# Patient Record
Sex: FEMALE | Race: WHITE | NOT HISPANIC OR LATINO | Employment: OTHER | URBAN - METROPOLITAN AREA
[De-identification: names, ages, dates, MRNs, and addresses within clinical notes are randomized per-mention and may not be internally consistent; named-entity substitution may affect disease eponyms.]

---

## 2017-01-04 ENCOUNTER — COMMUNICATION - HEALTHEAST (OUTPATIENT)
Dept: INTERNAL MEDICINE | Facility: CLINIC | Age: 67
End: 2017-01-04

## 2017-01-04 DIAGNOSIS — E13.9 DIABETES 1.5, MANAGED AS TYPE 2 (H): ICD-10-CM

## 2017-02-18 ENCOUNTER — COMMUNICATION - HEALTHEAST (OUTPATIENT)
Dept: INTERNAL MEDICINE | Facility: CLINIC | Age: 67
End: 2017-02-18

## 2017-02-26 ENCOUNTER — COMMUNICATION - HEALTHEAST (OUTPATIENT)
Dept: INTERNAL MEDICINE | Facility: CLINIC | Age: 67
End: 2017-02-26

## 2017-02-26 DIAGNOSIS — E03.9 HYPOTHYROIDISM: ICD-10-CM

## 2017-03-05 ENCOUNTER — COMMUNICATION - HEALTHEAST (OUTPATIENT)
Dept: INTERNAL MEDICINE | Facility: CLINIC | Age: 67
End: 2017-03-05

## 2017-03-05 DIAGNOSIS — E03.9 HYPOTHYROIDISM: ICD-10-CM

## 2017-03-18 ENCOUNTER — COMMUNICATION - HEALTHEAST (OUTPATIENT)
Dept: ONCOLOGY | Facility: HOSPITAL | Age: 67
End: 2017-03-18

## 2017-03-18 DIAGNOSIS — K21.9 GASTROESOPHAGEAL REFLUX DISEASE WITHOUT ESOPHAGITIS: ICD-10-CM

## 2017-03-24 ENCOUNTER — RECORDS - HEALTHEAST (OUTPATIENT)
Dept: ADMINISTRATIVE | Facility: OTHER | Age: 67
End: 2017-03-24

## 2017-03-29 ENCOUNTER — RECORDS - HEALTHEAST (OUTPATIENT)
Dept: MAMMOGRAPHY | Facility: CLINIC | Age: 67
End: 2017-03-29

## 2017-03-29 ENCOUNTER — RECORDS - HEALTHEAST (OUTPATIENT)
Dept: BONE DENSITY | Facility: CLINIC | Age: 67
End: 2017-03-29

## 2017-03-29 ENCOUNTER — RECORDS - HEALTHEAST (OUTPATIENT)
Dept: ADMINISTRATIVE | Facility: OTHER | Age: 67
End: 2017-03-29

## 2017-03-29 ENCOUNTER — OFFICE VISIT - HEALTHEAST (OUTPATIENT)
Dept: INTERNAL MEDICINE | Facility: CLINIC | Age: 67
End: 2017-03-29

## 2017-03-29 DIAGNOSIS — J44.9 COPD (CHRONIC OBSTRUCTIVE PULMONARY DISEASE) (H): ICD-10-CM

## 2017-03-29 DIAGNOSIS — C34.91 NON-SMALL CELL CANCER OF RIGHT LUNG (H): ICD-10-CM

## 2017-03-29 DIAGNOSIS — J44.0 COPD (CHRONIC OBSTRUCTIVE PULMONARY DISEASE) WITH ACUTE BRONCHITIS (H): ICD-10-CM

## 2017-03-29 DIAGNOSIS — E13.9 DIABETES 1.5, MANAGED AS TYPE 2 (H): ICD-10-CM

## 2017-03-29 DIAGNOSIS — H49.22: ICD-10-CM

## 2017-03-29 DIAGNOSIS — J70.0 ACUTE PULMONARY MANIFESTATIONS DUE TO RADIATION (H): ICD-10-CM

## 2017-03-29 DIAGNOSIS — E11.9 DIABETES MELLITUS (H): ICD-10-CM

## 2017-03-29 DIAGNOSIS — Z12.31 ENCOUNTER FOR SCREENING MAMMOGRAM FOR MALIGNANT NEOPLASM OF BREAST: ICD-10-CM

## 2017-03-29 DIAGNOSIS — E03.9 HYPOTHYROIDISM, UNSPECIFIED TYPE: ICD-10-CM

## 2017-03-29 DIAGNOSIS — J20.9 COPD (CHRONIC OBSTRUCTIVE PULMONARY DISEASE) WITH ACUTE BRONCHITIS (H): ICD-10-CM

## 2017-03-29 DIAGNOSIS — Z13.820 ENCOUNTER FOR SCREENING FOR OSTEOPOROSIS: ICD-10-CM

## 2017-03-29 DIAGNOSIS — Z13.820 SCREENING FOR OSTEOPOROSIS: ICD-10-CM

## 2017-03-29 LAB — HBA1C MFR BLD: 7.1 % (ref 3.5–6)

## 2017-03-29 ASSESSMENT — MIFFLIN-ST. JEOR: SCORE: 1376.35

## 2017-03-31 ENCOUNTER — HOSPITAL ENCOUNTER (OUTPATIENT)
Dept: CT IMAGING | Facility: HOSPITAL | Age: 67
Setting detail: RADIATION/ONCOLOGY SERIES
Discharge: STILL ADMITTED OR ADMINISTRATIVE | End: 2017-03-31
Attending: INTERNAL MEDICINE

## 2017-03-31 DIAGNOSIS — C34.91 NON-SMALL CELL CANCER OF RIGHT LUNG (H): ICD-10-CM

## 2017-03-31 DIAGNOSIS — E13.9 DIABETES 1.5, MANAGED AS TYPE 2 (H): ICD-10-CM

## 2017-03-31 DIAGNOSIS — J44.9 COPD (CHRONIC OBSTRUCTIVE PULMONARY DISEASE) (H): ICD-10-CM

## 2017-03-31 DIAGNOSIS — J70.0 ACUTE PULMONARY MANIFESTATIONS DUE TO RADIATION (H): ICD-10-CM

## 2017-04-05 ENCOUNTER — OFFICE VISIT - HEALTHEAST (OUTPATIENT)
Dept: ONCOLOGY | Facility: HOSPITAL | Age: 67
End: 2017-04-05

## 2017-04-05 DIAGNOSIS — C34.91 NON-SMALL CELL CANCER OF RIGHT LUNG (H): ICD-10-CM

## 2017-04-05 DIAGNOSIS — H49.22: ICD-10-CM

## 2017-04-06 ENCOUNTER — COMMUNICATION - HEALTHEAST (OUTPATIENT)
Dept: ONCOLOGY | Facility: CLINIC | Age: 67
End: 2017-04-06

## 2017-04-18 ENCOUNTER — COMMUNICATION - HEALTHEAST (OUTPATIENT)
Dept: INTERNAL MEDICINE | Facility: CLINIC | Age: 67
End: 2017-04-18

## 2017-04-18 DIAGNOSIS — J44.9 COPD (CHRONIC OBSTRUCTIVE PULMONARY DISEASE) (H): ICD-10-CM

## 2017-05-02 ENCOUNTER — COMMUNICATION - HEALTHEAST (OUTPATIENT)
Dept: INTERNAL MEDICINE | Facility: CLINIC | Age: 67
End: 2017-05-02

## 2017-05-02 DIAGNOSIS — E78.5 HYPERLIPIDEMIA: ICD-10-CM

## 2017-05-04 ENCOUNTER — COMMUNICATION - HEALTHEAST (OUTPATIENT)
Dept: ONCOLOGY | Facility: HOSPITAL | Age: 67
End: 2017-05-04

## 2017-05-04 DIAGNOSIS — I77.89 OTHER SPECIFIED DISORDERS OF ARTERIES AND ARTERIOLES (H): ICD-10-CM

## 2017-05-04 DIAGNOSIS — H53.2 DOUBLE VISION: ICD-10-CM

## 2017-05-08 ENCOUNTER — HOSPITAL ENCOUNTER (OUTPATIENT)
Dept: MRI IMAGING | Facility: HOSPITAL | Age: 67
Discharge: HOME | End: 2017-05-08
Attending: INTERNAL MEDICINE

## 2017-05-08 DIAGNOSIS — H53.2 DOUBLE VISION: ICD-10-CM

## 2017-05-08 DIAGNOSIS — I77.89 OTHER SPECIFIED DISORDERS OF ARTERIES AND ARTERIOLES (H): ICD-10-CM

## 2017-05-09 ENCOUNTER — COMMUNICATION - HEALTHEAST (OUTPATIENT)
Dept: ONCOLOGY | Facility: HOSPITAL | Age: 67
End: 2017-05-09

## 2017-05-09 DIAGNOSIS — J32.9 SINUSITIS: ICD-10-CM

## 2017-05-18 ENCOUNTER — COMMUNICATION - HEALTHEAST (OUTPATIENT)
Dept: ONCOLOGY | Facility: HOSPITAL | Age: 67
End: 2017-05-18

## 2017-05-18 ENCOUNTER — COMMUNICATION - HEALTHEAST (OUTPATIENT)
Dept: INTERNAL MEDICINE | Facility: CLINIC | Age: 67
End: 2017-05-18

## 2017-05-18 DIAGNOSIS — J44.9 COPD (CHRONIC OBSTRUCTIVE PULMONARY DISEASE) (H): ICD-10-CM

## 2017-05-23 ENCOUNTER — RECORDS - HEALTHEAST (OUTPATIENT)
Dept: ADMINISTRATIVE | Facility: OTHER | Age: 67
End: 2017-05-23

## 2017-05-23 ENCOUNTER — AMBULATORY - HEALTHEAST (OUTPATIENT)
Dept: LAB | Facility: CLINIC | Age: 67
End: 2017-05-23

## 2017-05-23 DIAGNOSIS — H49.01 THIRD NERVE PALSY OF RIGHT EYE: ICD-10-CM

## 2017-05-28 ENCOUNTER — COMMUNICATION - HEALTHEAST (OUTPATIENT)
Dept: INTERNAL MEDICINE | Facility: CLINIC | Age: 67
End: 2017-05-28

## 2017-05-28 DIAGNOSIS — E03.9 HYPOTHYROIDISM: ICD-10-CM

## 2017-06-12 ENCOUNTER — RECORDS - HEALTHEAST (OUTPATIENT)
Dept: ADMINISTRATIVE | Facility: OTHER | Age: 67
End: 2017-06-12

## 2017-06-21 ENCOUNTER — COMMUNICATION - HEALTHEAST (OUTPATIENT)
Dept: INTERNAL MEDICINE | Facility: CLINIC | Age: 67
End: 2017-06-21

## 2017-06-21 DIAGNOSIS — E13.9 DIABETES 1.5, MANAGED AS TYPE 2 (H): ICD-10-CM

## 2017-06-22 ENCOUNTER — COMMUNICATION - HEALTHEAST (OUTPATIENT)
Dept: INTERNAL MEDICINE | Facility: CLINIC | Age: 67
End: 2017-06-22

## 2017-06-22 DIAGNOSIS — E03.9 HYPOTHYROIDISM: ICD-10-CM

## 2017-06-29 ENCOUNTER — COMMUNICATION - HEALTHEAST (OUTPATIENT)
Dept: INTERNAL MEDICINE | Facility: CLINIC | Age: 67
End: 2017-06-29

## 2017-06-29 DIAGNOSIS — E13.9 DIABETES 1.5, MANAGED AS TYPE 2 (H): ICD-10-CM

## 2017-07-11 ENCOUNTER — RECORDS - HEALTHEAST (OUTPATIENT)
Dept: ADMINISTRATIVE | Facility: OTHER | Age: 67
End: 2017-07-11

## 2017-07-18 ENCOUNTER — COMMUNICATION - HEALTHEAST (OUTPATIENT)
Dept: ONCOLOGY | Facility: HOSPITAL | Age: 67
End: 2017-07-18

## 2017-07-21 ENCOUNTER — COMMUNICATION - HEALTHEAST (OUTPATIENT)
Dept: INTERNAL MEDICINE | Facility: CLINIC | Age: 67
End: 2017-07-21

## 2017-07-21 DIAGNOSIS — J44.89 CHRONIC AIRWAY OBSTRUCTION, NOT ELSEWHERE CLASSIFIED: ICD-10-CM

## 2017-09-10 ENCOUNTER — COMMUNICATION - HEALTHEAST (OUTPATIENT)
Dept: INTERNAL MEDICINE | Facility: CLINIC | Age: 67
End: 2017-09-10

## 2017-09-10 DIAGNOSIS — I10 HYPERTENSION: ICD-10-CM

## 2017-09-11 ENCOUNTER — COMMUNICATION - HEALTHEAST (OUTPATIENT)
Dept: INTERNAL MEDICINE | Facility: CLINIC | Age: 67
End: 2017-09-11

## 2017-09-11 DIAGNOSIS — J44.9 COPD (CHRONIC OBSTRUCTIVE PULMONARY DISEASE) (H): ICD-10-CM

## 2017-09-17 ENCOUNTER — COMMUNICATION - HEALTHEAST (OUTPATIENT)
Dept: INTERNAL MEDICINE | Facility: CLINIC | Age: 67
End: 2017-09-17

## 2017-09-17 DIAGNOSIS — J44.9 COPD (CHRONIC OBSTRUCTIVE PULMONARY DISEASE) (H): ICD-10-CM

## 2017-09-17 DIAGNOSIS — E13.9 DIABETES 1.5, MANAGED AS TYPE 2 (H): ICD-10-CM

## 2017-10-14 ENCOUNTER — COMMUNICATION - HEALTHEAST (OUTPATIENT)
Dept: ONCOLOGY | Facility: HOSPITAL | Age: 67
End: 2017-10-14

## 2017-10-14 DIAGNOSIS — K21.9 GASTROESOPHAGEAL REFLUX DISEASE WITHOUT ESOPHAGITIS: ICD-10-CM

## 2017-10-20 ENCOUNTER — COMMUNICATION - HEALTHEAST (OUTPATIENT)
Dept: INTERNAL MEDICINE | Facility: CLINIC | Age: 67
End: 2017-10-20

## 2017-10-24 ENCOUNTER — OFFICE VISIT - HEALTHEAST (OUTPATIENT)
Dept: INTERNAL MEDICINE | Facility: CLINIC | Age: 67
End: 2017-10-24

## 2017-10-24 DIAGNOSIS — E11.9 DIABETES MELLITUS (H): ICD-10-CM

## 2017-10-24 DIAGNOSIS — H91.90 HEARING LOSS: ICD-10-CM

## 2017-10-24 DIAGNOSIS — Z00.00 HEALTHCARE MAINTENANCE: ICD-10-CM

## 2017-10-24 DIAGNOSIS — J01.90 ACUTE SINUSITIS: ICD-10-CM

## 2017-10-24 DIAGNOSIS — L30.9 ECZEMA OF BOTH HANDS: ICD-10-CM

## 2017-10-24 LAB — HBA1C MFR BLD: 6.2 % (ref 3.5–6)

## 2017-11-09 ENCOUNTER — COMMUNICATION - HEALTHEAST (OUTPATIENT)
Dept: SCHEDULING | Facility: CLINIC | Age: 67
End: 2017-11-09

## 2017-11-19 ENCOUNTER — COMMUNICATION - HEALTHEAST (OUTPATIENT)
Dept: INTERNAL MEDICINE | Facility: CLINIC | Age: 67
End: 2017-11-19

## 2017-11-19 DIAGNOSIS — E03.9 HYPOTHYROIDISM: ICD-10-CM

## 2017-11-30 ENCOUNTER — COMMUNICATION - HEALTHEAST (OUTPATIENT)
Dept: INTERNAL MEDICINE | Facility: CLINIC | Age: 67
End: 2017-11-30

## 2017-11-30 DIAGNOSIS — Z51.81 MEDICATION MONITORING ENCOUNTER: ICD-10-CM

## 2017-12-06 ENCOUNTER — OFFICE VISIT - HEALTHEAST (OUTPATIENT)
Dept: OTOLARYNGOLOGY | Facility: CLINIC | Age: 67
End: 2017-12-06

## 2017-12-06 ENCOUNTER — OFFICE VISIT - HEALTHEAST (OUTPATIENT)
Dept: AUDIOLOGY | Facility: CLINIC | Age: 67
End: 2017-12-06

## 2017-12-06 DIAGNOSIS — H60.41 KERATOSIS OBTURANS OF EXTERNAL EAR CANAL, RIGHT: ICD-10-CM

## 2017-12-06 DIAGNOSIS — H92.01 EAR PAIN, RIGHT: ICD-10-CM

## 2017-12-06 DIAGNOSIS — H91.91 DIFFICULTY HEARING, RIGHT: ICD-10-CM

## 2017-12-06 DIAGNOSIS — H60.311 ACUTE DIFFUSE OTITIS EXTERNA OF RIGHT EAR: ICD-10-CM

## 2017-12-06 DIAGNOSIS — H93.8X3 PLUGGED FEELING IN EAR, BILATERAL: ICD-10-CM

## 2017-12-06 DIAGNOSIS — H93.11 TINNITUS OF RIGHT EAR: ICD-10-CM

## 2017-12-06 DIAGNOSIS — H61.21 IMPACTED CERUMEN OF RIGHT EAR: ICD-10-CM

## 2017-12-13 ENCOUNTER — OFFICE VISIT - HEALTHEAST (OUTPATIENT)
Dept: OTOLARYNGOLOGY | Facility: CLINIC | Age: 67
End: 2017-12-13

## 2017-12-13 DIAGNOSIS — H60.41 KERATOSIS OBTURANS OF EXTERNAL EAR CANAL, RIGHT: ICD-10-CM

## 2017-12-19 ENCOUNTER — COMMUNICATION - HEALTHEAST (OUTPATIENT)
Dept: INTERNAL MEDICINE | Facility: CLINIC | Age: 67
End: 2017-12-19

## 2017-12-19 DIAGNOSIS — E13.9 DIABETES 1.5, MANAGED AS TYPE 2 (H): ICD-10-CM

## 2017-12-22 ENCOUNTER — COMMUNICATION - HEALTHEAST (OUTPATIENT)
Dept: INTERNAL MEDICINE | Facility: CLINIC | Age: 67
End: 2017-12-22

## 2017-12-22 DIAGNOSIS — E13.9 DIABETES 1.5, MANAGED AS TYPE 2 (H): ICD-10-CM

## 2018-01-02 ENCOUNTER — COMMUNICATION - HEALTHEAST (OUTPATIENT)
Dept: INTERNAL MEDICINE | Facility: CLINIC | Age: 68
End: 2018-01-02

## 2018-01-02 DIAGNOSIS — J20.9 COPD (CHRONIC OBSTRUCTIVE PULMONARY DISEASE) WITH ACUTE BRONCHITIS (H): ICD-10-CM

## 2018-01-02 DIAGNOSIS — J44.0 COPD (CHRONIC OBSTRUCTIVE PULMONARY DISEASE) WITH ACUTE BRONCHITIS (H): ICD-10-CM

## 2018-01-24 ENCOUNTER — OFFICE VISIT - HEALTHEAST (OUTPATIENT)
Dept: OTOLARYNGOLOGY | Facility: CLINIC | Age: 68
End: 2018-01-24

## 2018-01-24 DIAGNOSIS — H60.41 KERATOSIS OBTURANS OF EXTERNAL EAR CANAL, RIGHT: ICD-10-CM

## 2018-02-25 ENCOUNTER — COMMUNICATION - HEALTHEAST (OUTPATIENT)
Dept: INTERNAL MEDICINE | Facility: CLINIC | Age: 68
End: 2018-02-25

## 2018-02-25 DIAGNOSIS — E78.5 HYPERLIPIDEMIA: ICD-10-CM

## 2018-02-26 ENCOUNTER — COMMUNICATION - HEALTHEAST (OUTPATIENT)
Dept: INTERNAL MEDICINE | Facility: CLINIC | Age: 68
End: 2018-02-26

## 2018-02-26 DIAGNOSIS — J44.9 COPD (CHRONIC OBSTRUCTIVE PULMONARY DISEASE) (H): ICD-10-CM

## 2018-02-28 ENCOUNTER — COMMUNICATION - HEALTHEAST (OUTPATIENT)
Dept: INTERNAL MEDICINE | Facility: CLINIC | Age: 68
End: 2018-02-28

## 2018-02-28 DIAGNOSIS — E03.9 HYPOTHYROIDISM: ICD-10-CM

## 2018-03-26 ENCOUNTER — COMMUNICATION - HEALTHEAST (OUTPATIENT)
Dept: INTERNAL MEDICINE | Facility: CLINIC | Age: 68
End: 2018-03-26

## 2018-03-26 DIAGNOSIS — E13.9 DIABETES 1.5, MANAGED AS TYPE 2 (H): ICD-10-CM

## 2018-03-29 ENCOUNTER — HOSPITAL ENCOUNTER (OUTPATIENT)
Dept: CT IMAGING | Facility: HOSPITAL | Age: 68
Setting detail: RADIATION/ONCOLOGY SERIES
Discharge: STILL ADMITTED OR ADMINISTRATIVE | End: 2018-03-29
Attending: INTERNAL MEDICINE

## 2018-03-29 DIAGNOSIS — C34.91 NON-SMALL CELL CANCER OF RIGHT LUNG (H): ICD-10-CM

## 2018-03-29 DIAGNOSIS — H49.22: ICD-10-CM

## 2018-04-02 ENCOUNTER — OFFICE VISIT - HEALTHEAST (OUTPATIENT)
Dept: ONCOLOGY | Facility: HOSPITAL | Age: 68
End: 2018-04-02

## 2018-04-02 ENCOUNTER — AMBULATORY - HEALTHEAST (OUTPATIENT)
Dept: INFUSION THERAPY | Facility: HOSPITAL | Age: 68
End: 2018-04-02

## 2018-04-02 ENCOUNTER — COMMUNICATION - HEALTHEAST (OUTPATIENT)
Dept: INTERNAL MEDICINE | Facility: CLINIC | Age: 68
End: 2018-04-02

## 2018-04-02 DIAGNOSIS — C34.91 NON-SMALL CELL CANCER OF RIGHT LUNG (H): ICD-10-CM

## 2018-04-02 DIAGNOSIS — J44.9 COPD (CHRONIC OBSTRUCTIVE PULMONARY DISEASE) (H): ICD-10-CM

## 2018-04-02 DIAGNOSIS — H49.22: ICD-10-CM

## 2018-04-02 LAB
ALBUMIN SERPL-MCNC: 3.6 G/DL (ref 3.5–5)
ALP SERPL-CCNC: 84 U/L (ref 45–120)
ALT SERPL W P-5'-P-CCNC: 20 U/L (ref 0–45)
ANION GAP SERPL CALCULATED.3IONS-SCNC: 9 MMOL/L (ref 5–18)
AST SERPL W P-5'-P-CCNC: 22 U/L (ref 0–40)
BASOPHILS # BLD AUTO: 0.1 THOU/UL (ref 0–0.2)
BASOPHILS NFR BLD AUTO: 1 % (ref 0–2)
BILIRUB SERPL-MCNC: 0.5 MG/DL (ref 0–1)
BUN SERPL-MCNC: 15 MG/DL (ref 8–22)
CALCIUM SERPL-MCNC: 9.5 MG/DL (ref 8.5–10.5)
CHLORIDE BLD-SCNC: 99 MMOL/L (ref 98–107)
CO2 SERPL-SCNC: 32 MMOL/L (ref 22–31)
CREAT SERPL-MCNC: 1.11 MG/DL (ref 0.6–1.1)
EOSINOPHIL # BLD AUTO: 0.1 THOU/UL (ref 0–0.4)
EOSINOPHIL NFR BLD AUTO: 1 % (ref 0–6)
ERYTHROCYTE [DISTWIDTH] IN BLOOD BY AUTOMATED COUNT: 13.3 % (ref 11–14.5)
GFR SERPL CREATININE-BSD FRML MDRD: 49 ML/MIN/1.73M2
GLUCOSE BLD-MCNC: 131 MG/DL (ref 70–125)
HCT VFR BLD AUTO: 38.6 % (ref 35–47)
HGB BLD-MCNC: 12.8 G/DL (ref 12–16)
LYMPHOCYTES # BLD AUTO: 2.4 THOU/UL (ref 0.8–4.4)
LYMPHOCYTES NFR BLD AUTO: 25 % (ref 20–40)
MCH RBC QN AUTO: 29.8 PG (ref 27–34)
MCHC RBC AUTO-ENTMCNC: 33.2 G/DL (ref 32–36)
MCV RBC AUTO: 90 FL (ref 80–100)
MONOCYTES # BLD AUTO: 1.1 THOU/UL (ref 0–0.9)
MONOCYTES NFR BLD AUTO: 11 % (ref 2–10)
NEUTROPHILS # BLD AUTO: 5.9 THOU/UL (ref 2–7.7)
NEUTROPHILS NFR BLD AUTO: 62 % (ref 50–70)
PLATELET # BLD AUTO: 233 THOU/UL (ref 140–440)
PMV BLD AUTO: 10.9 FL (ref 8.5–12.5)
POTASSIUM BLD-SCNC: 4.3 MMOL/L (ref 3.5–5)
PROT SERPL-MCNC: 7.4 G/DL (ref 6–8)
RBC # BLD AUTO: 4.3 MILL/UL (ref 3.8–5.4)
SODIUM SERPL-SCNC: 140 MMOL/L (ref 136–145)
WBC: 9.6 THOU/UL (ref 4–11)

## 2018-04-03 ENCOUNTER — RECORDS - HEALTHEAST (OUTPATIENT)
Dept: ADMINISTRATIVE | Facility: OTHER | Age: 68
End: 2018-04-03

## 2018-04-03 LAB — RETINOPATHY: NEGATIVE

## 2018-04-17 ENCOUNTER — COMMUNICATION - HEALTHEAST (OUTPATIENT)
Dept: INTERNAL MEDICINE | Facility: CLINIC | Age: 68
End: 2018-04-17

## 2018-04-25 ENCOUNTER — COMMUNICATION - HEALTHEAST (OUTPATIENT)
Dept: ONCOLOGY | Facility: CLINIC | Age: 68
End: 2018-04-25

## 2018-04-25 ENCOUNTER — AMBULATORY - HEALTHEAST (OUTPATIENT)
Dept: ONCOLOGY | Facility: CLINIC | Age: 68
End: 2018-04-25

## 2018-05-26 ENCOUNTER — COMMUNICATION - HEALTHEAST (OUTPATIENT)
Dept: INTERNAL MEDICINE | Facility: CLINIC | Age: 68
End: 2018-05-26

## 2018-05-26 DIAGNOSIS — J44.9 COPD (CHRONIC OBSTRUCTIVE PULMONARY DISEASE) (H): ICD-10-CM

## 2018-06-10 ENCOUNTER — COMMUNICATION - HEALTHEAST (OUTPATIENT)
Dept: INTERNAL MEDICINE | Facility: CLINIC | Age: 68
End: 2018-06-10

## 2018-06-10 DIAGNOSIS — E03.9 HYPOTHYROIDISM: ICD-10-CM

## 2018-06-10 DIAGNOSIS — E78.5 HYPERLIPIDEMIA: ICD-10-CM

## 2018-06-10 DIAGNOSIS — I10 HYPERTENSION: ICD-10-CM

## 2018-06-16 ENCOUNTER — COMMUNICATION - HEALTHEAST (OUTPATIENT)
Dept: INTERNAL MEDICINE | Facility: CLINIC | Age: 68
End: 2018-06-16

## 2018-06-16 DIAGNOSIS — E13.9 DIABETES 1.5, MANAGED AS TYPE 2 (H): ICD-10-CM

## 2018-06-23 ENCOUNTER — COMMUNICATION - HEALTHEAST (OUTPATIENT)
Dept: INTERNAL MEDICINE | Facility: CLINIC | Age: 68
End: 2018-06-23

## 2018-06-23 DIAGNOSIS — E13.9 DIABETES 1.5, MANAGED AS TYPE 2 (H): ICD-10-CM

## 2018-07-11 ENCOUNTER — COMMUNICATION - HEALTHEAST (OUTPATIENT)
Dept: INTERNAL MEDICINE | Facility: CLINIC | Age: 68
End: 2018-07-11

## 2018-07-11 DIAGNOSIS — I10 HYPERTENSION: ICD-10-CM

## 2018-07-25 ENCOUNTER — COMMUNICATION - HEALTHEAST (OUTPATIENT)
Dept: INTERNAL MEDICINE | Facility: CLINIC | Age: 68
End: 2018-07-25

## 2018-07-25 ENCOUNTER — OFFICE VISIT - HEALTHEAST (OUTPATIENT)
Dept: OTOLARYNGOLOGY | Facility: CLINIC | Age: 68
End: 2018-07-25

## 2018-07-25 DIAGNOSIS — J44.9 COPD (CHRONIC OBSTRUCTIVE PULMONARY DISEASE) (H): ICD-10-CM

## 2018-07-25 DIAGNOSIS — H60.41 KERATOSIS OBTURANS OF EXTERNAL EAR CANAL, RIGHT: ICD-10-CM

## 2018-08-02 ENCOUNTER — COMMUNICATION - HEALTHEAST (OUTPATIENT)
Dept: INTERNAL MEDICINE | Facility: CLINIC | Age: 68
End: 2018-08-02

## 2018-08-02 DIAGNOSIS — E03.9 HYPOTHYROIDISM: ICD-10-CM

## 2018-08-25 ENCOUNTER — COMMUNICATION - HEALTHEAST (OUTPATIENT)
Dept: INTERNAL MEDICINE | Facility: CLINIC | Age: 68
End: 2018-08-25

## 2018-08-25 DIAGNOSIS — J44.9 COPD (CHRONIC OBSTRUCTIVE PULMONARY DISEASE) (H): ICD-10-CM

## 2018-08-27 ENCOUNTER — RECORDS - HEALTHEAST (OUTPATIENT)
Dept: ADMINISTRATIVE | Facility: OTHER | Age: 68
End: 2018-08-27

## 2018-08-27 ENCOUNTER — OFFICE VISIT - HEALTHEAST (OUTPATIENT)
Dept: INTERNAL MEDICINE | Facility: CLINIC | Age: 68
End: 2018-08-27

## 2018-08-27 DIAGNOSIS — C34.91 NON-SMALL CELL CANCER OF RIGHT LUNG (H): ICD-10-CM

## 2018-08-27 DIAGNOSIS — H49.22: ICD-10-CM

## 2018-08-27 DIAGNOSIS — H60.41 KERATOSIS OBTURANS OF EXTERNAL EAR CANAL, RIGHT: ICD-10-CM

## 2018-08-27 DIAGNOSIS — G47.33 OSA ON CPAP: ICD-10-CM

## 2018-08-27 DIAGNOSIS — E03.9 HYPOTHYROIDISM, UNSPECIFIED TYPE: ICD-10-CM

## 2018-08-27 DIAGNOSIS — E13.9 DIABETES 1.5, MANAGED AS TYPE 2 (H): ICD-10-CM

## 2018-08-27 LAB
CREAT UR-MCNC: 20.8 MG/DL
HBA1C MFR BLD: 6.8 % (ref 3.5–6)
MICROALBUMIN UR-MCNC: <0.5 MG/DL (ref 0–1.99)
MICROALBUMIN/CREAT UR: NORMAL MG/G
TSH SERPL DL<=0.005 MIU/L-ACNC: 1.36 UIU/ML (ref 0.3–5)

## 2018-09-01 ENCOUNTER — COMMUNICATION - HEALTHEAST (OUTPATIENT)
Dept: INTERNAL MEDICINE | Facility: CLINIC | Age: 68
End: 2018-09-01

## 2018-09-01 DIAGNOSIS — E13.9 DIABETES 1.5, MANAGED AS TYPE 2 (H): ICD-10-CM

## 2018-09-01 DIAGNOSIS — E11.8 TYPE 2 DIABETES MELLITUS WITH COMPLICATION, WITHOUT LONG-TERM CURRENT USE OF INSULIN (H): ICD-10-CM

## 2018-09-30 ENCOUNTER — COMMUNICATION - HEALTHEAST (OUTPATIENT)
Dept: INTERNAL MEDICINE | Facility: CLINIC | Age: 68
End: 2018-09-30

## 2018-09-30 DIAGNOSIS — E03.9 HYPOTHYROIDISM: ICD-10-CM

## 2018-09-30 DIAGNOSIS — Z51.81 MEDICATION MONITORING ENCOUNTER: ICD-10-CM

## 2018-10-14 ENCOUNTER — COMMUNICATION - HEALTHEAST (OUTPATIENT)
Dept: INTERNAL MEDICINE | Facility: CLINIC | Age: 68
End: 2018-10-14

## 2018-10-14 DIAGNOSIS — J44.9 COPD (CHRONIC OBSTRUCTIVE PULMONARY DISEASE) (H): ICD-10-CM

## 2018-11-28 ENCOUNTER — OFFICE VISIT - HEALTHEAST (OUTPATIENT)
Dept: INTERNAL MEDICINE | Facility: CLINIC | Age: 68
End: 2018-11-28

## 2018-11-28 DIAGNOSIS — J44.1 COPD EXACERBATION (H): ICD-10-CM

## 2018-11-28 ASSESSMENT — MIFFLIN-ST. JEOR: SCORE: 1355.03

## 2018-12-02 ENCOUNTER — COMMUNICATION - HEALTHEAST (OUTPATIENT)
Dept: INTERNAL MEDICINE | Facility: CLINIC | Age: 68
End: 2018-12-02

## 2018-12-02 DIAGNOSIS — E03.9 HYPOTHYROIDISM: ICD-10-CM

## 2018-12-10 ENCOUNTER — HOSPITAL ENCOUNTER (OUTPATIENT)
Dept: CT IMAGING | Facility: HOSPITAL | Age: 68
Discharge: HOME | End: 2018-12-10
Attending: INTERNAL MEDICINE

## 2018-12-10 DIAGNOSIS — C34.91 NON-SMALL CELL CANCER OF RIGHT LUNG (H): ICD-10-CM

## 2018-12-12 ENCOUNTER — AMBULATORY - HEALTHEAST (OUTPATIENT)
Dept: INFUSION THERAPY | Facility: HOSPITAL | Age: 68
End: 2018-12-12

## 2018-12-12 ENCOUNTER — AMBULATORY - HEALTHEAST (OUTPATIENT)
Dept: ONCOLOGY | Facility: HOSPITAL | Age: 68
End: 2018-12-12

## 2018-12-12 ENCOUNTER — OFFICE VISIT - HEALTHEAST (OUTPATIENT)
Dept: ONCOLOGY | Facility: HOSPITAL | Age: 68
End: 2018-12-12

## 2018-12-12 DIAGNOSIS — C34.91 NON-SMALL CELL CANCER OF RIGHT LUNG (H): ICD-10-CM

## 2018-12-12 LAB
ALBUMIN SERPL-MCNC: 3.8 G/DL (ref 3.5–5)
ALP SERPL-CCNC: 72 U/L (ref 45–120)
ALT SERPL W P-5'-P-CCNC: 22 U/L (ref 0–45)
ANION GAP SERPL CALCULATED.3IONS-SCNC: 9 MMOL/L (ref 5–18)
AST SERPL W P-5'-P-CCNC: 19 U/L (ref 0–40)
BASOPHILS # BLD AUTO: 0.1 THOU/UL (ref 0–0.2)
BASOPHILS NFR BLD AUTO: 1 % (ref 0–2)
BILIRUB SERPL-MCNC: 0.7 MG/DL (ref 0–1)
BUN SERPL-MCNC: 26 MG/DL (ref 8–22)
CALCIUM SERPL-MCNC: 9.4 MG/DL (ref 8.5–10.5)
CHLORIDE BLD-SCNC: 97 MMOL/L (ref 98–107)
CO2 SERPL-SCNC: 33 MMOL/L (ref 22–31)
CREAT SERPL-MCNC: 1.1 MG/DL (ref 0.6–1.1)
EOSINOPHIL # BLD AUTO: 0.3 THOU/UL (ref 0–0.4)
EOSINOPHIL NFR BLD AUTO: 2 % (ref 0–6)
ERYTHROCYTE [DISTWIDTH] IN BLOOD BY AUTOMATED COUNT: 14.1 % (ref 11–14.5)
GFR SERPL CREATININE-BSD FRML MDRD: 49 ML/MIN/1.73M2
GLUCOSE BLD-MCNC: 131 MG/DL (ref 70–125)
HCT VFR BLD AUTO: 42 % (ref 35–47)
HGB BLD-MCNC: 13.9 G/DL (ref 12–16)
LYMPHOCYTES # BLD AUTO: 4 THOU/UL (ref 0.8–4.4)
LYMPHOCYTES NFR BLD AUTO: 32 % (ref 20–40)
MCH RBC QN AUTO: 29.6 PG (ref 27–34)
MCHC RBC AUTO-ENTMCNC: 33.1 G/DL (ref 32–36)
MCV RBC AUTO: 90 FL (ref 80–100)
MONOCYTES # BLD AUTO: 1.3 THOU/UL (ref 0–0.9)
MONOCYTES NFR BLD AUTO: 11 % (ref 2–10)
NEUTROPHILS # BLD AUTO: 6.5 THOU/UL (ref 2–7.7)
NEUTROPHILS NFR BLD AUTO: 54 % (ref 50–70)
PLATELET # BLD AUTO: 281 THOU/UL (ref 140–440)
PMV BLD AUTO: 11.2 FL (ref 8.5–12.5)
POTASSIUM BLD-SCNC: 3.9 MMOL/L (ref 3.5–5)
PROT SERPL-MCNC: 6.9 G/DL (ref 6–8)
RBC # BLD AUTO: 4.69 MILL/UL (ref 3.8–5.4)
SODIUM SERPL-SCNC: 139 MMOL/L (ref 136–145)
WBC: 12.4 THOU/UL (ref 4–11)

## 2018-12-28 ENCOUNTER — COMMUNICATION - HEALTHEAST (OUTPATIENT)
Dept: SCHEDULING | Facility: CLINIC | Age: 68
End: 2018-12-28

## 2018-12-31 ENCOUNTER — OFFICE VISIT - HEALTHEAST (OUTPATIENT)
Dept: INTERNAL MEDICINE | Facility: CLINIC | Age: 68
End: 2018-12-31

## 2018-12-31 DIAGNOSIS — Z12.31 VISIT FOR SCREENING MAMMOGRAM: ICD-10-CM

## 2018-12-31 DIAGNOSIS — K59.1 FUNCTIONAL DIARRHEA: ICD-10-CM

## 2018-12-31 DIAGNOSIS — J20.9 ACUTE BRONCHITIS, UNSPECIFIED ORGANISM: ICD-10-CM

## 2018-12-31 DIAGNOSIS — E03.9 HYPOTHYROIDISM, UNSPECIFIED TYPE: ICD-10-CM

## 2018-12-31 DIAGNOSIS — E13.9 DIABETES 1.5, MANAGED AS TYPE 2 (H): ICD-10-CM

## 2018-12-31 DIAGNOSIS — J44.9 CHRONIC OBSTRUCTIVE PULMONARY DISEASE, UNSPECIFIED COPD TYPE (H): ICD-10-CM

## 2018-12-31 DIAGNOSIS — C34.91 NON-SMALL CELL CANCER OF RIGHT LUNG (H): ICD-10-CM

## 2018-12-31 ASSESSMENT — MIFFLIN-ST. JEOR: SCORE: 1333.72

## 2019-02-05 ENCOUNTER — COMMUNICATION - HEALTHEAST (OUTPATIENT)
Dept: INTERNAL MEDICINE | Facility: CLINIC | Age: 69
End: 2019-02-05

## 2019-02-05 DIAGNOSIS — J44.9 CHRONIC OBSTRUCTIVE PULMONARY DISEASE, UNSPECIFIED COPD TYPE (H): ICD-10-CM

## 2019-02-06 ENCOUNTER — OFFICE VISIT - HEALTHEAST (OUTPATIENT)
Dept: INTERNAL MEDICINE | Facility: CLINIC | Age: 69
End: 2019-02-06

## 2019-02-06 DIAGNOSIS — K21.9 GASTROESOPHAGEAL REFLUX DISEASE WITHOUT ESOPHAGITIS: ICD-10-CM

## 2019-02-06 DIAGNOSIS — K43.9 VENTRAL HERNIA WITHOUT OBSTRUCTION OR GANGRENE: ICD-10-CM

## 2019-02-06 DIAGNOSIS — E13.9 DIABETES 1.5, MANAGED AS TYPE 2 (H): ICD-10-CM

## 2019-02-06 LAB — HBA1C MFR BLD: 6.3 % (ref 3.5–6)

## 2019-02-06 ASSESSMENT — MIFFLIN-ST. JEOR: SCORE: 1340.97

## 2019-02-15 ENCOUNTER — COMMUNICATION - HEALTHEAST (OUTPATIENT)
Dept: INTERNAL MEDICINE | Facility: CLINIC | Age: 69
End: 2019-02-15

## 2019-02-15 DIAGNOSIS — J44.9 COPD (CHRONIC OBSTRUCTIVE PULMONARY DISEASE) (H): ICD-10-CM

## 2019-03-11 ENCOUNTER — RECORDS - HEALTHEAST (OUTPATIENT)
Dept: GENERAL RADIOLOGY | Facility: CLINIC | Age: 69
End: 2019-03-11

## 2019-03-11 ENCOUNTER — OFFICE VISIT - HEALTHEAST (OUTPATIENT)
Dept: INTERNAL MEDICINE | Facility: CLINIC | Age: 69
End: 2019-03-11

## 2019-03-11 ENCOUNTER — COMMUNICATION - HEALTHEAST (OUTPATIENT)
Dept: SCHEDULING | Facility: CLINIC | Age: 69
End: 2019-03-11

## 2019-03-11 DIAGNOSIS — E13.9 DIABETES 1.5, MANAGED AS TYPE 2 (H): ICD-10-CM

## 2019-03-11 DIAGNOSIS — M54.42 LUMBAGO WITH SCIATICA, LEFT SIDE: ICD-10-CM

## 2019-03-11 DIAGNOSIS — M25.552 HIP PAIN, LEFT: ICD-10-CM

## 2019-03-11 DIAGNOSIS — R07.81 PLEURODYNIA: ICD-10-CM

## 2019-03-11 DIAGNOSIS — G89.29 OTHER CHRONIC PAIN: ICD-10-CM

## 2019-03-11 DIAGNOSIS — R07.81 RIB PAIN ON RIGHT SIDE: ICD-10-CM

## 2019-03-11 DIAGNOSIS — M54.42 CHRONIC LEFT-SIDED LOW BACK PAIN WITH LEFT-SIDED SCIATICA: ICD-10-CM

## 2019-03-11 DIAGNOSIS — M25.552 PAIN IN LEFT HIP: ICD-10-CM

## 2019-03-11 DIAGNOSIS — G89.29 CHRONIC LEFT-SIDED LOW BACK PAIN WITH LEFT-SIDED SCIATICA: ICD-10-CM
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
## 2019-03-11 ASSESSMENT — MIFFLIN-ST. JEOR: SCORE: 1341.43

## 2019-03-12 ENCOUNTER — COMMUNICATION - HEALTHEAST (OUTPATIENT)
Dept: INTERNAL MEDICINE | Facility: CLINIC | Age: 69
End: 2019-03-12

## 2019-03-12 DIAGNOSIS — R07.81 RIB PAIN ON RIGHT SIDE: ICD-10-CM

## 2019-03-17 ENCOUNTER — COMMUNICATION - HEALTHEAST (OUTPATIENT)
Dept: INTERNAL MEDICINE | Facility: CLINIC | Age: 69
End: 2019-03-17

## 2019-03-17 DIAGNOSIS — R07.81 RIB PAIN ON RIGHT SIDE: ICD-10-CM

## 2019-03-25 ENCOUNTER — COMMUNICATION - HEALTHEAST (OUTPATIENT)
Dept: INTERNAL MEDICINE | Facility: CLINIC | Age: 69
End: 2019-03-25

## 2019-03-25 DIAGNOSIS — R07.81 RIB PAIN ON RIGHT SIDE: ICD-10-CM

## 2019-03-27 ASSESSMENT — MIFFLIN-ST. JEOR: SCORE: 1395.86

## 2019-03-28 ENCOUNTER — ANESTHESIA - HEALTHEAST (OUTPATIENT)
Dept: SURGERY | Facility: CLINIC | Age: 69
End: 2019-03-28

## 2019-03-28 ASSESSMENT — MIFFLIN-ST. JEOR: SCORE: 1448.48

## 2019-03-29 ENCOUNTER — SURGERY - HEALTHEAST (OUTPATIENT)
Dept: SURGERY | Facility: CLINIC | Age: 69
End: 2019-03-29

## 2019-03-29 ASSESSMENT — MIFFLIN-ST. JEOR: SCORE: 1454.37

## 2019-03-30 ASSESSMENT — MIFFLIN-ST. JEOR: SCORE: 1479.77

## 2019-03-31 ASSESSMENT — MIFFLIN-ST. JEOR: SCORE: 1506.08

## 2019-04-02 ASSESSMENT — MIFFLIN-ST. JEOR: SCORE: 1512.89

## 2019-04-04 ASSESSMENT — MIFFLIN-ST. JEOR: SCORE: 1386.79

## 2019-04-07 ASSESSMENT — MIFFLIN-ST. JEOR: SCORE: 1384.97

## 2019-04-08 ENCOUNTER — OFFICE VISIT - HEALTHEAST (OUTPATIENT)
Dept: GERIATRICS | Facility: CLINIC | Age: 69
End: 2019-04-08

## 2019-04-08 DIAGNOSIS — J44.9 CHRONIC OBSTRUCTIVE PULMONARY DISEASE, UNSPECIFIED COPD TYPE (H): ICD-10-CM

## 2019-04-08 DIAGNOSIS — S42.291K OTHER CLOSED DISPLACED FRACTURE OF PROXIMAL END OF RIGHT HUMERUS WITH NONUNION, SUBSEQUENT ENCOUNTER: ICD-10-CM

## 2019-04-08 DIAGNOSIS — E13.9 DIABETES 1.5, MANAGED AS TYPE 2 (H): ICD-10-CM

## 2019-04-08 DIAGNOSIS — Z86.19: ICD-10-CM

## 2019-04-08 DIAGNOSIS — C34.91 NON-SMALL CELL CANCER OF RIGHT LUNG (H): ICD-10-CM

## 2019-04-08 DIAGNOSIS — J90 CHRONIC PLEURAL EFFUSION: ICD-10-CM

## 2019-04-08 ASSESSMENT — MIFFLIN-ST. JEOR: SCORE: 1384.97

## 2019-04-10 ENCOUNTER — OFFICE VISIT - HEALTHEAST (OUTPATIENT)
Dept: GERIATRICS | Facility: CLINIC | Age: 69
End: 2019-04-10

## 2019-04-10 DIAGNOSIS — Z86.19: ICD-10-CM

## 2019-04-10 DIAGNOSIS — S42.291K OTHER CLOSED DISPLACED FRACTURE OF PROXIMAL END OF RIGHT HUMERUS WITH NONUNION, SUBSEQUENT ENCOUNTER: ICD-10-CM

## 2019-04-10 DIAGNOSIS — E13.9 DIABETES 1.5, MANAGED AS TYPE 2 (H): ICD-10-CM

## 2019-04-10 DIAGNOSIS — J90 CHRONIC PLEURAL EFFUSION: ICD-10-CM

## 2019-04-10 DIAGNOSIS — J44.9 CHRONIC OBSTRUCTIVE PULMONARY DISEASE, UNSPECIFIED COPD TYPE (H): ICD-10-CM

## 2019-04-10 DIAGNOSIS — C34.91 NON-SMALL CELL CANCER OF RIGHT LUNG (H): ICD-10-CM

## 2019-04-10 ASSESSMENT — MIFFLIN-ST. JEOR: SCORE: 1333.26

## 2019-04-11 ENCOUNTER — COMMUNICATION - HEALTHEAST (OUTPATIENT)
Dept: GERIATRICS | Facility: CLINIC | Age: 69
End: 2019-04-11

## 2019-04-11 ENCOUNTER — RECORDS - HEALTHEAST (OUTPATIENT)
Dept: LAB | Facility: CLINIC | Age: 69
End: 2019-04-11

## 2019-04-11 LAB
ALBUMIN UR-MCNC: NEGATIVE MG/DL
APPEARANCE UR: CLEAR
BILIRUB UR QL STRIP: NEGATIVE
COLOR UR AUTO: NORMAL
GLUCOSE UR STRIP-MCNC: NEGATIVE MG/DL
HGB UR QL STRIP: NEGATIVE
KETONES UR STRIP-MCNC: NEGATIVE MG/DL
LEUKOCYTE ESTERASE UR QL STRIP: NEGATIVE
NITRATE UR QL: NEGATIVE
PH UR STRIP: 7 [PH] (ref 4.5–8)
SP GR UR STRIP: 1.01 (ref 1–1.03)
UROBILINOGEN UR STRIP-ACNC: NORMAL

## 2019-04-12 ENCOUNTER — OFFICE VISIT - HEALTHEAST (OUTPATIENT)
Dept: GERIATRICS | Facility: CLINIC | Age: 69
End: 2019-04-12

## 2019-04-12 DIAGNOSIS — J44.9 CHRONIC OBSTRUCTIVE PULMONARY DISEASE, UNSPECIFIED COPD TYPE (H): ICD-10-CM

## 2019-04-12 DIAGNOSIS — F33.0 MILD EPISODE OF RECURRENT MAJOR DEPRESSIVE DISORDER (H): ICD-10-CM

## 2019-04-12 DIAGNOSIS — A41.9 SEPTIC SHOCK (H): ICD-10-CM

## 2019-04-12 DIAGNOSIS — E13.9 DIABETES 1.5, MANAGED AS TYPE 2 (H): ICD-10-CM

## 2019-04-12 DIAGNOSIS — E03.9 ACQUIRED HYPOTHYROIDISM: ICD-10-CM

## 2019-04-12 DIAGNOSIS — R65.21 SEPTIC SHOCK (H): ICD-10-CM

## 2019-04-12 DIAGNOSIS — D72.829 LEUKOCYTOSIS, UNSPECIFIED TYPE: ICD-10-CM

## 2019-04-12 DIAGNOSIS — J86.9 PLEURAL EMPYEMA (H): ICD-10-CM

## 2019-04-12 DIAGNOSIS — S42.291K OTHER CLOSED DISPLACED FRACTURE OF PROXIMAL END OF RIGHT HUMERUS WITH NONUNION, SUBSEQUENT ENCOUNTER: ICD-10-CM

## 2019-04-12 DIAGNOSIS — R53.81 PHYSICAL DECONDITIONING: ICD-10-CM

## 2019-04-15 ENCOUNTER — RECORDS - HEALTHEAST (OUTPATIENT)
Dept: LAB | Facility: CLINIC | Age: 69
End: 2019-04-15

## 2019-04-15 ENCOUNTER — OFFICE VISIT - HEALTHEAST (OUTPATIENT)
Dept: GERIATRICS | Facility: CLINIC | Age: 69
End: 2019-04-15

## 2019-04-15 ENCOUNTER — COMMUNICATION - HEALTHEAST (OUTPATIENT)
Dept: INTERNAL MEDICINE | Facility: CLINIC | Age: 69
End: 2019-04-15

## 2019-04-15 DIAGNOSIS — S42.291K OTHER CLOSED DISPLACED FRACTURE OF PROXIMAL END OF RIGHT HUMERUS WITH NONUNION, SUBSEQUENT ENCOUNTER: ICD-10-CM

## 2019-04-15 DIAGNOSIS — I10 HYPERTENSION: ICD-10-CM

## 2019-04-15 DIAGNOSIS — J44.9 CHRONIC OBSTRUCTIVE PULMONARY DISEASE, UNSPECIFIED COPD TYPE (H): ICD-10-CM

## 2019-04-15 DIAGNOSIS — Z86.19: ICD-10-CM

## 2019-04-15 DIAGNOSIS — E13.9 DIABETES 1.5, MANAGED AS TYPE 2 (H): ICD-10-CM

## 2019-04-15 DIAGNOSIS — J90 CHRONIC PLEURAL EFFUSION: ICD-10-CM

## 2019-04-15 DIAGNOSIS — C34.91 NON-SMALL CELL CANCER OF RIGHT LUNG (H): ICD-10-CM

## 2019-04-15 LAB
BASOPHILS # BLD AUTO: 0.1 THOU/UL (ref 0–0.2)
BASOPHILS NFR BLD AUTO: 1 % (ref 0–2)
EOSINOPHIL # BLD AUTO: 0.1 THOU/UL (ref 0–0.4)
EOSINOPHIL NFR BLD AUTO: 0 % (ref 0–6)
ERYTHROCYTE [DISTWIDTH] IN BLOOD BY AUTOMATED COUNT: 16.7 % (ref 11–14.5)
HCT VFR BLD AUTO: 28.2 % (ref 35–47)
HGB BLD-MCNC: 8.4 G/DL (ref 12–16)
LYMPHOCYTES # BLD AUTO: 2.2 THOU/UL (ref 0.8–4.4)
LYMPHOCYTES NFR BLD AUTO: 12 % (ref 20–40)
MCH RBC QN AUTO: 27.9 PG (ref 27–34)
MCHC RBC AUTO-ENTMCNC: 29.8 G/DL (ref 32–36)
MCV RBC AUTO: 94 FL (ref 80–100)
MONOCYTES # BLD AUTO: 1.8 THOU/UL (ref 0–0.9)
MONOCYTES NFR BLD AUTO: 10 % (ref 2–10)
NEUTROPHILS # BLD AUTO: 13.6 THOU/UL (ref 2–7.7)
NEUTROPHILS NFR BLD AUTO: 77 % (ref 50–70)
PLATELET # BLD AUTO: 597 THOU/UL (ref 140–440)
PMV BLD AUTO: 10.2 FL (ref 8.5–12.5)
RBC # BLD AUTO: 3.01 MILL/UL (ref 3.8–5.4)
WBC: 18.1 THOU/UL (ref 4–11)

## 2019-04-15 ASSESSMENT — MIFFLIN-ST. JEOR: SCORE: 1338.71

## 2019-04-18 ENCOUNTER — COMMUNICATION - HEALTHEAST (OUTPATIENT)
Dept: ADMINISTRATIVE | Facility: CLINIC | Age: 69
End: 2019-04-18

## 2019-04-18 ENCOUNTER — OFFICE VISIT - HEALTHEAST (OUTPATIENT)
Dept: INFECTIOUS DISEASES | Facility: CLINIC | Age: 69
End: 2019-04-18

## 2019-04-18 ENCOUNTER — COMMUNICATION - HEALTHEAST (OUTPATIENT)
Dept: INFECTIOUS DISEASES | Facility: CLINIC | Age: 69
End: 2019-04-18

## 2019-04-18 ENCOUNTER — AMBULATORY - HEALTHEAST (OUTPATIENT)
Dept: LAB | Facility: CLINIC | Age: 69
End: 2019-04-18

## 2019-04-18 DIAGNOSIS — J18.9 PNEUMONIA OF LEFT LUNG DUE TO INFECTIOUS ORGANISM, UNSPECIFIED PART OF LUNG: ICD-10-CM

## 2019-04-18 LAB
BASOPHILS # BLD AUTO: 0.1 THOU/UL (ref 0–0.2)
BASOPHILS NFR BLD AUTO: 1 % (ref 0–2)
EOSINOPHIL # BLD AUTO: 0.1 THOU/UL (ref 0–0.4)
EOSINOPHIL NFR BLD AUTO: 1 % (ref 0–6)
ERYTHROCYTE [DISTWIDTH] IN BLOOD BY AUTOMATED COUNT: 16.1 % (ref 11–14.5)
HCT VFR BLD AUTO: 27.7 % (ref 35–47)
HGB BLD-MCNC: 8.6 G/DL (ref 12–16)
LYMPHOCYTES # BLD AUTO: 2.8 THOU/UL (ref 0.8–4.4)
LYMPHOCYTES NFR BLD AUTO: 15 % (ref 20–40)
MCH RBC QN AUTO: 28.3 PG (ref 27–34)
MCHC RBC AUTO-ENTMCNC: 31 G/DL (ref 32–36)
MCV RBC AUTO: 91 FL (ref 80–100)
MONOCYTES # BLD AUTO: 1.9 THOU/UL (ref 0–0.9)
MONOCYTES NFR BLD AUTO: 10 % (ref 2–10)
NEUTROPHILS # BLD AUTO: 13.4 THOU/UL (ref 2–7.7)
NEUTROPHILS NFR BLD AUTO: 74 % (ref 50–70)
PLATELET # BLD AUTO: 748 THOU/UL (ref 140–440)
PMV BLD AUTO: 10.3 FL (ref 8.5–12.5)
PROCALCITONIN SERPL-MCNC: 0.25 NG/ML (ref 0–0.49)
RBC # BLD AUTO: 3.04 MILL/UL (ref 3.8–5.4)
WBC: 18.6 THOU/UL (ref 4–11)

## 2019-04-19 ENCOUNTER — OFFICE VISIT - HEALTHEAST (OUTPATIENT)
Dept: GERIATRICS | Facility: CLINIC | Age: 69
End: 2019-04-19

## 2019-04-19 DIAGNOSIS — D72.829 LEUKOCYTOSIS, UNSPECIFIED TYPE: ICD-10-CM

## 2019-04-19 DIAGNOSIS — F33.0 MILD RECURRENT MAJOR DEPRESSION (H): ICD-10-CM

## 2019-04-19 DIAGNOSIS — E13.9 DIABETES 1.5, MANAGED AS TYPE 2 (H): ICD-10-CM

## 2019-04-19 DIAGNOSIS — J44.9 CHRONIC OBSTRUCTIVE PULMONARY DISEASE, UNSPECIFIED COPD TYPE (H): ICD-10-CM

## 2019-04-19 DIAGNOSIS — S42.201D CLOSED FRACTURE OF PROXIMAL END OF RIGHT HUMERUS WITH ROUTINE HEALING, UNSPECIFIED FRACTURE MORPHOLOGY, SUBSEQUENT ENCOUNTER: ICD-10-CM

## 2019-04-19 DIAGNOSIS — J18.9 PNEUMONIA OF LEFT LUNG DUE TO INFECTIOUS ORGANISM, UNSPECIFIED PART OF LUNG: ICD-10-CM

## 2019-04-19 DIAGNOSIS — J86.9 EMPYEMA (H): ICD-10-CM

## 2019-04-19 DIAGNOSIS — K13.79 PAIN IN ORAL CAVITY: ICD-10-CM

## 2019-04-19 DIAGNOSIS — R53.81 PHYSICAL DECONDITIONING: ICD-10-CM

## 2019-04-19 LAB
BASOPHILS # BLD AUTO: 0.2 THOU/UL (ref 0–0.2)
BASOPHILS NFR BLD AUTO: 1 % (ref 0–2)
EOSINOPHIL COUNT (ABSOLUTE): 0.1 THOU/UL (ref 0–0.4)
EOSINOPHIL NFR BLD AUTO: 1 % (ref 0–6)
ERYTHROCYTE [DISTWIDTH] IN BLOOD BY AUTOMATED COUNT: 16.2 % (ref 11–14.5)
HCT VFR BLD AUTO: 28.5 % (ref 35–47)
HGB BLD-MCNC: 8.4 G/DL (ref 12–16)
LYMPHOCYTES # BLD AUTO: 2.2 THOU/UL (ref 0.8–4.4)
LYMPHOCYTES NFR BLD AUTO: 12 % (ref 20–40)
MCH RBC QN AUTO: 27.6 PG (ref 27–34)
MCHC RBC AUTO-ENTMCNC: 29.5 G/DL (ref 32–36)
MCV RBC AUTO: 94 FL (ref 80–100)
MONOCYTES # BLD AUTO: 1.5 THOU/UL (ref 0–0.9)
MONOCYTES NFR BLD AUTO: 8 % (ref 2–10)
MYELOCYTES (ABSOLUTE): 0.1 THOU/UL
MYELOCYTES NFR BLD MANUAL: 1 %
PATH REPORT.MICROSCOPIC SPEC OTHER STN: ABNORMAL
PLAT MORPH BLD: ABNORMAL
PLATELET # BLD AUTO: 761 THOU/UL (ref 140–440)
PMV BLD AUTO: 10.3 FL (ref 8.5–12.5)
POLYCHROMASIA BLD QL SMEAR: ABNORMAL
RBC # BLD AUTO: 3.04 MILL/UL (ref 3.8–5.4)
TOTAL NEUTROPHILS-ABS(DIFF): 14.3 THOU/UL (ref 2–7.7)
TOTAL NEUTROPHILS-REL(DIFF): 78 % (ref 50–70)
WBC: 18.3 THOU/UL (ref 4–11)

## 2019-04-22 ENCOUNTER — COMMUNICATION - HEALTHEAST (OUTPATIENT)
Dept: INFECTIOUS DISEASES | Facility: CLINIC | Age: 69
End: 2019-04-22

## 2019-04-22 LAB
LAB AP CHARGES (HE HISTORICAL CONVERSION): NORMAL
PATH REPORT.COMMENTS IMP SPEC: NORMAL
PATH REPORT.COMMENTS IMP SPEC: NORMAL
PATH REPORT.FINAL DX SPEC: NORMAL
PATH REPORT.RELEVANT HX SPEC: NORMAL

## 2019-04-24 ENCOUNTER — OFFICE VISIT - HEALTHEAST (OUTPATIENT)
Dept: GERIATRICS | Facility: CLINIC | Age: 69
End: 2019-04-24

## 2019-04-24 DIAGNOSIS — F32.1 CURRENT MODERATE EPISODE OF MAJOR DEPRESSIVE DISORDER, UNSPECIFIED WHETHER RECURRENT (H): ICD-10-CM

## 2019-04-24 DIAGNOSIS — Z86.19: ICD-10-CM

## 2019-04-24 DIAGNOSIS — J44.9 CHRONIC OBSTRUCTIVE PULMONARY DISEASE, UNSPECIFIED COPD TYPE (H): ICD-10-CM

## 2019-04-24 DIAGNOSIS — K59.1 FUNCTIONAL DIARRHEA: ICD-10-CM

## 2019-04-24 DIAGNOSIS — E13.9 DIABETES 1.5, MANAGED AS TYPE 2 (H): ICD-10-CM

## 2019-04-24 DIAGNOSIS — K21.9 GASTROESOPHAGEAL REFLUX DISEASE WITHOUT ESOPHAGITIS: ICD-10-CM

## 2019-04-24 DIAGNOSIS — C34.91 NON-SMALL CELL CANCER OF RIGHT LUNG (H): ICD-10-CM

## 2019-04-24 DIAGNOSIS — S42.291K OTHER CLOSED DISPLACED FRACTURE OF PROXIMAL END OF RIGHT HUMERUS WITH NONUNION, SUBSEQUENT ENCOUNTER: ICD-10-CM

## 2019-04-24 DIAGNOSIS — J90 CHRONIC PLEURAL EFFUSION: ICD-10-CM

## 2019-04-24 ASSESSMENT — MIFFLIN-ST. JEOR: SCORE: 1266.58

## 2019-04-25 ENCOUNTER — RECORDS - HEALTHEAST (OUTPATIENT)
Dept: ADMINISTRATIVE | Facility: OTHER | Age: 69
End: 2019-04-25

## 2019-04-29 ENCOUNTER — OFFICE VISIT - HEALTHEAST (OUTPATIENT)
Dept: GERIATRICS | Facility: CLINIC | Age: 69
End: 2019-04-29

## 2019-04-29 ENCOUNTER — COMMUNICATION - HEALTHEAST (OUTPATIENT)
Dept: GERIATRICS | Facility: CLINIC | Age: 69
End: 2019-04-29

## 2019-04-29 DIAGNOSIS — Z86.19: ICD-10-CM

## 2019-04-29 DIAGNOSIS — F41.9 ANXIETY AND DEPRESSION: ICD-10-CM

## 2019-04-29 DIAGNOSIS — F32.1 CURRENT MODERATE EPISODE OF MAJOR DEPRESSIVE DISORDER, UNSPECIFIED WHETHER RECURRENT (H): ICD-10-CM

## 2019-04-29 DIAGNOSIS — E13.9 DIABETES 1.5, MANAGED AS TYPE 2 (H): ICD-10-CM

## 2019-04-29 DIAGNOSIS — C34.91 NON-SMALL CELL CANCER OF RIGHT LUNG (H): ICD-10-CM

## 2019-04-29 DIAGNOSIS — J90 CHRONIC PLEURAL EFFUSION: ICD-10-CM

## 2019-04-29 DIAGNOSIS — S42.291K OTHER CLOSED DISPLACED FRACTURE OF PROXIMAL END OF RIGHT HUMERUS WITH NONUNION, SUBSEQUENT ENCOUNTER: ICD-10-CM

## 2019-04-29 DIAGNOSIS — K59.1 FUNCTIONAL DIARRHEA: ICD-10-CM

## 2019-04-29 DIAGNOSIS — J44.9 CHRONIC OBSTRUCTIVE PULMONARY DISEASE, UNSPECIFIED COPD TYPE (H): ICD-10-CM

## 2019-04-29 DIAGNOSIS — K21.9 GASTROESOPHAGEAL REFLUX DISEASE WITHOUT ESOPHAGITIS: ICD-10-CM

## 2019-04-29 DIAGNOSIS — E03.9 ACQUIRED HYPOTHYROIDISM: ICD-10-CM

## 2019-04-29 DIAGNOSIS — F32.A ANXIETY AND DEPRESSION: ICD-10-CM

## 2019-04-29 ASSESSMENT — MIFFLIN-ST. JEOR: SCORE: 1256.15

## 2019-04-30 ENCOUNTER — RECORDS - HEALTHEAST (OUTPATIENT)
Dept: LAB | Facility: CLINIC | Age: 69
End: 2019-04-30

## 2019-04-30 ENCOUNTER — COMMUNICATION - HEALTHEAST (OUTPATIENT)
Dept: GERIATRICS | Facility: CLINIC | Age: 69
End: 2019-04-30

## 2019-04-30 LAB
ANION GAP SERPL CALCULATED.3IONS-SCNC: 15 MMOL/L (ref 5–18)
BASOPHILS # BLD AUTO: 0.1 THOU/UL (ref 0–0.2)
BASOPHILS NFR BLD AUTO: 1 % (ref 0–2)
BUN SERPL-MCNC: 17 MG/DL (ref 8–22)
CALCIUM SERPL-MCNC: 10 MG/DL (ref 8.5–10.5)
CHLORIDE BLD-SCNC: 105 MMOL/L (ref 98–107)
CO2 SERPL-SCNC: 19 MMOL/L (ref 22–31)
CREAT SERPL-MCNC: 0.92 MG/DL (ref 0.6–1.1)
EOSINOPHIL # BLD AUTO: 0.1 THOU/UL (ref 0–0.4)
EOSINOPHIL NFR BLD AUTO: 1 % (ref 0–6)
ERYTHROCYTE [DISTWIDTH] IN BLOOD BY AUTOMATED COUNT: 15.8 % (ref 11–14.5)
GFR SERPL CREATININE-BSD FRML MDRD: >60 ML/MIN/1.73M2
GLUCOSE BLD-MCNC: 68 MG/DL (ref 70–125)
HCT VFR BLD AUTO: 27.2 % (ref 35–47)
HGB BLD-MCNC: 8 G/DL (ref 12–16)
LYMPHOCYTES # BLD AUTO: 2.5 THOU/UL (ref 0.8–4.4)
LYMPHOCYTES NFR BLD AUTO: 17 % (ref 20–40)
MCH RBC QN AUTO: 27.1 PG (ref 27–34)
MCHC RBC AUTO-ENTMCNC: 29.4 G/DL (ref 32–36)
MCV RBC AUTO: 92 FL (ref 80–100)
MONOCYTES # BLD AUTO: 1.8 THOU/UL (ref 0–0.9)
MONOCYTES NFR BLD AUTO: 12 % (ref 2–10)
NEUTROPHILS # BLD AUTO: 10.1 THOU/UL (ref 2–7.7)
NEUTROPHILS NFR BLD AUTO: 70 % (ref 50–70)
PLATELET # BLD AUTO: 617 THOU/UL (ref 140–440)
PMV BLD AUTO: 9.7 FL (ref 8.5–12.5)
POTASSIUM BLD-SCNC: 4.7 MMOL/L (ref 3.5–5)
RBC # BLD AUTO: 2.95 MILL/UL (ref 3.8–5.4)
SODIUM SERPL-SCNC: 139 MMOL/L (ref 136–145)
WBC: 14.8 THOU/UL (ref 4–11)

## 2019-05-01 ENCOUNTER — OFFICE VISIT - HEALTHEAST (OUTPATIENT)
Dept: GERIATRICS | Facility: CLINIC | Age: 69
End: 2019-05-01

## 2019-05-01 DIAGNOSIS — K21.9 GASTROESOPHAGEAL REFLUX DISEASE WITHOUT ESOPHAGITIS: ICD-10-CM

## 2019-05-01 DIAGNOSIS — E03.9 ACQUIRED HYPOTHYROIDISM: ICD-10-CM

## 2019-05-01 DIAGNOSIS — Z86.19: ICD-10-CM

## 2019-05-01 DIAGNOSIS — K59.1 FUNCTIONAL DIARRHEA: ICD-10-CM

## 2019-05-01 DIAGNOSIS — S42.291K OTHER CLOSED DISPLACED FRACTURE OF PROXIMAL END OF RIGHT HUMERUS WITH NONUNION, SUBSEQUENT ENCOUNTER: ICD-10-CM

## 2019-05-01 DIAGNOSIS — J90 CHRONIC PLEURAL EFFUSION: ICD-10-CM

## 2019-05-01 DIAGNOSIS — C34.91 NON-SMALL CELL CANCER OF RIGHT LUNG (H): ICD-10-CM

## 2019-05-01 DIAGNOSIS — J44.9 CHRONIC OBSTRUCTIVE PULMONARY DISEASE, UNSPECIFIED COPD TYPE (H): ICD-10-CM

## 2019-05-01 ASSESSMENT — MIFFLIN-ST. JEOR: SCORE: 1243.45

## 2019-05-02 ENCOUNTER — OFFICE VISIT - HEALTHEAST (OUTPATIENT)
Dept: INFECTIOUS DISEASES | Facility: CLINIC | Age: 69
End: 2019-05-02

## 2019-05-02 ENCOUNTER — COMMUNICATION - HEALTHEAST (OUTPATIENT)
Dept: INTERNAL MEDICINE | Facility: CLINIC | Age: 69
End: 2019-05-02

## 2019-05-02 DIAGNOSIS — J18.9 PNEUMONIA OF LEFT LUNG DUE TO INFECTIOUS ORGANISM, UNSPECIFIED PART OF LUNG: ICD-10-CM

## 2019-05-03 ENCOUNTER — OFFICE VISIT - HEALTHEAST (OUTPATIENT)
Dept: GERIATRICS | Facility: CLINIC | Age: 69
End: 2019-05-03

## 2019-05-03 DIAGNOSIS — R53.81 PHYSICAL DECONDITIONING: ICD-10-CM

## 2019-05-03 DIAGNOSIS — J86.9 EMPYEMA (H): ICD-10-CM

## 2019-05-03 DIAGNOSIS — F33.1 MODERATE EPISODE OF RECURRENT MAJOR DEPRESSIVE DISORDER (H): ICD-10-CM

## 2019-05-03 DIAGNOSIS — J44.9 CHRONIC OBSTRUCTIVE PULMONARY DISEASE, UNSPECIFIED COPD TYPE (H): ICD-10-CM

## 2019-05-03 DIAGNOSIS — S42.291K OTHER CLOSED DISPLACED FRACTURE OF PROXIMAL END OF RIGHT HUMERUS WITH NONUNION, SUBSEQUENT ENCOUNTER: ICD-10-CM

## 2019-05-03 DIAGNOSIS — E13.9 DIABETES 1.5, MANAGED AS TYPE 2 (H): ICD-10-CM

## 2019-05-06 ENCOUNTER — OFFICE VISIT - HEALTHEAST (OUTPATIENT)
Dept: GERIATRICS | Facility: CLINIC | Age: 69
End: 2019-05-06

## 2019-05-06 DIAGNOSIS — J44.9 CHRONIC OBSTRUCTIVE PULMONARY DISEASE, UNSPECIFIED COPD TYPE (H): ICD-10-CM

## 2019-05-06 DIAGNOSIS — C34.91 NON-SMALL CELL CANCER OF RIGHT LUNG (H): ICD-10-CM

## 2019-05-06 DIAGNOSIS — D64.9 ANEMIA, UNSPECIFIED TYPE: ICD-10-CM

## 2019-05-06 DIAGNOSIS — K21.9 GASTROESOPHAGEAL REFLUX DISEASE WITHOUT ESOPHAGITIS: ICD-10-CM

## 2019-05-06 DIAGNOSIS — F51.04 PSYCHOPHYSIOLOGICAL INSOMNIA: ICD-10-CM

## 2019-05-06 DIAGNOSIS — J90 CHRONIC PLEURAL EFFUSION: ICD-10-CM

## 2019-05-06 DIAGNOSIS — E03.9 ACQUIRED HYPOTHYROIDISM: ICD-10-CM

## 2019-05-06 DIAGNOSIS — S42.291K OTHER CLOSED DISPLACED FRACTURE OF PROXIMAL END OF RIGHT HUMERUS WITH NONUNION, SUBSEQUENT ENCOUNTER: ICD-10-CM

## 2019-05-06 DIAGNOSIS — K59.1 FUNCTIONAL DIARRHEA: ICD-10-CM

## 2019-05-06 DIAGNOSIS — Z86.19: ICD-10-CM

## 2019-05-06 ASSESSMENT — MIFFLIN-ST. JEOR: SCORE: 1249.8

## 2019-05-07 ENCOUNTER — RECORDS - HEALTHEAST (OUTPATIENT)
Dept: LAB | Facility: CLINIC | Age: 69
End: 2019-05-07

## 2019-05-08 ENCOUNTER — OFFICE VISIT - HEALTHEAST (OUTPATIENT)
Dept: PULMONOLOGY | Facility: OTHER | Age: 69
End: 2019-05-08

## 2019-05-08 DIAGNOSIS — J85.1 ABSCESS OF LOWER LOBE OF LEFT LUNG WITH PNEUMONIA (H): ICD-10-CM

## 2019-05-08 LAB
ERYTHROCYTE [DISTWIDTH] IN BLOOD BY AUTOMATED COUNT: 16.3 % (ref 11–14.5)
HCT VFR BLD AUTO: 28.9 % (ref 35–47)
HGB BLD-MCNC: 8.4 G/DL (ref 12–16)
MCH RBC QN AUTO: 26.7 PG (ref 27–34)
MCHC RBC AUTO-ENTMCNC: 29.1 G/DL (ref 32–36)
MCV RBC AUTO: 92 FL (ref 80–100)
PLATELET # BLD AUTO: 640 THOU/UL (ref 140–440)
PMV BLD AUTO: 9.8 FL (ref 8.5–12.5)
RBC # BLD AUTO: 3.15 MILL/UL (ref 3.8–5.4)
WBC: 16.2 THOU/UL (ref 4–11)

## 2019-05-09 ENCOUNTER — RECORDS - HEALTHEAST (OUTPATIENT)
Dept: ADMINISTRATIVE | Facility: OTHER | Age: 69
End: 2019-05-09

## 2019-05-13 ENCOUNTER — OFFICE VISIT - HEALTHEAST (OUTPATIENT)
Dept: GERIATRICS | Facility: CLINIC | Age: 69
End: 2019-05-13

## 2019-05-13 DIAGNOSIS — S42.291K OTHER CLOSED DISPLACED FRACTURE OF PROXIMAL END OF RIGHT HUMERUS WITH NONUNION, SUBSEQUENT ENCOUNTER: ICD-10-CM

## 2019-05-13 DIAGNOSIS — J44.9 CHRONIC OBSTRUCTIVE PULMONARY DISEASE, UNSPECIFIED COPD TYPE (H): ICD-10-CM

## 2019-05-13 DIAGNOSIS — C34.91 NON-SMALL CELL CANCER OF RIGHT LUNG (H): ICD-10-CM

## 2019-05-13 DIAGNOSIS — J18.9 CAVITARY PNEUMONIA: ICD-10-CM

## 2019-05-13 DIAGNOSIS — F51.04 PSYCHOPHYSIOLOGICAL INSOMNIA: ICD-10-CM

## 2019-05-13 DIAGNOSIS — K21.9 GASTROESOPHAGEAL REFLUX DISEASE WITHOUT ESOPHAGITIS: ICD-10-CM

## 2019-05-13 DIAGNOSIS — E03.9 ACQUIRED HYPOTHYROIDISM: ICD-10-CM

## 2019-05-13 DIAGNOSIS — K59.1 FUNCTIONAL DIARRHEA: ICD-10-CM

## 2019-05-13 DIAGNOSIS — Z86.19: ICD-10-CM

## 2019-05-13 DIAGNOSIS — J90 CHRONIC PLEURAL EFFUSION: ICD-10-CM

## 2019-05-13 DIAGNOSIS — J98.4 CAVITARY PNEUMONIA: ICD-10-CM

## 2019-05-13 DIAGNOSIS — D64.9 ANEMIA, UNSPECIFIED TYPE: ICD-10-CM

## 2019-05-13 ASSESSMENT — MIFFLIN-ST. JEOR: SCORE: 1246.17

## 2019-05-14 ENCOUNTER — RECORDS - HEALTHEAST (OUTPATIENT)
Dept: LAB | Facility: CLINIC | Age: 69
End: 2019-05-14

## 2019-05-15 ENCOUNTER — OFFICE VISIT - HEALTHEAST (OUTPATIENT)
Dept: GERIATRICS | Facility: CLINIC | Age: 69
End: 2019-05-15

## 2019-05-15 DIAGNOSIS — J98.4 CAVITARY PNEUMONIA: ICD-10-CM

## 2019-05-15 DIAGNOSIS — K59.1 FUNCTIONAL DIARRHEA: ICD-10-CM

## 2019-05-15 DIAGNOSIS — Z86.19: ICD-10-CM

## 2019-05-15 DIAGNOSIS — S42.291K OTHER CLOSED DISPLACED FRACTURE OF PROXIMAL END OF RIGHT HUMERUS WITH NONUNION, SUBSEQUENT ENCOUNTER: ICD-10-CM

## 2019-05-15 DIAGNOSIS — K21.9 GASTROESOPHAGEAL REFLUX DISEASE WITHOUT ESOPHAGITIS: ICD-10-CM

## 2019-05-15 DIAGNOSIS — J44.9 CHRONIC OBSTRUCTIVE PULMONARY DISEASE, UNSPECIFIED COPD TYPE (H): ICD-10-CM

## 2019-05-15 DIAGNOSIS — D64.9 ANEMIA, UNSPECIFIED TYPE: ICD-10-CM

## 2019-05-15 DIAGNOSIS — F51.04 PSYCHOPHYSIOLOGICAL INSOMNIA: ICD-10-CM

## 2019-05-15 DIAGNOSIS — C34.91 NON-SMALL CELL CANCER OF RIGHT LUNG (H): ICD-10-CM

## 2019-05-15 DIAGNOSIS — J90 CHRONIC PLEURAL EFFUSION: ICD-10-CM

## 2019-05-15 DIAGNOSIS — J18.9 CAVITARY PNEUMONIA: ICD-10-CM

## 2019-05-15 DIAGNOSIS — E03.9 ACQUIRED HYPOTHYROIDISM: ICD-10-CM

## 2019-05-15 LAB
BASOPHILS # BLD AUTO: 0.1 THOU/UL (ref 0–0.2)
BASOPHILS NFR BLD AUTO: 1 % (ref 0–2)
EOSINOPHIL # BLD AUTO: 0.8 THOU/UL (ref 0–0.4)
EOSINOPHIL NFR BLD AUTO: 5 % (ref 0–6)
ERYTHROCYTE [DISTWIDTH] IN BLOOD BY AUTOMATED COUNT: 17.4 % (ref 11–14.5)
HCT VFR BLD AUTO: 32.5 % (ref 35–47)
HGB BLD-MCNC: 9.5 G/DL (ref 12–16)
LYMPHOCYTES # BLD AUTO: 4.9 THOU/UL (ref 0.8–4.4)
LYMPHOCYTES NFR BLD AUTO: 32 % (ref 20–40)
MCH RBC QN AUTO: 26.5 PG (ref 27–34)
MCHC RBC AUTO-ENTMCNC: 29.2 G/DL (ref 32–36)
MCV RBC AUTO: 91 FL (ref 80–100)
MONOCYTES # BLD AUTO: 2 THOU/UL (ref 0–0.9)
MONOCYTES NFR BLD AUTO: 13 % (ref 2–10)
NEUTROPHILS # BLD AUTO: 7.5 THOU/UL (ref 2–7.7)
NEUTROPHILS NFR BLD AUTO: 50 % (ref 50–70)
PLATELET # BLD AUTO: 580 THOU/UL (ref 140–440)
PMV BLD AUTO: 9.8 FL (ref 8.5–12.5)
RBC # BLD AUTO: 3.58 MILL/UL (ref 3.8–5.4)
WBC: 15.5 THOU/UL (ref 4–11)

## 2019-05-15 ASSESSMENT — MIFFLIN-ST. JEOR: SCORE: 1247.99

## 2019-05-16 ENCOUNTER — COMMUNICATION - HEALTHEAST (OUTPATIENT)
Dept: GERIATRICS | Facility: CLINIC | Age: 69
End: 2019-05-16

## 2019-05-16 ENCOUNTER — AMBULATORY - HEALTHEAST (OUTPATIENT)
Dept: GERIATRICS | Facility: CLINIC | Age: 69
End: 2019-05-16

## 2019-05-17 ENCOUNTER — COMMUNICATION - HEALTHEAST (OUTPATIENT)
Dept: GERIATRICS | Facility: CLINIC | Age: 69
End: 2019-05-17

## 2019-05-17 ENCOUNTER — COMMUNICATION - HEALTHEAST (OUTPATIENT)
Dept: INTERNAL MEDICINE | Facility: CLINIC | Age: 69
End: 2019-05-17

## 2019-05-20 ENCOUNTER — COMMUNICATION - HEALTHEAST (OUTPATIENT)
Dept: INTERNAL MEDICINE | Facility: CLINIC | Age: 69
End: 2019-05-20

## 2019-05-21 ENCOUNTER — COMMUNICATION - HEALTHEAST (OUTPATIENT)
Dept: INTERNAL MEDICINE | Facility: CLINIC | Age: 69
End: 2019-05-21

## 2019-05-22 ENCOUNTER — AMBULATORY - HEALTHEAST (OUTPATIENT)
Dept: INFECTIOUS DISEASES | Facility: CLINIC | Age: 69
End: 2019-05-22

## 2019-05-22 ENCOUNTER — COMMUNICATION - HEALTHEAST (OUTPATIENT)
Dept: INTERNAL MEDICINE | Facility: CLINIC | Age: 69
End: 2019-05-22

## 2019-05-22 ENCOUNTER — AMBULATORY - HEALTHEAST (OUTPATIENT)
Dept: LAB | Facility: CLINIC | Age: 69
End: 2019-05-22

## 2019-05-22 DIAGNOSIS — J18.9 PNEUMONIA OF LEFT LUNG DUE TO INFECTIOUS ORGANISM, UNSPECIFIED PART OF LUNG: ICD-10-CM

## 2019-05-22 DIAGNOSIS — J18.9 PNEUMONIA OF LEFT LOWER LOBE DUE TO INFECTIOUS ORGANISM: ICD-10-CM

## 2019-05-22 LAB
BASOPHILS # BLD AUTO: 0.2 THOU/UL (ref 0–0.2)
BASOPHILS NFR BLD AUTO: 1 % (ref 0–2)
EOSINOPHIL # BLD AUTO: 0.6 THOU/UL (ref 0–0.4)
EOSINOPHIL NFR BLD AUTO: 4 % (ref 0–6)
ERYTHROCYTE [DISTWIDTH] IN BLOOD BY AUTOMATED COUNT: 17.9 % (ref 11–14.5)
HCT VFR BLD AUTO: 31.9 % (ref 35–47)
HGB BLD-MCNC: 9.7 G/DL (ref 12–16)
LYMPHOCYTES # BLD AUTO: 7.9 THOU/UL (ref 0.8–4.4)
LYMPHOCYTES NFR BLD AUTO: 46 % (ref 20–40)
MCH RBC QN AUTO: 27.2 PG (ref 27–34)
MCHC RBC AUTO-ENTMCNC: 30.4 G/DL (ref 32–36)
MCV RBC AUTO: 89 FL (ref 80–100)
MONOCYTES # BLD AUTO: 1.9 THOU/UL (ref 0–0.9)
MONOCYTES NFR BLD AUTO: 11 % (ref 2–10)
NEUTROPHILS # BLD AUTO: 6.5 THOU/UL (ref 2–7.7)
NEUTROPHILS NFR BLD AUTO: 38 % (ref 50–70)
PLATELET # BLD AUTO: 487 THOU/UL (ref 140–440)
PMV BLD AUTO: 10.4 FL (ref 8.5–12.5)
RBC # BLD AUTO: 3.57 MILL/UL (ref 3.8–5.4)
WBC: 17.1 THOU/UL (ref 4–11)

## 2019-05-29 ENCOUNTER — COMMUNICATION - HEALTHEAST (OUTPATIENT)
Dept: ADMINISTRATIVE | Facility: CLINIC | Age: 69
End: 2019-05-29

## 2019-05-29 DIAGNOSIS — J18.9 PNEUMONIA OF LEFT LUNG DUE TO INFECTIOUS ORGANISM, UNSPECIFIED PART OF LUNG: ICD-10-CM

## 2019-06-05 ENCOUNTER — OFFICE VISIT - HEALTHEAST (OUTPATIENT)
Dept: INTERNAL MEDICINE | Facility: CLINIC | Age: 69
End: 2019-06-05

## 2019-06-05 DIAGNOSIS — J18.9 CAVITARY PNEUMONIA: ICD-10-CM

## 2019-06-05 DIAGNOSIS — R07.81 RIB PAIN ON RIGHT SIDE: ICD-10-CM

## 2019-06-05 DIAGNOSIS — M89.9 DISORDER OF BONE: ICD-10-CM

## 2019-06-05 DIAGNOSIS — E13.9 DIABETES 1.5, MANAGED AS TYPE 2 (H): ICD-10-CM

## 2019-06-05 DIAGNOSIS — F32.A ANXIETY AND DEPRESSION: ICD-10-CM

## 2019-06-05 DIAGNOSIS — G47.00 INSOMNIA, UNSPECIFIED TYPE: ICD-10-CM

## 2019-06-05 DIAGNOSIS — J44.9 CHRONIC OBSTRUCTIVE PULMONARY DISEASE, UNSPECIFIED COPD TYPE (H): ICD-10-CM

## 2019-06-05 DIAGNOSIS — Z13.820 SCREENING FOR OSTEOPOROSIS: ICD-10-CM

## 2019-06-05 DIAGNOSIS — J18.9 PNEUMONIA OF LEFT LUNG DUE TO INFECTIOUS ORGANISM, UNSPECIFIED PART OF LUNG: ICD-10-CM

## 2019-06-05 DIAGNOSIS — J98.4 CAVITARY PNEUMONIA: ICD-10-CM

## 2019-06-05 DIAGNOSIS — C34.91 NON-SMALL CELL CANCER OF RIGHT LUNG (H): ICD-10-CM

## 2019-06-05 DIAGNOSIS — F41.9 ANXIETY AND DEPRESSION: ICD-10-CM

## 2019-06-05 DIAGNOSIS — R77.9 ELEVATED BLOOD PROTEIN: ICD-10-CM

## 2019-06-05 DIAGNOSIS — J18.9 PNEUMONIA OF LEFT LOWER LOBE DUE TO INFECTIOUS ORGANISM: ICD-10-CM

## 2019-06-05 LAB
ALBUMIN SERPL-MCNC: 3.4 G/DL (ref 3.5–5)
ALP SERPL-CCNC: 91 U/L (ref 45–120)
ALT SERPL W P-5'-P-CCNC: <9 U/L (ref 0–45)
ANION GAP SERPL CALCULATED.3IONS-SCNC: 10 MMOL/L (ref 5–18)
AST SERPL W P-5'-P-CCNC: 22 U/L (ref 0–40)
BASOPHILS # BLD AUTO: 0.1 THOU/UL (ref 0–0.2)
BASOPHILS NFR BLD AUTO: 1 % (ref 0–2)
BILIRUB DIRECT SERPL-MCNC: 0.2 MG/DL
BILIRUB SERPL-MCNC: 0.4 MG/DL (ref 0–1)
BUN SERPL-MCNC: 12 MG/DL (ref 8–22)
CALCIUM SERPL-MCNC: 10.1 MG/DL (ref 8.5–10.5)
CHLORIDE BLD-SCNC: 101 MMOL/L (ref 98–107)
CO2 SERPL-SCNC: 27 MMOL/L (ref 22–31)
CREAT SERPL-MCNC: 1.02 MG/DL (ref 0.6–1.1)
EOSINOPHIL # BLD AUTO: 0.5 THOU/UL (ref 0–0.4)
EOSINOPHIL NFR BLD AUTO: 4 % (ref 0–6)
ERYTHROCYTE [DISTWIDTH] IN BLOOD BY AUTOMATED COUNT: 15 % (ref 11–14.5)
GFR SERPL CREATININE-BSD FRML MDRD: 54 ML/MIN/1.73M2
GLUCOSE BLD-MCNC: 97 MG/DL (ref 70–125)
HBA1C MFR BLD: 5.3 % (ref 3.5–6)
HCT VFR BLD AUTO: 33.5 % (ref 35–47)
HGB BLD-MCNC: 10.8 G/DL (ref 12–16)
LYMPHOCYTES # BLD AUTO: 4.8 THOU/UL (ref 0.8–4.4)
LYMPHOCYTES NFR BLD AUTO: 39 % (ref 20–40)
MCH RBC QN AUTO: 27.9 PG (ref 27–34)
MCHC RBC AUTO-ENTMCNC: 32.2 G/DL (ref 32–36)
MCV RBC AUTO: 87 FL (ref 80–100)
MONOCYTES # BLD AUTO: 1.1 THOU/UL (ref 0–0.9)
MONOCYTES NFR BLD AUTO: 9 % (ref 2–10)
NEUTROPHILS # BLD AUTO: 6 THOU/UL (ref 2–7.7)
NEUTROPHILS NFR BLD AUTO: 48 % (ref 50–70)
PLATELET # BLD AUTO: 407 THOU/UL (ref 140–440)
PMV BLD AUTO: 7.7 FL (ref 7–10)
POTASSIUM BLD-SCNC: 4.7 MMOL/L (ref 3.5–5)
PROT SERPL-MCNC: 8.2 G/DL (ref 6–8)
RBC # BLD AUTO: 3.87 MILL/UL (ref 3.8–5.4)
SODIUM SERPL-SCNC: 138 MMOL/L (ref 136–145)
WBC: 12.5 THOU/UL (ref 4–11)

## 2019-06-05 ASSESSMENT — MIFFLIN-ST. JEOR: SCORE: 1227.12

## 2019-06-06 ENCOUNTER — OFFICE VISIT - HEALTHEAST (OUTPATIENT)
Dept: INFECTIOUS DISEASES | Facility: CLINIC | Age: 69
End: 2019-06-06

## 2019-06-06 DIAGNOSIS — J18.9 PNEUMONIA OF LEFT LUNG DUE TO INFECTIOUS ORGANISM, UNSPECIFIED PART OF LUNG: ICD-10-CM

## 2019-06-07 ENCOUNTER — RECORDS - HEALTHEAST (OUTPATIENT)
Dept: ADMINISTRATIVE | Facility: OTHER | Age: 69
End: 2019-06-07

## 2019-06-09 ENCOUNTER — COMMUNICATION - HEALTHEAST (OUTPATIENT)
Dept: INTERNAL MEDICINE | Facility: CLINIC | Age: 69
End: 2019-06-09

## 2019-06-09 DIAGNOSIS — J44.9 CHRONIC OBSTRUCTIVE PULMONARY DISEASE, UNSPECIFIED COPD TYPE (H): ICD-10-CM

## 2019-06-10 LAB
ALBUMIN PERCENT: 44.2 % (ref 51–67)
ALBUMIN SERPL ELPH-MCNC: 3.6 G/DL (ref 3.2–4.7)
ALPHA 1 PERCENT: 3.7 % (ref 2–4)
ALPHA 2 PERCENT: 13.3 % (ref 5–13)
ALPHA1 GLOB SERPL ELPH-MCNC: 0.3 G/DL (ref 0.1–0.3)
ALPHA2 GLOB SERPL ELPH-MCNC: 1.1 G/DL (ref 0.4–0.9)
B-GLOBULIN SERPL ELPH-MCNC: 0.9 G/DL (ref 0.7–1.2)
BETA PERCENT: 11.5 % (ref 10–17)
GAMMA GLOB SERPL ELPH-MCNC: 2.2 G/DL (ref 0.6–1.4)
GAMMA GLOBULIN PERCENT: 27.3 % (ref 9–20)
PATH ICD:: ABNORMAL
PROT PATTERN SERPL ELPH-IMP: ABNORMAL
PROT SERPL-MCNC: 8.1 G/DL (ref 6–8)
REVIEWING PATHOLOGIST: ABNORMAL

## 2019-06-11 ENCOUNTER — COMMUNICATION - HEALTHEAST (OUTPATIENT)
Dept: INTERNAL MEDICINE | Facility: CLINIC | Age: 69
End: 2019-06-11

## 2019-06-13 ENCOUNTER — AMBULATORY - HEALTHEAST (OUTPATIENT)
Dept: INTERNAL MEDICINE | Facility: CLINIC | Age: 69
End: 2019-06-13

## 2019-06-13 DIAGNOSIS — R77.8 ABNORMAL SPEP: ICD-10-CM

## 2019-06-14 ENCOUNTER — COMMUNICATION - HEALTHEAST (OUTPATIENT)
Dept: INTERNAL MEDICINE | Facility: CLINIC | Age: 69
End: 2019-06-14

## 2019-06-14 DIAGNOSIS — E13.9 DIABETES 1.5, MANAGED AS TYPE 2 (H): ICD-10-CM

## 2019-06-17 ENCOUNTER — HOSPITAL ENCOUNTER (OUTPATIENT)
Dept: CT IMAGING | Facility: HOSPITAL | Age: 69
Discharge: HOME | End: 2019-06-17
Attending: INTERNAL MEDICINE

## 2019-06-17 ENCOUNTER — OFFICE VISIT - HEALTHEAST (OUTPATIENT)
Dept: PULMONOLOGY | Facility: OTHER | Age: 69
End: 2019-06-17

## 2019-06-17 DIAGNOSIS — J44.9 CHRONIC OBSTRUCTIVE PULMONARY DISEASE, UNSPECIFIED COPD TYPE (H): ICD-10-CM

## 2019-06-17 DIAGNOSIS — G47.33 OSA (OBSTRUCTIVE SLEEP APNEA): ICD-10-CM

## 2019-06-17 DIAGNOSIS — J85.1 ABSCESS OF LOWER LOBE OF LEFT LUNG WITH PNEUMONIA (H): ICD-10-CM

## 2019-06-17 ASSESSMENT — MIFFLIN-ST. JEOR: SCORE: 1208.98

## 2019-06-19 ENCOUNTER — RECORDS - HEALTHEAST (OUTPATIENT)
Dept: ADMINISTRATIVE | Facility: OTHER | Age: 69
End: 2019-06-19

## 2019-06-19 ENCOUNTER — COMMUNICATION - HEALTHEAST (OUTPATIENT)
Dept: INTERNAL MEDICINE | Facility: CLINIC | Age: 69
End: 2019-06-19

## 2019-06-19 ENCOUNTER — AMBULATORY - HEALTHEAST (OUTPATIENT)
Dept: LAB | Facility: CLINIC | Age: 69
End: 2019-06-19

## 2019-06-19 DIAGNOSIS — J18.9 PNEUMONIA OF LEFT LUNG DUE TO INFECTIOUS ORGANISM, UNSPECIFIED PART OF LUNG: ICD-10-CM

## 2019-06-19 DIAGNOSIS — E13.9 DIABETES 1.5, MANAGED AS TYPE 2 (H): ICD-10-CM

## 2019-06-19 DIAGNOSIS — R77.8 ABNORMAL SPEP: ICD-10-CM

## 2019-06-19 LAB
BASOPHILS # BLD AUTO: 0.1 THOU/UL (ref 0–0.2)
BASOPHILS NFR BLD AUTO: 1 % (ref 0–2)
EOSINOPHIL # BLD AUTO: 0.4 THOU/UL (ref 0–0.4)
EOSINOPHIL NFR BLD AUTO: 4 % (ref 0–6)
ERYTHROCYTE [DISTWIDTH] IN BLOOD BY AUTOMATED COUNT: 16.4 % (ref 11–14.5)
HCT VFR BLD AUTO: 34.8 % (ref 35–47)
HGB BLD-MCNC: 10.4 G/DL (ref 12–16)
LYMPHOCYTES # BLD AUTO: 5.5 THOU/UL (ref 0.8–4.4)
LYMPHOCYTES NFR BLD AUTO: 53 % (ref 20–40)
MCH RBC QN AUTO: 27.1 PG (ref 27–34)
MCHC RBC AUTO-ENTMCNC: 29.9 G/DL (ref 32–36)
MCV RBC AUTO: 91 FL (ref 80–100)
MONOCYTES # BLD AUTO: 1.4 THOU/UL (ref 0–0.9)
MONOCYTES NFR BLD AUTO: 13 % (ref 2–10)
NEUTROPHILS # BLD AUTO: 3.1 THOU/UL (ref 2–7.7)
NEUTROPHILS NFR BLD AUTO: 30 % (ref 50–70)
PLATELET # BLD AUTO: 373 THOU/UL (ref 140–440)
PMV BLD AUTO: 11.8 FL (ref 8.5–12.5)
RBC # BLD AUTO: 3.84 MILL/UL (ref 3.8–5.4)
WBC: 10.6 THOU/UL (ref 4–11)

## 2019-06-20 ENCOUNTER — COMMUNICATION - HEALTHEAST (OUTPATIENT)
Dept: INFECTIOUS DISEASES | Facility: CLINIC | Age: 69
End: 2019-06-20

## 2019-06-20 ENCOUNTER — AMBULATORY - HEALTHEAST (OUTPATIENT)
Dept: INFECTIOUS DISEASES | Facility: CLINIC | Age: 69
End: 2019-06-20

## 2019-06-21 LAB
PATH ICD:: NORMAL
PROT PATTERN SERPL IFE-IMP: NORMAL
REVIEWING PATHOLOGIST: NORMAL

## 2019-07-02 ENCOUNTER — RECORDS - HEALTHEAST (OUTPATIENT)
Dept: PULMONOLOGY | Facility: OTHER | Age: 69
End: 2019-07-02

## 2019-07-02 ENCOUNTER — COMMUNICATION - HEALTHEAST (OUTPATIENT)
Dept: PULMONOLOGY | Facility: OTHER | Age: 69
End: 2019-07-02

## 2019-07-02 ENCOUNTER — RECORDS - HEALTHEAST (OUTPATIENT)
Dept: ADMINISTRATIVE | Facility: OTHER | Age: 69
End: 2019-07-02

## 2019-07-02 DIAGNOSIS — J84.112 IPF (IDIOPATHIC PULMONARY FIBROSIS) (H): ICD-10-CM

## 2019-07-02 DIAGNOSIS — J70.0 RADIATION PNEUMONITIS (H): ICD-10-CM

## 2019-07-02 DIAGNOSIS — J44.9 CHRONIC OBSTRUCTIVE PULMONARY DISEASE, UNSPECIFIED COPD TYPE (H): ICD-10-CM

## 2019-07-02 DIAGNOSIS — J44.9 CHRONIC OBSTRUCTIVE PULMONARY DISEASE, UNSPECIFIED (H): ICD-10-CM

## 2019-07-03 ENCOUNTER — COMMUNICATION - HEALTHEAST (OUTPATIENT)
Dept: PULMONOLOGY | Facility: OTHER | Age: 69
End: 2019-07-03

## 2019-07-07 ENCOUNTER — COMMUNICATION - HEALTHEAST (OUTPATIENT)
Dept: ONCOLOGY | Facility: HOSPITAL | Age: 69
End: 2019-07-07

## 2019-07-07 DIAGNOSIS — K21.9 GASTROESOPHAGEAL REFLUX DISEASE WITHOUT ESOPHAGITIS: ICD-10-CM

## 2019-07-14 ENCOUNTER — COMMUNICATION - HEALTHEAST (OUTPATIENT)
Dept: INTERNAL MEDICINE | Facility: CLINIC | Age: 69
End: 2019-07-14

## 2019-07-14 DIAGNOSIS — E78.5 HYPERLIPIDEMIA: ICD-10-CM

## 2019-07-27 ENCOUNTER — OFFICE VISIT (OUTPATIENT)
Dept: URGENT CARE | Facility: URGENT CARE | Age: 69
End: 2019-07-27
Payer: MEDICARE

## 2019-07-27 ENCOUNTER — COMMUNICATION - HEALTHEAST (OUTPATIENT)
Dept: SCHEDULING | Facility: CLINIC | Age: 69
End: 2019-07-27

## 2019-07-27 VITALS
TEMPERATURE: 100.8 F | BODY MASS INDEX: 24.83 KG/M2 | HEART RATE: 64 BPM | WEIGHT: 149 LBS | HEIGHT: 65 IN | OXYGEN SATURATION: 95 % | SYSTOLIC BLOOD PRESSURE: 87 MMHG | DIASTOLIC BLOOD PRESSURE: 48 MMHG

## 2019-07-27 DIAGNOSIS — R19.7 DIARRHEA, UNSPECIFIED TYPE: ICD-10-CM

## 2019-07-27 DIAGNOSIS — M19.90 ARTHRITIS: Primary | ICD-10-CM

## 2019-07-27 PROCEDURE — 99214 OFFICE O/P EST MOD 30 MIN: CPT | Performed by: FAMILY MEDICINE

## 2019-07-27 RX ORDER — ALBUTEROL SULFATE 90 UG/1
2 AEROSOL, METERED RESPIRATORY (INHALATION) EVERY 6 HOURS
COMMUNITY
End: 2023-02-13

## 2019-07-27 RX ORDER — ACETAMINOPHEN 500 MG
500-1000 TABLET ORAL EVERY 6 HOURS PRN
Qty: 100 TABLET | Refills: 0 | Status: SHIPPED | OUTPATIENT
Start: 2019-07-27 | End: 2022-08-09

## 2019-07-27 RX ORDER — MONTELUKAST SODIUM 10 MG/1
10 TABLET ORAL AT BEDTIME
COMMUNITY
End: 2021-10-11

## 2019-07-27 RX ORDER — ALENDRONATE SODIUM 35 MG/1
35 TABLET ORAL
COMMUNITY
End: 2021-09-22

## 2019-07-27 RX ORDER — FUROSEMIDE 20 MG
20 TABLET ORAL DAILY
COMMUNITY
End: 2021-09-22

## 2019-07-27 RX ORDER — MULTIPLE VITAMINS W/ MINERALS TAB 9MG-400MCG
1 TAB ORAL DAILY
COMMUNITY

## 2019-07-27 RX ORDER — FLUTICASONE PROPIONATE 50 MCG
1 SPRAY, SUSPENSION (ML) NASAL DAILY
COMMUNITY
End: 2021-09-22

## 2019-07-27 RX ORDER — SACCHAROMYCES BOULARDII 250 MG
250 CAPSULE ORAL 2 TIMES DAILY
COMMUNITY
End: 2021-10-11

## 2019-07-27 RX ORDER — CITALOPRAM HYDROBROMIDE 10 MG/1
10 TABLET ORAL DAILY
COMMUNITY
End: 2021-08-12

## 2019-07-27 RX ORDER — TRAZODONE HYDROCHLORIDE 100 MG/1
100 TABLET ORAL AT BEDTIME
COMMUNITY
End: 2021-09-22

## 2019-07-27 ASSESSMENT — MIFFLIN-ST. JEOR: SCORE: 1196.74

## 2019-07-27 NOTE — PROGRESS NOTES
SUBJECTIVE:   Chief Complaint   Patient presents with     Urgent Care     Triage     Last night started to have loose stools/incontinence x 8; no blood in stool per pt.  Temp today 100.7 orally.  Denies nausea, vomiting, abdominal pain.  Able to keep fluids down, did have some soup as well.  Feels weak and light headed.  Hx COPD and Type II diabetes.         Diarrhea     How long?: Last night     Description:   Consistency of stool: watery  Blood in stool: Unknown   Number of loose stools in past 24 hours: More than eight stools     Progression of Symptoms:  worsening    Accompanying Signs & Symptoms:  Fever:  YES   Nausea or vomiting; No   Abdominal pain:No   Episodes of constipation: NO     Weight loss: Unknown   She also feels weak and lightheaded.    Review of Systems review of system negative except as mentioned in HPI.       No past medical history on file.  No family history on file.  Current Outpatient Medications   Medication Sig Dispense Refill     albuterol (PROAIR HFA/PROVENTIL HFA/VENTOLIN HFA) 108 (90 Base) MCG/ACT inhaler Inhale 2 puffs into the lungs every 6 hours       alendronate (FOSAMAX) 35 MG tablet Take 35 mg by mouth every 7 days       citalopram (CELEXA) 10 MG tablet Take 10 mg by mouth daily       fluticasone (FLONASE) 50 MCG/ACT nasal spray Spray 1 spray into both nostrils daily       furosemide (LASIX) 20 MG tablet Take 20 mg by mouth daily       metFORMIN (GLUCOPHAGE) 500 MG tablet Take 500 mg by mouth 2 times daily (with meals)       montelukast (SINGULAIR) 10 MG tablet Take 10 mg by mouth At Bedtime       multivitamin w/minerals (MULTI-VITAMIN) tablet Take 1 tablet by mouth daily       saccharomyces boulardii (FLORASTOR) 250 MG capsule Take 250 mg by mouth 2 times daily       traZODone (DESYREL) 100 MG tablet Take 100 mg by mouth At Bedtime       UNKNOWN TO PATIENT        Social History     Tobacco Use     Smoking status: Former Smoker     Smokeless tobacco: Never Used   Substance Use  "Topics     Alcohol use: Not on file       OBJECTIVE  BP (!) 87/48   Pulse 64   Temp 100.8  F (38.2  C) (Oral)   Ht 1.651 m (5' 5\")   Wt 67.6 kg (149 lb)   SpO2 95%   BMI 24.79 kg/m      Physical Exam   Constitutional:   Appears weak    Eyes: Conjunctivae are normal. Right eye exhibits no discharge. Left eye exhibits no discharge.   Cardiovascular:   Tachycardic    Pulmonary/Chest: Effort normal and breath sounds normal.   Neurological: She is alert.   Skin: Skin is warm.       Labs:  No results found for this or any previous visit (from the past 24 hour(s)).    X-Ray was not done.    ASSESSMENT:    Rita was seen today for urgent care and triage.    Diagnoses and all orders for this visit:    Diarrhea, unspecified type  Patient presented to the clinic for evaluation of diarrhea, weakness and lightheadedness. She is hypotensive and tachycardic on presentation. She also has elevated temp. I recommended ED visit for labs and possible IV fluids. She will follow up with PCP after ED visit for close monitoring. Son will drive her.     Arthritis:  Patient requested refills for Tylenol   -     acetaminophen (TYLENOL) 500 MG tablet; Take 1-2 tablets (500-1,000 mg) by mouth every 6 hours as needed for mild pain          Mariusz Rojas MD  "

## 2019-08-01 ENCOUNTER — COMMUNICATION - HEALTHEAST (OUTPATIENT)
Dept: CARE COORDINATION | Facility: CLINIC | Age: 69
End: 2019-08-01

## 2019-08-06 ENCOUNTER — AMBULATORY - HEALTHEAST (OUTPATIENT)
Dept: PHARMACY | Facility: CLINIC | Age: 69
End: 2019-08-06

## 2019-08-06 DIAGNOSIS — S42.291K OTHER CLOSED DISPLACED FRACTURE OF PROXIMAL END OF RIGHT HUMERUS WITH NONUNION, SUBSEQUENT ENCOUNTER: ICD-10-CM

## 2019-08-06 DIAGNOSIS — R60.9 EDEMA, UNSPECIFIED TYPE: ICD-10-CM

## 2019-08-06 DIAGNOSIS — E13.9 DIABETES 1.5, MANAGED AS TYPE 2 (H): ICD-10-CM

## 2019-08-06 DIAGNOSIS — J44.9 CHRONIC OBSTRUCTIVE PULMONARY DISEASE, UNSPECIFIED COPD TYPE (H): ICD-10-CM

## 2019-08-06 DIAGNOSIS — T78.40XS ALLERGIC STATE, SEQUELA: ICD-10-CM

## 2019-08-06 DIAGNOSIS — J18.9 PNEUMONIA OF LEFT LUNG DUE TO INFECTIOUS ORGANISM, UNSPECIFIED PART OF LUNG: ICD-10-CM

## 2019-08-06 DIAGNOSIS — F33.1 MODERATE EPISODE OF RECURRENT MAJOR DEPRESSIVE DISORDER (H): ICD-10-CM

## 2019-08-06 DIAGNOSIS — I10 HYPERTENSION: ICD-10-CM

## 2019-08-06 DIAGNOSIS — E03.9 HYPOTHYROIDISM, UNSPECIFIED TYPE: ICD-10-CM

## 2019-08-06 DIAGNOSIS — K21.9 GASTROESOPHAGEAL REFLUX DISEASE WITHOUT ESOPHAGITIS: ICD-10-CM

## 2019-08-08 ENCOUNTER — OFFICE VISIT - HEALTHEAST (OUTPATIENT)
Dept: INTERNAL MEDICINE | Facility: CLINIC | Age: 69
End: 2019-08-08

## 2019-08-08 ENCOUNTER — COMMUNICATION - HEALTHEAST (OUTPATIENT)
Dept: INTERNAL MEDICINE | Facility: CLINIC | Age: 69
End: 2019-08-08

## 2019-08-08 DIAGNOSIS — J44.9 CHRONIC OBSTRUCTIVE PULMONARY DISEASE, UNSPECIFIED COPD TYPE (H): ICD-10-CM

## 2019-08-08 DIAGNOSIS — C34.91 NON-SMALL CELL CANCER OF RIGHT LUNG (H): ICD-10-CM

## 2019-08-08 DIAGNOSIS — F32.A ANXIETY AND DEPRESSION: ICD-10-CM

## 2019-08-08 DIAGNOSIS — F41.9 ANXIETY AND DEPRESSION: ICD-10-CM

## 2019-08-08 DIAGNOSIS — E13.9 DIABETES 1.5, MANAGED AS TYPE 2 (H): ICD-10-CM

## 2019-08-08 DIAGNOSIS — G47.33 OSA ON CPAP: ICD-10-CM

## 2019-08-08 DIAGNOSIS — F51.01 PRIMARY INSOMNIA: ICD-10-CM

## 2019-08-08 DIAGNOSIS — R59.9 LYMPH NODE ENLARGEMENT: ICD-10-CM

## 2019-08-08 DIAGNOSIS — J18.9 PNEUMONIA OF RIGHT LOWER LOBE DUE TO INFECTIOUS ORGANISM: ICD-10-CM

## 2019-08-08 DIAGNOSIS — N17.9 AKI (ACUTE KIDNEY INJURY) (H): ICD-10-CM

## 2019-08-08 DIAGNOSIS — B00.9 HERPES SIMPLEX VIRUS INFECTION: ICD-10-CM

## 2019-08-08 LAB
ANION GAP SERPL CALCULATED.3IONS-SCNC: 10 MMOL/L (ref 5–18)
BUN SERPL-MCNC: 22 MG/DL (ref 8–28)
CALCIUM SERPL-MCNC: 10.6 MG/DL (ref 8.5–10.5)
CHLORIDE BLD-SCNC: 99 MMOL/L (ref 98–107)
CO2 SERPL-SCNC: 26 MMOL/L (ref 22–31)
CREAT SERPL-MCNC: 1.51 MG/DL (ref 0.6–1.1)
CREAT UR-MCNC: 48.5 MG/DL
ERYTHROCYTE [DISTWIDTH] IN BLOOD BY AUTOMATED COUNT: 12.6 % (ref 11–14.5)
GFR SERPL CREATININE-BSD FRML MDRD: 34 ML/MIN/1.73M2
GLUCOSE BLD-MCNC: 100 MG/DL (ref 70–125)
HCT VFR BLD AUTO: 36.3 % (ref 35–47)
HGB BLD-MCNC: 11.8 G/DL (ref 12–16)
MCH RBC QN AUTO: 27.8 PG (ref 27–34)
MCHC RBC AUTO-ENTMCNC: 32.6 G/DL (ref 32–36)
MCV RBC AUTO: 85 FL (ref 80–100)
MICROALBUMIN UR-MCNC: 0.6 MG/DL (ref 0–1.99)
MICROALBUMIN/CREAT UR: 12.4 MG/G
PLATELET # BLD AUTO: 651 THOU/UL (ref 140–440)
PMV BLD AUTO: 6.9 FL (ref 7–10)
POTASSIUM BLD-SCNC: 5.7 MMOL/L (ref 3.5–5)
RBC # BLD AUTO: 4.25 MILL/UL (ref 3.8–5.4)
SODIUM SERPL-SCNC: 135 MMOL/L (ref 136–145)
WBC: 10.4 THOU/UL (ref 4–11)

## 2019-08-08 ASSESSMENT — MIFFLIN-ST. JEOR: SCORE: 1165.43

## 2019-08-09 ENCOUNTER — COMMUNICATION - HEALTHEAST (OUTPATIENT)
Dept: INTERNAL MEDICINE | Facility: CLINIC | Age: 69
End: 2019-08-09

## 2019-08-15 ENCOUNTER — AMBULATORY - HEALTHEAST (OUTPATIENT)
Dept: LAB | Facility: CLINIC | Age: 69
End: 2019-08-15

## 2019-08-15 DIAGNOSIS — C34.91 NON-SMALL CELL CANCER OF RIGHT LUNG (H): ICD-10-CM

## 2019-08-15 DIAGNOSIS — N17.9 AKI (ACUTE KIDNEY INJURY) (H): ICD-10-CM

## 2019-08-15 LAB
ALBUMIN SERPL-MCNC: 3.4 G/DL (ref 3.5–5)
ALP SERPL-CCNC: 86 U/L (ref 45–120)
ALT SERPL W P-5'-P-CCNC: <9 U/L (ref 0–45)
ANION GAP SERPL CALCULATED.3IONS-SCNC: 8 MMOL/L (ref 5–18)
AST SERPL W P-5'-P-CCNC: 15 U/L (ref 0–40)
BILIRUB SERPL-MCNC: 0.3 MG/DL (ref 0–1)
BUN SERPL-MCNC: 13 MG/DL (ref 8–28)
CALCIUM SERPL-MCNC: 10 MG/DL (ref 8.5–10.5)
CHLORIDE BLD-SCNC: 104 MMOL/L (ref 98–107)
CO2 SERPL-SCNC: 28 MMOL/L (ref 22–31)
CREAT SERPL-MCNC: 0.91 MG/DL (ref 0.6–1.1)
GFR SERPL CREATININE-BSD FRML MDRD: >60 ML/MIN/1.73M2
GLUCOSE BLD-MCNC: 93 MG/DL (ref 70–125)
POTASSIUM BLD-SCNC: 5.3 MMOL/L (ref 3.5–5)
PROT SERPL-MCNC: 7 G/DL (ref 6–8)
SODIUM SERPL-SCNC: 140 MMOL/L (ref 136–145)

## 2019-08-22 ENCOUNTER — COMMUNICATION - HEALTHEAST (OUTPATIENT)
Dept: INTERNAL MEDICINE | Facility: CLINIC | Age: 69
End: 2019-08-22

## 2019-08-22 DIAGNOSIS — S42.291D OTHER CLOSED DISPLACED FRACTURE OF PROXIMAL END OF RIGHT HUMERUS WITH ROUTINE HEALING, SUBSEQUENT ENCOUNTER: ICD-10-CM

## 2019-08-26 ENCOUNTER — OFFICE VISIT - HEALTHEAST (OUTPATIENT)
Dept: ONCOLOGY | Facility: HOSPITAL | Age: 69
End: 2019-08-26

## 2019-08-26 DIAGNOSIS — J18.9 HCAP (HEALTHCARE-ASSOCIATED PNEUMONIA): ICD-10-CM

## 2019-08-26 DIAGNOSIS — C34.91 NON-SMALL CELL CANCER OF RIGHT LUNG (H): ICD-10-CM

## 2019-08-30 ENCOUNTER — COMMUNICATION - HEALTHEAST (OUTPATIENT)
Dept: INTERNAL MEDICINE | Facility: CLINIC | Age: 69
End: 2019-08-30

## 2019-09-02 ENCOUNTER — COMMUNICATION - HEALTHEAST (OUTPATIENT)
Dept: INTERNAL MEDICINE | Facility: CLINIC | Age: 69
End: 2019-09-02

## 2019-09-02 DIAGNOSIS — S42.291D OTHER CLOSED DISPLACED FRACTURE OF PROXIMAL END OF RIGHT HUMERUS WITH ROUTINE HEALING, SUBSEQUENT ENCOUNTER: ICD-10-CM

## 2019-09-06 ENCOUNTER — RECORDS - HEALTHEAST (OUTPATIENT)
Dept: ADMINISTRATIVE | Facility: OTHER | Age: 69
End: 2019-09-06

## 2019-09-09 ENCOUNTER — OFFICE VISIT - HEALTHEAST (OUTPATIENT)
Dept: SLEEP MEDICINE | Facility: CLINIC | Age: 69
End: 2019-09-09

## 2019-09-09 DIAGNOSIS — G47.33 OBSTRUCTIVE SLEEP APNEA: ICD-10-CM

## 2019-09-09 DIAGNOSIS — J44.9 CHRONIC OBSTRUCTIVE PULMONARY DISEASE, UNSPECIFIED COPD TYPE (H): ICD-10-CM

## 2019-09-09 DIAGNOSIS — R63.4 LOSS OF WEIGHT: ICD-10-CM

## 2019-09-09 DIAGNOSIS — G47.8 SLEEP DYSFUNCTION WITH SLEEP STAGE DISTURBANCE: ICD-10-CM

## 2019-09-09 ASSESSMENT — MIFFLIN-ST. JEOR: SCORE: 1196.28

## 2019-10-01 ENCOUNTER — OFFICE VISIT - HEALTHEAST (OUTPATIENT)
Dept: INTERNAL MEDICINE | Facility: CLINIC | Age: 69
End: 2019-10-01

## 2019-10-01 DIAGNOSIS — F51.04 PSYCHOPHYSIOLOGICAL INSOMNIA: ICD-10-CM

## 2019-10-01 DIAGNOSIS — R07.81 RIB PAIN ON LEFT SIDE: ICD-10-CM

## 2019-10-01 DIAGNOSIS — S42.291K OTHER CLOSED DISPLACED FRACTURE OF PROXIMAL END OF RIGHT HUMERUS WITH NONUNION, SUBSEQUENT ENCOUNTER: ICD-10-CM

## 2019-10-01 DIAGNOSIS — C34.91 NON-SMALL CELL CANCER OF RIGHT LUNG (H): ICD-10-CM

## 2019-10-01 DIAGNOSIS — Z23 NEED FOR PNEUMOCOCCAL VACCINE: ICD-10-CM

## 2019-10-01 DIAGNOSIS — R59.9 LYMPH NODE ENLARGEMENT: ICD-10-CM

## 2019-10-01 DIAGNOSIS — E13.9 DIABETES 1.5, MANAGED AS TYPE 2 (H): ICD-10-CM

## 2019-10-01 DIAGNOSIS — N17.9 AKI (ACUTE KIDNEY INJURY) (H): ICD-10-CM

## 2019-10-01 DIAGNOSIS — Z23 FLU VACCINE NEED: ICD-10-CM

## 2019-10-01 ASSESSMENT — MIFFLIN-ST. JEOR: SCORE: 1224.34

## 2019-10-03 ENCOUNTER — OFFICE VISIT - HEALTHEAST (OUTPATIENT)
Dept: PODIATRY | Facility: CLINIC | Age: 69
End: 2019-10-03

## 2019-10-03 DIAGNOSIS — L60.2 ONYCHAUXIS: ICD-10-CM

## 2019-10-03 DIAGNOSIS — B35.1 NAIL FUNGUS: ICD-10-CM

## 2019-10-03 ASSESSMENT — MIFFLIN-ST. JEOR: SCORE: 1223.49

## 2019-10-07 ENCOUNTER — COMMUNICATION - HEALTHEAST (OUTPATIENT)
Dept: INTERNAL MEDICINE | Facility: CLINIC | Age: 69
End: 2019-10-07

## 2019-10-07 ENCOUNTER — COMMUNICATION - HEALTHEAST (OUTPATIENT)
Dept: SCHEDULING | Facility: CLINIC | Age: 69
End: 2019-10-07

## 2019-10-07 ENCOUNTER — RECORDS - HEALTHEAST (OUTPATIENT)
Dept: ADMINISTRATIVE | Facility: OTHER | Age: 69
End: 2019-10-07

## 2019-10-07 DIAGNOSIS — E13.9 DIABETES 1.5, MANAGED AS TYPE 2 (H): ICD-10-CM

## 2019-10-16 ENCOUNTER — OFFICE VISIT - HEALTHEAST (OUTPATIENT)
Dept: INTERNAL MEDICINE | Facility: CLINIC | Age: 69
End: 2019-10-16

## 2019-10-16 DIAGNOSIS — R65.20 SEPSIS DUE TO ESCHERICHIA COLI WITH ACUTE RENAL FAILURE WITHOUT SEPTIC SHOCK, UNSPECIFIED ACUTE RENAL FAILURE TYPE (H): ICD-10-CM

## 2019-10-16 DIAGNOSIS — N13.30 HYDRONEPHROSIS, RIGHT: ICD-10-CM

## 2019-10-16 DIAGNOSIS — N17.9 SEPSIS DUE TO ESCHERICHIA COLI WITH ACUTE RENAL FAILURE WITHOUT SEPTIC SHOCK, UNSPECIFIED ACUTE RENAL FAILURE TYPE (H): ICD-10-CM

## 2019-10-16 DIAGNOSIS — E03.9 HYPOTHYROIDISM, UNSPECIFIED TYPE: ICD-10-CM

## 2019-10-16 DIAGNOSIS — A41.51 SEPSIS DUE TO ESCHERICHIA COLI WITH ACUTE RENAL FAILURE WITHOUT SEPTIC SHOCK, UNSPECIFIED ACUTE RENAL FAILURE TYPE (H): ICD-10-CM

## 2019-10-16 LAB
ANION GAP SERPL CALCULATED.3IONS-SCNC: 10 MMOL/L (ref 5–18)
BUN SERPL-MCNC: 22 MG/DL (ref 8–28)
CALCIUM SERPL-MCNC: 10.3 MG/DL (ref 8.5–10.5)
CHLORIDE BLD-SCNC: 103 MMOL/L (ref 98–107)
CO2 SERPL-SCNC: 26 MMOL/L (ref 22–31)
CREAT SERPL-MCNC: 1.41 MG/DL (ref 0.6–1.1)
ERYTHROCYTE [DISTWIDTH] IN BLOOD BY AUTOMATED COUNT: 11.7 % (ref 11–14.5)
GFR SERPL CREATININE-BSD FRML MDRD: 37 ML/MIN/1.73M2
GLUCOSE BLD-MCNC: 104 MG/DL (ref 70–125)
HCT VFR BLD AUTO: 33.9 % (ref 35–47)
HGB BLD-MCNC: 11.1 G/DL (ref 12–16)
MCH RBC QN AUTO: 29.1 PG (ref 27–34)
MCHC RBC AUTO-ENTMCNC: 32.8 G/DL (ref 32–36)
MCV RBC AUTO: 89 FL (ref 80–100)
PLATELET # BLD AUTO: 585 THOU/UL (ref 140–440)
PMV BLD AUTO: 7.4 FL (ref 7–10)
POTASSIUM BLD-SCNC: 5.5 MMOL/L (ref 3.5–5)
RBC # BLD AUTO: 3.81 MILL/UL (ref 3.8–5.4)
SODIUM SERPL-SCNC: 139 MMOL/L (ref 136–145)
TSH SERPL DL<=0.005 MIU/L-ACNC: 4.15 UIU/ML (ref 0.3–5)
WBC: 11.8 THOU/UL (ref 4–11)

## 2019-10-16 ASSESSMENT — MIFFLIN-ST. JEOR: SCORE: 1207.62

## 2019-10-18 ENCOUNTER — COMMUNICATION - HEALTHEAST (OUTPATIENT)
Dept: INTERNAL MEDICINE | Facility: CLINIC | Age: 69
End: 2019-10-18

## 2019-10-18 DIAGNOSIS — J44.9 COPD (CHRONIC OBSTRUCTIVE PULMONARY DISEASE) (H): ICD-10-CM

## 2019-10-20 ENCOUNTER — COMMUNICATION - HEALTHEAST (OUTPATIENT)
Dept: INTERNAL MEDICINE | Facility: CLINIC | Age: 69
End: 2019-10-20

## 2019-10-20 DIAGNOSIS — E03.9 HYPOTHYROIDISM: ICD-10-CM

## 2019-10-23 ENCOUNTER — COMMUNICATION - HEALTHEAST (OUTPATIENT)
Dept: INTERNAL MEDICINE | Facility: CLINIC | Age: 69
End: 2019-10-23

## 2019-10-23 DIAGNOSIS — R07.81 RIB PAIN ON LEFT SIDE: ICD-10-CM

## 2019-10-27 ENCOUNTER — COMMUNICATION - HEALTHEAST (OUTPATIENT)
Dept: INTERNAL MEDICINE | Facility: CLINIC | Age: 69
End: 2019-10-27

## 2019-10-27 DIAGNOSIS — E03.9 HYPOTHYROIDISM: ICD-10-CM

## 2019-10-27 DIAGNOSIS — E03.9 HYPOTHYROIDISM, UNSPECIFIED TYPE: ICD-10-CM

## 2019-10-30 ENCOUNTER — COMMUNICATION - HEALTHEAST (OUTPATIENT)
Dept: INTERNAL MEDICINE | Facility: CLINIC | Age: 69
End: 2019-10-30

## 2019-10-30 DIAGNOSIS — R65.20 SEPSIS DUE TO ESCHERICHIA COLI WITH ACUTE RENAL FAILURE WITHOUT SEPTIC SHOCK, UNSPECIFIED ACUTE RENAL FAILURE TYPE (H): ICD-10-CM

## 2019-10-30 DIAGNOSIS — A41.51 SEPSIS DUE TO ESCHERICHIA COLI WITH ACUTE RENAL FAILURE WITHOUT SEPTIC SHOCK, UNSPECIFIED ACUTE RENAL FAILURE TYPE (H): ICD-10-CM

## 2019-10-30 DIAGNOSIS — N17.9 SEPSIS DUE TO ESCHERICHIA COLI WITH ACUTE RENAL FAILURE WITHOUT SEPTIC SHOCK, UNSPECIFIED ACUTE RENAL FAILURE TYPE (H): ICD-10-CM

## 2019-11-04 ENCOUNTER — COMMUNICATION - HEALTHEAST (OUTPATIENT)
Dept: INTERNAL MEDICINE | Facility: CLINIC | Age: 69
End: 2019-11-04

## 2019-11-04 DIAGNOSIS — Z00.00 ROUTINE HEALTH MAINTENANCE: ICD-10-CM

## 2019-11-07 ENCOUNTER — RECORDS - HEALTHEAST (OUTPATIENT)
Dept: SLEEP MEDICINE | Facility: CLINIC | Age: 69
End: 2019-11-07

## 2019-11-07 ENCOUNTER — RECORDS - HEALTHEAST (OUTPATIENT)
Dept: ADMINISTRATIVE | Facility: OTHER | Age: 69
End: 2019-11-07

## 2019-11-07 DIAGNOSIS — J44.9 CHRONIC OBSTRUCTIVE PULMONARY DISEASE, UNSPECIFIED (H): ICD-10-CM

## 2019-11-07 DIAGNOSIS — R63.4 ABNORMAL WEIGHT LOSS: ICD-10-CM

## 2019-11-07 DIAGNOSIS — G47.33 OBSTRUCTIVE SLEEP APNEA (ADULT) (PEDIATRIC): ICD-10-CM

## 2019-11-07 DIAGNOSIS — G47.8 OTHER SLEEP DISORDERS: ICD-10-CM

## 2019-11-08 ENCOUNTER — RECORDS - HEALTHEAST (OUTPATIENT)
Dept: BONE DENSITY | Facility: CLINIC | Age: 69
End: 2019-11-08

## 2019-11-08 ENCOUNTER — RECORDS - HEALTHEAST (OUTPATIENT)
Dept: ADMINISTRATIVE | Facility: OTHER | Age: 69
End: 2019-11-08

## 2019-11-08 ENCOUNTER — RECORDS - HEALTHEAST (OUTPATIENT)
Dept: MAMMOGRAPHY | Facility: CLINIC | Age: 69
End: 2019-11-08

## 2019-11-08 DIAGNOSIS — Z12.31 ENCOUNTER FOR SCREENING MAMMOGRAM FOR MALIGNANT NEOPLASM OF BREAST: ICD-10-CM

## 2019-11-08 DIAGNOSIS — Z13.820 ENCOUNTER FOR SCREENING FOR OSTEOPOROSIS: ICD-10-CM

## 2019-11-08 DIAGNOSIS — M89.9 DISORDER OF BONE, UNSPECIFIED: ICD-10-CM

## 2019-11-13 ENCOUNTER — COMMUNICATION - HEALTHEAST (OUTPATIENT)
Dept: SLEEP MEDICINE | Facility: CLINIC | Age: 69
End: 2019-11-13

## 2019-11-19 ENCOUNTER — COMMUNICATION - HEALTHEAST (OUTPATIENT)
Dept: LAB | Facility: CLINIC | Age: 69
End: 2019-11-19

## 2019-11-19 ENCOUNTER — RECORDS - HEALTHEAST (OUTPATIENT)
Dept: ADMINISTRATIVE | Facility: OTHER | Age: 69
End: 2019-11-19

## 2019-11-19 DIAGNOSIS — E03.9 HYPOTHYROIDISM, UNSPECIFIED TYPE: ICD-10-CM

## 2019-11-21 ENCOUNTER — OFFICE VISIT - HEALTHEAST (OUTPATIENT)
Dept: SLEEP MEDICINE | Facility: CLINIC | Age: 69
End: 2019-11-21

## 2019-11-21 DIAGNOSIS — G47.33 OBSTRUCTIVE SLEEP APNEA: ICD-10-CM

## 2019-11-21 DIAGNOSIS — G47.69 SLEEP-RELATED MOVEMENT DISORDER: ICD-10-CM

## 2019-11-21 ASSESSMENT — MIFFLIN-ST. JEOR: SCORE: 1264.32

## 2019-11-23 ENCOUNTER — COMMUNICATION - HEALTHEAST (OUTPATIENT)
Dept: INTERNAL MEDICINE | Facility: CLINIC | Age: 69
End: 2019-11-23

## 2019-11-23 DIAGNOSIS — E03.9 HYPOTHYROIDISM: ICD-10-CM

## 2019-11-25 ENCOUNTER — RECORDS - HEALTHEAST (OUTPATIENT)
Dept: HEALTH INFORMATION MANAGEMENT | Facility: CLINIC | Age: 69
End: 2019-11-25

## 2019-11-27 ENCOUNTER — OFFICE VISIT - HEALTHEAST (OUTPATIENT)
Dept: INTERNAL MEDICINE | Facility: CLINIC | Age: 69
End: 2019-11-27

## 2019-11-27 DIAGNOSIS — K43.9 VENTRAL HERNIA WITHOUT OBSTRUCTION OR GANGRENE: ICD-10-CM

## 2019-11-27 DIAGNOSIS — E87.5 HYPERKALEMIA: ICD-10-CM

## 2019-11-27 DIAGNOSIS — J44.9 CHRONIC OBSTRUCTIVE PULMONARY DISEASE, UNSPECIFIED COPD TYPE (H): ICD-10-CM

## 2019-11-27 DIAGNOSIS — D50.0 IRON DEFICIENCY ANEMIA DUE TO CHRONIC BLOOD LOSS: ICD-10-CM

## 2019-11-27 DIAGNOSIS — G47.33 OSA ON CPAP: ICD-10-CM

## 2019-11-27 DIAGNOSIS — E13.9 DIABETES 1.5, MANAGED AS TYPE 2 (H): ICD-10-CM

## 2019-11-27 DIAGNOSIS — Z13.220 ENCOUNTER FOR SCREENING FOR LIPOID DISORDERS: ICD-10-CM

## 2019-11-27 DIAGNOSIS — C34.91 NON-SMALL CELL CANCER OF RIGHT LUNG (H): ICD-10-CM

## 2019-11-27 DIAGNOSIS — E03.9 HYPOTHYROIDISM, UNSPECIFIED TYPE: ICD-10-CM

## 2019-11-27 LAB
ALBUMIN SERPL-MCNC: 3.5 G/DL (ref 3.5–5)
ALP SERPL-CCNC: 91 U/L (ref 45–120)
ALT SERPL W P-5'-P-CCNC: 10 U/L (ref 0–45)
ANION GAP SERPL CALCULATED.3IONS-SCNC: 11 MMOL/L (ref 5–18)
AST SERPL W P-5'-P-CCNC: 17 U/L (ref 0–40)
BILIRUB DIRECT SERPL-MCNC: 0.1 MG/DL
BILIRUB SERPL-MCNC: 0.3 MG/DL (ref 0–1)
BUN SERPL-MCNC: 26 MG/DL (ref 8–28)
CALCIUM SERPL-MCNC: 9.3 MG/DL (ref 8.5–10.5)
CHLORIDE BLD-SCNC: 108 MMOL/L (ref 98–107)
CHOLEST SERPL-MCNC: 170 MG/DL
CO2 SERPL-SCNC: 24 MMOL/L (ref 22–31)
CREAT SERPL-MCNC: 1.36 MG/DL (ref 0.6–1.1)
ERYTHROCYTE [DISTWIDTH] IN BLOOD BY AUTOMATED COUNT: 13.1 % (ref 11–14.5)
FASTING STATUS PATIENT QL REPORTED: NO
GFR SERPL CREATININE-BSD FRML MDRD: 38 ML/MIN/1.73M2
GLUCOSE BLD-MCNC: 101 MG/DL (ref 70–125)
HCT VFR BLD AUTO: 35.7 % (ref 35–47)
HDLC SERPL-MCNC: 49 MG/DL
HGB BLD-MCNC: 11.6 G/DL (ref 12–16)
IRON SATN MFR SERPL: 11 % (ref 20–50)
IRON SERPL-MCNC: 37 UG/DL (ref 42–175)
LDLC SERPL CALC-MCNC: 99 MG/DL
MCH RBC QN AUTO: 29.5 PG (ref 27–34)
MCHC RBC AUTO-ENTMCNC: 32.6 G/DL (ref 32–36)
MCV RBC AUTO: 91 FL (ref 80–100)
PLATELET # BLD AUTO: 267 THOU/UL (ref 140–440)
PMV BLD AUTO: 7.8 FL (ref 7–10)
POTASSIUM BLD-SCNC: 4.6 MMOL/L (ref 3.5–5)
PROT SERPL-MCNC: 6.9 G/DL (ref 6–8)
RBC # BLD AUTO: 3.94 MILL/UL (ref 3.8–5.4)
SODIUM SERPL-SCNC: 143 MMOL/L (ref 136–145)
TIBC SERPL-MCNC: 339 UG/DL (ref 313–563)
TRANSFERRIN SERPL-MCNC: 271 MG/DL (ref 212–360)
TRIGL SERPL-MCNC: 112 MG/DL
TSH SERPL DL<=0.005 MIU/L-ACNC: 2.56 UIU/ML (ref 0.3–5)
WBC: 8.4 THOU/UL (ref 4–11)

## 2019-11-27 ASSESSMENT — PATIENT HEALTH QUESTIONNAIRE - PHQ9: SUM OF ALL RESPONSES TO PHQ QUESTIONS 1-9: 11

## 2019-12-03 ENCOUNTER — RECORDS - HEALTHEAST (OUTPATIENT)
Dept: ADMINISTRATIVE | Facility: OTHER | Age: 69
End: 2019-12-03

## 2019-12-10 ENCOUNTER — RECORDS - HEALTHEAST (OUTPATIENT)
Dept: ADMINISTRATIVE | Facility: OTHER | Age: 69
End: 2019-12-10

## 2019-12-10 ENCOUNTER — COMMUNICATION - HEALTHEAST (OUTPATIENT)
Dept: CARDIOLOGY | Facility: CLINIC | Age: 69
End: 2019-12-10

## 2019-12-10 LAB — RETINOPATHY: NEGATIVE

## 2019-12-12 ENCOUNTER — AMBULATORY - HEALTHEAST (OUTPATIENT)
Dept: CARDIOLOGY | Facility: CLINIC | Age: 69
End: 2019-12-12

## 2019-12-12 DIAGNOSIS — Z00.6 EXAM FOR CLINICAL RESEARCH: ICD-10-CM

## 2019-12-12 DIAGNOSIS — I49.8 BIGEMINY: ICD-10-CM

## 2019-12-12 LAB
ATRIAL RATE - MUSE: 67 BPM
DIASTOLIC BLOOD PRESSURE - MUSE: NORMAL
INTERPRETATION ECG - MUSE: NORMAL
P AXIS - MUSE: 73 DEGREES
PR INTERVAL - MUSE: 212 MS
QRS DURATION - MUSE: 74 MS
QT - MUSE: 408 MS
QTC - MUSE: 431 MS
R AXIS - MUSE: 6 DEGREES
SYSTOLIC BLOOD PRESSURE - MUSE: NORMAL
T AXIS - MUSE: 68 DEGREES
VENTRICULAR RATE- MUSE: 67 BPM

## 2019-12-12 ASSESSMENT — MIFFLIN-ST. JEOR: SCORE: 1281.55

## 2019-12-13 ENCOUNTER — COMMUNICATION - HEALTHEAST (OUTPATIENT)
Dept: INTERNAL MEDICINE | Facility: CLINIC | Age: 69
End: 2019-12-13

## 2019-12-13 DIAGNOSIS — D50.0 IRON DEFICIENCY ANEMIA DUE TO CHRONIC BLOOD LOSS: ICD-10-CM

## 2019-12-16 ENCOUNTER — AMBULATORY - HEALTHEAST (OUTPATIENT)
Dept: CARDIOLOGY | Facility: CLINIC | Age: 69
End: 2019-12-16

## 2019-12-18 ENCOUNTER — COMMUNICATION - HEALTHEAST (OUTPATIENT)
Dept: INTERNAL MEDICINE | Facility: CLINIC | Age: 69
End: 2019-12-18

## 2019-12-30 ENCOUNTER — AMBULATORY - HEALTHEAST (OUTPATIENT)
Dept: SLEEP MEDICINE | Facility: CLINIC | Age: 69
End: 2019-12-30

## 2020-01-05 ENCOUNTER — COMMUNICATION - HEALTHEAST (OUTPATIENT)
Dept: INTERNAL MEDICINE | Facility: CLINIC | Age: 70
End: 2020-01-05

## 2020-01-05 DIAGNOSIS — J44.9 COPD (CHRONIC OBSTRUCTIVE PULMONARY DISEASE) (H): ICD-10-CM

## 2020-01-06 ENCOUNTER — HOSPITAL ENCOUNTER (OUTPATIENT)
Dept: NUCLEAR MEDICINE | Facility: CLINIC | Age: 70
Discharge: HOME | End: 2020-01-06
Attending: UROLOGY

## 2020-01-06 DIAGNOSIS — N13.30 HYDRONEPHROSIS: ICD-10-CM

## 2020-02-09 ENCOUNTER — COMMUNICATION - HEALTHEAST (OUTPATIENT)
Dept: INTERNAL MEDICINE | Facility: CLINIC | Age: 70
End: 2020-02-09

## 2020-02-09 DIAGNOSIS — E03.9 HYPOTHYROIDISM: ICD-10-CM

## 2020-02-14 ENCOUNTER — COMMUNICATION - HEALTHEAST (OUTPATIENT)
Dept: INTERNAL MEDICINE | Facility: CLINIC | Age: 70
End: 2020-02-14

## 2020-02-14 DIAGNOSIS — E03.9 HYPOTHYROIDISM: ICD-10-CM

## 2020-02-27 ENCOUNTER — OFFICE VISIT - HEALTHEAST (OUTPATIENT)
Dept: SLEEP MEDICINE | Facility: CLINIC | Age: 70
End: 2020-02-27

## 2020-02-27 DIAGNOSIS — G47.33 OBSTRUCTIVE SLEEP APNEA: ICD-10-CM

## 2020-02-27 DIAGNOSIS — G47.10 HYPERSOMNIA: ICD-10-CM

## 2020-02-27 ASSESSMENT — MIFFLIN-ST. JEOR: SCORE: 1364.11

## 2020-03-09 ENCOUNTER — AMBULATORY - HEALTHEAST (OUTPATIENT)
Dept: CARDIOLOGY | Facility: CLINIC | Age: 70
End: 2020-03-09

## 2020-03-09 DIAGNOSIS — J44.9 CHRONIC OBSTRUCTIVE PULMONARY DISEASE, UNSPECIFIED COPD TYPE (H): ICD-10-CM

## 2020-03-09 ASSESSMENT — MIFFLIN-ST. JEOR: SCORE: 1364.11

## 2020-03-10 ENCOUNTER — COMMUNICATION - HEALTHEAST (OUTPATIENT)
Dept: INTERNAL MEDICINE | Facility: CLINIC | Age: 70
End: 2020-03-10

## 2020-03-10 DIAGNOSIS — J44.9 CHRONIC OBSTRUCTIVE PULMONARY DISEASE, UNSPECIFIED COPD TYPE (H): ICD-10-CM

## 2020-03-11 ENCOUNTER — RECORDS - HEALTHEAST (OUTPATIENT)
Dept: ADMINISTRATIVE | Facility: OTHER | Age: 70
End: 2020-03-11

## 2020-03-12 ENCOUNTER — HOSPITAL ENCOUNTER (OUTPATIENT)
Dept: CT IMAGING | Facility: HOSPITAL | Age: 70
Setting detail: RADIATION/ONCOLOGY SERIES
Discharge: STILL ADMITTED OR ADMINISTRATIVE | End: 2020-03-12
Attending: INTERNAL MEDICINE

## 2020-03-12 DIAGNOSIS — J18.9 HCAP (HEALTHCARE-ASSOCIATED PNEUMONIA): ICD-10-CM

## 2020-03-12 DIAGNOSIS — C34.91 NON-SMALL CELL CANCER OF RIGHT LUNG (H): ICD-10-CM

## 2020-03-16 ENCOUNTER — OFFICE VISIT - HEALTHEAST (OUTPATIENT)
Dept: ONCOLOGY | Facility: HOSPITAL | Age: 70
End: 2020-03-16

## 2020-03-16 DIAGNOSIS — C34.91 NON-SMALL CELL CANCER OF RIGHT LUNG (H): ICD-10-CM

## 2020-04-10 ENCOUNTER — RECORDS - HEALTHEAST (OUTPATIENT)
Dept: HEALTH INFORMATION MANAGEMENT | Facility: CLINIC | Age: 70
End: 2020-04-10

## 2020-06-15 ENCOUNTER — COMMUNICATION - HEALTHEAST (OUTPATIENT)
Dept: INTERNAL MEDICINE | Facility: CLINIC | Age: 70
End: 2020-06-15

## 2020-06-15 DIAGNOSIS — E13.9 DIABETES 1.5, MANAGED AS TYPE 2 (H): ICD-10-CM

## 2020-06-15 DIAGNOSIS — J44.9 CHRONIC OBSTRUCTIVE PULMONARY DISEASE, UNSPECIFIED COPD TYPE (H): ICD-10-CM

## 2020-06-15 DIAGNOSIS — F41.9 ANXIETY AND DEPRESSION: ICD-10-CM

## 2020-06-15 DIAGNOSIS — F32.A ANXIETY AND DEPRESSION: ICD-10-CM

## 2020-06-26 ENCOUNTER — COMMUNICATION - HEALTHEAST (OUTPATIENT)
Dept: SCHEDULING | Facility: CLINIC | Age: 70
End: 2020-06-26

## 2020-06-30 ENCOUNTER — OFFICE VISIT - HEALTHEAST (OUTPATIENT)
Dept: INTERNAL MEDICINE | Facility: CLINIC | Age: 70
End: 2020-06-30

## 2020-06-30 DIAGNOSIS — E13.9 DIABETES 1.5, MANAGED AS TYPE 2 (H): ICD-10-CM

## 2020-06-30 ASSESSMENT — PATIENT HEALTH QUESTIONNAIRE - PHQ9: SUM OF ALL RESPONSES TO PHQ QUESTIONS 1-9: 10

## 2020-07-23 ENCOUNTER — OFFICE VISIT - HEALTHEAST (OUTPATIENT)
Dept: INTERNAL MEDICINE | Facility: CLINIC | Age: 70
End: 2020-07-23

## 2020-07-23 DIAGNOSIS — E13.9 DIABETES 1.5, MANAGED AS TYPE 2 (H): ICD-10-CM

## 2020-07-23 DIAGNOSIS — E03.9 HYPOTHYROIDISM, UNSPECIFIED TYPE: ICD-10-CM

## 2020-07-23 DIAGNOSIS — C34.91 NON-SMALL CELL CANCER OF RIGHT LUNG (H): ICD-10-CM

## 2020-07-23 DIAGNOSIS — G47.09 OTHER INSOMNIA: ICD-10-CM

## 2020-07-23 LAB
ANION GAP SERPL CALCULATED.3IONS-SCNC: 10 MMOL/L (ref 5–18)
BUN SERPL-MCNC: 17 MG/DL (ref 8–28)
CALCIUM SERPL-MCNC: 9.9 MG/DL (ref 8.5–10.5)
CHLORIDE BLD-SCNC: 100 MMOL/L (ref 98–107)
CO2 SERPL-SCNC: 27 MMOL/L (ref 22–31)
CREAT SERPL-MCNC: 1.54 MG/DL (ref 0.6–1.1)
GFR SERPL CREATININE-BSD FRML MDRD: 33 ML/MIN/1.73M2
GLUCOSE BLD-MCNC: 101 MG/DL (ref 70–125)
HBA1C MFR BLD: 7.1 % (ref 3.5–6)
POTASSIUM BLD-SCNC: 5 MMOL/L (ref 3.5–5)
SODIUM SERPL-SCNC: 137 MMOL/L (ref 136–145)
TSH SERPL DL<=0.005 MIU/L-ACNC: 1.17 UIU/ML (ref 0.3–5)

## 2020-07-23 ASSESSMENT — MIFFLIN-ST. JEOR: SCORE: 1404.93

## 2020-08-04 ENCOUNTER — COMMUNICATION - HEALTHEAST (OUTPATIENT)
Dept: INTERNAL MEDICINE | Facility: CLINIC | Age: 70
End: 2020-08-04

## 2020-08-04 DIAGNOSIS — J44.9 CHRONIC OBSTRUCTIVE PULMONARY DISEASE, UNSPECIFIED COPD TYPE (H): ICD-10-CM

## 2020-08-28 ENCOUNTER — COMMUNICATION - HEALTHEAST (OUTPATIENT)
Dept: INTERNAL MEDICINE | Facility: CLINIC | Age: 70
End: 2020-08-28

## 2020-08-28 DIAGNOSIS — F41.9 ANXIETY AND DEPRESSION: ICD-10-CM

## 2020-08-28 DIAGNOSIS — F32.A ANXIETY AND DEPRESSION: ICD-10-CM

## 2020-08-29 ENCOUNTER — COMMUNICATION - HEALTHEAST (OUTPATIENT)
Dept: INTERNAL MEDICINE | Facility: CLINIC | Age: 70
End: 2020-08-29

## 2020-08-29 DIAGNOSIS — E78.5 HYPERLIPIDEMIA: ICD-10-CM

## 2020-08-29 DIAGNOSIS — K21.9 GASTROESOPHAGEAL REFLUX DISEASE WITHOUT ESOPHAGITIS: ICD-10-CM

## 2020-09-02 ENCOUNTER — COMMUNICATION - HEALTHEAST (OUTPATIENT)
Dept: INTERNAL MEDICINE | Facility: CLINIC | Age: 70
End: 2020-09-02

## 2020-09-02 DIAGNOSIS — J44.9 CHRONIC OBSTRUCTIVE PULMONARY DISEASE, UNSPECIFIED COPD TYPE (H): ICD-10-CM

## 2020-09-03 ENCOUNTER — OFFICE VISIT - HEALTHEAST (OUTPATIENT)
Dept: ONCOLOGY | Facility: HOSPITAL | Age: 70
End: 2020-09-03

## 2020-09-03 DIAGNOSIS — J90 CHRONIC PLEURAL EFFUSION: ICD-10-CM

## 2020-09-03 DIAGNOSIS — C34.91 NON-SMALL CELL CANCER OF RIGHT LUNG (H): ICD-10-CM

## 2020-09-15 ENCOUNTER — COMMUNICATION - HEALTHEAST (OUTPATIENT)
Dept: INTERNAL MEDICINE | Facility: CLINIC | Age: 70
End: 2020-09-15

## 2020-09-15 DIAGNOSIS — E13.9 DIABETES 1.5, MANAGED AS TYPE 2 (H): ICD-10-CM

## 2020-09-23 ENCOUNTER — RECORDS - HEALTHEAST (OUTPATIENT)
Dept: ADMINISTRATIVE | Facility: OTHER | Age: 70
End: 2020-09-23

## 2020-09-24 ENCOUNTER — RECORDS - HEALTHEAST (OUTPATIENT)
Dept: ADMINISTRATIVE | Facility: OTHER | Age: 70
End: 2020-09-24

## 2020-11-19 ENCOUNTER — OFFICE VISIT - HEALTHEAST (OUTPATIENT)
Dept: INTERNAL MEDICINE | Facility: CLINIC | Age: 70
End: 2020-11-19

## 2020-11-19 DIAGNOSIS — G47.9 SLEEP DISORDER: ICD-10-CM

## 2020-11-19 DIAGNOSIS — E03.9 ACQUIRED HYPOTHYROIDISM: ICD-10-CM

## 2020-11-19 DIAGNOSIS — N18.31 ANEMIA OF CHRONIC RENAL FAILURE, STAGE 3A (H): ICD-10-CM

## 2020-11-19 DIAGNOSIS — Z86.19 HISTORY OF GRAM NEGATIVE SEPSIS: ICD-10-CM

## 2020-11-19 DIAGNOSIS — C34.91 NON-SMALL CELL CANCER OF RIGHT LUNG (H): ICD-10-CM

## 2020-11-19 DIAGNOSIS — L30.9 DERMATITIS: ICD-10-CM

## 2020-11-19 DIAGNOSIS — J47.9 BRONCHIECTASIS WITHOUT COMPLICATION (H): ICD-10-CM

## 2020-11-19 DIAGNOSIS — G47.33 OSA ON CPAP: ICD-10-CM

## 2020-11-19 DIAGNOSIS — Z12.11 SCREENING FOR COLON CANCER: ICD-10-CM

## 2020-11-19 DIAGNOSIS — E13.9 DIABETES 1.5, MANAGED AS TYPE 2 (H): ICD-10-CM

## 2020-11-19 DIAGNOSIS — D63.1 ANEMIA OF CHRONIC RENAL FAILURE, STAGE 3A (H): ICD-10-CM

## 2020-11-19 DIAGNOSIS — J44.9 COPD (CHRONIC OBSTRUCTIVE PULMONARY DISEASE) (H): ICD-10-CM

## 2020-11-19 LAB
ANION GAP SERPL CALCULATED.3IONS-SCNC: 8 MMOL/L (ref 5–18)
BUN SERPL-MCNC: 24 MG/DL (ref 8–28)
CALCIUM SERPL-MCNC: 9.9 MG/DL (ref 8.5–10.5)
CHLORIDE BLD-SCNC: 102 MMOL/L (ref 98–107)
CO2 SERPL-SCNC: 27 MMOL/L (ref 22–31)
CREAT SERPL-MCNC: 1.51 MG/DL (ref 0.6–1.1)
GFR SERPL CREATININE-BSD FRML MDRD: 34 ML/MIN/1.73M2
GLUCOSE BLD-MCNC: 130 MG/DL (ref 70–125)
HBA1C MFR BLD: 6.9 %
POTASSIUM BLD-SCNC: 4.8 MMOL/L (ref 3.5–5)
SODIUM SERPL-SCNC: 137 MMOL/L (ref 136–145)
T4 FREE SERPL-MCNC: 1.1 NG/DL (ref 0.7–1.8)

## 2020-11-19 ASSESSMENT — PATIENT HEALTH QUESTIONNAIRE - PHQ9: SUM OF ALL RESPONSES TO PHQ QUESTIONS 1-9: 8

## 2020-11-19 ASSESSMENT — MIFFLIN-ST. JEOR: SCORE: 1383.38

## 2020-11-20 ENCOUNTER — COMMUNICATION - HEALTHEAST (OUTPATIENT)
Dept: INTERNAL MEDICINE | Facility: CLINIC | Age: 70
End: 2020-11-20

## 2020-11-20 DIAGNOSIS — E13.9 DIABETES 1.5, MANAGED AS TYPE 2 (H): ICD-10-CM

## 2020-11-24 ENCOUNTER — COMMUNICATION - HEALTHEAST (OUTPATIENT)
Dept: INTERNAL MEDICINE | Facility: CLINIC | Age: 70
End: 2020-11-24

## 2020-12-17 ENCOUNTER — COMMUNICATION - HEALTHEAST (OUTPATIENT)
Dept: INTERNAL MEDICINE | Facility: CLINIC | Age: 70
End: 2020-12-17

## 2020-12-17 DIAGNOSIS — J44.9 CHRONIC OBSTRUCTIVE PULMONARY DISEASE, UNSPECIFIED COPD TYPE (H): ICD-10-CM

## 2020-12-18 ENCOUNTER — COMMUNICATION - HEALTHEAST (OUTPATIENT)
Dept: INTERNAL MEDICINE | Facility: CLINIC | Age: 70
End: 2020-12-18

## 2020-12-18 DIAGNOSIS — E11.9 DM (DIABETES MELLITUS) (H): ICD-10-CM

## 2020-12-31 ENCOUNTER — COMMUNICATION - HEALTHEAST (OUTPATIENT)
Dept: INTERNAL MEDICINE | Facility: CLINIC | Age: 70
End: 2020-12-31

## 2020-12-31 DIAGNOSIS — E13.9 DIABETES 1.5, MANAGED AS TYPE 2 (H): ICD-10-CM

## 2021-01-12 ENCOUNTER — RECORDS - HEALTHEAST (OUTPATIENT)
Dept: MAMMOGRAPHY | Facility: CLINIC | Age: 71
End: 2021-01-12

## 2021-01-12 DIAGNOSIS — Z12.31 ENCOUNTER FOR SCREENING MAMMOGRAM FOR MALIGNANT NEOPLASM OF BREAST: ICD-10-CM

## 2021-01-14 ENCOUNTER — NURSE TRIAGE (OUTPATIENT)
Dept: NURSING | Facility: CLINIC | Age: 71
End: 2021-01-14

## 2021-01-14 ENCOUNTER — COMMUNICATION - HEALTHEAST (OUTPATIENT)
Dept: SCHEDULING | Facility: CLINIC | Age: 71
End: 2021-01-14

## 2021-01-14 NOTE — TELEPHONE ENCOUNTER
I charted in her [facility] chart.  Cassie Langston RN  [facility]

## 2021-01-18 ENCOUNTER — COMMUNICATION - HEALTHEAST (OUTPATIENT)
Dept: INTERNAL MEDICINE | Facility: CLINIC | Age: 71
End: 2021-01-18

## 2021-01-18 DIAGNOSIS — E03.9 HYPOTHYROIDISM, UNSPECIFIED TYPE: ICD-10-CM

## 2021-02-01 ENCOUNTER — COMMUNICATION - HEALTHEAST (OUTPATIENT)
Dept: INTERNAL MEDICINE | Facility: CLINIC | Age: 71
End: 2021-02-01

## 2021-02-01 DIAGNOSIS — J44.9 CHRONIC OBSTRUCTIVE PULMONARY DISEASE, UNSPECIFIED COPD TYPE (H): ICD-10-CM

## 2021-02-02 ENCOUNTER — RECORDS - HEALTHEAST (OUTPATIENT)
Dept: ADMINISTRATIVE | Facility: OTHER | Age: 71
End: 2021-02-02

## 2021-02-02 ENCOUNTER — COMMUNICATION - HEALTHEAST (OUTPATIENT)
Dept: ONCOLOGY | Facility: HOSPITAL | Age: 71
End: 2021-02-02

## 2021-02-02 LAB — RETINOPATHY: NEGATIVE

## 2021-02-05 ENCOUNTER — COMMUNICATION - HEALTHEAST (OUTPATIENT)
Dept: INTERNAL MEDICINE | Facility: CLINIC | Age: 71
End: 2021-02-05

## 2021-02-05 DIAGNOSIS — J44.9 COPD (CHRONIC OBSTRUCTIVE PULMONARY DISEASE) (H): ICD-10-CM

## 2021-02-18 ENCOUNTER — OFFICE VISIT - HEALTHEAST (OUTPATIENT)
Dept: INTERNAL MEDICINE | Facility: CLINIC | Age: 71
End: 2021-02-18

## 2021-02-18 DIAGNOSIS — N18.31 STAGE 3A CHRONIC KIDNEY DISEASE (H): ICD-10-CM

## 2021-02-18 DIAGNOSIS — E13.9 DIABETES 1.5, MANAGED AS TYPE 2 (H): ICD-10-CM

## 2021-02-18 DIAGNOSIS — G47.33 OSA ON CPAP: ICD-10-CM

## 2021-02-18 LAB
ALBUMIN SERPL-MCNC: 4.1 G/DL (ref 3.5–5)
ALP SERPL-CCNC: 87 U/L (ref 45–120)
ALT SERPL W P-5'-P-CCNC: 14 U/L (ref 0–45)
ANION GAP SERPL CALCULATED.3IONS-SCNC: 9 MMOL/L (ref 5–18)
AST SERPL W P-5'-P-CCNC: 19 U/L (ref 0–40)
BILIRUB SERPL-MCNC: 0.6 MG/DL (ref 0–1)
BUN SERPL-MCNC: 24 MG/DL (ref 8–28)
CALCIUM SERPL-MCNC: 9.4 MG/DL (ref 8.5–10.5)
CHLORIDE BLD-SCNC: 103 MMOL/L (ref 98–107)
CO2 SERPL-SCNC: 26 MMOL/L (ref 22–31)
CREAT SERPL-MCNC: 1.31 MG/DL (ref 0.6–1.1)
GFR SERPL CREATININE-BSD FRML MDRD: 40 ML/MIN/1.73M2
GLUCOSE BLD-MCNC: 100 MG/DL (ref 70–125)
HBA1C MFR BLD: 6.6 %
POTASSIUM BLD-SCNC: 4.7 MMOL/L (ref 3.5–5)
PROT SERPL-MCNC: 7.4 G/DL (ref 6–8)
SODIUM SERPL-SCNC: 138 MMOL/L (ref 136–145)

## 2021-02-18 ASSESSMENT — MIFFLIN-ST. JEOR: SCORE: 1379.3

## 2021-02-19 ENCOUNTER — RECORDS - HEALTHEAST (OUTPATIENT)
Dept: HEALTH INFORMATION MANAGEMENT | Facility: CLINIC | Age: 71
End: 2021-02-19

## 2021-02-19 ENCOUNTER — COMMUNICATION - HEALTHEAST (OUTPATIENT)
Dept: INTERNAL MEDICINE | Facility: CLINIC | Age: 71
End: 2021-02-19

## 2021-02-19 LAB — HCV AB SERPL QL IA: NEGATIVE

## 2021-03-06 ENCOUNTER — COMMUNICATION - HEALTHEAST (OUTPATIENT)
Dept: INTERNAL MEDICINE | Facility: CLINIC | Age: 71
End: 2021-03-06

## 2021-03-06 DIAGNOSIS — E03.9 HYPOTHYROIDISM: ICD-10-CM

## 2021-03-17 ENCOUNTER — HOSPITAL ENCOUNTER (OUTPATIENT)
Dept: CT IMAGING | Facility: HOSPITAL | Age: 71
Setting detail: RADIATION/ONCOLOGY SERIES
Discharge: STILL ADMITTED OR ADMINISTRATIVE | End: 2021-03-17
Attending: INTERNAL MEDICINE

## 2021-03-17 DIAGNOSIS — C34.91 NON-SMALL CELL CANCER OF RIGHT LUNG (H): ICD-10-CM

## 2021-03-17 DIAGNOSIS — J90 CHRONIC PLEURAL EFFUSION: ICD-10-CM

## 2021-03-18 ENCOUNTER — AMBULATORY - HEALTHEAST (OUTPATIENT)
Dept: INFUSION THERAPY | Facility: HOSPITAL | Age: 71
End: 2021-03-18

## 2021-03-18 ENCOUNTER — OFFICE VISIT - HEALTHEAST (OUTPATIENT)
Dept: ONCOLOGY | Facility: HOSPITAL | Age: 71
End: 2021-03-18

## 2021-03-18 DIAGNOSIS — J90 CHRONIC PLEURAL EFFUSION: ICD-10-CM

## 2021-03-18 DIAGNOSIS — C34.91 NON-SMALL CELL CANCER OF RIGHT LUNG (H): ICD-10-CM

## 2021-03-18 LAB
BASOPHILS # BLD AUTO: 0.1 THOU/UL (ref 0–0.2)
BASOPHILS NFR BLD AUTO: 1 % (ref 0–2)
EOSINOPHIL # BLD AUTO: 0.2 THOU/UL (ref 0–0.4)
EOSINOPHIL NFR BLD AUTO: 2 % (ref 0–6)
ERYTHROCYTE [DISTWIDTH] IN BLOOD BY AUTOMATED COUNT: 12.9 % (ref 11–14.5)
HCT VFR BLD AUTO: 42.1 % (ref 35–47)
HGB BLD-MCNC: 13.5 G/DL (ref 12–16)
IMM GRANULOCYTES # BLD: 0 THOU/UL
IMM GRANULOCYTES NFR BLD: 0 %
LYMPHOCYTES # BLD AUTO: 3.3 THOU/UL (ref 0.8–4.4)
LYMPHOCYTES NFR BLD AUTO: 33 % (ref 20–40)
MCH RBC QN AUTO: 29.7 PG (ref 27–34)
MCHC RBC AUTO-ENTMCNC: 32.1 G/DL (ref 32–36)
MCV RBC AUTO: 93 FL (ref 80–100)
MONOCYTES # BLD AUTO: 1.1 THOU/UL (ref 0–0.9)
MONOCYTES NFR BLD AUTO: 11 % (ref 2–10)
NEUTROPHILS # BLD AUTO: 5.2 THOU/UL (ref 2–7.7)
NEUTROPHILS NFR BLD AUTO: 53 % (ref 50–70)
PLATELET # BLD AUTO: 264 THOU/UL (ref 140–440)
PMV BLD AUTO: 11.1 FL (ref 8.5–12.5)
RBC # BLD AUTO: 4.54 MILL/UL (ref 3.8–5.4)
WBC: 9.8 THOU/UL (ref 4–11)

## 2021-03-30 ENCOUNTER — COMMUNICATION - HEALTHEAST (OUTPATIENT)
Dept: FAMILY MEDICINE | Facility: CLINIC | Age: 71
End: 2021-03-30

## 2021-03-30 DIAGNOSIS — E13.9 DIABETES 1.5, MANAGED AS TYPE 2 (H): ICD-10-CM

## 2021-03-31 ENCOUNTER — COMMUNICATION - HEALTHEAST (OUTPATIENT)
Dept: ADMINISTRATIVE | Facility: CLINIC | Age: 71
End: 2021-03-31

## 2021-03-31 DIAGNOSIS — E13.9 DIABETES 1.5, MANAGED AS TYPE 2 (H): ICD-10-CM

## 2021-05-26 ASSESSMENT — PATIENT HEALTH QUESTIONNAIRE - PHQ9
SUM OF ALL RESPONSES TO PHQ QUESTIONS 1-9: 11
SUM OF ALL RESPONSES TO PHQ QUESTIONS 1-9: 8

## 2021-05-27 ASSESSMENT — PATIENT HEALTH QUESTIONNAIRE - PHQ9: SUM OF ALL RESPONSES TO PHQ QUESTIONS 1-9: 10

## 2021-05-27 NOTE — PROGRESS NOTES
[facility]    Name:   Rita Mancini  : 1950  Facility:   [facility] [[id number]]   Room:   Code Status: FULL CODE -   Fac type:   SNF (Skilled Nursing Facility, TCU) -     CHIEF COMPLAINT / REASON FOR VISIT:  Chief Complaint   Patient presents with     Follow-up     TCU follow-up after hospitalization for pneumosepsis, but she was also recently in the ED with a closed displaced fracture of the proximal end of the right humerus.     [facility] ED 19 (displaced fracture of proximal end of right humerus)    HPI: Rita is a 68 y.o. female was admitted to [facility] on 3/27/2019 for septic shock.   She had been to [facility] ED 11 days earlier after slipping on the ice and sustaining a closed displaced fracture of the proximal end of the right humerus.  X-rays of the right shoulder showed fracture of the proximal humerus, comminuted with displacement.  She was discharged with a shoulder immobilizer to home with orthopedic surgery follow-up.      MOST RECENT HOSPITALIZATION  (Per hospital discharge summary)     Pneumonia (S.pyogenes)  complicated by left-sided empyema s/p VATS/decortication 3/29  Strep biology knees bacteremia cleared  All chest tubes now removed, increase ambulation and activity  Respiratory failure-resolved, extubated 3/30   Antibiotics throughout course for broad-spectrum including vancomycin, Zosyn, clindamycin, and ultimately transition to Augmentin to complete 3 additional weeks.  Infectious disease consultant would like to follow-up in clinic in 3 weeks.  BCx 3/30 CNS- contaminant  Follow-up with general surgery after hospitalization  Pain control with small dose of oxycodone when needed, scheduled Tylenol, the latter has been enough for her pain control.     Anxiety  She is on diazepam as an outpatient, while taking more oxycodone, we will put this on hold.    COPD  Continue home nebulizers, she remains on 2-3 L/min  of oxygen, wean as tolerated     Hypervolemia  She was diuresed following ICU stay, back down to baseline weight     Diabetes  At goal as outpatient (A1c <7).   Correction scale, keeping at goal 140-180  Acceptable control in the hospital, continue metformin at discharge     MONSERRAT  Home cpap use     Insomnia  Continue trazodone when needed     Right humerus fracture  Ortho assessed recommending sling, also no DVT in right arm.  Recommendations given, will need outpatient follow-up     Lung cancer  Dx in 2009, hx of recurrent R effusion (talc pleurodesis 4/2015), currently in remission        Consult/s: pulmonary/intensive care, ID, general surgery and orthopedic surgery  Significant Diagnostic Studies:   EXAM: CT CHEST WO CONTRAST  LOCATION: [facility]  DATE/TIME: 3/28/2019 12:36 PM     INDICATION: pleural effusions, concern for empyema pleural effusions, concern for empyema  COMPARISON: Left thoracentesis from 3/27/2019, chest CT from 12/10/2018  TECHNIQUE: Helical images were obtained through the chest. Multiplanar reformats were obtained. Dose reduction techniques were used.  IV CONTRAST: None.     FINDINGS:   LUNGS AND PLEURA: Residual modest sized left pleural effusion is new compared to the previous CT. Airspace infiltrate at left lung base may represent a combination of pneumonia and atelectasis.  Small loculated pleural effusion within right major fissure. Atelectasis/consolidation right lung base new compared to the previous study.  There is no significant change in the postoperative spiculated appearance involving medial right upper lobe measuring approximately 2.5 x 1.5 cm with an adjacent suture line.     MEDIASTINUM: 2 AP window lymph nodes appear larger on image 35 and 36, each now measuring 10 mm in short axis, previously measuring 4 mm. Likely these are reactive. Endotracheal tube and NG tube are in place. Heart size normal.     LIMITED UPPER ABDOMEN: Negative.     MUSCULOSKELETAL:  "Negative.     IMPRESSION:   CONCLUSION:   1.  Modest residual left pleural effusion even after recent thoracentesis. Small loculated right pleural effusion. There are are no pleural findings on this noncontrast study to suggest empyema. Suggest correlating with results of yesterday's large   volume left thoracentesis.  2.  Dependent infiltrates at both lung bases suggestive of pneumonia.  3.  Minimal change in postoperative appearance of right upper lobe. A few small AP window lymph nodes are minimally larger, likely reactive.     Blood culture 3/27/19          Streptococcus pyogenes       EL       Ceftriaxone Sensitive       Clindamycin Sensitive       Erythromycin Sensitive       Penicillin Sensitive       Vancomycin Sensitive           Procedures:  LEFT THORACOSCOPY WITH DECORTICATION 3/29/19  US guided thoracentesis      Treatments: IV antibiotics as above       CURRENT TCU ISSUES    Primary diagnosis: She does have a follow-up with infectious disease on 04/18/19.    Pain management: The pain she experiences is in her right shoulder.  She is receiving low-dose oxycodone (2.5-5 mg every 6 hours) as needed they are also applying ice, and she has a lidocaine patch.    Her daughter, who is in attendance, tells me that her mother gets \"agitated when the pain killers make her groggy.\"  As for the patient, she tells me she is not getting too much, and the pain is never completely gone.    Anxiety: We did talk a bit about her anxiety that can make her pain worse.  They do have an outside person who comes and performs healing touch, and we will write orders for this.    COPD: She does a lot of coughing which is chronic.  She is always coughing up phlegm, so she may wait too long before her respiratory symptoms become serious.  Apparently, she has infections at least a couple times a year.  She was a smoker until 2008 when she developed lung cancer.  She is on multiple inhaled medications.  I have a feeling that she is " generally noncompliant, disagreeing with her daughter how frequently she takes her nebulizer treatments, for example.  She tells me that her breathing is somewhat labored, but her O2 sats remained good.  PT says it is stable at about 97% on 2 L of oxygen per nasal cannula.    Diabetes: Only 2 recent blood glucose levels, 72 and 98.  She is on 1000 mg of metformin twice daily.    Obstructive sleep apnea: She does have a CPAP machine at home but has not used it in months.  Here, she does have an incentive spirometer, and I encouraged her to use it.      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     Osteopenia      Pleural effusion 1/15     Radiation Pneumonitis     Created by Conversion      Sleep apnea     does not use cpap              Family History   Problem Relation Age of Onset     Heart disease Mother      Hypertension Mother      Alcohol abuse Mother      Depression Mother      Heart disease Father 45        mi age 45, cabg     Heart attack Father      Cancer Father         prostate     Hypertension Father      COPD Brother      Alcohol abuse Brother      Alcohol abuse Sister      Breast cancer Paternal Aunt      Leukemia Grandchild      Cancer Maternal Aunt 47        breast     Diabetes Son      Anxiety disorder Son      Depression Son      No Medical Problems Daughter      Schizophrenia Grandchild      Social History     Socioeconomic History     Marital status:      Spouse name: Not on file     Number of children: Not on file     Years of education: Not on file     Highest education level: Not on file   Occupational  History     Not on file   Social Needs     Financial resource strain: Not on file     Food insecurity:     Worry: Not on file     Inability: Not on file     Transportation needs:     Medical: Not on file     Non-medical: Not on file   Tobacco Use     Smoking status: Former Smoker     Packs/day: 1.50     Years: 0.00     Pack years: 0.00     Last attempt to quit: 11/9/2008     Years since quitting: 10.4     Smokeless tobacco: Former User   Substance and Sexual Activity     Alcohol use: No     Drug use: No     Sexual activity: Never   Lifestyle     Physical activity:     Days per week: Not on file     Minutes per session: Not on file     Stress: Not on file   Relationships     Social connections:     Talks on phone: Not on file     Gets together: Not on file     Attends [employer]ism service: Not on file     Active member of club or organization: Not on file     Attends meetings of clubs or organizations: Not on file     Relationship status: Not on file     Intimate partner violence:     Fear of current or ex partner: Not on file     Emotionally abused: Not on file     Physically abused: Not on file     Forced sexual activity: Not on file   Other Topics Concern     Not on file   Social History Narrative     Not on file       MEDICATIONS: Reviewed from the MAR, physician orders, and/or earlier progress notes.  Current Outpatient Medications   Medication Sig     acetaminophen (TYLENOL) 500 MG tablet Take 2 tablets (1,000 mg total) by mouth 3 (three) times a day.     albuterol (PROVENTIL) 2.5 mg /3 mL (0.083 %) nebulizer solution TAKE 3ML BY MOUTH EVERY 4 HOURS AS NEEDED FOR WHEEZING     amLODIPine (NORVASC) 5 MG tablet Take 1 tablet (5 mg total) by mouth daily.     amoxicillin-clavulanate (AUGMENTIN) 875-125 mg per tablet Take 1 tablet by mouth 2 (two) times a day for 21 days. Follow up with Infectious Disease physician prior to completing antibiotics     aspirin 81 MG EC tablet Take 81 mg by mouth daily.     baclofen  (LIORESAL) 10 MG tablet Take 1 tablet (10 mg total) by mouth 3 (three) times a day as needed.     blood glucose meter (GLUCOMETER) Use 1 each As Directed as needed. Dispense glucometer brand per patient's insurance at pharmacy discretion.     blood glucose test (ACCU-CHEK SMARTVIEW TEST STRIP) strips Test blood sugar 3 times daily     calcium carbonate-vitamin D3 (CALCIUM 600 WITH VITAMIN D3) 600 mg(1,500mg) -200 unit per tablet Take 1 tablet by mouth 2 (two) times a day. Taking 1200mg of Calcium with 1000 D3     cetirizine 10 mg cap Take 10 mg by mouth daily with supper.            citalopram (CELEXA) 10 MG tablet TAKE 1 TABLET BY MOUTH ONCE DAILY.     famotidine (PEPCID) 20 MG tablet Take 1 tablet (20 mg total) by mouth daily.     fluticasone (FLONASE) 50 mcg/actuation nasal spray 2 SPRAYS INTO EACH NOSTRIL DAILY.     furosemide (LASIX) 20 MG tablet Take 2 tablets (40 mg total) by mouth every morning.     generic lancets Use 1 each As Directed daily. Dispense brand per patient's insurance at pharmacy discretion. (dx E11.9)     INCRUSE ELLIPTA 62.5 mcg/actuation DsDv inhaler INHALE 1 PUFF BY MOUTH DAILY     levothyroxine (SYNTHROID, LEVOTHROID) 50 MCG tablet TAKE 1 TABLET BY MOUTH EVERY OTHER DAY ALTERNATING WITH 75MG TABLET     levothyroxine (SYNTHROID, LEVOTHROID) 75 MCG tablet TAKE 1 TABLET BY MOUTH EVERY OTHER DAYS ALTERNATING WITH 75MCG AND 50MCG DOSE     lidocaine 4 % patch Place 1 patch on the skin daily. Remove and discard patch with 12 hours.  Apply to right shoulder     metFORMIN (GLUCOPHAGE) 1000 MG tablet Take 1 tablet (1,000 mg total) by mouth 2 (two) times a day with meals.     montelukast (SINGULAIR) 10 mg tablet Take 1 tablet (10 mg total) by mouth at bedtime.     naproxen (NAPROSYN) 500 MG tablet Take 1 tablet (500 mg total) by mouth 2 (two) times a day with meals.     oxyCODONE (ROXICODONE) 5 MG immediate release tablet Take 0.5-1 tablets (2.5-5 mg total) by mouth every 6 (six) hours as needed for  "pain.     PULMICORT FLEXHALER 180 mcg/actuation inhaler INHALE 2 PUFFS 2 (TWO) TIMES A DAY.     simvastatin (ZOCOR) 10 MG tablet TAKE 1 TABLET BY MOUTH AT BEDTIME.     traZODone (DESYREL) 50 MG tablet Take 1 tablet (50 mg total) by mouth at bedtime as needed for sleep.     triamcinolone (KENALOG) 0.1 % ointment Apply topically 2 (two) times a day. hands (Patient taking differently: Apply topically daily as needed hands.      )     VENTOLIN HFA 90 mcg/actuation inhaler INHALE 1-2 PUFFS EVERY 4 (FOUR) HOURS AS NEEDED FOR WHEEZING.     ALLERGIES: No Known Allergies    DIET: Diabetic, regular texture, thin liquids.    Vitals:    04/08/19 1128   BP: 123/69   Pulse: 69   Resp: 18   Temp: 97.8  F (36.6  C)   SpO2: 96%   Weight: 191 lb 9.6 oz (86.9 kg)   Height: 5' 5\" (1.651 m)     Body mass index is 31.88 kg/m .    EXAMINATION:   General: Pleasant middle-aged female, looking much older than her stated age, sitting in a wheelchair, right arm in the sling, nasal cannula in the nose.  Her daughter is in attendance.  Head: Normocephalic and atraumatic.   Eyes: PERRLA, sclerae clear.   ENT: Moist oral mucosa.   Cardiovascular: Regular rate and rhythm.  No appreciable murmur.  Respiratory: Left lung rhonchi, particularly in the base.  I believe this is a chronic condition.  The right lung is pretty much cleared.  Abdomen: Soft and nontender.   Musculoskeletal/Extremities: Age-related degenerative joint disease.  Right arm in a sling.  No peripheral edema.  Integument: No rashes, clinically significant lesions, or skin breakdown.   Cognitive/Psychiatric: Alert and oriented x3 as far as I can tell.  Affect is euthymic.    DIAGNOSTICS:   Results for orders placed or performed during the hospital encounter of 03/27/19   Basic Metabolic Panel   Result Value Ref Range    Sodium 138 136 - 145 mmol/L    Potassium 4.1 3.5 - 5.0 mmol/L    Chloride 102 98 - 107 mmol/L    CO2 27 22 - 31 mmol/L    Anion Gap, Calculation 9 5 - 18 mmol/L    " Glucose 136 (H) 70 - 125 mg/dL    Calcium 8.3 (L) 8.5 - 10.5 mg/dL    BUN 10 8 - 22 mg/dL    Creatinine 0.76 0.60 - 1.10 mg/dL    GFR MDRD Af Amer >60 >60 mL/min/1.73m2    GFR MDRD Non Af Amer >60 >60 mL/min/1.73m2     Lab Results   Component Value Date    WBC 22.8 (H) 04/06/2019    HGB 8.3 (L) 04/06/2019    HCT 26.3 (L) 04/06/2019    MCV 92 04/06/2019     (H) 04/06/2019     Estimated Creatinine Clearance: 72.3 mL/min (by C-G formula based on SCr of 0.76 mg/dL).  Lab Results   Component Value Date    HGBA1C 6.3 (H) 02/06/2019     Lab Results   Component Value Date    TSH 1.36 08/27/2018       ASSESSMENT/Plan:      ICD-10-CM    1. History of gram-positive (S pyogenes) pneumosepsis Z86.19    2. Chronic obstructive pulmonary disease, unspecified COPD type (H) J44.9    3. 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 Non-small cell cancer of right lung (H) C34.91    5. 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 Chronic pleural effusion, left J90      CHANGES:    Request for healing touch therapy.    CARE PLAN:    The care plan has been reviewed and all orders signed. Changes to care plan, if any, as noted. Otherwise, continue current plan of care.  Total time spent with this patient was approximately 40 minutes, with greater than 50% spent in counseling and coordination of care that included discussing a variety of the patient's issues with the patient herself, her daughter, and PT, who was also in the room.    The above has been created using voice recognition software. Please be aware that this may unintentionally  produce inaccuracies and/or nonsensical sentences.      Electronically signed by: Robert Yepez CNP

## 2021-05-27 NOTE — TELEPHONE ENCOUNTER
Patient seen Dr. Ang today and he changed her Augmentin dose and told her to discontinue the Norvasc.  Please fax a order stating this to the assisted living facility.     Fax:[phone]

## 2021-05-27 NOTE — TELEPHONE ENCOUNTER
Refill Approved    Rx renewed per Medication Renewal Policy. Medication was last renewed on 7/12/18.    Lydia Woo, [facility] Triage/Med Refill 4/16/2019     Requested Prescriptions   Pending Prescriptions Disp Refills     furosemide (LASIX) 20 MG tablet [Pharmacy Med Name: FUROSEMIDE 20 MG TABLET] 180 tablet 2     Sig: TAKE 2 TABLETS (40 MG TOTAL) BY MOUTH EVERY MORNING.       Diuretics/Combination Diuretics Refill Protocol  Passed - 4/15/2019  1:06 AM        Passed - Visit with PCP or prescribing provider visit in past 12 months     Last office visit with prescriber/PCP: 2/6/2019 Master Deleon DO OR same dept: 3/11/2019 Lazaro Tabor MD OR same specialty: 3/11/2019 Lazaro Tabor MD  Last physical: Visit date not found Last MTM visit: Visit date not found   Next visit within 3 mo: Visit date not found  Next physical within 3 mo: Visit date not found  Prescriber OR PCP: Master Deleon DO  Last diagnosis associated with med order: 1. Hypertension  - furosemide (LASIX) 20 MG tablet [Pharmacy Med Name: FUROSEMIDE 20 MG TABLET]; Take 2 tablets (40 mg total) by mouth every morning.  Dispense: 180 tablet; Refill: 2    If protocol passes may refill for 12 months if within 3 months of last provider visit (or a total of 15 months).             Passed - Serum Potassium in past 12 months      Lab Results   Component Value Date    Potassium 4.1 04/06/2019             Passed - Serum Sodium in past 12 months      Lab Results   Component Value Date    Sodium 138 04/06/2019             Passed - Blood pressure on file in past 12 months     BP Readings from Last 1 Encounters:   04/15/19 127/69             Passed - Serum Creatinine in past 12 months      Creatinine   Date Value Ref Range Status   04/06/2019 0.76 0.60 - 1.10 mg/dL Final

## 2021-05-27 NOTE — PROGRESS NOTES
[facility]    Name:   Rita Mancini  : 1950  Facility:   [facility] [[id number]]   Room:   Code Status: FULL CODE -   Fac type:   SNF (Skilled Nursing Facility, TCU) -     CHIEF COMPLAINT / REASON FOR VISIT:  Chief Complaint   Patient presents with     Follow-up     TCU follow-up after hospitalization for pneumosepsis, but she was also recently in the ED with a closed displaced fracture of the proximal end of the right humerus.     [facility] ED 19 (displaced fracture of proximal end of right humerus)    Patient was last seen by me on 19.      HPI: Rita is a 68 y.o. female was admitted to [facility] on 3/27/2019 for septic shock.   She had been to [facility] ED 11 days earlier after slipping on the ice and sustaining a closed displaced fracture of the proximal end of the right humerus.  X-rays of the right shoulder showed fracture of the proximal humerus, comminuted with displacement.  She was discharged with a shoulder immobilizer to home with orthopedic surgery follow-up.      MOST RECENT HOSPITALIZATION  (Per hospital discharge summary)     Pneumonia (S.pyogenes)  complicated by left-sided empyema s/p VATS/decortication 3/29  Strep biology knees bacteremia cleared  All chest tubes now removed, increase ambulation and activity  Respiratory failure-resolved, extubated 3/30   Antibiotics throughout course for broad-spectrum including vancomycin, Zosyn, clindamycin, and ultimately transition to Augmentin to complete 3 additional weeks.  Infectious disease consultant would like to follow-up in clinic in 3 weeks.  BCx 3/30 CNS- contaminant  Follow-up with general surgery after hospitalization  Pain control with small dose of oxycodone when needed, scheduled Tylenol, the latter has been enough for her pain control.     Anxiety  She is on diazepam as an outpatient, while taking more oxycodone, we will put this on  hold.    COPD  Continue home nebulizers, she remains on 2-3 L/min of oxygen, wean as tolerated     Hypervolemia  She was diuresed following ICU stay, back down to baseline weight     Diabetes  At goal as outpatient (A1c <7).   Correction scale, keeping at goal 140-180  Acceptable control in the hospital, continue metformin at discharge     MONSERRAT  Home cpap use     Insomnia  Continue trazodone when needed     Right humerus fracture  Ortho assessed recommending sling, also no DVT in right arm.  Recommendations given, will need outpatient follow-up     Lung cancer  Dx in 2009, hx of recurrent R effusion (talc pleurodesis 4/2015), currently in remission        Consult/s: pulmonary/intensive care, ID, general surgery and orthopedic surgery  Significant Diagnostic Studies:   EXAM: CT CHEST WO CONTRAST  LOCATION: [facility]  DATE/TIME: 3/28/2019 12:36 PM     INDICATION: pleural effusions, concern for empyema pleural effusions, concern for empyema  COMPARISON: Left thoracentesis from 3/27/2019, chest CT from 12/10/2018  TECHNIQUE: Helical images were obtained through the chest. Multiplanar reformats were obtained. Dose reduction techniques were used.  IV CONTRAST: None.     FINDINGS:   LUNGS AND PLEURA: Residual modest sized left pleural effusion is new compared to the previous CT. Airspace infiltrate at left lung base may represent a combination of pneumonia and atelectasis.  Small loculated pleural effusion within right major fissure. Atelectasis/consolidation right lung base new compared to the previous study.  There is no significant change in the postoperative spiculated appearance involving medial right upper lobe measuring approximately 2.5 x 1.5 cm with an adjacent suture line.     MEDIASTINUM: 2 AP window lymph nodes appear larger on image 35 and 36, each now measuring 10 mm in short axis, previously measuring 4 mm. Likely these are reactive. Endotracheal tube and NG tube are in place. Heart size  "normal.     LIMITED UPPER ABDOMEN: Negative.     MUSCULOSKELETAL: Negative.     IMPRESSION:   CONCLUSION:   1.  Modest residual left pleural effusion even after recent thoracentesis. Small loculated right pleural effusion. There are are no pleural findings on this noncontrast study to suggest empyema. Suggest correlating with results of yesterday's large   volume left thoracentesis.  2.  Dependent infiltrates at both lung bases suggestive of pneumonia.  3.  Minimal change in postoperative appearance of right upper lobe. A few small AP window lymph nodes are minimally larger, likely reactive.     Blood culture 3/27/19          Streptococcus pyogenes       EL       Ceftriaxone Sensitive       Clindamycin Sensitive       Erythromycin Sensitive       Penicillin Sensitive       Vancomycin Sensitive           Procedures:  LEFT THORACOSCOPY WITH DECORTICATION 3/29/19  US guided thoracentesis      Treatments: IV antibiotics as above       CURRENT TCU ISSUES    Primary diagnosis: She does have a follow-up with infectious disease on 04/18/19.  Chest tube sites can be covered with a dry dressing.  Change every 3 days and as needed.    Pain management: The pain she experiences is in her right shoulder.  She is receiving low-dose oxycodone (2.5-5 mg every 6 hours) as needed they are also applying ice, and she has a lidocaine patch.  She tells me, \"I've had decent luck with Tylenol.\"  At my last visit, we scheduled 1000 mg every 8 hours (3 times daily).    Her daughter, who is in attendance, tells me that her mother gets \"agitated when the pain killers make her groggy.\"  As for the patient, she tells me she is not getting too much, and the pain is never completely gone.    Buttock soreness: She tells me they are using an alcohol-based product to clean her up.  She would like something without that.    Dry mouth: She is requesting Biotene.  We can write orders for that.  Uncertain whether it will be covered by her insurance.  I " did suggest she could also suck on hard candy or lemon drops.    Anxiety: Earlier, we did talk a bit about her anxiety that can make her pain worse.  They do have an outside person who comes and performs healing touch, and we did write orders for this.  However, at the current time her anxiety is affecting her daily life, sleep, and ability to participate in therapy.  We will write orders for lorazepam 0.5 mg nightly.    COPD: She does a lot of coughing which is chronic.  She is always coughing up phlegm, so she may wait too long before her respiratory symptoms become serious.  Apparently, she has infections at least a couple times a year.  She was a smoker until 2008 when she developed lung cancer.  She is on multiple inhaled medications.  I have a feeling that she is generally noncompliant, disagreeing with her daughter how frequently she takes her nebulizer treatments, for example.  She tells me that her breathing is somewhat labored, but her O2 sats remained good.  PT says it is stable at about 97% on 2 L of oxygen per nasal cannula.  She says she has been using the incentive spirometer.    Diabetes: Only 2 recent blood glucose levels, 72 and 98.  She is on 1000 mg of metformin twice daily.    Obstructive sleep apnea: She does have a CPAP machine at home but has not used it in months.  Here, she does have an incentive spirometer, and I encouraged her to use it.      ROS: No headaches, nausea or vomiting, dizziness or dyspnea, dysuria, difficulty chewing or swallowing.    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     Osteopenia      Pleural  effusion 1/15     Radiation Pneumonitis     Created by Conversion      Sleep apnea     does not use cpap              Family History   Problem Relation Age of Onset     Heart disease Mother      Hypertension Mother      Alcohol abuse Mother      Depression Mother      Heart disease Father 45        mi age 45, cabg     Heart attack Father      Cancer Father         prostate     Hypertension Father      COPD Brother      Alcohol abuse Brother      Alcohol abuse Sister      Breast cancer Paternal Aunt      Leukemia Grandchild      Cancer Maternal Aunt 47        breast     Diabetes Son      Anxiety disorder Son      Depression Son      No Medical Problems Daughter      Schizophrenia Grandchild      Social History     Socioeconomic History     Marital status:      Spouse name: Not on file     Number of children: Not on file     Years of education: Not on file     Highest education level: Not on file   Occupational History     Not on file   Social Needs     Financial resource strain: Not on file     Food insecurity:     Worry: Not on file     Inability: Not on file     Transportation needs:     Medical: Not on file     Non-medical: Not on file   Tobacco Use     Smoking status: Former Smoker     Packs/day: 1.50     Years: 0.00     Pack years: 0.00     Last attempt to quit: 11/9/2008     Years since quitting: 10.4     Smokeless tobacco: Former User   Substance and Sexual Activity     Alcohol use: No     Drug use: No     Sexual activity: Never   Lifestyle     Physical activity:     Days per week: Not on file     Minutes per session: Not on file     Stress: Not on file   Relationships     Social connections:     Talks on phone: Not on file     Gets together: Not on file     Attends [employer] service: Not on file     Active member of club or organization: Not on file     Attends meetings of clubs or organizations: Not on file     Relationship status: Not on file     Intimate partner violence:     Fear of current or ex  partner: Not on file     Emotionally abused: Not on file     Physically abused: Not on file     Forced sexual activity: Not on file   Other Topics Concern     Not on file   Social History Narrative     Not on file       MEDICATIONS: Reviewed from the MAR, physician orders, and/or earlier progress notes.  Updated by me today (04/10/19) with the addition of lorazepam reflected below.  Current Outpatient Medications   Medication Sig     LORazepam (ATIVAN) 0.5 MG tablet Take by mouth at bedtime.     acetaminophen (TYLENOL) 500 MG tablet Take 2 tablets (1,000 mg total) by mouth 3 (three) times a day.     albuterol (PROVENTIL) 2.5 mg /3 mL (0.083 %) nebulizer solution TAKE 3ML BY MOUTH EVERY 4 HOURS AS NEEDED FOR WHEEZING     amLODIPine (NORVASC) 5 MG tablet Take 1 tablet (5 mg total) by mouth daily.     amoxicillin-clavulanate (AUGMENTIN) 875-125 mg per tablet Take 1 tablet by mouth 2 (two) times a day for 21 days. Follow up with Infectious Disease physician prior to completing antibiotics     aspirin 81 MG EC tablet Take 81 mg by mouth daily.     baclofen (LIORESAL) 10 MG tablet Take 1 tablet (10 mg total) by mouth 3 (three) times a day as needed.     blood glucose meter (GLUCOMETER) Use 1 each As Directed as needed. Dispense glucometer brand per patient's insurance at pharmacy discretion.     blood glucose test (ACCU-CHEK SMARTVIEW TEST STRIP) strips Test blood sugar 3 times daily     calcium carbonate-vitamin D3 (CALCIUM 600 WITH VITAMIN D3) 600 mg(1,500mg) -200 unit per tablet Take 1 tablet by mouth 2 (two) times a day. Taking 1200mg of Calcium with 1000 D3     cetirizine 10 mg cap Take 10 mg by mouth daily with supper.            citalopram (CELEXA) 10 MG tablet TAKE 1 TABLET BY MOUTH ONCE DAILY.     famotidine (PEPCID) 20 MG tablet Take 1 tablet (20 mg total) by mouth daily.     fluticasone (FLONASE) 50 mcg/actuation nasal spray 2 SPRAYS INTO EACH NOSTRIL DAILY.     furosemide (LASIX) 20 MG tablet Take 2 tablets  "(40 mg total) by mouth every morning.     generic lancets Use 1 each As Directed daily. Dispense brand per patient's insurance at pharmacy discretion. (dx E11.9)     INCRUSE ELLIPTA 62.5 mcg/actuation DsDv inhaler INHALE 1 PUFF BY MOUTH DAILY     levothyroxine (SYNTHROID, LEVOTHROID) 50 MCG tablet TAKE 1 TABLET BY MOUTH EVERY OTHER DAY ALTERNATING WITH 75MG TABLET     levothyroxine (SYNTHROID, LEVOTHROID) 75 MCG tablet TAKE 1 TABLET BY MOUTH EVERY OTHER DAYS ALTERNATING WITH 75MCG AND 50MCG DOSE     lidocaine 4 % patch Place 1 patch on the skin daily. Remove and discard patch with 12 hours.  Apply to right shoulder     metFORMIN (GLUCOPHAGE) 1000 MG tablet Take 1 tablet (1,000 mg total) by mouth 2 (two) times a day with meals.     montelukast (SINGULAIR) 10 mg tablet Take 1 tablet (10 mg total) by mouth at bedtime.     naproxen (NAPROSYN) 500 MG tablet Take 1 tablet (500 mg total) by mouth 2 (two) times a day with meals.     oxyCODONE (ROXICODONE) 5 MG immediate release tablet Take 0.5-1 tablets (2.5-5 mg total) by mouth every 6 (six) hours as needed for pain.     PULMICORT FLEXHALER 180 mcg/actuation inhaler INHALE 2 PUFFS 2 (TWO) TIMES A DAY.     simvastatin (ZOCOR) 10 MG tablet TAKE 1 TABLET BY MOUTH AT BEDTIME.     traZODone (DESYREL) 50 MG tablet Take 1 tablet (50 mg total) by mouth at bedtime as needed for sleep.     triamcinolone (KENALOG) 0.1 % ointment Apply topically 2 (two) times a day. hands (Patient taking differently: Apply topically daily as needed hands.      )     VENTOLIN HFA 90 mcg/actuation inhaler INHALE 1-2 PUFFS EVERY 4 (FOUR) HOURS AS NEEDED FOR WHEEZING.     ALLERGIES: No Known Allergies    DIET: Diabetic, regular texture, thin liquids.  Mighty Shakes Sugar-Free.    Vitals:    04/10/19 1358   BP: 131/72   Pulse: 87   Resp: 18   Temp: 98.3  F (36.8  C)   SpO2: 97%   Weight: 180 lb 3.2 oz (81.7 kg)   Height: 5' 5\" (1.651 m)   Down 11.4 pounds.  She was started on nutritional " supplementation.  Body mass index is 29.99 kg/m .    EXAMINATION:   General: Pleasant middle-aged female, looking much older than her stated age, sitting in a wheelchair, right arm in the sling, nasal cannula in the nose.  Her daughter is in attendance.  Head: Normocephalic and atraumatic.   Eyes: PERRLA, sclerae clear.   ENT: Moist oral mucosa.   Cardiovascular: Regular rate and rhythm.  No appreciable murmur.  Respiratory: Loose, phlegmy cough.  Able to appreciate congestion anteriorly.  Left lung rhonchi, particularly in the base.  I believe this is a chronic condition.  The right lung is pretty much clear.  Abdomen: Soft and nontender.   Musculoskeletal/Extremities: Age-related degenerative joint disease.  Right arm in a sling.  No peripheral edema.  Integument: No rashes, clinically significant lesions, or skin breakdown.   Cognitive/Psychiatric: Alert and oriented x3 as far as I can tell.  Affect is euthymic.    DIAGNOSTICS:   Results for orders placed or performed during the hospital encounter of 03/27/19   Basic Metabolic Panel   Result Value Ref Range    Sodium 138 136 - 145 mmol/L    Potassium 4.1 3.5 - 5.0 mmol/L    Chloride 102 98 - 107 mmol/L    CO2 27 22 - 31 mmol/L    Anion Gap, Calculation 9 5 - 18 mmol/L    Glucose 136 (H) 70 - 125 mg/dL    Calcium 8.3 (L) 8.5 - 10.5 mg/dL    BUN 10 8 - 22 mg/dL    Creatinine 0.76 0.60 - 1.10 mg/dL    GFR MDRD Af Amer >60 >60 mL/min/1.73m2    GFR MDRD Non Af Amer >60 >60 mL/min/1.73m2     Lab Results   Component Value Date    WBC 22.8 (H) 04/06/2019    HGB 8.3 (L) 04/06/2019    HCT 26.3 (L) 04/06/2019    MCV 92 04/06/2019     (H) 04/06/2019     Estimated Creatinine Clearance: 70.1 mL/min (by C-G formula based on SCr of 0.76 mg/dL).  Lab Results   Component Value Date    HGBA1C 6.3 (H) 02/06/2019     Lab Results   Component Value Date    TSH 1.36 08/27/2018       ASSESSMENT/Plan:      ICD-10-CM    1. History of gram-positive (S. pyogenes) pneumosepsis Z86.19     2. Chronic obstructive pulmonary disease, unspecified COPD type (H) J44.9    3. 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 Non-small cell cancer of right lung (H) C34.91    5. 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 Chronic pleural effusion, left J90      CHANGES:    1) Lorazepam 0.5 mg nightly.  2) chest tube incision sites can be covered with dry dressing and change every 3 days and as needed.  3) Biotene spray (for dry mouth).  May use per package insert.  May not be covered by insurance.  4) is not an alcohol products for cleaning up patient.  She is complaining of a sore bottom.    CARE PLAN:    The care plan has been reviewed and all orders signed. Changes to care plan, if any, as noted. Otherwise, continue current plan of care.  Total time spent with this patient was approximately 35  minutes, with greater than 50% spent in counseling and coordination of care that included discussing multiple issues affecting her recovery today.    The above has been created using voice recognition software. Please be aware that this may unintentionally  produce inaccuracies and/or nonsensical sentences.      Electronically signed by: Robert Yepez CNP

## 2021-05-27 NOTE — PROGRESS NOTES
[facility] INFECTIOUS DISEASE CLINIC FOLLOW UP NOTE    Date: 4/18/2019  Patient Name: Rita Mancini   YOB: 1950  MRN: [id number]      ASSESSMENT:  68-year-old woman with recent admission for group A strep sepsis and pneumonia.  She required intubation for respiratory failure.  She is status post VATS with debridement of a left lung abscess on 3/29.  Ultimately had a single right-sided chest tube, and 3 left-sided chest tubes.  She was treated for toxic shock syndrome with addition of clindamycin and IVIG x3 days.  She has had slow recovery with normalization of her procalcitonin, and slow decline of the WBC count.    History of lung cancer in 2009 complicated by recurrent effusion, requiring talc pleurodesis on the right in 2015.  Currently in remission.    Recent right proximal humerus fracture.  Followed by [facility], not a surgical candidate.    PLAN:  -Continue Augmentin until next appointment  -CBC with differential today, repeat weekly  -Chest x-ray  -Peripheral blood smear  -Repeat procalcitonin    Return to clinic in 2-4 weeks.    Louis Ang MD  [facility]   Clinic phone: [phone]  Clinic fax: [phone]    ADDENDUM: cxr reviewed. Radiologist recommending repeat CT chest with contrast. Given ongoing symptoms and findings, patient should also f/up with Dr. Ma with surgery.  ______________________________________________________________________    HISTORY OF PRESENT ILLNESS:   From inpatient ID consult on 3/28:    Rita Mancini is a 68 y.o. old female. History is provided by patients family and chart review.     About two weeks prior to admission on 3/11, Rita had a fall and hurt her ribs. She was treated with prednisone and oxycodone. She had an additional fall on 3/16 and fractured her right humerus. Since then, she has had progressively increasing shortness of breath and the day prior to admission became confused and thus EMS was  called and she was brought to [facility] for further evaluation and management.      Her O2 sats were in the 70s per EMS and she was hypotensive and tachycardic in the ED. CXR showed left sided pleural effusion and left lower lobe opacity. She was initially placed on BiPAP and eventually required intubation. Labs revealed lactic acid of 7.4 with pH of 7.31. WBC 30. She was started on azithromycin, zosyn, and vancomycin.       She remains in the ICU ventilated on pressure support.    SUBJECTIVE / INTERVAL HISTORY:   The patient was hospitalized at [facility] from 3/27 to 4/7.  Her course was complicated by a persistent leukocytosis.  Her chest tubes were eventually discontinued without any signs of decompensation or reaccumulation of fluid on imaging.  Repeat CT scan of the chest showed ongoing consolidation in the lower lobes.  She was eventually transitioned to Augmentin and has been on that for the past 10 days.    She is currently staying at [facility].  She is on supplemental oxygen of 2 L/min.  She overall feels fatigued and weak.  Her daughters are here and feel that she is more disoriented over the past week.  Her narcotic pain medicines were discontinued, but this has not affected her mentation.  Patient appears alert and answers questions appropriately, but the patient's daughters feel that her answers are inaccurate.  The patient does have intermittent cough and feels congested.  The patient feels that she has been having diarrhea, but this is refuted by her daughters who have spoken with the patient's caretakers.    The patient's arm continues to be in a sling.  She does some physical therapy with this.  She has follow-up with [facility] coming up.      ROS:   No recorded fevers, but the patient says that she has episodes of feeling very warm.  No new rashes.      Current Outpatient Medications:      acetaminophen (TYLENOL) 500 MG tablet, Take 2 tablets (1,000 mg total) by mouth 3  (three) times a day., Disp: , Rfl: 0     albuterol (PROVENTIL) 2.5 mg /3 mL (0.083 %) nebulizer solution, TAKE 3ML BY MOUTH EVERY 4 HOURS AS NEEDED FOR WHEEZING, Disp: 900 mL, Rfl: 1     amoxicillin-clavulanate (AUGMENTIN) 875-125 mg per tablet, Take 1 tablet by mouth 2 (two) times a day for 21 days. Follow up with Infectious Disease physician prior to completing antibiotics, Disp: 42 tablet, Rfl: 0     aspirin 81 MG EC tablet, Take 81 mg by mouth daily., Disp: , Rfl:      baclofen (LIORESAL) 10 MG tablet, Take 1 tablet (10 mg total) by mouth 3 (three) times a day as needed., Disp: 30 tablet, Rfl: 0     blood glucose meter (GLUCOMETER), Use 1 each As Directed as needed. Dispense glucometer brand per patient's insurance at pharmacy discretion., Disp: 1 each, Rfl: 0     blood glucose test (ACCU-CHEK SMARTVIEW TEST STRIP) strips, Test blood sugar 3 times daily, Disp: 200 strip, Rfl: 3     cetirizine 10 mg cap, Take 10 mg by mouth daily with supper.    , Disp: , Rfl:      citalopram (CELEXA) 10 MG tablet, TAKE 1 TABLET BY MOUTH ONCE DAILY., Disp: 90 tablet, Rfl: 3     fluticasone (FLONASE) 50 mcg/actuation nasal spray, 2 SPRAYS INTO EACH NOSTRIL DAILY., Disp: 48 g, Rfl: 3     furosemide (LASIX) 20 MG tablet, TAKE 2 TABLETS (40 MG TOTAL) BY MOUTH EVERY MORNING., Disp: 180 tablet, Rfl: 3     generic lancets, Use 1 each As Directed daily. Dispense brand per patient's insurance at pharmacy discretion. (dx E11.9), Disp: 100 each, Rfl: 1     guaiFENesin ER (MUCINEX) 600 mg 12 hr tablet, Take 1,200 mg by mouth 2 (two) times a day., Disp: , Rfl:      INCRUSE ELLIPTA 62.5 mcg/actuation DsDv inhaler, INHALE 1 PUFF BY MOUTH DAILY, Disp: 90 puff, Rfl: 1     levothyroxine (SYNTHROID, LEVOTHROID) 75 MCG tablet, TAKE 1 TABLET BY MOUTH EVERY OTHER DAYS ALTERNATING WITH 75MCG AND 50MCG DOSE, Disp: 45 tablet, Rfl: 2     lidocaine 4 % patch, Place 1 patch on the skin daily. Remove and discard patch with 12 hours.  Apply to right shoulder,  Disp: 5 patch, Rfl: 0     LORazepam (ATIVAN) 0.5 MG tablet, Take by mouth at bedtime., Disp: , Rfl:      menthol-zinc oxide (CALMOSEPTINE) 0.44-20.6 % Oint ointment, Apply topically 3 (three) times a day., Disp: , Rfl:      metFORMIN (GLUCOPHAGE) 1000 MG tablet, Take 1 tablet (1,000 mg total) by mouth 2 (two) times a day with meals., Disp: 180 tablet, Rfl: 3     montelukast (SINGULAIR) 10 mg tablet, Take 1 tablet (10 mg total) by mouth at bedtime., Disp: 90 tablet, Rfl: 3     naproxen (NAPROSYN) 500 MG tablet, Take 1 tablet (500 mg total) by mouth 2 (two) times a day with meals., Disp: 60 tablet, Rfl: 2     oxyCODONE (ROXICODONE) 5 MG immediate release tablet, Take 0.5-1 tablets (2.5-5 mg total) by mouth every 6 (six) hours as needed for pain., Disp: 13 tablet, Rfl: 0     PULMICORT FLEXHALER 180 mcg/actuation inhaler, INHALE 2 PUFFS 2 (TWO) TIMES A DAY., Disp: 3 each, Rfl: 3     simvastatin (ZOCOR) 10 MG tablet, TAKE 1 TABLET BY MOUTH AT BEDTIME., Disp: 90 tablet, Rfl: 2     traZODone (DESYREL) 50 MG tablet, Take 1 tablet (50 mg total) by mouth at bedtime as needed for sleep., Disp: 30 tablet, Rfl: 11     triamcinolone (KENALOG) 0.1 % ointment, Apply topically 2 (two) times a day. hands (Patient taking differently: Apply topically daily as needed hands.   ), Disp: 30 g, Rfl: 3     VENTOLIN HFA 90 mcg/actuation inhaler, INHALE 1-2 PUFFS EVERY 4 (FOUR) HOURS AS NEEDED FOR WHEEZING., Disp: 54 g, Rfl: 0     calcium carbonate-vitamin D3 (CALCIUM 600 WITH VITAMIN D3) 600 mg(1,500mg) -200 unit per tablet, Take 1 tablet by mouth 2 (two) times a day. Taking 1200mg of Calcium with 1000 D3, Disp: , Rfl:      famotidine (PEPCID) 20 MG tablet, Take 1 tablet (20 mg total) by mouth daily., Disp: , Rfl:       OBJECTIVE:  Vitals:    04/18/19 0945   BP: 108/52   Pulse: 76   Temp: 97.9  F (36.6  C)         GEN: No acute distress.    HENT: Head is normocephalic, atraumatic.   EYES: Eyes have anicteric sclerae without conjunctival  injection or stigmata of endocarditis.   RESPIRATORY: Using supplemental oxygen.  Decreased sounds at the right base.  Crackles midway up the left chest.  CARDIOVASCULAR:  Regular rate and rhythm. Normal S1 and S2. No murmur, click, gallop or rub.   ABDOMEN:  Soft, normal bowel sounds, non-tender, no masses  EXTREMITIES: No edema.   SKIN/HAIR/NAILS:  No rashes  NEUROLOGIC: Grossly nonfocal.  AOx3      Pertinent labs:    Results from last 7 days   Lab Units 04/15/19  1405   LN-WHITE BLOOD CELL COUNT thou/uL 18.1*   LN-HEMOGLOBIN g/dL 8.4*   LN-HEMATOCRIT % 28.2*   LN-PLATELET COUNT thou/uL 597*         Lab Results   Component Value Date    CRP 0.8 05/23/2017         Lab Results   Component Value Date    ALT 16 04/05/2019    AST 26 04/05/2019    ALKPHOS 187 (H) 04/05/2019    BILITOT 0.6 04/05/2019         MICROBIOLOGY DATA:  Reviewed     RADIOLOGY:  EXAM: CT CHEST WO CONTRAST  LOCATION: [facility]  DATE/TIME: 4/4/2019 12:19 PM     INDICATION: Pneumonia complicated / unresolved re-evaluate empyema  COMPARISON: 3/28/2019  TECHNIQUE: Helical images were obtained through the chest. Multiplanar reformats were obtained. Dose reduction techniques were used.  IV CONTRAST: None.     FINDINGS:   LUNGS AND PLEURA: Interval placement of a pigtail drain on the right side which resides within the right major fissure, the previous fluid collection within the fissure has been drained. A mild amount of fluid remaining dependently at lung base with some   faint linear hyperdensity involving pleura that may relate to pleural calcifications or perhaps talc material at posterior right costophrenic sulcus. Modest parenchymal opacity persists at right base, atelectasis or fibrosis. Numerous tiny micronodules   present within posterior right upper lobe more prominent though likely this relates to improved aeration after pleural fluid drainage. Postoperative and post therapeutic changes at medial right apex are stable.     There is  also been significant decrease in left pleural effusion with some pockets of air in the fluid and a slight amount of linear air tracking in the soft tissues, likely the patient had interval chest tubes that have been removed. Only a minimal   amount of left pleural fluid and air persists. Subpleural lung parenchymal consolidation or rounded atelectasis persists at right base. There is a small amount of fluid now present within the right major fissure.     MEDIASTINUM: Small likely reactive anterior mediastinal and subcarinal lymph nodes are unchanged. No undrained fluid collection. Heart size normal. The ET and NG tubes have been removed.     LIMITED UPPER ABDOMEN: Negative.     MUSCULOSKELETAL: Negative.     IMPRESSION:   CONCLUSION:   1.  Significant decrease in pleural effusions bilaterally. Small amount of fluid and air bubbles persist within each pleural space.  2.  Right pigtail catheter remains within right major fissure, no surrounding fluid.  3.  Parenchymal subpleural consolidation or bilateral rounded atelectasis minimally changed. Postoperative appearances of medial right apex unchanged.

## 2021-05-27 NOTE — TELEPHONE ENCOUNTER
[facility] Nurse Triage Telephone Note      Provider: SERVANDO Don  Facility: [facility]    Facility Type: TCU    Caller: Winnie  Call Back Number:  [phone]    Allergies: Patient has no known allergies.    Reason for call: Pt is c/o burning and frequency with urination and also has some redness noted around her urethra. Nursing is thinking this could possibly be the start of a yeast infection, pt recently on Abx for pneumonia    Verbal Order/Direction given by Provider: MANDO/TAWANDA, monostat 7, apply I applicator at bedtime x7days    Provider giving order: SERVANDO Don    Verbal order given to: Beatris Holden RN

## 2021-05-27 NOTE — ANESTHESIA PREPROCEDURE EVALUATION
"Anesthesia Evaluation      Patient summary reviewed   History of anesthetic complications (slow to wake up)     Airway    Pulmonary    (+) pneumonia, COPD, sleep apnea, rhonchi,     ROS comment: Acute respiratory failure d/t PNA, intubated  H/o right lung cancer in 2009 s/p chemo/radiation, c/b recurrent right pleural effusions s/p talc pleurodesis                         Cardiovascular   ECG reviewed  Rhythm: regular  Rate: normal,      ROS comment: Septic shock, requiring pressors, d/t PNA       Neuro/Psych    (+) neuromuscular disease,      Endo/Other    (+) diabetes mellitus well controlled, hypothyroidism, obesity (BMI 32),      GI/Hepatic/Renal    (+) GERD,   chronic renal disease ARF,      Other findings: Patient is a 68 year old female who presents with septic shock d/t PNA. 2 weeks ago she fell and fractured her RIGHT HUMEROUS - plan for non-surgical intervention. Patient with left PNA and pleural effusion.      Intubated and sedation  On pressors      Dental                         Anesthesia Plan  Planned anesthetic: general endotracheal  - will required either a DLETT vs bronchial blocker. Have fiberoptic bronchoscope available to confirm placement  - arterial line  - FRACTURED RIGHT HUMEROUS - unable to lie on right side. Positioning will need to be discussed with surgeon  - right PIV with Vit C (for study) - DO NOT HOOK UP TO THIS PIV (it is marked \"do not touch\" per Darell)  - discussed inability to place epidural for pain control d/t patient sedated  - multimodal analgesia - consider IV tylenol, ketamine, precedex (already on), and narcotics.  - Will be transferred to PACU (and subsequently ICU) intubated  ASA 4   Induction: intravenous   Anesthetic plan and risks discussed with: patient  Anesthesia plan special considerations: arterial catheterization,   Post-op plan: extended intubation/vent support          "

## 2021-05-27 NOTE — TELEPHONE ENCOUNTER
Called pt daughter Tricia per Dr Ang's orders, Chest CT was placed and to see Dr Ma after result reported. Daughter understood.

## 2021-05-27 NOTE — TELEPHONE ENCOUNTER
Patient calling to report she is still seeing [facility] for her broken arm.  She had another fall this weekend on Saturday.  She got a weekend supply from [facility] but then was advised to contact Master Deleon DO for pain management.    Patient reports she is getting way better and is taking less and less pain medication but only has 1 day left and is hoping to get a few more pills to get her through the end of her pain/recovery. She meets with PT next week.     Controlled Substance Refill Request  Medication Name:   Requested Prescriptions     Pending Prescriptions Disp Refills     oxyCODONE (ROXICODONE) 5 MG immediate release tablet 13 tablet 0     Sig: Take 1 tablet (5 mg total) by mouth every 4 (four) hours as needed for pain.     Date Last Fill: 3/13/2019  Pharmacy: [id number]      Submit electronically to pharmacy  Controlled Substance Agreement Date Scanned:   Encounter-Level CSA Scan Date:    There are no encounter-level csa scan date.       Last office visit with prescriber/PCP: 2/6/2019 Master Deleon DO OR same dept: 3/11/2019 Lazaro Tabor MD OR same specialty: 3/11/2019 Lazaro Tabor MD  Last physical: Visit date not found Last MTM visit: Visit date not found

## 2021-05-27 NOTE — ANESTHESIA POSTPROCEDURE EVALUATION
Patient: Rita Mancini  LEFT THORACOSCOPY WITH DECORTICATION  Anesthesia type: general    Patient location: PACU  Last vitals:   Vitals:    03/29/19 1604   BP:    Pulse: 61   Resp: 16   Temp:    SpO2: 96%     Post vital signs: stable  Level of consciousness: sedated on vent  Post-anesthesia pain: pain controlled  Post-anesthesia nausea and vomiting: no  Pulmonary: sedated on vent  Cardiovascular: stable and blood pressure at baseline  Hydration: adequate  Anesthetic events: no    QCDR Measures:  ASA# 11 - Shelia-op Cardiac Arrest: ASA11B - Patient did NOT experience unanticipated cardiac arrest  ASA# 12 - Shelia-op Mortality Rate: ASA12B - Patient did NOT die  ASA# 13 - PACU Re-Intubation Rate: ASA13X - Exclusion: organ donor or direct ICU transfer  ASA# 10 - Composite Anes Safety: ASA10A - No serious adverse event    Additional Notes:

## 2021-05-27 NOTE — PROGRESS NOTES
[facility]    Name:   Rita Mancini  : 1950  Facility:   [facility] [[id number]]   Room:   Code Status: FULL CODE -   Fac type:   SNF (Skilled Nursing Facility, TCU) -     CHIEF COMPLAINT / REASON FOR VISIT:  Chief Complaint   Patient presents with     Follow-up     TCU follow-up after hospitalization for pneumosepsis, but she was also recently in the ED with a closed displaced fracture of the proximal end of the right humerus.     [facility] ED 19 (displaced fracture of proximal end of right humerus)  [facility] from 19 until 19 (pneumosepsis)    Patient was last seen by me on 04/10/19 and subsequently seen by Dr. Luna on 19.      HPI: Rita is a 68 y.o. female was admitted to [facility] on 3/27/2019 for septic shock.   She had been to [facility] ED 11 days earlier after slipping on the ice and sustaining a closed displaced fracture of the proximal end of the right humerus.  X-rays of the right shoulder showed fracture of the proximal humerus, comminuted with displacement.  She was discharged with a shoulder immobilizer to home with orthopedic surgery follow-up.      MOST RECENT HOSPITALIZATION  (Per hospital discharge summary)     Pneumonia (S.pyogenes)  complicated by left-sided empyema s/p VATS/decortication 3/29  Strep biology knees bacteremia cleared  All chest tubes now removed, increase ambulation and activity  Respiratory failure-resolved, extubated 3/30   Antibiotics throughout course for broad-spectrum including vancomycin, Zosyn, clindamycin, and ultimately transition to Augmentin to complete 3 additional weeks.  Infectious disease consultant would like to follow-up in clinic in 3 weeks.  BCx 3/30 CNS- contaminant  Follow-up with general surgery after hospitalization  Pain control with small dose of oxycodone when needed, scheduled Tylenol, the latter has been enough for her pain  control.     Anxiety  She is on diazepam as an outpatient, while taking more oxycodone, we will put this on hold.    COPD  Continue home nebulizers, she remains on 2-3 L/min of oxygen, wean as tolerated     Hypervolemia  She was diuresed following ICU stay, back down to baseline weight     Diabetes  At goal as outpatient (A1c <7).   Correction scale, keeping at goal 140-180  Acceptable control in the hospital, continue metformin at discharge     MONSERRAT  Home cpap use     Insomnia  Continue trazodone when needed     Right humerus fracture  Ortho assessed recommending sling, also no DVT in right arm.  Recommendations given, will need outpatient follow-up     Lung cancer  Dx in 2009, hx of recurrent R effusion (talc pleurodesis 4/2015), currently in remission        Consult/s: pulmonary/intensive care, ID, general surgery and orthopedic surgery  Significant Diagnostic Studies:   EXAM: CT CHEST WO CONTRAST  LOCATION: [facility]  DATE/TIME: 3/28/2019 12:36 PM     INDICATION: pleural effusions, concern for empyema pleural effusions, concern for empyema  COMPARISON: Left thoracentesis from 3/27/2019, chest CT from 12/10/2018  TECHNIQUE: Helical images were obtained through the chest. Multiplanar reformats were obtained. Dose reduction techniques were used.  IV CONTRAST: None.     FINDINGS:   LUNGS AND PLEURA: Residual modest sized left pleural effusion is new compared to the previous CT. Airspace infiltrate at left lung base may represent a combination of pneumonia and atelectasis.  Small loculated pleural effusion within right major fissure. Atelectasis/consolidation right lung base new compared to the previous study.  There is no significant change in the postoperative spiculated appearance involving medial right upper lobe measuring approximately 2.5 x 1.5 cm with an adjacent suture line.     MEDIASTINUM: 2 AP window lymph nodes appear larger on image 35 and 36, each now measuring 10 mm in short axis, previously  "measuring 4 mm. Likely these are reactive. Endotracheal tube and NG tube are in place. Heart size normal.     LIMITED UPPER ABDOMEN: Negative.     MUSCULOSKELETAL: Negative.     IMPRESSION:   CONCLUSION:   1.  Modest residual left pleural effusion even after recent thoracentesis. Small loculated right pleural effusion. There are are no pleural findings on this noncontrast study to suggest empyema. Suggest correlating with results of yesterday's large   volume left thoracentesis.  2.  Dependent infiltrates at both lung bases suggestive of pneumonia.  3.  Minimal change in postoperative appearance of right upper lobe. A few small AP window lymph nodes are minimally larger, likely reactive.     Blood culture 3/27/19          Streptococcus pyogenes       EL       Ceftriaxone Sensitive       Clindamycin Sensitive       Erythromycin Sensitive       Penicillin Sensitive       Vancomycin Sensitive           Procedures:  LEFT THORACOSCOPY WITH DECORTICATION 3/29/19  US guided thoracentesis      Treatments: IV antibiotics as above       CURRENT TCU ISSUES    Primary diagnosis: She does have a follow-up with infectious disease on 04/18/19 (may have been changed).      Right humeral head fracture: Angie does want follow-up x-rays at some point.    Pain management: Most of the pain she experiences is in her right shoulder.  She is receiving low-dose oxycodone (2.5-5 mg every 6 hours), and she also has a lidocaine patch.  Acetaminophen has also been beneficial, and we scheduled 1000 mg every 8 hours (3 times daily).    When seen at an earlier visit, her daughter, who was in attendance, told me that her mother gets \"agitated when the pain killers make her groggy.\"  As for the patient, she stated she was not getting too much, although the pain is never completely gone.    Buttock soreness: Dr. Luna ordered calmoseptine.    Dry mouth/dysphagia: She requested Biotene, and we wrote orders for that.  Uncertain whether it would be " covered by her insurance.  I did suggest she could also suck on hard candy or lemon drops, and I later saw her with those.  Her daughter also mentioned that she was choking with eating.  Dr. Luna ordered speech to evaluate and treat.    Anxiety: Earlier, we did talk a bit about her anxiety that can make her pain worse.  They do have an outside person who comes and performs healing touch, and we did write orders for this.  However, at the current time her anxiety is affecting her daily life, sleep, and ability to participate in therapy.  We ordered lorazepam 0.5 mg nightly.  I believe she is able to sleep now.    COPD: She states she has lots of lung problems and is 10 years out from inoperable lung cancer for which she underwent radiation and chemotherapy.  She does a lot of coughing which is chronic.  She is always coughing up phlegm, so she may wait too long before her respiratory symptoms become serious.  Apparently, she has infections at least a couple times a year.  She was a smoker until 2008 when she developed lung cancer.  She is on multiple inhaled medications.  I have a feeling that she is generally noncompliant, disagreeing with her daughter how frequently she takes her nebulizer treatments, for example.  She tells me that her breathing is somewhat labored, but her O2 sats remained good.  PT says it is stable at about 97% on 2 L of oxygen per nasal cannula.  She says she has been using the incentive spirometer.    She does have trouble breathing without oxygen, although Dr. Luna did write orders for a taper.  She also wrote orders for scheduled albuterol nebs (in addition to as needed dosing).      Diabetes: Recent blood glucose levels look good on 1000 mg of metformin twice daily.    Obstructive sleep apnea: She does have a CPAP machine at home but has not used it in months.  Here, she does have an incentive spirometer which she is encouraged to use.      ROS: No headaches, fever or chills, nausea  or vomiting, dizziness, dysuria, constipation or diarrhea.    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     Osteopenia      Pleural effusion 1/15     Radiation Pneumonitis     Created by Conversion      Sleep apnea     does not use cpap              Family History   Problem Relation Age of Onset     Heart disease Mother      Hypertension Mother      Alcohol abuse Mother      Depression Mother      Heart disease Father 45        mi age 45, cabg     Heart attack Father      Cancer Father         prostate     Hypertension Father      COPD Brother      Alcohol abuse Brother      Alcohol abuse Sister      Breast cancer Paternal Aunt      Leukemia Grandchild      Cancer Maternal Aunt 47        breast     Diabetes Son      Anxiety disorder Son      Depression Son      No Medical Problems Daughter      Schizophrenia Grandchild      Social History     Socioeconomic History     Marital status:      Spouse name: Not on file     Number of children: Not on file     Years of education: Not on file     Highest education level: Not on file   Occupational History     Not on file   Social Needs     Financial resource strain: Not on file     Food insecurity:     Worry: Not on file     Inability: Not on file     Transportation needs:     Medical: Not on file     Non-medical: Not on file   Tobacco Use     Smoking status: Former Smoker     Packs/day: 1.50     Years: 0.00     Pack years: 0.00     Last attempt to quit: 11/9/2008     Years since quitting: 10.4     Smokeless tobacco: Former User   Substance and Sexual Activity     Alcohol use: No     Drug use: No      Sexual activity: Never   Lifestyle     Physical activity:     Days per week: Not on file     Minutes per session: Not on file     Stress: Not on file   Relationships     Social connections:     Talks on phone: Not on file     Gets together: Not on file     Attends Anabaptist service: Not on file     Active member of club or organization: Not on file     Attends meetings of clubs or organizations: Not on file     Relationship status: Not on file     Intimate partner violence:     Fear of current or ex partner: Not on file     Emotionally abused: Not on file     Physically abused: Not on file     Forced sexual activity: Not on file   Other Topics Concern     Not on file   Social History Narrative     Not on file       MEDICATIONS: Reviewed from the MAR, physician orders, and/or earlier progress notes.  Updated by me today (04/15/19) with the addition of scheduled albuterol nebs, guaifenesin, and calmoseptine reflected below.  Current Outpatient Medications   Medication Sig     albuterol (PROVENTIL) 2.5 mg /3 mL (0.083 %) nebulizer solution Take 2.5 mg by nebulization 4 (four) times a day. Also has PRN dosing.     guaiFENesin ER (MUCINEX) 600 mg 12 hr tablet Take 1,200 mg by mouth 2 (two) times a day.     menthol-zinc oxide (CALMOSEPTINE) 0.44-20.6 % Oint ointment Apply topically 3 (three) times a day.     acetaminophen (TYLENOL) 500 MG tablet Take 2 tablets (1,000 mg total) by mouth 3 (three) times a day.     albuterol (PROVENTIL) 2.5 mg /3 mL (0.083 %) nebulizer solution TAKE 3ML BY MOUTH EVERY 4 HOURS AS NEEDED FOR WHEEZING     amLODIPine (NORVASC) 5 MG tablet Take 1 tablet (5 mg total) by mouth daily.     amoxicillin-clavulanate (AUGMENTIN) 875-125 mg per tablet Take 1 tablet by mouth 2 (two) times a day for 21 days. Follow up with Infectious Disease physician prior to completing antibiotics     aspirin 81 MG EC tablet Take 81 mg by mouth daily.     baclofen (LIORESAL) 10 MG tablet Take 1 tablet (10 mg total) by  mouth 3 (three) times a day as needed.     blood glucose meter (GLUCOMETER) Use 1 each As Directed as needed. Dispense glucometer brand per patient's insurance at pharmacy discretion.     blood glucose test (ACCU-CHEK SMARTVIEW TEST STRIP) strips Test blood sugar 3 times daily     calcium carbonate-vitamin D3 (CALCIUM 600 WITH VITAMIN D3) 600 mg(1,500mg) -200 unit per tablet Take 1 tablet by mouth 2 (two) times a day. Taking 1200mg of Calcium with 1000 D3     cetirizine 10 mg cap Take 10 mg by mouth daily with supper.            citalopram (CELEXA) 10 MG tablet TAKE 1 TABLET BY MOUTH ONCE DAILY.     famotidine (PEPCID) 20 MG tablet Take 1 tablet (20 mg total) by mouth daily.     fluticasone (FLONASE) 50 mcg/actuation nasal spray 2 SPRAYS INTO EACH NOSTRIL DAILY.     furosemide (LASIX) 20 MG tablet Take 2 tablets (40 mg total) by mouth every morning.     generic lancets Use 1 each As Directed daily. Dispense brand per patient's insurance at pharmacy discretion. (dx E11.9)     INCRUSE ELLIPTA 62.5 mcg/actuation DsDv inhaler INHALE 1 PUFF BY MOUTH DAILY     levothyroxine (SYNTHROID, LEVOTHROID) 50 MCG tablet TAKE 1 TABLET BY MOUTH EVERY OTHER DAY ALTERNATING WITH 75MG TABLET     levothyroxine (SYNTHROID, LEVOTHROID) 75 MCG tablet TAKE 1 TABLET BY MOUTH EVERY OTHER DAYS ALTERNATING WITH 75MCG AND 50MCG DOSE     lidocaine 4 % patch Place 1 patch on the skin daily. Remove and discard patch with 12 hours.  Apply to right shoulder     LORazepam (ATIVAN) 0.5 MG tablet Take by mouth at bedtime.     metFORMIN (GLUCOPHAGE) 1000 MG tablet Take 1 tablet (1,000 mg total) by mouth 2 (two) times a day with meals.     montelukast (SINGULAIR) 10 mg tablet Take 1 tablet (10 mg total) by mouth at bedtime.     naproxen (NAPROSYN) 500 MG tablet Take 1 tablet (500 mg total) by mouth 2 (two) times a day with meals.     oxyCODONE (ROXICODONE) 5 MG immediate release tablet Take 0.5-1 tablets (2.5-5 mg total) by mouth every 6 (six) hours as  "needed for pain.     PULMICORT FLEXHALER 180 mcg/actuation inhaler INHALE 2 PUFFS 2 (TWO) TIMES A DAY.     simvastatin (ZOCOR) 10 MG tablet TAKE 1 TABLET BY MOUTH AT BEDTIME.     traZODone (DESYREL) 50 MG tablet Take 1 tablet (50 mg total) by mouth at bedtime as needed for sleep.     triamcinolone (KENALOG) 0.1 % ointment Apply topically 2 (two) times a day. hands (Patient taking differently: Apply topically daily as needed hands.      )     VENTOLIN HFA 90 mcg/actuation inhaler INHALE 1-2 PUFFS EVERY 4 (FOUR) HOURS AS NEEDED FOR WHEEZING.     ALLERGIES: No Known Allergies    DIET: Diabetic, regular texture, thin liquids.  Mighty Shakes Sugar-Free.    Vitals:    04/15/19 1159   BP: 127/69   Pulse: (!) 104   Resp: 18   Temp: 97.6  F (36.4  C)   SpO2: 96%   Weight: 181 lb 6.4 oz (82.3 kg)   Height: 5' 5\" (1.651 m)   After being down 11.4 pounds, she was started on nutritional supplementation.  Since then, weight has been stable.  Body mass index is 30.19 kg/m .    EXAMINATION:   General: Pleasant middle-aged female, looking much older than her stated age, sitting in a wheelchair, right arm in the sling.    Head: Normocephalic and atraumatic.   Eyes: PERRLA, sclerae clear.   ENT: Moist oral mucosa.   Cardiovascular: Regular rate and rhythm.  No appreciable murmur.  Respiratory: Loose, phlegmy cough.  Able to appreciate congestion anteriorly.  Rales in bilateral bases, left greater than right.  I believe this is a chronic condition.  She tells me she is doing less coughing.  Abdomen: Soft and nontender.   Musculoskeletal/Extremities: Age-related degenerative joint disease.  Right arm in a sling.  No peripheral edema.  Integument: No rashes, clinically significant lesions, or skin breakdown.   Cognitive/Psychiatric: Alert and oriented x3 as far as I can tell.  Affect is euthymic.    DIAGNOSTICS:   Results for orders placed or performed during the hospital encounter of 03/27/19   Basic Metabolic Panel   Result Value Ref " Range    Sodium 138 136 - 145 mmol/L    Potassium 4.1 3.5 - 5.0 mmol/L    Chloride 102 98 - 107 mmol/L    CO2 27 22 - 31 mmol/L    Anion Gap, Calculation 9 5 - 18 mmol/L    Glucose 136 (H) 70 - 125 mg/dL    Calcium 8.3 (L) 8.5 - 10.5 mg/dL    BUN 10 8 - 22 mg/dL    Creatinine 0.76 0.60 - 1.10 mg/dL    GFR MDRD Af Amer >60 >60 mL/min/1.73m2    GFR MDRD Non Af Amer >60 >60 mL/min/1.73m2     Lab Results   Component Value Date    WBC 22.8 (H) 04/06/2019    HGB 8.3 (L) 04/06/2019    HCT 26.3 (L) 04/06/2019    MCV 92 04/06/2019     (H) 04/06/2019     CrCl cannot be calculated (Patient's most recent lab result is older than the maximum 5 days allowed.).  Lab Results   Component Value Date    HGBA1C 6.3 (H) 02/06/2019     Lab Results   Component Value Date    TSH 1.36 08/27/2018       ASSESSMENT/Plan:      ICD-10-CM    1. History of gram-positive (S. pyogenes) pneumosepsis Z86.19    2. Chronic obstructive pulmonary disease, unspecified COPD type (H) J44.9    3. 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 Non-small cell cancer of right lung (H) C34.91    5. 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 Chronic pleural effusion, left J90      CHANGES:    None.    CARE PLAN:    The care plan has been reviewed and all orders signed. Changes to care plan, if any, as noted. Otherwise, continue current plan of care.      The above has been created using voice recognition software. Please be aware that this may unintentionally  produce inaccuracies and/or nonsensical sentences.      Electronically signed by: Robert Yepez CNP

## 2021-05-27 NOTE — ANESTHESIA CARE TRANSFER NOTE
Last vitals:   Vitals:    03/29/19 1415   BP: 150/67   Pulse: 63   Resp: 16   Temp: 36.7  C (98  F)   SpO2: (!) 16%     Patient's level of consciousness is intubated/sedated/on ventilator  Spontaneous respirations: no: intubated/sedated/ventilator  Maintains airway independently: no: intubated/sedated/ventilator  Dentition unchanged: yes  Oropharynx: oral airway in place and endotracheal tube in place    QCDR Measures:  ASA# 20 - Surgical Safety Checklist: WHO surgical safety checklist completed prior to induction    PQRS# 430 - Adult PONV Prevention: 4558F - Pt received => 2 anti-emetic agents (different classes) preop & intraop  ASA# 8 - Peds PONV Prevention: NA - Not pediatric patient, not GA or 2 or more risk factors NOT present  PQRS# 424 - [place]-op Temp Management: 4559F - At least one body temp DOCUMENTED => 35.5C or 95.9F within required timeframe  PQRS# 426 - PACU Transfer Protocol: - Transfer of care checklist used  ASA# 14 - Acute Post-op Pain: ASA14B - Patient did NOT experience pain >= 7 out of 10

## 2021-05-28 NOTE — PROGRESS NOTES
Pulmonary Clinic Follow-up Visit    Assessment/Plan:  68 year old female with a history of tobacco dependence in remission, COPD, MONSERRAT, non small cell lung cancer of the right upper lobe diagnosed 2009 s/p chemotherapy and radiation c/b radiation pneumonitis in remission, recurrent right pleural effusion s/p talc pleurodesis in 2015, hypothyroidism, dyslipidemia, GERD, DM, right ureteral stent, chronic anemia, recent proximal right humerus fracture treated nonoperatively (poor candidate for surgery), and recent left lung abscess and left empyema with Strep pyogenes bacteremia and toxic shock syndrome s/p hospitalization, left thoracoscopic decortication and abscess drainage, and ongoing antibiotics, presenting for post-hospital follow-up.    COPD, left lung abscess/empyema, chronic right upper lobe radiation pneumonitis: My first time meeting Ms. Mancini and her daughter. Currently stable without fever, improving cough. Followed up with Dr. Ma with thoracic surgery and Dr. Ang with ID. Chest CT images reviewed; last CT 4/22/19 with bilateral consolidations and known RUL fibrotic changes from radiation. Continues on amoxicillin-clavulanate and taking Culturelle, but has intermittent loose stools. Has chronic exertional dyspnea; no PFTs on record. Denies fevers/chills. Currently taking umeclidinium and budesonide, with scheduled nebulized albuterol four times a day; she is interested in a more convenient regimen. Discussed pulmonary rehab, and she is interested, but with her current illness and multiple appointments it will not be feasible; wants to defer for a time. Also due for booster of Pneumovax-23, but will wait until acute process has resolved in discussion with the patient. Has thick nasal mucus and only inconsistently using nasal saline; does take cetirizine and nasal fluticasone.    Plan:  - change umeclidinium to umeclidinium-vilanterol one inhalation daily  - change nebulized albuterol to prn  -  continue budesonide flexhaler 180 mcg two inhalations two times a day; rinse/gargle/spit water after use  - continue montelukast 10 mg at bedtime  - continues on furosemide 40 mg daily and appears euvolemic  - ongoing follow-up with Dr. Ang in ID clinic  - I will see her in six weeks with repeat chest CT; imaging interval can be modified by her ID or heme/onc providers if indicated  - will discuss formal PFTs and pulmonary rehab referral at follow-up; patient and her daughter wanted to defer at this point  - received Pneumovax-23 in September 2012 and Prevnar-13 in July 2015; due for booster of Pneumovax-23 which we will defer until she is more recovered from her recent infection per patient preference  - now off oxygen  - encouraged her to call any time with questions or concerning symptoms    I appreciate the opportunity to participate in the care of Ms. Mancini.  Please call any time if needed.    CCx: COPD, left lung abscess/empyema, chronic right upper lobe radiation pneumonitis    HPI: 68 year old female with a history of tobacco dependence in remission, COPD, MONSERRAT, non small cell lung cancer of the right upper lobe diagnosed 2009 s/p chemotherapy and radiation c/b radiation pneumonitis in remission, recurrent right pleural effusion s/p talc pleurodesis, hypothyroidism, dyslipidemia, GERD, DM, right ureteral stent, chronic anemia, recent proximal right humerus fracture treated nonoperatively (poor candidate for surgery), and recent left lung abscess and left empyema with Strep pyogenes bacteremia and toxic shock syndrome s/p hospitalization, left thoracoscopic decortication and abscess drainage, and ongoing antibiotics, presenting for post-hospital follow-up. My first time meeting Ms. Mancini and her daughter. Currently stable without fever, improving cough. Followed up with Dr. Ma with thoracic surgery and Dr. Ang with ID. Chest CT images reviewed; last CT 4/22/19 with bilateral consolidations and  known RUL fibrotic changes from radiation. Continues on amoxicillin-clavulanate and taking Culturelle, but has intermittent loose stools. Has chronic exertional dyspnea; no PFTs on record. Currently taking umeclidinium and budesonide, with scheduled nebulized albuterol four times a day; she is interested in a more convenient regimen. Discussed pulmonary rehab, and she is interested, but with her current illness and multiple appointments it will not be feasible; wants to defer for a time. Also due for booster of Pneumovax-23, but will wait until acute process has resolved in discussion with the patient. Has thick nasal mucus and only inconsistently using nasal saline; does take cetirizine and nasal fluticasone. Denies fevers/chills.    ROS:  A 12-system review was obtained and was negative with the exception of the symptoms endorsed in the history of present illness.    PMH:  MONSERRAT  NSCLC RUL dx'd 2019 s/p chemo and radiation c/b radiation pneumonitis with ongoing fibrotic changes RUL  Recurrent right pleural effusion s/p right talc pleurodesis in 2015  Osteopenia  Hypothyroidism  DL  GERD  DM  MDD  COPD  Right ureteral stent  Chronic anemia  Left lung abscess/empyema, hospitalized 3/27/19-4/7/19, s/p left thoracoscopic decortication and abscess washout on 3/29/19; path negative for malignancy    PSH:  Past Surgical History:   Procedure Laterality Date     PORTACATH PLACEMENT      and removal     THORACOSCOPY Right 4/6/2015    Procedure: RIGHT THORACOSCOPY / BIOPSY PLEURAL / TALC PLEURODESIS;  Surgeon: Michi Ma MD;  Location: [facility] OR;  Service:      THORACOSCOPY Left 3/29/2019    Procedure: LEFT THORACOSCOPY WITH DECORTICATION;  Surgeon: Michi Ma MD;  Location: [facility] OR;  Service: General     TONSILLECTOMY  age 5     URETERAL STENT PLACEMENT Right 6/2010     US THORACENTESIS  3/27/2019       Allergies:  No Known Allergies    Family HX:  Family History   Problem Relation Age of  Onset     Heart disease Mother      Hypertension Mother      Alcohol abuse Mother      Depression Mother      Heart disease Father 45        mi age 45, cabg     Heart attack Father      Cancer Father         prostate     Hypertension Father      COPD Brother      Alcohol abuse Brother      Alcohol abuse Sister      Breast cancer Paternal Aunt      Leukemia Grandchild      Cancer Maternal Aunt 47        breast     Diabetes Son      Anxiety disorder Son      Depression Son      No Medical Problems Daughter      Schizophrenia Grandchild        Social Hx:  Social History     Socioeconomic History     Marital status:      Spouse name: Not on file     Number of children: Not on file     Years of education: Not on file     Highest education level: Not on file   Occupational History     Not on file   Social Needs     Financial resource strain: Not on file     Food insecurity:     Worry: Not on file     Inability: Not on file     Transportation needs:     Medical: Not on file     Non-medical: Not on file   Tobacco Use     Smoking status: Former Smoker     Packs/day: 1.50     Years: 0.00     Pack years: 0.00     Last attempt to quit: 11/9/2008     Years since quitting: 10.4     Smokeless tobacco: Former User   Substance and Sexual Activity     Alcohol use: No     Drug use: No     Sexual activity: Never   Lifestyle     Physical activity:     Days per week: Not on file     Minutes per session: Not on file     Stress: Not on file   Relationships     Social connections:     Talks on phone: Not on file     Gets together: Not on file     Attends [employer] service: Not on file     Active member of club or organization: Not on file     Attends meetings of clubs or organizations: Not on file     Relationship status: Not on file     Intimate partner violence:     Fear of current or ex partner: Not on file     Emotionally abused: Not on file     Physically abused: Not on file     Forced sexual activity: Not on file   Other  Topics Concern     Not on file   Social History Narrative     Not on file       Current Meds:  Current Outpatient Medications   Medication Sig Dispense Refill     acetaminophen (TYLENOL) 500 MG tablet Take 2 tablets (1,000 mg total) by mouth 3 (three) times a day.  0     albuterol (PROVENTIL) 2.5 mg /3 mL (0.083 %) nebulizer solution TAKE 3ML BY MOUTH EVERY 4 HOURS AS NEEDED FOR WHEEZING 900 mL 1     alum/mag hydrox-simethicone-diphenhydramine-lidocaine (MAGIC MOUTHWASH) suspension 15 mL 4 (four) times a day.       amoxicillin-clavulanate (AUGMENTIN) 875-125 mg per tablet Take 1 tablet by mouth 2 (two) times a day.  0     aspirin 81 MG EC tablet Take 81 mg by mouth daily.       blood glucose meter (GLUCOMETER) Use 1 each As Directed as needed. Dispense glucometer brand per patient's insurance at pharmacy discretion. 1 each 0     blood glucose test (ACCU-CHEK SMARTVIEW TEST STRIP) strips Test blood sugar 3 times daily 200 strip 3     calcium carbonate-vitamin D3 (CALCIUM 600 WITH VITAMIN D3) 600 mg(1,500mg) -200 unit per tablet Take 1 tablet by mouth 2 (two) times a day. Taking 1200mg of Calcium with 1000 D3       cetirizine 10 mg cap Take 10 mg by mouth daily with supper.              citalopram (CELEXA) 20 MG tablet Take 20 mg by mouth daily.       famotidine (PEPCID) 20 MG tablet Take 1 tablet (20 mg total) by mouth daily. (Patient taking differently: Take 20 mg by mouth 2 (two) times a day.       )       fluticasone (FLONASE) 50 mcg/actuation nasal spray 2 SPRAYS INTO EACH NOSTRIL DAILY. 48 g 3     furosemide (LASIX) 20 MG tablet TAKE 2 TABLETS (40 MG TOTAL) BY MOUTH EVERY MORNING. 180 tablet 3     generic lancets Use 1 each As Directed daily. Dispense brand per patient's insurance at pharmacy discretion. (dx E11.9) 100 each 1     guaiFENesin ER (MUCINEX) 600 mg 12 hr tablet Take 1,200 mg by mouth 2 (two) times a day.       INCRUSE ELLIPTA 62.5 mcg/actuation DsDv inhaler INHALE 1 PUFF BY MOUTH DAILY 90 puff 1      Lactobacillus rhamnosus GG (CULTURELLE) 15 billion cell CpSP Take 2 capsules by mouth Daily at 8:00 am..       levothyroxine (SYNTHROID, LEVOTHROID) 75 MCG tablet TAKE 1 TABLET BY MOUTH EVERY OTHER DAYS ALTERNATING WITH 75MCG AND 50MCG DOSE 45 tablet 2     lidocaine 4 % patch Place 1 patch on the skin daily. Remove and discard patch with 12 hours.  Apply to right shoulder 5 patch 0     loperamide (IMODIUM A-D) 2 mg tablet Take 2 mg by mouth as needed for diarrhea (2 mg after each loose stool to a maximum of 16 mg/day).       LORazepam (ATIVAN) 0.5 MG tablet Take 1 tablet (0.5 mg total) by mouth at bedtime. 30 tablet 0     menthol-zinc oxide (CALMOSEPTINE) 0.44-20.6 % Oint ointment Apply topically 3 (three) times a day.       metFORMIN (GLUCOPHAGE) 1000 MG tablet Take 1 tablet (1,000 mg total) by mouth 2 (two) times a day with meals. 180 tablet 3     montelukast (SINGULAIR) 10 mg tablet Take 1 tablet (10 mg total) by mouth at bedtime. 90 tablet 3     naproxen (NAPROSYN) 500 MG tablet Take 1 tablet (500 mg total) by mouth 2 (two) times a day with meals. 60 tablet 2     psyllium (METAMUCIL) 0.52 gram capsule Take 0.52 g by mouth 3 (three) times a day with meals.       PULMICORT FLEXHALER 180 mcg/actuation inhaler INHALE 2 PUFFS 2 (TWO) TIMES A DAY. 3 each 3     saliva stimulant (BIOTENE MOISTURIZING MOUTH) oral spray Take by mouth as needed.       simvastatin (ZOCOR) 10 MG tablet TAKE 1 TABLET BY MOUTH AT BEDTIME. 90 tablet 2     sodium chloride (OCEAN) 0.65 % nasal spray Apply 1 spray into each nostril as needed for congestion.       traZODone (DESYREL) 50 MG tablet Take 1 tablet (50 mg total) by mouth at bedtime as needed for sleep. (Patient taking differently: Take 75 mg by mouth at bedtime as needed for sleep.       ) 30 tablet 11     triamcinolone (KENALOG) 0.1 % ointment Apply topically 2 (two) times a day. hands (Patient taking differently: Apply topically daily as needed hands.      ) 30 g 3     VENTOLIN HFA 90  mcg/actuation inhaler INHALE 1-2 PUFFS EVERY 4 (FOUR) HOURS AS NEEDED FOR WHEEZING. 54 g 0     No current facility-administered medications for this visit.        Physical Exam:  /54   Pulse 96   Resp 19   SpO2 94% Comment: RA  Gen: alert, oriented, no distress, in wheelchair  HEENT: nasal turbinates are unremarkable, no oropharyngeal lesions, no cervical or supraclavicular lymphadenopathy  CV: tachy, regular, no M/G/R  Resp: bibasilar crackles, L>R  Abd: soft, nontender, no palpable organomegaly  MSK: right arm in sling  Skin: no apparent rashes  Ext: no cyanosis, clubbing or edema  Neuro: alert, nonfocal    Labs:  reviewed    Imaging studies:  TTE (3/28/19):  - EF 50%  - normal RV sie/function  - no valvular abnormalities  - severely increased CVP    Chest CT (4/22/19):  - images directly reviewed, formal interpretation follows:  FINDINGS:  LUNGS AND PLEURA: A 2.5 x 1.6 cm left lower lobe cavitation has developed. The  surrounding left lower lobe consolidation has decreased.     Evolving irregular right lower lobe consolidation. Persistent right upper lobe  paramediastinal irregular consolidation.     Decreased bilateral pleural effusions. Residual fluid collections in both major  and the right minor fissures.     Increased smooth appearing intralobular septal thickening, particularly on the  left.     MEDIASTINUM: Persistent mediastinal adenopathy which could be reactive or  neoplastic.     LIMITED UPPER ABDOMEN: Left para-aortic 13 x 9 mm node just medial to the  adrenal gland. This could be reactive or neoplastic.     MUSCULOSKELETAL: Proximal right humeral impacted fracture.     CONCLUSION:  1.  Evolving bilateral lung consolidations.  2.  New left lower lobe cavitation. This most likely represents cavitary  pneumonia.  3.  Increasing left interlobular septal thickening most suggestive of edema.  4.  Persistent but improved bilateral pleural effusions.  5.  Mediastinal and upper para-aortic adenopathy  which could be reactive or  neoplastic.  6.  Recommend CT chest follow-up exclude the possibility of neoplasm.    Jabari Perez MD  Pulmonary and Critical Care Medicine  [facility]  Cell [phone]  Office [phone]  Pager [phone]

## 2021-05-28 NOTE — PROGRESS NOTES
[facility]    Name:   Rita Mancini  : 1950  Facility:   [facility] [[id number]]   Room:   Code Status: FULL CODE -   Fac type:   SNF (Skilled Nursing Facility, TCU) -     CHIEF COMPLAINT / REASON FOR VISIT:  Chief Complaint   Patient presents with     Follow-up     TCU follow-up after hospitalization for pneumosepsis, but she was also recently in the ED with a closed displaced fracture of the proximal end of the right humerus.     [facility] ED 19 (displaced fracture of proximal end of right humerus)  [facility] from 19 until 19 (pneumosepsis)  [facility] TCU from 2019 until 2019 (expected discharge date)    Patient was last seen by me on 2019.      HPI: Rita is a 68 y.o. female was admitted to [facility] on 3/27/2019 for septic shock.   She had been to [facility] ED 11 days earlier after slipping on the ice and sustaining a closed displaced fracture of the proximal end of the right humerus.  X-rays of the right shoulder showed fracture of the proximal humerus, comminuted with displacement.  She was discharged with a shoulder immobilizer to home with orthopedic surgery follow-up.    Subsequent to that, she developed progressive shortness of breath, and the day before admission, family thought she sounded quite short of breath and confused.  The patient herself thought it was from her pain medication.  The night before admission, her son heard very heavy breathing.  He woke her up, and she was quite confused.  He helped her to the bathroom, but she took over an hour in the bathroom because she kept falling asleep.  He helped her back to bed and called 911.      EMS measured sats in the 70s and felt she was in respiratory distress.  She was also found to be hypertensive on arrival at the ED.  She was given IV fluids and placed on BiPAP (PCO2 = 63), very elevated lactic acid (7.4),  very high procalcitonin (20.85), acute renal failure (creatinine = 3.59), and hyperkalemic (6.0).  She was intubated and started on pressors.  Cultures grew group A strep as well as S. pyogenes.  There was concern for toxic shock syndrome.  She was given IVIG x3 days and clindamycin.  She was followed by ID (Dr. Ang), and chest tubes were placed.  CT showed improvement and chronic effusion.  She was switched to Zosyn but discharged on Augmentin.      MOST RECENT HOSPITALIZATION  (Per hospital discharge summary)     Pneumonia (S.pyogenes)  complicated by left-sided empyema s/p VATS/decortication 3/29  Strep biology knees bacteremia cleared  All chest tubes now removed, increase ambulation and activity  Respiratory failure-resolved, extubated 3/30   Antibiotics throughout course for broad-spectrum including vancomycin, Zosyn, clindamycin, and ultimately transition to Augmentin to complete 3 additional weeks.  Infectious disease consultant would like to follow-up in clinic in 3 weeks.  BCx 3/30 CNS- contaminant  Follow-up with general surgery after hospitalization  Pain control with small dose of oxycodone when needed, scheduled Tylenol, the latter has been enough for her pain control.     Anxiety  She is on diazepam as an outpatient, while taking more oxycodone, we will put this on hold.    COPD  Continue home nebulizers, she remains on 2-3 L/min of oxygen, wean as tolerated     Hypervolemia  She was diuresed following ICU stay, back down to baseline weight     Diabetes  At goal as outpatient (A1c <7).   Correction scale, keeping at goal 140-180  Acceptable control in the hospital, continue metformin at discharge     MONSERRAT  Home cpap use     Insomnia  Continue trazodone when needed     Right humerus fracture  Ortho assessed recommending sling, also no DVT in right arm.  Recommendations given, will need outpatient follow-up     Lung cancer  Dx in 2009, hx of recurrent R effusion (talc pleurodesis 4/2015), currently  in remission        Consult/s: pulmonary/intensive care, ID, general surgery and orthopedic surgery  Significant Diagnostic Studies:   EXAM: CT CHEST WO CONTRAST  LOCATION: [facility]  DATE/TIME: 3/28/2019 12:36 PM     INDICATION: pleural effusions, concern for empyema pleural effusions, concern for empyema  COMPARISON: Left thoracentesis from 3/27/2019, chest CT from 12/10/2018  TECHNIQUE: Helical images were obtained through the chest. Multiplanar reformats were obtained. Dose reduction techniques were used.  IV CONTRAST: None.     FINDINGS:   LUNGS AND PLEURA: Residual modest sized left pleural effusion is new compared to the previous CT. Airspace infiltrate at left lung base may represent a combination of pneumonia and atelectasis.  Small loculated pleural effusion within right major fissure. Atelectasis/consolidation right lung base new compared to the previous study.  There is no significant change in the postoperative spiculated appearance involving medial right upper lobe measuring approximately 2.5 x 1.5 cm with an adjacent suture line.     MEDIASTINUM: 2 AP window lymph nodes appear larger on image 35 and 36, each now measuring 10 mm in short axis, previously measuring 4 mm. Likely these are reactive. Endotracheal tube and NG tube are in place. Heart size normal.     LIMITED UPPER ABDOMEN: Negative.     MUSCULOSKELETAL: Negative.     IMPRESSION:   CONCLUSION:   1.  Modest residual left pleural effusion even after recent thoracentesis. Small loculated right pleural effusion. There are are no pleural findings on this noncontrast study to suggest empyema. Suggest correlating with results of yesterday's large   volume left thoracentesis.  2.  Dependent infiltrates at both lung bases suggestive of pneumonia.  3.  Minimal change in postoperative appearance of right upper lobe. A few small AP window lymph nodes are minimally larger, likely reactive.     Blood culture 3/27/19          Streptococcus  pyogenes       EL       Ceftriaxone Sensitive       Clindamycin Sensitive       Erythromycin Sensitive       Penicillin Sensitive       Vancomycin Sensitive           Procedures:  LEFT THORACOSCOPY WITH DECORTICATION 3/29/19  US guided thoracentesis      Treatments: IV antibiotics as above    Subsequent Chest CT on 04/22/2019:  CONCLUSION (per Dr. Jabari Perez):  1.  Evolving bilateral lung consolidations.  2.  New left lower lobe cavitation. This most likely represents cavitary  pneumonia.  3.  Increasing left interlobular septal thickening most suggestive of edema.  4.  Persistent but improved bilateral pleural effusions.  5.  Mediastinal and upper para-aortic adenopathy which could be reactive or  neoplastic.  6.  Recommend CT chest follow-up exclude the possibility of neoplasm.      CURRENT TCU ISSUES    Note: Approximately 15 to 20 minutes was spent today explaining what her most recent visits to pulmonology and ortho meant so that she could best understand.    Primary diagnosis: She did have a scheduled follow-up with infectious disease on 04/18/19.  She saw the surgeon, Dr. Ma, on 04/25/2019.  She tells me he wants her to make an appointment to see the physician who saw her in the hospital.  She did have an appointment with Dr. Jabari Perez (pulmonology) on 05/08/2019 with a diagnoses of COPD, left lung abscess/empyema, and chronic right upper lobe radiation pneumonitis.  Orders called for discontinuing Incruse and starting Anoro Ellipta.  Albuterol nebs were also changed every 4 hours as needed for dyspnea.  Also, Ocean Spray was ordered in each nostril daily on a scheduled basis plus up to 4 times daily as needed.     She saw ID on 04/18/2019.  Something was seen on x-ray that Dr. Ang found concerning, and a CT was ordered.  She was then referred to Dr. Ma.  I believe this involves the area of the pleural effusion/lung abscess in the left lobe.  She tells me that ID wanted to see her again  "on 05/02/2019, and she was continued on antibiotics.  Lung sounds currently are very coarse.  Current Augmentin is being continued after being seen by Dr. Perez and referred to ID.  White count was still elevated at 16.2 on 05/08/2019.    Right humeral head fracture: She was seen in follow-up at [facility] by Dr. Jase Anaya.  She appears to be doing well with minimal residual swelling/bruising or complaint of pain.  X-rays showed grossly maintained alignment and abundant fracture callus.  Orders were to wean from the sling as comfort permits and PT/OT to continue with PROM as tolerated.  Still to remain nonweightbearing with the right upper extremity until follow-up on 06/07/2019.    Pain management: The pain she experiences is in her right shoulder.  She is receiving low-dose oxycodone (2.5-5 mg every 6 hours) as needed they are also applying ice, and she has a lidocaine patch.  She mentioned, \"I've had decent luck with Tylenol.\"  At an earlier visit, we scheduled 1000 mg every 8 hours (3 times daily).  The nurse told me she did not want to take any oxycodone because it made her \"goofy.\"  The side effect may not be an accurate assessment, as her previous mental state was caused by illness.  Nonetheless, we discontinued it.    When seen earlier, her daughter, who was in attendance, told me that her mother gets \"agitated when the pain killers make her groggy.\"  As for the patient, she stated she was not getting too much, although the pain is never completely gone.    Buttock soreness: She tells me they are using an alcohol-based product to clean her up.  She would like something without that.  Dr. Luna ordered calmoseptine.  It is much better now.    Candidal intertrigo: Under the breasts and abdominal folds.  With nystatin powder, this is now resolved.    Dry mouth/dysphagia: She requested Biotene, and we wrote orders for that.  Uncertain whether it would be covered by her insurance.  I did suggest she " could also suck on hard candy or lemon drops, and I later saw her with those.  Her daughter also mentioned that she was choking with eating.  Dr. Luna ordered speech to evaluate and treat.  Her diet was recently upgraded to regular texture by SLP on 04/25/2019.  She now claims she is not needing to use the Biotene as much.    Anxiety/insomnia: Earlier, we did talk a bit about her anxiety that can make her pain worse.  They do have an outside person who comes and performs healing touch, and we did write orders for this.  However, at the current time her anxiety is affecting her daily life, sleep, and ability to participate in therapy.  We ordered lorazepam 0.5 mg nightly.  She is sleeping a little better with this and trazodone, although she cannot seem to sleep past 4 AM.  We increased the trazodone from 50 mg to 75 mg nightly, and this seems to be helping.    Depression/cognition: She was seen by Dr. Shepherd on 04/23/2019 and scored high (16/27) on the PHQ 9 depression scale.  At that point, we increased her citalopram from 10 mg to 20 mg daily.  She was also noted by Occupational Therapy to have some cognitive issues per the Short Blessed test.  She does not exhibit overt psychotic symptoms.    COPD: She states she has lots of lung problems and is 10 years out from inoperable lung cancer for which she underwent radiation and chemotherapy.  She does a lot of coughing which is chronic.  She is always coughing up phlegm, so she may wait too long before her respiratory symptoms become serious.  Apparently, she has infections at least a couple times a year.  She was a smoker until 2008 when she developed lung cancer.  She is on multiple inhaled medications.  I have a feeling that she is generally noncompliant, disagreeing with her daughter how frequently she takes her nebulizer treatments, for example.  She tells me that her breathing can be somewhat labored, but her O2 sats remained good.  When seen on 04/24/2019,  "she was on 0.5 L/min per nasal cannula, although she believed she could do without it.  We had planned to do a taper, and oxygen was discontinued on 04/25/2019.    Dr. Luna wrote orders for scheduled albuterol nebs (in addition to as needed dosing).  She also ordered a CBC with differential and a BMP.  CBC on 04/15/2019 still showed an elevated white count of 18.1 with a slight left shift (neutrophils 77%, lymphocytes 12%).  We ordered a follow-up CBC with differential and BMP was still elevated but less so at 14.8, hemoglobin 8.0, platelets 617,000.    Diarrhea: They are loose and frequent and always seems to occur when she takes any antibiotics.  We ordered Imodium 2 mg after each loose stool (maximum 16 mg/day) and Culturelle (15 billion cell) probiotic (2 caps every morning).  Dr. Luna added psyllium capsules (0.52 g 3 times daily with meals), and this has helped somewhat.    GERD: Another problem she complains about there is persistent heartburn.  She is already receiving 20 mg of famotidine daily.  We increased the famotidine to 20 mg twice daily.  This is now much improved, although it did take some time.  She tells me the heartburn is \"all gone.\"    Diabetes: Recent blood glucose levels range between 70 and 175, mostly around 100.  She is on 1000 mg of metformin twice daily.    Anemia: This may or may not be the cause, but she tells me she gets tired very easily.  Her latest hemoglobin was 8.0.  With the previous white count of 14.8 and elevated platelets, we rechecked a hemogram-2.  White count is still elevated at 16.2.  Apparently, some dosing of her antibiotic was administered.    Obstructive sleep apnea: She does have a CPAP machine at home but has not used it in months.  Here, she does have an incentive spirometer, and I encouraged her to use it.       ROS: No headaches, nausea or vomiting, dizziness, dysuria.    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     Osteopenia      Pleural effusion 1/15     Radiation Pneumonitis     Created by Conversion      Sleep apnea     does not use cpap              Family History   Problem Relation Age of Onset     Heart disease Mother      Hypertension Mother      Alcohol abuse Mother      Depression Mother      Heart disease Father 45        mi age 45, cabg     Heart attack Father      Cancer Father         prostate     Hypertension Father      COPD Brother      Alcohol abuse Brother      Alcohol abuse Sister      Breast cancer Paternal Aunt      Leukemia Grandchild      Cancer Maternal Aunt 47        breast     Diabetes Son      Anxiety disorder Son      Depression Son      No Medical Problems Daughter      Schizophrenia Grandchild      Social History     Socioeconomic History     Marital status:      Spouse name: Not on file     Number of children: Not on file     Years of education: Not on file     Highest education level: Not on file   Occupational History     Not on file   Social Needs     Financial resource strain: Not on file     Food insecurity:     Worry: Not on file     Inability: Not on file     Transportation needs:     Medical: Not on file     Non-medical: Not on file   Tobacco Use     Smoking status: Former Smoker     Packs/day: 1.50     Years: 0.00     Pack years: 0.00     Last attempt to quit: 11/9/2008     Years since quitting: 10.5     Smokeless tobacco: Former User   Substance and Sexual Activity     Alcohol use: No     Drug use: No     Sexual activity: Never   Lifestyle     Physical activity:     Days per week: Not on file     Minutes per session: Not on file     Stress:  Not on file   Relationships     Social connections:     Talks on phone: Not on file     Gets together: Not on file     Attends [employer] service: Not on file     Active member of club or organization: Not on file     Attends meetings of clubs or organizations: Not on file     Relationship status: Not on file     Intimate partner violence:     Fear of current or ex partner: Not on file     Emotionally abused: Not on file     Physically abused: Not on file     Forced sexual activity: Not on file   Other Topics Concern     Not on file   Social History Narrative     Not on file       MEDICATIONS: Reviewed from the MAR, physician orders, and/or earlier progress notes.  Updated by me today (05/13/2019) with changes made by Dr. Perez reflected below.  Current Outpatient Medications   Medication Sig     acetaminophen (TYLENOL) 500 MG tablet Take 2 tablets (1,000 mg total) by mouth 3 (three) times a day.     albuterol (PROVENTIL) 2.5 mg /3 mL (0.083 %) nebulizer solution TAKE 3ML BY MOUTH EVERY 4 HOURS AS NEEDED FOR WHEEZING     alum/mag hydrox-simethicone-diphenhydramine-lidocaine (MAGIC MOUTHWASH) suspension 15 mL 4 (four) times a day as needed. Change from scheduled to as needed on 05/13/2019.           amoxicillin-clavulanate (AUGMENTIN) 875-125 mg per tablet Take 1 tablet by mouth 2 (two) times a day.     aspirin 81 MG EC tablet Take 81 mg by mouth daily.     blood glucose meter (GLUCOMETER) Use 1 each As Directed as needed. Dispense glucometer brand per patient's insurance at pharmacy discretion.     blood glucose test (ACCU-CHEK SMARTVIEW TEST STRIP) strips Test blood sugar 3 times daily     calcium carbonate-vitamin D3 (CALCIUM 600 WITH VITAMIN D3) 600 mg(1,500mg) -200 unit per tablet Take 1 tablet by mouth 2 (two) times a day. Taking 1200mg of Calcium with 1000 D3     cetirizine 10 mg cap Take 10 mg by mouth daily with supper.            citalopram (CELEXA) 20 MG tablet Take 20 mg by mouth daily.     famotidine  (PEPCID) 20 MG tablet Take 1 tablet (20 mg total) by mouth daily. (Patient taking differently: Take 20 mg by mouth 2 (two) times a day.       )     fluticasone (FLONASE) 50 mcg/actuation nasal spray 2 SPRAYS INTO EACH NOSTRIL DAILY.     furosemide (LASIX) 20 MG tablet TAKE 2 TABLETS (40 MG TOTAL) BY MOUTH EVERY MORNING.     generic lancets Use 1 each As Directed daily. Dispense brand per patient's insurance at pharmacy discretion. (dx E11.9)     guaiFENesin ER (MUCINEX) 600 mg 12 hr tablet Take 1,200 mg by mouth 2 (two) times a day.     INCRUSE ELLIPTA 62.5 mcg/actuation DsDv inhaler INHALE 1 PUFF BY MOUTH DAILY     Lactobacillus rhamnosus GG (CULTURELLE) 15 billion cell CpSP Take 2 capsules by mouth Daily at 8:00 am..     levothyroxine (SYNTHROID, LEVOTHROID) 75 MCG tablet TAKE 1 TABLET BY MOUTH EVERY OTHER DAYS ALTERNATING WITH 75MCG AND 50MCG DOSE     lidocaine 4 % patch Place 1 patch on the skin daily. Remove and discard patch with 12 hours.  Apply to right shoulder     loperamide (IMODIUM A-D) 2 mg tablet Take 2 mg by mouth as needed for diarrhea (2 mg after each loose stool to a maximum of 16 mg/day).     LORazepam (ATIVAN) 0.5 MG tablet Take 1 tablet (0.5 mg total) by mouth at bedtime.     menthol-zinc oxide (CALMOSEPTINE) 0.44-20.6 % Oint ointment Apply topically 3 (three) times a day.     metFORMIN (GLUCOPHAGE) 1000 MG tablet Take 1 tablet (1,000 mg total) by mouth 2 (two) times a day with meals.     montelukast (SINGULAIR) 10 mg tablet Take 1 tablet (10 mg total) by mouth at bedtime.     naproxen (NAPROSYN) 500 MG tablet Take 1 tablet (500 mg total) by mouth 2 (two) times a day with meals.     psyllium (METAMUCIL) 0.52 gram capsule Take 0.52 g by mouth 3 (three) times a day with meals.     PULMICORT FLEXHALER 180 mcg/actuation inhaler INHALE 2 PUFFS 2 (TWO) TIMES A DAY.     saliva stimulant (BIOTENE MOISTURIZING MOUTH) oral spray Take by mouth as needed.     simvastatin (ZOCOR) 10 MG tablet TAKE 1 TABLET  "BY MOUTH AT BEDTIME.     sodium chloride (OCEAN) 0.65 % nasal spray Apply 1 spray into each nostril as needed for congestion.     traZODone (DESYREL) 50 MG tablet Take 1 tablet (50 mg total) by mouth at bedtime as needed for sleep. (Patient taking differently: Take 75 mg by mouth at bedtime as needed for sleep.       )     triamcinolone (KENALOG) 0.1 % ointment Apply topically 2 (two) times a day. hands (Patient taking differently: Apply topically daily as needed hands.      )     VENTOLIN HFA 90 mcg/actuation inhaler INHALE 1-2 PUFFS EVERY 4 (FOUR) HOURS AS NEEDED FOR WHEEZING.     ALLERGIES: No Known Allergies    DIET: Diabetic, regular texture, thin liquids.  Mighty Shakes Sugar-Free.    Vitals:    05/13/19 1759   BP: 108/56   Pulse: (!) 108   Resp: 18   Temp: 98.7  F (37.1  C)   SpO2: 96%   Weight: 161 lb (73 kg)   Height: 5' 5\" (1.651 m)     Body mass index is 26.79 kg/m .    EXAMINATION:   General: Pleasant middle-aged female, looking much older than her stated age, sitting in a recliner, in NAD.  Head: Normocephalic and atraumatic.   Eyes: PERRLA, sclerae clear.   ENT: Moist oral mucosa.   Cardiovascular: Regular rate and rhythm.  No appreciable murmur.  Respiratory: Still has a loose cough (Mucinex helps).  Earlier, I could appreciate bibasilar rales (left greater than right).  At later visits, there appeared to be coarse bilateral mid to upper lung rhonchi with an expiratory wheeze.  Most likely related to her pleural/left lung abscess effusion discussed above.  These persist, right greater than left.  Abdomen: Soft and nontender.   Musculoskeletal/Extremities: Age-related degenerative joint disease.  Right arm in a sling.  No peripheral edema.  Integument: No rashes, clinically significant lesions, or skin breakdown.   Cognitive/Psychiatric: Alert and oriented x3 as far as I can tell.  Affect is somewhat depressed/anxious.    DIAGNOSTICS:   Results for orders placed or performed in visit on 04/30/19 "   Basic Metabolic Panel   Result Value Ref Range    Sodium 139 136 - 145 mmol/L    Potassium 4.7 3.5 - 5.0 mmol/L    Chloride 105 98 - 107 mmol/L    CO2 19 (L) 22 - 31 mmol/L    Anion Gap, Calculation 15 5 - 18 mmol/L    Glucose 68 (L) 70 - 125 mg/dL    Calcium 10.0 8.5 - 10.5 mg/dL    BUN 17 8 - 22 mg/dL    Creatinine 0.92 0.60 - 1.10 mg/dL    GFR MDRD Af Amer >60 >60 mL/min/1.73m2    GFR MDRD Non Af Amer >60 >60 mL/min/1.73m2     Lab Results   Component Value Date    WBC 16.2 (H) 05/08/2019    HGB 8.4 (L) 05/08/2019    HCT 28.9 (L) 05/08/2019    MCV 92 05/08/2019     (H) 05/08/2019     CrCl cannot be calculated (Patient's most recent lab result is older than the maximum 5 days allowed.).  Lab Results   Component Value Date    HGBA1C 6.3 (H) 02/06/2019     Lab Results   Component Value Date    TSH 1.36 08/27/2018       ASSESSMENT/Plan:      ICD-10-CM    1. History of gram-positive (S. pyogenes) pneumosepsis Z86.19    2. Cavitary pneumonia J18.9     J98.4    3. Chronic pleural effusion, left (s/p Talc pleurodesis 2015) J90    4. 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 Chronic obstructive pulmonary disease, unspecified COPD type (H) J44.9    6. Psychophysiological insomnia F51.04    7. Functional diarrhea K59.1    8. Anemia, unspecified type D64.9    9. Gastroesophageal reflux disease without esophagitis K21.9    10. Acquired hypothyroidism E03.9    11. Non-small cell cancer of right lung, s/p radiation and chemotherapies, in remission since 3025-4340 (H) C34.91      CHANGES:    None.    CARE PLAN:    The care plan has been reviewed and all orders signed. Changes to care plan, if any, as noted. Otherwise, continue current plan of care.  Total time spent with this patient was approximately 35 minutes, with greater than 50% spent in counseling and coordination of care that included a review of recent consults (pulmonology and ortho) and  discussing this with the patient stated that she could best understand.    The above has been created using voice recognition software. Please be aware that this may unintentionally  produce inaccuracies and/or nonsensical sentences.      Electronically signed by: Robert Yepez CNP

## 2021-05-28 NOTE — PROGRESS NOTES
[facility] Patient Outreach:     Discharge Date::  5/16/19      Reason for TCU stay (discharge diagnosis)::  Septic shock, pneumosepsis, right humerus fx, COPD, DM      Are you feeling better, the same or worse since your discharge?:  Patient is feeling the same          As part of your discharge plan, did they discuss home care with you?: Yes        Have your seen them yet, or are they scheduled to visit?: Yes                Do you have any follow up visits scheduled with your PCP or Specialist?:  Yes, with PCP      (RN) Is it scheduled soon enough (3-5 days)?: No        (RN) Is the patient okay with moving appointment up (if RN feels appropriate)?: No    I'm glad to hear you're doing well and we want you to continue to do well. Your PCP would like to see you for a follow-up visit. Can we help set that up for your today?: No        (RN) Provided patient the PCP's phone number to call if they have any questions or concerns?: No (Patient knows the number.  )

## 2021-05-28 NOTE — TELEPHONE ENCOUNTER
[facility] Nurse Triage Telephone Note      Provider: SERVANDO Don  Facility: [facility]    Facility Type: TCU    Caller: Savanna  Call Back Number:  [phone]    Allergies: Patient has no known allergies.    Reason for call: BMP results- ok, WBC 14.8 ( 18.3, 18.6), Hgb 8.0 (8.4, 8.6) not on Iron. Plt 617 ([phone]). Finished antibiotics 4/28/19.     Verbal Order/Direction given by Provider: NNO    Provider giving order: SERVANDO Don    Verbal order given to: Savanna Tafoya RN

## 2021-05-28 NOTE — TELEPHONE ENCOUNTER
Dr. Ang patient      Daughter- Tricia called.    Patient saw Dr. Ang and he wanted her to have a CT scan and also see a Pulmonologist.   Please send order or visit summary to [facility] so they are away for Dr. Ang's request     [facility]U @ [facility] Fax @ [phone]    Pulmonology appointment scheduled for 04.25.2019 and f/u visit with Dr. Ang on 05.02.2019    Tricia @ [phone]

## 2021-05-28 NOTE — TELEPHONE ENCOUNTER
Contacted [facility] and gave verbal orders for the below requested per Master Deleon DO. No further questions or concerns at this time.

## 2021-05-28 NOTE — PROGRESS NOTES
[facility]    Name:   Rita Mancini  : 1950  Facility:   [facility] [[id number]]   Room:   Code Status: FULL CODE -   Fac type:   SNF (Skilled Nursing Facility, TCU) -     CHIEF COMPLAINT / REASON FOR VISIT:  Chief Complaint   Patient presents with     Discharge Summary     TCU discharge after hospitalization for pneumosepsis, but she was also recently in the ED with a closed displaced fracture of the proximal end of the right humerus.     [facility] ED 19 (displaced fracture of proximal end of right humerus)  [facility] from 19 until 19 (pneumosepsis)  [facility] TCU from 2019 until 2019 (expected discharge date)      HPI: Rita is a 68 y.o. female was admitted to [facility] on 3/27/2019 for septic shock.   She had been to [facility] ED 11 days earlier after slipping on the ice and sustaining a closed displaced fracture of the proximal end of the right humerus.  X-rays of the right shoulder showed fracture of the proximal humerus, comminuted with displacement.  She was discharged with a shoulder immobilizer to home with orthopedic surgery follow-up.    Subsequent to that, she developed progressive shortness of breath, and the day before admission, family thought she sounded quite short of breath and confused.  The patient herself thought it was from her pain medication.  The night before admission, her son heard very heavy breathing.  He woke her up, and she was quite confused.  He helped her to the bathroom, but she took over an hour in the bathroom because she kept falling asleep.  He helped her back to bed and called 911.      EMS measured sats in the 70s and felt she was in respiratory distress.  She was also found to be hypertensive on arrival at the ED.  She was given IV fluids and placed on BiPAP (PCO2 = 63), very elevated lactic acid (7.4), very high procalcitonin (20.85), acute  renal failure (creatinine = 3.59), and hyperkalemic (6.0).  She was intubated and started on pressors.  Cultures grew group A strep as well as S. pyogenes.  There was concern for toxic shock syndrome.  She was given IVIG x3 days and clindamycin.  She was followed by ID (Dr. Ang), and chest tubes were placed.  CT showed improvement and chronic effusion.  She was switched to Zosyn but discharged on Augmentin.      MOST RECENT HOSPITALIZATION  (Per hospital discharge summary)     Pneumonia (S.pyogenes)  complicated by left-sided empyema s/p VATS/decortication 3/29  Strep biology knees bacteremia cleared  All chest tubes now removed, increase ambulation and activity  Respiratory failure-resolved, extubated 3/30   Antibiotics throughout course for broad-spectrum including vancomycin, Zosyn, clindamycin, and ultimately transition to Augmentin to complete 3 additional weeks.  Infectious disease consultant would like to follow-up in clinic in 3 weeks.  BCx 3/30 CNS- contaminant  Follow-up with general surgery after hospitalization  Pain control with small dose of oxycodone when needed, scheduled Tylenol, the latter has been enough for her pain control.     Anxiety  She is on diazepam as an outpatient, while taking more oxycodone, we will put this on hold.    COPD  Continue home nebulizers, she remains on 2-3 L/min of oxygen, wean as tolerated     Hypervolemia  She was diuresed following ICU stay, back down to baseline weight     Diabetes  At goal as outpatient (A1c <7).   Correction scale, keeping at goal 140-180  Acceptable control in the hospital, continue metformin at discharge     MONSERRAT  Home cpap use     Insomnia  Continue trazodone when needed     Right humerus fracture  Ortho assessed recommending sling, also no DVT in right arm.  Recommendations given, will need outpatient follow-up     Lung cancer  Dx in 2009, hx of recurrent R effusion (talc pleurodesis 4/2015), currently in remission        Consult/s:  pulmonary/intensive care, ID, general surgery and orthopedic surgery  Significant Diagnostic Studies:   EXAM: CT CHEST WO CONTRAST  LOCATION: [facility]  DATE/TIME: 3/28/2019 12:36 PM     INDICATION: pleural effusions, concern for empyema pleural effusions, concern for empyema  COMPARISON: Left thoracentesis from 3/27/2019, chest CT from 12/10/2018  TECHNIQUE: Helical images were obtained through the chest. Multiplanar reformats were obtained. Dose reduction techniques were used.  IV CONTRAST: None.     FINDINGS:   LUNGS AND PLEURA: Residual modest sized left pleural effusion is new compared to the previous CT. Airspace infiltrate at left lung base may represent a combination of pneumonia and atelectasis.  Small loculated pleural effusion within right major fissure. Atelectasis/consolidation right lung base new compared to the previous study.  There is no significant change in the postoperative spiculated appearance involving medial right upper lobe measuring approximately 2.5 x 1.5 cm with an adjacent suture line.     MEDIASTINUM: 2 AP window lymph nodes appear larger on image 35 and 36, each now measuring 10 mm in short axis, previously measuring 4 mm. Likely these are reactive. Endotracheal tube and NG tube are in place. Heart size normal.     LIMITED UPPER ABDOMEN: Negative.     MUSCULOSKELETAL: Negative.     IMPRESSION:   CONCLUSION:   1.  Modest residual left pleural effusion even after recent thoracentesis. Small loculated right pleural effusion. There are are no pleural findings on this noncontrast study to suggest empyema. Suggest correlating with results of yesterday's large   volume left thoracentesis.  2.  Dependent infiltrates at both lung bases suggestive of pneumonia.  3.  Minimal change in postoperative appearance of right upper lobe. A few small AP window lymph nodes are minimally larger, likely reactive.     Blood culture 3/27/19          Streptococcus pyogenes       EL        Ceftriaxone Sensitive       Clindamycin Sensitive       Erythromycin Sensitive       Penicillin Sensitive       Vancomycin Sensitive           Procedures:  LEFT THORACOSCOPY WITH DECORTICATION 3/29/19  US guided thoracentesis      Treatments: IV antibiotics as above    Subsequent Chest CT on 04/22/2019:  CONCLUSION (per Dr. Jabari Perez):  1.  Evolving bilateral lung consolidations.  2.  New left lower lobe cavitation. This most likely represents cavitary  pneumonia.  3.  Increasing left interlobular septal thickening most suggestive of edema.  4.  Persistent but improved bilateral pleural effusions.  5.  Mediastinal and upper para-aortic adenopathy which could be reactive or  neoplastic.  6.  Recommend CT chest follow-up exclude the possibility of neoplasm.      CURRENT TCU ISSUES    Note: She does have some concerns about possible cancer as presented in the differential diagnosis from her CT scans.  I suggested that she follow-up with pulmonology.    Primary diagnosis: She did have a scheduled follow-up with infectious disease on 04/18/19.  She saw the surgeon, Dr. Ma, on 04/25/2019.  She tells me he wants her to make an appointment to see the physician who saw her in the hospital.  She did have an appointment with Dr. Jabari Perez (pulmonology) on 05/08/2019 with a diagnoses of COPD, left lung abscess/empyema, and chronic right upper lobe radiation pneumonitis.  Orders called for discontinuing Incruse and starting Anoro Ellipta.  Albuterol nebs were also changed every 4 hours as needed for dyspnea.  Also, Ocean Spray was ordered in each nostril daily on a scheduled basis plus up to 4 times daily as needed.     She saw ID on 04/18/2019.  Something was seen on x-ray that Dr. Ang found concerning, and a CT was ordered.  She was then referred to Dr. Ma.  I believe this involves the area of the pleural effusion/lung abscess in the left lobe.  She tells me that ID wanted to see her again on 05/02/2019,  "and she was continued on antibiotics.  Lung sounds currently are very coarse.  Current Augmentin is being continued after being seen by Dr. Perez and referred to ID.  White count was still elevated at 16.2 on 05/08/2019.    Right humeral head fracture: She was seen in follow-up at [facility] by Dr. Jase Anaya.  She appears to be doing well with minimal residual swelling/bruising or complaint of pain.  X-rays showed grossly maintained alignment and abundant fracture callus.  Orders were to wean from the sling as comfort permits and PT/OT to continue with PROM as tolerated.  Still to remain nonweightbearing with the right upper extremity until follow-up on 06/07/2019.    Pain management: The pain she experiences is in her right shoulder.  She is receiving low-dose oxycodone (2.5-5 mg every 6 hours) as needed they are also applying ice, and she has a lidocaine patch.  She mentioned, \"I've had decent luck with Tylenol.\"  At an earlier visit, we scheduled 1000 mg every 8 hours (3 times daily).  The nurse told me she did not want to take any oxycodone because it made her \"goofy.\"  The side effect may not be an accurate assessment, as her previous mental state was caused by illness.  Nonetheless, we discontinued it.    When seen earlier, her daughter, who was in attendance, told me that her mother gets \"agitated when the pain killers make her groggy.\"  As for the patient, she stated she was not getting too much, although the pain is never completely gone.    Buttock soreness: She tells me they are using an alcohol-based product to clean her up.  She would like something without that.  Dr. Luna ordered calmoseptine.  It is still present but much better now.    Candidal intertrigo: Under the breasts and abdominal folds.  With nystatin powder, this is now pretty much resolved.    Dry mouth/dysphagia: She requested Biotene, and we wrote orders for that.  Uncertain whether it would be covered by her insurance.  I " did suggest she could also suck on hard candy or lemon drops, and I later saw her with those.  Her daughter also mentioned that she was choking with eating.  Dr. Luna ordered speech to evaluate and treat.  Her diet was recently upgraded to regular texture by SLP on 04/25/2019.  She now claims she is not needing to use the Biotene as much.    Anxiety/insomnia: Earlier, we did talk a bit about her anxiety that can make her pain worse.  They do have an outside person who comes and performs healing touch, and we did write orders for this.  However, at the current time her anxiety is affecting her daily life, sleep, and ability to participate in therapy.  We ordered lorazepam 0.5 mg nightly.  She is sleeping a little better with this and trazodone, although she cannot seem to sleep past 4 AM.  We increased the trazodone from 50 mg to 75 mg nightly, and this seems to be helping.    Depression/cognition: She was seen by Dr. Shepherd on 04/23/2019 and scored high (16/27) on the PHQ 9 depression scale.  At that point, we increased her citalopram from 10 mg to 20 mg daily.  She was also noted by Occupational Therapy to have some cognitive issues per the Short Blessed test.  She does not exhibit overt psychotic symptoms.    COPD: She states she has lots of lung problems and is 10 years out from inoperable lung cancer for which she underwent radiation and chemotherapy.  She does a lot of coughing which is chronic.  She is always coughing up phlegm, so she may wait too long before her respiratory symptoms become serious.  Apparently, she has infections at least a couple times a year.  She was a smoker until 2008 when she developed lung cancer.  She is on multiple inhaled medications.  I have a feeling that she is generally noncompliant, disagreeing with her daughter how frequently she takes her nebulizer treatments, for example.  She tells me that her breathing can be somewhat labored, but her O2 sats remained good.  When seen  "on 04/24/2019, she was on 0.5 L/min per nasal cannula, although she believed she could do without it.  We had planned to do a taper, and oxygen was discontinued on 04/25/2019.    Dr. Luna wrote orders for scheduled albuterol nebs (in addition to as needed dosing).  She also ordered a CBC with differential and a BMP.  CBC on 04/15/2019 still showed an elevated white count of 18.1 with a slight left shift (neutrophils 77%, lymphocytes 12%).  We ordered a follow-up CBC with differential and BMP was still elevated but less so at 14.8, hemoglobin 8.0, platelets 617,000.    Diarrhea: They are loose and frequent and always seems to occur when she takes any antibiotics.  We ordered Imodium 2 mg after each loose stool (maximum 16 mg/day) and Culturelle (15 billion cell) probiotic (2 caps every morning).  Dr. Luna added psyllium capsules (0.52 g 3 times daily with meals).  This and Imodium has helped somewhat.    GERD: Another problem she complains about there is persistent heartburn.  She is already receiving 20 mg of famotidine daily.  We increased the famotidine to 20 mg twice daily.  This is now much improved, although it did take some time.  She has indicated that her heartburn is \"all gone.\"    Diabetes: Recent blood glucose levels range between 70 and 175, mostly around 100.  She is on 1000 mg of metformin twice daily.    Anemia: This may or may not be the cause, but she tells me she gets tired very easily.  Her latest hemoglobin was 8.0.  With the previous white count of 14.8 and elevated platelets, we rechecked a hemogram-2.  White count is still elevated at 16.2.  Apparently, some dosing of her antibiotic was administered.    Obstructive sleep apnea: She does have a CPAP machine at home but has not used it in months.  Here, she does have an incentive spirometer, and I encouraged her to use it.       ROS: No headaches, nausea or vomiting, dizziness, dysuria.    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     Osteopenia      Pleural effusion 1/15     Radiation Pneumonitis     Created by Conversion      Sleep apnea     does not use cpap              Family History   Problem Relation Age of Onset     Heart disease Mother      Hypertension Mother      Alcohol abuse Mother      Depression Mother      Heart disease Father 45        mi age 45, cabg     Heart attack Father      Cancer Father         prostate     Hypertension Father      COPD Brother      Alcohol abuse Brother      Alcohol abuse Sister      Breast cancer Paternal Aunt      Leukemia Grandchild      Cancer Maternal Aunt 47        breast     Diabetes Son      Anxiety disorder Son      Depression Son      No Medical Problems Daughter      Schizophrenia Grandchild      Social History     Socioeconomic History     Marital status:      Spouse name: Not on file     Number of children: Not on file     Years of education: Not on file     Highest education level: Not on file   Occupational History     Not on file   Social Needs     Financial resource strain: Not on file     Food insecurity:     Worry: Not on file     Inability: Not on file     Transportation needs:     Medical: Not on file     Non-medical: Not on file   Tobacco Use     Smoking status: Former Smoker     Packs/day: 1.50     Years: 0.00     Pack years: 0.00     Last attempt to quit: 11/9/2008     Years since quitting: 10.5     Smokeless tobacco: Former User   Substance and Sexual Activity     Alcohol use: No     Drug use: No     Sexual activity: Never   Lifestyle     Physical activity:     Days per week: Not on file     Minutes per  session: Not on file     Stress: Not on file   Relationships     Social connections:     Talks on phone: Not on file     Gets together: Not on file     Attends Mu-ism service: Not on file     Active member of club or organization: Not on file     Attends meetings of clubs or organizations: Not on file     Relationship status: Not on file     Intimate partner violence:     Fear of current or ex partner: Not on file     Emotionally abused: Not on file     Physically abused: Not on file     Forced sexual activity: Not on file   Other Topics Concern     Not on file   Social History Narrative     Not on file       MEDICATIONS: Reviewed from the MAR, physician orders, and/or earlier progress notes.    Current Outpatient Medications   Medication Sig     acetaminophen (TYLENOL) 500 MG tablet Take 2 tablets (1,000 mg total) by mouth 3 (three) times a day.     albuterol (PROVENTIL) 2.5 mg /3 mL (0.083 %) nebulizer solution TAKE 3ML BY MOUTH EVERY 4 HOURS AS NEEDED FOR WHEEZING     alum/mag hydrox-simethicone-diphenhydramine-lidocaine (MAGIC MOUTHWASH) suspension 15 mL 4 (four) times a day as needed. Change from scheduled to as needed on 05/13/2019.           amoxicillin-clavulanate (AUGMENTIN) 875-125 mg per tablet Take 1 tablet by mouth 2 (two) times a day.     aspirin 81 MG EC tablet Take 81 mg by mouth daily.     blood glucose meter (GLUCOMETER) Use 1 each As Directed as needed. Dispense glucometer brand per patient's insurance at pharmacy discretion.     blood glucose test (ACCU-CHEK SMARTVIEW TEST STRIP) strips Test blood sugar 3 times daily     calcium carbonate-vitamin D3 (CALCIUM 600 WITH VITAMIN D3) 600 mg(1,500mg) -200 unit per tablet Take 1 tablet by mouth 2 (two) times a day. Taking 1200mg of Calcium with 1000 D3     cetirizine 10 mg cap Take 10 mg by mouth daily with supper.            citalopram (CELEXA) 20 MG tablet Take 20 mg by mouth daily.     famotidine (PEPCID) 20 MG tablet Take 1 tablet (20 mg total)  by mouth daily. (Patient taking differently: Take 20 mg by mouth 2 (two) times a day.       )     fluticasone (FLONASE) 50 mcg/actuation nasal spray 2 SPRAYS INTO EACH NOSTRIL DAILY.     furosemide (LASIX) 20 MG tablet TAKE 2 TABLETS (40 MG TOTAL) BY MOUTH EVERY MORNING.     generic lancets Use 1 each As Directed daily. Dispense brand per patient's insurance at pharmacy discretion. (dx E11.9)     guaiFENesin ER (MUCINEX) 600 mg 12 hr tablet Take 1,200 mg by mouth 2 (two) times a day.     Lactobacillus rhamnosus GG (CULTURELLE) 15 billion cell CpSP Take 2 capsules by mouth Daily at 8:00 am..     levothyroxine (SYNTHROID, LEVOTHROID) 75 MCG tablet TAKE 1 TABLET BY MOUTH EVERY OTHER DAYS ALTERNATING WITH 75MCG AND 50MCG DOSE     lidocaine 4 % patch Place 1 patch on the skin daily. Remove and discard patch with 12 hours.  Apply to right shoulder     loperamide (IMODIUM A-D) 2 mg tablet Take 2 mg by mouth as needed for diarrhea (2 mg after each loose stool to a maximum of 16 mg/day).     LORazepam (ATIVAN) 0.5 MG tablet Take 1 tablet (0.5 mg total) by mouth at bedtime.     menthol-zinc oxide (CALMOSEPTINE) 0.44-20.6 % Oint ointment Apply topically 3 (three) times a day.     metFORMIN (GLUCOPHAGE) 1000 MG tablet Take 1 tablet (1,000 mg total) by mouth 2 (two) times a day with meals.     montelukast (SINGULAIR) 10 mg tablet Take 1 tablet (10 mg total) by mouth at bedtime.     naproxen (NAPROSYN) 500 MG tablet Take 1 tablet (500 mg total) by mouth 2 (two) times a day with meals.     psyllium (METAMUCIL) 0.52 gram capsule Take 0.52 g by mouth 3 (three) times a day with meals.     PULMICORT FLEXHALER 180 mcg/actuation inhaler INHALE 2 PUFFS 2 (TWO) TIMES A DAY.     saliva stimulant (BIOTENE MOISTURIZING MOUTH) oral spray Take by mouth as needed.     simvastatin (ZOCOR) 10 MG tablet TAKE 1 TABLET BY MOUTH AT BEDTIME.     sodium chloride (OCEAN) 0.65 % nasal spray Apply 1 spray into each nostril as needed for congestion.      "traZODone (DESYREL) 50 MG tablet Take 1 tablet (50 mg total) by mouth at bedtime as needed for sleep. (Patient taking differently: Take 75 mg by mouth at bedtime as needed for sleep.       )     triamcinolone (KENALOG) 0.1 % ointment Apply topically 2 (two) times a day. hands (Patient taking differently: Apply topically daily as needed hands.      )     umeclidinium-vilanterol (ANORO ELLIPTA) 62.5-25 mcg/actuation inhaler Inhale 1 puff daily.     VENTOLIN HFA 90 mcg/actuation inhaler INHALE 1-2 PUFFS EVERY 4 (FOUR) HOURS AS NEEDED FOR WHEEZING.     ALLERGIES: No Known Allergies    DIET: Diabetic, regular texture, thin liquids.  Mighty Shakes Sugar-Free twice daily.    Vitals:    05/15/19 1412   BP: 107/61   Pulse: 90   Resp: 18   Temp: 97.9  F (36.6  C)   SpO2: 94%   Weight: 161 lb 6.4 oz (73.2 kg)   Height: 5' 5\" (1.651 m)     Body mass index is 26.86 kg/m .    EXAMINATION:   General: Pleasant middle-aged female, looking much older than her stated age, sitting in a recliner, in NAD.  Head: Normocephalic and atraumatic.   Eyes: PERRLA, sclerae clear.   ENT: Moist oral mucosa.   Cardiovascular: Regular rate and rhythm.  No appreciable murmur.  Respiratory: Still has an occasional loose cough (Mucinex helps).  Earlier, I could appreciate bibasilar rales (left greater than right).  At later visits, there appeared to be coarse bilateral mid to upper lung rhonchi with an expiratory wheeze.  Most likely related to her pleural/left lung abscess effusion discussed above.  These persist, right greater than left, and expiratory wheezes can be appreciated midlung.  Abdomen: Soft and nontender.   Musculoskeletal/Extremities: Age-related degenerative joint disease.  Right arm in a sling.  No peripheral edema.  Integument: No rashes, clinically significant lesions, or skin breakdown.   Cognitive/Psychiatric: Alert and oriented x3 as far as I can tell.  Affect is somewhat depressed/anxious.    DIAGNOSTICS:   Results for orders " placed or performed in visit on 04/30/19   Basic Metabolic Panel   Result Value Ref Range    Sodium 139 136 - 145 mmol/L    Potassium 4.7 3.5 - 5.0 mmol/L    Chloride 105 98 - 107 mmol/L    CO2 19 (L) 22 - 31 mmol/L    Anion Gap, Calculation 15 5 - 18 mmol/L    Glucose 68 (L) 70 - 125 mg/dL    Calcium 10.0 8.5 - 10.5 mg/dL    BUN 17 8 - 22 mg/dL    Creatinine 0.92 0.60 - 1.10 mg/dL    GFR MDRD Af Amer >60 >60 mL/min/1.73m2    GFR MDRD Non Af Amer >60 >60 mL/min/1.73m2     Lab Results   Component Value Date    WBC 15.5 (H) 05/15/2019    HGB 9.5 (L) 05/15/2019    HCT 32.5 (L) 05/15/2019    MCV 91 05/15/2019     (H) 05/15/2019     CrCl cannot be calculated (Patient's most recent lab result is older than the maximum 5 days allowed.).  Lab Results   Component Value Date    HGBA1C 6.3 (H) 02/06/2019     Lab Results   Component Value Date    TSH 1.36 08/27/2018       ASSESSMENT/Plan:      ICD-10-CM    1. History of gram-positive (S. pyogenes) pneumosepsis Z86.19    2. Cavitary pneumonia J18.9     J98.4    3. Chronic pleural effusion, left (s/p Talc pleurodesis 2015) J90    4. 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 Chronic obstructive pulmonary disease, unspecified COPD type (H) J44.9    6. Psychophysiological insomnia F51.04    7. Functional diarrhea K59.1    8. Anemia, unspecified type D64.9    9. Gastroesophageal reflux disease without esophagitis K21.9    10. Acquired hypothyroidism E03.9    11. Non-small cell cancer of right lung, s/p radiation and chemotherapies, in remission since [id number] (H) C34.91      DISCHARGE PLAN/FACE TO FACE:  I certify that this patient is under my care and that I had a face-to-face encounter that meets the physician face-to-face encounter requirements with this patient.     Date of Face-to-Face Encounter: 05/15/2019     I certify that, based on my findings, the following services are medically necessary  home health services: Home health aide, home health nurse, home PT, home OT    My clinical findings support the need for the above skilled services because: (Please write a brief narrative summary that describes what the RN, PT, SLP, or other services will be doing in the home. A list of diagnoses in this section does not meet the CMS requirements): Home health aide to assist with bathing, home health nurse to monitor vitals and for medication management, home PT and home OT for mobility and safety in the home.    This patient is homebound because: (Please write a brief narrative summmary describing the functional limitations as to why this patient is homebound and specifically what makes this patient homebound.):  Above services necessarily need to be performed in the home to be of benefit.    The patient is, or has been, under my care and I have initiated the establishment of the plan of care. This patient will be followed by a physician who will periodically review the plan of care.  Initial follow-up should be within 7-10 days.    Approximate time spent with this patient was greater than 30 minutes with greater than 50% spent in discussions regarding services required upon discharge.      The above has been created using voice recognition software. Please be aware that this may unintentionally  produce inaccuracies and/or nonsensical sentences.      Electronically signed by: Robert Yepez CNP

## 2021-05-28 NOTE — PROGRESS NOTES
[facility]    Name:   Rita Mancini  : 1950  Facility:   [facility] [[id number]]   Room:   Code Status: FULL CODE -   Fac type:   SNF (Skilled Nursing Facility, TCU) -     CHIEF COMPLAINT / REASON FOR VISIT:  Chief Complaint   Patient presents with     Follow-up     TCU follow-up after hospitalization for pneumosepsis, but she was also recently in the ED with a closed displaced fracture of the proximal end of the right humerus.  Also addressing diarrhea and depression.     [facility] ED 19 (displaced fracture of proximal end of right humerus)  [facility] from 19 until 19 (pneumosepsis)    Patient was last seen by me on 04/10/19 and subsequently seen by Dr. Luna on 19.      HPI: Rita is a 68 y.o. female was admitted to [facility] on 3/27/2019 for septic shock.   She had been to [facility] ED 11 days earlier after slipping on the ice and sustaining a closed displaced fracture of the proximal end of the right humerus.  X-rays of the right shoulder showed fracture of the proximal humerus, comminuted with displacement.  She was discharged with a shoulder immobilizer to home with orthopedic surgery follow-up.      MOST RECENT HOSPITALIZATION  (Per hospital discharge summary)     Pneumonia (S.pyogenes)  complicated by left-sided empyema s/p VATS/decortication 3/29  Strep biology knees bacteremia cleared  All chest tubes now removed, increase ambulation and activity  Respiratory failure-resolved, extubated 3/30   Antibiotics throughout course for broad-spectrum including vancomycin, Zosyn, clindamycin, and ultimately transition to Augmentin to complete 3 additional weeks.  Infectious disease consultant would like to follow-up in clinic in 3 weeks.  BCx 3/30 CNS- contaminant  Follow-up with general surgery after hospitalization  Pain control with small dose of oxycodone when needed, scheduled Tylenol, the  latter has been enough for her pain control.     Anxiety  She is on diazepam as an outpatient, while taking more oxycodone, we will put this on hold.    COPD  Continue home nebulizers, she remains on 2-3 L/min of oxygen, wean as tolerated     Hypervolemia  She was diuresed following ICU stay, back down to baseline weight     Diabetes  At goal as outpatient (A1c <7).   Correction scale, keeping at goal 140-180  Acceptable control in the hospital, continue metformin at discharge     MONSERRAT  Home cpap use     Insomnia  Continue trazodone when needed     Right humerus fracture  Ortho assessed recommending sling, also no DVT in right arm.  Recommendations given, will need outpatient follow-up     Lung cancer  Dx in 2009, hx of recurrent R effusion (talc pleurodesis 4/2015), currently in remission        Consult/s: pulmonary/intensive care, ID, general surgery and orthopedic surgery  Significant Diagnostic Studies:   EXAM: CT CHEST WO CONTRAST  LOCATION: [facility]  DATE/TIME: 3/28/2019 12:36 PM     INDICATION: pleural effusions, concern for empyema pleural effusions, concern for empyema  COMPARISON: Left thoracentesis from 3/27/2019, chest CT from 12/10/2018  TECHNIQUE: Helical images were obtained through the chest. Multiplanar reformats were obtained. Dose reduction techniques were used.  IV CONTRAST: None.     FINDINGS:   LUNGS AND PLEURA: Residual modest sized left pleural effusion is new compared to the previous CT. Airspace infiltrate at left lung base may represent a combination of pneumonia and atelectasis.  Small loculated pleural effusion within right major fissure. Atelectasis/consolidation right lung base new compared to the previous study.  There is no significant change in the postoperative spiculated appearance involving medial right upper lobe measuring approximately 2.5 x 1.5 cm with an adjacent suture line.     MEDIASTINUM: 2 AP window lymph nodes appear larger on image 35 and 36, each now  "measuring 10 mm in short axis, previously measuring 4 mm. Likely these are reactive. Endotracheal tube and NG tube are in place. Heart size normal.     LIMITED UPPER ABDOMEN: Negative.     MUSCULOSKELETAL: Negative.     IMPRESSION:   CONCLUSION:   1.  Modest residual left pleural effusion even after recent thoracentesis. Small loculated right pleural effusion. There are are no pleural findings on this noncontrast study to suggest empyema. Suggest correlating with results of yesterday's large   volume left thoracentesis.  2.  Dependent infiltrates at both lung bases suggestive of pneumonia.  3.  Minimal change in postoperative appearance of right upper lobe. A few small AP window lymph nodes are minimally larger, likely reactive.     Blood culture 3/27/19          Streptococcus pyogenes       EL       Ceftriaxone Sensitive       Clindamycin Sensitive       Erythromycin Sensitive       Penicillin Sensitive       Vancomycin Sensitive           Procedures:  LEFT THORACOSCOPY WITH DECORTICATION 3/29/19  US guided thoracentesis      Treatments: IV antibiotics as above       CURRENT TCU ISSUES    Primary diagnosis: She did have a scheduled follow-up with infectious disease on 04/18/19.      Right humeral head fracture: Angie does want follow-up x-rays at some point.    Pain management: The pain she experiences is in her right shoulder.  She is receiving low-dose oxycodone (2.5-5 mg every 6 hours) as needed they are also applying ice, and she has a lidocaine patch.  She mentioned, \"I've had decent luck with Tylenol.\"  At an earlier visit, we scheduled 1000 mg every 8 hours (3 times daily).    When seen earlier, her daughter, who was in attendance, told me that her mother gets \"agitated when the pain killers make her groggy.\"  As for the patient, she stated she was not getting too much, although the pain is never completely gone.    Buttock soreness: She tells me they are using an alcohol-based product to clean her up.  " She would like something without that.  Dr. Luna ordered calmoseptine.    Dry mouth/dysphagia: She requested Biotene, and we wrote orders for that.  Uncertain whether it would be covered by her insurance.  I did suggest she could also suck on hard candy or lemon drops, and I later saw her with those.  Her daughter also mentioned that she was choking with eating.  Dr. Luna ordered speech to evaluate and treat.    Anxiety: Earlier, we did talk a bit about her anxiety that can make her pain worse.  They do have an outside person who comes and performs healing touch, and we did write orders for this.  However, at the current time her anxiety is affecting her daily life, sleep, and ability to participate in therapy.  We ordered lorazepam 0.5 mg nightly.  I believe she is able to sleep now.    Depression: She was seen by Dr. Shepherd and scored high on a depression scale.  We are increasing her citalopram from 10 mg daily to 20 mg daily.    COPD: She states she has lots of lung problems and is 10 years out from inoperable lung cancer for which she underwent radiation and chemotherapy.  She does a lot of coughing which is chronic.  She is always coughing up phlegm, so she may wait too long before her respiratory symptoms become serious.  Apparently, she has infections at least a couple times a year.  She was a smoker until 2008 when she developed lung cancer.  She is on multiple inhaled medications.  I have a feeling that she is generally noncompliant, disagreeing with her daughter how frequently she takes her nebulizer treatments, for example.  She tells me that her breathing can be somewhat labored, but her O2 sats remained good.  At the current time, she is on 0.5 L/min per nasal cannula.  At this juncture, she believes that she could do without it.  We do plan to taper as able (orders written by Dr. Luna).  She did say she was using the incentive spirometer.    Dr. Luna wrote orders for scheduled albuterol  nebs (in addition to as needed dosing).  She also ordered a CBC with differential and a BMP.  CBC on 04/15/2019 still showed an elevated white count of 18.1 with a slight left shift (neutrophils 77%, lymphocytes 12%).  We will order a follow-up for 04/26/2019 along with another BMP.    Dry nose: Due to oxygen per nasal cannula.  We are ordering a non-petroleum-based gel to apply to the nostrils twice daily.  Also, Ocean nasal spray may be used hourly as needed.    Diarrhea: This appears to be her biggest concern.  We are going to order Imodium 2 mg after each loose stool, maximum 16 mg/day.  We are also going to order Culturelle (15 billion cell) probiotic, 2 caps every morning.    GERD: Another problem she complains about there is persistent heartburn.  She is already receiving 20 mg of famotidine daily.  We will increase the famotidine to 20 mg twice daily.    Diabetes: Recent blood glucose levels range between 72 and 175, mostly around 100.  She is on 1000 mg of metformin twice daily.    Obstructive sleep apnea: She does have a CPAP machine at home but has not used it in months.  Here, she does have an incentive spirometer, and I encouraged her to use it.       ROS: No headaches, nausea or vomiting, dizziness, dysuria.    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     Osteopenia      Pleural effusion 1/15     Radiation Pneumonitis     Created by Conversion      Sleep apnea     does not use cpap              Family History   Problem Relation Age of Onset     Heart disease Mother      Hypertension Mother      Alcohol abuse Mother      Depression  Mother      Heart disease Father 45        mi age 45, cabg     Heart attack Father      Cancer Father         prostate     Hypertension Father      COPD Brother      Alcohol abuse Brother      Alcohol abuse Sister      Breast cancer Paternal Aunt      Leukemia Grandchild      Cancer Maternal Aunt 47        breast     Diabetes Son      Anxiety disorder Son      Depression Son      No Medical Problems Daughter      Schizophrenia Grandchild      Social History     Socioeconomic History     Marital status:      Spouse name: Not on file     Number of children: Not on file     Years of education: Not on file     Highest education level: Not on file   Occupational History     Not on file   Social Needs     Financial resource strain: Not on file     Food insecurity:     Worry: Not on file     Inability: Not on file     Transportation needs:     Medical: Not on file     Non-medical: Not on file   Tobacco Use     Smoking status: Former Smoker     Packs/day: 1.50     Years: 0.00     Pack years: 0.00     Last attempt to quit: 11/9/2008     Years since quitting: 10.4     Smokeless tobacco: Former User   Substance and Sexual Activity     Alcohol use: No     Drug use: No     Sexual activity: Never   Lifestyle     Physical activity:     Days per week: Not on file     Minutes per session: Not on file     Stress: Not on file   Relationships     Social connections:     Talks on phone: Not on file     Gets together: Not on file     Attends Alevism service: Not on file     Active member of club or organization: Not on file     Attends meetings of clubs or organizations: Not on file     Relationship status: Not on file     Intimate partner violence:     Fear of current or ex partner: Not on file     Emotionally abused: Not on file     Physically abused: Not on file     Forced sexual activity: Not on file   Other Topics Concern     Not on file   Social History Narrative     Not on file       MEDICATIONS: Reviewed from the MAR,  physician orders, and/or earlier progress notes.  Updated by me today (04/24/2019) with multiple changes reflected below.  Current Outpatient Medications   Medication Sig     citalopram (CELEXA) 20 MG tablet Take 20 mg by mouth daily.     Lactobacillus rhamnosus GG (CULTURELLE) 15 billion cell CpSP Take 2 capsules by mouth Daily at 8:00 am..     loperamide (IMODIUM A-D) 2 mg tablet Take 2 mg by mouth as needed for diarrhea (2 mg after each loose stool to a maximum of 16 mg/day).     sodium chloride (OCEAN) 0.65 % nasal spray Apply 1 spray into each nostril as needed for congestion.     acetaminophen (TYLENOL) 500 MG tablet Take 2 tablets (1,000 mg total) by mouth 3 (three) times a day.     albuterol (PROVENTIL) 2.5 mg /3 mL (0.083 %) nebulizer solution TAKE 3ML BY MOUTH EVERY 4 HOURS AS NEEDED FOR WHEEZING     amoxicillin-clavulanate (AUGMENTIN) 875-125 mg per tablet Take 1 tablet by mouth 2 (two) times a day for 21 days. Follow up with Infectious Disease physician prior to completing antibiotics     aspirin 81 MG EC tablet Take 81 mg by mouth daily.     baclofen (LIORESAL) 10 MG tablet Take 1 tablet (10 mg total) by mouth 3 (three) times a day as needed.     blood glucose meter (GLUCOMETER) Use 1 each As Directed as needed. Dispense glucometer brand per patient's insurance at pharmacy discretion.     blood glucose test (ACCU-CHEK SMARTVIEW TEST STRIP) strips Test blood sugar 3 times daily     calcium carbonate-vitamin D3 (CALCIUM 600 WITH VITAMIN D3) 600 mg(1,500mg) -200 unit per tablet Take 1 tablet by mouth 2 (two) times a day. Taking 1200mg of Calcium with 1000 D3     cetirizine 10 mg cap Take 10 mg by mouth daily with supper.            famotidine (PEPCID) 20 MG tablet Take 1 tablet (20 mg total) by mouth daily. (Patient taking differently: Take 20 mg by mouth 2 (two) times a day.       )     fluticasone (FLONASE) 50 mcg/actuation nasal spray 2 SPRAYS INTO EACH NOSTRIL DAILY.     furosemide (LASIX) 20 MG tablet  TAKE 2 TABLETS (40 MG TOTAL) BY MOUTH EVERY MORNING.     generic lancets Use 1 each As Directed daily. Dispense brand per patient's insurance at pharmacy discretion. (dx E11.9)     guaiFENesin ER (MUCINEX) 600 mg 12 hr tablet Take 1,200 mg by mouth 2 (two) times a day.     INCRUSE ELLIPTA 62.5 mcg/actuation DsDv inhaler INHALE 1 PUFF BY MOUTH DAILY     levothyroxine (SYNTHROID, LEVOTHROID) 75 MCG tablet TAKE 1 TABLET BY MOUTH EVERY OTHER DAYS ALTERNATING WITH 75MCG AND 50MCG DOSE     lidocaine 4 % patch Place 1 patch on the skin daily. Remove and discard patch with 12 hours.  Apply to right shoulder     LORazepam (ATIVAN) 0.5 MG tablet Take by mouth at bedtime.     menthol-zinc oxide (CALMOSEPTINE) 0.44-20.6 % Oint ointment Apply topically 3 (three) times a day.     metFORMIN (GLUCOPHAGE) 1000 MG tablet Take 1 tablet (1,000 mg total) by mouth 2 (two) times a day with meals.     montelukast (SINGULAIR) 10 mg tablet Take 1 tablet (10 mg total) by mouth at bedtime.     naproxen (NAPROSYN) 500 MG tablet Take 1 tablet (500 mg total) by mouth 2 (two) times a day with meals.     oxyCODONE (ROXICODONE) 5 MG immediate release tablet Take 0.5-1 tablets (2.5-5 mg total) by mouth every 6 (six) hours as needed for pain.     PULMICORT FLEXHALER 180 mcg/actuation inhaler INHALE 2 PUFFS 2 (TWO) TIMES A DAY.     simvastatin (ZOCOR) 10 MG tablet TAKE 1 TABLET BY MOUTH AT BEDTIME.     traZODone (DESYREL) 50 MG tablet Take 1 tablet (50 mg total) by mouth at bedtime as needed for sleep.     triamcinolone (KENALOG) 0.1 % ointment Apply topically 2 (two) times a day. hands (Patient taking differently: Apply topically daily as needed hands.      )     VENTOLIN HFA 90 mcg/actuation inhaler INHALE 1-2 PUFFS EVERY 4 (FOUR) HOURS AS NEEDED FOR WHEEZING.     ALLERGIES: No Known Allergies    DIET: Diabetic, regular texture, thin liquids.  Mighty Shakes Sugar-Free.    Vitals:    04/24/19 1415   BP: 102/57   Pulse: 88   Resp: 20   Temp: 98  F  "(36.7  C)   SpO2: 95%   Weight: 165 lb 8 oz (75.1 kg)   Height: 5' 5\" (1.651 m)   Significant variance and weight.  Previous weight of 181.4 pounds was obtained on a wheelchair.  Now, it is obtained standing.  Body mass index is 27.54 kg/m .    EXAMINATION:   General: Pleasant middle-aged female, looking much older than her stated age, sitting in a wheelchair, right arm in the sling, nasal cannula in the nose.    Head: Normocephalic and atraumatic.   Eyes: PERRLA, sclerae clear.   ENT: Moist oral mucosa.   Cardiovascular: Regular rate and rhythm.  No appreciable murmur.  Respiratory: Rales in bilateral bases, left greater than right.  I believe this is a chronic condition.  Otherwise, lungs appear clear.  Abdomen: Soft and nontender.   Musculoskeletal/Extremities: Age-related degenerative joint disease.  Right arm in a sling.  No peripheral edema.  Integument: No rashes, clinically significant lesions, or skin breakdown.   Cognitive/Psychiatric: Alert and oriented x3 as far as I can tell.  Affect is somewhat depressed/anxious.    DIAGNOSTICS:   Results for orders placed or performed during the hospital encounter of 03/27/19   Basic Metabolic Panel   Result Value Ref Range    Sodium 138 136 - 145 mmol/L    Potassium 4.1 3.5 - 5.0 mmol/L    Chloride 102 98 - 107 mmol/L    CO2 27 22 - 31 mmol/L    Anion Gap, Calculation 9 5 - 18 mmol/L    Glucose 136 (H) 70 - 125 mg/dL    Calcium 8.3 (L) 8.5 - 10.5 mg/dL    BUN 10 8 - 22 mg/dL    Creatinine 0.76 0.60 - 1.10 mg/dL    GFR MDRD Af Amer >60 >60 mL/min/1.73m2    GFR MDRD Non Af Amer >60 >60 mL/min/1.73m2     Lab Results   Component Value Date    WBC 18.3 (H) 04/18/2019    WBC 18.6 (H) 04/18/2019    HGB 8.4 (L) 04/18/2019    HGB 8.6 (L) 04/18/2019    HCT 28.5 (L) 04/18/2019    HCT 27.7 (L) 04/18/2019    MCV 94 04/18/2019    MCV 91 04/18/2019     (H) 04/18/2019     (H) 04/18/2019     CrCl cannot be calculated (Patient's most recent lab result is older than the " maximum 5 days allowed.).  Lab Results   Component Value Date    HGBA1C 6.3 (H) 02/06/2019     Lab Results   Component Value Date    TSH 1.36 08/27/2018       ASSESSMENT/Plan:      ICD-10-CM    1. History of gram-positive (S. pyogenes) pneumosepsis Z86.19    2. 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 Gastroesophageal reflux disease without esophagitis K21.9    4. Functional diarrhea K59.1    5. Chronic obstructive pulmonary disease, unspecified COPD type (H) J44.9    6. Non-small cell cancer of right lung (H) C34.91    7. 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 Chronic pleural effusion, left J90    9. Current moderate episode of major depressive disorder, unspecified whether recurrent (H) F32.1      CHANGES:    1) Imodium 2 mg after each loose stool (maximum 16 mg/day).  2) Culturelle-15 Billion Cell caps, 2 caps daily.  3) increase famotidine from 20 mg daily to 20 mg twice daily for persistent GERD symptoms.  4) sodium chloride (Ocean) 0.65% nasal spray hourly as needed for dry nose secondary to oxygen per nasal cannula.  5) non-petroleum-based gel to nostrils twice daily.  6) increase citalopram from 10 mg to 20 mg daily due to persistent depression.    CARE PLAN:    The care plan has been reviewed and all orders signed. Changes to care plan, if any, as noted. Otherwise, continue current plan of care.  Total time spent with this patient was approximately 40 minutes, with greater than 50% spent in counseling and coordination of care that involved a review of multiple medical conditions, addressing them with new medications or changes.    The above has been created using voice recognition software. Please be aware that this may unintentionally  produce inaccuracies and/or nonsensical sentences.      Electronically signed by: Robert Yepez CNP

## 2021-05-28 NOTE — PROGRESS NOTES
[facility]    Name:   Rita Mancini  : 1950  Facility:   [facility] [[id number]]   Room:   Code Status: FULL CODE -   Fac type:   SNF (Skilled Nursing Facility, TCU) -     CHIEF COMPLAINT / REASON FOR VISIT:  Chief Complaint   Patient presents with     Follow-up     TCU follow-up after hospitalization for pneumosepsis, but she was also recently in the ED with a closed displaced fracture of the proximal end of the right humerus.  Also addressing diarrhea and depression.     [facility] ED 19 (displaced fracture of proximal end of right humerus)  [facility] from 19 until 19 (pneumosepsis)    Patient was last seen by me on 19.      HPI: Rita is a 68 y.o. female was admitted to [facility] on 3/27/2019 for septic shock.   She had been to [facility] ED 11 days earlier after slipping on the ice and sustaining a closed displaced fracture of the proximal end of the right humerus.  X-rays of the right shoulder showed fracture of the proximal humerus, comminuted with displacement.  She was discharged with a shoulder immobilizer to home with orthopedic surgery follow-up.      MOST RECENT HOSPITALIZATION  (Per hospital discharge summary)     Pneumonia (S.pyogenes)  complicated by left-sided empyema s/p VATS/decortication 3/29  Strep biology knees bacteremia cleared  All chest tubes now removed, increase ambulation and activity  Respiratory failure-resolved, extubated 3/30   Antibiotics throughout course for broad-spectrum including vancomycin, Zosyn, clindamycin, and ultimately transition to Augmentin to complete 3 additional weeks.  Infectious disease consultant would like to follow-up in clinic in 3 weeks.  BCx 3/30 CNS- contaminant  Follow-up with general surgery after hospitalization  Pain control with small dose of oxycodone when needed, scheduled Tylenol, the latter has been enough for her pain  control.     Anxiety  She is on diazepam as an outpatient, while taking more oxycodone, we will put this on hold.    COPD  Continue home nebulizers, she remains on 2-3 L/min of oxygen, wean as tolerated     Hypervolemia  She was diuresed following ICU stay, back down to baseline weight     Diabetes  At goal as outpatient (A1c <7).   Correction scale, keeping at goal 140-180  Acceptable control in the hospital, continue metformin at discharge     MONSERRAT  Home cpap use     Insomnia  Continue trazodone when needed     Right humerus fracture  Ortho assessed recommending sling, also no DVT in right arm.  Recommendations given, will need outpatient follow-up     Lung cancer  Dx in 2009, hx of recurrent R effusion (talc pleurodesis 4/2015), currently in remission        Consult/s: pulmonary/intensive care, ID, general surgery and orthopedic surgery  Significant Diagnostic Studies:   EXAM: CT CHEST WO CONTRAST  LOCATION: [facility]  DATE/TIME: 3/28/2019 12:36 PM     INDICATION: pleural effusions, concern for empyema pleural effusions, concern for empyema  COMPARISON: Left thoracentesis from 3/27/2019, chest CT from 12/10/2018  TECHNIQUE: Helical images were obtained through the chest. Multiplanar reformats were obtained. Dose reduction techniques were used.  IV CONTRAST: None.     FINDINGS:   LUNGS AND PLEURA: Residual modest sized left pleural effusion is new compared to the previous CT. Airspace infiltrate at left lung base may represent a combination of pneumonia and atelectasis.  Small loculated pleural effusion within right major fissure. Atelectasis/consolidation right lung base new compared to the previous study.  There is no significant change in the postoperative spiculated appearance involving medial right upper lobe measuring approximately 2.5 x 1.5 cm with an adjacent suture line.     MEDIASTINUM: 2 AP window lymph nodes appear larger on image 35 and 36, each now measuring 10 mm in short axis, previously  "measuring 4 mm. Likely these are reactive. Endotracheal tube and NG tube are in place. Heart size normal.     LIMITED UPPER ABDOMEN: Negative.     MUSCULOSKELETAL: Negative.     IMPRESSION:   CONCLUSION:   1.  Modest residual left pleural effusion even after recent thoracentesis. Small loculated right pleural effusion. There are are no pleural findings on this noncontrast study to suggest empyema. Suggest correlating with results of yesterday's large   volume left thoracentesis.  2.  Dependent infiltrates at both lung bases suggestive of pneumonia.  3.  Minimal change in postoperative appearance of right upper lobe. A few small AP window lymph nodes are minimally larger, likely reactive.     Blood culture 3/27/19          Streptococcus pyogenes       EL       Ceftriaxone Sensitive       Clindamycin Sensitive       Erythromycin Sensitive       Penicillin Sensitive       Vancomycin Sensitive           Procedures:  LEFT THORACOSCOPY WITH DECORTICATION 3/29/19  US guided thoracentesis      Treatments: IV antibiotics as above       CURRENT TCU ISSUES    Primary diagnosis: She did have a scheduled follow-up with infectious disease on 04/18/19.  She saw the surgeon, Dr. Ma, on 04/25/2019.  She tells me he wants her to make an appointment to see the physician who saw her in the hospital.    Right humeral head fracture: Angie does want follow-up x-rays at some point.  She will be making an appointment for that.    Pain management: The pain she experiences is in her right shoulder.  She is receiving low-dose oxycodone (2.5-5 mg every 6 hours) as needed they are also applying ice, and she has a lidocaine patch.  She mentioned, \"I've had decent luck with Tylenol.\"  At an earlier visit, we scheduled 1000 mg every 8 hours (3 times daily).  The nurse tells me she does not want to take any oxycodone because it makes her \"goofy.\"  We will discontinue it.    When seen earlier, her daughter, who was in attendance, told me that " "her mother gets \"agitated when the pain killers make her groggy.\"  As for the patient, she stated she was not getting too much, although the pain is never completely gone.    Buttock soreness: She tells me they are using an alcohol-based product to clean her up.  She would like something without that.  Dr. Luna ordered calmoseptine.    Candidal intertrigo: Under the breasts and abdominal folds.  She is currently being treated nystatin powder.    Dry mouth/dysphagia: She requested Biotene, and we wrote orders for that.  Uncertain whether it would be covered by her insurance.  I did suggest she could also suck on hard candy or lemon drops, and I later saw her with those.  Her daughter also mentioned that she was choking with eating.  Dr. Luna ordered speech to evaluate and treat.  Her diet was recently upgraded to regular texture by SLP on 04/25/2019.    Anxiety: Earlier, we did talk a bit about her anxiety that can make her pain worse.  They do have an outside person who comes and performs healing touch, and we did write orders for this.  However, at the current time her anxiety is affecting her daily life, sleep, and ability to participate in therapy.  We ordered lorazepam 0.5 mg nightly.  She is sleeping better with this and trazodone.    Depression/cognition: She was seen by Dr. Shepherd on 04/23/2019 and scored high (16/27) on the PHQ 9 depression scale.  At that point, we increased her citalopram from 10 mg to 20 mg daily.  She was also noted by Occupational Therapy to have some cognitive issues per the Short Blessed test.  She does not exhibit overt psychotic symptoms.    COPD: She states she has lots of lung problems and is 10 years out from inoperable lung cancer for which she underwent radiation and chemotherapy.  She does a lot of coughing which is chronic.  She is always coughing up phlegm, so she may wait too long before her respiratory symptoms become serious.  Apparently, she has infections at least " "a couple times a year.  She was a smoker until 2008 when she developed lung cancer.  She is on multiple inhaled medications.  I have a feeling that she is generally noncompliant, disagreeing with her daughter how frequently she takes her nebulizer treatments, for example.  She tells me that her breathing can be somewhat labored, but her O2 sats remained good.  When seen on 04/24/2019, she was on 0.5 L/min per nasal cannula, although she believed she could do without it.  We had planned to do a taper, and oxygen was discontinued on 04/25/2019.    Dr. Luna wrote orders for scheduled albuterol nebs (in addition to as needed dosing).  She also ordered a CBC with differential and a BMP.  CBC on 04/15/2019 still showed an elevated white count of 18.1 with a slight left shift (neutrophils 77%, lymphocytes 12%).  We ordered a follow-up CBC with differential and BMP for 04/26/2019.  At this time, they are not available in the chart.    Dry nose: Due to oxygen per nasal cannula.  We ordered a non-petroleum-based gel to apply to the nares twice daily.  Also, Ocean nasal spray may be used hourly as needed.  While the nares are still somewhat dry, she has not needed oxygen since 04/25/2019.    Diarrhea: This appears to be her biggest concern.  We ordered Imodium 2 mg after each loose stool (maximum 16 mg/day) and Culturelle (15 billion cell) probiotic (2 caps every morning).  Dr. Luna added psyllium capsules (0.52 g 3 times daily with meals), and she states that it is helping a lot.    GERD: Another problem she complains about there is persistent heartburn.  She is already receiving 20 mg of famotidine daily.  We increased the famotidine to 20 mg twice daily.  She does not note much improvement.  She says, \"it's like you want to burp all the time.\"  We may need to switch to something else.    Diabetes: Recent blood glucose levels range between 72 and 175, mostly around 100.  She is on 1000 mg of metformin twice " daily.    Obstructive sleep apnea: She does have a CPAP machine at home but has not used it in months.  Here, she does have an incentive spirometer, and I encouraged her to use it.       ROS: No headaches, nausea or vomiting, dizziness, dysuria.    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     Osteopenia      Pleural effusion 1/15     Radiation Pneumonitis     Created by Conversion      Sleep apnea     does not use cpap              Family History   Problem Relation Age of Onset     Heart disease Mother      Hypertension Mother      Alcohol abuse Mother      Depression Mother      Heart disease Father 45        mi age 45, cabg     Heart attack Father      Cancer Father         prostate     Hypertension Father      COPD Brother      Alcohol abuse Brother      Alcohol abuse Sister      Breast cancer Paternal Aunt      Leukemia Grandchild      Cancer Maternal Aunt 47        breast     Diabetes Son      Anxiety disorder Son      Depression Son      No Medical Problems Daughter      Schizophrenia Grandchild      Social History     Socioeconomic History     Marital status:      Spouse name: Not on file     Number of children: Not on file     Years of education: Not on file     Highest education level: Not on file   Occupational History     Not on file   Social Needs     Financial resource strain: Not on file     Food insecurity:     Worry: Not on file     Inability: Not on file     Transportation needs:     Medical: Not on file     Non-medical: Not on file   Tobacco Use     Smoking status: Former Smoker     Packs/day: 1.50     Years: 0.00     Pack  years: 0.00     Last attempt to quit: 11/9/2008     Years since quitting: 10.4     Smokeless tobacco: Former User   Substance and Sexual Activity     Alcohol use: No     Drug use: No     Sexual activity: Never   Lifestyle     Physical activity:     Days per week: Not on file     Minutes per session: Not on file     Stress: Not on file   Relationships     Social connections:     Talks on phone: Not on file     Gets together: Not on file     Attends Sabianism service: Not on file     Active member of club or organization: Not on file     Attends meetings of clubs or organizations: Not on file     Relationship status: Not on file     Intimate partner violence:     Fear of current or ex partner: Not on file     Emotionally abused: Not on file     Physically abused: Not on file     Forced sexual activity: Not on file   Other Topics Concern     Not on file   Social History Narrative     Not on file       MEDICATIONS: Reviewed from the MAR, physician orders, and/or earlier progress notes.  Updated by me today (04/29/2019) with discontinuation of oxycodone and the addition of psyllium reflected below.  Current Outpatient Medications   Medication Sig     psyllium (METAMUCIL) 0.52 gram capsule Take 0.52 g by mouth 3 (three) times a day with meals.     acetaminophen (TYLENOL) 500 MG tablet Take 2 tablets (1,000 mg total) by mouth 3 (three) times a day.     albuterol (PROVENTIL) 2.5 mg /3 mL (0.083 %) nebulizer solution TAKE 3ML BY MOUTH EVERY 4 HOURS AS NEEDED FOR WHEEZING     aspirin 81 MG EC tablet Take 81 mg by mouth daily.     baclofen (LIORESAL) 10 MG tablet Take 1 tablet (10 mg total) by mouth 3 (three) times a day as needed.     blood glucose meter (GLUCOMETER) Use 1 each As Directed as needed. Dispense glucometer brand per patient's insurance at pharmacy discretion.     blood glucose test (ACCU-CHEK SMARTVIEW TEST STRIP) strips Test blood sugar 3 times daily     calcium carbonate-vitamin D3 (CALCIUM 600 WITH VITAMIN  D3) 600 mg(1,500mg) -200 unit per tablet Take 1 tablet by mouth 2 (two) times a day. Taking 1200mg of Calcium with 1000 D3     cetirizine 10 mg cap Take 10 mg by mouth daily with supper.            citalopram (CELEXA) 20 MG tablet Take 20 mg by mouth daily.     famotidine (PEPCID) 20 MG tablet Take 1 tablet (20 mg total) by mouth daily. (Patient taking differently: Take 20 mg by mouth 2 (two) times a day.       )     fluticasone (FLONASE) 50 mcg/actuation nasal spray 2 SPRAYS INTO EACH NOSTRIL DAILY.     furosemide (LASIX) 20 MG tablet TAKE 2 TABLETS (40 MG TOTAL) BY MOUTH EVERY MORNING.     generic lancets Use 1 each As Directed daily. Dispense brand per patient's insurance at pharmacy discretion. (dx E11.9)     guaiFENesin ER (MUCINEX) 600 mg 12 hr tablet Take 1,200 mg by mouth 2 (two) times a day.     INCRUSE ELLIPTA 62.5 mcg/actuation DsDv inhaler INHALE 1 PUFF BY MOUTH DAILY     Lactobacillus rhamnosus GG (CULTURELLE) 15 billion cell CpSP Take 2 capsules by mouth Daily at 8:00 am..     levothyroxine (SYNTHROID, LEVOTHROID) 75 MCG tablet TAKE 1 TABLET BY MOUTH EVERY OTHER DAYS ALTERNATING WITH 75MCG AND 50MCG DOSE     lidocaine 4 % patch Place 1 patch on the skin daily. Remove and discard patch with 12 hours.  Apply to right shoulder     loperamide (IMODIUM A-D) 2 mg tablet Take 2 mg by mouth as needed for diarrhea (2 mg after each loose stool to a maximum of 16 mg/day).     LORazepam (ATIVAN) 0.5 MG tablet Take by mouth at bedtime.     menthol-zinc oxide (CALMOSEPTINE) 0.44-20.6 % Oint ointment Apply topically 3 (three) times a day.     metFORMIN (GLUCOPHAGE) 1000 MG tablet Take 1 tablet (1,000 mg total) by mouth 2 (two) times a day with meals.     montelukast (SINGULAIR) 10 mg tablet Take 1 tablet (10 mg total) by mouth at bedtime.     naproxen (NAPROSYN) 500 MG tablet Take 1 tablet (500 mg total) by mouth 2 (two) times a day with meals.     PULMICORT FLEXHALER 180 mcg/actuation inhaler INHALE 2 PUFFS 2 (TWO)  "TIMES A DAY.     simvastatin (ZOCOR) 10 MG tablet TAKE 1 TABLET BY MOUTH AT BEDTIME.     sodium chloride (OCEAN) 0.65 % nasal spray Apply 1 spray into each nostril as needed for congestion.     traZODone (DESYREL) 50 MG tablet Take 1 tablet (50 mg total) by mouth at bedtime as needed for sleep.     triamcinolone (KENALOG) 0.1 % ointment Apply topically 2 (two) times a day. hands (Patient taking differently: Apply topically daily as needed hands.      )     VENTOLIN HFA 90 mcg/actuation inhaler INHALE 1-2 PUFFS EVERY 4 (FOUR) HOURS AS NEEDED FOR WHEEZING.     ALLERGIES: No Known Allergies    DIET: Diabetic, regular texture, thin liquids.  Mighty Shakes Sugar-Free.    Vitals:    04/29/19 1245   BP: 104/56   Pulse: 100   Resp: 18   Temp: 97.6  F (36.4  C)   SpO2: 95%   Weight: 163 lb 3.2 oz (74 kg)   Height: 5' 5\" (1.651 m)     Body mass index is 27.16 kg/m .    EXAMINATION:   General: Pleasant middle-aged female, looking much older than her stated age, sitting in a wheelchair, in NAD.  Head: Normocephalic and atraumatic.   Eyes: PERRLA, sclerae clear.   ENT: Moist oral mucosa.   Cardiovascular: Regular rate and rhythm.  No appreciable murmur.  Respiratory: Previously, I could appreciate bibasilar rales (left greater than right).  Now, there appear to be bilateral mid to upper lung rhonchi with an expiratory wheeze.  I believe this is a chronic condition.  In addition to her COPD, she does have a left lung abscess.  Otherwise, lungs appear clear.  Abdomen: Soft and nontender.   Musculoskeletal/Extremities: Age-related degenerative joint disease.  Right arm in a sling.  No peripheral edema.  Integument: No rashes, clinically significant lesions, or skin breakdown.   Cognitive/Psychiatric: Alert and oriented x3 as far as I can tell.  Affect is somewhat depressed/anxious.    DIAGNOSTICS:   Results for orders placed or performed during the hospital encounter of 03/27/19   Basic Metabolic Panel   Result Value Ref Range    " Sodium 138 136 - 145 mmol/L    Potassium 4.1 3.5 - 5.0 mmol/L    Chloride 102 98 - 107 mmol/L    CO2 27 22 - 31 mmol/L    Anion Gap, Calculation 9 5 - 18 mmol/L    Glucose 136 (H) 70 - 125 mg/dL    Calcium 8.3 (L) 8.5 - 10.5 mg/dL    BUN 10 8 - 22 mg/dL    Creatinine 0.76 0.60 - 1.10 mg/dL    GFR MDRD Af Amer >60 >60 mL/min/1.73m2    GFR MDRD Non Af Amer >60 >60 mL/min/1.73m2     Lab Results   Component Value Date    WBC 18.3 (H) 04/18/2019    WBC 18.6 (H) 04/18/2019    HGB 8.4 (L) 04/18/2019    HGB 8.6 (L) 04/18/2019    HCT 28.5 (L) 04/18/2019    HCT 27.7 (L) 04/18/2019    MCV 94 04/18/2019    MCV 91 04/18/2019     (H) 04/18/2019     (H) 04/18/2019     CrCl cannot be calculated (Patient's most recent lab result is older than the maximum 5 days allowed.).  Lab Results   Component Value Date    HGBA1C 6.3 (H) 02/06/2019     Lab Results   Component Value Date    TSH 1.36 08/27/2018       ASSESSMENT/Plan:      ICD-10-CM    1. History of gram-positive (S. pyogenes) pneumosepsis Z86.19    2. 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 Gastroesophageal reflux disease without esophagitis K21.9    4. Functional diarrhea K59.1    5. Chronic obstructive pulmonary disease, unspecified COPD type (H) J44.9    6. Non-small cell cancer of right lung (H) C34.91    7. 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 Chronic pleural effusion, left J90    9. Current moderate episode of major depressive disorder, unspecified whether recurrent (H) F32.1    10. Acquired hypothyroidism E03.9      CHANGES:    Please obtain previously requested BMP and CBC with differential.    CARE PLAN:    The care plan has been reviewed and all orders signed. Changes to care plan, if any, as noted. Otherwise, continue current plan of care.     The above has been created using voice recognition software. Please be aware that this may unintentionally  produce inaccuracies  and/or nonsensical sentences.      Electronically signed by: Robert Yepez, ZACHARIAH

## 2021-05-28 NOTE — TELEPHONE ENCOUNTER
[facility] Nurse Triage Telephone Note      Provider: SERVANDO Don  Facility: [facility]    Facility Type: TCU      Call Back Number:  [phone]([facility] pharmacy)    Allergies: Patient has no known allergies.    Reason for call: Lidocaine patches are not covered by patient's insurance.       Verbal Order/Direction given by Provider: DC Lidocaine patches.      Provider giving order: SERVANDO Dno    Verbal order given to: [facility] pharmacist      Michi Silverio RN

## 2021-05-28 NOTE — TELEPHONE ENCOUNTER
Orders being requested: Skilled nursing 1x a week for 1 week, 3x a week for 2 weeks, 2x a week for 2 weeks, and 1x a week for 2 weeks.   Reason service is needed/diagnosis: Start of care, patient had an evaluation today and she qualifies for their services. She recently was discharged from TCU with discharge diagnosis of an pneumonia and sepsis. Her lungs still really bad. Patient also will be getting blood work per her infectious disease doctor, and they will be following patient with that issue as well.   When are orders needed by: ASAP. Today if possible.   Where to send Orders: Phone:  [phone]  Okay to leave detailed message?  Yes

## 2021-05-28 NOTE — PROGRESS NOTES
[facility]    Name:   Rita Mancini  : 1950  Facility:   [facility] [[id number]]   Room:   Code Status: FULL CODE -   Fac type:   SNF (Skilled Nursing Facility, TCU) -     CHIEF COMPLAINT / REASON FOR VISIT:  Chief Complaint   Patient presents with     Follow-up     TCU follow-up after hospitalization for pneumosepsis, but she was also recently in the ED with a closed displaced fracture of the proximal end of the right humerus.     [facility] ED 19 (displaced fracture of proximal end of right humerus)  [facility] from 19 until 19 (pneumosepsis)    Patient was last seen by me on 2019 and subsequently seen by Dr. Luna on 2019.      HPI: Rita is a 68 y.o. female was admitted to [facility] on 3/27/2019 for septic shock.   She had been to [facility] ED 11 days earlier after slipping on the ice and sustaining a closed displaced fracture of the proximal end of the right humerus.  X-rays of the right shoulder showed fracture of the proximal humerus, comminuted with displacement.  She was discharged with a shoulder immobilizer to home with orthopedic surgery follow-up.    Subsequent to that, she developed progressive shortness of breath, and the day before admission, family thought she sounded quite short of breath and confused.  The patient herself thought it was from her pain medication.  The night before admission, her son heard very heavy breathing.  He woke her up, and she was quite confused.  He helped her to the bathroom, but she took over an hour in the bathroom because she kept falling asleep.  He helped her back to bed and called 911.      EMS measured sats in the 70s and felt she was in respiratory distress.  She was also found to be hypertensive on arrival at the ED.  She was given IV fluids and placed on BiPAP (PCO2 = 63), very elevated lactic acid (7.4), very high procalcitonin (20.85),  acute renal failure (creatinine = 3.59), and hyperkalemic (6.0).  She was intubated and started on pressors.  Cultures grew group A strep as well as S. pyogenes.  There was concern for toxic shock syndrome.  She was given IVIG x3 days and clindamycin.  She was followed by ID (Dr. Ang), and chest tubes were placed.  CT showed improvement and chronic effusion.  She was switched to Zosyn but discharged on Augmentin.      MOST RECENT HOSPITALIZATION  (Per hospital discharge summary)     Pneumonia (S.pyogenes)  complicated by left-sided empyema s/p VATS/decortication 3/29  Strep biology knees bacteremia cleared  All chest tubes now removed, increase ambulation and activity  Respiratory failure-resolved, extubated 3/30   Antibiotics throughout course for broad-spectrum including vancomycin, Zosyn, clindamycin, and ultimately transition to Augmentin to complete 3 additional weeks.  Infectious disease consultant would like to follow-up in clinic in 3 weeks.  BCx 3/30 CNS- contaminant  Follow-up with general surgery after hospitalization  Pain control with small dose of oxycodone when needed, scheduled Tylenol, the latter has been enough for her pain control.     Anxiety  She is on diazepam as an outpatient, while taking more oxycodone, we will put this on hold.    COPD  Continue home nebulizers, she remains on 2-3 L/min of oxygen, wean as tolerated     Hypervolemia  She was diuresed following ICU stay, back down to baseline weight     Diabetes  At goal as outpatient (A1c <7).   Correction scale, keeping at goal 140-180  Acceptable control in the hospital, continue metformin at discharge     MONSERRAT  Home cpap use     Insomnia  Continue trazodone when needed     Right humerus fracture  Ortho assessed recommending sling, also no DVT in right arm.  Recommendations given, will need outpatient follow-up     Lung cancer  Dx in 2009, hx of recurrent R effusion (talc pleurodesis 4/2015), currently in remission        Consult/s:  pulmonary/intensive care, ID, general surgery and orthopedic surgery  Significant Diagnostic Studies:   EXAM: CT CHEST WO CONTRAST  LOCATION: [facility]  DATE/TIME: 3/28/2019 12:36 PM     INDICATION: pleural effusions, concern for empyema pleural effusions, concern for empyema  COMPARISON: Left thoracentesis from 3/27/2019, chest CT from 12/10/2018  TECHNIQUE: Helical images were obtained through the chest. Multiplanar reformats were obtained. Dose reduction techniques were used.  IV CONTRAST: None.     FINDINGS:   LUNGS AND PLEURA: Residual modest sized left pleural effusion is new compared to the previous CT. Airspace infiltrate at left lung base may represent a combination of pneumonia and atelectasis.  Small loculated pleural effusion within right major fissure. Atelectasis/consolidation right lung base new compared to the previous study.  There is no significant change in the postoperative spiculated appearance involving medial right upper lobe measuring approximately 2.5 x 1.5 cm with an adjacent suture line.     MEDIASTINUM: 2 AP window lymph nodes appear larger on image 35 and 36, each now measuring 10 mm in short axis, previously measuring 4 mm. Likely these are reactive. Endotracheal tube and NG tube are in place. Heart size normal.     LIMITED UPPER ABDOMEN: Negative.     MUSCULOSKELETAL: Negative.     IMPRESSION:   CONCLUSION:   1.  Modest residual left pleural effusion even after recent thoracentesis. Small loculated right pleural effusion. There are are no pleural findings on this noncontrast study to suggest empyema. Suggest correlating with results of yesterday's large   volume left thoracentesis.  2.  Dependent infiltrates at both lung bases suggestive of pneumonia.  3.  Minimal change in postoperative appearance of right upper lobe. A few small AP window lymph nodes are minimally larger, likely reactive.     Blood culture 3/27/19          Streptococcus pyogenes       EL        "Ceftriaxone Sensitive       Clindamycin Sensitive       Erythromycin Sensitive       Penicillin Sensitive       Vancomycin Sensitive           Procedures:  LEFT THORACOSCOPY WITH DECORTICATION 3/29/19  US guided thoracentesis      Treatments: IV antibiotics as above       CURRENT TCU ISSUES    Today: She tells me she is feeling stronger every day.    Primary diagnosis: She did have a scheduled follow-up with infectious disease on 04/18/19.  She saw the surgeon, Dr. Ma, on 04/25/2019.  She tells me he wants her to make an appointment to see the physician who saw her in the hospital.  She does have an appointment with Dr. Perez (pulmonology?) on 05/08/2019.    She saw ID on 04/18/2019.  Something was seen on x-ray that Dr. Ang found concerning, and a CT was ordered.  She was then referred to Dr. Ma.  I believe this involves the area of the pleural effusion/lung abscess in the left lobe.  She tells me that ID wanted to see her again on 05/02/2019, and she was continued on antibiotics.  Lung sounds currently are very coarse.    Right humeral head fracture: [facility]ie does want follow-up x-rays at some point.  She will be seen there on 05/09/2019.    Pain management: The pain she experiences is in her right shoulder.  She is receiving low-dose oxycodone (2.5-5 mg every 6 hours) as needed they are also applying ice, and she has a lidocaine patch.  She mentioned, \"I've had decent luck with Tylenol.\"  At an earlier visit, we scheduled 1000 mg every 8 hours (3 times daily).  The nurse told me she did not want to take any oxycodone because it made her \"goofy.\"  The side effect may not be an accurate assessment, as her previous mental state was caused by illness.  Nonetheless, we discontinued it.    When seen earlier, her daughter, who was in attendance, told me that her mother gets \"agitated when the pain killers make her groggy.\"  As for the patient, she stated she was not getting too much, although the pain is " never completely gone.    Buttock soreness: She tells me they are using an alcohol-based product to clean her up.  She would like something without that.  Dr. Luna ordered calmoseptine.  It is much better now.    Candidal intertrigo: Under the breasts and abdominal folds.  With nystatin powder, this is now resolved.    Dry mouth/dysphagia: She requested Biotene, and we wrote orders for that.  Uncertain whether it would be covered by her insurance.  I did suggest she could also suck on hard candy or lemon drops, and I later saw her with those.  Her daughter also mentioned that she was choking with eating.  Dr. Luna ordered speech to evaluate and treat.  Her diet was recently upgraded to regular texture by SLP on 04/25/2019.    Anxiety/insomnia: Earlier, we did talk a bit about her anxiety that can make her pain worse.  They do have an outside person who comes and performs healing touch, and we did write orders for this.  However, at the current time her anxiety is affecting her daily life, sleep, and ability to participate in therapy.  We ordered lorazepam 0.5 mg nightly.  She is sleeping a little better with this and trazodone, although she cannot seem to sleep past 4 AM.  We will try increasing the trazodone from 50 mg to 75 mg nightly.    Depression/cognition: She was seen by Dr. Shepherd on 04/23/2019 and scored high (16/27) on the PHQ 9 depression scale.  At that point, we increased her citalopram from 10 mg to 20 mg daily.  She was also noted by Occupational Therapy to have some cognitive issues per the Short Blessed test.  She does not exhibit overt psychotic symptoms.    COPD: She states she has lots of lung problems and is 10 years out from inoperable lung cancer for which she underwent radiation and chemotherapy.  She does a lot of coughing which is chronic.  She is always coughing up phlegm, so she may wait too long before her respiratory symptoms become serious.  Apparently, she has infections at  least a couple times a year.  She was a smoker until 2008 when she developed lung cancer.  She is on multiple inhaled medications.  I have a feeling that she is generally noncompliant, disagreeing with her daughter how frequently she takes her nebulizer treatments, for example.  She tells me that her breathing can be somewhat labored, but her O2 sats remained good.  When seen on 04/24/2019, she was on 0.5 L/min per nasal cannula, although she believed she could do without it.  We had planned to do a taper, and oxygen was discontinued on 04/25/2019.    Dr. Luna wrote orders for scheduled albuterol nebs (in addition to as needed dosing).  She also ordered a CBC with differential and a BMP.  CBC on 04/15/2019 still showed an elevated white count of 18.1 with a slight left shift (neutrophils 77%, lymphocytes 12%).  We ordered a follow-up CBC with differential and BMP was still elevated but less so at 14.8, hemoglobin 8.0, platelets 617,000.    Dry nose: Due to oxygen per nasal cannula.  We ordered a non-petroleum-based gel to apply to the nares twice daily.  Also, Ocean nasal spray may be used hourly as needed.  While the nares are still somewhat dry, she has not needed oxygen since 04/25/2019.    Diarrhea: They are loose and frequent and always seems to occur when she takes any antibiotics.  We ordered Imodium 2 mg after each loose stool (maximum 16 mg/day) and Culturelle (15 billion cell) probiotic (2 caps every morning).  Dr. Luna added psyllium capsules (0.52 g 3 times daily with meals), and this has helped somewhat.    GERD: Another problem she complains about there is persistent heartburn.  She is already receiving 20 mg of famotidine daily.  We increased the famotidine to 20 mg twice daily.  This is now much improved, although it did take some time.    Diabetes: Recent blood glucose levels range between 72 and 175, mostly around 100.  She is on 1000 mg of metformin twice daily.    Anemia: This may or may  not be the cause, but she tells me she gets tired very easily.  Her latest hemoglobin was 8.0.  With the previous white count of 14.8 and elevated platelets, we will recheck a hemogram-2.    Obstructive sleep apnea: She does have a CPAP machine at home but has not used it in months.  Here, she does have an incentive spirometer, and I encouraged her to use it.       ROS: No headaches, nausea or vomiting, dizziness, dysuria.    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     Osteopenia      Pleural effusion 1/15     Radiation Pneumonitis     Created by Conversion      Sleep apnea     does not use cpap              Family History   Problem Relation Age of Onset     Heart disease Mother      Hypertension Mother      Alcohol abuse Mother      Depression Mother      Heart disease Father 45        mi age 45, cabg     Heart attack Father      Cancer Father         prostate     Hypertension Father      COPD Brother      Alcohol abuse Brother      Alcohol abuse Sister      Breast cancer Paternal Aunt      Leukemia Grandchild      Cancer Maternal Aunt 47        breast     Diabetes Son      Anxiety disorder Son      Depression Son      No Medical Problems Daughter      Schizophrenia Grandchild      Social History     Socioeconomic History     Marital status:      Spouse name: Not on file     Number of children: Not on file     Years of education: Not on file     Highest education level: Not on file   Occupational History     Not on file   Social Needs     Financial resource strain: Not on file     Food insecurity:     Worry: Not on file     Inability: Not  on file     Transportation needs:     Medical: Not on file     Non-medical: Not on file   Tobacco Use     Smoking status: Former Smoker     Packs/day: 1.50     Years: 0.00     Pack years: 0.00     Last attempt to quit: 11/9/2008     Years since quitting: 10.4     Smokeless tobacco: Former User   Substance and Sexual Activity     Alcohol use: No     Drug use: No     Sexual activity: Never   Lifestyle     Physical activity:     Days per week: Not on file     Minutes per session: Not on file     Stress: Not on file   Relationships     Social connections:     Talks on phone: Not on file     Gets together: Not on file     Attends Zoroastrian service: Not on file     Active member of club or organization: Not on file     Attends meetings of clubs or organizations: Not on file     Relationship status: Not on file     Intimate partner violence:     Fear of current or ex partner: Not on file     Emotionally abused: Not on file     Physically abused: Not on file     Forced sexual activity: Not on file   Other Topics Concern     Not on file   Social History Narrative     Not on file       MEDICATIONS: Reviewed from the MAR, physician orders, and/or earlier progress notes.  Updated by me today (05/06/2019) with discontinuation of baclofen and an increase in trazodone reflected below.  Current Outpatient Medications   Medication Sig     acetaminophen (TYLENOL) 500 MG tablet Take 2 tablets (1,000 mg total) by mouth 3 (three) times a day.     albuterol (PROVENTIL) 2.5 mg /3 mL (0.083 %) nebulizer solution TAKE 3ML BY MOUTH EVERY 4 HOURS AS NEEDED FOR WHEEZING     alum/mag hydrox-simethicone-diphenhydramine-lidocaine (MAGIC MOUTHWASH) suspension 15 mL 4 (four) times a day.     amoxicillin-clavulanate (AUGMENTIN) 875-125 mg per tablet Take 1 tablet by mouth 2 (two) times a day.     aspirin 81 MG EC tablet Take 81 mg by mouth daily.     blood glucose meter (GLUCOMETER) Use 1 each As Directed as needed. Dispense glucometer brand per  patient's insurance at pharmacy discretion.     blood glucose test (ACCU-CHEK SMARTVIEW TEST STRIP) strips Test blood sugar 3 times daily     calcium carbonate-vitamin D3 (CALCIUM 600 WITH VITAMIN D3) 600 mg(1,500mg) -200 unit per tablet Take 1 tablet by mouth 2 (two) times a day. Taking 1200mg of Calcium with 1000 D3     cetirizine 10 mg cap Take 10 mg by mouth daily with supper.            citalopram (CELEXA) 20 MG tablet Take 20 mg by mouth daily.     famotidine (PEPCID) 20 MG tablet Take 1 tablet (20 mg total) by mouth daily. (Patient taking differently: Take 20 mg by mouth 2 (two) times a day.       )     fluticasone (FLONASE) 50 mcg/actuation nasal spray 2 SPRAYS INTO EACH NOSTRIL DAILY.     furosemide (LASIX) 20 MG tablet TAKE 2 TABLETS (40 MG TOTAL) BY MOUTH EVERY MORNING.     generic lancets Use 1 each As Directed daily. Dispense brand per patient's insurance at pharmacy discretion. (dx E11.9)     guaiFENesin ER (MUCINEX) 600 mg 12 hr tablet Take 1,200 mg by mouth 2 (two) times a day.     INCRUSE ELLIPTA 62.5 mcg/actuation DsDv inhaler INHALE 1 PUFF BY MOUTH DAILY     Lactobacillus rhamnosus GG (CULTURELLE) 15 billion cell CpSP Take 2 capsules by mouth Daily at 8:00 am..     levothyroxine (SYNTHROID, LEVOTHROID) 75 MCG tablet TAKE 1 TABLET BY MOUTH EVERY OTHER DAYS ALTERNATING WITH 75MCG AND 50MCG DOSE     lidocaine 4 % patch Place 1 patch on the skin daily. Remove and discard patch with 12 hours.  Apply to right shoulder     loperamide (IMODIUM A-D) 2 mg tablet Take 2 mg by mouth as needed for diarrhea (2 mg after each loose stool to a maximum of 16 mg/day).     LORazepam (ATIVAN) 0.5 MG tablet Take 1 tablet (0.5 mg total) by mouth at bedtime.     menthol-zinc oxide (CALMOSEPTINE) 0.44-20.6 % Oint ointment Apply topically 3 (three) times a day.     metFORMIN (GLUCOPHAGE) 1000 MG tablet Take 1 tablet (1,000 mg total) by mouth 2 (two) times a day with meals.     montelukast (SINGULAIR) 10 mg tablet Take 1  "tablet (10 mg total) by mouth at bedtime.     naproxen (NAPROSYN) 500 MG tablet Take 1 tablet (500 mg total) by mouth 2 (two) times a day with meals.     psyllium (METAMUCIL) 0.52 gram capsule Take 0.52 g by mouth 3 (three) times a day with meals.     PULMICORT FLEXHALER 180 mcg/actuation inhaler INHALE 2 PUFFS 2 (TWO) TIMES A DAY.     saliva stimulant (BIOTENE MOISTURIZING MOUTH) oral spray Take by mouth as needed.     simvastatin (ZOCOR) 10 MG tablet TAKE 1 TABLET BY MOUTH AT BEDTIME.     sodium chloride (OCEAN) 0.65 % nasal spray Apply 1 spray into each nostril as needed for congestion.     traZODone (DESYREL) 50 MG tablet Take 1 tablet (50 mg total) by mouth at bedtime as needed for sleep. (Patient taking differently: Take 75 mg by mouth at bedtime as needed for sleep.       )     triamcinolone (KENALOG) 0.1 % ointment Apply topically 2 (two) times a day. hands (Patient taking differently: Apply topically daily as needed hands.      )     VENTOLIN HFA 90 mcg/actuation inhaler INHALE 1-2 PUFFS EVERY 4 (FOUR) HOURS AS NEEDED FOR WHEEZING.     ALLERGIES: No Known Allergies    DIET: Diabetic, regular texture, thin liquids.  Mighty Shakes Sugar-Free.    Vitals:    05/06/19 1340   BP: 109/64   Pulse: (!) 108   Resp: 14   Temp: 98  F (36.7  C)   SpO2: 94%   Weight: 161 lb 12.8 oz (73.4 kg)   Height: 5' 5\" (1.651 m)     Body mass index is 26.92 kg/m .    EXAMINATION:   General: Pleasant middle-aged female, looking much older than her stated age, lying in bed, in NAD.  Head: Normocephalic and atraumatic.   Eyes: PERRLA, sclerae clear.   ENT: Moist oral mucosa.   Cardiovascular: Regular rate and rhythm.  No appreciable murmur.  Respiratory: Previously, I could appreciate bibasilar rales (left greater than right).  At my last several visits, there appear to be coarse bilateral mid to upper lung rhonchi with an expiratory wheeze.  Most likely related to her pleural/left lung abscess effusion.  This was discussed above.  "   Abdomen: Soft and nontender.   Musculoskeletal/Extremities: Age-related degenerative joint disease.  Right arm in a sling.  No peripheral edema.  Integument: No rashes, clinically significant lesions, or skin breakdown.   Cognitive/Psychiatric: Alert and oriented x3 as far as I can tell.  Affect is somewhat depressed/anxious.    DIAGNOSTICS:   Results for orders placed or performed in visit on 04/30/19   Basic Metabolic Panel   Result Value Ref Range    Sodium 139 136 - 145 mmol/L    Potassium 4.7 3.5 - 5.0 mmol/L    Chloride 105 98 - 107 mmol/L    CO2 19 (L) 22 - 31 mmol/L    Anion Gap, Calculation 15 5 - 18 mmol/L    Glucose 68 (L) 70 - 125 mg/dL    Calcium 10.0 8.5 - 10.5 mg/dL    BUN 17 8 - 22 mg/dL    Creatinine 0.92 0.60 - 1.10 mg/dL    GFR MDRD Af Amer >60 >60 mL/min/1.73m2    GFR MDRD Non Af Amer >60 >60 mL/min/1.73m2     Lab Results   Component Value Date    WBC 14.8 (H) 04/30/2019    HGB 8.0 (L) 04/30/2019    HCT 27.2 (L) 04/30/2019    MCV 92 04/30/2019     (H) 04/30/2019     CrCl cannot be calculated (Patient's most recent lab result is older than the maximum 5 days allowed.).  Lab Results   Component Value Date    HGBA1C 6.3 (H) 02/06/2019     Lab Results   Component Value Date    TSH 1.36 08/27/2018       ASSESSMENT/Plan:      ICD-10-CM    1. History of gram-positive (S. pyogenes) pneumosepsis Z86.19    2. 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 Chronic obstructive pulmonary disease, unspecified COPD type (H) J44.9    4. Chronic pleural effusion, left (s/p Talc pleurodesis 2015) J90    5. Functional diarrhea K59.1    6. Psychophysiological insomnia F51.04    7. Anemia, unspecified type D64.9    8. Gastroesophageal reflux disease without esophagitis K21.9    9. Non-small cell cancer of right lung, s/p radiation and chemotherapies, in remission since 7546-3042 (H) C34.91    10. Acquired hypothyroidism E03.9      CHANGES:     None.    CARE PLAN:    The care plan has been reviewed and all orders signed. Changes to care plan, if any, as noted. Otherwise, continue current plan of care.     The above has been created using voice recognition software. Please be aware that this may unintentionally  produce inaccuracies and/or nonsensical sentences.      Electronically signed by: Robert Yepez CNP

## 2021-05-28 NOTE — TELEPHONE ENCOUNTER
Orders being requested: physical therapy 2 time a week for 4 weeks and then 1 time a week for 2 weeks   Reason service is needed/diagnosis: strength, balance, endurance, home exercise program   When are orders needed by: as soon as possible   Where to send Orders: Phone:  [phone]  Okay to leave detailed message?  Yes

## 2021-05-28 NOTE — PROGRESS NOTES
[facility] INFECTIOUS DISEASE CLINIC FOLLOW UP NOTE    Date: 5/2/2019  Patient Name: Rita Mancini   YOB: 1950  MRN: [id number]      ASSESSMENT:  68-year-old woman with recent admission for group A strep sepsis and pneumonia.  She required intubation for respiratory failure.  She is status post VATS with debridement of a left lung abscess on 3/29.  Ultimately had a single right-sided chest tube, and 3 left-sided chest tubes.  She was treated for toxic shock syndrome with addition of clindamycin and IVIG x3 days.  She has had slow recovery with normalization of her procalcitonin, and slow decline of the WBC count.    Repeat chest x-ray last month showed complex necrotizing pneumonia with empyema.  CT scan was done which was reviewed with the radiologist.  This confirmed left lower lobe pneumonia with cavitation, but there was no signs of drainable abscess or effusion.    Today her symptoms have incrementally improved.  Her white count is down to 14.8.  She is now off supplemental oxygen, and participating more in her physical therapy.  She was seen by Dr. Ma, who did not recommend further surgery.  She has been scheduled to see her pulmonologist as well.    History of lung cancer in 2009 complicated by recurrent effusion, requiring talc pleurodesis on the right in 2015.  Currently in remission.    Recent right proximal humerus fracture.  Followed by [facility] [facility], not a surgical candidate.    PLAN:  -Continue Augmentin until next appointment  -weekly CBC with differential at rehab center, results faxed to me    Return to clinic in 4 weeks.    Louis Ang MD  [facility]   Clinic phone: [phone]  Clinic fax: [phone]    ______________________________________________________________________    HISTORY OF PRESENT ILLNESS:   From inpatient ID consult on 3/28:    Rita Mancini is a 68 y.o. old female. History is provided by patients family and chart  review.     About two weeks prior to admission on 3/11, Rita had a fall and hurt her ribs. She was treated with prednisone and oxycodone. She had an additional fall on 3/16 and fractured her right humerus. Since then, she has had progressively increasing shortness of breath and the day prior to admission became confused and thus EMS was called and she was brought to [facility] for further evaluation and management.      Her O2 sats were in the 70s per EMS and she was hypotensive and tachycardic in the ED. CXR showed left sided pleural effusion and left lower lobe opacity. She was initially placed on BiPAP and eventually required intubation. Labs revealed lactic acid of 7.4 with pH of 7.31. WBC 30. She was started on azithromycin, zosyn, and vancomycin.       She remains in the ICU ventilated on pressure support.    SUBJECTIVE / INTERVAL HISTORY:   Patient is here for follow-up with her daughter.  She has been off supplemental oxygen for about a week.  She has been participating more with physical therapy and has been more compliant with incentive spirometry.  She continues to feel fatigued and weak.  She has intermittent loose bowel movements, but not consistently.    Since her last appointment CT scan showed left lower lobe pneumonia with cavitation, but no abscess.  She saw Dr. Ma in clinic who recommended follow-up with pulmonary.  White count was repeated and is decreased to 14.8.    ROS:   No fevers, No new rashes.      Current Outpatient Medications:      acetaminophen (TYLENOL) 500 MG tablet, Take 2 tablets (1,000 mg total) by mouth 3 (three) times a day., Disp: , Rfl: 0     albuterol (PROVENTIL) 2.5 mg /3 mL (0.083 %) nebulizer solution, TAKE 3ML BY MOUTH EVERY 4 HOURS AS NEEDED FOR WHEEZING, Disp: 900 mL, Rfl: 1     alum/mag hydrox-simethicone-diphenhydramine-lidocaine (MAGIC MOUTHWASH) suspension, 15 mL 4 (four) times a day., Disp: , Rfl:      blood glucose meter (GLUCOMETER), Use 1 each As Directed  as needed. Dispense glucometer brand per patient's insurance at pharmacy discretion., Disp: 1 each, Rfl: 0     blood glucose test (ACCU-CHEK SMARTVIEW TEST STRIP) strips, Test blood sugar 3 times daily, Disp: 200 strip, Rfl: 3     calcium carbonate-vitamin D3 (CALCIUM 600 WITH VITAMIN D3) 600 mg(1,500mg) -200 unit per tablet, Take 1 tablet by mouth 2 (two) times a day. Taking 1200mg of Calcium with 1000 D3, Disp: , Rfl:      cetirizine 10 mg cap, Take 10 mg by mouth daily with supper.    , Disp: , Rfl:      citalopram (CELEXA) 20 MG tablet, Take 20 mg by mouth daily., Disp: , Rfl:      famotidine (PEPCID) 20 MG tablet, Take 1 tablet (20 mg total) by mouth daily. (Patient taking differently: Take 20 mg by mouth 2 (two) times a day.    ), Disp: , Rfl:      fluticasone (FLONASE) 50 mcg/actuation nasal spray, 2 SPRAYS INTO EACH NOSTRIL DAILY., Disp: 48 g, Rfl: 3     furosemide (LASIX) 20 MG tablet, TAKE 2 TABLETS (40 MG TOTAL) BY MOUTH EVERY MORNING., Disp: 180 tablet, Rfl: 3     generic lancets, Use 1 each As Directed daily. Dispense brand per patient's insurance at pharmacy discretion. (dx E11.9), Disp: 100 each, Rfl: 1     guaiFENesin ER (MUCINEX) 600 mg 12 hr tablet, Take 1,200 mg by mouth 2 (two) times a day., Disp: , Rfl:      INCRUSE ELLIPTA 62.5 mcg/actuation DsDv inhaler, INHALE 1 PUFF BY MOUTH DAILY, Disp: 90 puff, Rfl: 1     Lactobacillus rhamnosus GG (CULTURELLE) 15 billion cell CpSP, Take 2 capsules by mouth Daily at 8:00 am.., Disp: , Rfl:      levothyroxine (SYNTHROID, LEVOTHROID) 75 MCG tablet, TAKE 1 TABLET BY MOUTH EVERY OTHER DAYS ALTERNATING WITH 75MCG AND 50MCG DOSE, Disp: 45 tablet, Rfl: 2     lidocaine 4 % patch, Place 1 patch on the skin daily. Remove and discard patch with 12 hours.  Apply to right shoulder, Disp: 5 patch, Rfl: 0     loperamide (IMODIUM A-D) 2 mg tablet, Take 2 mg by mouth as needed for diarrhea (2 mg after each loose stool to a maximum of 16 mg/day)., Disp: , Rfl:       LORazepam (ATIVAN) 0.5 MG tablet, Take 1 tablet (0.5 mg total) by mouth at bedtime., Disp: 30 tablet, Rfl: 0     menthol-zinc oxide (CALMOSEPTINE) 0.44-20.6 % Oint ointment, Apply topically 3 (three) times a day., Disp: , Rfl:      metFORMIN (GLUCOPHAGE) 1000 MG tablet, Take 1 tablet (1,000 mg total) by mouth 2 (two) times a day with meals., Disp: 180 tablet, Rfl: 3     montelukast (SINGULAIR) 10 mg tablet, Take 1 tablet (10 mg total) by mouth at bedtime., Disp: 90 tablet, Rfl: 3     psyllium (METAMUCIL) 0.52 gram capsule, Take 0.52 g by mouth 3 (three) times a day with meals., Disp: , Rfl:      PULMICORT FLEXHALER 180 mcg/actuation inhaler, INHALE 2 PUFFS 2 (TWO) TIMES A DAY., Disp: 3 each, Rfl: 3     saliva stimulant (BIOTENE MOISTURIZING MOUTH) oral spray, Take by mouth as needed., Disp: , Rfl:      simvastatin (ZOCOR) 10 MG tablet, TAKE 1 TABLET BY MOUTH AT BEDTIME., Disp: 90 tablet, Rfl: 2     sodium chloride (OCEAN) 0.65 % nasal spray, Apply 1 spray into each nostril as needed for congestion., Disp: , Rfl:      traZODone (DESYREL) 50 MG tablet, Take 1 tablet (50 mg total) by mouth at bedtime as needed for sleep., Disp: 30 tablet, Rfl: 11     triamcinolone (KENALOG) 0.1 % ointment, Apply topically 2 (two) times a day. hands (Patient taking differently: Apply topically daily as needed hands.   ), Disp: 30 g, Rfl: 3     VENTOLIN HFA 90 mcg/actuation inhaler, INHALE 1-2 PUFFS EVERY 4 (FOUR) HOURS AS NEEDED FOR WHEEZING., Disp: 54 g, Rfl: 0     aspirin 81 MG EC tablet, Take 81 mg by mouth daily., Disp: , Rfl:      baclofen (LIORESAL) 10 MG tablet, Take 1 tablet (10 mg total) by mouth 3 (three) times a day as needed., Disp: 30 tablet, Rfl: 0     naproxen (NAPROSYN) 500 MG tablet, Take 1 tablet (500 mg total) by mouth 2 (two) times a day with meals., Disp: 60 tablet, Rfl: 2      OBJECTIVE:  Vitals:    05/02/19 0918   BP: 100/56   Pulse: 68   Temp: 98.1  F (36.7  C)         GEN: No acute distress.    HENT: Head is  normocephalic, atraumatic.   EYES: Eyes have anicteric sclerae without conjunctival injection or stigmata of endocarditis.   RESPIRATORY:  Decreased sounds at the right base.  Crackles midway up the left chest.  CARDIOVASCULAR:  Regular rate and rhythm. Normal S1 and S2. No murmur, click, gallop or rub.   ABDOMEN:  Soft, normal bowel sounds, non-tender, no masses  EXTREMITIES: No edema.  Right arm in sling.  SKIN/HAIR/NAILS:  No rashes  NEUROLOGIC: Grossly nonfocal.  AOx3      Pertinent labs:    Results from last 7 days   Lab Units 04/30/19  1215   LN-WHITE BLOOD CELL COUNT thou/uL 14.8*   LN-HEMOGLOBIN g/dL 8.0*   LN-HEMATOCRIT % 27.2*   LN-PLATELET COUNT thou/uL 617*     Results from last 7 days   Lab Units 04/30/19  1215   LN-SODIUM mmol/L 139   LN-POTASSIUM mmol/L 4.7   LN-CHLORIDE mmol/L 105   LN-CO2 mmol/L 19*   LN-BLOOD UREA NITROGEN mg/dL 17   LN-CREATININE mg/dL 0.92   LN-CALCIUM mg/dL 10.0     Lab Results   Component Value Date    CRP 0.8 05/23/2017         Lab Results   Component Value Date    ALT 16 04/05/2019    AST 26 04/05/2019    ALKPHOS 187 (H) 04/05/2019    BILITOT 0.6 04/05/2019         MICROBIOLOGY DATA:  Reviewed     RADIOLOGY:  EXAM DATE:         04/22/2019     EXAM: CT CHEST WITH CONTRAST  LOCATION: [facility]  DATE/TIME: 4/22/2019 11:15 AM     INDICATION: Pneumonia, unspecified organism  COMPARISON: CT 3/28/2019.  TECHNIQUE: Helical images were obtained through the chest during injection of IV  contrast. Multiplanar reformats were obtained. Dose reduction techniques were  used.     IV CONTRAST: 100ml IV Isovue 370 administered. SPR I-Stat Cr=0.8mg/dl, eGFR=71.     FINDINGS:  LUNGS AND PLEURA: A 2.5 x 1.6 cm left lower lobe cavitation has developed. The  surrounding left lower lobe consolidation has decreased.     Evolving irregular right lower lobe consolidation. Persistent right upper lobe  paramediastinal irregular consolidation.     Decreased bilateral pleural effusions.  Residual fluid collections in both major  and the right minor fissures.     Increased smooth appearing intralobular septal thickening, particularly on the  left.     MEDIASTINUM: Persistent mediastinal adenopathy which could be reactive or  neoplastic.     LIMITED UPPER ABDOMEN: Left para-aortic 13 x 9 mm node just medial to the  adrenal gland. This could be reactive or neoplastic.     MUSCULOSKELETAL: Proximal right humeral impacted fracture.     CONCLUSION:  1.  Evolving bilateral lung consolidations.  2.  New left lower lobe cavitation. This most likely represents cavitary  pneumonia.  3.  Increasing left interlobular septal thickening most suggestive of edema.  4.  Persistent but improved bilateral pleural effusions.  5.  Mediastinal and upper para-aortic adenopathy which could be reactive or  neoplastic.  6.  Recommend CT chest follow-up exclude the possibility of neoplasm.     DICOM format image data is available to non-affiliated external healthcare  facilities or entities on a secure, media-free, reciprocally searchable basis  with patient authorization for at least a 12-month period after the study.

## 2021-05-28 NOTE — PROGRESS NOTES
[facility]    Name:   Rita Mancini  : 1950  Facility:   [facility] [[id number]]   Room:   Code Status: FULL CODE -   Fac type:   SNF (Skilled Nursing Facility, TCU) -     CHIEF COMPLAINT / REASON FOR VISIT:  Chief Complaint   Patient presents with     Follow-up     TCU follow-up after hospitalization for pneumosepsis, but she was also recently in the ED with a closed displaced fracture of the proximal end of the right humerus.     [facility] ED 19 (displaced fracture of proximal end of right humerus)  [facility] from 19 until 19 (pneumosepsis)    Patient was last seen by me on 19.      HPI: Rita is a 68 y.o. female was admitted to [facility] on 3/27/2019 for septic shock.   She had been to [facility] ED 11 days earlier after slipping on the ice and sustaining a closed displaced fracture of the proximal end of the right humerus.  X-rays of the right shoulder showed fracture of the proximal humerus, comminuted with displacement.  She was discharged with a shoulder immobilizer to home with orthopedic surgery follow-up.    Subsequent to that, she developed progressive shortness of breath, and the day before admission, family thought she sounded quite short of breath and confused.  The patient herself thought it was from her pain medication.  The night before admission, her son heard very heavy breathing.  He woke her up, and she was quite confused.  He helped her to the bathroom, but she took over an hour in the bathroom because she kept falling asleep.  He helped her back to bed and called 911.      EMS measured sats in the 70s and felt she was in respiratory distress.  She was also found to be hypertensive on arrival at the ED.  She was given IV fluids and placed on BiPAP (PCO2 = 63), very elevated lactic acid (7.4), very high procalcitonin (20.85), acute renal failure (creatinine = 3.59), and hyperkalemic  (6.0).  She was intubated and started on pressors.  Cultures grew group A strep as well as S. pyogenes.  There was concern for toxic shock syndrome.  She was given IVIG x3 days and clindamycin.  She was followed by ID (Dr. Ang), and chest tubes were placed.  CT showed improvement and chronic effusion.  She was switched to Zosyn but discharged on Augmentin.      MOST RECENT HOSPITALIZATION  (Per hospital discharge summary)     Pneumonia (S.pyogenes)  complicated by left-sided empyema s/p VATS/decortication 3/29  Strep biology knees bacteremia cleared  All chest tubes now removed, increase ambulation and activity  Respiratory failure-resolved, extubated 3/30   Antibiotics throughout course for broad-spectrum including vancomycin, Zosyn, clindamycin, and ultimately transition to Augmentin to complete 3 additional weeks.  Infectious disease consultant would like to follow-up in clinic in 3 weeks.  BCx 3/30 CNS- contaminant  Follow-up with general surgery after hospitalization  Pain control with small dose of oxycodone when needed, scheduled Tylenol, the latter has been enough for her pain control.     Anxiety  She is on diazepam as an outpatient, while taking more oxycodone, we will put this on hold.    COPD  Continue home nebulizers, she remains on 2-3 L/min of oxygen, wean as tolerated     Hypervolemia  She was diuresed following ICU stay, back down to baseline weight     Diabetes  At goal as outpatient (A1c <7).   Correction scale, keeping at goal 140-180  Acceptable control in the hospital, continue metformin at discharge     MONSERRAT  Home cpap use     Insomnia  Continue trazodone when needed     Right humerus fracture  Ortho assessed recommending sling, also no DVT in right arm.  Recommendations given, will need outpatient follow-up     Lung cancer  Dx in 2009, hx of recurrent R effusion (talc pleurodesis 4/2015), currently in remission        Consult/s: pulmonary/intensive care, ID, general surgery and orthopedic  surgery  Significant Diagnostic Studies:   EXAM: CT CHEST WO CONTRAST  LOCATION: [facility]  DATE/TIME: 3/28/2019 12:36 PM     INDICATION: pleural effusions, concern for empyema pleural effusions, concern for empyema  COMPARISON: Left thoracentesis from 3/27/2019, chest CT from 12/10/2018  TECHNIQUE: Helical images were obtained through the chest. Multiplanar reformats were obtained. Dose reduction techniques were used.  IV CONTRAST: None.     FINDINGS:   LUNGS AND PLEURA: Residual modest sized left pleural effusion is new compared to the previous CT. Airspace infiltrate at left lung base may represent a combination of pneumonia and atelectasis.  Small loculated pleural effusion within right major fissure. Atelectasis/consolidation right lung base new compared to the previous study.  There is no significant change in the postoperative spiculated appearance involving medial right upper lobe measuring approximately 2.5 x 1.5 cm with an adjacent suture line.     MEDIASTINUM: 2 AP window lymph nodes appear larger on image 35 and 36, each now measuring 10 mm in short axis, previously measuring 4 mm. Likely these are reactive. Endotracheal tube and NG tube are in place. Heart size normal.     LIMITED UPPER ABDOMEN: Negative.     MUSCULOSKELETAL: Negative.     IMPRESSION:   CONCLUSION:   1.  Modest residual left pleural effusion even after recent thoracentesis. Small loculated right pleural effusion. There are are no pleural findings on this noncontrast study to suggest empyema. Suggest correlating with results of yesterday's large   volume left thoracentesis.  2.  Dependent infiltrates at both lung bases suggestive of pneumonia.  3.  Minimal change in postoperative appearance of right upper lobe. A few small AP window lymph nodes are minimally larger, likely reactive.     Blood culture 3/27/19          Streptococcus pyogenes       EL       Ceftriaxone Sensitive       Clindamycin Sensitive       Erythromycin  "Sensitive       Penicillin Sensitive       Vancomycin Sensitive           Procedures:  LEFT THORACOSCOPY WITH DECORTICATION 3/29/19  US guided thoracentesis      Treatments: IV antibiotics as above       CURRENT TCU ISSUES    Today: She tells me she is feeling stronger every day.    Primary diagnosis: She did have a scheduled follow-up with infectious disease on 04/18/19.  She saw the surgeon, Dr. Ma, on 04/25/2019.  She tells me he wants her to make an appointment to see the physician who saw her in the hospital.    She saw ID on 04/18/2019.  Something was seen on x-ray that Dr. Ang found concerning, and a CT was ordered.  She was then referred to Dr. Ma.  I believe this involves the area of the pleural effusion/lung abscess in the left lobe.  She tells me that ID once to see her again tomorrow, 05/02/2019, but she is unsure why.  Lung sounds currently are very coarse.    Right humeral head fracture: Angie does want follow-up x-rays at some point.  She will be making an appointment for that.    Pain management: The pain she experiences is in her right shoulder.  She is receiving low-dose oxycodone (2.5-5 mg every 6 hours) as needed they are also applying ice, and she has a lidocaine patch.  She mentioned, \"I've had decent luck with Tylenol.\"  At an earlier visit, we scheduled 1000 mg every 8 hours (3 times daily).  The nurse told me she did not want to take any oxycodone because it made her \"goofy.\"  The side effect may not be an accurate assessment, as her previous mental state was caused by illness.  Nonetheless, we discontinued it.    When seen earlier, her daughter, who was in attendance, told me that her mother gets \"agitated when the pain killers make her groggy.\"  As for the patient, she stated she was not getting too much, although the pain is never completely gone.    Buttock soreness: She tells me they are using an alcohol-based product to clean her up.  She would like something without " that.  Dr. Luna ordered calmoseptine.    Candidal intertrigo: Under the breasts and abdominal folds.  She is currently being treated nystatin powder.    Dry mouth/dysphagia: She requested Biotene, and we wrote orders for that.  Uncertain whether it would be covered by her insurance.  I did suggest she could also suck on hard candy or lemon drops, and I later saw her with those.  Her daughter also mentioned that she was choking with eating.  Dr. Luna ordered speech to evaluate and treat.  Her diet was recently upgraded to regular texture by SLP on 04/25/2019.    Anxiety: Earlier, we did talk a bit about her anxiety that can make her pain worse.  They do have an outside person who comes and performs healing touch, and we did write orders for this.  However, at the current time her anxiety is affecting her daily life, sleep, and ability to participate in therapy.  We ordered lorazepam 0.5 mg nightly.  She is sleeping better with this and trazodone.    Depression/cognition: She was seen by Dr. Shepherd on 04/23/2019 and scored high (16/27) on the PHQ 9 depression scale.  At that point, we increased her citalopram from 10 mg to 20 mg daily.  She was also noted by Occupational Therapy to have some cognitive issues per the Short Blessed test.  She does not exhibit overt psychotic symptoms.    COPD: She states she has lots of lung problems and is 10 years out from inoperable lung cancer for which she underwent radiation and chemotherapy.  She does a lot of coughing which is chronic.  She is always coughing up phlegm, so she may wait too long before her respiratory symptoms become serious.  Apparently, she has infections at least a couple times a year.  She was a smoker until 2008 when she developed lung cancer.  She is on multiple inhaled medications.  I have a feeling that she is generally noncompliant, disagreeing with her daughter how frequently she takes her nebulizer treatments, for example.  She tells me that her  "breathing can be somewhat labored, but her O2 sats remained good.  When seen on 04/24/2019, she was on 0.5 L/min per nasal cannula, although she believed she could do without it.  We had planned to do a taper, and oxygen was discontinued on 04/25/2019.    Dr. Luna wrote orders for scheduled albuterol nebs (in addition to as needed dosing).  She also ordered a CBC with differential and a BMP.  CBC on 04/15/2019 still showed an elevated white count of 18.1 with a slight left shift (neutrophils 77%, lymphocytes 12%).  We ordered a follow-up CBC with differential and BMP was still elevated but less so at 14.8, hemoglobin 8.0, platelets 617,000.    Dry nose: Due to oxygen per nasal cannula.  We ordered a non-petroleum-based gel to apply to the nares twice daily.  Also, Ocean nasal spray may be used hourly as needed.  While the nares are still somewhat dry, she has not needed oxygen since 04/25/2019.    Diarrhea: This appears to be her biggest concern.  We ordered Imodium 2 mg after each loose stool (maximum 16 mg/day) and Culturelle (15 billion cell) probiotic (2 caps every morning).  Dr. Luna added psyllium capsules (0.52 g 3 times daily with meals), and she states that it is helping a lot.    GERD: Another problem she complains about there is persistent heartburn.  She is already receiving 20 mg of famotidine daily.  We increased the famotidine to 20 mg twice daily.  She does not note much improvement.  She says, \"it's like you want to burp all the time.\"  We may need to switch to something else.    Diabetes: Recent blood glucose levels range between 72 and 175, mostly around 100.  She is on 1000 mg of metformin twice daily.    Obstructive sleep apnea: She does have a CPAP machine at home but has not used it in months.  Here, she does have an incentive spirometer, and I encouraged her to use it.       ROS: No headaches, nausea or vomiting, dizziness, dysuria.    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     Osteopenia      Pleural effusion 1/15     Radiation Pneumonitis     Created by Conversion      Sleep apnea     does not use cpap              Family History   Problem Relation Age of Onset     Heart disease Mother      Hypertension Mother      Alcohol abuse Mother      Depression Mother      Heart disease Father 45        mi age 45, cabg     Heart attack Father      Cancer Father         prostate     Hypertension Father      COPD Brother      Alcohol abuse Brother      Alcohol abuse Sister      Breast cancer Paternal Aunt      Leukemia Grandchild      Cancer Maternal Aunt 47        breast     Diabetes Son      Anxiety disorder Son      Depression Son      No Medical Problems Daughter      Schizophrenia Grandchild      Social History     Socioeconomic History     Marital status:      Spouse name: Not on file     Number of children: Not on file     Years of education: Not on file     Highest education level: Not on file   Occupational History     Not on file   Social Needs     Financial resource strain: Not on file     Food insecurity:     Worry: Not on file     Inability: Not on file     Transportation needs:     Medical: Not on file     Non-medical: Not on file   Tobacco Use     Smoking status: Former Smoker     Packs/day: 1.50     Years: 0.00     Pack years: 0.00     Last attempt to quit: 11/9/2008     Years since quitting: 10.4     Smokeless tobacco: Former User   Substance and Sexual Activity     Alcohol use: No     Drug use: No     Sexual activity: Never   Lifestyle     Physical activity:     Days per week: Not on file     Minutes per  session: Not on file     Stress: Not on file   Relationships     Social connections:     Talks on phone: Not on file     Gets together: Not on file     Attends Yazdanism service: Not on file     Active member of club or organization: Not on file     Attends meetings of clubs or organizations: Not on file     Relationship status: Not on file     Intimate partner violence:     Fear of current or ex partner: Not on file     Emotionally abused: Not on file     Physically abused: Not on file     Forced sexual activity: Not on file   Other Topics Concern     Not on file   Social History Narrative     Not on file       MEDICATIONS: Reviewed from the MAR, physician orders, and/or earlier progress notes.    Current Outpatient Medications   Medication Sig     acetaminophen (TYLENOL) 500 MG tablet Take 2 tablets (1,000 mg total) by mouth 3 (three) times a day.     albuterol (PROVENTIL) 2.5 mg /3 mL (0.083 %) nebulizer solution TAKE 3ML BY MOUTH EVERY 4 HOURS AS NEEDED FOR WHEEZING     aspirin 81 MG EC tablet Take 81 mg by mouth daily.     baclofen (LIORESAL) 10 MG tablet Take 1 tablet (10 mg total) by mouth 3 (three) times a day as needed.     blood glucose meter (GLUCOMETER) Use 1 each As Directed as needed. Dispense glucometer brand per patient's insurance at pharmacy discretion.     blood glucose test (ACCU-CHEK SMARTVIEW TEST STRIP) strips Test blood sugar 3 times daily     calcium carbonate-vitamin D3 (CALCIUM 600 WITH VITAMIN D3) 600 mg(1,500mg) -200 unit per tablet Take 1 tablet by mouth 2 (two) times a day. Taking 1200mg of Calcium with 1000 D3     cetirizine 10 mg cap Take 10 mg by mouth daily with supper.            citalopram (CELEXA) 20 MG tablet Take 20 mg by mouth daily.     famotidine (PEPCID) 20 MG tablet Take 1 tablet (20 mg total) by mouth daily. (Patient taking differently: Take 20 mg by mouth 2 (two) times a day.       )     fluticasone (FLONASE) 50 mcg/actuation nasal spray 2 SPRAYS INTO EACH NOSTRIL  DAILY.     furosemide (LASIX) 20 MG tablet TAKE 2 TABLETS (40 MG TOTAL) BY MOUTH EVERY MORNING.     generic lancets Use 1 each As Directed daily. Dispense brand per patient's insurance at pharmacy discretion. (dx E11.9)     guaiFENesin ER (MUCINEX) 600 mg 12 hr tablet Take 1,200 mg by mouth 2 (two) times a day.     INCRUSE ELLIPTA 62.5 mcg/actuation DsDv inhaler INHALE 1 PUFF BY MOUTH DAILY     Lactobacillus rhamnosus GG (CULTURELLE) 15 billion cell CpSP Take 2 capsules by mouth Daily at 8:00 am..     levothyroxine (SYNTHROID, LEVOTHROID) 75 MCG tablet TAKE 1 TABLET BY MOUTH EVERY OTHER DAYS ALTERNATING WITH 75MCG AND 50MCG DOSE     lidocaine 4 % patch Place 1 patch on the skin daily. Remove and discard patch with 12 hours.  Apply to right shoulder     loperamide (IMODIUM A-D) 2 mg tablet Take 2 mg by mouth as needed for diarrhea (2 mg after each loose stool to a maximum of 16 mg/day).     LORazepam (ATIVAN) 0.5 MG tablet Take 1 tablet (0.5 mg total) by mouth at bedtime.     menthol-zinc oxide (CALMOSEPTINE) 0.44-20.6 % Oint ointment Apply topically 3 (three) times a day.     metFORMIN (GLUCOPHAGE) 1000 MG tablet Take 1 tablet (1,000 mg total) by mouth 2 (two) times a day with meals.     montelukast (SINGULAIR) 10 mg tablet Take 1 tablet (10 mg total) by mouth at bedtime.     naproxen (NAPROSYN) 500 MG tablet Take 1 tablet (500 mg total) by mouth 2 (two) times a day with meals.     psyllium (METAMUCIL) 0.52 gram capsule Take 0.52 g by mouth 3 (three) times a day with meals.     PULMICORT FLEXHALER 180 mcg/actuation inhaler INHALE 2 PUFFS 2 (TWO) TIMES A DAY.     simvastatin (ZOCOR) 10 MG tablet TAKE 1 TABLET BY MOUTH AT BEDTIME.     sodium chloride (OCEAN) 0.65 % nasal spray Apply 1 spray into each nostril as needed for congestion.     traZODone (DESYREL) 50 MG tablet Take 1 tablet (50 mg total) by mouth at bedtime as needed for sleep.     triamcinolone (KENALOG) 0.1 % ointment Apply topically 2 (two) times a day.  "hands (Patient taking differently: Apply topically daily as needed hands.      )     VENTOLIN HFA 90 mcg/actuation inhaler INHALE 1-2 PUFFS EVERY 4 (FOUR) HOURS AS NEEDED FOR WHEEZING.     ALLERGIES: No Known Allergies    DIET: Diabetic, regular texture, thin liquids.  Mighty Shakes Sugar-Free.    Vitals:    05/01/19 1413   BP: 97/61   Pulse: 99   Resp: 17   Temp: 97.6  F (36.4  C)   SpO2: 96%   Weight: 160 lb 6.4 oz (72.8 kg)   Height: 5' 5\" (1.651 m)     Body mass index is 26.69 kg/m .    EXAMINATION:   General: Pleasant middle-aged female, looking much older than her stated age, lying in bed, in NAD.  Head: Normocephalic and atraumatic.   Eyes: PERRLA, sclerae clear.   ENT: Moist oral mucosa.   Cardiovascular: Regular rate and rhythm.  No appreciable murmur.  Respiratory: Previously, I could appreciate bibasilar rales (left greater than right).  My last 2 visits, there appear to be coarse bilateral mid to upper lung rhonchi with an expiratory wheeze.  Most likely related to her pleural/left lung abscess effusion.  This was discussed above.    Abdomen: Soft and nontender.   Musculoskeletal/Extremities: Age-related degenerative joint disease.  Right arm in a sling.  No peripheral edema.  Integument: No rashes, clinically significant lesions, or skin breakdown.   Cognitive/Psychiatric: Alert and oriented x3 as far as I can tell.  Affect is somewhat depressed/anxious.    DIAGNOSTICS:   Results for orders placed or performed in visit on 04/30/19   Basic Metabolic Panel   Result Value Ref Range    Sodium 139 136 - 145 mmol/L    Potassium 4.7 3.5 - 5.0 mmol/L    Chloride 105 98 - 107 mmol/L    CO2 19 (L) 22 - 31 mmol/L    Anion Gap, Calculation 15 5 - 18 mmol/L    Glucose 68 (L) 70 - 125 mg/dL    Calcium 10.0 8.5 - 10.5 mg/dL    BUN 17 8 - 22 mg/dL    Creatinine 0.92 0.60 - 1.10 mg/dL    GFR MDRD Af Amer >60 >60 mL/min/1.73m2    GFR MDRD Non Af Amer >60 >60 mL/min/1.73m2     Lab Results   Component Value Date    WBC " 14.8 (H) 04/30/2019    HGB 8.0 (L) 04/30/2019    HCT 27.2 (L) 04/30/2019    MCV 92 04/30/2019     (H) 04/30/2019     Estimated Creatinine Clearance: 57.7 mL/min (by C-G formula based on SCr of 0.92 mg/dL).  Lab Results   Component Value Date    HGBA1C 6.3 (H) 02/06/2019     Lab Results   Component Value Date    TSH 1.36 08/27/2018       ASSESSMENT/Plan:      ICD-10-CM    1. History of gram-positive (S. pyogenes) pneumosepsis Z86.19    2. 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 Chronic obstructive pulmonary disease, unspecified COPD type (H) J44.9    4. Chronic pleural effusion, left (s/p Talc pleurodesis 2015) J90    5. Gastroesophageal reflux disease without esophagitis K21.9    6. Functional diarrhea K59.1    7. Non-small cell cancer of right lung, s/p radiation and chemotherapies, in remission since 8312-1008 (H) C34.91    8. Acquired hypothyroidism E03.9      CHANGES:    None.    CARE PLAN:    The care plan has been reviewed and all orders signed. Changes to care plan, if any, as noted. Otherwise, continue current plan of care.     The above has been created using voice recognition software. Please be aware that this may unintentionally  produce inaccuracies and/or nonsensical sentences.      Electronically signed by: Robert Yepez CNP

## 2021-05-28 NOTE — PATIENT INSTRUCTIONS - HE
It was good to see you in clinic today. This is what we discussed:    1. See the completed form for details.  2. Stop Incruse and start Anoro one inhalation daily.  3. Use the Ocean Spray daily and up to four times daily as needed.  4. Use the albuterol nebs as needed.  5. Continue Pulmicort two puffs twice daily. Rinse, gargle, and spit water after use.  6. When it works to do pulmonary rehabilitation, contact me and we can schedule lung function tests to qualify you.  7. I will see you in six weeks with a chest CT.  8. Call any time with questions or concerning symptoms.    Jabari Perez MD  Pulmonary and Critical Care Medicine  [facility]  Office [phone]

## 2021-05-29 NOTE — PROGRESS NOTES
Recent labs reviewed and wbc normalized. CT was done earlier this week which shows ongoing scarring/cavity with resolution of adenopathy.    I called patient, who continues to feel better. At this point she will finish her current antibiotic course. No further antibiotics are needed once done.    She will cancel her upcoming appointment unless she has new symptoms.

## 2021-05-29 NOTE — PROGRESS NOTES
"Pulmonary Clinic Follow-up Visit    Assessment/Plan:  68 year old female with a history of tobacco dependence in remission, COPD, MONSERRAT, non small cell lung cancer of the right upper lobe diagnosed 2009 s/p chemotherapy and radiation c/b radiation pneumonitis in remission, recurrent right pleural effusion s/p talc pleurodesis in 2015, hypothyroidism, dyslipidemia, GERD, DM, right ureteral stent, chronic anemia, recent proximal right humerus fracture treated nonoperatively (poor candidate for surgery), and recent left lung abscess and left empyema with Strep pyogenes bacteremia and toxic shock syndrome s/p hospitalization, left thoracoscopic decortication and abscess drainage, and ongoing antibiotics, presenting for follow-up.     COPD, left lung abscess/empyema, chronic right upper lobe radiation pneumonitis: Feels very \"tired and wobbly,\" overall weak. Breathing feels okay. Occasional cough, similar to previous, usually with light yellow but occasionally green phlegm. Feels some benefit from umeclidinium-vilanterol. No leg edema. Denies fevers/chills. WBC count has been decreasing and following with Dr. Ang in ID clinic. Chest CT today shows marked improvement in the left lung pneumonia and abscess with the process appearing to organize and contract. Also has significant daytime hypersomnolence and untreated MONSERRAT; she is interested in a sleep medicine referral. Also discussed getting formal PFTs and getting her into pulmonary rehabilitation.     Plan:  - continue umeclidinium-vilanterol one inhalation daily  - continue budesonide flexhaler 180 mcg two inhalations two times a day; rinse/gargle/spit water after use  - continue montelukast 10 mg at bedtime  - continues on furosemide 40 mg daily and appears euvolemic  - continues on amoxicillin-clavulanate with ongoing follow-up with Dr. Ang in ID clinic; appreciated  - referral to sleep medicine clinic for untreated MONSERRAT  - PFTs and 6MWT; if she qualifies, will " "refer her to pulmonary rehabilitation  - annual high-dose influenza vaccination  - received Pneumovax-23 in September 2012 and Prevnar-13 in July 2015; due for booster of Pneumovax-23 which we will defer until she is more recovered from her recent infection per patient preference  - follow up in 6 months  - encouraged her to call any time with questions or concerning symptoms     I appreciate the opportunity to participate in the care of Ms. Mancini.  Please call any time if needed.     CCx: COPD, left lung abscess/empyema, chronic right upper lobe radiation pneumonitis    HPI: 68 year old female with a history of tobacco dependence in remission, COPD, MONSERRAT, non small cell lung cancer of the right upper lobe diagnosed 2009 s/p chemotherapy and radiation c/b radiation pneumonitis in remission, recurrent right pleural effusion s/p talc pleurodesis in 2015, hypothyroidism, dyslipidemia, GERD, DM, right ureteral stent, chronic anemia, recent proximal right humerus fracture treated nonoperatively (poor candidate for surgery), and recent left lung abscess and left empyema with Strep pyogenes bacteremia and toxic shock syndrome s/p hospitalization, left thoracoscopic decortication and abscess drainage, and ongoing antibiotics, presenting for follow-up. Feels very \"tired and wobbly,\" overall weak. Breathing feels okay. Occasional cough, similar to previous, usually with light yellow but occasionally green phlegm. Feels some benefit from umeclidinium-vilanterol. No leg edema. Denies fevers/chills. WBC count has been decreasing and following with Dr. Ang in ID clinic. Chest CT today shows marked improvement in the left lung pneumonia and abscess with the process appearing to organize and contract. Also has significant daytime hypersomnolence and untreated MONSERRAT; she is interested in a sleep medicine referral. Also discussed getting formal PFTs and getting her into pulmonary rehabilitation.    ROS:  A 12-system review was " obtained and was negative with the exception of the symptoms endorsed in the history of present illness.    PMH:  Untreated MONSERRAT  NSCLC RUL dx'd 2019 s/p chemo and radiation c/b radiation pneumonitis with ongoing fibrotic changes RUL  Recurrent right pleural effusion s/p right talc pleurodesis in 2015  Osteopenia  Hypothyroidism  DL  GERD  DM  MDD  COPD  Right ureteral stent  Chronic anemia  Left lung abscess/empyema, hospitalized 3/27/19-4/7/19, s/p left thoracoscopic decortication and abscess washout on 3/29/19; path negative for malignancy; remains on amoxicillin-clavulanate as of 6/17/19    PSH:  Past Surgical History:   Procedure Laterality Date     PORTACATH PLACEMENT      and removal     THORACOSCOPY Right 4/6/2015    Procedure: RIGHT THORACOSCOPY / BIOPSY PLEURAL / TALC PLEURODESIS;  Surgeon: Michi Ma MD;  Location: [facility];  Service:      THORACOSCOPY Left 3/29/2019    Procedure: LEFT THORACOSCOPY WITH DECORTICATION;  Surgeon: Michi Ma MD;  Location: [facility];  Service: General     TONSILLECTOMY  age 5     URETERAL STENT PLACEMENT Right 6/2010     US THORACENTESIS  3/27/2019       Allergies:  No Known Allergies    Family HX:  Family History   Problem Relation Age of Onset     Heart disease Mother      Hypertension Mother      Alcohol abuse Mother      Depression Mother      Heart disease Father 45        mi age 45, cabg     Heart attack Father      Cancer Father         prostate     Hypertension Father      COPD Brother      Alcohol abuse Brother      Alcohol abuse Sister      Breast cancer Paternal Aunt      Leukemia Grandchild      Cancer Maternal Aunt 47        breast     Diabetes Son      Anxiety disorder Son      Depression Son      No Medical Problems Daughter      Schizophrenia Grandchild        Social Hx:  Social History     Socioeconomic History     Marital status:      Spouse name: Not on file     Number of children: Not on file     Years of  education: Not on file     Highest education level: Not on file   Occupational History     Not on file   Social Needs     Financial resource strain: Not on file     Food insecurity:     Worry: Not on file     Inability: Not on file     Transportation needs:     Medical: Not on file     Non-medical: Not on file   Tobacco Use     Smoking status: Former Smoker     Packs/day: 1.50     Years: 0.00     Pack years: 0.00     Last attempt to quit: 11/9/2008     Years since quitting: 10.6     Smokeless tobacco: Former User   Substance and Sexual Activity     Alcohol use: No     Drug use: No     Sexual activity: Never   Lifestyle     Physical activity:     Days per week: Not on file     Minutes per session: Not on file     Stress: Not on file   Relationships     Social connections:     Talks on phone: Not on file     Gets together: Not on file     Attends Gnosticist service: Not on file     Active member of club or organization: Not on file     Attends meetings of clubs or organizations: Not on file     Relationship status: Not on file     Intimate partner violence:     Fear of current or ex partner: Not on file     Emotionally abused: Not on file     Physically abused: Not on file     Forced sexual activity: Not on file   Other Topics Concern     Not on file   Social History Narrative     Not on file       Current Meds:  Current Outpatient Medications   Medication Sig Dispense Refill     acetaminophen (TYLENOL) 500 MG tablet Take 2 tablets (1,000 mg total) by mouth 3 (three) times a day.  0     albuterol (PROVENTIL) 2.5 mg /3 mL (0.083 %) nebulizer solution TAKE 3ML BY MOUTH EVERY 4 HOURS AS NEEDED FOR WHEEZING 900 mL 1     amoxicillin-clavulanate (AUGMENTIN) 875-125 mg per tablet Take 1 tablet by mouth 2 (two) times a day. 28 tablet 1     aspirin 81 MG EC tablet Take 81 mg by mouth daily.       blood glucose meter (GLUCOMETER) Use 1 each As Directed as needed. Dispense glucometer brand per patient's insurance at pharmacy  discretion. 1 each 0     blood glucose test (ACCU-CHEK SMARTVIEW TEST STRIP) strips Test blood sugar 3 times daily 200 strip 3     blood glucose test (ACCU-CHEK SMARTVIEW TEST STRIP) strips PT TO TEST BLOOD SUGAR 2-3 TIMES DAILY 300 strip 3     cetirizine 10 mg cap Take 10 mg by mouth daily with supper.              citalopram (CELEXA) 10 MG tablet Take 1 tablet (10 mg total) by mouth daily. 90 tablet 3     famotidine (PEPCID) 20 MG tablet Take 1 tablet (20 mg total) by mouth daily.       fluticasone (FLONASE) 50 mcg/actuation nasal spray 2 SPRAYS INTO EACH NOSTRIL DAILY. 48 g 3     furosemide (LASIX) 20 MG tablet TAKE 2 TABLETS (40 MG TOTAL) BY MOUTH EVERY MORNING. 180 tablet 3     generic lancets Use 1 each As Directed daily. Dispense brand per patient's insurance at pharmacy discretion. (dx E11.9) 100 each 1     Lactobacillus rhamnosus GG (CULTURELLE) 15 billion cell CpSP Take 1 capsule by mouth 2 (two) times a day.              levothyroxine (SYNTHROID, LEVOTHROID) 75 MCG tablet TAKE 1 TABLET BY MOUTH EVERY OTHER DAYS ALTERNATING WITH 75MCG AND 50MCG DOSE 45 tablet 2     loperamide (IMODIUM A-D) 2 mg tablet Take 2 mg by mouth as needed for diarrhea (2 mg after each loose stool to a maximum of 16 mg/day).       menthol-zinc oxide (CALMOSEPTINE) 0.44-20.6 % Oint ointment Apply topically 3 (three) times a day.       metFORMIN (GLUCOPHAGE) 1000 MG tablet Take 1 tablet (1,000 mg total) by mouth 2 (two) times a day with meals. 180 tablet 3     montelukast (SINGULAIR) 10 mg tablet Take 1 tablet (10 mg total) by mouth at bedtime. 90 tablet 3     multivit with calcium,iron,min (MULTIVITAMIN-CA-IRON-MINERALS ORAL) Take by mouth.       naproxen (NAPROSYN) 500 MG tablet Take 1 tablet (500 mg total) by mouth 2 (two) times a day with meals. 180 tablet 3     PULMICORT FLEXHALER 180 mcg/actuation inhaler INHALE 2 PUFFS 2 (TWO) TIMES A DAY. 3 each 3     simvastatin (ZOCOR) 10 MG tablet TAKE 1 TABLET BY MOUTH AT BEDTIME. 90  "tablet 2     traZODone (DESYREL) 100 MG tablet Take 1 tablet (100 mg total) by mouth at bedtime as needed for sleep. (Patient taking differently: Take 100 mg by mouth at bedtime as needed for sleep. Alternative with 50 mg      ) 90 tablet 3     triamcinolone (KENALOG) 0.1 % ointment Apply topically 2 (two) times a day. hands (Patient taking differently: Apply topically daily as needed hands.      ) 30 g 3     umeclidinium-vilanterol (ANORO ELLIPTA) 62.5-25 mcg/actuation inhaler Inhale 1 puff daily. 90 puff 3     VENTOLIN HFA 90 mcg/actuation inhaler INHALE 1-2 PUFFS EVERY 4 (FOUR) HOURS AS NEEDED FOR WHEEZING. 54 g 0     calcium carbonate-vitamin D3 (CALCIUM 600 WITH VITAMIN D3) 600 mg(1,500mg) -200 unit per tablet Take 1 tablet by mouth 2 (two) times a day. Taking 1200mg of Calcium with 1000 D3       sodium chloride (OCEAN) 0.65 % nasal spray Apply 1 spray into each nostril as needed for congestion.       No current facility-administered medications for this visit.        Physical Exam:  /60   Pulse 90   Resp 24   Ht 5' 5\" (1.651 m)   Wt 152 lb 12.8 oz (69.3 kg)   SpO2 96%   BMI 25.43 kg/m    Gen: alert, oriented, no distress  HEENT: nasal turbinates are unremarkable, no oropharyngeal lesions, no cervical or supraclavicular lymphadenopathy  CV: RRR, no M/G/R  Resp: rhonchi toward the bases, no new findings  Abd: soft, nontender, no palpable organomegaly  Skin: no apparent rashes  Ext: no cyanosis, clubbing or edema  Neuro: alert, nonfocal    Labs:  reviewed    Imaging studies:  TTE (3/28/19):  - EF 50%  - normal RV sie/function  - no valvular abnormalities  - severely increased CVP     Chest CT (4/22/19):  - images directly reviewed, formal interpretation follows:  FINDINGS:  LUNGS AND PLEURA: A 2.5 x 1.6 cm left lower lobe cavitation has developed. The  surrounding left lower lobe consolidation has decreased.     Evolving irregular right lower lobe consolidation. Persistent right upper " lobe  paramediastinal irregular consolidation.     Decreased bilateral pleural effusions. Residual fluid collections in both major  and the right minor fissures.     Increased smooth appearing intralobular septal thickening, particularly on the  left.     MEDIASTINUM: Persistent mediastinal adenopathy which could be reactive or  neoplastic.     LIMITED UPPER ABDOMEN: Left para-aortic 13 x 9 mm node just medial to the  adrenal gland. This could be reactive or neoplastic.     MUSCULOSKELETAL: Proximal right humeral impacted fracture.     CONCLUSION:  1.  Evolving bilateral lung consolidations.  2.  New left lower lobe cavitation. This most likely represents cavitary  pneumonia.  3.  Increasing left interlobular septal thickening most suggestive of edema.  4.  Persistent but improved bilateral pleural effusions.  5.  Mediastinal and upper para-aortic adenopathy which could be reactive or  neoplastic.  6.  Recommend CT chest follow-up exclude the possibility of neoplasm.    CT chest (6/17/19):  - images directly reviewed, formal interpretation follows:  FINDINGS:   LUNGS AND PLEURA: The central airways are clear. Bilateral pleural effusions, left greater than right are improved since the previous exam. Mild left lower lobar consolidation with associated mild cavitation, improved since the prior exam. The previously   seen right lower lobar consolidation and adjacent airspace opacities are also improved. Stable irregular medial right apical opacity with associated traction bronchiectasis and distortion, measuring up to 2.1 cm. Previously noted left-sided loculated   pleural fluid along the major fissure has resolved.      MEDIASTINUM: Previously seen mediastinal adenopathy has improved with a representative prevascular node measuring 4 mm short axis dimension versus 11 mm previously. A subcarinal node measures 10 mm short axis dimension versus 17 mm previously. Normal   heart size. Trace pericardial fluid. Mild to  moderate coronary artery calcifications.     LIMITED UPPER ABDOMEN: Fullness of the right renal pelvis may reflect peripelvic cysts.     MUSCULOSKELETAL: Old comminuted right proximal humerus fracture with surrounding callus formation. Multiple old healing bilateral rib fractures.     IMPRESSION:   CONCLUSION:   1.  Interval improved bilateral lower lobar consolidation and airspace opacities. Minimal residual cavitation in the left lower lobar consolidation. This is favored to reflect cavitary pneumonia. Recommend continued follow-up to resolution.  2.  Stable spiculated right apical mass.  3.  Trace residual bilateral pleural effusions, improved since the prior exam.  4.  Interval near complete resolution of the previously seen mediastinal adenopathy.    Jabari Perez MD  Pulmonary and Critical Care Medicine  [facility]  Cell [phone]  Office [phone]  Pager [phone]

## 2021-05-29 NOTE — TELEPHONE ENCOUNTER
"Medication Request  Medication name: Arano inhaler  Pharmacy Name and Location: [facility]/pharmacy #[id number] - [place], [place]   Reason for request: Faxed received from [facility]/pharmacy #[id number] - [place], [place] stating, \"Patient requests new rx: Patient would like a new RX for the arano inhaler would like for a 3 month supply.   When did you use medication last?:  Unknown  Patient offered appointment:  No  Okay to leave a detailed message: no    "

## 2021-05-29 NOTE — TELEPHONE ENCOUNTER
Left detailed message for Natividad giving verbal okay for orders.  Advised Natividad to call back with further questions.

## 2021-05-29 NOTE — TELEPHONE ENCOUNTER
I called the pt to inform that Dr Ang said she may have a CBC in 2 wks. Pt will do so. There is a standing order in place.

## 2021-05-29 NOTE — TELEPHONE ENCOUNTER
Rita wants to know if she needs a Amoxacillin refill.  She will run out before she sees Dr. Ang.

## 2021-05-29 NOTE — TELEPHONE ENCOUNTER
Refill Approved    Rx renewed per Medication Renewal Policy. Medication was last renewed on 9/7/2018.  Last OV 6/5/2019.    Simona Marr, [facility] Connection Triage/Med Refill 6/17/2019     Requested Prescriptions   Pending Prescriptions Disp Refills     blood glucose test (ACCU-CHEK SMARTVIEW TEST STRIP) strips [Pharmacy Med Name: ACCU-CHEK SMARTVIEW TEST STRIP] 200 strip 2     Sig: PT TO TEST BLOOD SUGAR 2-3 TIMES DAILY       Diabetic Supplies Refill Protocol Passed - 6/14/2019 12:43 PM        Passed - Visit with PCP or prescribing provider visit in last 6 months     Last office visit with prescriber/PCP: Visit date not found OR same dept: 6/5/2019 Master Deleon DO OR same specialty: 6/5/2019 Master Deleon DO  Last physical: Visit date not found Last MTM visit: Visit date not found   Next visit within 3 mo: Visit date not found  Next physical within 3 mo: Visit date not found  Prescriber OR PCP: Dang Singh MD  Last diagnosis associated with med order: 1. Diabetes 1.5, managed as type 2 (H)  - ACCU-CHEK SMARTVIEW TEST STRIP strips [Pharmacy Med Name: ACCU-CHEK SMARTVIEW TEST STRIP]; PT TO TEST BLOOD SUGAR 2-3 TIMES DAILY  Dispense: 200 strip; Refill: 2    If protocol passes may refill for 12 months if within 3 months of last provider visit (or a total of 15 months).             Passed - A1C in last 6 months     Hemoglobin A1c   Date Value Ref Range Status   06/05/2019 5.3 3.5 - 6.0 % Final

## 2021-05-29 NOTE — TELEPHONE ENCOUNTER
Medication Question or Clarification  Who is calling: Pharmacy: [facility]  What medication are you calling about? (include dose and sig)   blood glucose test (ACCU-CHEK SMARTVIEW TEST STRIP) strips 300 strip 3 6/17/2019     Sig: PT TO TEST BLOOD SUGAR 2-3 TIMES DAILY        Who prescribed the medication?: Dr Deleon  What is your question/concern?: Fax request received from pharmacy states Patient has Medicare so we need a new Rx for her test strips for just once daily per medicare max of 1 per day when not on insulin.  Pharmacy: [facility]  Okay to leave a detailed message?: Yes  Site CMT - Please call the pharmacy to obtain any additional needed information.

## 2021-05-29 NOTE — TELEPHONE ENCOUNTER
I called the pt, she would like a WBC 2 wks after she completes abx to ensure the values are still WNL. I will send to the MD.

## 2021-05-29 NOTE — TELEPHONE ENCOUNTER
Orders being requested: CBC with differntial  Reason service is needed/diagnosis: Patient was supposed to get this lab drawn today by home care but the nurse was unable to obtain the lab due to patient having deep veins. This lab was ordered by Dr. Louis Ang ([facility])   Patient would like to get this lab drawn in clinic today. Can covering provider place order? Thank you.   When are orders needed by: ASA patient scheduled for lab only today at 3:15 PM.   Where to send Orders: Place in chart  Okay to leave detailed message?  Yes

## 2021-05-29 NOTE — TELEPHONE ENCOUNTER
Sav    In regards of her wbc, wondering if she should do another blood draw, so that it can show if it is bouncing around again, she can be reached @ [phone]

## 2021-05-29 NOTE — PROGRESS NOTES
[facility] Note    Rita Mancini   68 y.o. female    Date of Visit: 6/5/2019  Chief Complaint   Patient presents with     Follow-up     right shoulder, reoccuring abdominal pain under left breast       ASSESSMENT/PLAN  1. Cavitary pneumonia  Hepatic Profile    Basic Metabolic Panel    Hepatic Profile    Basic Metabolic Panel   2. Pneumonia of left lower lobe due to infectious organism (H)  CANCELED: HM1(CBC and Differential)   3. Pneumonia of left lung due to infectious organism, unspecified part of lung  HM1(CBC and Differential)    HM1 (CBC with Diff)   4. Non-small cell cancer of right lung, s/p radiation and chemotherapies, in remission since [id number] (H)     5. Chronic obstructive pulmonary disease, unspecified COPD type (H)     6. Anxiety and depression  citalopram (CELEXA) 10 MG tablet   7. Rib pain on right side  naproxen (NAPROSYN) 500 MG tablet   8. Insomnia, unspecified type  traZODone (DESYREL) 100 MG tablet   9. Disorder of bone   DXA Bone Density Scan   10. Screening for osteoporosis  DXA Bone Density Scan   11. Diabetes 1.5, managed as type 2 (H)  Glycosylated Hemoglobin A1c    Glycosylated Hemoglobin A1c     ---------------------------------------------    1-3.  Rita is recovering from pneumonia complicated by empyema.  Due to persistently high white blood cell count, she is still on Augmentin and is due for follow-up with Dr. Louis Ang tomorrow.  I will have her do the hemogram as he has ordered since this has not been done recently due to difficult blood draw.    4.  She has history of lung cancer, follows with Dr. Foster, no evidence of recurrence    5.  COPD appears stable at this time, no changes.  Dr. Jabari Perez is her pulmonologist    6.  She prefers to go back down to 10 mg for citalopram, was increased to 20 mg at [facility].  She does not feel she needs this higher dose    7.  She has rib pain ever since a fall in March, takes naproxen with good effect, I suggested that  she could take this in the evening with meal to help with rib pain.  I doubt there is fracture, it is in the area of the cartilage.  Also on the differential is ongoing irritation from chest tubes, but seems to be a pain which randomly occurs even prior to the pneumonia, more related to the follow-up    8.  Had a discussion with her daughter Tricia as well as Rita, we think it would be best if she get off all benzodiazepines including the lorazepam which she was occasionally taking for sleep and the diazepam which she was occasionally taking for anxiety.  I will increase the trazodone from 75 mg up to 100 mg since she still has a little bit trouble with sleep, but once she does fall asleep, she is able to stay asleep.  She has bad sleep hygiene, we talked about this a little bit.    9.-10.  Update bone density, she had a humerus fracture around the time of the hospital stay    11.  Update A1c    Return in about 1 month (around 7/5/2019) for Recheck.      SUBJECTIVE    Rita is a 68-year-old woman with history of lung cancer, COPD, who follows up from a prolonged hospital stay and prolonged TCU stay.  She was admitted on 3/27 and discharged on 4/7 following diagnosis of strep pyogenes pneumonia which was complicated by left-sided empyema, status post VATS decortication on 3/29.  Hospital stay was prolonged in part because of this and the need to keep chest tubes in until healed.  She had persistently elevated white blood cell count was on Zosyn, clindamycin, vancomycin, ended up transitioning to Augmentin with follow-up with the infectious disease specialist.    The WBC is not decreasing, still on Augmentin for this.  She will see ID tomorrow.    Dr. Perez ordered a CT chest to see if there is residual infection.      In regards to pain, this was controlled with Tylenol, though she seemed to prefer oxycodone.  She also had anxiety and was on diazepam.  This was put on hold while she was on pain medications.    She  also has diabetes and sleep apnea, uses CPAP.    She had right upper extremity humerus fracture and followed up with [facility].  There is some malalignment of the fracture, but latest x-ray in early May showed that this was healing and they are recommending nonoperative treatment.  I also recommended starting PT/OT, but not unrestricted until 1 month follow-up, which should be coming up soon.     She has pain under the left breast-- she had a fall and appointment with Dr. Tabor in March.      ROS A comprehensive review of systems was performed and was otherwise negative    All medications before and after hospitalization including the changes were reconciled and reviewed with the patient.       Medications, allergies, and problem list were reviewed and updated    Patient Active Problem List   Diagnosis     Episode of recurrent major depressive disorder (H)     Non-small cell cancer of right lung, s/p radiation and chemotherapies, in remission since 6504-7625 (H)     Hypothyroidism     Hyperlipidemia     Diabetes 1.5, managed as type 2 (H)     COPD (chronic obstructive pulmonary disease) (H)     MONSERRAT on CPAP     Diverticulosis     Chronic pleural effusion, left (s/p Talc pleurodesis 2015)     Bronchiectasis (H)     Eczema of both hands     Keratosis obturans of external ear canal, right     Functional diarrhea     Ventral hernia without obstruction or gangrene     PNA (pneumonia)     Septic shock (H)     Hyperkalemia     Empyema (H)     Leukocytosis, unspecified type     History of gram-positive (S. pyogenes) pneumosepsis     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, subsequent encounter     Current moderate episode of major depressive disorder (H)     Gastroesophageal reflux disease without esophagitis     Psychophysiological insomnia     Anemia     Cavitary pneumonia     Past Medical History:   Diagnosis Date     COPD (chronic obstructive pulmonary disease) (H)       Depression      Diabetes mellitus (H)      GERD (gastroesophageal reflux disease)      Hx antineoplastic chemotherapy     lung cancer      Hx of radiation therapy     lung cancer      Hyperlipemia      Hypothyroid      Lung cancer (H) 2008    RUL,  Non small cell cancer     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     Osteopenia      Pleural effusion 1/15     Radiation Pneumonitis     Created by Conversion      Sleep apnea     does not use cpap     Past Surgical History:   Procedure Laterality Date     PORTACATH PLACEMENT      and removal     THORACOSCOPY Right 4/6/2015    Procedure: RIGHT THORACOSCOPY / BIOPSY PLEURAL / TALC PLEURODESIS;  Surgeon: Michi Ma MD;  Location: [facility];  Service:      THORACOSCOPY Left 3/29/2019    Procedure: LEFT THORACOSCOPY WITH DECORTICATION;  Surgeon: Michi Ma MD;  Location: [facility];  Service: General     TONSILLECTOMY  age 5     URETERAL STENT PLACEMENT Right 6/2010     US THORACENTESIS  3/27/2019     Social History     Socioeconomic History     Marital status:      Spouse name: Not on file     Number of children: Not on file     Years of education: Not on file     Highest education level: Not on file   Occupational History     Not on file   Social Needs     Financial resource strain: Not on file     Food insecurity:     Worry: Not on file     Inability: Not on file     Transportation needs:     Medical: Not on file     Non-medical: Not on file   Tobacco Use     Smoking status: Former Smoker     Packs/day: 1.50     Years: 0.00     Pack years: 0.00     Last attempt to quit: 11/9/2008     Years since quitting: 10.5     Smokeless tobacco: Former User   Substance and Sexual Activity     Alcohol use: No     Drug use: No     Sexual activity: Never   Lifestyle     Physical activity:     Days per week: Not on file     Minutes per session: Not on file     Stress: Not on file   Relationships     Social connections:     Talks on phone: Not on  file     Gets together: Not on file     Attends Worship service: Not on file     Active member of club or organization: Not on file     Attends meetings of clubs or organizations: Not on file     Relationship status: Not on file     Intimate partner violence:     Fear of current or ex partner: Not on file     Emotionally abused: Not on file     Physically abused: Not on file     Forced sexual activity: Not on file   Other Topics Concern     Not on file   Social History Narrative     Not on file     Family History   Problem Relation Age of Onset     Heart disease Mother      Hypertension Mother      Alcohol abuse Mother      Depression Mother      Heart disease Father 45        mi age 45, cabg     Heart attack Father      Cancer Father         prostate     Hypertension Father      COPD Brother      Alcohol abuse Brother      Alcohol abuse Sister      Breast cancer Paternal Aunt      Leukemia Grandchild      Cancer Maternal Aunt 47        breast     Diabetes Son      Anxiety disorder Son      Depression Son      No Medical Problems Daughter      Schizophrenia Grandchild        Current Outpatient Medications   Medication Sig Dispense Refill     acetaminophen (TYLENOL) 500 MG tablet Take 2 tablets (1,000 mg total) by mouth 3 (three) times a day.  0     albuterol (PROVENTIL) 2.5 mg /3 mL (0.083 %) nebulizer solution TAKE 3ML BY MOUTH EVERY 4 HOURS AS NEEDED FOR WHEEZING 900 mL 1     amoxicillin-clavulanate (AUGMENTIN) 875-125 mg per tablet Take 1 tablet by mouth 2 (two) times a day for 10 days. 20 tablet 0     aspirin 81 MG EC tablet Take 81 mg by mouth daily.       blood glucose meter (GLUCOMETER) Use 1 each As Directed as needed. Dispense glucometer brand per patient's insurance at pharmacy discretion. 1 each 0     blood glucose test (ACCU-CHEK SMARTVIEW TEST STRIP) strips Test blood sugar 3 times daily 200 strip 3     calcium carbonate-vitamin D3 (CALCIUM 600 WITH VITAMIN D3) 600 mg(1,500mg) -200 unit per tablet  Take 1 tablet by mouth 2 (two) times a day. Taking 1200mg of Calcium with 1000 D3       cetirizine 10 mg cap Take 10 mg by mouth daily with supper.              citalopram (CELEXA) 10 MG tablet Take 1 tablet (10 mg total) by mouth daily. 90 tablet 3     famotidine (PEPCID) 20 MG tablet Take 1 tablet (20 mg total) by mouth daily.       fluticasone (FLONASE) 50 mcg/actuation nasal spray 2 SPRAYS INTO EACH NOSTRIL DAILY. 48 g 3     furosemide (LASIX) 20 MG tablet TAKE 2 TABLETS (40 MG TOTAL) BY MOUTH EVERY MORNING. 180 tablet 3     generic lancets Use 1 each As Directed daily. Dispense brand per patient's insurance at pharmacy discretion. (dx E11.9) 100 each 1     Lactobacillus rhamnosus GG (CULTURELLE) 15 billion cell CpSP Take 1 capsule by mouth 2 (two) times a day.              levothyroxine (SYNTHROID, LEVOTHROID) 75 MCG tablet TAKE 1 TABLET BY MOUTH EVERY OTHER DAYS ALTERNATING WITH 75MCG AND 50MCG DOSE 45 tablet 2     loperamide (IMODIUM A-D) 2 mg tablet Take 2 mg by mouth as needed for diarrhea (2 mg after each loose stool to a maximum of 16 mg/day).       menthol-zinc oxide (CALMOSEPTINE) 0.44-20.6 % Oint ointment Apply topically 3 (three) times a day.       metFORMIN (GLUCOPHAGE) 1000 MG tablet Take 1 tablet (1,000 mg total) by mouth 2 (two) times a day with meals. 180 tablet 3     montelukast (SINGULAIR) 10 mg tablet Take 1 tablet (10 mg total) by mouth at bedtime. 90 tablet 3     PULMICORT FLEXHALER 180 mcg/actuation inhaler INHALE 2 PUFFS 2 (TWO) TIMES A DAY. 3 each 3     simvastatin (ZOCOR) 10 MG tablet TAKE 1 TABLET BY MOUTH AT BEDTIME. 90 tablet 2     sodium chloride (OCEAN) 0.65 % nasal spray Apply 1 spray into each nostril as needed for congestion.       traZODone (DESYREL) 100 MG tablet Take 1 tablet (100 mg total) by mouth at bedtime as needed for sleep. 90 tablet 3     triamcinolone (KENALOG) 0.1 % ointment Apply topically 2 (two) times a day. hands (Patient taking differently: Apply topically daily  "as needed hands.      ) 30 g 3     umeclidinium-vilanterol (ANORO ELLIPTA) 62.5-25 mcg/actuation inhaler Inhale 1 puff daily.       VENTOLIN HFA 90 mcg/actuation inhaler INHALE 1-2 PUFFS EVERY 4 (FOUR) HOURS AS NEEDED FOR WHEEZING. 54 g 0     naproxen (NAPROSYN) 500 MG tablet Take 1 tablet (500 mg total) by mouth 2 (two) times a day with meals. 180 tablet 3     No current facility-administered medications for this visit.        No Known Allergies    EXAM  Vitals:    06/05/19 0953   BP: 102/56   Patient Site: Left Arm   Patient Position: Sitting   Cuff Size: Adult Regular   Pulse: 88   SpO2: 95%   Weight: 156 lb 12.8 oz (71.1 kg)   Height: 5' 5\" (1.651 m)         General: alert, no distress  HEENT: sclerae anicteric, moist oral mucosa  Heart: Regular rate and rhythm, no murmurs.  No pretibial edema.  Warm extremities  Lungs: Scant wheezes throughout, no loud crackles  Gastrointestinal: abdomen is soft, non-tender, non-distended.    Skin: warm/dry, no rashes  Neuro: no gross abnormalities  MSK: Focal tenderness over ninth/10th rib, anterior axillary line on the left    RESULTS REVIEWED:     ANALYSIS AND SUMMARY OF OLD RECORDS, NOTES AND CONSULTS (2): reviewed hospital summary, ortho    RECORDS REQUESTED (1): None.     OTHER HISTORY SUMMARIZED (from nursing staff, family, friends) (2): Much of the history was fact corrected by her daughter    RADIOLOGY TESTS SUMMARIZED or REQUESTED (XRAY/CT/MRI/DXA) (1):   No results found.    MEDICINE TESTS SUMMARIZED or REQUESTED (EKG/ECHO/COLONOSCOPY/EGD) (1): None    INDEPENDENT REVIEW OF EKG OR X-RAY (2): None.    Lab Results   Component Value Date    WBC 17.1 (H) 05/22/2019    HGB 9.7 (L) 05/22/2019    HCT 31.9 (L) 05/22/2019    MCV 89 05/22/2019     (H) 05/22/2019      Results for orders placed or performed in visit on 04/30/19   Basic Metabolic Panel   Result Value Ref Range    Sodium 139 136 - 145 mmol/L    Potassium 4.7 3.5 - 5.0 mmol/L    Chloride 105 98 - 107 mmol/L "    CO2 19 (L) 22 - 31 mmol/L    Anion Gap, Calculation 15 5 - 18 mmol/L    Glucose 68 (L) 70 - 125 mg/dL    Calcium 10.0 8.5 - 10.5 mg/dL    BUN 17 8 - 22 mg/dL    Creatinine 0.92 0.60 - 1.10 mg/dL    GFR MDRD Af Amer >60 >60 mL/min/1.73m2    GFR MDRD Non Af Amer >60 >60 mL/min/1.73m2          Data points  5     Master Deleon DO  Internal Medicine  [facility]

## 2021-05-29 NOTE — PROGRESS NOTES
[facility] INFECTIOUS DISEASE CLINIC FOLLOW UP NOTE    Date: 6/6/2019  Patient Name: Rita Mancini   YOB: 1950  MRN: [id number]      ASSESSMENT:  68-year-old woman with recent admission for group A strep sepsis and pneumonia.  She required intubation for respiratory failure.  She is status post VATS with debridement of a left lung abscess on 3/29.  Ultimately had a single right-sided chest tube, and 3 left-sided chest tubes.  She was treated for toxic shock syndrome with addition of clindamycin and IVIG x3 days.  She has had slow recovery with normalization of her procalcitonin, and slow decline of the WBC count.    Repeat chest x-ray last month showed complex necrotizing pneumonia with empyema.  CT scan was done which was reviewed with the radiologist.  This confirmed left lower lobe pneumonia with cavitation, but there was no signs of drainable abscess or effusion.    Patient continues to improve. Wbc yesterday down to 12.5. Now in independent living.    History of lung cancer in 2009 complicated by recurrent effusion, requiring talc pleurodesis on the right in 2015.  Currently in remission.    Recent right proximal humerus fracture.  Followed by [facility], not a surgical candidate.    PLAN:  -Continue Augmentin, refills today  -repeat cbc in 2 weeks; if normal, then stop augmentin; if still elevated, refill for another 2 wks  -cxr today    I will call after next cbc    Return to clinic in 4 weeks.    Louis Ang MD  [facility]   Clinic phone: [phone]  Clinic fax: [phone]    ______________________________________________________________________    HISTORY OF PRESENT ILLNESS:   From inpatient ID consult on 3/28:    Rita Mancini is a 68 y.o. old female. History is provided by patients family and chart review.     About two weeks prior to admission on 3/11, Rita had a fall and hurt her ribs. She was treated with prednisone and oxycodone. She had  an additional fall on 3/16 and fractured her right humerus. Since then, she has had progressively increasing shortness of breath and the day prior to admission became confused and thus EMS was called and she was brought to [facility] for further evaluation and management.      Her O2 sats were in the 70s per EMS and she was hypotensive and tachycardic in the ED. CXR showed left sided pleural effusion and left lower lobe opacity. She was initially placed on BiPAP and eventually required intubation. Labs revealed lactic acid of 7.4 with pH of 7.31. WBC 30. She was started on azithromycin, zosyn, and vancomycin.       She remains in the ICU ventilated on pressure support.    SUBJECTIVE / INTERVAL HISTORY:   Doing well. Moved back to independent living. Gets winded quickly, but more active. General confusion is closer to baseline per daughter, but she still sees some memory changes. Tolerating antibiotics, with loose stools, unchanged.    ROS:   No fevers, No new rashes.      Current Outpatient Medications:      acetaminophen (TYLENOL) 500 MG tablet, Take 2 tablets (1,000 mg total) by mouth 3 (three) times a day., Disp: , Rfl: 0     amoxicillin-clavulanate (AUGMENTIN) 875-125 mg per tablet, Take 1 tablet by mouth 2 (two) times a day for 10 days., Disp: 20 tablet, Rfl: 0     aspirin 81 MG EC tablet, Take 81 mg by mouth daily., Disp: , Rfl:      blood glucose meter (GLUCOMETER), Use 1 each As Directed as needed. Dispense glucometer brand per patient's insurance at pharmacy discretion., Disp: 1 each, Rfl: 0     blood glucose test (ACCU-CHEK SMARTVIEW TEST STRIP) strips, Test blood sugar 3 times daily, Disp: 200 strip, Rfl: 3     calcium carbonate-vitamin D3 (CALCIUM 600 WITH VITAMIN D3) 600 mg(1,500mg) -200 unit per tablet, Take 1 tablet by mouth 2 (two) times a day. Taking 1200mg of Calcium with 1000 D3, Disp: , Rfl:      cetirizine 10 mg cap, Take 10 mg by mouth daily with supper.    , Disp: , Rfl:      citalopram  (CELEXA) 10 MG tablet, Take 1 tablet (10 mg total) by mouth daily., Disp: 90 tablet, Rfl: 3     famotidine (PEPCID) 20 MG tablet, Take 1 tablet (20 mg total) by mouth daily., Disp: , Rfl:      fluticasone (FLONASE) 50 mcg/actuation nasal spray, 2 SPRAYS INTO EACH NOSTRIL DAILY., Disp: 48 g, Rfl: 3     furosemide (LASIX) 20 MG tablet, TAKE 2 TABLETS (40 MG TOTAL) BY MOUTH EVERY MORNING., Disp: 180 tablet, Rfl: 3     generic lancets, Use 1 each As Directed daily. Dispense brand per patient's insurance at pharmacy discretion. (dx E11.9), Disp: 100 each, Rfl: 1     Lactobacillus rhamnosus GG (CULTURELLE) 15 billion cell CpSP, Take 1 capsule by mouth 2 (two) times a day.    , Disp: , Rfl:      levothyroxine (SYNTHROID, LEVOTHROID) 75 MCG tablet, TAKE 1 TABLET BY MOUTH EVERY OTHER DAYS ALTERNATING WITH 75MCG AND 50MCG DOSE, Disp: 45 tablet, Rfl: 2     loperamide (IMODIUM A-D) 2 mg tablet, Take 2 mg by mouth as needed for diarrhea (2 mg after each loose stool to a maximum of 16 mg/day)., Disp: , Rfl:      metFORMIN (GLUCOPHAGE) 1000 MG tablet, Take 1 tablet (1,000 mg total) by mouth 2 (two) times a day with meals., Disp: 180 tablet, Rfl: 3     montelukast (SINGULAIR) 10 mg tablet, Take 1 tablet (10 mg total) by mouth at bedtime., Disp: 90 tablet, Rfl: 3     multivit with calcium,iron,min (MULTIVITAMIN-CA-IRON-MINERALS ORAL), Take by mouth., Disp: , Rfl:      PULMICORT FLEXHALER 180 mcg/actuation inhaler, INHALE 2 PUFFS 2 (TWO) TIMES A DAY., Disp: 3 each, Rfl: 3     simvastatin (ZOCOR) 10 MG tablet, TAKE 1 TABLET BY MOUTH AT BEDTIME., Disp: 90 tablet, Rfl: 2     traZODone (DESYREL) 100 MG tablet, Take 1 tablet (100 mg total) by mouth at bedtime as needed for sleep., Disp: 90 tablet, Rfl: 3     umeclidinium-vilanterol (ANORO ELLIPTA) 62.5-25 mcg/actuation inhaler, Inhale 1 puff daily., Disp: , Rfl:      VENTOLIN HFA 90 mcg/actuation inhaler, INHALE 1-2 PUFFS EVERY 4 (FOUR) HOURS AS NEEDED FOR WHEEZING., Disp: 54 g, Rfl: 0      albuterol (PROVENTIL) 2.5 mg /3 mL (0.083 %) nebulizer solution, TAKE 3ML BY MOUTH EVERY 4 HOURS AS NEEDED FOR WHEEZING, Disp: 900 mL, Rfl: 1     menthol-zinc oxide (CALMOSEPTINE) 0.44-20.6 % Oint ointment, Apply topically 3 (three) times a day., Disp: , Rfl:      naproxen (NAPROSYN) 500 MG tablet, Take 1 tablet (500 mg total) by mouth 2 (two) times a day with meals., Disp: 180 tablet, Rfl: 3     sodium chloride (OCEAN) 0.65 % nasal spray, Apply 1 spray into each nostril as needed for congestion., Disp: , Rfl:      triamcinolone (KENALOG) 0.1 % ointment, Apply topically 2 (two) times a day. hands (Patient taking differently: Apply topically daily as needed hands.   ), Disp: 30 g, Rfl: 3      OBJECTIVE:  Vitals:    06/06/19 0915   BP: 100/58   Pulse: 84   Temp: 98.5  F (36.9  C)         GEN: No acute distress.    HENT: Head is normocephalic, atraumatic.   EYES: Eyes have anicteric sclerae without conjunctival injection or stigmata of endocarditis.   RESPIRATORY:  Decreased sounds at the right base.  Crackles at base on the left mostly  CARDIOVASCULAR:  Regular rate and rhythm. Normal S1 and S2. No murmur, click, gallop or rub.   EXTREMITIES: No edema.    SKIN/HAIR/NAILS:  No rashes  NEUROLOGIC: Grossly nonfocal.  AOx3      Pertinent labs:    Results from last 7 days   Lab Units 06/05/19  1033   LN-WHITE BLOOD CELL COUNT thou/uL 12.5*   LN-HEMOGLOBIN g/dL 10.8*   LN-HEMATOCRIT % 33.5*   LN-PLATELET COUNT thou/uL 407     Results from last 7 days   Lab Units 06/05/19  1033   LN-SODIUM mmol/L 138   LN-POTASSIUM mmol/L 4.7   LN-CHLORIDE mmol/L 101   LN-CO2 mmol/L 27   LN-BLOOD UREA NITROGEN mg/dL 12   LN-CREATININE mg/dL 1.02   LN-CALCIUM mg/dL 10.1     Lab Results   Component Value Date    CRP 0.8 05/23/2017         Lab Results   Component Value Date    ALT <9 06/05/2019    AST 22 06/05/2019    ALKPHOS 91 06/05/2019    BILITOT 0.4 06/05/2019         MICROBIOLOGY DATA:  Reviewed     RADIOLOGY:  EXAM DATE:          04/22/2019     EXAM: CT CHEST WITH CONTRAST  LOCATION: [facility]  DATE/TIME: 4/22/2019 11:15 AM     INDICATION: Pneumonia, unspecified organism  COMPARISON: CT 3/28/2019.  TECHNIQUE: Helical images were obtained through the chest during injection of IV  contrast. Multiplanar reformats were obtained. Dose reduction techniques were  used.     IV CONTRAST: 100ml IV Isovue 370 administered. SPR I-Stat Cr=0.8mg/dl, eGFR=71.     FINDINGS:  LUNGS AND PLEURA: A 2.5 x 1.6 cm left lower lobe cavitation has developed. The  surrounding left lower lobe consolidation has decreased.     Evolving irregular right lower lobe consolidation. Persistent right upper lobe  paramediastinal irregular consolidation.     Decreased bilateral pleural effusions. Residual fluid collections in both major  and the right minor fissures.     Increased smooth appearing intralobular septal thickening, particularly on the  left.     MEDIASTINUM: Persistent mediastinal adenopathy which could be reactive or  neoplastic.     LIMITED UPPER ABDOMEN: Left para-aortic 13 x 9 mm node just medial to the  adrenal gland. This could be reactive or neoplastic.     MUSCULOSKELETAL: Proximal right humeral impacted fracture.     CONCLUSION:  1.  Evolving bilateral lung consolidations.  2.  New left lower lobe cavitation. This most likely represents cavitary  pneumonia.  3.  Increasing left interlobular septal thickening most suggestive of edema.  4.  Persistent but improved bilateral pleural effusions.  5.  Mediastinal and upper para-aortic adenopathy which could be reactive or  neoplastic.  6.  Recommend CT chest follow-up exclude the possibility of neoplasm.     DICOM format image data is available to non-affiliated external healthcare  facilities or entities on a secure, media-free, reciprocally searchable basis  with patient authorization for at least a 12-month period after the study.

## 2021-05-29 NOTE — TELEPHONE ENCOUNTER
Orders being requested: 1 time a week for 4 weeks ot orders  Reason service is needed/diagnosis: working on safety for self care and home management tasks, address strengthening and motion in right arm as the ortho allows  When are orders needed by: asap  Where to send Orders: Phone:  [phone]  Okay to leave detailed message?  Yes

## 2021-05-29 NOTE — PATIENT INSTRUCTIONS - HE
It was good to see you in clinic today. This is what we discussed:    1. Continue Anoro one inhalation daily.  2. Continue Pulmicort Flexhaler two inhalations twice daily. Rinse, gargle, and spit water after use.  3. Continue Singulair one pill at bedtime.  4. We will get you into the sleep medicine clinic.  5. We will get lung function tests and a walking test.  6. Depending on the lung function test results, we can get into pulmonary rehabilitation.  7. The left lung abscess is healing on the CT.  8. I will see you in about six months.  9. Call any time with questions or concerning symptoms.    Jabari Perez MD  Pulmonary and Critical Care Medicine  [facility]  Office [phone]

## 2021-05-30 VITALS — BODY MASS INDEX: 31.61 KG/M2 | HEIGHT: 65 IN | WEIGHT: 189.7 LBS

## 2021-05-30 VITALS — WEIGHT: 188.3 LBS | BODY MASS INDEX: 31.33 KG/M2

## 2021-05-30 NOTE — TELEPHONE ENCOUNTER
Called and spoke with Rita.  Explained that Dr. Perez tried to reach her last evening and was not able.    Explained that her PFT's showed a likely mixed obstructive/restrictive process (primarily restrictive) and reduced DLEO.  No significant desat on 6 MWT but walk distance was reduced.  Her PFT results do qualify her for pulmonary rehab and orders were placed for that.  Will have our schedulers call her to see which location she would prefer and fax orders.

## 2021-05-30 NOTE — TELEPHONE ENCOUNTER
Pulmonary Telephone Note    PFT shows a likely mixed obstructive/restrictive process (primarily restrictive) and reduced DLCO. No significant desaturation on 6MWT, but walk distance is somewhat reduced (about 71% predicted). She would qualify for pulmonary rehabilitation. Called her cell and home phone numbers and reached voicemail on both. Left a brief voicemail message on her cell phone with the clinic phone number and a message that I would try her again at another time. She apparently asked at the clinic about pulmonary rehab, so I will place the order and have the clinic contact her with results and help arrange pulmonary rehab.    Jabari Perez MD  Pulmonary and Critical Care Medicine  [facility]  Cell [phone]  Office [phone]  Pager [phone]

## 2021-05-30 NOTE — TELEPHONE ENCOUNTER
Patient calling - says she was in the hospital for 2 weeks and then in transitional care for 5 weeks.  She says she was diagnosed with pneumonia and became septic.  Says she was on antibiotics for almost 7 weeks    Says her temperature now is 100.6 and she has had more than 7 episodes of explosive diarrhea today.  Says her temperature is usually 97.6.  Is feeling weak and a little lightheaded when she stands.  Able to walk.  Says she is hydrated - has been drinking water and ginger ale.  No abdominal pain or blood in stool.    Triaged to disposition of See Physician Within 4 Hours.  Patient intends to have her daughter bring her in now.    Angela Maya, RN  Triage Nurse Advisor    Reason for Disposition    [1] SEVERE diarrhea (e.g., 7 or more times / day more than normal) AND [2]  age > 60 years    Protocols used: DIARRHEA-A-

## 2021-05-30 NOTE — TELEPHONE ENCOUNTER
Refill Approved    Rx renewed per Medication Renewal Policy. Medication was last renewed on 2/25/18.    Jil Arrington, [facility] Connection Triage/Med Refill 7/14/2019     Requested Prescriptions   Pending Prescriptions Disp Refills     simvastatin (ZOCOR) 10 MG tablet [Pharmacy Med Name: SIMVASTATIN 10 MG TABLET] 90 tablet 2     Sig: TAKE 1 TABLET BY MOUTH AT BEDTIME.       Statins Refill Protocol (Hmg CoA Reductase Inhibitors) Passed - 7/14/2019  3:46 PM        Passed - PCP or prescribing provider visit in past 12 months      Last office visit with prescriber/PCP: 6/5/2019 Master Deleon DO OR same dept: 6/5/2019 Master Deleon DO OR same specialty: 6/5/2019 Master Deleon DO  Last physical: Visit date not found Last MTM visit: Visit date not found   Next visit within 3 mo: Visit date not found  Next physical within 3 mo: Visit date not found  Prescriber OR PCP: Master Deleon DO  Last diagnosis associated with med order: 1. Hyperlipidemia  - simvastatin (ZOCOR) 10 MG tablet [Pharmacy Med Name: SIMVASTATIN 10 MG TABLET]; TAKE 1 TABLET BY MOUTH AT BEDTIME.  Dispense: 90 tablet; Refill: 2    If protocol passes may refill for 12 months if within 3 months of last provider visit (or a total of 15 months).

## 2021-05-31 ENCOUNTER — RECORDS - HEALTHEAST (OUTPATIENT)
Dept: ADMINISTRATIVE | Facility: CLINIC | Age: 71
End: 2021-05-31

## 2021-05-31 VITALS — BODY MASS INDEX: 31 KG/M2 | WEIGHT: 186.3 LBS

## 2021-05-31 NOTE — TELEPHONE ENCOUNTER
Central PA team  [phone]  Pool: HE PA MED ([id number])          PA has been initiated.       PA form completed and faxed insurance via Cover My Meds     Key:   [id number] - PA Case ID: [id number] - Rx #: [id number]     Medication:  diazePAM 2MG tablets    Insurance:  [employer]         Response will be received via fax and may take up to 5-10 business days depending on plan

## 2021-05-31 NOTE — PROGRESS NOTES
TCM DISCHARGE FOLLOW UP CALL    Discharge Date:  7/31/2019  Reason for hospital stay (discharge diagnosis)::  HCAP RLL, septic shock  Are you feeling better, the same or worse since your discharge?:  Patient is feeling the same (She has altered taste, mouth is sore, very tired. Tongue is reddened. No white patches.. Has a dry weak cough. Pain under (L) breast and in shoulder unchanged. )  Do you feel like you have a plan in the event of a health emergency?: Yes (Dtr, neighbors)    As part of your discharge plan, were  home care services ordered for you?: No    Did you receive any new medications, or was there a change to your medications?: Yes    Are you taking those medications, or do you have any established regiment?:  Levaquin daily (pt thinks this is causing taste alteration). She is taking Tylenol scheduled.  Do you have any follow up visits scheduled with your PCP or Specialist?:  Yes, with PCP and Yes, with Specialist  (RN) Is PCP appt scheduled soon enough (within 14 days of discharge date)?: Yes (8/8)    Who are you seeing and when is it scheduled?:  Dr Foster 8/26

## 2021-05-31 NOTE — PROGRESS NOTES
Hospital discharge follow up call to pt, no answer. Left VM message reminding pt of appt on 8/7. RN will attempt call back at another time.

## 2021-05-31 NOTE — TELEPHONE ENCOUNTER
RN cannot approve Refill Request    RN can NOT refill this medication med is not covered by policy/route to provider. Last office visit: 8/8/2019 Master Deleon DO Last Physical: Visit date not found Last MTM visit: Visit date not found Last visit same specialty: 8/8/2019 Master Deleon DO.  Next visit within 3 mo: Visit date not found  Next physical within 3 mo: Visit date not found      Irene Seymour, [facility] Connection Triage/Med Refill 8/23/2019    Requested Prescriptions   Pending Prescriptions Disp Refills     acetaminophen (TYLENOL) 500 MG tablet [Pharmacy Med Name: ACETAMINOPHEN 500 MG TABLET] 100 tablet 0     Sig: TAKE 1-2 TABLETS (500-1,000 MG) BY MOUTH EVERY 6 HOURS AS NEEDED FOR MILD PAIN       There is no refill protocol information for this order

## 2021-05-31 NOTE — TELEPHONE ENCOUNTER
Left message for patient stating letter has been completed and is ready for  at the  on first floor at the clinic.

## 2021-05-31 NOTE — TELEPHONE ENCOUNTER
Fax received from [facility], they have started the Prior Authorization Process via Cover My Meds    CoverMyMeds Key: [id number]    Medication Name:   diazePAM (VALIUM) 2 MG tablet 30 tablet 1 8/8/2019     Sig - Route: Take 1 tablet (2 mg total) by mouth at bedtime as needed for anxiety. - Oral    Sent to pharmacy as: diazePAM (VALIUM) 2 MG tablet    E-Prescribing Status: Receipt confirmed by pharmacy (8/8/2019 11:40 AM CDT)          Insurance Plan: Medicare Part D  PBM: Cherise  Patient ID: [id number]    Please complete the PA process

## 2021-05-31 NOTE — PROGRESS NOTES
MTM Transition of Care Encounter  Assessment & Plan                                                     Post Discharge Medication Reconciliation Status: discharge medications reconciled and changed, per note/orders (see AVS)    Pneumonia: Symptom improvement, but patient continues to be fatigued. Appears to be tolerating levofloxacin. Will be seen by PCP on Thursday for further evaluation.     Pain: Stable. Continue to take APAP PRN. Due to her renal function, agree to avoid NSAIDs.     Type 2 Diabetes:  well controlled. A1C at goal of <7%. FBG at goal  mg/dL. She plans on discussing with PCP about decreasing metformin back to 500 mg two times a day. Recommend future lipid recheck.    Hypothyroidism: TSH was within normal limits. Reviewed symptoms/signs hypo/hyperthyroidism.     Edema: Patient reduce the dose on her own and has seen no change. Recommended to continue 20 mg and monitor edema.     Depression/Insomnia: Needs improvement. Appears that trazodone is helping her stay asleep, but not fall asleep and she is having a hangover feeling in the morning. Reviewed to talk to Dr. Deleon about diazepam, but to only use PRN and not on a regular basis due to the risk of falls, cognitive impairment, and dependence. If there is an anxiety component, then consider increasing citalopram to 20 mg.     GERD: Stable. Recommended that she talk to Dr. Deleon about stopping famotidine regularly and to use PRN for GERD symptoms.     Allergies: Stable. Recommended to continue current regimen.     COPD: Stable. Continue current inhalers. Reviewed that Arnuity is an Elliptra version (like Anoro) or Pulmicort and would be once daily if she is interested in switching to consolidate her medication regimen.     Follow Up  As needed with MTM     Subjective & Objective                                                       Rita Mancini is a 69 y.o. female called for a transitions of care visit. she was discharged from [facility]  "on 7/31/19 for septic shock and PNA.     Chief Complaint: A couple good days, but fatigued again. Not getting her walking in and is less active so she feels tired.     Pneumonia: Septic shock resolved after brief ICU stay. Secondary to right lower lobe pneumonia. Discharged with levofloxacin 500 mg daily x 7 days. Reports her breathing is ok. If walking around will have shortness of breath, but if sitting or short distance she is ok. Less coughing and less sputum. Pain under her left breast and around her back. Denies hypoglycemia or tendon pain.     Pain: Currently taking APAP 1000 mg three times a day. Naproxen was dc'ed due to her kidneys. Was only taking occasionally after fall in April. APAP works for her.     Type 2 Diabetes: Currently taking metformin 1000 mg two times a day. Was on a lower dose in the past and had stress which raised her A1c. Has diarrhea but does not think it is from metformin, is a separate issue for her.   Tests BG once times daily in the morning. Reports blood sugars: <100.   Last A1c checked 6/5/19 = 5.3%.   Microalbumin checked 8/27/18  Is taking simvastatin 10 mg HS. Last lipids checked 2015  Is taking aspirin 81 mg for primary prevention.     Hypothyroidism: Currently taking levothyroxine 50 mcg and 75 mcg every other day. Last TSH checked 8/27/18. Is not aware of symptoms/signs hypo/hyperthyroidism. Current symptoms: none     Edema: Currently taking furosemide 20 mg daily. Was taking 40 mg daily, but decreased on her own to 20 mg for a week because she is worried about dehydration. Reports no change. Denies swelling. Was having edema mostly in her feet,  ankles and hands which have been fine.     Depression/Insomnia: Currently taking citalopram 10 mg daily and trazodone 100 mg HS. Has been a little more teary than usual, had \"stuff\" going on. Planning on talking to Dr. Deleon about restarting Valium. Was on it in the past and it would keep her asleep. Trazodone helps her stay " asleep, but does not help her fall asleep. Increased the dose to 100 mg which did not help and made her tired the next day. Does not plan to use Valium nightly or with trazodone. She can tell when she needs it, feels like she cant unwind.     GERD: Currently taking famotidine 20 mg daily. Does not think she needs it anymore. Was used many years ago after cancer since she had heatburn all the time, now tums will take care of it.     Allergies: Currently taking  cetirizine 10 mg daily, montelukast 10 mg daily and fluticasone nasal spray 2 sprays daily. Yearround, helpful.     COPD: Current medications: Pulmicort 180 mcg 2 puffs two times a day, Anoro daily, and Ventolin HFA PRN. Rinses mouth after Pulmicort. Has not needed the Ventolin more since having PNA. Anoro is expensive but it is nice that it is one puff daily and she feels like it has helped. Prior to PNA, feels like her breathing was overall normal.       PMH: reviewed in EPIC   Allergies/ADRs: reviewed in EPIC   Alcohol: Reviewed in EPIC  Tobacco:   Social History     Tobacco Use   Smoking Status Former Smoker     Packs/day: 1.50     Years: 0.00     Pack years: 0.00     Last attempt to quit: 11/9/2008     Years since quitting: 10.7   Smokeless Tobacco Former User     Recent Vitals:   BP Readings from Last 3 Encounters:   07/31/19 112/65   06/17/19 106/60   06/06/19 100/58      Wt Readings from Last 3 Encounters:   07/29/19 154 lb 14.4 oz (70.3 kg)   06/17/19 152 lb 12.8 oz (69.3 kg)   06/06/19 156 lb (70.8 kg)     ----------------    The patient declined an after visit summary    I spent 20 minutes with this patient today;  . All changes were made via collaborative practice agreement with Master Deleon DO. A copy of the visit note was provided to the patient's provider.     Charla Perez, Pharm.D., [facility]  Medication Therapy Management Pharmacist  [facility]     Current Outpatient Medications   Medication Sig Dispense Refill      acetaminophen (TYLENOL) 500 MG tablet Take 2 tablets (1,000 mg total) by mouth 3 (three) times a day. (Patient taking differently: Take 500 mg by mouth 3 (three) times a day.       )  0     aspirin 81 MG EC tablet Take 81 mg by mouth at bedtime.              blood glucose meter (GLUCOMETER) Use 1 each As Directed as needed. Dispense glucometer brand per patient's insurance at pharmacy discretion. 1 each 0     blood glucose test (ACCU-CHEK SMARTVIEW TEST STRIP) strips Test blood sugar 3 times daily 200 strip 3     blood glucose test (ACCU-CHEK SMARTVIEW TEST STRIP) strips PT TO TEST BLOOD SUGAR DAILY 300 strip 3     cetirizine 10 mg cap Take 10 mg by mouth daily with supper.              citalopram (CELEXA) 10 MG tablet Take 1 tablet (10 mg total) by mouth daily. 90 tablet 3     famotidine (PEPCID) 20 MG tablet Take 1 tablet (20 mg total) by mouth daily. 90 tablet 3     fluticasone (FLONASE) 50 mcg/actuation nasal spray 2 SPRAYS INTO EACH NOSTRIL DAILY. 48 g 3     furosemide (LASIX) 20 MG tablet TAKE 2 TABLETS (40 MG TOTAL) BY MOUTH EVERY MORNING. 180 tablet 3     generic lancets Use 1 each As Directed daily. Dispense brand per patient's insurance at pharmacy discretion. (dx E11.9) 100 each 1     Lactobacillus rhamnosus GG (CULTURELLE) 15 billion cell CpSP Take 1 capsule by mouth daily.              levoFLOXacin (LEVAQUIN) 500 MG tablet Take 1 tablet (500 mg total) by mouth daily for 7 days. Starting on 8/1 (morning) 7 tablet 0     levothyroxine (SYNTHROID, LEVOTHROID) 50 MCG tablet Take 50 mcg by mouth every other day.       levothyroxine (SYNTHROID, LEVOTHROID) 75 MCG tablet Take 75 mcg by mouth every other day.       metFORMIN (GLUCOPHAGE) 1000 MG tablet Take 1 tablet (1,000 mg total) by mouth 2 (two) times a day with meals. 180 tablet 3     montelukast (SINGULAIR) 10 mg tablet Take 1 tablet (10 mg total) by mouth at bedtime. 90 tablet 3     multivitamin therapeutic tablet Take 1 tablet by mouth daily.        PULMICORT FLEXHALER 180 mcg/actuation inhaler INHALE 2 PUFFS 2 (TWO) TIMES A DAY. 3 each 3     simvastatin (ZOCOR) 10 MG tablet Take 1 tablet (10 mg total) by mouth at bedtime. 90 tablet 3     sodium chloride (OCEAN) 0.65 % nasal spray Apply 1 spray into each nostril as needed for congestion.       traZODone (DESYREL) 100 MG tablet Take 1 tablet (100 mg total) by mouth at bedtime as needed for sleep. 90 tablet 3     triamcinolone (KENALOG) 0.1 % ointment Apply topically 2 (two) times a day as needed.       umeclidinium-vilanterol (ANORO ELLIPTA) 62.5-25 mcg/actuation inhaler Inhale 1 puff daily. 90 puff 3     VENTOLIN HFA 90 mcg/actuation inhaler INHALE 1-2 PUFFS EVERY 4 (FOUR) HOURS AS NEEDED FOR WHEEZING. 54 g 0     No current facility-administered medications for this visit.

## 2021-05-31 NOTE — PROGRESS NOTES
[facility]    Rita Mancini   69 y.o. female    Date of Visit: 8/8/2019  Chief Complaint   Patient presents with     Hospital Visit Follow Up     [facility]     Medication Management     Breathing Problem     oxygen levels at night are low     Request For Letter     FL at the end of the month     Mouth Lesions       ASSESSMENT/PLAN  1. Pneumonia of right lower lobe due to infectious organism (H)  HM2(CBC w/o Differential)   2. Chronic obstructive pulmonary disease, unspecified COPD type (H)     3. Lymph node enlargement     4. Non-small cell cancer of right lung, s/p radiation and chemotherapies, in remission since 6654-2120 (H)     5. Diabetes 1.5, managed as type 2 (H)  Microalbumin, Random Urine    metFORMIN (GLUCOPHAGE) 500 MG tablet   6. VIRA (acute kidney injury) (H)  Basic Metabolic Panel   7. Primary insomnia  diazePAM (VALIUM) 2 MG tablet    DISCONTINUED: diazePAM (VALIUM) 2 MG tablet   8. Anxiety and depression  diazePAM (VALIUM) 2 MG tablet    DISCONTINUED: diazePAM (VALIUM) 2 MG tablet   9. MONSERRAT on CPAP     10. Herpes simplex virus infection       ---------------------------------------------    1-2.  Rita is a 69-year-old woman with COPD, and recurrence of pneumonia (left lower lobe cavitary pneumonia in March and right lower lung pneumonia last month) who presents for follow-up.  She completed a course of levofloxacin.  She was not as severely ill this admission, but did have a brief stay in the ICU, feels better, encouraged to stay active, continue using her flutter valve, etc.  She currently feels well, but tired.  Given her recurrent pneumonia, Dr. Alcala raised some concern about immunodeficiency, pneumonia vaccination status.  I will discuss with Betsey Alcala and Sav.  She has documented PPSV23 in 2012 and PCV13 in 2015.    --tentative plan for recheck CT chest 4-6 weeks after last on 7/30 to verify pneumonia is cleared, pending plans from Dr. Foster (see below)    3-4.   She has a concerning paratracheal lymph node, history of non-small cell lung cancer.  I have communicated with the oncology clinic, they have a follow-up with her later this month.    5.  Cut back on metformin from 1000 mg twice a day down to 500 mg twice a day.  She has lost nearly 40 pounds since this past December.    6.  Acute kidney injury noted in the hospital, this improved with fluids.  She has herself cut back on the furosemide from 40 mg daily down to 20 mg daily.  She does not see any swelling.  We will check kidney function again, may end up changing furosemide to 20 mg daily as needed.  I think she had a lot of swelling during the first hospitalization which prompted at least adjustment of lasix.  She has been on it for 5+ years.    --creatinine, K (5.7) and calcium are elevated-- stop lasix and ensure improvement with recheck BMP next week.  Not on K-sparing diuretic or supplemental KCl that I know of-- advised her to stop KCl if she is taking.    7.  We had somewhat of a long discussion regarding insomnia, largely driven by anxiety.  She has used diazepam in the past, but expressed my concern about her use of diazepam with untreated sleep apnea.  She agreed to use her CPAP, as another sleep study coming up.  But her daughter thinks that may be she was not using the CPAP because she is anxious.  I explained the concern about respiratory suppression in someone with sleep apnea.  I agreed to prescribe a low dose, 2 mg nightly, since trazodone does not seem to help.  He was taken off this previously because she was also on oxycodone, also I think was a bit confused at the time.    8.  She is on citalopram 10 mg daily.  This was increased earlier in the year, though it was thought not to be needed at the higher dose anymore.  She is back down to 10 mg daily.    9.  See #8    10.  Improving with topical treatment.      Return in about 1 month (around 9/8/2019) for Recheck.      MCKENNA  Rita is a  69-year-old woman with history of lung cancer in remission, COPD, and recent admission from July 27-31 for septic shock.    This is secondary to right lower lung pneumonia.  She was treated with broad-spectrum antibiotics and narrowed to levofloxacin to complete a 7-day course.  Requirement for vasopressors was very brief.  CT scan was done given her recent pneumonia on the left side with cavitation (with prolonged Augmentin treatment for persistent leukocytosis), and this does demonstrate improvement in this, but incidentally noted was a prominent paratracheal lymph node which requires follow-up with the oncology department.  Original non-small cell lung cancer was treated with chemotherapy and radiation, treatment finished February 2010.  She later had talc pleurodesis for recurring right-sided effusion, nonmalignant.    She also has chronic right-sided hydronephrosis from UPJ obstruction, which is not new.    Acute kidney injury resolved with hydration.    She had questions about vaccination status for pneumonia. Dr. Alcala brought up concern for vaccination.      She cut furosemide from 40 mg to 20 mg daily.  She feels she does not need this diuretic anymore.    She has noted that her blood sugars have been in the high 80s to high 90s.  She would like to cut back the metformin.    She has trouble sleeping at night, trazodone is not helping.  The sleep has been related to anxiety. One of the NPs at the TCU had increased citalopram to address anxiety, but she wanted to decrease it back to 10 mg daily.  Diazepam previously has helped with sleep, but she also has sleep apnea and never uses her CPAP.  She blames this on ripping the mask off in her sleep and always finding it lying on the ground when she wakes up.    Appetite is low, she thinks this will improve when stopping levofloxacin.  She lost 40 pounds since last December.    ROS A comprehensive review of systems was performed and was otherwise negative    Post  Discharge Medication Reconciliation Status: discharge medications reconciled, continue medications without change     Patient Active Problem List   Diagnosis     Episode of recurrent major depressive disorder (H)     Non-small cell cancer of right lung, s/p radiation and chemotherapies, in remission since 4154-6616 (H)     Hypothyroidism     Dyslipidemia     Diabetes 1.5, managed as type 2 (H)     COPD (chronic obstructive pulmonary disease) (H)     MONSERRAT on CPAP     Diverticulosis     Chronic pleural effusion, left (s/p Talc pleurodesis 2015)     Bronchiectasis (H)     Eczema of both hands     Keratosis obturans of external ear canal, right     Ventral hernia without obstruction or gangrene     PNA (pneumonia)     VIRA (acute kidney injury) (H)     Hyperkalemia     Empyema (H)     Leukocytosis, unspecified type     History of gram-positive (S. pyogenes) pneumosepsis     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, subsequent encounter     Current moderate episode of major depressive disorder (H)     Gastroesophageal reflux disease without esophagitis     Psychophysiological insomnia     Anemia     Cavitary pneumonia     HAP (hospital-acquired pneumonia)     Volume depletion     HCAP (healthcare-associated pneumonia)     Hydronephrosis, right     Lymph node enlargement     Past Medical History:   Diagnosis Date     COPD (chronic obstructive pulmonary disease) (H)      Depression      Diabetes mellitus (H)      Functional diarrhea 12/31/2018     GERD (gastroesophageal reflux disease)      Hx antineoplastic chemotherapy     lung cancer      Hx of radiation therapy     lung cancer      Hyperlipemia      Hypothyroid      Lung cancer (H) 2008    RUL,  Non small cell cancer     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     Osteopenia      Pleural effusion 1/15     Radiation Pneumonitis     Created by Conversion      Sleep apnea     does not use cpap     Past Surgical History:   Procedure  Laterality Date     PORTACATH PLACEMENT      and removal     THORACOSCOPY Right 4/6/2015    Procedure: RIGHT THORACOSCOPY / BIOPSY PLEURAL / TALC PLEURODESIS;  Surgeon: Michi Ma MD;  Location: [facility];  Service:      THORACOSCOPY Left 3/29/2019    Procedure: LEFT THORACOSCOPY WITH DECORTICATION;  Surgeon: Michi Ma MD;  Location: [facility];  Service: General     TONSILLECTOMY  age 5     URETERAL STENT PLACEMENT Right 6/2010     US THORACENTESIS  3/27/2019     Social History     Socioeconomic History     Marital status:      Spouse name: Not on file     Number of children: Not on file     Years of education: Not on file     Highest education level: Not on file   Occupational History     Not on file   Social Needs     Financial resource strain: Not on file     Food insecurity:     Worry: Not on file     Inability: Not on file     Transportation needs:     Medical: Not on file     Non-medical: Not on file   Tobacco Use     Smoking status: Former Smoker     Packs/day: 1.50     Years: 0.00     Pack years: 0.00     Last attempt to quit: 11/9/2008     Years since quitting: 10.7     Smokeless tobacco: Former User   Substance and Sexual Activity     Alcohol use: No     Drug use: No     Sexual activity: Never   Lifestyle     Physical activity:     Days per week: Not on file     Minutes per session: Not on file     Stress: Not on file   Relationships     Social connections:     Talks on phone: Not on file     Gets together: Not on file     Attends Buddhist service: Not on file     Active member of club or organization: Not on file     Attends meetings of clubs or organizations: Not on file     Relationship status: Not on file     Intimate partner violence:     Fear of current or ex partner: Not on file     Emotionally abused: Not on file     Physically abused: Not on file     Forced sexual activity: Not on file   Other Topics Concern     Not on file   Social History Narrative      Not on file     Family History   Problem Relation Age of Onset     Heart disease Mother      Hypertension Mother      Alcohol abuse Mother      Depression Mother      Heart disease Father 45        mi age 45, cabg     Heart attack Father      Cancer Father         prostate     Hypertension Father      COPD Brother      Alcohol abuse Brother      Alcohol abuse Sister      Breast cancer Paternal Aunt      Leukemia Grandchild      Cancer Maternal Aunt 47        breast     Diabetes Son      Anxiety disorder Son      Depression Son      No Medical Problems Daughter      Schizophrenia Grandchild        Current Outpatient Medications   Medication Sig Dispense Refill     acetaminophen (TYLENOL) 500 MG tablet Take 2 tablets (1,000 mg total) by mouth 3 (three) times a day. (Patient taking differently: Take 500 mg by mouth 3 (three) times a day.       )  0     aspirin 81 MG EC tablet Take 81 mg by mouth at bedtime.              blood glucose meter (GLUCOMETER) Use 1 each As Directed as needed. Dispense glucometer brand per patient's insurance at pharmacy discretion. 1 each 0     blood glucose test (ACCU-CHEK SMARTVIEW TEST STRIP) strips PT TO TEST BLOOD SUGAR DAILY 300 strip 3     cetirizine 10 mg cap Take 10 mg by mouth daily with supper.              citalopram (CELEXA) 10 MG tablet Take 1 tablet (10 mg total) by mouth daily. 90 tablet 3     diazePAM (VALIUM) 2 MG tablet Take 1 tablet (2 mg total) by mouth at bedtime as needed for anxiety. 30 tablet 1     famotidine (PEPCID) 20 MG tablet Take 1 tablet (20 mg total) by mouth daily. 90 tablet 3     fluticasone (FLONASE) 50 mcg/actuation nasal spray 2 SPRAYS INTO EACH NOSTRIL DAILY. 48 g 3     furosemide (LASIX) 20 MG tablet Take 1-2 tablets (20-40 mg total) by mouth every morning. 180 tablet 3     generic lancets Use 1 each As Directed daily. Dispense brand per patient's insurance at pharmacy discretion. (dx E11.9) 100 each 1     Lactobacillus rhamnosus GG (CULTURELLE) 15  "billion cell CpSP Take 1 capsule by mouth daily.              levothyroxine (SYNTHROID, LEVOTHROID) 50 MCG tablet Take 50 mcg by mouth every other day.       levothyroxine (SYNTHROID, LEVOTHROID) 75 MCG tablet Take 75 mcg by mouth every other day.       metFORMIN (GLUCOPHAGE) 500 MG tablet Take 1 tablet (500 mg total) by mouth 2 (two) times a day with meals. 180 tablet 3     montelukast (SINGULAIR) 10 mg tablet Take 1 tablet (10 mg total) by mouth at bedtime. 90 tablet 3     multivitamin therapeutic tablet Take 1 tablet by mouth daily.       PULMICORT FLEXHALER 180 mcg/actuation inhaler INHALE 2 PUFFS 2 (TWO) TIMES A DAY. 3 each 3     simvastatin (ZOCOR) 10 MG tablet Take 1 tablet (10 mg total) by mouth at bedtime. 90 tablet 3     sodium chloride (OCEAN) 0.65 % nasal spray Apply 1 spray into each nostril as needed for congestion.       triamcinolone (KENALOG) 0.1 % ointment Apply topically 2 (two) times a day as needed.       umeclidinium-vilanterol (ANORO ELLIPTA) 62.5-25 mcg/actuation inhaler Inhale 1 puff daily. 90 puff 3     VENTOLIN HFA 90 mcg/actuation inhaler INHALE 1-2 PUFFS EVERY 4 (FOUR) HOURS AS NEEDED FOR WHEEZING. 54 g 0     No current facility-administered medications for this visit.        No Known Allergies    EXAM  Vitals:    08/08/19 1104   BP: 94/58   Patient Site: Left Arm   Patient Position: Sitting   Cuff Size: Adult Regular   Pulse: 88   Weight: 143 lb 3.2 oz (65 kg)   Height: 5' 5\" (1.651 m)         General: alert, no distress  HEENT: sclerae anicteric, moist oral mucosa  Heart: Regular rate and rhythm, no murmurs.  No pretibial edema.    Lungs: Bibasilar coarse crackles  Gastrointestinal: abdomen is non-distended.    Skin: warm/dry, herpetic lesion on left philtrum  Neuro: no gross abnormalities  Psychiatric: Pleasant affect, anxious mood    RESULTS REVIEWED:     ANALYSIS AND SUMMARY OF OLD RECORDS, NOTES AND CONSULTS (2): Reviewed hospital discharge summary, summarized above    RECORDS " REQUESTED (1): None.     OTHER HISTORY SUMMARIZED (from nursing staff, family, friends) (2): Daughter provided some of the history, her mother has a high level of anxiety, and she thinks that anxiety keeps her awake at night.    RADIOLOGY TESTS SUMMARIZED or REQUESTED (XRAY/CT/MRI/DXA) (1):   Ct Abdomen Pelvis Without Oral Without Iv Contrast    Result Date: 7/30/2019  EXAM: CT ABDOMEN PELVIS WO ORAL WO IV CONTRAST LOCATION: [facility] DATE/TIME: 7/30/2019 2:17 PM INDICATION: Right-sided hydronephrosis. Septic shock. COMPARISON: Chest CT 7/30/2019, PET/CT 8/29/2008. TECHNIQUE: Helical thin-section CT scan of the abdomen and pelvis was performed without oral or IV contrast. Multiplanar reformats were obtained. Dose reduction techniques were used. CONTRAST: None. FINDINGS: LUNG BASES: Bilateral lower lobe infiltrates, better seen on prior chest CT. Previous pleurodesis on the right. ABDOMEN: Chronic moderate right hydronephrosis with normal caliber ureter, indicating chronic UPJ stenosis. Normal noncontrast liver, spleen, pancreas, gallbladder, adrenal glands, and left kidney. Midline ventral abdominal wall hernia containing fat. There is also a small fat-containing umbilical hernia. PELVIS: Sigmoid colonic diverticulosis. No free fluid or inflammatory changes. MUSCULOSKELETAL: Negative.     CONCLUSION: 1.  Chronic right UPJ stenosis, not appreciably changed since at least 2008. 2.  Bilateral lower lobe pulmonary infiltrates, better seen on earlier chest CT.    Xr Chest 2 Views    Result Date: 7/27/2019  EXAM: XR CHEST 2 VIEWS LOCATION: [facility] DATE/TIME: 7/27/2019 8:17 PM INDICATION: chest pain COMPARISON: 06/06/2019, chest CT 06/17/2019 FINDINGS: New multisegmental airspace opacity in the right lower lobe suspicious for pneumonia please correlate for infection. Irregular slightly nodular opacity at the left bases similar. Stable fluid within the right major fissure. Right upper  paramediastinal mass seen on CT is not well seen on radiographs. No pneumothorax. Normal heart size and mediastinal contour.    Ct Chest Without Contrast    Result Date: 7/30/2019  EXAM: CT CHEST WO CONTRAST LOCATION: [facility] DATE/TIME: 7/30/2019 9:40 AM INDICATION: Pneumonia. Non-small cell right lung cancer. COMPARISON: 6/17/2019 CT TECHNIQUE: Helical images were obtained through the chest. Multiplanar reformats were obtained. Dose reduction techniques were used. CONTRAST: None. FINDINGS: LUNGS AND PLEURA: New moderate right lower lobe consolidation could represent bacterial or aspiration pneumonia. No cavitation. Improving left lower lobe consolidation with cavitation resolution. Small bilateral pleural effusions, the right is increased. The medial right upper lobe irregular mass estimated at 2.6 x 1.6 cm (series 3, image 26) is not significantly changed since April 2019, but larger than December 2018. Therefore malignancy progression remains possible. MEDIASTINUM: Enlarging mediastinal adenopathy. Left paratracheal carinal node measures 2 x 1.4 cm compared to 1.4 x 0.7 cm previously. A subcarinal node is best measured short axis at 1.5 cm enlarged from 1.1 cm previously. LIMITED UPPER ABDOMEN: Ventral hernia containing mesentery. Incompletely imaged new right hydronephrosis. MUSCULOSKELETAL: No metastases seen.     CONCLUSION: 1.  New right lower lobe pneumonia. 2.  Improving left lower lobe pneumonia with cavitation resolution. 3.  Right upper lobe paratracheal 2.6 cm mass enlarged from December 2018. This could be malignant recurrence. 4.  Mediastinal adenopathy. 5.  New right hydronephrosis. CT abdomen and pelvis could further evaluate. NOTE: ABNORMAL REPORT THE DICTATION ABOVE DESCRIBES AN ABNORMALITY FOR WHICH FOLLOW-UP IS NEEDED.       MEDICINE TESTS SUMMARIZED or REQUESTED (EKG/ECHO/COLONOSCOPY/EGD) (1): None    INDEPENDENT REVIEW OF EKG OR X-RAY (2): None.    Results for orders placed or  performed during the hospital encounter of 07/27/19   Basic Metabolic Panel   Result Value Ref Range    Sodium 142 136 - 145 mmol/L    Potassium 4.0 3.5 - 5.0 mmol/L    Chloride 107 98 - 107 mmol/L    CO2 27 22 - 31 mmol/L    Anion Gap, Calculation 8 5 - 18 mmol/L    Glucose 94 70 - 125 mg/dL    Calcium 9.4 8.5 - 10.5 mg/dL    BUN 29 (H) 8 - 28 mg/dL    Creatinine 1.28 (H) 0.60 - 1.10 mg/dL    GFR MDRD Af Amer 50 (L) >60 mL/min/1.73m2    GFR MDRD Non Af Amer 41 (L) >60 mL/min/1.73m2         Lab Results   Component Value Date    HGBA1C 5.3 06/05/2019        Data points  6     Master Deleon DO  Internal Medicine  [facility]

## 2021-05-31 NOTE — TELEPHONE ENCOUNTER
Dr Deleon has approved a letter for patient not to travel/ fly at the end of August (leaving the 26th) because of her recent hospitalization, please contact patient when ready for .

## 2021-05-31 NOTE — TELEPHONE ENCOUNTER
----- Message from Louis Ang MD sent at 8/23/2019 11:49 AM CDT -----  Regarding: RE: Pneumonia status  Hi,  I'm sorry to hear she had another pneumonia. But I'm also glad to hear she is doing better.  My read of Dr Alcala's note is that she should have both pneumococcal vaccines. Verification only means that we should look for documentation of previous vaccinations, not checking serotypes.    That is only my interpretation of Dr. Alcala's note. Personally, I don't think she needs serotype levels. Just make sure she is up to date on vaccines.    Thanks,  Louis.  ----- Message -----  From: Master Deleon DO  Sent: 8/22/2019   5:13 PM  To: Michael Alcala MD, Louis Ang MD  Subject: Pneumonia status                                 Hi,    Thanks for your help taking care of Rita.  I was seeing her in follow-up, she mentioned Michael mentioned (during hospital) verification of pneumonia vaccination status.  I was not sure if this was a request to assess for immunodeficiency with pneumococcal immunoglobulin subtypes before and after vaccination, or if it was just a call to verify that she has received both pneumococcal vaccinations.    I did not know how to answer her question, so I thought I would ask.  She seems to be getting better from her latest bout from pneumonia.  I am not sure if she has a follow-up set with the ID clinic at this time.    Thanks for your help again    Master Deleon DO  Internal Medicine  [facility]

## 2021-05-31 NOTE — TELEPHONE ENCOUNTER
Please call Rita and let her know that Dr. Alcala just wanted to be sure she had the vaccines (I believe she did).  There was not a recommendation to start an immunodeficiency workup.      Next time we should do the PCV 23 vaccine (pneumonia shot) since she had this one before age 64.  That one should be updated.

## 2021-05-31 NOTE — TELEPHONE ENCOUNTER
"RN cannot approve Refill Request    RN can NOT refill this medication med is not covered by policy/route to provider. Last office visit: 8/8/2019 Master Deleon DO Last Physical: Visit date not found Last MTM visit: Visit date not found Last visit same specialty: 8/8/2019 Master Deleon DO.  Next visit within 3 mo: Visit date not found  Next physical within 3 mo: Visit date not found  Last OV 8/8/2019  Last renewal 8/23/2019 \"no print\".  No dispensing instructions    Tennille Lopez, [facility] Triage/Med Refill 9/3/2019    Requested Prescriptions   Pending Prescriptions Disp Refills     acetaminophen (TYLENOL) 500 MG tablet [Pharmacy Med Name: ACETAMINOPHEN 500 MG TABLET] 100 tablet 0     Sig: TAKE 1-2 TABLETS (500-1,000 MG) BY MOUTH EVERY 6 HOURS AS NEEDED FOR MILD PAIN       There is no refill protocol information for this order           "

## 2021-05-31 NOTE — PROGRESS NOTES
[facility] Progress Note    Patient: Rita Mancini  MRN: [id number]  Date of Service: 8/26/2019        Reason for visit      1. Non-small cell cancer of right lung, s/p radiation and chemotherapies, in remission since 2311-3235 (H)    2. HCAP (healthcare-associated pneumonia)        Assessment     1.  A very pleasant 69 y.o. woman with non-small-cell lung cancer stage III treated with cisplatin and -16 and radiation and currently in remission.  She was diagnosed in 09/2009. Finished her treatment in February 2010.  No evidence of recurrence.  Last CT scan was somewhat concerning for mediastinal lymph nodes but I think that most likely reactive.  2.   Recent hospitalization for pneumonia and then repeat hospitalization for hospital associated pneumonia.  She is done with the antibiotics.  She had it on both sides and needed a chest tube intubation in the whole 9 yards.  3.  H/o Double vision secondary to left lateral rectus muscle weakness.  Seems to have resolved a little bit.  4.  Mild renal insufficiency. Stable slightly dilated renal collecting system.  This is stable.  5.  Diabetes is improved since she has lost all that weight.    Plan     1. I would say we should meet back in about 6 months with a repeat CT chest.  2. Follow-up with your primary care physician for other medical issues.  3. Advised to continue with exercise.  4. Continue to use flutter valve as well as incentive spirometry.  Be very careful while swallowing.    Clinical stage      Lung Cancer, Right side    Primary site: Lung (Left)    Clinical: Stage IIIA (T1b, N2, M0) signed by Herbert Foster MD on 10/9/2014 11:00 AM    Summary: Stage IIIA (T1b, N2, M0)      History     Rita Mancini is a very pleasant 69 y.o. old female with a history of nonsmall cell lung cancer located in the right upper lobe measuring 4.2 cm in size, presenting with some shortness of breath and cough in 09/2009 and biopsy suggestive of adenocarcinoma  including lymphnode biopsy confirming it is stage III disease.  Subsequent to that she was treated with cisplatin and -16 for 3 cycles and Taxotere for 2 cycles afterwards.  Subsequent to that she has been in remission.  She had been fine up until 11/2013 where a CT scan actually showed some congestion in the right lower lobe and then the PET scan showed that to be slightly hypermetabolic.  Therefore, she had that biopsied and that was negative. She had CT in September 2015 that revealed pleural effusion. This was tapped and was negative. About one litre of fluid was removed. She felt slightly better.    She was seen by pulmonary again for the fluid. Was then sent to Dr. Fair for pleural biopsy and pleurodhesis. That was done on 4/6/15. The biopsy was non malignant.      She has since been followed by me and pulmonary with CT scans.      Rita was admitted at [facility] on March 27 with septic shock.  She was found to have bilateral pneumonia but more so on the left side.  Needed to be intubated and ventilated.  Needed chest tube.  He was in the hospital for several days.  Repeat hospitalization.  She was recently again in the hospital 31 July with some coughing and shortness of breath.  Found to have another infiltrate in the right lower lobe.    Currently she is getting little bit better.  Still having fair amount of cough.  Needing to use flapper valve to help clear up her lungs.  She has lost a lot of weight.  But now eating better.        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     Osteopenia      Pleural effusion 1/15     Pneumonia      Radiation Pneumonitis     Created by Conversion      Sleep apnea     does not use cpap     Social History     Socioeconomic History     Marital status:      Spouse name: Not on file     Number of children: Not on file     Years of education: Not on file     Highest education level: Not on file   Occupational History     Not on file   Social Needs     Financial resource strain: Not on file     Food insecurity:     Worry: Not on file     Inability: Not on file     Transportation needs:     Medical: Not on file     Non-medical: Not on file   Tobacco Use     Smoking status: Former Smoker     Packs/day: 1.50     Years: 0.00     Pack years: 0.00     Last attempt to quit: 11/9/2008     Years since quitting: 10.8     Smokeless tobacco: Former User   Substance and Sexual Activity     Alcohol use: No     Drug use: No     Sexual activity: Never   Lifestyle     Physical activity:     Days per week: Not on file     Minutes per session: Not on file     Stress: Not on file   Relationships     Social connections:     Talks on phone: Not on file     Gets together: Not on file     Attends Nondenominational service: Not on file     Active member of club or organization: Not on file     Attends meetings of clubs or organizations: Not on file     Relationship status: Not on file     Intimate partner violence:     Fear of current or ex partner: Not on file     Emotionally abused: Not on file     Physically abused: Not on file     Forced sexual activity: Not on file   Other Topics Concern     Not on file   Social History Narrative     Not on file     Social History     Tobacco Use     Smoking status: Former Smoker     Packs/day: 1.50     Years: 0.00     Pack years: 0.00     Last attempt to quit: 11/9/2008     Years since quitting: 10.8     Smokeless tobacco: Former User   Substance Use Topics     Alcohol use: No     Drug use: No       Review of Systems    Constitutional  Constitutional (WDL): Exceptions to WDL  Fatigue: Fatigue not relieved by rest - Limiting instrumental ADL  Weight Loss: 10 - <20% from baseline, nutritional support indicated(down 70 lbs since April)  Neurosensory  Neurosensory (WDL): All neurosensory elements are within defined limits  Cardiovascular  Cardiovascular (WDL): All cardiovascular elements are within defined limits  Pulmonary  Respiratory (WDL): Exceptions to WDL  Cough: Mild symptoms, nonprescription intervention indicated(clear to white phlgem)  Dyspnea: Shortness of breath with minimal exertion, limiting instrumental ADL  Gastrointestinal  Gastrointestinal (WDL): All gastrointestinal elements are within defined limits  Genitourinary  Genitourinary (WDL): All genitourinary elements are within defined limits  Integumentary  Integumentary (WDL): Exceptions to WDL  Alopecia: Hair loss of up to 50% of normal for that individual that is not obvious from a distance but only on close inspection, a different hair style may be required to cover the hair loss but it does not require a wig or hair piece to camouflage  Patient Coping  Patient Coping: Accepting  Accompanied by  Accompanied by: Alone    ECOG performance status and Distress Assessment      ECOG Performance:    ECOG Performance Status: 1    Distress Assessment  Distress Assessment Score: 1:     Pain Status  Currently in Pain: No/denies      Vital Signs     Vitals:    08/26/19 1327   BP: 116/56   Pulse: 74   Temp: 98.2  F (36.8  C)   SpO2: 97%       Physical Exam     GENERAL: No acute distress. Cooperative in conversation.   HEENT: Pupils are equal, round and reactive. Oral mucosa is clean and intact. No ulcerations or mucositis noted. No bleeding noted.  RESP:Chest symmetric lungs are clear bilaterally per auscultation. Regular respiratory rate.  Significant amount of coarse crepitation and rhonchi.  CV: Normal S1 S2 Regular, rate and rhythm. No murmurs.  ABD: Nondistended, soft,  nontender. Positive bowel sounds. No organomegaly.   EXTREMITIES: No lower extremity edema.   NEURO: Non- focal. Alert and oriented x3.  Cranial nerves appear intact.  PSYCH: Within normal limits. No depression or anxiety.  SKIN: Warm dry intact.    LYMPH NODES: Bilateral cervical, supraclavicular, axillary lymph node examination was done.  Negative for any palpable adenopathy.      Lab Results     Results for orders placed or performed in visit on 08/15/19   Comprehensive Metabolic Panel   Result Value Ref Range    Sodium 140 136 - 145 mmol/L    Potassium 5.3 (H) 3.5 - 5.0 mmol/L    Chloride 104 98 - 107 mmol/L    CO2 28 22 - 31 mmol/L    Anion Gap, Calculation 8 5 - 18 mmol/L    Glucose 93 70 - 125 mg/dL    BUN 13 8 - 28 mg/dL    Creatinine 0.91 0.60 - 1.10 mg/dL    GFR MDRD Af Amer >60 >60 mL/min/1.73m2    GFR MDRD Non Af Amer >60 >60 mL/min/1.73m2    Bilirubin, Total 0.3 0.0 - 1.0 mg/dL    Calcium 10.0 8.5 - 10.5 mg/dL    Protein, Total 7.0 6.0 - 8.0 g/dL    Albumin 3.4 (L) 3.5 - 5.0 g/dL    Alkaline Phosphatase 86 45 - 120 U/L    AST 15 0 - 40 U/L    ALT <9 0 - 45 U/L         Imaging Results     Ct Abdomen Pelvis Without Oral Without Iv Contrast    Result Date: 7/30/2019  EXAM: CT ABDOMEN PELVIS WO ORAL WO IV CONTRAST LOCATION: [facility] DATE/TIME: 7/30/2019 2:17 PM INDICATION: Right-sided hydronephrosis. Septic shock. COMPARISON: Chest CT 7/30/2019, PET/CT 8/29/2008. TECHNIQUE: Helical thin-section CT scan of the abdomen and pelvis was performed without oral or IV contrast. Multiplanar reformats were obtained. Dose reduction techniques were used. CONTRAST: None. FINDINGS: LUNG BASES: Bilateral lower lobe infiltrates, better seen on prior chest CT. Previous pleurodesis on the right. ABDOMEN: Chronic moderate right hydronephrosis with normal caliber ureter, indicating chronic UPJ stenosis. Normal noncontrast liver, spleen, pancreas, gallbladder, adrenal glands, and left kidney. Midline ventral  abdominal wall hernia containing fat. There is also a small fat-containing umbilical hernia. PELVIS: Sigmoid colonic diverticulosis. No free fluid or inflammatory changes. MUSCULOSKELETAL: Negative.     CONCLUSION: 1.  Chronic right UPJ stenosis, not appreciably changed since at least 2008. 2.  Bilateral lower lobe pulmonary infiltrates, better seen on earlier chest CT.    Xr Chest 2 Views    Result Date: 7/27/2019  EXAM: XR CHEST 2 VIEWS LOCATION: [facility] DATE/TIME: 7/27/2019 8:17 PM INDICATION: chest pain COMPARISON: 06/06/2019, chest CT 06/17/2019 FINDINGS: New multisegmental airspace opacity in the right lower lobe suspicious for pneumonia please correlate for infection. Irregular slightly nodular opacity at the left bases similar. Stable fluid within the right major fissure. Right upper paramediastinal mass seen on CT is not well seen on radiographs. No pneumothorax. Normal heart size and mediastinal contour.    Ct Chest Without Contrast    Result Date: 7/30/2019  EXAM: CT CHEST WO CONTRAST LOCATION: [facility] DATE/TIME: 7/30/2019 9:40 AM INDICATION: Pneumonia. Non-small cell right lung cancer. COMPARISON: 6/17/2019 CT TECHNIQUE: Helical images were obtained through the chest. Multiplanar reformats were obtained. Dose reduction techniques were used. CONTRAST: None. FINDINGS: LUNGS AND PLEURA: New moderate right lower lobe consolidation could represent bacterial or aspiration pneumonia. No cavitation. Improving left lower lobe consolidation with cavitation resolution. Small bilateral pleural effusions, the right is increased. The medial right upper lobe irregular mass estimated at 2.6 x 1.6 cm (series 3, image 26) is not significantly changed since April 2019, but larger than December 2018. Therefore malignancy progression remains possible. MEDIASTINUM: Enlarging mediastinal adenopathy. Left paratracheal carinal node measures 2 x 1.4 cm compared to 1.4 x 0.7 cm previously. A subcarinal  node is best measured short axis at 1.5 cm enlarged from 1.1 cm previously. LIMITED UPPER ABDOMEN: Ventral hernia containing mesentery. Incompletely imaged new right hydronephrosis. MUSCULOSKELETAL: No metastases seen.     CONCLUSION: 1.  New right lower lobe pneumonia. 2.  Improving left lower lobe pneumonia with cavitation resolution. 3.  Right upper lobe paratracheal 2.6 cm mass enlarged from December 2018. This could be malignant recurrence. 4.  Mediastinal adenopathy. 5.  New right hydronephrosis. CT abdomen and pelvis could further evaluate. NOTE: ABNORMAL REPORT THE DICTATION ABOVE DESCRIBES AN ABNORMALITY FOR WHICH FOLLOW-UP IS NEEDED.         Herbert Foster MD

## 2021-06-01 ENCOUNTER — RECORDS - HEALTHEAST (OUTPATIENT)
Dept: ADMINISTRATIVE | Facility: CLINIC | Age: 71
End: 2021-06-01

## 2021-06-01 VITALS — WEIGHT: 190.9 LBS | BODY MASS INDEX: 31.77 KG/M2

## 2021-06-01 VITALS — WEIGHT: 185 LBS | BODY MASS INDEX: 30.79 KG/M2

## 2021-06-01 NOTE — PROGRESS NOTES
"[facility] Clinic Note    Rita Mancini   69 y.o. female    Date of Visit: 10/1/2019  Chief Complaint   Patient presents with     Follow-up       ASSESSMENT/PLAN  1. Rib pain on left side  gabapentin (NEURONTIN) 300 MG capsule   2. Flu vaccine need  Influenza High Dose,Seasonal,PF 65+ Yrs   3. Need for pneumococcal vaccine  Pneumococcal polysaccharide vaccine 23-valent 3 yo or older, subq/IM   4. Diabetes 1.5, managed as type 2 (H)     5. VIRA (acute kidney injury) (H)     6. Lymph node enlargement     7. Non-small cell cancer of right lung, s/p radiation and chemotherapies, in remission since 0187-4072 (H)     8. 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 Psychophysiological insomnia       ---------------------------------------------    1. Left sided rib pain following pneumonia and VATS, will try gabapentin at bedtime, as she has never tried this medication    2. Update influenza    3. Needs the pneumococcal 23 since she has not had one since turning 64    4-5. A1c 5.3% earlier this year, we cut back on metformin in part because of weight loss and also creatinine.  Creatinine normalized lately and advised to stay off furosemide.  She doesn't have a clear indication for lasix apart from \"swelling\" she may get.  I think she got fluid overloaded during one of the hospital stays.      6. Lymph node attributed to probable reactive node, will be followed by Dr. Foster and his team    7. Hx non small cell lung CA    8. Fxr healed, managed by [facility]    9. She has not had to use sleeping medications.  I have tried to steer her away from diazepam. She may have repeat testing coming up with Dr. Wilkes.    Return in about 2 months (around 12/1/2019) for Recheck.      SUBJECTIVE    Rita Mancini is a 69-year-old woman with history of COPD, lung cancer, who presents for 2-month follow-up.    She missed the 1 month follow-up, but has been doing " well.     She sleeps better without taking trazodone or diazepam.  She has some additional sleep evaluation coming up.      She has been more active.     She still has left sided rib pain.  She had a prolonged hospital stay for pneumonia/empyema and need for VATS/chest tubes and a subsequent stay as well with.    She lost nearly 40 lbs while on antibiotics, but since off them she feels better and has put the weight back on.      She is due for PPSV23 (last 2012, when she was <65 years old).       ROS:   Per HPI, all other systems negative     Medications, allergies, and problem list were reviewed and updated    Patient Active Problem List   Diagnosis     Episode of recurrent major depressive disorder (H)     Non-small cell cancer of right lung, s/p radiation and chemotherapies, in remission since 3474-0535 (H)     Hypothyroidism     Dyslipidemia     Diabetes 1.5, managed as type 2 (H)     COPD (chronic obstructive pulmonary disease) (H)     MONSERRAT on CPAP     Diverticulosis     Chronic pleural effusion, left (s/p [place] pleurodesis 2015)     Bronchiectasis (H)     Eczema of both hands     Keratosis obturans of external ear canal, right     Ventral hernia without obstruction or gangrene     Hyperkalemia     Current moderate episode of major depressive disorder (H)     Gastroesophageal reflux disease without esophagitis     Psychophysiological insomnia     Anemia     Hydronephrosis, right     Lymph node enlargement     Past Medical History:   Diagnosis Date     COPD (chronic obstructive pulmonary disease) (H)      Depression      Diabetes mellitus (H)      Functional diarrhea 12/31/2018     GERD (gastroesophageal reflux disease)      HCAP (healthcare-associated pneumonia)      Hx antineoplastic chemotherapy     lung cancer      Hx of radiation therapy     lung cancer      Hyperlipemia      Hypothyroid      Lung cancer (H) 2008    RUL,  Non small cell cancer     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     Osteopenia       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              blood glucose meter (GLUCOMETER) Use 1 each As Directed as needed. Dispense glucometer brand per patient's insurance at pharmacy discretion. 1 each 0     blood glucose test (ACCU-CHEK SMARTVIEW TEST STRIP) strips PT TO TEST BLOOD SUGAR DAILY 300 strip 3     cetirizine 10 mg cap Take 10 mg by mouth daily with supper.              citalopram (CELEXA) 10 MG tablet Take 1 tablet (10 mg total) by mouth daily. 90 tablet 3     diazePAM (VALIUM) 2 MG tablet Take 1 tablet (2 mg total) by mouth at bedtime as needed for anxiety. 30 tablet 1     famotidine (PEPCID) 20 MG tablet Take 1 tablet (20 mg total) by mouth daily. 90 tablet 3     fluticasone (FLONASE) 50 mcg/actuation nasal spray 2 SPRAYS INTO EACH NOSTRIL DAILY. 48 g 3     generic lancets Use 1 each As Directed daily. Dispense brand per patient's insurance at pharmacy discretion. (dx E11.9) 100 each 1     Lactobacillus rhamnosus GG (CULTURELLE) 15 billion cell CpSP Take 1 capsule by mouth daily.              levothyroxine (SYNTHROID, LEVOTHROID) 50 MCG tablet Take 50 mcg by mouth every other day.       levothyroxine (SYNTHROID, LEVOTHROID) 75 MCG tablet Take 75 mcg by mouth every other day.       metFORMIN (GLUCOPHAGE) 500 MG tablet Take 1 tablet (500 mg total) by mouth 2 (two) times a day with meals. 180 tablet 3     montelukast (SINGULAIR) 10 mg tablet Take 1 tablet (10 mg total) by mouth at bedtime. 90 tablet 3     multivitamin therapeutic tablet Take 1  "tablet by mouth daily.       PULMICORT FLEXHALER 180 mcg/actuation inhaler INHALE 2 PUFFS 2 (TWO) TIMES A DAY. 3 each 3     simvastatin (ZOCOR) 10 MG tablet Take 1 tablet (10 mg total) by mouth at bedtime. 90 tablet 3     sodium chloride (OCEAN) 0.65 % nasal spray Apply 1 spray into each nostril as needed for congestion.       traZODone (DESYREL) 100 MG tablet Take 100 mg by mouth at bedtime.       triamcinolone (KENALOG) 0.1 % ointment Apply topically 2 (two) times a day as needed.       umeclidinium-vilanterol (ANORO ELLIPTA) 62.5-25 mcg/actuation inhaler Inhale 1 puff daily. 90 puff 3     VENTOLIN HFA 90 mcg/actuation inhaler INHALE 1-2 PUFFS EVERY 4 (FOUR) HOURS AS NEEDED FOR WHEEZING. 54 g 0     gabapentin (NEURONTIN) 300 MG capsule Take 1 capsule (300 mg total) by mouth at bedtime. 30 capsule 0     No current facility-administered medications for this visit.      No Known Allergies    EXAM  Vitals:    10/01/19 1551   BP: 98/56   Patient Site: Left Arm   Patient Position: Sitting   Cuff Size: Adult Regular   Pulse: 80   SpO2: 97%   Weight: 156 lb 3 oz (70.8 kg)   Height: 5' 5\" (1.651 m)         GEN: alert, no distress  Lungs: Expiratory wheezes throughout, some course crackles near the bases  Heart: RRR    This visit lasted a total of 26 minutes.  Over 50% of the time was spent counseling regarding he management of insomnia, diabetes.       Master Deleon,   Internal Medicine  [facility]    "

## 2021-06-01 NOTE — PROGRESS NOTES
Dear Dr. Perez, Jabari LIMA Md  [address] [facility]  [address]  [place] [address]    Thank you for the opportunity to participate in the care of Ms. Rita Mancini.    She is a 69 y.o. female who comes to the clinic for the transfer of care of her obstructive sleep apnea.  The patient was actually diagnosed with obstructive sleep apnea at our facility on 11/11 2010 with an apnea hypopnea index of 26.7 events per hour with the lowest O2 sat of 82%.  She was placed on CPAP therapy however, in July of this year, she was hospitalized for pneumonia/septic shock.  During the hospitalization she lost close to 40 pounds.  Post discharge, she feels that her sleep is actually better without the CPAP.  She is interested in finding out she still has obstructive sleep apnea or not even though she has gained about 10 pounds back.  Patient's review of systems otherwise negative.     Ideal Sleep-Wake Cycle(devoid of societal pressure):    Patient would try to initiate sleep at around 10-12 at night with a sleep latency of 30 minutes to an hour. The patient would have 1 awakening. Final wake up time is around 8-10 AM.    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 OR;  Service:      THORACOSCOPY Left 3/29/2019    Procedure: LEFT THORACOSCOPY WITH DECORTICATION;  Surgeon: Michi Ma MD;  Location: [facility] OR;  Service: General     TONSILLECTOMY  age 5     URETERAL STENT PLACEMENT Right 6/2010     US THORACENTESIS  3/27/2019        Meds  Current Outpatient Medications   Medication Sig Dispense Refill     acetaminophen (TYLENOL) 500 MG tablet TAKE 1-2 TABLETS (500-1,000 MG) BY MOUTH EVERY 6 HOURS AS NEEDED FOR MILD PAIN 100 tablet 0     aspirin 81 MG EC tablet Take 81 mg by mouth at bedtime.              blood glucose meter (GLUCOMETER) Use 1 each As Directed as needed. Dispense glucometer brand per patient's insurance at pharmacy discretion. 1 each 0     blood glucose test (ACCU-CHEK SMARTVIEW TEST STRIP) strips PT TO TEST BLOOD SUGAR DAILY 300 strip 3     cetirizine 10 mg cap Take 10 mg by mouth daily with supper.              citalopram (CELEXA) 10 MG tablet Take 1 tablet (10 mg total) by mouth daily. 90 tablet 3     famotidine (PEPCID) 20 MG tablet Take 1 tablet (20 mg total) by mouth daily. 90 tablet 3     fluticasone (FLONASE) 50 mcg/actuation nasal spray 2 SPRAYS INTO EACH NOSTRIL DAILY. 48 g 3     generic lancets Use 1 each As Directed daily. Dispense brand per patient's insurance at pharmacy discretion. (dx E11.9) 100 each 1     Lactobacillus rhamnosus GG (CULTURELLE) 15 billion cell CpSP Take 1 capsule by mouth daily.              levothyroxine (SYNTHROID, LEVOTHROID) 50 MCG tablet Take 50 mcg by mouth every other day.       levothyroxine (SYNTHROID, LEVOTHROID) 75 MCG tablet Take 75 mcg by mouth every other day.       metFORMIN (GLUCOPHAGE) 500 MG tablet Take 1 tablet (500 mg total) by mouth 2 (two) times a day with meals. 180 tablet 3     montelukast (SINGULAIR) 10 mg tablet Take 1 tablet (10 mg total) by mouth at bedtime. 90 tablet 3     multivitamin therapeutic tablet  Take 1 tablet by mouth daily.       PULMICORT FLEXHALER 180 mcg/actuation inhaler INHALE 2 PUFFS 2 (TWO) TIMES A DAY. 3 each 3     simvastatin (ZOCOR) 10 MG tablet Take 1 tablet (10 mg total) by mouth at bedtime. 90 tablet 3     sodium chloride (OCEAN) 0.65 % nasal spray Apply 1 spray into each nostril as needed for congestion.       triamcinolone (KENALOG) 0.1 % ointment Apply topically 2 (two) times a day as needed.       umeclidinium-vilanterol (ANORO ELLIPTA) 62.5-25 mcg/actuation inhaler Inhale 1 puff daily. 90 puff 3     VENTOLIN HFA 90 mcg/actuation inhaler INHALE 1-2 PUFFS EVERY 4 (FOUR) HOURS AS NEEDED FOR WHEEZING. 54 g 0     diazePAM (VALIUM) 2 MG tablet Take 1 tablet (2 mg total) by mouth at bedtime as needed for anxiety. 30 tablet 1     traZODone (DESYREL) 100 MG tablet Take 100 mg by mouth at bedtime.       traZODone (DESYREL) 50 MG tablet Take 50 mg by mouth at bedtime.       No current facility-administered medications for this visit.         Allergies  Patient has no known allergies.     Social History  Social History     Socioeconomic History     Marital status:      Spouse name: Not on file     Number of children: Not on file     Years of education: Not on file     Highest education level: Not on file   Occupational History     Not on file   Social Needs     Financial resource strain: Not on file     Food insecurity:     Worry: Not on file     Inability: Not on file     Transportation needs:     Medical: Not on file     Non-medical: Not on file   Tobacco Use     Smoking status: Former Smoker     Packs/day: 1.50     Years: 0.00     Pack years: 0.00     Last attempt to quit: 11/9/2008     Years since quitting: 10.8     Smokeless tobacco: Former User   Substance and Sexual Activity     Alcohol use: No     Drug use: No     Sexual activity: Never   Lifestyle     Physical activity:     Days per week: Not on file     Minutes per session: Not on file     Stress: Not on file   Relationships      Social connections:     Talks on phone: Not on file     Gets together: Not on file     Attends [employer] service: Not on file     Active member of club or organization: Not on file     Attends meetings of clubs or organizations: Not on file     Relationship status: Not on file     Intimate partner violence:     Fear of current or ex partner: Not on file     Emotionally abused: Not on file     Physically abused: Not on file     Forced sexual activity: Not on file   Other Topics Concern     Not on file   Social History Narrative     Not on file        Family History  Family History   Problem Relation Age of Onset     Heart disease Mother      Hypertension Mother      Alcohol abuse Mother      Depression Mother      Heart disease Father 45        mi age 45, cabg     Heart attack Father      Cancer Father         prostate     Hypertension Father      COPD Brother      Alcohol abuse Brother      Alcohol abuse Sister      Breast cancer Paternal Aunt      Leukemia Grandchild      Cancer Maternal Aunt 47        breast     Diabetes Son      Anxiety disorder Son      Depression Son      No Medical Problems Daughter      Schizophrenia Grandchild         Review of Systems:  Constitutional: Negative except as noted in HPI.   Eyes: Negative except as noted in HPI.   ENT: Negative except as noted in HPI.   Cardiovascular: Negative except as noted in HPI.   Respiratory: Negative except as noted in HPI.   Gastrointestinal: Negative except as noted in HPI.   Genitourinary: Negative except as noted in HPI.   Musculoskeletal: Negative except as noted in HPI.   Integumentary: Negative except as noted in HPI.   Neurological: Negative except as noted in HPI.   Psychiatric: Negative except as noted in HPI.   Endocrine: Negative except as noted in HPI.   Hematologic/Lymphatic: Negative except as noted in HPI.      STOP BANG 9/9/2019   Do you snore loudly (louder than talking or loud enough to be heard through closed doors)? 1   Do you often  "feel tired, fatigued, or sleepy during daytime? 1   Has anyone observed you stop breathing in your sleep? 0   Do you have or are you being treated for high blood pressure? 0   BMI more than 35 kg/m2 0   Age over 50 years old? 1   Neck circumference greater than 16 inches? 0   Gender male? 0   Total Score 3     Epworths Sleepiness Scale 9/9/2019   Sitting and reading 1   Watching TV 1   Sitting, inactive in a public place (e.g. a theatre or a meeting) 1   As a passenger in a car for an hour without a break 1   Lying down to rest in the afternoon when circumstances permit 2   Sitting and talking to someone 0   Sitting quietly after a lunch without alcohol 0   In a car, while stopped for a few minutes in traffic 0   Total score 6     Rooming 9/9/2019   Usual bedtime 10-12   Sleep Latency varies 30-90 minutes   Awakenings 1 time   Wake Up Time 8-10   Weekends same   Energy Drinks 0   Coffee 0   Cola 0   Difficulty falling asleep Yes   Difficulty staying asleep Yes   Excessive daytime tiredness Yes   Excessive daytime sleepiness Yes   Dozing off while driving No   Shift Worker No   Sleep Walking? Yes   Sleep Talking? Yes   Kicking or punching? Yes   Restless legs symptoms No       Physical Exam:  /67   Pulse 75   Ht 5' 5\" (1.651 m)   Wt 150 lb (68 kg)   SpO2 98%   BMI 24.96 kg/m    BMI:Body mass index is 24.96 kg/m .   GEN: NAD, appropriate for age  Head: Normocephalic.  EYES: PERRLA, EOMI  ENT: Oropharynx is clear, Severino class 3+ airway. Uvula is intact  Nasal mucosa is moist without erythema  Neck : Thyroid is within normal limits. Neck Circ 14\"  CV: Regular rate and rhythm, S1 & S2 positive.  LUNGS: Bilateral breathsounds heard with rhonchi heard.  ABDOMEN: Positive bowel sounds in all quadrants, soft, no rebound or guarding  MUSCULOSKELETAL: Bilateral trace leg swelling  SKIN: warm, dry, no rashes  Neurological: Alert, oriented to time, place, and person.  Psych: normal mood, normal affect   "   Labs/Studies:     Lab Results   Component Value Date    WBC 10.4 08/08/2019    HGB 11.8 (L) 08/08/2019    HCT 36.3 08/08/2019    MCV 85 08/08/2019     (H) 08/08/2019         Chemistry        Component Value Date/Time     08/15/2019 1052    K 5.3 (H) 08/15/2019 1052     08/15/2019 1052    CO2 28 08/15/2019 1052    BUN 13 08/15/2019 1052    CREATININE 0.91 08/15/2019 1052    GLU 93 08/15/2019 1052        Component Value Date/Time    CALCIUM 10.0 08/15/2019 1052    ALKPHOS 86 08/15/2019 1052    AST 15 08/15/2019 1052    ALT <9 08/15/2019 1052    BILITOT 0.3 08/15/2019 1052            No results found for: FERRITIN  Lab Results   Component Value Date    TSH 1.36 08/27/2018         Assessment and Plan:  In summary Rita Mancini is a 69 y.o. year old female here for transfer of care.  1.  Obstructive sleep apnea  Due to the patient's history of significant weight loss and reports of improved sleep without CPAP, I recommend that we get another sleep study to make sure she does not need CPAP therapy anymore.  Due to her history of COPD I recommend we also get transcutaneous CO2 monitoring.  2.  History of COPD  3.  Other sleep disturbance  4.  Weight loss    Patient verbalized understanding of these issues, agrees with the plan and all questions were answered today. Patient was given an opportuntity to voice any other symptoms or concerns not listed above. Patient did not have any other symptoms or concerns.      Patient told to return in one week after the sleep study is interpreted.      Barry Wilkes DO  Board Certified in Internal Medicine and Sleep Medicine  [facility].    (Note created with Dragon voice recognition and unintended spelling errors and word substitutions may occur)

## 2021-06-02 ENCOUNTER — RECORDS - HEALTHEAST (OUTPATIENT)
Dept: ADMINISTRATIVE | Facility: CLINIC | Age: 71
End: 2021-06-02

## 2021-06-02 VITALS — WEIGHT: 161.4 LBS | HEIGHT: 65 IN | BODY MASS INDEX: 26.89 KG/M2

## 2021-06-02 VITALS — WEIGHT: 165.5 LBS | BODY MASS INDEX: 27.57 KG/M2 | HEIGHT: 65 IN

## 2021-06-02 VITALS — HEIGHT: 65 IN | BODY MASS INDEX: 30.02 KG/M2 | WEIGHT: 180.2 LBS

## 2021-06-02 VITALS — WEIGHT: 191.6 LBS | HEIGHT: 65 IN | BODY MASS INDEX: 31.92 KG/M2

## 2021-06-02 VITALS — WEIGHT: 160.4 LBS | BODY MASS INDEX: 26.73 KG/M2 | HEIGHT: 65 IN

## 2021-06-02 VITALS — HEIGHT: 65 IN | BODY MASS INDEX: 30.32 KG/M2 | WEIGHT: 182 LBS

## 2021-06-02 VITALS — HEIGHT: 65 IN | WEIGHT: 163.2 LBS | BODY MASS INDEX: 27.19 KG/M2

## 2021-06-02 VITALS — HEIGHT: 65 IN | WEIGHT: 181.4 LBS | BODY MASS INDEX: 30.22 KG/M2

## 2021-06-02 VITALS — WEIGHT: 161 LBS | HEIGHT: 65 IN | BODY MASS INDEX: 26.82 KG/M2

## 2021-06-02 VITALS — WEIGHT: 180.3 LBS | HEIGHT: 65 IN | BODY MASS INDEX: 30.04 KG/M2

## 2021-06-02 VITALS — BODY MASS INDEX: 25.96 KG/M2 | WEIGHT: 156 LBS

## 2021-06-02 VITALS — WEIGHT: 191.6 LBS | BODY MASS INDEX: 31.92 KG/M2 | HEIGHT: 65 IN

## 2021-06-02 VITALS — WEIGHT: 168 LBS | BODY MASS INDEX: 27.96 KG/M2

## 2021-06-02 VITALS — WEIGHT: 185 LBS | HEIGHT: 65 IN | BODY MASS INDEX: 30.82 KG/M2

## 2021-06-02 VITALS — HEIGHT: 65 IN | WEIGHT: 156.8 LBS | BODY MASS INDEX: 26.12 KG/M2

## 2021-06-02 VITALS — BODY MASS INDEX: 30.31 KG/M2 | WEIGHT: 181.9 LBS | HEIGHT: 65 IN

## 2021-06-02 VITALS — WEIGHT: 181.6 LBS | BODY MASS INDEX: 30.22 KG/M2

## 2021-06-02 VITALS — HEIGHT: 65 IN | WEIGHT: 161.8 LBS | BODY MASS INDEX: 26.96 KG/M2

## 2021-06-02 NOTE — TELEPHONE ENCOUNTER
RN cannot approve Refill Request    RN can NOT refill this medication med is not covered by policy/route to provider. Last office visit: 10/16/2019 Master Deleon DO Last Physical: Visit date not found Last MTM visit: Visit date not found Last visit same specialty: 10/16/2019 Master Deleon DO.  Next visit within 3 mo: Visit date not found  Next physical within 3 mo: Visit date not found      Jil Arrington, [facility] Triage/Med Refill 10/18/2019    Requested Prescriptions   Pending Prescriptions Disp Refills     PULMICORT FLEXHALER 180 mcg/actuation inhaler [Pharmacy Med Name: PULMICORT 180 MCG FLEXHALER]  3     Sig: TAKE 2 PUFFS BY MOUTH TWICE A DAY       There is no refill protocol information for this order

## 2021-06-02 NOTE — TELEPHONE ENCOUNTER
RN cannot approve Refill Request    RN can NOT refill this medication Sig clarification. Last office visit: 10/16/2019 Master Deleon DO Last Physical: Visit date not found Last MTM visit: Visit date not found Last visit same specialty: 10/16/2019 Master Deleon DO.  Next visit within 3 mo: Visit date not found  Next physical within 3 mo: Visit date not found      Annabel Cortez, [facility] Triage/Med Refill 10/27/2019    Requested Prescriptions   Pending Prescriptions Disp Refills     levothyroxine (SYNTHROID, LEVOTHROID) 75 MCG tablet [Pharmacy Med Name: LEVOTHYROXINE 75 MCG TABLET] 45 tablet 2     Sig: TAKE 1 TABLET BY MOUTH EVERY OTHER DAYS ALTERNATING WITH 75MCG AND 50MCG DOSE       Thyroid Hormones Protocol Passed - 10/27/2019  9:53 AM        Passed - Provider visit in past 12 months or next 3 months     Last office visit with prescriber/PCP: 10/16/2019 Master Deleon DO OR same dept: 10/16/2019 Master Deleon DO OR same specialty: 10/16/2019 Master Deleon DO  Last physical: Visit date not found Last MTM visit: Visit date not found   Next visit within 3 mo: Visit date not found  Next physical within 3 mo: Visit date not found  Prescriber OR PCP: Master Deleon DO  Last diagnosis associated with med order: 1. Hypothyroidism  - levothyroxine (SYNTHROID, LEVOTHROID) 75 MCG tablet [Pharmacy Med Name: LEVOTHYROXINE 75 MCG TABLET]; TAKE 1 TABLET BY MOUTH EVERY OTHER DAYS ALTERNATING WITH 75MCG AND 50MCG DOSE  Dispense: 45 tablet; Refill: 2    If protocol passes may refill for 12 months if within 3 months of last provider visit (or a total of 15 months).             Passed - TSH on file in past 12 months for patient age 12 & older     TSH   Date Value Ref Range Status   10/16/2019 4.15 0.30 - 5.00 uIU/mL Final

## 2021-06-02 NOTE — TELEPHONE ENCOUNTER
RN Triage:    Spoke with 69 yr old Rita who c/o fever ranging from 99.0 to 103.0 x 4 days.    Pt reports massiel colored urine with foul odor.    Reports occasional stinging when urinating.    Pt reports sweats and chills.    Fever 99.5 currently.    Taking Tylenol or Naproxen for fever.    Woke up last night and was confused.  Pt did a load of laundry and made coffee thinking it was morning.    Reports dizziness off and on; denies current dizziness.    Reports Shortness of breath when laying in bed shivering under the covers.    Hx of COPD.  Hx of sepsis.    PLAN:  Advised ED per protocol.  Pt intends to go to [facility] ED.   Joanne Velez RN   [facility] RN Triage      Reason for Disposition    Difficulty breathing    Patient sounds very sick or weak to the triager    Protocols used: FEVER-A-[place], URINATION PAIN - FEMALE-A-OH

## 2021-06-02 NOTE — TELEPHONE ENCOUNTER
metFORMIN (GLUCOPHAGE) 1000 MG tablet [[id number]]     Electronically signed by: Master Deleon DO on 08/27/18 1453 Status: Discontinued   Ordering user: Master Deleon DO 08/27/18 1453 Authorized by: Master Deleon DO   Frequency: BID with meals 08/27/18 - 08/08/19  Released by: Master Deleon DO 08/27/18 1453   Discontinued by: Master Deleon DO 08/08/19 1136

## 2021-06-02 NOTE — PROGRESS NOTES
FOOT AND ANKLE SURGERY/PODIATRY CONSULT NOTE         ASSESSMENT:   Onychomycosis  Onychauxis      TREATMENT:  Debrided left great toenail        HPI: The patient is a pleasant 69-year-old female who presented to the clinic today complaining of a long thick painful left great toenail.  The patient stated that 25 years ago she injured this toenail.  Ever since that time she has always had some problems with the toenail.  It has become unsightly.  Over the past several months however the nail has become painful.  The pain is aggravated by her shoe gear.  It is an aching pain upon pressure.  She has not had any redness or swelling surrounding the toenail.  She is never had any noticeable drainage of bleeding.  The pain is relieved after removing her shoe gear.  She denies any other previous treatment.  The pain is mild to moderate.    Past Medical History:   Diagnosis Date     COPD (chronic obstructive pulmonary disease) (H)      Depression      Diabetes mellitus (H)      Functional diarrhea 12/31/2018     GERD (gastroesophageal reflux disease)      HCAP (healthcare-associated pneumonia)      Hx antineoplastic chemotherapy     lung cancer      Hx of radiation therapy     lung cancer      Hyperlipemia      Hypothyroid      Lung cancer (H) 2008    RUL,  Non small cell cancer     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     Osteopenia      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, subsequent encounter 4/8/2019     Pleural effusion 1/15     Pneumonia      Radiation Pneumonitis     Created by Conversion      Sleep apnea     does not use cpap       Past Surgical History:   Procedure Laterality Date     PORTACATH PLACEMENT      and removal     THORACOSCOPY Right 4/6/2015    Procedure: RIGHT THORACOSCOPY / BIOPSY PLEURAL / TALC PLEURODESIS;  Surgeon: Michi Ma MD;  Location: [facility];  Service:      THORACOSCOPY Left 3/29/2019    Procedure: LEFT THORACOSCOPY WITH  DECORTICATION;  Surgeon: Michi Ma MD;  Location: [facility] OR;  Service: General     TONSILLECTOMY  age 5     URETERAL STENT PLACEMENT Right 6/2010     US THORACENTESIS  3/27/2019       No Known Allergies      Current Outpatient Medications:      acetaminophen (TYLENOL) 500 MG tablet, TAKE 1-2 TABLETS (500-1,000 MG) BY MOUTH EVERY 6 HOURS AS NEEDED FOR MILD PAIN, Disp: 100 tablet, Rfl: 0     aspirin 81 MG EC tablet, Take 81 mg by mouth at bedtime.    , Disp: , Rfl:      blood glucose meter (GLUCOMETER), Use 1 each As Directed as needed. Dispense glucometer brand per patient's insurance at pharmacy discretion., Disp: 1 each, Rfl: 0     blood glucose test (ACCU-CHEK SMARTVIEW TEST STRIP) strips, PT TO TEST BLOOD SUGAR DAILY, Disp: 300 strip, Rfl: 3     cetirizine 10 mg cap, Take 10 mg by mouth daily with supper.    , Disp: , Rfl:      citalopram (CELEXA) 10 MG tablet, Take 1 tablet (10 mg total) by mouth daily., Disp: 90 tablet, Rfl: 3     diazePAM (VALIUM) 2 MG tablet, Take 1 tablet (2 mg total) by mouth at bedtime as needed for anxiety., Disp: 30 tablet, Rfl: 1     docosahexanoic acid/epa (FISH OIL ORAL), Take by mouth., Disp: , Rfl:      famotidine (PEPCID) 20 MG tablet, Take 1 tablet (20 mg total) by mouth daily., Disp: 90 tablet, Rfl: 3     fluticasone (FLONASE) 50 mcg/actuation nasal spray, 2 SPRAYS INTO EACH NOSTRIL DAILY., Disp: 48 g, Rfl: 3     gabapentin (NEURONTIN) 300 MG capsule, Take 1 capsule (300 mg total) by mouth at bedtime., Disp: 30 capsule, Rfl: 0     generic lancets, Use 1 each As Directed daily. Dispense brand per patient's insurance at pharmacy discretion. (dx E11.9), Disp: 100 each, Rfl: 1     levothyroxine (SYNTHROID, LEVOTHROID) 50 MCG tablet, Take 50 mcg by mouth every other day., Disp: , Rfl:      levothyroxine (SYNTHROID, LEVOTHROID) 75 MCG tablet, Take 75 mcg by mouth every other day., Disp: , Rfl:      metFORMIN (GLUCOPHAGE) 500 MG tablet, Take 1 tablet (500 mg total) by  mouth 2 (two) times a day with meals., Disp: 180 tablet, Rfl: 3     montelukast (SINGULAIR) 10 mg tablet, Take 1 tablet (10 mg total) by mouth at bedtime., Disp: 90 tablet, Rfl: 3     multivitamin therapeutic tablet, Take 1 tablet by mouth daily., Disp: , Rfl:      PULMICORT FLEXHALER 180 mcg/actuation inhaler, INHALE 2 PUFFS 2 (TWO) TIMES A DAY., Disp: 3 each, Rfl: 3     simvastatin (ZOCOR) 10 MG tablet, Take 1 tablet (10 mg total) by mouth at bedtime., Disp: 90 tablet, Rfl: 3     traZODone (DESYREL) 100 MG tablet, Take 100 mg by mouth at bedtime., Disp: , Rfl:      triamcinolone (KENALOG) 0.1 % ointment, Apply topically 2 (two) times a day as needed., Disp: , Rfl:      umeclidinium-vilanterol (ANORO ELLIPTA) 62.5-25 mcg/actuation inhaler, Inhale 1 puff daily., Disp: 90 puff, Rfl: 3     VENTOLIN HFA 90 mcg/actuation inhaler, INHALE 1-2 PUFFS EVERY 4 (FOUR) HOURS AS NEEDED FOR WHEEZING., Disp: 54 g, Rfl: 0    Family History   Problem Relation Age of Onset     Heart disease Mother      Hypertension Mother      Alcohol abuse Mother      Depression Mother      Heart disease Father 45        mi age 45, cabg     Heart attack Father      Cancer Father         prostate     Hypertension Father      Snoring Father      COPD Brother      Alcohol abuse Brother      Alcohol abuse Sister      Breast cancer Paternal Aunt      Leukemia Grandchild      Cancer Maternal Aunt 47        breast     Diabetes Son      Anxiety disorder Son      Depression Son      No Medical Problems Daughter      Schizophrenia Grandchild        Social History     Socioeconomic History     Marital status:      Spouse name: Not on file     Number of children: Not on file     Years of education: Not on file     Highest education level: Not on file   Occupational History     Not on file   Social Needs     Financial resource strain: Not on file     Food insecurity:     Worry: Not on file     Inability: Not on file     Transportation needs:     Medical:  Not on file     Non-medical: Not on file   Tobacco Use     Smoking status: Former Smoker     Packs/day: 1.50     Years: 0.00     Pack years: 0.00     Last attempt to quit: 11/9/2008     Years since quitting: 10.9     Smokeless tobacco: Former User   Substance and Sexual Activity     Alcohol use: No     Drug use: No     Sexual activity: Never   Lifestyle     Physical activity:     Days per week: Not on file     Minutes per session: Not on file     Stress: Not on file   Relationships     Social connections:     Talks on phone: Not on file     Gets together: Not on file     Attends Mandaen service: Not on file     Active member of club or organization: Not on file     Attends meetings of clubs or organizations: Not on file     Relationship status: Not on file     Intimate partner violence:     Fear of current or ex partner: Not on file     Emotionally abused: Not on file     Physically abused: Not on file     Forced sexual activity: Not on file   Other Topics Concern     Not on file   Social History Narrative     Not on file       Review of Systems - Patient denies fever, chills, rash, wound, stiffness, limping, numbness, weakness, heart burn, blood in stool, chest pain with activity, calf pain when walking, shortness of breath with activity, chronic cough, easy bleeding/bruising, swelling of ankles, excessive thirst, fatigue, depression, anxiety.  Patient admits to painful left great toenail.      OBJECTIVE:  Appearance: alert, well appearing, and in no distress.    Vitals:    10/03/19 1323   BP: 90/48   Pulse: 66   SpO2: 96%       BMI= Body mass index is 25.96 kg/m .    General appearance: Patient is alert and fully cooperative with history & exam.  No sign of distress is noted during the visit.  Psychiatric: Affect is pleasant & appropriate.  Patient appears motivated to improve health.  Respiratory: Breathing is regular & unlabored while sitting.  HEENT: Hearing is intact to spoken word.  Speech is clear.  No  gross evidence of visual impairment that would impact ambulation.    Vascular: Dorsalis pedis and posterior tibial pulses are palpable. There is no pedal hair growth bilaterally.  CFT < 3 sec from anterior tibial surface to distal digits bilaterally. There is no appreciable edema noted.  Dermatologic: Nails bilateral feet are normal length and color.  The left great toenail however is 2-3 times normal thickness with subungual debris present.  Turgor and texture are within normal limits. No coloration or temperature changes. No primary or secondary lesions noted.  Neurologic: All epicritic and proprioceptive sensations are grossly intact bilaterally.  Musculoskeletal: All active and passive ankle, subtalar, midtarsal, and 1st MPJ range of motion are grossly intact without pain or crepitus, with the exception of none. Manual muscle strength is within normal limits bilaterally. All dorsiflexors, plantarflexors, invertors, evertors are intact bilaterally. Tenderness present to nail plate left great toe on palpation.  No tenderness to left great toe with range of motion. Calf is soft/non-tender without warmth/induration    Imaging:     No results found.       TREATMENT:  I debrided the left great toenail today.  I recommended the patient have this performed every 2 months.  She is to return to the clinic as needed.    Pierre Louis; JANICE  [facility] Foot & Ankle Surgery/Podiatry

## 2021-06-02 NOTE — PROGRESS NOTES
[facility] Clinic Note    Rita Mancini   69 y.o. female    Date of Visit: 10/16/2019  Chief Complaint   Patient presents with     Hospital Visit Follow Up     [facility], discharged 10/10/19       ASSESSMENT/PLAN  1. Sepsis due to Escherichia coli with acute renal failure without septic shock, unspecified acute renal failure type (H)  Ambulatory referral to Urology    Basic Metabolic Panel    HM2(CBC w/o Differential)   2. Hydronephrosis, right  Ambulatory referral to Urology    Basic Metabolic Panel   3. Hypothyroidism, unspecified type  Thyroid Stimulating Hormone (TSH)     ---------------------------------------------    1.  Rita is doing well following hospital admission for E. coli sepsis.  She is not volume overloaded following the hospitalization, still should remain off furosemide.  She does not have an appointment with Dr. Jeffers with urology yet.  This is to follow-up on chronic right-sided UPJ stenosis and hydronephrosis.  She did present with acute kidney injury, so we will recheck basic metabolic panel, also white blood cell count.  I asked Dr. Watkins if there was any potential for immunodeficiency given that she has had 3 presentations to the hospital with sepsis in the last 6 months, though he did not feel this needed to be worked up.  --She has just finished up course of levofloxacin, reminded she should take daily Bactrim for prophylaxis until she can see Dr. Jeffers    2.  See above    3.  Recheck thyroid, she has been more fatigued than normal.  She could sleep most of the day.  We will also check hemogram as above.  --TSH high but within normal parameters.  Increase to 75 mcg 5d/wk and 50 mcg M/F    She has oncology follow-up in March, she also has a sleep study coming up this next month.  I suggested she go through with this.  I recommended against taking any further diazepam, this was taken off her list.  If needed, she can take trazodone as she has been doing recently,  albeit sparingly.    Consider early colonoscopy given iron deficiency anemia.  We will wait until she is feeling better before entertaining this.    Continue off metformin, blood sugars around 100    Return in about 1 month (around 11/16/2019) for Recheck.      SUBJECTIVE  Rita Mancini is a 69-year-old woman with history of lung cancer, 2 admissions earlier this year for pneumonia, who presents for recent hospitalization follow-up for E. coli sepsis.    She presented with severe sepsis, E. coli was the bacteria, treated with broad-spectrum antibiotics and eventually narrowed to cefepime, then levofloxacin by mouth to complete a course.  Once she is done with that, she was to transition to daily Bactrim prophylaxis until she can follow-up with urology.  She has not set up an appointment with urology yet.    They were consulted because she has chronic UPJ stenosis on the right leading to hydronephrosis, which has been present for over 10 years.  They did not feel that intervention was immediately necessary.    Her renal dysfunction resolved during the course of the hospitalization and with IV fluids.    She has iron deficiency anemia, GI was consulted, they did not recommend any inpatient work-up, particularly while she is dealing with sepsis.     We stopped metformin at discharge, BG in 100 range.    She has been feeling well apart from being tired, she feels like she could sleep all day.      ROS A comprehensive review of systems was performed and was otherwise negative    Post Discharge Medication Reconciliation Status: discharge medications reconciled, continue medications without change     Patient Active Problem List   Diagnosis     Episode of recurrent major depressive disorder (H)     Non-small cell cancer of right lung, s/p radiation and chemotherapies, in remission since [id number] (H)     Hypothyroidism     Dyslipidemia     Diabetes 1.5, managed as type 2 (H)     COPD (chronic obstructive pulmonary  disease) (H)     MONSERRAT on CPAP     Diverticulosis     Chronic pleural effusion, left (s/p Talc pleurodesis 2015)     Bronchiectasis (H)     Eczema of both hands     Keratosis obturans of external ear canal, right     Ventral hernia without obstruction or gangrene     Hyperkalemia     Current moderate episode of major depressive disorder (H)     Gastroesophageal reflux disease without esophagitis     Psychophysiological insomnia     Anemia     Hydronephrosis, right     Lymph node enlargement     Severe sepsis (H)     Sepsis due to Escherichia coli with acute renal failure without septic shock, unspecified acute renal failure type (H)     Past Medical History:   Diagnosis Date     COPD (chronic obstructive pulmonary disease) (H)      Depression      Diabetes mellitus (H)      Functional diarrhea 12/31/2018     GERD (gastroesophageal reflux disease)      HCAP (healthcare-associated pneumonia)      Hx antineoplastic chemotherapy     lung cancer      Hx of radiation therapy     lung cancer      Hyperlipemia      Hypothyroid      Lung cancer (H) 2008    RUL,  Non small cell cancer     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     Osteopenia      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, subsequent encounter 4/8/2019     Pleural effusion 1/15     Pneumonia      Radiation Pneumonitis     Created by Conversion      Sleep apnea     does not use cpap     Past Surgical History:   Procedure Laterality Date     PORTACATH PLACEMENT      and removal     THORACOSCOPY Right 4/6/2015    Procedure: RIGHT THORACOSCOPY / BIOPSY PLEURAL / TALC PLEURODESIS;  Surgeon: Michi Ma MD;  Location: [facility];  Service:      THORACOSCOPY Left 3/29/2019    Procedure: LEFT THORACOSCOPY WITH DECORTICATION;  Surgeon: Michi Ma MD;  Location: [facility];  Service: General     TONSILLECTOMY  age 5     URETERAL STENT PLACEMENT Right 6/2010     US THORACENTESIS  3/27/2019     Social  History     Socioeconomic History     Marital status:      Spouse name: Not on file     Number of children: Not on file     Years of education: Not on file     Highest education level: Not on file   Occupational History     Not on file   Social Needs     Financial resource strain: Not on file     Food insecurity:     Worry: Not on file     Inability: Not on file     Transportation needs:     Medical: Not on file     Non-medical: Not on file   Tobacco Use     Smoking status: Former Smoker     Packs/day: 1.50     Years: 0.00     Pack years: 0.00     Last attempt to quit: 11/9/2008     Years since quitting: 10.9     Smokeless tobacco: Former User   Substance and Sexual Activity     Alcohol use: No     Drug use: No     Sexual activity: Never   Lifestyle     Physical activity:     Days per week: Not on file     Minutes per session: Not on file     Stress: Not on file   Relationships     Social connections:     Talks on phone: Not on file     Gets together: Not on file     Attends Shinto service: Not on file     Active member of club or organization: Not on file     Attends meetings of clubs or organizations: Not on file     Relationship status: Not on file     Intimate partner violence:     Fear of current or ex partner: Not on file     Emotionally abused: Not on file     Physically abused: Not on file     Forced sexual activity: Not on file   Other Topics Concern     Not on file   Social History Narrative     Not on file     Family History   Problem Relation Age of Onset     Heart disease Mother      Hypertension Mother      Alcohol abuse Mother      Depression Mother      Heart disease Father 45        mi age 45, cabg     Heart attack Father      Cancer Father         prostate     Hypertension Father      Snoring Father      COPD Brother      Alcohol abuse Brother      Alcohol abuse Sister      Breast cancer Paternal Aunt      Leukemia Grandchild      Cancer Maternal Aunt 47        breast     Diabetes Son       Anxiety disorder Son      Depression Son      No Medical Problems Daughter      Schizophrenia Grandchild        Current Outpatient Medications   Medication Sig Dispense Refill     acetaminophen (TYLENOL) 500 MG tablet TAKE 1-2 TABLETS (500-1,000 MG) BY MOUTH EVERY 6 HOURS AS NEEDED FOR MILD PAIN 100 tablet 0     aspirin 81 MG EC tablet Take 81 mg by mouth at bedtime.              blood glucose meter (GLUCOMETER) Use 1 each As Directed daily. Dispense glucometer brand per patient's insurance at pharmacy discretion. 1 each 0     blood glucose test (ACCU-CHEK SMARTVIEW TEST STRIP) strips PT TO TEST BLOOD SUGAR DAILY 300 strip 3     cetirizine (ZYRTEC) 10 MG tablet Take 10 mg by mouth daily with supper.       citalopram (CELEXA) 10 MG tablet Take 1 tablet (10 mg total) by mouth daily. 90 tablet 3     docosahexanoic acid/epa (FISH OIL ORAL) Take 1 g by mouth daily.              famotidine (PEPCID) 20 MG tablet Take 1 tablet (20 mg total) by mouth daily. 90 tablet 3     ferrous sulfate 325 (65 FE) MG tablet Take 1 tablet (325 mg total) by mouth daily with breakfast.  0     fluticasone (FLONASE) 50 mcg/actuation nasal spray 2 SPRAYS INTO EACH NOSTRIL DAILY. 48 g 3     gabapentin (NEURONTIN) 300 MG capsule Take 1 capsule (300 mg total) by mouth at bedtime. 30 capsule 0     generic lancets Use 1 each As Directed daily. Dispense brand per patient's insurance at pharmacy discretion. (dx E11.9) 100 each 1     levothyroxine (SYNTHROID, LEVOTHROID) 50 MCG tablet Take 50 mcg by mouth every other day.       levothyroxine (SYNTHROID, LEVOTHROID) 75 MCG tablet Take 75 mcg by mouth every other day.       montelukast (SINGULAIR) 10 mg tablet Take 1 tablet (10 mg total) by mouth at bedtime. 90 tablet 3     multivitamin therapeutic tablet Take 1 tablet by mouth daily.       PULMICORT FLEXHALER 180 mcg/actuation inhaler INHALE 2 PUFFS 2 (TWO) TIMES A DAY. (Patient taking differently: Inhale 2 puffs daily.       ) 3 each 3      "simvastatin (ZOCOR) 10 MG tablet Take 1 tablet (10 mg total) by mouth at bedtime. 90 tablet 3     sulfamethoxazole-trimethoprim (BACTRIM DS) 800-160 mg per tablet Take 1 tablet by mouth daily for 20 days. Start after finished with levofloxacin and continue until you can see urology 20 tablet 0     traZODone (DESYREL) 100 MG tablet Take 100 mg by mouth at bedtime as needed for sleep.              triamcinolone (KENALOG) 0.1 % ointment Apply 1 application topically 2 (two) times a day as needed (dry skin on hands).              umeclidinium-vilanterol (ANORO ELLIPTA) 62.5-25 mcg/actuation inhaler Inhale 1 puff daily. 90 puff 3     VENTOLIN HFA 90 mcg/actuation inhaler INHALE 1-2 PUFFS EVERY 4 (FOUR) HOURS AS NEEDED FOR WHEEZING. 54 g 0     No current facility-administered medications for this visit.        No Known Allergies    EXAM  Vitals:    10/16/19 1003   BP: 102/62   Patient Site: Left Arm   Patient Position: Sitting   Cuff Size: Adult Regular   Pulse: 76   SpO2: 98%   Weight: 152 lb 8 oz (69.2 kg)   Height: 5' 5\" (1.651 m)         General: alert, no distress  HEENT: sclerae anicteric, moist oral mucosa  Heart: Regular rate and rhythm, no murmurs.  No pretibial edema.    Lungs: Bibasilar crackles which is baseline, nonlabored breathing  Gastrointestinal: abdomen is non-distended.    Skin: warm/dry, no rashes  Neuro: no gross abnormalities  Psychiatric: Pleasant affect       RESULTS REVIEWED:     ANALYSIS AND SUMMARY OF OLD RECORDS, NOTES AND CONSULTS (2): dc summary reviewed, summ above    RECORDS REQUESTED (1): None.     OTHER HISTORY SUMMARIZED (from nursing staff, family, friends) (2):     RADIOLOGY TESTS SUMMARIZED or REQUESTED (XRAY/CT/MRI/DXA) (1):   Xr Chest 2 Views    Result Date: 10/7/2019  EXAM: XR CHEST 2 VIEWS LOCATION: [facility] DATE/TIME: 10/7/2019 2:07 PM INDICATION: fever COMPARISON: PA and lateral views of the chest 7/27/2019     The cardiac silhouette is normal in size. Mediastinal " borders are well-defined. Separate focal areas of consolidation in the right lower lobe and left lower lobe present 7/27/2019 has resolved, with angular and linear opacities and their former location reflecting parenchymal scarring. An additional focal opacity in the medial right  apex is also unchanged reflecting focal scar. There are no new airspace opacities. Unchanged basilar pleural scarring. No pleural fluid accumulation or pneumothorax.    Us Kidney Bilateral    Result Date: 10/8/2019  EXAM: US KIDNEY BILATERAL WITH BLADDER LOCATION: [facility] DATE/TIME: 10/8/2019 10:09 AM INDICATION: complicated uti COMPARISON: CT abdomen pelvis dated 07/30/2019. TECHNIQUE: Routine kidneys and bladder. FINDINGS: RIGHT KIDNEY: 10.2 x 6 x 5.8 cm. Study. No renal masses. Moderate hydronephrosis which appears similar in degree to the CT LEFT KIDNEY: 10.8 x 5.1 x 2.3 cm. Normal without hydronephrosis or masses. BLADDER: Normal.     1.  Moderate right hydronephrosis not significantly changed from CT abdomen pelvis of 07/30/2019.      MEDICINE TESTS SUMMARIZED or REQUESTED (EKG/ECHO/COLONOSCOPY/EGD) (1): None    INDEPENDENT REVIEW OF EKG OR X-RAY (2): None.    Lab Results   Component Value Date    HGBA1C 5.9 10/08/2019        Lab Results   Component Value Date    TSH 1.36 08/27/2018        Data points  4     Master Deleon DO  Internal Medicine  [facility]

## 2021-06-02 NOTE — TELEPHONE ENCOUNTER
I sent in the current Rx of levothyroxine to [facility] on [address]... does this need to go to mailorder?

## 2021-06-02 NOTE — TELEPHONE ENCOUNTER
Refill Approved    Rx renewed per Medication Renewal Policy. Medication was last renewed on 10/18/19 .  levothyroxine (SYNTHROID, LEVOTHROID) 75 MCG tablet 90 tablet 3 10/18/2019   Sig: Take daily 5 days a week (except Monday and Friday)    Jli Arrington, [facility] Triage/Med Refill 10/20/2019     Requested Prescriptions   Pending Prescriptions Disp Refills     levothyroxine (SYNTHROID, LEVOTHROID) 75 MCG tablet [Pharmacy Med Name: LEVOTHYROXINE 75 MCG TABLET] 45 tablet 2     Sig: TAKE 1 TABLET BY MOUTH EVERY OTHER DAYS ALTERNATING WITH 75MCG AND 50MCG DOSE       Thyroid Hormones Protocol Passed - 10/20/2019 10:36 AM        Passed - Provider visit in past 12 months or next 3 months     Last office visit with prescriber/PCP: 10/16/2019 Master Deleon DO OR same dept: 10/16/2019 Master Deleon DO OR same specialty: 10/16/2019 Master Deleon DO  Last physical: Visit date not found Last MTM visit: Visit date not found   Next visit within 3 mo: Visit date not found  Next physical within 3 mo: Visit date not found  Prescriber OR PCP: Master Deleon DO  Last diagnosis associated with med order: 1. Hypothyroidism  - levothyroxine (SYNTHROID, LEVOTHROID) 75 MCG tablet [Pharmacy Med Name: LEVOTHYROXINE 75 MCG TABLET]; TAKE 1 TABLET BY MOUTH EVERY OTHER DAYS ALTERNATING WITH 75MCG AND 50MCG DOSE  Dispense: 45 tablet; Refill: 2    If protocol passes may refill for 12 months if within 3 months of last provider visit (or a total of 15 months).             Passed - TSH on file in past 12 months for patient age 12 & older     TSH   Date Value Ref Range Status   10/16/2019 4.15 0.30 - 5.00 uIU/mL Final

## 2021-06-02 NOTE — TELEPHONE ENCOUNTER
Refill Approved    Rx renewed per Medication Renewal Policy. Medication was last renewed on 10/1/19.    Lydia Woo, [facility] Triage/Med Refill 10/23/2019     Requested Prescriptions   Pending Prescriptions Disp Refills     gabapentin (NEURONTIN) 300 MG capsule [Pharmacy Med Name: GABAPENTIN 300 MG CAPSULE] 30 capsule 0     Sig: TAKE 1 CAPSULE BY MOUTH EVERYDAY AT BEDTIME       Gabapentin/Levetiracetam/Tiagabine Refill Protocol  Passed - 10/23/2019  9:32 AM        Passed - PCP or prescribing provider visit in past 12 months or next 3 months     Last office visit with prescriber/PCP: 10/16/2019 Master Deleon DO OR same dept: 10/16/2019 Master Deleon DO OR same specialty: 10/16/2019 Master Deleon DO  Last physical: Visit date not found Last MTM visit: Visit date not found   Next visit within 3 mo: Visit date not found  Next physical within 3 mo: Visit date not found  Prescriber OR PCP: Master Deleon DO  Last diagnosis associated with med order: 1. Rib pain on left side  - gabapentin (NEURONTIN) 300 MG capsule [Pharmacy Med Name: GABAPENTIN 300 MG CAPSULE]; TAKE 1 CAPSULE BY MOUTH EVERYDAY AT BEDTIME  Dispense: 30 capsule; Refill: 0    If protocol passes may refill for 12 months if within 3 months of last provider visit (or a total of 15 months).

## 2021-06-02 NOTE — TELEPHONE ENCOUNTER
RN cannot approve Refill Request    RN can NOT refill this medication med is not covered by policy/route to provider. Last office visit: 10/16/2019 Master Deleon DO Last Physical: Visit date not found Last MTM visit: Visit date not found Last visit same specialty: 10/16/2019 Master Deleon DO.  Next visit within 3 mo: Visit date not found  Next physical within 3 mo: Visit date not found      Chery Steel, [facility] Triage/Med Refill 10/30/2019    Requested Prescriptions   Pending Prescriptions Disp Refills     sulfamethoxazole-trimethoprim (BACTRIM DS) 800-160 mg per tablet 20 tablet 0     Sig: Take 1 tablet by mouth daily for 20 days. Start after finished with levofloxacin and continue until you can see urology       There is no refill protocol information for this order

## 2021-06-03 VITALS — WEIGHT: 143.2 LBS | BODY MASS INDEX: 23.86 KG/M2 | HEIGHT: 65 IN

## 2021-06-03 VITALS
OXYGEN SATURATION: 96 % | WEIGHT: 156 LBS | SYSTOLIC BLOOD PRESSURE: 90 MMHG | HEIGHT: 65 IN | HEART RATE: 66 BPM | BODY MASS INDEX: 25.99 KG/M2 | DIASTOLIC BLOOD PRESSURE: 48 MMHG

## 2021-06-03 VITALS — BODY MASS INDEX: 25.46 KG/M2 | HEIGHT: 65 IN | WEIGHT: 152.8 LBS

## 2021-06-03 VITALS
WEIGHT: 165 LBS | HEART RATE: 70 BPM | SYSTOLIC BLOOD PRESSURE: 116 MMHG | DIASTOLIC BLOOD PRESSURE: 73 MMHG | OXYGEN SATURATION: 98 % | HEIGHT: 65 IN | BODY MASS INDEX: 27.49 KG/M2

## 2021-06-03 VITALS
OXYGEN SATURATION: 97 % | HEIGHT: 65 IN | BODY MASS INDEX: 26.02 KG/M2 | SYSTOLIC BLOOD PRESSURE: 98 MMHG | HEART RATE: 80 BPM | DIASTOLIC BLOOD PRESSURE: 56 MMHG | WEIGHT: 156.19 LBS

## 2021-06-03 VITALS
DIASTOLIC BLOOD PRESSURE: 59 MMHG | RESPIRATION RATE: 20 BRPM | SYSTOLIC BLOOD PRESSURE: 119 MMHG | HEIGHT: 65 IN | BODY MASS INDEX: 28.12 KG/M2 | WEIGHT: 168.8 LBS | HEART RATE: 65 BPM | TEMPERATURE: 98.2 F

## 2021-06-03 VITALS
SYSTOLIC BLOOD PRESSURE: 102 MMHG | HEIGHT: 65 IN | DIASTOLIC BLOOD PRESSURE: 62 MMHG | WEIGHT: 152.5 LBS | HEART RATE: 76 BPM | BODY MASS INDEX: 25.41 KG/M2 | OXYGEN SATURATION: 98 %

## 2021-06-03 VITALS
BODY MASS INDEX: 24.99 KG/M2 | HEIGHT: 65 IN | OXYGEN SATURATION: 98 % | SYSTOLIC BLOOD PRESSURE: 110 MMHG | HEART RATE: 75 BPM | DIASTOLIC BLOOD PRESSURE: 67 MMHG | WEIGHT: 150 LBS

## 2021-06-03 VITALS — BODY MASS INDEX: 24.88 KG/M2 | WEIGHT: 149.5 LBS

## 2021-06-03 NOTE — TELEPHONE ENCOUNTER
Spoke with pt about diabetic eye quality. Pt understanding that she is due, has not scheduled yet but states she will.  PT appreciated the reminder.

## 2021-06-03 NOTE — TELEPHONE ENCOUNTER
Patient has lab appt on 12/5/19; says for A1c in appt notes, but last A1c was 10/8/19. Looks like she might need a thyroid re-check? Please advise.    Thanks

## 2021-06-03 NOTE — TELEPHONE ENCOUNTER
Lab results on 10/16/19:  The lab work shows there is improvement in the white blood cell count.   The thyroid level looks ok, but TSH at the higher end of normal.  With the fatigue, I would suggest a slight increase in thyroid medicine.      We can recheck in a couple months.    TSH order pended:

## 2021-06-03 NOTE — TELEPHONE ENCOUNTER
Orders being requested: Mammogram and Bone Density   Reason service is needed/diagnosis: Routine health maintenance  When are orders needed by: Non-urgent  Where to send Orders: [facility]  Okay to leave detailed message?  Yes  [phone]

## 2021-06-03 NOTE — TELEPHONE ENCOUNTER
Overnight polysomnography was reviewed.   The patient still had obstructive sleep apnea. I will wait until her upcoming appointment to discuss results and treatment options.     Thank you for allowing me to participate in the care of this patient. If you have any questions or concerns, please feel free to call me.

## 2021-06-03 NOTE — TELEPHONE ENCOUNTER
Both DEXA and mammogram have been ordered repeatedly and there are several active orders for each.  I will forward to  to see if they can help her set up these tests.  Both are tests that I have previously talked to her about and recommended to her.

## 2021-06-03 NOTE — PROGRESS NOTES
Assessment and Plan:       1. Encounter for screening for lipoid disorders  Check fasting labs today  - Lipid Conner, RANDOM    2. Hypothyroidism, unspecified type  Because of the fatigue, increase the levothyroxine and because the TSH was in the upper range of normal.  We will recheck today.  - Thyroid Stimulating Hormone (TSH)    3. Non-small cell cancer of right lung, s/p radiation and chemotherapies, in remission since [id number] (H)  She follows up with Dr. Foster in January with additional testing    4. Chronic obstructive pulmonary disease, unspecified COPD type (H)  She has coarse breath sounds at baseline, not on supplementary oxygen.  Not in exacerbation.    5. Diabetes 1.5, managed as type 2 (H)  She is not quite due for repeat A1c.  This has been managed well off metformin.  - Hepatic Profile  - HM2(CBC w/o Differential)  - Basic Metabolic Panel    6. Hyperkalemia  Repeat BMP as above    7. Iron deficiency anemia due to chronic blood loss  She has iron deficiency anemia, we will recheck on this.  There may be a need to repeat colonoscopy earlier than otherwise indicated in 2021.  - Iron and Transferrin Iron Binding Capacity  -iron still low, refer to GI for discussion  -change famotidine to omeprazole as recommended by Dr. Wojciech Mauricio in hospital.     8. Ventral hernia without obstruction or gangrene  Noted right upper quadrant and supraumbilical ventral hernia    9. MONSERRAT on CPAP  Uses CPAP        The patient's current medical problems were reviewed.    The following high BMI interventions were performed this visit: encouragement to exercise  The following health maintenance schedule was reviewed with the patient and provided in printed form in the after visit summary:   Health Maintenance   Topic Date Due     HEPATITIS C SCREENING  1950     LIPID  03/25/2016     DIABETIC EYE EXAM  04/03/2019     ZOSTER VACCINES (3 of 3) 11/26/2019     ADVANCE CARE PLANNING  03/25/2020     TD 18+ HE  10/25/2020      A1C  04/08/2020     DEPRESSION FOLLOW UP  05/27/2020     MICROALBUMIN  08/08/2020     FALL RISK ASSESSMENT  08/08/2020     BMP  10/16/2020     MEDICARE ANNUAL WELLNESS VISIT  11/27/2020     DIABETIC FOOT EXAM  11/27/2020     COLONOSCOPY  02/15/2021     MAMMOGRAM  11/08/2021     DXA SCAN  11/08/2021     PNEUMOCOCCAL IMMUNIZATION 65+ LOW/MEDIUM RISK  Completed     INFLUENZA VACCINE RULE BASED  Completed        Subjective:   Chief Complaint: Rita Mancini is an 69 y.o. female here for an Annual Wellness visit.   HPI:      She has been feeling well.  She is a bit tired today, but may be because she had a large Thanksgiving meal with her friends.  She has Thanksgiving with her immediate family tomorrow at a [employer] restaurant.    She was hospitalized several times over the last last 6 months for a few different infections, most recently E. coli sepsis.  She made a full recovery from this.  She has not had any fevers recently.    She had a couple of admissions for pneumonia earlier this year.  She saw infectious disease, they were not concerned about immunodeficiency.  She made a referral recovery on this as well.  She has COPD, always has a productive cough.    Through the course of illnesses, her weight dropped into the 140s, now back up to 160s.  She does not want to gain any more weight.  She has a difficult time controlling her impulse to eat.  She has a complicated relationship with food, and the sense that have brings her jorge.    When she was at a lighter weight, she had less apneic events as seen on her sleep study.    During recent hospital stay, was identified that she had iron deficiency anemia.  GI was consulted, they did not recommend any inpatient work-up, but should consider outpatient colonoscopy and/or EGD.  Last colonoscopy was 2011.    She has a ventral hernia superior to the umbilicus which continues to be reducible.  She has a new area in the right upper quadrant where she thinks she has another  hernia.      Review of Systems:    Please see above.  The rest of the review of systems are negative for all systems.    Patient Care Team:  Master Deleon DO as PCP - General (Internal Medicine)  Ella Cohen MD as Physician (Radiation Oncology)  Herbert Foster MD as Physician (Hematology and Oncology)  Felisha Lee, RN as Oncology Nurse Navigator (Hematology and Oncology)  Robert Yepez, CNP as Assigned PCP  Charla Perez, PharmD as Pharmacist (Pharmacist)  Jase Anaya MD as Physician (Orthopedic Surgery)     Patient Active Problem List   Diagnosis     Episode of recurrent major depressive disorder (H)     Non-small cell cancer of right lung, s/p radiation and chemotherapies, in remission since [id number] (H)     Hypothyroidism     Dyslipidemia     Diabetes 1.5, managed as type 2 (H)     COPD (chronic obstructive pulmonary disease) (H)     MONSERRAT on CPAP     Diverticulosis     Chronic pleural effusion, left (s/p Talc pleurodesis 2015)     Bronchiectasis (H)     Eczema of both hands     Keratosis obturans of external ear canal, right     Ventral hernia without obstruction or gangrene     Hyperkalemia     Current moderate episode of major depressive disorder (H)     Gastroesophageal reflux disease without esophagitis     Psychophysiological insomnia     Anemia     Hydronephrosis, right     Lymph node enlargement     Past Medical History:   Diagnosis Date     COPD (chronic obstructive pulmonary disease) (H)      Depression      Diabetes mellitus (H)      Functional diarrhea 12/31/2018     GERD (gastroesophageal reflux disease)      HCAP (healthcare-associated pneumonia)      Hx antineoplastic chemotherapy     lung cancer      Hx of radiation therapy     lung cancer      Hyperlipemia      Hypothyroid      Lung cancer (H) 2008    RUL,  Non small cell cancer     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     Osteopenia      Other closed displaced fracture of proximal end of right  humerus with nonunion, subsequent encounter 4/8/2019     Pleural effusion 1/15     Pneumonia      Radiation Pneumonitis     Created by Conversion      Sepsis due to Escherichia coli with acute renal failure without septic shock, unspecified acute renal failure type (H)      Sleep apnea     does not use cpap      Past Surgical History:   Procedure Laterality Date     PORTACATH PLACEMENT      and removal     THORACOSCOPY Right 4/6/2015    Procedure: RIGHT THORACOSCOPY / BIOPSY PLEURAL / TALC PLEURODESIS;  Surgeon: Michi Ma MD;  Location: [facility] Main OR;  Service:      THORACOSCOPY Left 3/29/2019    Procedure: LEFT THORACOSCOPY WITH DECORTICATION;  Surgeon: Michi Ma MD;  Location: [facility] Main OR;  Service: General     TONSILLECTOMY  age 5     URETERAL STENT PLACEMENT Right 6/2010     US THORACENTESIS  3/27/2019      Family History   Problem Relation Age of Onset     Heart disease Mother      Hypertension Mother      Alcohol abuse Mother      Depression Mother      Heart disease Father 45        mi age 45, cabg     Heart attack Father      Cancer Father         prostate     Hypertension Father      Snoring Father      COPD Brother      Alcohol abuse Brother      Alcohol abuse Sister      Breast cancer Paternal Aunt      Leukemia Grandchild      Cancer Maternal Aunt 47        breast     Diabetes Son      Anxiety disorder Son      Depression Son      No Medical Problems Daughter      Schizophrenia Grandchild       Social History     Socioeconomic History     Marital status:      Spouse name: Not on file     Number of children: Not on file     Years of education: Not on file     Highest education level: Not on file   Occupational History     Not on file   Social Needs     Financial resource strain: Not on file     Food insecurity:     Worry: Not on file     Inability: Not on file     Transportation needs:     Medical: Not on file     Non-medical: Not on file   Tobacco Use     Smoking  status: Former Smoker     Packs/day: 1.50     Years: 0.00     Pack years: 0.00     Last attempt to quit: 2008     Years since quittin.0     Smokeless tobacco: Former User   Substance and Sexual Activity     Alcohol use: No     Drug use: No     Sexual activity: Never   Lifestyle     Physical activity:     Days per week: Not on file     Minutes per session: Not on file     Stress: Not on file   Relationships     Social connections:     Talks on phone: Not on file     Gets together: Not on file     Attends Yazidism service: Not on file     Active member of club or organization: Not on file     Attends meetings of clubs or organizations: Not on file     Relationship status: Not on file     Intimate partner violence:     Fear of current or ex partner: Not on file     Emotionally abused: Not on file     Physically abused: Not on file     Forced sexual activity: Not on file   Other Topics Concern     Not on file   Social History Narrative     Not on file      Current Outpatient Medications   Medication Sig Dispense Refill     acetaminophen (TYLENOL) 500 MG tablet TAKE 1-2 TABLETS (500-1,000 MG) BY MOUTH EVERY 6 HOURS AS NEEDED FOR MILD PAIN 100 tablet 0     aspirin 81 MG EC tablet Take 81 mg by mouth at bedtime.              blood glucose meter (GLUCOMETER) Use 1 each As Directed daily. Dispense glucometer brand per patient's insurance at pharmacy discretion. 1 each 0     blood glucose test (ACCU-CHEK SMARTVIEW TEST STRIP) strips PT TO TEST BLOOD SUGAR DAILY 300 strip 3     cetirizine (ZYRTEC) 10 MG tablet Take 10 mg by mouth daily with supper.       citalopram (CELEXA) 10 MG tablet Take 1 tablet (10 mg total) by mouth daily. 90 tablet 3     docosahexanoic acid/epa (FISH OIL ORAL) Take 1 g by mouth daily.              famotidine (PEPCID) 20 MG tablet Take 1 tablet (20 mg total) by mouth daily. 90 tablet 3     ferrous sulfate 325 (65 FE) MG tablet Take 1 tablet (325 mg total) by mouth daily with breakfast.  0      fluticasone (FLONASE) 50 mcg/actuation nasal spray 2 SPRAYS INTO EACH NOSTRIL DAILY. 48 g 3     generic lancets Use 1 each As Directed daily. Dispense brand per patient's insurance at pharmacy discretion. (dx E11.9) 100 each 1     levothyroxine (SYNTHROID, LEVOTHROID) 50 MCG tablet TAKE 1 TABLET BY MOUTH EVERY OTHER DAY ALTERNATING WITH 75MG TABLET 45 tablet 3     levothyroxine (SYNTHROID, LEVOTHROID) 75 MCG tablet Take daily 5 days a week (except Monday and Friday) 90 tablet 3     montelukast (SINGULAIR) 10 mg tablet Take 1 tablet (10 mg total) by mouth at bedtime. 90 tablet 3     multivitamin therapeutic tablet Take 1 tablet by mouth daily.       PULMICORT FLEXHALER 180 mcg/actuation inhaler TAKE 2 PUFFS BY MOUTH TWICE A DAY 3 each 3     simvastatin (ZOCOR) 10 MG tablet Take 1 tablet (10 mg total) by mouth at bedtime. 90 tablet 3     traZODone (DESYREL) 100 MG tablet Take 100 mg by mouth at bedtime as needed for sleep.              triamcinolone (KENALOG) 0.1 % ointment Apply 1 application topically 2 (two) times a day as needed (dry skin on hands).              umeclidinium-vilanterol (ANORO ELLIPTA) 62.5-25 mcg/actuation inhaler Inhale 1 puff daily. 90 puff 3     VENTOLIN HFA 90 mcg/actuation inhaler INHALE 1-2 PUFFS EVERY 4 (FOUR) HOURS AS NEEDED FOR WHEEZING. 54 g 0     No current facility-administered medications for this visit.       Objective:   Vital Signs: There were no vitals taken for this visit.     VisionScreening:  No exam data present     PHYSICAL Exam    General: alert, no distress  HEENT: sclerae anicteric, moist oral mucosa  Heart: Regular rate and rhythm, no murmurs.  No pretibial edema.  Warm extremities  Lungs: Diffuse coarse crackles, seems to be baseline  Gastrointestinal: abdomen is soft, non-tender, non-distended.  There is a 10 cm bulge superior to the umbilicus consistent with previously identified ventral hernia.  There is also a bulge with coughing from a standing position of the right  upper quadrant also consistent with small ventral hernia.  Skin: warm/dry, no rashes  Neuro: no gross abnormalities          Assessment Results 11/27/2019   Activities of Daily Living No help needed   Instrumental Activities of Daily Living No help needed   Get Up and Go Score Less than 12 seconds   Mini Cog Total Score 5   Some recent data might be hidden     A Mini-Cog score of 0-2 suggests the possibility of dementia, score of 3-5 suggests no dementia    Identified Health Risks:     She is at risk for lack of exercise and has been provided with information to increase physical activity for the benefit of her well-being.  Information on urinary incontinence and treatment options given to patient.  The patient s PHQ-9 score is consistent with moderate depression.  She was provided with information regarding depression and was advised to schedule a follow up appointment in 52 weeks to further address this issue.  She is at risk for falling and has been provided with information to reduce the risk of falling at home.  Patient's advanced directive was discussed and I am comfortable with the patient's wishes.

## 2021-06-03 NOTE — TELEPHONE ENCOUNTER
Refill Approved    Rx renewed per Medication Renewal Policy. Medication was last renewed on .  levothyroxine (SYNTHROID, LEVOTHROID) 50 MCG tablet 90 tablet 3 10/28/2019     levothyroxine (SYNTHROID, LEVOTHROID) 75 MCG tablet 90 tablet 3 10/28/2019      Jil Arrington, [facility] Connection Triage/Med Refill 11/23/2019     Requested Prescriptions   Pending Prescriptions Disp Refills     levothyroxine (SYNTHROID, LEVOTHROID) 50 MCG tablet [Pharmacy Med Name: LEVOTHYROXINE 50 MCG TABLET] 45 tablet 3     Sig: TAKE 1 TABLET BY MOUTH EVERY OTHER DAY ALTERNATING WITH 75MG TABLET       Thyroid Hormones Protocol Passed - 11/23/2019 11:33 AM        Passed - Provider visit in past 12 months or next 3 months     Last office visit with prescriber/PCP: 10/16/2019 Master Deleon DO OR same dept: 10/16/2019 Master Deleon DO OR same specialty: 10/16/2019 Master Deleon DO  Last physical: Visit date not found Last MTM visit: Visit date not found   Next visit within 3 mo: Visit date not found  Next physical within 3 mo: Visit date not found  Prescriber OR PCP: aMster Deleon DO  Last diagnosis associated with med order: 1. Hypothyroidism  - levothyroxine (SYNTHROID, LEVOTHROID) 50 MCG tablet [Pharmacy Med Name: LEVOTHYROXINE 50 MCG TABLET]; TAKE 1 TABLET BY MOUTH EVERY OTHER DAY ALTERNATING WITH 75MG TABLET  Dispense: 45 tablet; Refill: 3    If protocol passes may refill for 12 months if within 3 months of last provider visit (or a total of 15 months).             Passed - TSH on file in past 12 months for patient age 12 & older     TSH   Date Value Ref Range Status   10/16/2019 4.15 0.30 - 5.00 uIU/mL Final

## 2021-06-04 VITALS
SYSTOLIC BLOOD PRESSURE: 135 MMHG | TEMPERATURE: 98.7 F | HEART RATE: 61 BPM | OXYGEN SATURATION: 97 % | BODY MASS INDEX: 32.45 KG/M2 | DIASTOLIC BLOOD PRESSURE: 68 MMHG | WEIGHT: 195 LBS

## 2021-06-04 VITALS
TEMPERATURE: 97.8 F | HEART RATE: 78 BPM | DIASTOLIC BLOOD PRESSURE: 56 MMHG | SYSTOLIC BLOOD PRESSURE: 113 MMHG | RESPIRATION RATE: 20 BRPM | OXYGEN SATURATION: 95 % | BODY MASS INDEX: 31.16 KG/M2 | HEIGHT: 65 IN | WEIGHT: 187 LBS

## 2021-06-04 VITALS
SYSTOLIC BLOOD PRESSURE: 108 MMHG | DIASTOLIC BLOOD PRESSURE: 66 MMHG | HEART RATE: 64 BPM | BODY MASS INDEX: 32.65 KG/M2 | OXYGEN SATURATION: 99 % | WEIGHT: 196 LBS | HEIGHT: 65 IN

## 2021-06-04 VITALS
HEIGHT: 65 IN | BODY MASS INDEX: 31.16 KG/M2 | SYSTOLIC BLOOD PRESSURE: 105 MMHG | HEART RATE: 68 BPM | OXYGEN SATURATION: 95 % | WEIGHT: 187 LBS | DIASTOLIC BLOOD PRESSURE: 67 MMHG

## 2021-06-04 NOTE — PROGRESS NOTES
Patient was offered choice of vendor and chose [facility].  Patient Rita Mancini was set up at [facility] on 12/30/19. Patient received a Resmed [id number] Auto. Pressures were set at 5-10.   Patient s ramp is 5 cm H2O for off and FLEX/EPR is EPR.  Patient received a Resmed Mask name: Airfit [id number]  nasal Size sm, heated tubing and heated humidifier.    Pacee Her

## 2021-06-04 NOTE — TELEPHONE ENCOUNTER
Medication Question or Clarification  Who is calling: Patient  What medication are you calling about? (include dose and sig)   umeclidinium-vilanterol (ANORO ELLIPTA) 62.5-25 mcg/actuation inhaler  1 puff, Inhalation, DAILY         Summary: Inhale 1 puff daily., Starting Mon 6/10/2019, Normal          Who prescribed the medication?: Master Deleon, DO  What is your question/concern?: The patient was text from [facility] saying clinic did not respond to refill. Writer shared it has refills until at least May 2020.  Writer internally faxed order to [facility] [phone].  Patient is going to call [facility] back.   Pharmacy: [facility] [place]  Okay to leave a detailed message?: Yes  Site CMT - Please call the pharmacy to obtain any additional needed information.

## 2021-06-04 NOTE — TELEPHONE ENCOUNTER
Zestar Study Consent Visit    Study description: ECG and PPG Study: Zestar Study      Rita Mancini a 69 y.o. female , was contacted by today to discuss participation in the Zestar study.     The patient called the Clinical Trials Office to inquire about study participation.  The consent form was reviewed with the patient.     The review of the study included:    Study purpose     Conflict of interest    Device description      Study visits    Risks of participation    Benefits (if any)    Alternatives    Voluntary participation    Confidentiality     Compensation/costs of participation    Study stipends    Injury and legal rights    The subject was queried in regards to her willingness to continue and come in for scheduled appointment. her questions were answered to her satisfaction.   The patient has given her preliminary agreement to volunteer to participate in the above noted study.     Plan: Rita Mancini will come to [facility] on 12/12/2019 [date] to continue consent process. If she continues to agrees to participate, the study visit will be done on the same day.  she was instructed to eat as usual before coming for study visit and take medications as usual too. she was encouraged to wear comfortable clothes and shoes to the study appointment.       Gayathri Palma RN

## 2021-06-04 NOTE — TELEPHONE ENCOUNTER
RN cannot approve Refill Request    RN can NOT refill this medication med is not covered by policy/route to provider. Last office visit: 10/16/2019 Master Deleon DO Last Physical: 11/27/2019 Last MTM visit: Visit date not found Last visit same specialty: 10/16/2019 Master Deleon DO.  Next visit within 3 mo: Visit date not found  Next physical within 3 mo: Visit date not found      Santiago Coello, [facility] Connection Triage/Med Refill 1/5/2020    Requested Prescriptions   Pending Prescriptions Disp Refills     montelukast (SINGULAIR) 10 mg tablet [Pharmacy Med Name: MONTELUKAST SOD 10 MG TABLET] 90 tablet 3     Sig: TAKE 1 TABLET BY MOUTH EVERYDAY AT BEDTIME       There is no refill protocol information for this order

## 2021-06-05 ENCOUNTER — RECORDS - HEALTHEAST (OUTPATIENT)
Dept: ONCOLOGY | Facility: HOSPITAL | Age: 71
End: 2021-06-05

## 2021-06-05 VITALS
DIASTOLIC BLOOD PRESSURE: 60 MMHG | HEART RATE: 76 BPM | OXYGEN SATURATION: 98 % | HEIGHT: 65 IN | WEIGHT: 192.1 LBS | BODY MASS INDEX: 32.01 KG/M2 | SYSTOLIC BLOOD PRESSURE: 122 MMHG

## 2021-06-05 VITALS
DIASTOLIC BLOOD PRESSURE: 75 MMHG | OXYGEN SATURATION: 99 % | WEIGHT: 189.8 LBS | TEMPERATURE: 98.6 F | BODY MASS INDEX: 32.08 KG/M2 | HEART RATE: 71 BPM | SYSTOLIC BLOOD PRESSURE: 128 MMHG

## 2021-06-05 VITALS
HEIGHT: 65 IN | BODY MASS INDEX: 32.15 KG/M2 | RESPIRATION RATE: 18 BRPM | WEIGHT: 193 LBS | HEART RATE: 76 BPM | DIASTOLIC BLOOD PRESSURE: 64 MMHG | SYSTOLIC BLOOD PRESSURE: 126 MMHG | OXYGEN SATURATION: 96 %

## 2021-06-05 DIAGNOSIS — C34.90 MALIGNANT NEOPLASM OF BRONCHUS AND LUNG (H): ICD-10-CM

## 2021-06-06 NOTE — TELEPHONE ENCOUNTER
Requested Prescriptions     Pending Prescriptions Disp Refills     fluticasone propionate (FLONASE) 50 mcg/actuation nasal spray [Pharmacy Med Name: FLUTICASONE PROP 50 MCG SPRAY] 16 g 3     Sig: SPRAY 2 SPRAYS INTO EACH NOSTRIL EVERY DAY     Last Appt: 11/27/19  Next Appt:  unknown  90 day supply pended

## 2021-06-06 NOTE — TELEPHONE ENCOUNTER
Medication refill request declined: requested refill too soon      Disp Refills Start End    levothyroxine (SYNTHROID, LEVOTHROID) 50 MCG tablet 45 tablet 3 11/23/2019     Sig: TAKE 1 TABLET BY MOUTH EVERY OTHER DAY ALTERNATING WITH 75MG TABLET    Sent to pharmacy as: levothyroxine 50 mcg tablet    E-Prescribing Status: Receipt confirmed by pharmacy (11/23/2019 12:00 PM CST)      Chery Steel RN [facility] Care Connection Triage/Med Refill 2/13/2020 2:26 PM

## 2021-06-06 NOTE — PROGRESS NOTES
[facility] Cancer Care Progress Note    Patient: Rita Mancini  MRN: [id number]  Date of Service: 3/16/2020        Reason for visit      1. Non-small cell cancer of right lung, s/p radiation and chemotherapies, in remission since [id number] (H)        Assessment     1.  A very pleasant 69 y.o. woman with non-small-cell lung cancer stage III treated with cisplatin and -16 and radiation and currently in remission.  She was diagnosed in 09/2009. Finished her treatment in February 2010.  No evidence of recurrence.  Current CT scan shows no evidence of any recurrence.  Still shows some chronic changes.  2.   History of hospitalization for pneumonia and then repeat hospitalization for hospital associated pneumonia.  She is done with the antibiotics.  She had it on both sides and needed a chest tube intubation in the whole 9 yards.  3.  H/o Double vision secondary to left lateral rectus muscle weakness.  Seems to have resolved a little bit.  4.  Mild renal insufficiency. Stable slightly dilated renal collecting system.  This is stable.  5.  Diabetes is improved since she has lost all that weight.    Plan     1. I would say we should meet back in about 6 months.  We will not need to do anymore CT sooner than a year.  2. Follow-up with your primary care physician for other medical issues.  3. Advised to continue with exercise.  4. Continue to use flutter valve as well as incentive spirometry.  Be very careful while swallowing.    Clinical stage      Lung Cancer, Right side    Primary site: Lung (Left)    Clinical: Stage IIIA (T1b, N2, M0) signed by Herbert Foster MD on 10/9/2014 11:00 AM    Summary: Stage IIIA (T1b, N2, M0)      History     Rita Mancini is a very pleasant 69 y.o. old female with a history of nonsmall cell lung cancer located in the right upper lobe measuring 4.2 cm in size, presenting with some shortness of breath and cough in 09/2009 and biopsy suggestive of adenocarcinoma including lymphnode biopsy  confirming it is stage III disease.  Subsequent to that she was treated with cisplatin and -16 for 3 cycles and Taxotere for 2 cycles afterwards.  Subsequent to that she has been in remission.  She had been fine up until 11/2013 where a CT scan actually showed some congestion in the right lower lobe and then the PET scan showed that to be slightly hypermetabolic.  Therefore, she had that biopsied and that was negative. She had CT in September 2015 that revealed pleural effusion. This was tapped and was negative. About one litre of fluid was removed. She felt slightly better.    She was seen by pulmonary again for the fluid. Was then sent to Dr. Fair for pleural biopsy and pleurodhesis. That was done on 4/6/15. The biopsy was non malignant.      She has since been followed by me and pulmonary with CT scans.      Rita was admitted at [facility] on March 27 with septic shock.  She was found to have bilateral pneumonia but more so on the left side.  Needed to be intubated and ventilated.  Needed chest tube.  He was in the hospital for several days.  Repeat hospitalization.  She was recently again in the hospital 31 July with some coughing and shortness of breath.  Found to have another infiltrate in the right lower lobe.    We did have phone visit with her today.  Reviewed her CT scan with her.  She has been complaining of some cough which is quite chronic for her.  Some expectoration.  Overall otherwise the patient is pretty stable.  Denies any new symptoms.  She was just wondering about her CT scan.      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     Osteopenia      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, subsequent encounter 2019     Pleural effusion 1/15     Pneumonia      Radiation Pneumonitis     Created by Conversion      Sepsis due to Escherichia coli with acute renal failure without septic shock, unspecified acute renal failure type (H)      Sleep apnea     does not use cpap     Social History     Socioeconomic History     Marital status:      Spouse name: Not on file     Number of children: Not on file     Years of education: Not on file     Highest education level: Not on file   Occupational History     Not on file   Social Needs     Financial resource strain: Not on file     Food insecurity     Worry: Not on file     Inability: Not on file     Transportation needs     Medical: Not on file     Non-medical: Not on file   Tobacco Use     Smoking status: Former Smoker     Packs/day: 1.50     Years: 0.00     Pack years: 0.00     Last attempt to quit: 2008     Years since quittin.3     Smokeless tobacco: Former User   Substance and Sexual Activity     Alcohol use: No     Drug use: No     Sexual activity: Never   Lifestyle     Physical activity     Days per week: Not on file     Minutes per session: Not on file     Stress: Not on file   Relationships     Social connections     Talks on phone: Not on file     Gets together: Not on file     Attends Episcopalian service: Not on file     Active member of club or organization: Not on file     Attends meetings of clubs or organizations: Not on file     Relationship status: Not on file     Intimate partner violence     Fear of current or ex partner: Not on file     Emotionally abused: Not on file     Physically abused: Not on file     Forced sexual activity: Not on file   Other Topics  Concern     Not on file   Social History Narrative     Not on file     Social History     Tobacco Use     Smoking status: Former Smoker     Packs/day: 1.50     Years: 0.00     Pack years: 0.00     Last attempt to quit: 2008     Years since quittin.3     Smokeless tobacco: Former User   Substance Use Topics     Alcohol use: No     Drug use: No       Review of Systems   Constitutional     Neurosensory     Cardiovascular     Pulmonary     Gastrointestinal     Genitourinary     Integumentary     Patient Coping     Accompanied by       ECOG performance status and Distress Assessment      ECOG Performance:         Distress Assessment   :     Pain Status         Vital Signs     There were no vitals filed for this visit.    Physical Exam     GENERAL: No acute distress. Cooperative in conversation.   HEENT: Pupils are equal, round and reactive. Oral mucosa is clean and intact. No ulcerations or mucositis noted. No bleeding noted.  RESP:Chest symmetric lungs are clear bilaterally per auscultation. Regular respiratory rate.  Significant amount of coarse crepitation and rhonchi.  CV: Normal S1 S2 Regular, rate and rhythm. No murmurs.  ABD: Nondistended, soft, nontender. Positive bowel sounds. No organomegaly.   EXTREMITIES: No lower extremity edema.   NEURO: Non- focal. Alert and oriented x3.  Cranial nerves appear intact.  PSYCH: Within normal limits. No depression or anxiety.  SKIN: Warm dry intact.    LYMPH NODES: Bilateral cervical, supraclavicular, axillary lymph node examination was done.  Negative for any palpable adenopathy.      Lab Results     Results for orders placed or performed in visit on 19   ECG Clinic - Today   Result Value Ref Range    SYSTOLIC BLOOD PRESSURE      DIASTOLIC BLOOD PRESSURE      VENTRICULAR RATE 67 BPM    ATRIAL RATE 67 BPM    P-R INTERVAL 212 ms    QRS DURATION 74 ms    Q-T INTERVAL 408 ms    QTC CALCULATION (BEZET) 431 ms    P Axis 73 degrees    R AXIS 6 degrees    T AXIS 68  degrees    MUSE DIAGNOSIS       Sinus rhythm with 1st degree A-V block  Otherwise normal ECG  When compared with ECG of 27-MAR-2019 09:47,  Premature atrial complexes are no longer Present  CA interval has increased  Confirmed by ELLIE ZAVALA MD LOC:JN ([id number]) on 12/12/2019 3:56:46 PM           Imaging Results     Ct Chest Without Contrast    Result Date: 3/12/2020  EXAM: CT CHEST WO CONTRAST LOCATION: [facility] DATE/TIME: 3/12/2020 2:09 PM INDICATION: COPD exacerbation Pneumonia COMPARISON: CT of the chest without contrast 07/30/2019 TECHNIQUE: CT chest without IV contrast. Multiplanar reformats were obtained. Dose reduction techniques were used. CONTRAST: None. FINDINGS: LUNGS AND PLEURA: Thin linear parenchymal band in the posterior left lower lobe representing focal cicatricial atelectasis. There is a small adjacent calcified granuloma. Similar pattern of abnormalities in the right base including subpleural atelectasis and high attenuation along the pleural surfaces indicative of previous talc pleurodesis. Irregularly-shaped subpleural opacity along the right apical mediastinal pleura associated with volume loss in bronchiectasis of adjacent airways reflecting focal parenchymal scarring. There is a well-circumscribed ovoid nodule in the basilar right lower  lobe measuring 6 x 7 mm, unchanged from 07/30/2019 (series 4, image 73). The trachea and central airways are patent. Occasional peripheral airways in the lower lobes have cylindrical bronchiectasis in proximity to areas of cicatricial atelectasis. MEDIASTINUM/AXILLAE: Cardiac chambers are normal in size. No pericardial effusion. Normal caliber thoracic aorta with minimal atheromatous calcification. No enlarged hilar lymph nodes. A subcarinal node has diminished in size measuring 9 mm AP (series 4, image 52, formerly up to 12 mm. No new enlarged thoracic lymph nodes. UPPER ABDOMEN: Calyces of the right kidney superior pole remain dilated without  associated cortical thinning, unchanged. No new findings in the imaged upper abdomen. MUSCULOSKELETAL: Posttraumatic deformity of the proximal right humerus. Diffuse small thoracic spine degenerative osteophytes without focal compression deformity. No lytic or sclerotic bone lesions are present.     1.  Evolution of bilateral lower lobe airspace opacities present 07/30/2019 into focal parenchymal scarring. 2.  Status post right talc pleurodesis. 3.  Unchanged ovoid nodule in the medial right lower lobe measuring 5 x 7 mm. No new or enlarging lung nodules. 4.  Decrease in size of an enlarged subcarinal lymph node. No new enlarged thoracic lymph nodes. 5.  Focal irregular mixed solid and groundglass opacity in the medial right apex associated with volume loss presumably a treated lung cancer. No findings to suggest local disease recurrence or progression of thoracic metastatic disease.    I have personally reviewed the images with the patient and compared them to the prior scans and appreciated the difference.  All the patient's questions were answered      Herbert Foster MD

## 2021-06-06 NOTE — TELEPHONE ENCOUNTER
Medication Question or Clarification  Who is calling: Patient  What medication are you calling about (include dose and sig)?:     1.   Disp Refills Start End    levothyroxine (SYNTHROID, LEVOTHROID) 75 MCG tablet 90 tablet 3 10/28/2019     Sig: Take daily 5 days a week (except Monday and Friday)    Sent to pharmacy as: levothyroxine 75 mcg tablet (SYNTHROID, LEVOTHROID)    Notes to Pharmacy: Update prescription, do not fill until requested by patient or due for refill    E-Prescribing Status: Receipt confirmed by pharmacy (10/28/2019  9:54 AM CDT)      2.   Disp Refills Start End    levothyroxine (SYNTHROID, LEVOTHROID) 50 MCG tablet 45 tablet 3 11/23/2019     Sig: TAKE 1 TABLET BY MOUTH EVERY OTHER DAY ALTERNATING WITH 75MG TABLET    Sent to pharmacy as: levothyroxine 50 mcg tablet    E-Prescribing Status: Receipt confirmed by pharmacy (11/23/2019 12:00 PM CST)      Who prescribed the medication?: Master Deleon, DO   What is your question/concern?: Patient stated that she would like a call back for clarification on how to take the above medications. Patient stated that she has been taking 50 MCG tablet on Mondays and Fridays. Patient stated that she doesn't know and is confused and that a call back for carnification would be appreciated.  Requested Pharmacy: [facility] #[id number]  Okay to leave a detailed message?: Yes  [phone]

## 2021-06-06 NOTE — PROGRESS NOTES
Dear Dr. Deleon, Master Quezada, DO  [address] [place] [address],    Thank you for the opportunity to participate in the care of Rita Mancini.     She is a 69 y.o. y/o female patient who comes to the sleep medicine clinic for follow up.  This is the patient's first clinical visit since starting CPAP therapy.  The patient states that her sleep quality and energy level have dramatically improved since she started CPAP therapy.  She also reports fewer nighttime awakenings and better daytime alertness.  However she is complaining of some allergic reactions with the mask on her skin.       Assessment and Plan:  In summary Rita Mancini is a 69 y.o. year old female who is here for follow-up.    1. Obstructive sleep apnea  I congratulated the patient on her excellent CPAP usage.  I will keep her on the same pressure settings for now.  I welcome her to follow-up with me every 2 years.  I also recommend that she discuss her skin irritation issues with the durable medical  to see if there is any other mask options that may decrease her skin sensitivity.    2. Hypersomnia  Improving       Initial compliance Download data for 30 days:  Pressure setting: APAP 5-10 CWP  Residual AHI: 0.9 events per hour  Leak: Minimal  Compliance: 70%  Mask Tolerance: Good  Skin irritation: None    Past Medical History:   Diagnosis Date     Anxiety and depression 1962     Bigeminy 03/17/2019     COPD (chronic obstructive pulmonary disease) (H) 2009     Diabetes mellitus (H) 2008     Functional diarrhea 12/31/2018     GERD (gastroesophageal reflux disease)      HCAP (healthcare-associated pneumonia)      Hx antineoplastic chemotherapy     lung cancer      Hx of radiation therapy     lung cancer      Hyperlipemia 2009     Hypothyroid      Lung cancer (H) 2008    RUL,  Non small cell cancer     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     Osteopenia      Other closed displaced fracture of proximal end of  right humerus with nonunion, subsequent encounter 2019     Pleural effusion 1/15     Pneumonia      Radiation Pneumonitis     Created by Conversion      Sepsis due to Escherichia coli with acute renal failure without septic shock, unspecified acute renal failure type (H)      Sleep apnea     does not use cpap       Past Surgical History:   Procedure Laterality Date     PORTACATH PLACEMENT      and removal     THORACOSCOPY Right 2015    Procedure: RIGHT THORACOSCOPY / BIOPSY PLEURAL / TALC PLEURODESIS;  Surgeon: Michi Ma MD;  Location: [facility];  Service:      THORACOSCOPY Left 3/29/2019    Procedure: LEFT THORACOSCOPY WITH DECORTICATION;  Surgeon: Michi Ma MD;  Location: [facility];  Service: General     TONSILLECTOMY  age 5     URETERAL STENT PLACEMENT Right 2010     US THORACENTESIS  3/27/2019       Social History     Socioeconomic History     Marital status:      Spouse name: Not on file     Number of children: Not on file     Years of education: Not on file     Highest education level: Not on file   Occupational History     Not on file   Social Needs     Financial resource strain: Not on file     Food insecurity:     Worry: Not on file     Inability: Not on file     Transportation needs:     Medical: Not on file     Non-medical: Not on file   Tobacco Use     Smoking status: Former Smoker     Packs/day: 1.50     Years: 0.00     Pack years: 0.00     Last attempt to quit: 2008     Years since quittin.3     Smokeless tobacco: Former User   Substance and Sexual Activity     Alcohol use: No     Drug use: No     Sexual activity: Never   Lifestyle     Physical activity:     Days per week: Not on file     Minutes per session: Not on file     Stress: Not on file   Relationships     Social connections:     Talks on phone: Not on file     Gets together: Not on file     Attends Jain service: Not on file     Active member of club or organization: Not on  file     Attends meetings of clubs or organizations: Not on file     Relationship status: Not on file     Intimate partner violence:     Fear of current or ex partner: Not on file     Emotionally abused: Not on file     Physically abused: Not on file     Forced sexual activity: Not on file   Other Topics Concern     Not on file   Social History Narrative     Not on file       Current Outpatient Medications   Medication Sig Dispense Refill     acetaminophen (TYLENOL) 500 MG tablet TAKE 1-2 TABLETS (500-1,000 MG) BY MOUTH EVERY 6 HOURS AS NEEDED FOR MILD PAIN 100 tablet 0     aspirin 81 MG EC tablet Take 81 mg by mouth at bedtime.              blood glucose meter (GLUCOMETER) Use 1 each As Directed daily. Dispense glucometer brand per patient's insurance at pharmacy discretion. 1 each 0     blood glucose test (ACCU-CHEK SMARTVIEW TEST STRIP) strips PT TO TEST BLOOD SUGAR DAILY 300 strip 3     cetirizine (ZYRTEC) 10 MG tablet Take 10 mg by mouth daily with supper.       citalopram (CELEXA) 10 MG tablet Take 1 tablet (10 mg total) by mouth daily. 90 tablet 3     docosahexanoic acid/epa (FISH OIL ORAL) Take 1 g by mouth daily.              famotidine (PEPCID) 20 MG tablet Take 1 tablet (20 mg total) by mouth daily. 90 tablet 3     ferrous sulfate 325 (65 FE) MG tablet Take 1 tablet (325 mg total) by mouth daily with breakfast.  0     fluticasone (FLONASE) 50 mcg/actuation nasal spray 2 SPRAYS INTO EACH NOSTRIL DAILY. 48 g 3     generic lancets Use 1 each As Directed daily. Dispense brand per patient's insurance at pharmacy discretion. (dx E11.9) 100 each 1     levothyroxine (SYNTHROID, LEVOTHROID) 50 MCG tablet Take on empty stomach every Monday and Friday (see other dose for remaining days of week) 45 tablet 3     levothyroxine (SYNTHROID, LEVOTHROID) 75 MCG tablet Take daily 5 days a week (except Monday and Friday) 90 tablet 3     montelukast (SINGULAIR) 10 mg tablet TAKE 1 TABLET BY MOUTH EVERYDAY AT BEDTIME 90 tablet  "3     multivitamin therapeutic tablet Take 1 tablet by mouth daily.       omeprazole (PRILOSEC) 20 MG capsule Take 1 capsule (20 mg total) by mouth daily before breakfast. 90 capsule 0     PULMICORT FLEXHALER 180 mcg/actuation inhaler TAKE 2 PUFFS BY MOUTH TWICE A DAY 3 each 3     simvastatin (ZOCOR) 10 MG tablet Take 1 tablet (10 mg total) by mouth at bedtime. 90 tablet 3     traZODone (DESYREL) 100 MG tablet Take 100 mg by mouth at bedtime as needed for sleep.              triamcinolone (KENALOG) 0.1 % ointment Apply 1 application topically 2 (two) times a day as needed (dry skin on hands).              umeclidinium-vilanterol (ANORO ELLIPTA) 62.5-25 mcg/actuation inhaler Inhale 1 puff daily. 90 puff 3     VENTOLIN HFA 90 mcg/actuation inhaler INHALE 1-2 PUFFS EVERY 4 (FOUR) HOURS AS NEEDED FOR WHEEZING. 54 g 0     No current facility-administered medications for this visit.        No Known Allergies    Epworths Sleepiness Scale 9/9/2019   Sitting and reading 1   Watching TV 1   Sitting, inactive in a public place (e.g. a theatre or a meeting) 1   As a passenger in a car for an hour without a break 1   Lying down to rest in the afternoon when circumstances permit 2   Sitting and talking to someone 0   Sitting quietly after a lunch without alcohol 0   In a car, while stopped for a few minutes in traffic 0   Total score 6       Physical Exam:  /67   Pulse 68   Ht 5' 5\" (1.651 m)   Wt 187 lb (84.8 kg)   SpO2 95%   BMI 31.12 kg/m    BMI:Body mass index is 31.12 kg/m .   GEN: NAD, obese  Psych: normal mood, normal affect    Labs/Studies:    I reviewed the efficacy and compliance report from her device. Data summarized on the HPI and the PAP compliance flow sheet.      Patient verbalized understanding of these issues, agrees with the plan and all questions were answered today. Patient was given an opportuntity to voice any other symptoms or concerns not listed above. Patient did not have any other symptoms or " concerns.      Barry Wilkes DO  Board Certified in Internal Medicine and Sleep Medicine        (Note created with Dragon voice recognition and unintended spelling errors and word substitutions may occur)

## 2021-06-06 NOTE — TELEPHONE ENCOUNTER
Review of chart indicates last script instructions were levothyroxine was to take 50 mcg daily alternating with 75 mcg daily.    Will route to pcp to confirm this is how pt should be taking medication.   Pt reported she takes the 50 mcg tab 2 days a week and 75 mcg 5 days a week.

## 2021-06-08 NOTE — TELEPHONE ENCOUNTER
Refill Approved    Rx renewed per Medication Renewal Policy. Medication was last renewed on 6/5/19.    Lydia Woo, [facility] Triage/Med Refill 6/16/2020     Requested Prescriptions   Pending Prescriptions Disp Refills     umeclidinium-vilanteroL (ANORO ELLIPTA) 62.5-25 mcg/actuation inhaler 90 puff 3     Sig: Inhale 1 puff daily.       Asthma Medications Refill Protocol Passed - 6/15/2020  2:16 PM        Passed - PCP or prescribing provider visit in last year     Last office visit with prescriber/PCP: 10/16/2019 Master Deleon DO OR same dept: 10/16/2019 Master Deleon DO OR same specialty: 10/16/2019 Master Deleon DO  Last physical: 11/27/2019 Last MTM visit: Visit date not found    Next appt within 3 mo: Visit date not found Next physical within 3 mo: Visit date not found  Prescriber OR PCP: Master Deleon DO  Last diagnosis associated with med order: 1. Chronic obstructive pulmonary disease, unspecified COPD type (H)  - umeclidinium-vilanteroL (ANORO ELLIPTA) 62.5-25 mcg/actuation inhaler; Inhale 1 puff daily.  Dispense: 90 puff; Refill: 3    2. Diabetes 1.5, managed as type 2 (H)  - blood glucose test (ACCU-CHEK SMARTVIEW TEST STRIP) strips; PT TO TEST BLOOD SUGAR DAILY  Dispense: 300 strip; Refill: 3    3. Anxiety and depression  - citalopram (CELEXA) 10 MG tablet; Take 1 tablet (10 mg total) by mouth daily.  Dispense: 90 tablet; Refill: 3    If protocol passes may refill for 6 months if within 3 months of last provider visit (or a total of 9 months).             blood glucose test (ACCU-CHEK SMARTVIEW TEST STRIP) strips 300 strip 3     Sig: PT TO TEST BLOOD SUGAR DAILY       Diabetic Supplies Refill Protocol Failed - 6/15/2020  2:16 PM        Failed - Visit with PCP or prescribing provider visit in last 6 months     Last office visit with prescriber/PCP: 10/16/2019 Master Deleon DO OR [facility] dept: 10/16/2019 Master Deleon DO OR same specialty: 10/16/2019  Master Deleon DO  Last physical: 11/27/2019 Last MTM visit: Visit date not found   Next visit within 3 mo: Visit date not found  Next physical within 3 mo: Visit date not found  Prescriber OR PCP: Master Deleon DO  Last diagnosis associated with med order: 1. Chronic obstructive pulmonary disease, unspecified COPD type (H)  - umeclidinium-vilanteroL (ANORO ELLIPTA) 62.5-25 mcg/actuation inhaler; Inhale 1 puff daily.  Dispense: 90 puff; Refill: 3    2. Diabetes 1.5, managed as type 2 (H)  - blood glucose test (ACCU-CHEK SMARTVIEW TEST STRIP) strips; PT TO TEST BLOOD SUGAR DAILY  Dispense: 300 strip; Refill: 3    3. Anxiety and depression  - citalopram (CELEXA) 10 MG tablet; Take 1 tablet (10 mg total) by mouth daily.  Dispense: 90 tablet; Refill: 3    If protocol passes may refill for 12 months if within 3 months of last provider visit (or a total of 15 months).             Failed - A1C in last 6 months     Hemoglobin A1c   Date Value Ref Range Status   10/08/2019 5.9 4.2 - 6.1 % Final                  citalopram (CELEXA) 10 MG tablet 90 tablet 3     Sig: Take 1 tablet (10 mg total) by mouth daily.       SSRI Refill Protocol  Passed - 6/15/2020  2:16 PM        Passed - PCP or prescribing provider visit in last year     Last office visit with prescriber/PCP: 10/16/2019 Master Deleon DO OR same dept: 10/16/2019 Master Deleon DO OR same specialty: 10/16/2019 Master Deleon DO  Last physical: 11/27/2019 Last MTM visit: Visit date not found   Next visit within 3 mo: Visit date not found  Next physical within 3 mo: Visit date not found  Prescriber OR PCP: Master Deleon DO  Last diagnosis associated with med order: 1. Chronic obstructive pulmonary disease, unspecified COPD type (H)  - umeclidinium-vilanteroL (ANORO ELLIPTA) 62.5-25 mcg/actuation inhaler; Inhale 1 puff daily.  Dispense: 90 puff; Refill: 3    2. Diabetes 1.5, managed as type 2 (H)  - blood glucose test (ACCU-CHEK  SMARTVIEW TEST STRIP) strips; PT TO TEST BLOOD SUGAR DAILY  Dispense: 300 strip; Refill: 3    3. Anxiety and depression  - citalopram (CELEXA) 10 MG tablet; Take 1 tablet (10 mg total) by mouth daily.  Dispense: 90 tablet; Refill: 3    If protocol passes may refill for 12 months if within 3 months of last provider visit (or a total of 15 months).

## 2021-06-08 NOTE — TELEPHONE ENCOUNTER
Requested Prescriptions     Pending Prescriptions Disp Refills     umeclidinium-vilanteroL (ANORO ELLIPTA) 62.5-25 mcg/actuation inhaler 90 puff 3     Sig: Inhale 1 puff daily.     blood glucose test (ACCU-CHEK SMARTVIEW TEST STRIP) strips 300 strip 3     Sig: PT TO TEST BLOOD SUGAR DAILY     [facility] #[id number] is open

## 2021-06-09 NOTE — PROGRESS NOTES
ASSESSMENT:  1. Diabetes mellitus  Rita Mancini is a 66-year-old woman who presents for diabetes follow-up.  She has had a great decrease in the stress level, which she attributes to the improvement in her A1c down to 7.1.  Continue current therapy.  She is on good regimen with statin and aspirin.  Six-month recheck.  Update microalbumin.  She recently saw ophthalmology, does not have retinopathy.  - Glycosylated Hemoglobin A1c  - Microalbumin, Random Urine    2. COPD (chronic obstructive pulmonary disease) with acute bronchitis  Refilled medication  - albuterol (PROVENTIL) 2.5 mg /3 mL (0.083 %) nebulizer solution; Take 3 mL (2.5 mg total) by nebulization every 4 (four) hours as needed for wheezing (COPD Code: J44.1).  Dispense: 900 mL; Refill: 12    3. Screening for osteoporosis  Due for 2 year follow-up, she is on calcium and vitamin D.  - DXA Bone Density Scan; Future    4. Hypothyroidism, unspecified type  Due for recheck  - Thyroid Stimulating Hormone (TSH)    5. Non-small cell cancer of right lung  In remission, followed by Dr. Foster, will see him next week.  - Comprehensive Metabolic Panel  - HM1(CBC and Differential)  - HM1 (CBC with Diff)    6. Neuropathy of left abducens nerve  Recently diagnosed with left lateral rectus palsy secondary to diabetes.  This is expected to resolve within 6 weeks.      PLAN:  Patient Instructions     Lab Results   Component Value Date    HGBA1C 7.1 (H) 03/29/2017      Great work!    Check on bone density       Orders Placed This Encounter   Procedures     DXA Bone Density Scan     Standing Status:   Future     Number of Occurrences:   1     Standing Expiration Date:   3/29/2018     Order Specific Question:   Can the procedure be changed per Radiologist protocol?     Answer:   Yes     Glycosylated Hemoglobin A1c     Microalbumin, Random Urine     Thyroid Stimulating Hormone (TSH)     HM1 (CBC with Diff)     Medications Discontinued During This Encounter   Medication Reason      famotidine (PEPCID) 20 MG tablet Duplicate order     albuterol (PROVENTIL) 2.5 mg /3 mL (0.083 %) nebulizer solution Reorder       Return in about 6 months (around 9/29/2017) for Next scheduled follow up.    CHIEF COMPLAINT:  Chief Complaint   Patient presents with     Follow-up     Otitis Media     Right ear       HISTORY OF PRESENT ILLNESS:  Rita is a 66 y.o. female presenting to the clinic today for diabetes and right ear pain.    Diabetes Type II: Her A1c is 7.1 today, decreased from 7.5 and 7.6 at her previous visits. Since her family stress has decreased, her sleep has greatly improved and her blood sugar levels have improved. She is exercising less than she had been and continues to occasionally binge-eat carbohydrates, so she believes it was stress that was adversely affecting her diabetes and vision, and causing weight gain.    Double Vision: She saw ophthalmologist Dr. Vitale at [facility] on 3/24/17 because she is seeing double, due to left lateral rectus palsy secondary to diabetes. She will follow up with there in 5 weeks. She has had these symptoms in the past, but they did not concern her previously because it would resolve on its own. She is wearing a patch over her right eye, which is helpful, but she says this puts strain on her right eye.     Ear issue: She reports her right ear is itchy and are irritating her. She has been scratching inside her ears. She says it feels as if she has multiple pimples in her right ear canal and has pain with pressing on her external ear. She denies any drainage from her ears.     Health Maintenance: She is due for a DEXA scan. Her last DEXA scan on 3/27/15 revealed low bone mass. She has a family history of osteoporosis. She had her mammogram done today.     REVIEW OF SYSTEMS:   She follows up routinely with oncologist Dr. Foster regarding lung cancer. All other systems are negative.    PFSH:  Her stress levels have decreased, since her grandson Stalin is  "cancer-free and her son's divorce has resolved. She is using public transport because she is afraid to drive. She was in [place] recently for a wedding that didn't end up happening.    TOBACCO USE:  History   Smoking Status     Former Smoker     Packs/day: 1.50     Years: 0.00     Quit date: 11/9/2008   Smokeless Tobacco     Never Used       VITALS:  Vitals:    03/29/17 1128   BP: 110/60   Pulse: 80   Weight: 189 lb 11.2 oz (86 kg)   Height: 5' 5\" (1.651 m)     Wt Readings from Last 3 Encounters:   03/29/17 189 lb 11.2 oz (86 kg)   10/12/16 195 lb 11.2 oz (88.8 kg)   08/08/16 199 lb 12.8 oz (90.6 kg)     Body mass index is 31.57 kg/(m^2).    PHYSICAL EXAM:  General Appearance: Alert, cooperative, no distress, appears stated age  Eyes: Mild reduction in abduction of left eye  Ears: Some dry, thickened skin in right external ear canal. No erythema of external ear canals bilaterally. Cerumen partially obstructs the right TM. No discharge.   Neck: Supple, symmetrical, no cervical adenopathy; Thyroid: no enlargement/tenderness/nodules    ADDITIONAL HISTORY SUMMARIZED (2): Reviewed Dr. Foster's oncology note from 8/8/16.  DECISION TO OBTAIN EXTRA INFORMATION (1): None.   RADIOLOGY TESTS (1): Reviewed DEXA scan from 3/27/15.  LABS (1): Reviewed labs from 10/12/16 and 8/8/16. Ordered labs today.  MEDICINE TESTS (1): None.  INDEPENDENT REVIEW (2 each): None.     The visit lasted a total of 18 minutes face to face with the patient. Over 50% of the time was spent counseling and educating the patient about diabetes and ear pain.    IShanice, am scribing for and in the presence of, Dr. Deleon.    I, Dr. Deleon, personally performed the services described in this documentation, as scribed by Shanice Jasso in my presence, and it is both accurate and complete.    MEDICATIONS:  Current Outpatient Prescriptions   Medication Sig Dispense Refill     ACCU-CHEK SMARTVIEW TEST STRIP strips USE 1 EACH AS DIRECTED DAILY. " DISPENSE BRAND PER PATIENT'S INSURANCE AT PHARMACY DISCRETION. 100 strip 1     albuterol (PROVENTIL HFA;VENTOLIN HFA) 90 mcg/actuation inhaler Inhale 1-2 puffs every 4 (four) hours as needed for wheezing. 3 Inhaler 3     aspirin 81 MG EC tablet Take 81 mg by mouth daily.       blood glucose meter (GLUCOMETER) Use 1 each As Directed as needed. Dispense glucometer brand per patient's insurance at pharmacy discretion. 1 each 0     budesonide (PULMICORT FLEXHALER) 180 mcg/actuation inhaler Inhale 2 puffs 2 (two) times a day. 3 each 1     calcium carbonate-vitamin D3 (CALCIUM 600 WITH VITAMIN D3) 600 mg(1,500mg) -200 unit per tablet Take 1 tablet by mouth 2 (two) times a day. Taking 1200mg of Calcium with 1000 D3       cetirizine 10 mg cap Take 10 mg by mouth bedtime.        citalopram (CELEXA) 10 MG tablet TAKE 1 TABLET BY MOUTH ONCE DAILY. 90 tablet 3     diazePAM (VALIUM) 10 MG tablet Take 1 tablet (10 mg total) by mouth every 12 (twelve) hours as needed for anxiety. 30 tablet 3     famotidine (PEPCID) 20 MG tablet Take 1 tablet (20 mg total) by mouth bedtime as needed for heartburn. 180 tablet 3     fluticasone (FLONASE) 50 mcg/actuation nasal spray 2 sprays into each nostril daily. 48 g 3     furosemide (LASIX) 20 MG tablet Take 1 tablet (20 mg total) by mouth daily. 180 tablet 3     generic lancets Use 1 each As Directed daily. Dispense brand per patient's insurance at pharmacy discretion. (dx E11.9) 100 each 1     INCRUSE ELLIPTA 62.5 mcg/actuation DsDv inhaler INHALE 1 PUFF BY MOUTH DAILY 90 each 3     levothyroxine (SYNTHROID, LEVOTHROID) 50 MCG tablet TAKE 1 TABLET BY MOUTH EVERY OTHER DAY *ALTERNATING WITH 75MG TABLET 45 tablet 0     levothyroxine (SYNTHROID, LEVOTHROID) 75 MCG tablet TAKE 1 TAB BY MOUTH EVERY OTHER DAY. ALTERNATE THE 75MCG AND 50MCG DOSE. 45 tablet 0     metFORMIN (GLUCOPHAGE) 1000 MG tablet Take 1 tablet (1,000 mg total) by mouth 2 (two) times a day with meals. 180 tablet 3     montelukast  (SINGULAIR) 10 mg tablet TAKE 1 TABLET BY MOUTH AT BEDTIME 90 tablet 3     simvastatin (ZOCOR) 10 MG tablet TAKE 1 TABLET BY MOUTH AT BEDTIME. 90 tablet 3     albuterol (PROVENTIL) 2.5 mg /3 mL (0.083 %) nebulizer solution Take 3 mL (2.5 mg total) by nebulization every 4 (four) hours as needed for wheezing (COPD Code: J44.1). 900 mL 12     No current facility-administered medications for this visit.        Total data points: 4

## 2021-06-09 NOTE — PROGRESS NOTES
[facility] Clinic Note    Rita Mancini   70 y.o. female    Date of Visit: 7/23/2020  Chief Complaint   Patient presents with     Follow-up     meds       ASSESSMENT/PLAN  1. Diabetes 1.5, managed as type 2 (H)  Glycosylated Hemoglobin A1c    dulaglutide (TRULICITY) 1.5 mg/0.5 mL PnIj    Basic Metabolic Panel   2. Hypothyroidism, unspecified type  Thyroid Stimulating Hormone (TSH)   3. Non-small cell cancer of right lung, s/p radiation and chemotherapies, in remission since [id number] (H)     4. Other insomnia       ---------------------------------------------    1.  Plan to increase Trulicity up to 1.5 mg/week.  I think her mention of heartburn is less likely to be from Trulicity, and more likely to be related to stress, as she suggested it might be.  I did explain to her that we have a couple options, either keeping it as is or trying it at the higher dose, the latter option potentially exposing herself to making heartburn worse.  She does not have any symptoms of pancreatitis, and I felt like it was relatively safe to trial the medication at the higher dose.    2.  Update TSH    3.  Continue to follow with Dr. Foster.    4. Strong recommendation AGAINST benzodiazepines.  She had been on them for sleep, but between myself, family, we agreed these were more risky than beneficial, particularly given COPD, and for a time she was needing o2 (around time of hospitalization).  I don't think she has misused these, but seems to take to pain medications and benzodiazepines too easily and is hard to get off.  Continue trazodone.     Return in about 3 months (around 10/23/2020) for Diabetes, Establish care with new provider.      SUBJECTIVE    Rita Mancini is a 70-year-old woman with history of non-small cell lung cancer in remission, diabetes, hypothyroidism, presents for diabetic follow-up.    About a month ago, her blood sugars have been rising, so we decided to start Trulicity.  Since starting Trulicity, she  has noticed that she might get heartburn a little more frequently than usual, usually resolves by itself, sometimes takes Tums to good effect.  She has not had any epigastric pain, nausea, or vomiting.  She thinks it may be due to increased stress.  At times she has had trouble sleeping as well.     The blood sugars are still in the 130s or 140s when she wakes up, higher than she feels like they ought to be.  She does not feel that she lost any weight as was the hope with starting this medication as well.    She had a particularly harrowing year last year, was hospitalized a couple times, one time critically ill and on a ventilator.  She lost a dramatic amount of weight, but unfortunately gained it back.  When she had lost the weight, she came off of her diabetes medications, namely metformin.  We decided not to add this back because of the reduced GFR    Am am departing from [facility] at the end of August, so she will need to find a new physician.    ROS:   Per HPI, all other systems negative     Medications, allergies, and problem list were reviewed and updated    Patient Active Problem List   Diagnosis     Episode of recurrent major depressive disorder (H)     Non-small cell cancer of right lung, s/p radiation and chemotherapies, in remission since 5816-0697 (H)     Hypothyroidism     Dyslipidemia     Diabetes 1.5, managed as type 2 (H)     COPD (chronic obstructive pulmonary disease) (H)     MONSERRAT on CPAP     Diverticulosis     Chronic pleural effusion, left (s/p Talc pleurodesis 2015)     Bronchiectasis (H)     Eczema of both hands     Keratosis obturans of external ear canal, right     Ventral hernia without obstruction or gangrene     Hyperkalemia     Current moderate episode of major depressive disorder (H)     Gastroesophageal reflux disease without esophagitis     Other insomnia     Anemia     Hydronephrosis, right     Lymph node enlargement     Past Medical History:   Diagnosis Date     Anxiety and  depression 1962     Bigeminy 03/17/2019     COPD (chronic obstructive pulmonary disease) (H) 2009     Diabetes mellitus (H) 2008     Functional diarrhea 12/31/2018     GERD (gastroesophageal reflux disease)      HCAP (healthcare-associated pneumonia)      Hx antineoplastic chemotherapy     lung cancer      Hx of radiation therapy     lung cancer      Hyperlipemia 2009     Hypothyroid      Lung cancer (H) 2008    RUL,  Non small cell cancer     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     Osteopenia      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              blood glucose meter (GLUCOMETER) Use 1 each As Directed daily. Dispense glucometer brand per patient's insurance at pharmacy discretion. 1 each 0     blood glucose test (ACCU-CHEK SMARTVIEW TEST STRIP) strips PT TO TEST BLOOD SUGAR DAILY 100 strip 3     cetirizine (ZYRTEC) 10 MG tablet Take 10 mg by mouth daily with supper.       citalopram (CELEXA) 10 MG tablet Take 1 tablet (10 mg total) by mouth daily. 90 tablet 0     famotidine (PEPCID) 20 MG tablet Take 1 tablet (20 mg total) by mouth daily. 90 tablet 3     ferrous sulfate 325 (65 FE) MG tablet Take 1 tablet (325 mg total) by mouth daily with breakfast.  0     fluticasone propionate (FLONASE) 50 mcg/actuation nasal spray SPRAY 2 SPRAYS INTO EACH NOSTRIL EVERY DAY 16 g 3     generic lancets Use 1 each As Directed daily. Dispense brand per patient's insurance at pharmacy discretion. (dx E11.9) 100 each 1     levothyroxine (SYNTHROID, LEVOTHROID) 50 MCG tablet  "Take on empty stomach every Monday and Friday (see other dose for remaining days of week) 45 tablet 3     levothyroxine (SYNTHROID, LEVOTHROID) 75 MCG tablet Take daily 5 days a week (except Monday and Friday) 90 tablet 3     melatonin 1 mg Tab tablet Take 10 mg by mouth at bedtime as needed.       montelukast (SINGULAIR) 10 mg tablet TAKE 1 TABLET BY MOUTH EVERYDAY AT BEDTIME 90 tablet 3     multivitamin therapeutic tablet Take 1 tablet by mouth daily.       PULMICORT FLEXHALER 180 mcg/actuation inhaler TAKE 2 PUFFS BY MOUTH TWICE A DAY 3 each 3     simvastatin (ZOCOR) 10 MG tablet Take 1 tablet (10 mg total) by mouth at bedtime. 90 tablet 3     traZODone (DESYREL) 100 MG tablet Take 100 mg by mouth at bedtime as needed for sleep.              triamcinolone (KENALOG) 0.1 % ointment Apply 1 application topically 2 (two) times a day as needed (dry skin on hands).              umeclidinium-vilanteroL (ANORO ELLIPTA) 62.5-25 mcg/actuation inhaler Inhale 1 puff daily. 90 puff 1     VENTOLIN HFA 90 mcg/actuation inhaler INHALE 1-2 PUFFS EVERY 4 (FOUR) HOURS AS NEEDED FOR WHEEZING. 54 g 0     dulaglutide (TRULICITY) 1.5 mg/0.5 mL PnIj Inject 1.5 mg under the skin once a week. 4 Syringe 3     No current facility-administered medications for this visit.      No Known Allergies    EXAM  Vitals:    07/23/20 1412   BP: 108/66   Patient Site: Right Arm   Patient Position: Sitting   Cuff Size: Adult Large   Pulse: 64   SpO2: 99%   Weight: 196 lb (88.9 kg)   Height: 5' 5\" (1.651 m)         General: Alert, no distress  Psychiatric: Pleasant affect  Lungs: Right lower lung squeaking sound scant wheezing, otherwise clear, similar to her baseline  Recommend with both numbers    Results reviewed:     Recent Results (from the past 24 hour(s))   Glycosylated Hemoglobin A1c    Collection Time: 07/23/20  2:57 PM   Result Value Ref Range    Hemoglobin A1c 7.1 (H) 3.5 - 6.0 %   Basic Metabolic Panel    Collection Time: 07/23/20  2:57 PM "   Result Value Ref Range    Sodium 137 136 - 145 mmol/L    Potassium 5.0 3.5 - 5.0 mmol/L    Chloride 100 98 - 107 mmol/L    CO2 27 22 - 31 mmol/L    Anion Gap, Calculation 10 5 - 18 mmol/L    Glucose 101 70 - 125 mg/dL    Calcium 9.9 8.5 - 10.5 mg/dL    BUN 17 8 - 28 mg/dL    Creatinine 1.54 (H) 0.60 - 1.10 mg/dL    GFR MDRD Af Amer 40 (L) >60 mL/min/1.73m2    GFR MDRD Non Af Amer 33 (L) >60 mL/min/1.73m2   Thyroid Stimulating Hormone (TSH)    Collection Time: 07/23/20  2:57 PM   Result Value Ref Range    TSH 1.17 0.30 - 5.00 uIU/mL        Master Deleon DO  Internal Medicine  [facility]

## 2021-06-09 NOTE — TELEPHONE ENCOUNTER
Pt is calling    Pt has appointment this Tuesday.  Diabetic.  Off her Metformin for the last 13 months. Lost a lot of weight and was doing well.  Now she has gained a lot of that weight back, she has had some increase stress, and she was off of her diet due to her birthday yesterday. Increase sugar.  Blood sugar is running around 150 fasting when she first wakes up.  , 192 in the last few days.  Now today she woke up at 9:00am and her BS was 270 and then 301.  Her blood sugar now is 234.  She still has not eaten anything since yesterday.  These are fasting levels.  She stated that she is not experiencing any effects of increase BS.  She is wondering if she should restart her Metformin, as she still has some pills left at home.    Reason for Disposition    Blood glucose 240 - 300 mg/dL (13.3 - 16.7 mmol/L)    Additional Information    Negative: Unconscious or difficult to awaken    Negative: Acting confused (e.g., disoriented, slurred speech)    Negative: Very weak (can't stand)    Negative: Sounds like a life-threatening emergency to the triager    Negative: Vomiting and signs of dehydration (e.g., very dry mouth, lightheaded, dark urine)    Negative: Blood glucose > 240 mg/dL (13.3 mmol/L) and rapid breathing    Negative: Blood glucose > 500 mg/dL (27.8 mmol/L)    Negative: Blood glucose > 240 mg/dL (13.3 mmol/L) AND urine ketones moderate-large (or more than 1+)    Negative: Blood glucose > 240 mg/dL (13.3 mmol/L) and blood ketones > 1.4 mmol/L    Negative: Blood glucose > 240 mg/dL (13.3 mmol/L) AND vomiting AND unable to check for ketones (in blood or urine)    Negative: Vomiting lasting > 4 hours    Negative: Patient sounds very sick or weak to the triager    Negative: Fever > 100.5 F (38.1 C)    Negative: Caller has URGENT medication or insulin pump question and triager unable to answer question    Negative: Blood glucose > 400 mg/dL (22.2 mmol/L)    Negative: Blood glucose > 300 mg/dL (16.7 mmol/L) AND  two or more times in a row    Negative: Urine ketones moderate - large (or blood ketones > 1.4 mmol/L)    Negative: New-onset diabetes suspected (e.g., frequent urination, weak, weight loss)    Negative: Symptoms of high blood sugar (e.g., frequent urination, weak, weight loss) and not able to test blood glucose    Negative: Patient wants to be seen    Negative: Caller has NON-URGENT medication question about med that PCP prescribed and triager unable to answer question    Protocols used: DIABETES - HIGH BLOOD SUGAR-A-OH    Protocol and home care reviewed with the patient. I advised her not to restart the Metformin. Continue to watch your diet closely. Increase fluids. Monitor you blood sugar. Follow up on Tuesday at your appointment.  Call back if any symptoms develop, or if you blood sugar remains above 300 twice in a row or more.  Call back with any new signs, symptoms, concerns, or questions.  She verbalized understanding.    Joanne Arreola RN   [facility] Nurse Advisor

## 2021-06-09 NOTE — PROGRESS NOTES
Patient would like to be contacted via the following phone number [phone]    ASSESSMENT:  1. Diabetes 1.5, managed as type 2 (H)  She was taken off metformin previously as she lost nearly 50 lbs in context of a couple admissions for pneumonia.  She gained it all back, has had difficulty with portion control as well as carbohydrates.  While starting metformin is an option, she has CKD III and going back to full dose metformin is not idea.  I suggested trial of GLP-1 agonist like Trulicity which could also help address issues with appetite.  Recheck 1 month.  Consider reduced dose metformin for GFR if GLP-1's not covered or not affordable.   - dulaglutide (TRULICITY) 0.75 mg/0.5 mL PnIj; Inject 0.75 mg under the skin once a week.  Dispense: 4 Syringe; Refill: 1      PLAN:  There are no Patient Instructions on file for this visit.    No orders of the defined types were placed in this encounter.      Return in about 1 month (around 7/30/2020) for Diabetes.    CHIEF COMPLAINT:  Chief Complaint   Patient presents with     Follow-up     meds, hypertension       HISTORY OF PRESENT ILLNESS:  Rita is a 70 y.o. female calling in to the clinic today     The blood sugar is skyrocketing.  Her eating is out of control, not exercising, has gained weight.  She was 145 lbs at her lowest (and able to come off DM meds), but now 194 lb.    Her glucometer shows 139 for 90 day average, 173 for 14 day average.  In other words, the numbers are trending upward lately  One morning she found her numbers at 300.      She had a rough 2019 with a couple hospital stays for pneumonia.      She has kept up to date with lung cancer surveillance and no sign of return on last CT done this past March    It was noted during one of the hospital stays again that she has moderate R hydronephrosis, but lasix renogram does not show any obstruction.      REVIEW OF SYSTEMS:     All other systems are negative.    PFSH:      TOBACCO USE:  Social History      Tobacco Use   Smoking Status Former Smoker     Packs/day: 1.50     Years: 0.00     Pack years: 0.00     Last attempt to quit: 2008     Years since quittin.6   Smokeless Tobacco Former User       VITALS:    Stated Weight 194lb    PHYSICAL EXAM:  (observations via phone)  Clear voice        The visit lasted a total of 13 minutes   CA intake time  6 minutes    Telephone Consent was completed by  staff and confirmed by SA    I have reviewed and updated the patient's Past Medical History, Social History, Family History as appropriate.    MEDICATIONS: Reviewed and updated per CA and MD  Current Outpatient Medications   Medication Sig Dispense Refill     aspirin 81 MG EC tablet Take 81 mg by mouth at bedtime.              blood glucose meter (GLUCOMETER) Use 1 each As Directed daily. Dispense glucometer brand per patient's insurance at pharmacy discretion. 1 each 0     blood glucose test (ACCU-CHEK SMARTVIEW TEST STRIP) strips PT TO TEST BLOOD SUGAR DAILY 100 strip 3     cetirizine (ZYRTEC) 10 MG tablet Take 10 mg by mouth daily with supper.       citalopram (CELEXA) 10 MG tablet Take 1 tablet (10 mg total) by mouth daily. 90 tablet 0     docosahexanoic acid/epa (FISH OIL ORAL) Take 1 g by mouth daily.              famotidine (PEPCID) 20 MG tablet Take 1 tablet (20 mg total) by mouth daily. 90 tablet 3     ferrous sulfate 325 (65 FE) MG tablet Take 1 tablet (325 mg total) by mouth daily with breakfast.  0     fluticasone propionate (FLONASE) 50 mcg/actuation nasal spray SPRAY 2 SPRAYS INTO EACH NOSTRIL EVERY DAY 16 g 3     generic lancets Use 1 each As Directed daily. Dispense brand per patient's insurance at pharmacy discretion. (dx E11.9) 100 each 1     levothyroxine (SYNTHROID, LEVOTHROID) 50 MCG tablet Take on empty stomach every Monday and Friday (see other dose for remaining days of week) 45 tablet 3     levothyroxine (SYNTHROID, LEVOTHROID) 75 MCG tablet Take daily 5 days a week (except  "Monday and Friday) 90 tablet 3     melatonin 1 mg Tab tablet Take 10 mg by mouth at bedtime as needed.       montelukast (SINGULAIR) 10 mg tablet TAKE 1 TABLET BY MOUTH EVERYDAY AT BEDTIME 90 tablet 3     multivitamin therapeutic tablet Take 1 tablet by mouth daily.       PULMICORT FLEXHALER 180 mcg/actuation inhaler TAKE 2 PUFFS BY MOUTH TWICE A DAY 3 each 3     simvastatin (ZOCOR) 10 MG tablet Take 1 tablet (10 mg total) by mouth at bedtime. 90 tablet 3     traZODone (DESYREL) 100 MG tablet Take 100 mg by mouth at bedtime as needed for sleep.              triamcinolone (KENALOG) 0.1 % ointment Apply 1 application topically 2 (two) times a day as needed (dry skin on hands).              umeclidinium-vilanteroL (ANORO ELLIPTA) 62.5-25 mcg/actuation inhaler Inhale 1 puff daily. 90 puff 1     VENTOLIN HFA 90 mcg/actuation inhaler INHALE 1-2 PUFFS EVERY 4 (FOUR) HOURS AS NEEDED FOR WHEEZING. 54 g 0     dulaglutide (TRULICITY) 0.75 mg/0.5 mL PnIj Inject 0.75 mg under the skin once a week. 4 Syringe 1     No current facility-administered medications for this visit.            The patient has been notified of following:     \"This telephone visit will be conducted via a call between you and your physician/provider. We have found that certain health care needs can be provided without the need for a physical exam.  This service lets us provide the care you need with a short phone conversation.  If a prescription is necessary we can send it directly to your pharmacy.  If lab work is needed we can place an order for that and you can then stop by our lab to have the test done at a later time.    If during the course of the call the physician/provider feels a telephone visit is not appropriate, you will not be charged for this service.\"    "

## 2021-06-09 NOTE — PROGRESS NOTES
[facility] Cancer Care Progress Note    Patient: Rita Mancini  MRN: [id number]  Date of Service: 4/5/2017        Reason for visit      1. Non-small cell cancer of right lung    2. Neuropathy of left abducens nerve        Assessment     1.  A very pleasant 66 y.o. woman with non-small-cell lung cancer stage III treated with cisplatin and -16 and radiation and currently in remission.  She was diagnosed in 09/2009. Finished her treatment in February 2010.  2.  Persistent pleural effusion right side. S/p pleural biopsy and talc pleurodesis.  This looks stable.  3.  Double vision secondary to left lateral rectus muscle weakness.  4.  Mild renal insufficiency. Stable slightly dilated renal collecting system.    Plan     1. RTC in 12 months with CT chest.  2. I asked her that if her double vision is not better after 2 months then we should get an MRI brain.  3. Advised to continue with exercise.  4. I advised her that she needs to be strong and take care of her health.  In particular she needs to take care of her diabetes with proper diet.    Clinical stage      Lung Cancer, Right side    Primary site: Lung (Left)    Clinical: Stage IIIA (T1b, N2, M0) signed by Herbert Foster MD on 10/9/2014 11:00 AM    Summary: Stage IIIA (T1b, N2, M0)      History     Rita Mancini is a very pleasant 66 y.o. old female with a history of nonsmall cell lung cancer located in the right upper lobe measuring 4.2 cm in size, presenting with some shortness of breath and cough in 09/2009 and biopsy suggestive of adenocarcinoma including lymphnode biopsy confirming it is stage III disease.  Subsequent to that she was treated with cisplatin and -16 for 3 cycles and Taxotere for 2 cycles afterwards.  Subsequent to that she has been in remission.  She had been fine up until 11/2013 where a CT scan actually showed some congestion in the right lower lobe and then the PET scan showed that to be slightly hypermetabolic.  Therefore, she had  that biopsied and that was negative. She had CT in September that revealed pleural effusion. This was tapped and was negative. About one litre of fluid was removed. She felt slightly better.    She was seen by pulmonary again for the fluid. Was then sent to Dr. Fair for pleural biopsy and pleurodhesis. That was done on 4/6/15. The biopsy was non malignant.      However she is under a lot of stress because her son is going through divorce and her 4-year-old grandson has been diagnosed with leukemia.    She comes in today for scheduled follow-up.  Since her last visit she has had problems with double vision due to some lateral rectus muscle issue on her left eye.  She has been seen by ophthalmology.  She has been told that her symptoms can be expected to get better on their own.  She is using an eye patch.  Comes in today after CT scan.      Past Medical History     Past Medical History:   Diagnosis Date     COPD (chronic obstructive pulmonary disease)      Depression      Diabetes mellitus      GERD (gastroesophageal reflux disease)      Hx antineoplastic chemotherapy     lung cancer      Hx of radiation therapy     lung cancer      Hyperlipemia      Hypothyroid      Lung cancer 2008    RUL,  Non small cell cancer     Osteopenia      Pleural effusion 1/15     Sleep apnea     does not use cpap     Social History     Social History     Marital status:      Spouse name: N/A     Number of children: N/A     Years of education: N/A     Occupational History     Not on file.     Social History Main Topics     Smoking status: Former Smoker     Packs/day: 1.50     Years: 0.00     Quit date: 11/9/2008     Smokeless tobacco: Never Used     Alcohol use No     Drug use: No     Sexual activity: No     Other Topics Concern     Not on file     Social History Narrative     Social History   Substance Use Topics     Smoking status: Former Smoker     Packs/day: 1.50     Years: 0.00     Quit date: 11/9/2008     Smokeless  tobacco: Never Used     Alcohol use No       Review of Systems   Constitutional  Constitutional (WDL): Exceptions to WDL  Fatigue: Fatigue relieved by rest  Neurosensory  Neurosensory (WDL): All neurosensory elements are within defined limits  Cardiovascular  Cardiovascular (WDL): All cardiovascular elements are within defined limits  Pulmonary  Respiratory (WDL): Exceptions to WDL  Cough: Mild symptoms, nonprescription intervention indicated (Chronic)  Dyspnea: Shortness of breath with moderate exertion (Hx: COPD.)  Gastrointestinal  Gastrointestinal (WDL): Exceptions to WDL  Dry Mouth: Symptomatic (e.g., dry or thick saliva) without significant dietary alteration, unstimulated saliva flow >0.2 ml/min  Genitourinary  Genitourinary (WDL): All genitourinary elements are within defined limits  Integumentary  Integumentary (WDL): All integumentary elements are within defined limits  Patient Coping  Patient Coping: Accepting  Accompanied by  Accompanied by: Alone    ECOG performance status and Distress Assessment      ECOG Performance:    ECOG Performance Status: 1    Distress Assessment  Distress Assessment Score: No distress:     Pain Status  Currently in Pain: No/denies      Vital Signs     Vitals:    04/05/17 1540   BP: 110/53   Pulse: 80   Temp: 98.2  F (36.8  C)   SpO2: 97%       Physical Exam     GENERAL: No acute distress. Cooperative in conversation.   HEENT: Pupils are equal, round and reactive. Oral mucosa is clean and intact. No ulcerations or mucositis noted. No bleeding noted.  RESP:Chest symmetric lungs are clear bilaterally per auscultation. Regular respiratory rate. No wheezes or rhonchi.  CV: Normal S1 S2 Regular, rate and rhythm. No murmurs.  ABD: Nondistended, soft, nontender. Positive bowel sounds. No organomegaly.   EXTREMITIES: No lower extremity edema.   NEURO: Non- focal. Alert and oriented x3.  Cranial nerves appear intact.  PSYCH: Within normal limits. No depression or anxiety.  SKIN: Warm dry  intact.    LYMPH NODES: Bilateral cervical, supraclavicular, axillary lymph node examination was done.  Negative for any palpable adenopathy.      Lab Results     Results for orders placed or performed in visit on 03/29/17   Glycosylated Hemoglobin A1c   Result Value Ref Range    Hemoglobin A1c 7.1 (H) 3.5 - 6.0 %   Microalbumin, Random Urine   Result Value Ref Range    Microalbumin, Random Urine <0.50 0.00 - 1.99 mg/dL    Creatinine, Urine 23.3 mg/dL    Microalbumin/Creatinine Ratio Random Urine  <=19.9 mg/g   Thyroid Stimulating Hormone (TSH)   Result Value Ref Range    TSH 1.75 0.30 - 5.00 uIU/mL   Comprehensive Metabolic Panel   Result Value Ref Range    Sodium 141 136 - 145 mmol/L    Potassium 4.7 3.5 - 5.0 mmol/L    Chloride 100 98 - 107 mmol/L    CO2 29 22 - 31 mmol/L    Anion Gap, Calculation 12 5 - 18 mmol/L    Glucose 119 70 - 125 mg/dL    BUN 13 8 - 22 mg/dL    Creatinine 1.29 (H) 0.60 - 1.10 mg/dL    GFR MDRD Af Amer 50 (L) >60 mL/min/1.73m2    GFR MDRD Non Af Amer 41 (L) >60 mL/min/1.73m2    Bilirubin, Total 0.5 0.0 - 1.0 mg/dL    Calcium 10.1 8.5 - 10.5 mg/dL    Protein, Total 7.4 6.0 - 8.0 g/dL    Albumin 3.9 3.5 - 5.0 g/dL    Alkaline Phosphatase 87 45 - 120 U/L    AST 21 0 - 40 U/L    ALT 19 0 - 45 U/L   HM1 (CBC with Diff)   Result Value Ref Range    WBC 9.1 4.0 - 11.0 thou/uL    RBC 4.64 3.80 - 5.40 mill/uL    Hemoglobin 13.4 12.0 - 16.0 g/dL    Hematocrit 40.5 35.0 - 47.0 %    MCV 87 80 - 100 fL    MCH 28.8 27.0 - 34.0 pg    MCHC 33.0 32.0 - 36.0 g/dL    RDW 12.3 11.0 - 14.5 %    Platelets 239 140 - 440 thou/uL    MPV 9.5 7.0 - 10.0 fL    Neutrophils % 50 50 - 70 %    Lymphocytes % 36 20 - 40 %    Monocytes % 12 (H) 2 - 10 %    Eosinophils % 2 0 - 6 %    Basophils % 1 0 - 2 %    Neutrophils Absolute 4.5 2.0 - 7.7 thou/uL    Lymphocytes Absolute 3.2 0.8 - 4.4 thou/uL    Monocytes Absolute 1.1 (H) 0.0 - 0.9 thou/uL    Eosinophils Absolute 0.2 0.0 - 0.4 thou/uL    Basophils Absolute 0.1 0.0 - 0.2  thou/uL         Imaging Results     Ct Chest Without Contrast    Result Date: 3/31/2017  CT CHEST WO CONTRAST 3/31/2017 12:37 PM INDICATION: Neoplasm: chest, lung, rx monitor or f/u TECHNIQUE: Routine chest. Dose reduction techniques were used. IV CONTRAST: None COMPARISON: 08/04/2016 study FINDINGS: LUNGS AND PLEURA: Central airways are patent. Right Mediastinal soft tissue with associated architectural distortion and traction bronchiectasis has not changed in configuration from the prior study. A 6 mm right lower lobe nodule is unchanged (series 3 image 78). Opacities adjacent to the pleura and right major fissure in the right middle lobe have not changed (image 77). Pleural thickening and adjacent architectural distortion in the right lung base has not changed. Trace right pleural fluid cannot be excluded. Right pleural calcifications are noted. There are no new pulmonary nodules. MEDIASTINUM: Normal size heart. Soft tissue in the left lower paratracheal distribution measuring 9 mm in short axis has not changed from the prior study. Subcarinal soft tissue measuring 10 mm has not changed. LIMITED UPPER ABDOMEN: Possible fatty change of the liver. Dilatation of the right renal collecting system is seen. MUSCULOSKELETAL: Negative.     CONCLUSION: 1.  No significant change from the prior study. 2.  A right apical soft tissue focus with associated architectural distortion has not changed in configuration from the prior study. A small opacity in the medial right lung base has not changed. 3.  Nodular foci in the lungs have not changed. Attention on follow-up imaging. 4.  Small prominent and mildly enlarged mediastinal lymph nodes have not changed. 5.  Right pleural scarring and calcification is unchanged.    Dxa Bone Density Scan    Result Date: 3/30/2017  3/29/2017 RE: Rita Mancini YOB: 1950 Dear Master Deleon, Patient Profile: 66 y.o. female, postmenopausal, is here for the follow up bone  density test. History of fractures - None. Family history of osteoporosis - Yes;  mother.  Family history of hip fracture: Yes;  mother. Smoking history - Past. Osteoporosis treatment past -  Yes;  HRT. Osteoporosis treatment current - No.  Chronic medical problems - Diabetes Mellitus, Malignancy, Radiation treatment and Thyroid disease. High risk medications -  Chemotherapy;  Yes, in the Past and Thyroid;  Yes, Currently. Assessment: 1. The spine bone density is best assessed at L1-L2 with T-score of -2.2. 2. Femoral bone densities show low bone mass with a left femoral neck T- score of -2.0 and right femoral neck T-score of -1.8. 3. Vertebral fracture assessment was not done. 4.  Since the scan in 2015, bone density has declined 2.5% at L1 and L2.  Bone density has not changed significantly in either total hip.  Since the scan in 2010 however, bone density has declined significantly in both total hips. 66 y.o. female with LOW BONE DENSITY (OSTEOPENIA) and MODERATE predicted fracture risk with a 10 year major osteoporotic fracture risk of 18.5 % and hip fracture risk of 2.8 %. Recommendations: Ensure adequate calcium, vitamin D intake, and regular weightbearing exercise with a recheck in 2 years.  Given the significant decline in bone density in both hips since 2010, further preventative measures also could be considered Bone densitometry was performed on your patient using our [employer] densitometer. The results are summarized and a copy of the actual scans are included for your review. In conformity with the International Society of Clinical Densitometry's most recent position statement for DXA interpretation (2015), the diagnosis will be made on the lowest measured T-score of the lumbar spine, femoral neck, total proximal femur or 33% radius. Note the change in terminology for diagnostic classification from OSTEOPENIA to LOW BONE MASS. All trending for sequential exams will be done using multiple vertebrae  or the total proximal femur. Fracture risk is based on the WHO Fracture Risk Assessment Tool (FRAX). If additional information is needed or if you would like to discuss the results, please do not hesitate to call me. Thank you for referring this patient to [facility]. We are happy to be of service in support of you and your practice. If you have any questions or suggestions to improve our service, please call me at [phone]. Sincerely, Valentino López M.D. KATHYCELENO. Osteoporosis Services, [facility]     Mammo Screening Bilateral    Result Date: 3/30/2017  BILATERAL FULL FIELD DIGITAL SCREENING MAMMOGRAM Performed on: 3/29/17. Compared to: 03/27/2015 Mammo Screening Bilateral, 02/02/2012 Mammo Screening Bilateral, and 12/22/2010 Mammo Screening Bilateral Findings: The breasts have scattered areas of fibroglandular densities. There is no radiographic evidence of malignancy. This study was evaluated with the assistance of Computer-Aided Detection. Continue routine screening mammogram as recommended. ACR BI-RADS Category 1: Negative        Herbert Foster MD

## 2021-06-10 NOTE — TELEPHONE ENCOUNTER
Refill Approved    Rx renewed per Medication Renewal Policy. Medication was last renewed on 3/12/20, last OV 7/23/20.    Annabel Cortez, [facility] Triage/Med Refill 8/5/2020     Requested Prescriptions   Pending Prescriptions Disp Refills     fluticasone propionate (FLONASE) 50 mcg/actuation nasal spray [Pharmacy Med Name: FLUTICASONE PROP 50 MCG SPRAY] 16 g 0     Sig: SPRAY 2 SPRAYS INTO EACH NOSTRIL EVERY DAY       Nasal Steroid Refill Protocol Passed - 8/4/2020  6:00 PM        Passed - Patient has had office visit/physical in last 2 years     Last office visit with prescriber/PCP: 7/23/2020 OR same dept: 7/23/2020 Master Deleon DO OR same specialty: 7/23/2020 Master Deleon DO Last physical: 11/27/2019 Last MTM visit: Visit date not found    Next appt within 3 mo: Visit date not found  Next physical within 3 mo: Visit date not found  Prescriber OR PCP: Master Deleon DO  Last diagnosis associated with med order: 1. Chronic obstructive pulmonary disease, unspecified COPD type (H)  - fluticasone propionate (FLONASE) 50 mcg/actuation nasal spray [Pharmacy Med Name: FLUTICASONE PROP 50 MCG SPRAY]; SPRAY 2 SPRAYS INTO EACH NOSTRIL EVERY DAY  Dispense: 16 g; Refill: 0     If protocol passes may refill for 12 months if within 3 months of last provider visit (or a total of 15 months).

## 2021-06-11 NOTE — TELEPHONE ENCOUNTER
Refill Approved    Rx renewed per Medication Renewal Policy. Medication was last renewed on 6/16/20.    Lydia Woo, [facility] Triage/Med Refill 8/31/2020     Requested Prescriptions   Pending Prescriptions Disp Refills     citalopram (CELEXA) 10 MG tablet [Pharmacy Med Name: CITALOPRAM HBR 10 MG TABLET] 90 tablet 0     Sig: TAKE 1 TABLET BY MOUTH EVERY DAY       [facility] Refill Protocol  Passed - 8/28/2020  1:46 PM        Passed - PCP or prescribing provider visit in last year     Last office visit with prescriber/PCP: 7/23/2020 Master Deleon DO OR same dept: 10/16/2019 Master Deleon DO OR same specialty: 7/23/2020 Master Deleon DO  Last physical: 11/27/2019 Last MTM visit: Visit date not found   Next visit within 3 mo: Visit date not found  Next physical within 3 mo: Visit date not found  Prescriber OR PCP: Master Deleon DO  Last diagnosis associated with med order: 1. Anxiety and depression  - citalopram (CELEXA) 10 MG tablet [Pharmacy Med Name: CITALOPRAM HBR 10 MG TABLET]; TAKE 1 TABLET BY MOUTH EVERY DAY  Dispense: 90 tablet; Refill: 0    If protocol passes may refill for 12 months if within 3 months of last provider visit (or a total of 15 months).

## 2021-06-11 NOTE — PROGRESS NOTES
Pt arrived at [facility] to see Dr. Foster. Pt has Lung Cancer and is here for f/u.

## 2021-06-11 NOTE — TELEPHONE ENCOUNTER
Refill Approved    Rx renewed per Medication Renewal Policy. Medication was last renewed on 7/8/19.    Lydia Woo, [facility] Triage/Med Refill 8/31/2020     Requested Prescriptions   Pending Prescriptions Disp Refills     famotidine (PEPCID) 20 MG tablet 90 tablet 3     Sig: Take 1 tablet (20 mg total) by mouth daily.       GI Medications Refill Protocol Passed - 8/29/2020 12:04 PM        Passed - PCP or prescribing provider visit in last 12 or next 3 months.     Last office visit with prescriber/PCP: 7/23/2020 Master Deleon DO OR same dept: 10/16/2019 Master Deleon DO OR same specialty: 7/23/2020 Master Deleon DO  Last physical: 11/27/2019 Last MTM visit: Visit date not found   Next visit within 3 mo: Visit date not found  Next physical within 3 mo: Visit date not found  Prescriber OR PCP: Master Deleon DO  Last diagnosis associated with med order: 1. Gastroesophageal reflux disease without esophagitis  - famotidine (PEPCID) 20 MG tablet; Take 1 tablet (20 mg total) by mouth daily.  Dispense: 90 tablet; Refill: 3    2. Hyperlipidemia  - simvastatin (ZOCOR) 10 MG tablet; Take 1 tablet (10 mg total) by mouth at bedtime.  Dispense: 90 tablet; Refill: 3    If protocol passes may refill for 12 months if within 3 months of last provider visit (or a total of 15 months).                simvastatin (ZOCOR) 10 MG tablet 90 tablet 3     Sig: Take 1 tablet (10 mg total) by mouth at bedtime.       Statins Refill Protocol (Hmg CoA Reductase Inhibitors) Passed - 8/29/2020 12:04 PM        Passed - PCP or prescribing provider visit in past 12 months      Last office visit with prescriber/PCP: 7/23/2020 Master Deleon DO OR [facility] dept: 10/16/2019 Master Deleon DO OR same specialty: 7/23/2020 Master Deleon DO  Last physical: 11/27/2019 Last MTM visit: Visit date not found   Next visit within 3 mo: Visit date not found  Next physical within 3 mo: Visit date not  found  Prescriber OR PCP: Master Deleon DO  Last diagnosis associated with med order: 1. Gastroesophageal reflux disease without esophagitis  - famotidine (PEPCID) 20 MG tablet; Take 1 tablet (20 mg total) by mouth daily.  Dispense: 90 tablet; Refill: 3    2. Hyperlipidemia  - simvastatin (ZOCOR) 10 MG tablet; Take 1 tablet (10 mg total) by mouth at bedtime.  Dispense: 90 tablet; Refill: 3    If protocol passes may refill for 12 months if within 3 months of last provider visit (or a total of 15 months).

## 2021-06-11 NOTE — PROGRESS NOTES
[facility] Progress Note    Patient: Rita Mancini  MRN: [id number]  Date of Service: 9/3/2020        Reason for visit      1. Non-small cell cancer of right lung, s/p radiation and chemotherapies, in remission since [id number] (H)    2. Chronic pleural effusion, left (s/p Talc pleurodesis 2015)        Assessment     1.  A very pleasant 70 y.o. woman with non-small-cell lung cancer stage III treated with cisplatin and -16 and radiation and currently in remission.  She was diagnosed in 09/2009. Finished her treatment in February 2010.  No evidence of recurrence.  In March 2020 CT scan showed no evidence of any recurrence.  Still shows some chronic changes.  2.   History of hospitalization for pneumonia and then repeat hospitalization for hospital associated pneumonia.  She is done with the antibiotics.  She had it on both sides and needed a chest tube intubation in the whole 9 yards.  3.  H/o Double vision secondary to left lateral rectus muscle weakness.  Seems to have resolved a little bit.  4.  Mild renal insufficiency. Stable slightly dilated renal collecting system.  This is stable.  5.  Diabetes is improved since she has lost all that weight.    Plan     1. Should come back in 6 months.  We will do a CT scan at that time.  2. Follow-up with your primary care physician for other medical issues.  3. Advised to continue with exercise.  4. Continue to use flutter valve as well as incentive spirometry.  Be very careful while swallowing.    Clinical stage      Lung Cancer, Right side    Primary site: Lung (Left)    Clinical: Stage IIIA (T1b, N2, M0) signed by Herbert Foster MD on 10/9/2014 11:00 AM    Summary: Stage IIIA (T1b, N2, M0)      History     Rita Mancini is a very pleasant 70 y.o. old female with a history of nonsmall cell lung cancer located in the right upper lobe measuring 4.2 cm in size, presenting with some shortness of breath and cough in 09/2009 and biopsy suggestive of  adenocarcinoma including lymphnode biopsy confirming it is stage III disease.  Subsequent to that she was treated with cisplatin and -16 for 3 cycles and Taxotere for 2 cycles afterwards.  Subsequent to that she has been in remission.  She had been fine up until 11/2013 where a CT scan actually showed some congestion in the right lower lobe and then the PET scan showed that to be slightly hypermetabolic.  Therefore, she had that biopsied and that was negative. She had CT in September 2015 that revealed pleural effusion. This was tapped and was negative. About one litre of fluid was removed. She felt slightly better.    She was seen by pulmonary again for the fluid. Was then sent to Dr. Fair for pleural biopsy and pleurodhesis. That was done on 4/6/15. The biopsy was non malignant.      She has since been followed by me and pulmonary with CT scans.      Rita was admitted at [facility] on March 27, 2019 with septic shock.  She was found to have bilateral pneumonia but more so on the left side.  Needed to be intubated and ventilated.  Needed chest tube.  He was in the hospital for several days.  Repeat hospitalization.  She was  again in the hospital 31 July with some coughing and shortness of breath.  Found to have another infiltrate in the right lower lobe.    This year she has stayed out of the hospital.  She had a CT scan in March 2020 which was stable.  She had a virtual visit at that time.    Today comes in scheduled for follow-up.  Overall she is feeling fair.      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     Osteopenia      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, subsequent encounter 2019     Pleural effusion 1/15     Pneumonia      Radiation Pneumonitis     Created by Conversion      Sepsis due to Escherichia coli with acute renal failure without septic shock, unspecified acute renal failure type (H)      Sleep apnea     does not use cpap     Social History     Socioeconomic History     Marital status:      Spouse name: Not on file     Number of children: Not on file     Years of education: Not on file     Highest education level: Not on file   Occupational History     Not on file   Social Needs     Financial resource strain: Not on file     Food insecurity     Worry: Not on file     Inability: Not on file     Transportation needs     Medical: Not on file     Non-medical: Not on file   Tobacco Use     Smoking status: Former Smoker     Packs/day: 1.50     Years: 0.00     Pack years: 0.00     Last attempt to quit: 2008     Years since quittin.8     Smokeless tobacco: Former User   Substance and Sexual Activity     Alcohol use: No     Drug use: No     Sexual activity: Never   Lifestyle     Physical activity     Days per week: Not on file     Minutes per session: Not on file     Stress: Not on file   Relationships     Social connections     Talks on phone: Not on file     Gets together: Not on file     Attends Sikhism service: Not on file     Active member of club or organization: Not on file     Attends meetings of clubs or organizations: Not on file     Relationship status: Not on file     Intimate partner violence     Fear of current or ex partner: Not on file     Emotionally abused: Not on file     Physically abused: Not on file     Forced sexual activity: Not on file   Other Topics Concern     Not on file    Social History Narrative     Not on file     Social History     Tobacco Use     Smoking status: Former Smoker     Packs/day: 1.50     Years: 0.00     Pack years: 0.00     Last attempt to quit: 2008     Years since quittin.8     Smokeless tobacco: Former User   Substance Use Topics     Alcohol use: No     Drug use: No       Review of Systems   Constitutional  Constitutional (WDL): Exceptions to WDL  Fatigue: Fatigue relieved by rest  Neurosensory  Neurosensory (WDL): Exceptions to WDL  Peripheral Sensory Neuropathy: Asymptomatic, loss of deep tendon reflexes or paresthesia  Cardiovascular  Cardiovascular (WDL): All cardiovascular elements are within defined limits  Pulmonary  Respiratory (WDL): Exceptions to WDL  Cough: Mild symptoms, nonprescription intervention indicated  Dyspnea: Shortness of breath with moderate exertion  Gastrointestinal  Gastrointestinal (WDL): All gastrointestinal elements are within defined limits  Genitourinary  Genitourinary (WDL): All genitourinary elements are within defined limits  Integumentary  Integumentary (WDL): All integumentary elements are within defined limits  Patient Coping  Patient Coping: Accepting  Accompanied by  Accompanied by: Alone    ECOG performance status and Distress Assessment      ECOG Performance:    ECOG Performance Status: 0    Distress Assessment  Distress Assessment Score: 2:     Pain Status  Currently in Pain: No/denies      Vital Signs     Vitals:    20 1004   BP: 135/68   Pulse: 61   Temp: 98.7  F (37.1  C)   SpO2: 97%       Physical Exam     GENERAL: No acute distress. Cooperative in conversation.   HEENT: Pupils are equal, round and reactive. Oral mucosa is clean and intact. No ulcerations or mucositis noted. No bleeding noted.  RESP:Chest symmetric lungs are clear bilaterally per auscultation. Regular respiratory rate.  Significant amount of coarse crepitation and rhonchi.  CV: Normal S1 S2 Regular, rate and rhythm. No murmurs.  ABD:  Nondistended, soft, nontender. Positive bowel sounds. No organomegaly.   EXTREMITIES: No lower extremity edema.   NEURO: Non- focal. Alert and oriented x3.  Cranial nerves appear intact.  PSYCH: Within normal limits. No depression or anxiety.  SKIN: Warm dry intact.    LYMPH NODES: Bilateral cervical, supraclavicular, axillary lymph node examination was done.  Negative for any palpable adenopathy.      Lab Results     Results for orders placed or performed in visit on 07/23/20   Glycosylated Hemoglobin A1c   Result Value Ref Range    Hemoglobin A1c 7.1 (H) 3.5 - 6.0 %   Basic Metabolic Panel   Result Value Ref Range    Sodium 137 136 - 145 mmol/L    Potassium 5.0 3.5 - 5.0 mmol/L    Chloride 100 98 - 107 mmol/L    CO2 27 22 - 31 mmol/L    Anion Gap, Calculation 10 5 - 18 mmol/L    Glucose 101 70 - 125 mg/dL    Calcium 9.9 8.5 - 10.5 mg/dL    BUN 17 8 - 28 mg/dL    Creatinine 1.54 (H) 0.60 - 1.10 mg/dL    GFR MDRD Af Amer 40 (L) >60 mL/min/1.73m2    GFR MDRD Non Af Amer 33 (L) >60 mL/min/1.73m2   Thyroid Stimulating Hormone (TSH)   Result Value Ref Range    TSH 1.17 0.30 - 5.00 uIU/mL         Imaging Results     No results found.      Herbert Foster MD

## 2021-06-13 NOTE — PROGRESS NOTES
Assessment and Plan:     Patient has been advised of split billing requirements and indicates understanding: Yes     1. COPD (chronic obstructive pulmonary disease)  Stage II function.   - budesonide (PULMICORT FLEXHALER) 180 mcg/actuation inhaler; TAKE 2 PUFFS BY MOUTH TWICE A DAY  Dispense: 3 each; Refill: 3    2. Diabetes 1.5, managed as type 2   Urged to continue dulaglutide therapy.   - Glycosylated Hemoglobin A1c  - dulaglutide (TRULICITY) 1.5 mg/0.5 mL PnIj; Inject 1.5 mg under the skin once a week.  Dispense: 4 Syringe; Refill: 12    3. Non-small cell cancer of right lung, s/p radiation and chemotherapies, in remission since 6728-1643   Likely cured, no clinical evidence of recurrence.  She has quit smoking.     4. MONSERRAT on CPAP    5. Bronchiectasis without complication   Chronic, with chronic cough and some sputum.  No acute changes or hemoptysis.     6. Sleep disorder  Trazodone is not helping sleep. Will try mirtazapine.   - mirtazapine (REMERON) 15 MG tablet; Take 0.5 tablets (7.5 mg total) by mouth at bedtime.  Dispense: 30 tablet; Refill: 5    7. Anemia of chronic renal failure, stage 3a  Iron deficiency work up is negative. Iron studies are Ok, she will stop the iron replacement therapy.   - Basic Metabolic Panel    8. Dermatitis  This is a chronic eczematous dermatitis, avoids use on face.  Refilled.   - triamcinolone (KENALOG) 0.1 % ointment; Apply 1 application topically 2 (two) times a day as needed (dry skin on hands).  Dispense: 80 g; Refill: 5    9. Acquired hypothyroidism  Assess control   - T4, Free    10. History of gram negative sepsis  E. Coli, noted to have chronic right sided pyelocaliectasis (hydronephrosis) which may have played a role. She has had evaluation by urology, renogram revealed no chronic obstruction.      11. Screening for colon cancer  Colonoscopy 9/23/2020, negative except diverticulosis, due 2030. ( EGD done then also negative evaluation for iron deficiency.)    The  patient's current medical problems were reviewed.    The following health maintenance schedule was reviewed with the patient and provided in printed form in the after visit summary:   Health Maintenance   Topic Date Due     HEPATITIS C SCREENING  1950     COPD ACTION PLAN  1950     MICROALBUMIN  08/08/2020     TD 18+ HE  10/25/2020     MEDICARE ANNUAL WELLNESS VISIT  11/27/2020     DIABETIC FOOT EXAM  11/27/2020     LIPID  11/27/2020     DIABETIC EYE EXAM  12/10/2020     COLORECTAL CANCER SCREENING  02/15/2021     A1C  01/23/2021     BMP  07/23/2021     MAMMOGRAM  11/08/2021     DXA SCAN  11/08/2021     FALL RISK ASSESSMENT  11/19/2021     ADVANCE CARE PLANNING  11/27/2024     DEPRESSION ACTION PLAN  Completed     SPIROMETRY  Completed     Pneumococcal Vaccine: Pediatrics (0 to 5 Years) and At-Risk Patients (6 to 64 Years)  Completed     Pneumococcal Vaccine: 65+ Years  Completed     INFLUENZA VACCINE RULE BASED  Completed     ZOSTER VACCINES  Completed        Subjective:   Chief Complaint: Rita Mancini is an 70 y.o. female here for an Annual Wellness visit.   HPI:  She is doing OK, chronic cough ongoing.  No unusual dyspnea.  No GI problems.  Just had colonoscopy and EGD for anemia, which were negative.  Has had weight gain since her severe illness with sepsis last fall.  Feels like diabetes is going OK.     Review of Systems:  Please see above.  The rest of the review of systems are negative for all systems.    Patient Care Team:  Michi Murillo MD as PCP - General (Internal Medicine)  Ella Cohen MD as Physician (Radiation Oncology)  Herbert Foster MD as Physician (Hematology and Oncology)  Felisha Lee RN as Oncology Nurse Navigator (Hematology and Oncology)  Charla Perez, PharmD as Pharmacist (Pharmacist)  Jase Anaya MD as Physician (Orthopedic Surgery)  Pierre Louis DPM as Assigned Musculoskeletal Provider  Jabari Perez MD as Assigned Pulmonology  Provider    Patient Active Problem List   Diagnosis     Episode of recurrent major depressive disorder (H)     Non-small cell cancer of right lung, s/p radiation and chemotherapies, in remission since 1656-2966 (H)     Hypothyroidism     Dyslipidemia     Diabetes 1.5, managed as type 2 (H)     COPD (chronic obstructive pulmonary disease) (H)     MONSERRAT on CPAP     Diverticulosis     Chronic pleural effusion, left (s/p Talc pleurodesis 2015)     Bronchiectasis (H)     Eczema of both hands     Keratosis obturans of external ear canal, right     Ventral hernia without obstruction or gangrene     Hyperkalemia     Current moderate episode of major depressive disorder (H)     Gastroesophageal reflux disease without esophagitis     Anemia     Hydronephrosis, right     Lymph node enlargement     Past Medical History:   Diagnosis Date     Anxiety and depression 1962     Bigeminy 03/17/2019     COPD (chronic obstructive pulmonary disease) (H) 2009     Diabetes mellitus (H) 2008     Functional diarrhea 12/31/2018     GERD (gastroesophageal reflux disease)      HCAP (healthcare-associated pneumonia)      Hx antineoplastic chemotherapy     lung cancer      Hx of radiation therapy     lung cancer      Hyperlipemia 2009     Hypothyroid      Lung cancer (H) 2008    RUL,  Non small cell cancer     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     Osteopenia      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, subsequent encounter 4/8/2019     Pleural effusion 1/15     Pneumonia      Radiation Pneumonitis     Created by Conversion      Sepsis due to Escherichia coli with acute renal failure without septic shock, unspecified acute renal failure type (H)      Sleep apnea 2010    does not use cpap      Past Surgical History:   Procedure Laterality Date     PORTACATH PLACEMENT      and removal     THORACOSCOPY Right 4/6/2015    Procedure: RIGHT THORACOSCOPY / BIOPSY PLEURAL / TALC PLEURODESIS;  Surgeon: Michi MCKEON  MD Francesca;  Location: [facility] Main OR;  Service:      THORACOSCOPY Left 3/29/2019    Procedure: LEFT THORACOSCOPY WITH DECORTICATION;  Surgeon: Michi Ma MD;  Location: [facility] Main OR;  Service: General     TONSILLECTOMY  age 5     URETERAL STENT PLACEMENT Right 2010     US THORACENTESIS  3/27/2019      Family History   Problem Relation Age of Onset     Heart disease Mother      Hypertension Mother      Alcohol abuse Mother      Depression Mother      Heart disease Father 45        mi age 45, cabg     Heart attack Father      Cancer Father         prostate     Hypertension Father      Snoring Father      COPD Brother      Alcohol abuse Brother      Alcohol abuse Sister      Breast cancer Paternal Aunt      Leukemia Grandchild      Cancer Maternal Aunt 47        breast     Diabetes Son      Anxiety disorder Son      Depression Son      No Medical Problems Daughter      Schizophrenia Grandchild       Social History     Socioeconomic History     Marital status:      Spouse name: Not on file     Number of children: Not on file     Years of education: Not on file     Highest education level: Not on file   Occupational History     Not on file   Social Needs     Financial resource strain: Not on file     Food insecurity     Worry: Not on file     Inability: Not on file     Transportation needs     Medical: Not on file     Non-medical: Not on file   Tobacco Use     Smoking status: Former Smoker     Packs/day: 1.50     Years: 0.00     Pack years: 0.00     Quit date: 2008     Years since quittin.0     Smokeless tobacco: Former User   Substance and Sexual Activity     Alcohol use: No     Drug use: No     Sexual activity: Never   Lifestyle     Physical activity     Days per week: Not on file     Minutes per session: Not on file     Stress: Not on file   Relationships     Social connections     Talks on phone: Not on file     Gets together: Not on file     Attends Scientologist service: Not on  file     Active member of club or organization: Not on file     Attends meetings of clubs or organizations: Not on file     Relationship status: Not on file     Intimate partner violence     Fear of current or ex partner: Not on file     Emotionally abused: Not on file     Physically abused: Not on file     Forced sexual activity: Not on file   Other Topics Concern     Not on file   Social History Narrative     Not on file      Current Outpatient Medications   Medication Sig Dispense Refill     aspirin 81 MG EC tablet Take 81 mg by mouth at bedtime.              blood glucose meter (GLUCOMETER) Use 1 each As Directed daily. Dispense glucometer brand per patient's insurance at pharmacy discretion. 1 each 0     blood glucose test (ACCU-CHEK SMARTVIEW TEST STRIP) strips PT TO TEST BLOOD SUGAR DAILY 100 strip 3     cetirizine (ZYRTEC) 10 MG tablet Take 10 mg by mouth daily with supper.       citalopram (CELEXA) 10 MG tablet TAKE 1 TABLET BY MOUTH EVERY DAY 90 tablet 3     dulaglutide (TRULICITY) 1.5 mg/0.5 mL PnIj Inject 1.5 mg under the skin once a week. 4 Syringe 3     famotidine (PEPCID) 20 MG tablet Take 1 tablet (20 mg total) by mouth daily. 90 tablet 3     ferrous sulfate 325 (65 FE) MG tablet Take 1 tablet (325 mg total) by mouth daily with breakfast.  0     fluticasone propionate (FLONASE) 50 mcg/actuation nasal spray SPRAY 2 SPRAYS INTO EACH NOSTRIL EVERY DAY 16 g 2     generic lancets Use 1 each As Directed daily. Dispense brand per patient's insurance at pharmacy discretion. (dx E11.9) 100 each 1     levothyroxine (SYNTHROID, LEVOTHROID) 50 MCG tablet Take on empty stomach every Monday and Friday (see other dose for remaining days of week) 45 tablet 3     levothyroxine (SYNTHROID, LEVOTHROID) 75 MCG tablet Take daily 5 days a week (except Monday and Friday) 90 tablet 3     melatonin 5 mg cap Take 10 mg by mouth at bedtime as needed.        montelukast (SINGULAIR) 10 mg tablet TAKE 1 TABLET BY MOUTH EVERYDAY  "AT BEDTIME 90 tablet 3     multivitamin therapeutic tablet Take 1 tablet by mouth daily.       PULMICORT FLEXHALER 180 mcg/actuation inhaler TAKE 2 PUFFS BY MOUTH TWICE A DAY 3 each 3     simvastatin (ZOCOR) 10 MG tablet Take 1 tablet (10 mg total) by mouth at bedtime. 90 tablet 3     traZODone (DESYREL) 100 MG tablet Take 100 mg by mouth at bedtime as needed for sleep.              triamcinolone (KENALOG) 0.1 % ointment Apply 1 application topically 2 (two) times a day as needed (dry skin on hands).              umeclidinium-vilanteroL (ANORO ELLIPTA) 62.5-25 mcg/actuation inhaler Inhale 1 puff daily. 90 puff 1     VENTOLIN HFA 90 mcg/actuation inhaler INHALE 1-2 PUFFS EVERY 4 (FOUR) HOURS AS NEEDED FOR WHEEZING. 54 g 0     Objective:   Vital Signs:   Visit Vitals  /64 (Patient Site: Left Arm, Patient Position: Sitting, Cuff Size: Adult Regular)   Pulse 76   Resp 18   Ht 5' 4.5\" (1.638 m)   Wt 193 lb (87.5 kg)   SpO2 96%   BMI 32.62 kg/m       PHYSICAL EXAM:  General Appearance: In no acute distress    /64 (Patient Site: Left Arm, Patient Position: Sitting, Cuff Size: Adult Regular)   Pulse 76   Resp 18   Ht 5' 4.5\" (1.638 m)   Wt 193 lb (87.5 kg)   SpO2 96%   BMI 32.62 kg/m        RESPIRATORY: bilateral scattered, but few rhonchi, and occasional brief wheeze.   CARDIOVASCULAR: S1, S2  ABDOMEN:  Soft, flat, and non-tender  PSYCHIATRIC: Oriented X 3, without confusion, behavior and affect normal, thinking is clear    Assessment Results 11/19/2020   Activities of Daily Living No help needed   Instrumental Activities of Daily Living No help needed   Get Up and Go Score Less than 12 seconds   Mini Cog Total Score 4   Some recent data might be hidden     A Mini-Cog score of 0-2 suggests the possibility of dementia, score of 3-5 suggests no dementia        Identified Health Risks:     She is at risk for lack of exercise and has been provided with information to increase physical activity for the benefit " of her well-being.

## 2021-06-13 NOTE — TELEPHONE ENCOUNTER
RN cannot approve Refill Request    RN can NOT refill this medication med is not covered by policy/route to provider. Last office visit: 7/23/2020 Master Deleon DO Last Physical: 11/27/2019 Last MTM visit: Visit date not found Last visit same specialty: 7/23/2020 Master Deleon DO.  Next visit within 3 mo: Visit date not found  Next physical within 3 mo: Visit date not found      Quynh Mueller, [facility] Triage/Med Refill 12/17/2020    Requested Prescriptions   Pending Prescriptions Disp Refills     ANORO ELLIPTA 62.5-25 mcg/actuation inhaler [Pharmacy Med Name: ANORO ELLIPTA 62.5-25 MCG INH]  1     Sig: TAKE 1 PUFF BY MOUTH EVERY DAY       There is no refill protocol information for this order

## 2021-06-13 NOTE — TELEPHONE ENCOUNTER
Refill Request  Did you contact pharmacy: Yes  Medication name:   Requested Prescriptions     Pending Prescriptions Disp Refills     dulaglutide (TRULICITY) 1.5 mg/0.5 mL PnIj 4 Syringe 12     Sig: Inject 1.5 mg under the skin once a week.     Who prescribed the medication: Michi Murillo MD  Requested Pharmacy: [facility]  Is patient out of medication: N/A  Patient notified refills processed in 3 business days:  N/A  Okay to leave a detailed message: no    Pt requesting 90 day supply. Please advise.

## 2021-06-13 NOTE — PROGRESS NOTES
formerly [facility] Clinic Note    Rita Mancini   67 y.o. female    Date of Visit: 10/24/2017  Chief Complaint   Patient presents with     Diabetes     Labs Only     Ear Pain     right     Questions about skin       ASSESSMENT/PLAN  1. Diabetes mellitus  Glycosylated Hemoglobin A1c   2. Hearing loss  Ambulatory referral to ENT   3. Acute sinusitis     4. Healthcare maintenance  Influenza High Dose, Seasonal 65+ yrs   5. Eczema of both hands       ---------------------------------------------    1.  A1c 6.2, continue current treatment.  Check foot exam next time    2.  Referral to ENT for removal of what appears to be cerumen from the right TM.  She Ever had irrigation of the ear canal done at urgent care    3.  20 days of acute sinusitis symptoms, no improvement with azithromycin, change to amoxicillin which may be more effective    4.  Vaccination today    5.  Recommended against using bacitracin on the hand because of the tendency to cause allergic contact dermatitis, can use Vaseline instead, also triamcinolone ointment    Return in about 6 months (around 4/24/2018) for Recheck.      SUBJECTIVE  Rita Mancini is a 67-year-old woman here for diabetes follow-up.  A1c returned at 7.0, but later returned at 6.2.  She is on aspirin and simvastatin, does not smoke, eye exam up-to-date.    She called in earlier, 2 weeks ago she developed sinus congestion, colorful drainage, which has not let up.  She was treated over the telephone with a azithromycin, but has not improved.    While she was in [place], she jumped in a pool and had right-sided hearing loss shortly thereafter.  She went to a [employer] and was noted to have impaction of cerumen.  Both sides were irrigated, and she had some sort of film over the right tympanic membrane.  She still has significant hearing loss on the side.  She was advised to go to ENT, wanted to know what I thought.    She gets dry skin, sometimes itchy, sometimes has colorless bumps on  her fingers and fissuring of the skin.  She has been using bacitracin.      Medications, allergies, and problem list were reviewed and updated    Patient Active Problem List   Diagnosis     Radiation Pneumonitis     Depression     Non-small cell cancer of right lung     Hypothyroidism     Hyperlipidemia     Diabetes 1.5, managed as type 2     COPD (chronic obstructive pulmonary disease)     MONSERRAT on CPAP     Diverticulosis     Bronchiectasis     Neuropathy of left abducens nerve     Eczema of both hands     Past Medical History:   Diagnosis Date     COPD (chronic obstructive pulmonary disease)      Depression      Diabetes mellitus      GERD (gastroesophageal reflux disease)      Hx antineoplastic chemotherapy     lung cancer      Hx of radiation therapy     lung cancer      Hyperlipemia      Hypothyroid      Lung cancer 2008    RUL,  Non small cell cancer     Osteopenia      Pleural effusion 1/15     Sleep apnea     does not use cpap     Current Outpatient Prescriptions   Medication Sig Dispense Refill     albuterol (PROVENTIL) 2.5 mg /3 mL (0.083 %) nebulizer solution Take 3 mL (2.5 mg total) by nebulization every 4 (four) hours as needed for wheezing (COPD Code: J44.1). 900 mL 12     aspirin 81 MG EC tablet Take 81 mg by mouth daily.       blood glucose meter (GLUCOMETER) Use 1 each As Directed as needed. Dispense glucometer brand per patient's insurance at pharmacy discretion. 1 each 0     blood glucose test (ACCU-CHEK SMARTVIEW TEST STRIP) strips PT TO TEST BLOOD SUGAR 2-3 TIMES DAILY 200 strip 3     calcium carbonate-vitamin D3 (CALCIUM 600 WITH VITAMIN D3) 600 mg(1,500mg) -200 unit per tablet Take 1 tablet by mouth 2 (two) times a day. Taking 1200mg of Calcium with 1000 D3       cetirizine 10 mg cap Take 10 mg by mouth bedtime.        citalopram (CELEXA) 10 MG tablet TAKE 1 TABLET BY MOUTH ONCE DAILY. 90 tablet 3     diazePAM (VALIUM) 10 MG tablet TAKE 1 TABLET (10 MG TOTAL) BY MOUTH EVERY 12 (TWELVE) HOURS AS  NEEDED FOR ANXIETY. 30 tablet 3     famotidine (PEPCID) 20 MG tablet TAKE 1 TABLET (20 MG TOTAL) BY MOUTH 2 (TWO) TIMES A DAY. 180 tablet 1     fluticasone (FLONASE) 50 mcg/actuation nasal spray 2 sprays into each nostril daily. 48 g 3     furosemide (LASIX) 20 MG tablet TAKE 2 TABLETS BY MOUTH EVERY MORNING. 180 tablet 3     generic lancets Use 1 each As Directed daily. Dispense brand per patient's insurance at pharmacy discretion. (dx E11.9) 100 each 1     INCRUSE ELLIPTA 62.5 mcg/actuation DsDv inhaler INHALE 1 PUFF BY MOUTH DAILY 90 each 3     levothyroxine (SYNTHROID, LEVOTHROID) 50 MCG tablet TAKE 1 TABLET BY MOUTH EVERY OTHER DAY ALTERNATING WITH 75MG TABLET 45 tablet 2     levothyroxine (SYNTHROID, LEVOTHROID) 75 MCG tablet TAKE 1 TABLET BY MOUTH EVERY OTHER DAYS ALTERNATING WITH 75MCG AND 50MCG DOSE 45 tablet 1     metFORMIN (GLUCOPHAGE) 1000 MG tablet TAKE 1 TABLET (1,000 MG TOTAL) BY MOUTH 2 (TWO) TIMES A DAY WITH MEALS. 180 tablet 0     montelukast (SINGULAIR) 10 mg tablet TAKE 1 TABLET BY MOUTH AT BEDTIME 90 tablet 1     PULMICORT FLEXHALER 180 mcg/actuation inhaler INHALE 2 PUFFS 2 (TWO) TIMES A DAY. 3 each 1     simvastatin (ZOCOR) 10 MG tablet TAKE 1 TABLET BY MOUTH AT BEDTIME. 90 tablet 3     VENTOLIN HFA 90 mcg/actuation inhaler INHALE 1-2 PUFFS EVERY 4 (FOUR) HOURS AS NEEDED FOR WHEEZING. 54 g 0     amoxicillin (AMOXIL) 875 MG tablet Take 1 tablet (875 mg total) by mouth 2 (two) times a day for 7 days. 14 tablet 0     triamcinolone (KENALOG) 0.1 % ointment Apply topically 2 (two) times a day. hands 30 g 3     No current facility-administered medications for this visit.      No Known Allergies    EXAM  Vitals:    10/24/17 1215   BP: 96/62   Pulse: 75   SpO2: 95%   Weight: 186 lb 4.8 oz (84.5 kg)     General: Alert, no distress  Skin: Fissuring of the fingers, no papules noted at time of exam  ENT: Left TM and ear canal unremarkable, right ear canal unremarkable, right TM obscured by a layer of what  appears to be cerumen    Results reviewed:     A1c reviewed, 6.2  Reviewed urgent care note she brought in, citing cerumen impaction as the diagnosis    Data points: 3    Master Deleon,   Internal Medicine  [facility]

## 2021-06-13 NOTE — TELEPHONE ENCOUNTER
RN cannot approve Refill Request    RN can NOT refill this medication Protocol failed and NO refill given. Last office visit: Visit date not found Last Physical: 11/19/2020 Last MTM visit: Visit date not found Last visit same specialty: 7/23/2020 Master Deleon DO.  Next visit within 3 mo: Visit date not found  Next physical within 3 mo: Visit date not found      Lydia Woo, [facility] Triage/Med Refill 11/20/2020    Requested Prescriptions   Pending Prescriptions Disp Refills     dulaglutide (TRULICITY) 1.5 mg/0.5 mL PnIj 4 Syringe 12     Sig: Inject 1.5 mg under the skin once a week.       Insulin/GLP-1 Refill Protocol Failed - 11/20/2020  2:24 PM        Failed - Microalbumin in last year     Microalbumin, Random Urine   Date Value Ref Range Status   08/08/2019 0.60 0.00 - 1.99 mg/dL Final                  Passed - Visit with PCP or prescribing provider visit in last 6 months     Last office visit with prescriber/PCP: Visit date not found OR same dept: Visit date not found OR same specialty: 7/23/2020 Master Deleon DO Last physical: 11/19/2020 Last MTM visit: Visit date not found     Next appt within 3 mo: Visit date not found  Next physical within 3 mo: Visit date not found  Prescriber OR PCP: Michi Murillo MD  Last diagnosis associated with med order: 1. Diabetes 1.5, managed as type 2 (H)  - dulaglutide (TRULICITY) 1.5 mg/0.5 mL PnIj; Inject 1.5 mg under the skin once a week.  Dispense: 4 Syringe; Refill: 12    If protocol passes may refill for 6 months if within 3 months of last provider visit (or a total of 9 months).              Passed - A1C in last 6 months     Hemoglobin A1c   Date Value Ref Range Status   11/19/2020 6.9 (H) <=5.6 % Final     Comment:     Normal <5.7% Prediabete 5.7-6.4% Diabletes 6.5% or higher - adopted from ADA consensus guidelines               Passed - Blood pressure in last year     BP Readings from Last 1 Encounters:   11/19/20 126/64             Passed -  Creatinine done in last year     Creatinine   Date Value Ref Range Status   11/19/2020 1.51 (H) 0.60 - 1.10 mg/dL Final

## 2021-06-14 NOTE — TELEPHONE ENCOUNTER
She lives in a [facility] complex. They will be immunizing their residents for coronavirus. The patient wants the MD's approval to go ahead and get the shot or because of her existing conditions and the meds she is on, is it not recommended? Please call her and advise further:  [phone]..  Thank you,  Cassie Langston RN  [facility]    Reason for Disposition    Nursing judgment    Additional Information    Negative: Nursing judgment    Negative: Nursing judgment    Negative: Nursing judgment    Protocols used: NO PROTOCOL AVAILABLE - INFORMATION ONLY-A-[place]

## 2021-06-14 NOTE — TELEPHONE ENCOUNTER
Pt calling requestin a new blood glucose meter. Pt is requesting the accucheck brand. Please send to [facility] on [address].

## 2021-06-14 NOTE — PROGRESS NOTES
Rita Mancini is a 67 y.o. female seen in consultation at the request of Dr. Deleon for hearing loss.  Patient has noticed acute hearing loss in the right ear over the last couple of months after swimming.  Denies otologic history of infections or surgeries. Denies tinnitus and vertigo.  She notes frequent sinus infections.    ALLERGY:  No Known Allergies    MEDICATIONS:     Current Outpatient Prescriptions on File Prior to Visit   Medication Sig Dispense Refill     albuterol (PROVENTIL) 2.5 mg /3 mL (0.083 %) nebulizer solution Take 3 mL (2.5 mg total) by nebulization every 4 (four) hours as needed for wheezing (COPD Code: J44.1). 900 mL 12     aspirin 81 MG EC tablet Take 81 mg by mouth daily.       blood glucose meter (GLUCOMETER) Use 1 each As Directed as needed. Dispense glucometer brand per patient's insurance at pharmacy discretion. 1 each 0     blood glucose test (ACCU-CHEK SMARTVIEW TEST STRIP) strips PT TO TEST BLOOD SUGAR 2-3 TIMES DAILY 200 strip 3     calcium carbonate-vitamin D3 (CALCIUM 600 WITH VITAMIN D3) 600 mg(1,500mg) -200 unit per tablet Take 1 tablet by mouth 2 (two) times a day. Taking 1200mg of Calcium with 1000 D3       cetirizine 10 mg cap Take 10 mg by mouth bedtime.        citalopram (CELEXA) 10 MG tablet TAKE 1 TABLET BY MOUTH ONCE DAILY. 90 tablet 2     diazePAM (VALIUM) 10 MG tablet TAKE 1 TABLET (10 MG TOTAL) BY MOUTH EVERY 12 (TWELVE) HOURS AS NEEDED FOR ANXIETY. 30 tablet 3     famotidine (PEPCID) 20 MG tablet TAKE 1 TABLET (20 MG TOTAL) BY MOUTH 2 (TWO) TIMES A DAY. 180 tablet 1     fluticasone (FLONASE) 50 mcg/actuation nasal spray 2 sprays into each nostril daily. 48 g 3     furosemide (LASIX) 20 MG tablet TAKE 2 TABLETS BY MOUTH EVERY MORNING. 180 tablet 3     generic lancets Use 1 each As Directed daily. Dispense brand per patient's insurance at pharmacy discretion. (dx E11.9) 100 each 1     INCRUSE ELLIPTA 62.5 mcg/actuation DsDv inhaler INHALE 1 PUFF BY MOUTH DAILY 90 each  3     levothyroxine (SYNTHROID, LEVOTHROID) 50 MCG tablet TAKE 1 TABLET BY MOUTH EVERY OTHER DAY ALTERNATING WITH 75MG TABLET 45 tablet 2     levothyroxine (SYNTHROID, LEVOTHROID) 75 MCG tablet TAKE 1 TABLET BY MOUTH EVERY OTHER DAYS ALTERNATING WITH 75MCG AND 50MCG DOSE 45 tablet 1     metFORMIN (GLUCOPHAGE) 1000 MG tablet TAKE 1 TABLET (1,000 MG TOTAL) BY MOUTH 2 (TWO) TIMES A DAY WITH MEALS. 180 tablet 0     montelukast (SINGULAIR) 10 mg tablet TAKE 1 TABLET BY MOUTH AT BEDTIME 90 tablet 1     PULMICORT FLEXHALER 180 mcg/actuation inhaler INHALE 2 PUFFS 2 (TWO) TIMES A DAY. 3 each 1     simvastatin (ZOCOR) 10 MG tablet TAKE 1 TABLET BY MOUTH AT BEDTIME. 90 tablet 3     triamcinolone (KENALOG) 0.1 % ointment Apply topically 2 (two) times a day. hands 30 g 3     VENTOLIN HFA 90 mcg/actuation inhaler INHALE 1-2 PUFFS EVERY 4 (FOUR) HOURS AS NEEDED FOR WHEEZING. 54 g 0     No current facility-administered medications on file prior to visit.        Past Medical/Surgical History, Family History and Social History reviewed in detail and documented separately in the medical record.    Complete Review of Systems:  A 10-point review was performed.  Pertinent positives are noted in the HPI and on a separate scanned document in the chart.    EXAM:  There were no vitals filed for this visit.    Nurse documentation reviewed  and documented separately.    General Appearance: Pleasant, alert, appropriate appearance for age. No acute distress    Head Exam: Normal. Normocephalic, atraumatic.    Eye Exam: Normal external eye, conjunctiva, lids, cornea. Extra-ocular movements are intact.    Left external ear: normal  Left otoscopic exam: Normal EAC. Normal TM     Right external ear:Cerumen impaction    PROCEDURE  Cerumen removal  The right ear is examined under the microscope and a cerumen impaction is noted.  This is debrided uncovering keratosis obturans of the medial canal.  There is granulation tissue and some exudate.  Removal  is quite painful for her so we aborted removal.      Neuro: Alert and oriented times 3, CN 2-12 grossly intact, no nystagmus, PERRL, EOMI, normal speech and gait    Chest/Respiratory Exam: Normal chest wall motion and respiratory effort. No audible stridor or wheezing.    Cardiovascular Exam: Regular rate and rhythm.  No cyanosis, clubbing or edema.    Pulses: carotid pulses normal    ASSESSMENT:  1. Impacted cerumen of right ear    2. Acute diffuse otitis externa of right ear    3. Keratosis obturans of external ear canal, right        PLAN: Findings, assessment, and management options were discussed. Will treat with ototopicals and then see her back in 1 week for further cleaning and consultation.

## 2021-06-14 NOTE — PROGRESS NOTES
Audiology Report    Referring Provider:   Service    History:  Rita Mancini is seen in conjunction with ENT appointment today. She has a history of otalgia, aural fullness, and difficulty hearing in her right ear since she jumped into a pool while visiting [place] in early October. Rita reports reports some aural fullness in her left ear. She states she had her ears flushed at Urgent Care, but they were unable to clear her right ear. She reportedly experiences tinnitus in her right ear. Rita reports she has some balance issues. She reports a positive family history of hearing loss. She denies a history of otorrhea, ear surgery, and noise exposure.    Results:     Left Ear Right Ear   Otoscopy clear canals occluding cerumen   Tympanometry normal (Type A)  flat (Type B)  with small ear canal volume indicating possible occlusion     Further hearing testing was discontinued today due to the occluding cerumen in the right ear.    Plan:  The patient is returned to ENT for follow up.  She should return for retesting with ENT recommendation.      Please see audiogram under  media  and  audiogram  in the patient s chart.     Eric Llanos, CCC-A  [place] Licensed Audiologist #[id number]

## 2021-06-14 NOTE — PROGRESS NOTES
HPI: reutnrs after drops, still with plugging and some pain.  Past medical history, surgical history, family history, social history, medications, and allergies have been reviewed and are documented above.     Review of Systems: a 10-system review was performed. Symptoms are noted in the HPI and on a scanned document in the computer chart.    Physical Examination:   GEN: no acute distress, alert and oriented  EYES: extraocular movements intact, pupils equal and round. Sclera clear.   EARS: right impaction is cleared under binocular microscopy using sction and microdissection. The tympanic membrane is edematous.  PULM: breathing comfortably on room air with no stertor or stridor. Chest expansion symmetric.  HEART: regular rate and rhythm, no peripheral edema    MEDICAL DECISION-MAKING:Keratosis obturans.  Continue drops for 1 week and then I would like to recheck ear in 1 month given irritation seen in the EAC.

## 2021-06-14 NOTE — TELEPHONE ENCOUNTER
Please let her know that I think she should get the vaccine.  It is safe and effective and would be a good thing for her.  Dr. Lidia MD

## 2021-06-14 NOTE — TELEPHONE ENCOUNTER
Refill Approved    Rx renewed per Medication Renewal Policy. Medication was last renewed on 9/2/20.  Last OV 11/19/20.  Joanne Velez, [facility] Triage/Med Refill 2/1/2021     Requested Prescriptions   Pending Prescriptions Disp Refills     fluticasone propionate (FLONASE) 50 mcg/actuation nasal spray [Pharmacy Med Name: FLUTICASONE PROP 50 MCG SPRAY] 16 g 0     Sig: SPRAY 2 SPRAYS INTO EACH NOSTRIL EVERY DAY       Nasal Steroid Refill Protocol Passed - 2/1/2021 12:07 AM        Passed - Patient has had office visit/physical in last 2 years     Last office visit with prescriber/PCP: Visit date not found OR same dept: Visit date not found OR same specialty: 7/23/2020 Master Deleon DO Last physical: 11/19/2020 Last MTM visit: Visit date not found    Next appt within 3 mo: Visit date not found  Next physical within 3 mo: Visit date not found  Prescriber OR PCP: Michi Murillo MD  Last diagnosis associated with med order: 1. Chronic obstructive pulmonary disease, unspecified COPD type (H)  - fluticasone propionate (FLONASE) 50 mcg/actuation nasal spray [Pharmacy Med Name: FLUTICASONE PROP 50 MCG SPRAY]; SPRAY 2 SPRAYS INTO EACH NOSTRIL EVERY DAY  Dispense: 16 g; Refill: 0     If protocol passes may refill for 12 months if within 3 months of last provider visit (or a total of 15 months).

## 2021-06-15 NOTE — TELEPHONE ENCOUNTER
RN cannot approve Refill Request    RN can NOT refill this medication med is not covered by policy/route to provider. Last office visit: 7/23/2020 Master Deleon DO Last Physical: 11/27/2019 Last MTM visit: Visit date not found Last visit same specialty: 7/23/2020 Master Deleon DO.  Next visit within 3 mo: Visit date not found  Next physical within 3 mo: Visit date not found      Junior Prince, [facility] Triage/Med Refill 2/5/2021    Requested Prescriptions   Pending Prescriptions Disp Refills     montelukast (SINGULAIR) 10 mg tablet [Pharmacy Med Name: MONTELUKAST SOD 10 MG TABLET] 90 tablet 1     Sig: TAKE 1 TABLET BY MOUTH EVERYDAY AT BEDTIME       There is no refill protocol information for this order

## 2021-06-15 NOTE — TELEPHONE ENCOUNTER
Refill Approved    Rx renewed per Medication Renewal Policy. Medication was last renewed on 2/14/20.    Junior Prince, [facility] Triage/Med Refill 3/7/2021     Requested Prescriptions   Pending Prescriptions Disp Refills     levothyroxine (SYNTHROID, LEVOTHROID) 50 MCG tablet [Pharmacy Med Name: LEVOTHYROXINE 50 MCG TABLET] 45 tablet 2     Sig: TAKE ON EMPTY STOMACH EVERY MONDAY AND FRIDAY (SEE OTHER DOSE FOR REMAINING DAYS OF WEEK)       Thyroid Hormones Protocol Passed - 3/6/2021  9:06 AM        Passed - Provider visit in past 12 months or next 3 months     Last office visit with prescriber/PCP: 7/23/2020 Master Deleon DO OR same dept: 2/18/2021 Michi Murillo MD OR same specialty: 2/18/2021 Michi Murillo MD  Last physical: 11/27/2019 Last MTM visit: Visit date not found   Next visit within 3 mo: Visit date not found  Next physical within 3 mo: Visit date not found  Prescriber OR PCP: Master Deleon DO  Last diagnosis associated with med order: 1. Hypothyroidism  - levothyroxine (SYNTHROID, LEVOTHROID) 50 MCG tablet [Pharmacy Med Name: LEVOTHYROXINE 50 MCG TABLET]; Take on empty stomach every Monday and Friday (see other dose for remaining days of week)  Dispense: 45 tablet; Refill: 2    If protocol passes may refill for 12 months if within 3 months of last provider visit (or a total of 15 months).             Passed - TSH on file in past 12 months for patient age 12 & older     TSH   Date Value Ref Range Status   07/23/2020 1.17 0.30 - 5.00 uIU/mL Final

## 2021-06-15 NOTE — PROGRESS NOTES
ASSESSMENT AND PLAN:    1. Stage 3a chronic kidney disease    2. Diabetes 1.5, managed as type 2   She feels that control is good.     3. MONSERRAT on CPAP  Not using CPAP and feels well, doesn't feel she needs it.  No daytime somnolence.      No changes in medications.  Follow up in 6 months.     CHIEF COMPLAINT:  Chief Complaint   Patient presents with     Follow-up     HISTORY OF PRESENT ILLNESS:  Rita Mancini is a 70 y.o. female here in follow up. She feels well.  No problems with dyspnea, or cough or any febrile illness.  No voiding symptoms or bowel symptoms. Sleeping OK and doesn't feel she needs the CPAP.  Weight is stable.     REVIEW OF SYSTEMS:   See HPI, all other systems on review are negative.    Past Medical History:   Diagnosis Date     Anxiety and depression      Bigeminy 2019     COPD (chronic obstructive pulmonary disease) (H) 2009     Diabetes mellitus (H)      Functional diarrhea 2018     GERD (gastroesophageal reflux disease)      HCAP (healthcare-associated pneumonia)      Hx antineoplastic chemotherapy     lung cancer      Hx of radiation therapy     lung cancer      Hyperlipemia 2009     Hypothyroid      Lung cancer (H)     RUL,  Non small cell cancer     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     Osteopenia      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, subsequent encounter 2019     Pleural effusion 1/15     Pneumonia      Radiation Pneumonitis     Created by Conversion      Sepsis due to Escherichia coli with acute renal failure without septic shock, unspecified acute renal failure type (H)      Sleep apnea     does not use cpap     Social History     Tobacco Use   Smoking Status Former Smoker     Packs/day: 1.50     Years: 0.00     Pack years: 0.00     Quit date: 2008     Years since quittin.2   Smokeless Tobacco Former User     Family History   Problem Relation Age of Onset     Heart disease Mother       "Hypertension Mother      Alcohol abuse Mother      Depression Mother      Heart disease Father 45        mi age 45, cabg     Heart attack Father      Cancer Father         prostate     Hypertension Father      Snoring Father      COPD Brother      Alcohol abuse Brother      Alcohol abuse Sister      Breast cancer Paternal Aunt      Leukemia Grandchild      Cancer Maternal Aunt 47        breast     Diabetes Son      Anxiety disorder Son      Depression Son      No Medical Problems Daughter      Schizophrenia Grandchild      Past Surgical History:   Procedure Laterality Date     PORTACATH PLACEMENT      and removal     THORACOSCOPY Right 4/6/2015    Procedure: RIGHT THORACOSCOPY / BIOPSY PLEURAL / TALC PLEURODESIS;  Surgeon: Michi Ma MD;  Location: [facility];  Service:      THORACOSCOPY Left 3/29/2019    Procedure: LEFT THORACOSCOPY WITH DECORTICATION;  Surgeon: Michi Ma MD;  Location: [facility];  Service: General     TONSILLECTOMY  age 5     URETERAL STENT PLACEMENT Right 6/2010      THORACENTESIS  3/27/2019     VITALS:  Vitals:    02/18/21 1310   BP: 122/60   Patient Site: Left Arm   Patient Position: Sitting   Cuff Size: Adult Regular   Pulse: 76   SpO2: 98%   Weight: 192 lb 1.6 oz (87.1 kg)   Height: 5' 4.5\" (1.638 m)     Wt Readings from Last 3 Encounters:   02/18/21 192 lb 1.6 oz (87.1 kg)   11/19/20 193 lb (87.5 kg)   09/03/20 195 lb (88.5 kg)     PHYSICAL EXAM:  Constitutional:  In NAD, alert and oriented  Neck: no cervical or axillary adenopathy  Cardiac:  S1 S2   Lungs: Clear   Abdomen:   Soft, flat and non-tender     Current Outpatient Medications   Medication Sig Dispense Refill     ANORO ELLIPTA 62.5-25 mcg/actuation inhaler TAKE 1 PUFF BY MOUTH EVERY DAY 1 each 12     aspirin 81 MG EC tablet Take 81 mg by mouth at bedtime.              blood glucose meter (GLUCOMETER) Use 1 each As Directed daily. Dispense glucometer brand per patient's insurance at pharmacy " discretion. 1 each 0     blood glucose test (ACCU-CHEK SMARTVIEW TEST STRIP) strips PT TO TEST BLOOD SUGAR DAILY 100 strip 3     budesonide (PULMICORT FLEXHALER) 180 mcg/actuation inhaler TAKE 2 PUFFS BY MOUTH TWICE A DAY 3 each 3     cetirizine (ZYRTEC) 10 MG tablet Take 10 mg by mouth daily with supper.       citalopram (CELEXA) 10 MG tablet TAKE 1 TABLET BY MOUTH EVERY DAY 90 tablet 3     dulaglutide (TRULICITY) 1.5 mg/0.5 mL PnIj Inject 1.5 mg under the skin once a week. 4 Syringe 12     famotidine (PEPCID) 20 MG tablet Take 1 tablet (20 mg total) by mouth daily. 90 tablet 3     fluticasone propionate (FLONASE) 50 mcg/actuation nasal spray SPRAY 2 SPRAYS INTO EACH NOSTRIL EVERY DAY 48 g 3     generic lancets Use 1 each As Directed daily. Dispense brand per patient's insurance at pharmacy discretion. (dx E11.9) 100 each 1     levothyroxine (SYNTHROID, LEVOTHROID) 50 MCG tablet Take on empty stomach every Monday and Friday (see other dose for remaining days of week) 45 tablet 3     levothyroxine (SYNTHROID, LEVOTHROID) 75 MCG tablet Take daily 5 days a week (except Monday and Friday) 90 tablet 3     melatonin 5 mg cap Take 10 mg by mouth at bedtime as needed.        mirtazapine (REMERON) 15 MG tablet Take 0.5 tablets (7.5 mg total) by mouth at bedtime. 30 tablet 5     montelukast (SINGULAIR) 10 mg tablet TAKE 1 TABLET BY MOUTH EVERYDAY AT BEDTIME 90 tablet 2     multivitamin therapeutic tablet Take 1 tablet by mouth daily.       simvastatin (ZOCOR) 10 MG tablet Take 1 tablet (10 mg total) by mouth at bedtime. 90 tablet 3     triamcinolone (KENALOG) 0.1 % ointment Apply 1 application topically 2 (two) times a day as needed (dry skin on hands). 80 g 5     VENTOLIN HFA 90 mcg/actuation inhaler INHALE 1-2 PUFFS EVERY 4 (FOUR) HOURS AS NEEDED FOR WHEEZING. 54 g 0     Michi Murillo MD  Internal Medicine  [facility]

## 2021-06-15 NOTE — PROGRESS NOTES
HPI:  Notes her ear is feeling well  - no issues.    Past medical history, surgical history, social history, family history, medications, and allergies have been reviewed with the patient and are documented above.    Review of Systems: a 10-system review was performed. Pertinent positives are noted in the HPI and on a separate scanned document in the chart.    PHYSICAL EXAMINATION:  GEN: no acute distress, normocephalic  EYES: extraocular movements are intact, pupils are equal and round. Sclera clear.   EARS:  Very small amount of dried debris on the inferior EAC medially and somewhat on the inferior TM.  Right EAC and TM are normal.  PULM: breathing comfortably on room air, normal chest expansion with respiration  HEART: regular rate and rhythm, no peripheral edema    MEDICAL DECISION-MAKING:   Recheck for cleaning in 6 months.

## 2021-06-16 PROBLEM — F32.1 CURRENT MODERATE EPISODE OF MAJOR DEPRESSIVE DISORDER (H): Status: ACTIVE | Noted: 2019-04-24

## 2021-06-16 PROBLEM — R59.9 LYMPH NODE ENLARGEMENT: Status: ACTIVE | Noted: 2019-08-08

## 2021-06-16 PROBLEM — D64.9 ANEMIA: Status: ACTIVE | Noted: 2019-05-06

## 2021-06-16 PROBLEM — K21.9 GASTROESOPHAGEAL REFLUX DISEASE WITHOUT ESOPHAGITIS: Status: ACTIVE | Noted: 2019-04-24

## 2021-06-16 PROBLEM — K43.9 VENTRAL HERNIA WITHOUT OBSTRUCTION OR GANGRENE: Status: ACTIVE | Noted: 2019-02-06

## 2021-06-16 PROBLEM — N13.30 HYDRONEPHROSIS, RIGHT: Status: ACTIVE | Noted: 2019-07-30

## 2021-06-16 PROBLEM — Z86.19 HISTORY OF GRAM NEGATIVE SEPSIS: Status: ACTIVE | Noted: 2020-11-19

## 2021-06-16 PROBLEM — L30.9 ECZEMA OF BOTH HANDS: Status: ACTIVE | Noted: 2017-10-24

## 2021-06-16 PROBLEM — N18.30 CHRONIC KIDNEY DISEASE, STAGE 3 (H): Status: ACTIVE | Noted: 2021-02-18

## 2021-06-16 PROBLEM — H60.41: Status: ACTIVE | Noted: 2018-01-24

## 2021-06-16 NOTE — TELEPHONE ENCOUNTER
Telephone Encounter by Faina Edouard at 3/13/2019 11:39 AM     Author: Faina Edouard Service: -- Author Type: --    Filed: 3/13/2019 11:41 AM Encounter Date: 3/12/2019 Status: Signed    : Faina Edouard       PRIOR AUTHORIZATION DENIED    Denial Rational: Lidoderm patches are only covered with the diagnosis of post-herpetic neuralgia, diabetic neuropathy, or cancer related pain. It will not be covered for the associated diagnosis.         Appeal Information:

## 2021-06-16 NOTE — TELEPHONE ENCOUNTER
Telephone Encounter by Natalie Fitzgerald at 8/12/2019  2:44 PM     Author: Natalie Fitzgerald Service: -- Author Type: --    Filed: 8/12/2019  2:45 PM Encounter Date: 8/9/2019 Status: Signed    : Natalie Fitzgerald APPROVED:    Approval start date:05/14/2019  Approval end date:09/11/2019    Pharmacy has been notified of approval and will contact patient when medication is ready for pickup.

## 2021-06-16 NOTE — TELEPHONE ENCOUNTER
Reason for Call:  Medication or medication refill:    Do you use a [facility]?  Name of the pharmacy and phone number for the current request: [facility] on [address]-new pharm since last time.    Name of the medication requested:   blood glucose test (ACCU-CHEK SMARTVIEW TEST STRIP) strips 100 strip 3 9/16/2020  No        And lancets as well please      Other request: needs to be specific , please use dx and how often, quantity, dx and frequency.      Can we leave a detailed message on this number? Yes    Phone number patient can be reached at: Other phone number:  [phone]    Best Time: any    Call taken on 3/31/2021 at 9:15 AM by Pamela Behr

## 2021-06-16 NOTE — PROGRESS NOTES
[facility] Progress Note    Patient: Rita Mancini  MRN: [id number]  Date of Service: 3/18/2021        Reason for visit      1. Non-small cell cancer of right lung, s/p radiation and chemotherapies, in remission since [id number] (H)        Assessment     1.  A very pleasant 70 y.o. woman with non-small-cell lung cancer stage III treated with cisplatin and -16 and radiation and currently in remission.  She was diagnosed in 09/2009. Finished her treatment in February 2010.  No evidence of recurrence.  In March 2020 CT scan showed no evidence of any recurrence.  Her current CT scan is negative for any malignancy but still shows some chronic changes.  2.   History of hospitalization for pneumonia and then repeat hospitalization for hospital associated pneumonia.  She is done with the antibiotics.  She had it on both sides and needed a chest tubes in 2019.  3.  H/o Double vision secondary to left lateral rectus muscle weakness.  Seems to have resolved a little bit.  4.  Mild renal insufficiency. Stable slightly dilated renal collecting system.  This is stable.  5.  Diabetes is improved since she has lost all that weight.    Plan     1. Should come back in 12 months.  We will do a CT scan at that time.  2. Follow-up with your primary care physician for other medical issues.  3. Advised to continue with exercise.  4. Continue to use flutter valve as well as incentive spirometry.  Be very careful while swallowing.    Clinical stage      Lung Cancer, Right side    Primary site: Lung (Left)    Clinical: Stage IIIA (T1b, N2, M0) signed by Herbert Foster MD on 10/9/2014 11:00 AM    Summary: Stage IIIA (T1b, N2, M0)      History     Rita Mancini is a very pleasant 70 y.o. old female with a history of nonsmall cell lung cancer located in the right upper lobe measuring 4.2 cm in size, presenting with some shortness of breath and cough in 09/2009 and biopsy suggestive of adenocarcinoma including lymphnode biopsy  confirming it is stage III disease.  Subsequent to that she was treated with cisplatin and -16 for 3 cycles and Taxotere for 2 cycles afterwards.  Subsequent to that she has been in remission.  She had been fine up until 11/2013 where a CT scan actually showed some congestion in the right lower lobe and then the PET scan showed that to be slightly hypermetabolic.  Therefore, she had that biopsied and that was negative. She had CT in September 2015 that revealed pleural effusion. This was tapped and was negative. About one litre of fluid was removed. She felt slightly better.    She was seen by pulmonary again for the fluid. Was then sent to Dr. Fair for pleural biopsy and pleurodhesis. That was done on 4/6/15. The biopsy was non malignant.      She has since been followed by me and pulmonary with CT scans.      Rita was admitted at [facility] on March 27, 2019 with septic shock.  She was found to have bilateral pneumonia but more so on the left side.  Needed to be intubated and ventilated.  Needed chest tube.  He was in the hospital for several days.  Repeat hospitalization.  She was  again in the hospital 31 July with some coughing and shortness of breath.  Found to have another infiltrate in the right lower lobe.    This year she has stayed out of the hospital.  She had a CT scan in March 2020 which was stable.  She had a virtual visit at that time.    Today comes in scheduled for follow-up.  Overall she is feeling fair.  Comes in today after CT scan.  He is trying to manage her blood sugars well.  She did get her Covid vaccination.  No new medical events since the last time she was seen here.      Past Medical History     Past Medical History:   Diagnosis Date     Anxiety and depression 1962     Cynthia 03/17/2019     COPD (chronic obstructive pulmonary disease) (H) 2009     Diabetes mellitus (H) 2008     Functional diarrhea 12/31/2018     GERD (gastroesophageal reflux disease)      [facility]      Hx antineoplastic chemotherapy     lung cancer      Hx of radiation therapy     lung cancer      Hyperlipemia 2009     Hypothyroid      Lung cancer (H)     RUL,  Non small cell cancer     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     Osteopenia      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, subsequent encounter 2019     Pleural effusion 1/15     Pneumonia      Radiation Pneumonitis     Created by Conversion      Sepsis due to Escherichia coli with acute renal failure without septic shock, unspecified acute renal failure type (H)      Sleep apnea     does not use cpap     Social History     Socioeconomic History     Marital status:      Spouse name: Not on file     Number of children: Not on file     Years of education: Not on file     Highest education level: Not on file   Occupational History     Not on file   Social Needs     Financial resource strain: Not on file     Food insecurity     Worry: Not on file     Inability: Not on file     Transportation needs     Medical: Not on file     Non-medical: Not on file   Tobacco Use     Smoking status: Former Smoker     Packs/day: 1.50     Years: 0.00     Pack years: 0.00     Quit date: 2008     Years since quittin.3     Smokeless tobacco: Former User   Substance and Sexual Activity     Alcohol use: No     Drug use: No     Sexual activity: Never   Lifestyle     Physical activity     Days per week: Not on file     Minutes per session: Not on file     Stress: Not on file   Relationships     Social connections     Talks on phone: Not on file     Gets together: Not on file     Attends Sabianist service: Not on file     Active member of club or organization: Not on file     Attends meetings of clubs or organizations: Not on file     Relationship status: Not on file     Intimate partner violence     Fear of current or ex partner: Not on file     Emotionally abused: Not on  file     Physically abused: Not on file     Forced sexual activity: Not on file   Other Topics Concern     Not on file   Social History Narrative     Not on file     Social History     Tobacco Use     Smoking status: Former Smoker     Packs/day: 1.50     Years: 0.00     Pack years: 0.00     Quit date: 2008     Years since quittin.3     Smokeless tobacco: Former User   Substance Use Topics     Alcohol use: No     Drug use: No       Review of Systems   Constitutional  Constitutional (WDL): Exceptions to WDL  Fatigue: Fatigue not relieved by rest - Limiting instrumental ADL  Neurosensory  Neurosensory (WDL): All neurosensory elements are within defined limits  Cardiovascular  Cardiovascular (WDL): All cardiovascular elements are within defined limits  Pulmonary  Respiratory (WDL): Exceptions to WDL  Cough: Moderate symptoms, medical intervention indicated, limiting instrumental ADL(phlegm clear to green)  Dyspnea: Shortness of breath with moderate exertion  Gastrointestinal  Gastrointestinal (WDL): All gastrointestinal elements are within defined limits  Genitourinary  Genitourinary (WDL): All genitourinary elements are within defined limits  Integumentary  Integumentary (WDL): Exceptions to WDL(cold sores)  Patient Coping  Patient Coping: Accepting  Accompanied by  Accompanied by: Alone    ECOG performance status and Distress Assessment      ECOG Performance:    ECOG Performance Status: 1    Distress Assessment  Distress Assessment Score: 2:     Pain Status  Currently in Pain: Yes      Vital Signs     Vitals:    21 1340   BP: 128/75   Pulse: 71   Temp: 98.6  F (37  C)   SpO2: 99%       Physical Exam     GENERAL: No acute distress. Cooperative in conversation.   HEENT: Pupils are equal, round and reactive. Oral mucosa is clean and intact. No ulcerations or mucositis noted. No bleeding noted.  RESP:Chest symmetric lungs are clear bilaterally per auscultation. Regular respiratory rate.  Significant amount  of coarse crepitation and rhonchi.  CV: Normal S1 S2 Regular, rate and rhythm. No murmurs.  ABD: Nondistended, soft, nontender. Positive bowel sounds. No organomegaly.   EXTREMITIES: No lower extremity edema.   NEURO: Non- focal. Alert and oriented x3.  Cranial nerves appear intact.  PSYCH: Within normal limits. No depression or anxiety.  SKIN: Warm dry intact.    LYMPH NODES: Bilateral cervical, supraclavicular, axillary lymph node examination was done.  Negative for any palpable adenopathy.      Lab Results     Results for orders placed or performed in visit on 03/18/21   HM1 (CBC with Diff)   Result Value Ref Range    WBC 9.8 4.0 - 11.0 thou/uL    RBC 4.54 3.80 - 5.40 mill/uL    Hemoglobin 13.5 12.0 - 16.0 g/dL    Hematocrit 42.1 35.0 - 47.0 %    MCV 93 80 - 100 fL    MCH 29.7 27.0 - 34.0 pg    MCHC 32.1 32.0 - 36.0 g/dL    RDW 12.9 11.0 - 14.5 %    Platelets 264 140 - 440 thou/uL    MPV 11.1 8.5 - 12.5 fL    Neutrophils % 53 50 - 70 %    Lymphocytes % 33 20 - 40 %    Monocytes % 11 (H) 2 - 10 %    Eosinophils % 2 0 - 6 %    Basophils % 1 0 - 2 %    Immature Granulocyte % 0 <=0 %    Neutrophils Absolute 5.2 2.0 - 7.7 thou/uL    Lymphocytes Absolute 3.3 0.8 - 4.4 thou/uL    Monocytes Absolute 1.1 (H) 0.0 - 0.9 thou/uL    Eosinophils Absolute 0.2 0.0 - 0.4 thou/uL    Basophils Absolute 0.1 0.0 - 0.2 thou/uL    Immature Granulocyte Absolute 0.0 <=0.0 thou/uL         Imaging Results     Ct Chest Without Contrast    Result Date: 3/17/2021  EXAM: CT CHEST WO CONTRAST LOCATION: [facility] DATE/TIME: 3/17/2021 1:15 PM INDICATION: COPD exacerbation Cough Pleural effusion COMPARISON: CT chest exams 03/12/2020, 7/30/2019 and 4/22/2019 TECHNIQUE: CT chest without IV contrast. Multiplanar reformats were obtained. Dose reduction techniques were used. CONTRAST: None. FINDINGS: LUNGS AND PLEURA: Stable post right pleurodesis changes with associated irregular pleural thickening. Stable irregular medial  right apical and upper right para mediastinal consolidative opacity with associated traction bronchiectasis and architectural distortion, unchanged since 04/22/2019. Mild lower lobar predominant bronchiectasis with stable bronchial wall thickening likely reflecting chronic bronchiolitis. Mild upper lobar endobronchial mucoid impaction. Basilar predominant parenchymal scarring is similar to the prior exam. No pleural effusion. MEDIASTINUM/AXILLAE: No thoracic adenopathy. Small hiatal hernia. CORONARY ARTERY CALCIFICATION: Mild. UPPER ABDOMEN: Moderate to severe right-sided renal pelvocaliectasis is stable since 07/30/2019. MUSCULOSKELETAL: Old healed right proximal humerus fracture deformity. Spinal degenerative changes.     1.  Stable basilar predominant bronchiectasis with bronchial wall thickening likely reflecting chronic bronchiolitis. 2.  Mild upper lobar predominant endobronchial mucoid impaction consistent with bronchiolitis. No pneumonic infiltrate or pleural effusion. 3.  Stable post right pleurodesis changes and stable right upper lobe findings presumably related to treated lung cancer.         Herbert Foster MD

## 2021-06-16 NOTE — TELEPHONE ENCOUNTER
Telephone Encounter by Yeny Rios CMA at 3/13/2019  3:12 PM     Author: Yeny Rios CMA Service: -- Author Type: Certified Medical Assistant    Filed: 3/13/2019  3:38 PM Encounter Date: 3/12/2019 Status: Signed    : Yeny Rios CMA (Certified Medical Assistant)       Contacted patient and stated the denial, patient says she is feeling better and doesn't think she will need the patch if she can get a refill of Oxycodone, please advise.    Medication: Oxycodone  Last Date Filled 3/11/2019   pulled: YES     Only PCP Prescribing? : YES  Taken as prescribed from physician notes? YES- 3/11/2019- 2. Rib pain on right side  No evidence of fracture.  Begin aggressive treatment including short course narcotics.  Pleural effusion apparently is chronic per history and comparison with old films  - XR Ribs Right W PA Chest; Future  - naproxen (NAPROSYN) 500 MG tablet; Take 1 tablet (500 mg total) by mouth 2 (two) times a day with meals.  Dispense: 60 tablet; Refill: 2  - lidocaine (LIDODERM) 5 %; Remove & Discard patch within 12 hours or as directed by MD  Dispense: 30 patch; Refill: 0  - predniSONE (DELTASONE) 20 MG tablet; Take 20 mg by mouth daily for 7 days.  Dispense: 7 tablet; Refill: 0  - oxyCODONE (ROXICODONE) 5 MG immediate release tablet; Take 1 tablet (5 mg total) by mouth every 4 (four) hours as needed for pain.  Dispense: 13 tablet; Refill: 0    CSA in last year: NO  Random Utox in last year: NO  (if any of the above answer NO - schedule with PCP)     Opioids + benzodiazepines? NO    All responses suggest: PCP decision

## 2021-06-16 NOTE — TELEPHONE ENCOUNTER
Telephone Encounter by Yeny Rios CMA at 3/25/2019 11:47 AM     Author: Yeny Rios CMA Service: -- Author Type: Certified Medical Assistant    Filed: 3/25/2019  1:48 PM Encounter Date: 3/25/2019 Status: Signed    : Yeny Rios CMA (Certified Medical Assistant)       Medication: Oxycodone  Last Date Filled: 3/23/2019 for 30 tablets, 3/19/2019 for 30 tablets, 3/16/2019 for 20 tablets, 3/13/2019 for 13 tablets, 3/11/2019 for 13 tablets   pulled: YES     Only PCP Prescribing? : NO  Taken as prescribed from physician notes? YES- 3/11/2019- 2. Rib pain on right side  No evidence of fracture.  Begin aggressive treatment including short course narcotics.  Pleural effusion apparently is chronic per history and comparison with old films  - XR Ribs Right W PA Chest; Future  - naproxen (NAPROSYN) 500 MG tablet; Take 1 tablet (500 mg total) by mouth 2 (two) times a day with meals.  Dispense: 60 tablet; Refill: 2  - lidocaine (LIDODERM) 5 %; Remove & Discard patch within 12 hours or as directed by MD  Dispense: 30 patch; Refill: 0  - predniSONE (DELTASONE) 20 MG tablet; Take 20 mg by mouth daily for 7 days.  Dispense: 7 tablet; Refill: 0  - oxyCODONE (ROXICODONE) 5 MG immediate release tablet; Take 1 tablet (5 mg total) by mouth every 4 (four) hours as needed for pain.  Dispense: 13 tablet; Refill: 0    CSA in last year: NO  Random Utox in last year: NO  (if any of the above answer NO - schedule with PCP)     Opioids + benzodiazepines? NO    All responses suggest: provider discretion

## 2021-06-17 NOTE — PATIENT INSTRUCTIONS - HE
"Patient Instructions by Barry Wilkes DO at 11/21/2019 11:00 AM     Author: Barry Wilkes DO Service: -- Author Type: Physician    Filed: 11/21/2019 11:16 AM Encounter Date: 11/21/2019 Status: Signed    : Barry Wilkes DO (Physician)         What is sleep apnea?    Sleep apnea is a condition that makes you stop breathing for short periods while you are asleep. There are 2 types of sleep apnea. One is called \"obstructive sleep apnea,\" and the other is called \"central sleep apnea.\"  In obstructive sleep apnea, you stop breathing because your throat narrows or closes (figure 1). In central sleep apnea, you stop breathing because your brain does not send the right signals to your muscles to make you breathe. When people talk about sleep apnea, they are usually referring to obstructive sleep apnea, which is what this article is about.  People with sleep apnea do not know that they stop breathing when they are asleep. But they do sometimes wake up startled or gasping for breath. They also often hear from loved ones that they snore.  What are the symptoms of sleep apnea? -- The main symptoms of sleep apnea are loud snoring, tiredness, and daytime sleepiness. Other symptoms can include:  ?Restless sleep  ?Waking up choking or gasping  ?Morning headaches, dry mouth, or sore throat  ?Waking up often to urinate  ?Waking up feeling unrested or groggy  ?Trouble thinking clearly or remembering things  Some people with sleep apnea don't have symptoms, or they don't know they have them. They might figure that it's normal to be tired or to snore a lot.  Should I see a doctor or nurse? -- Yes. If you think you might have sleep apnea, see your doctor.  Is there a test for sleep apnea? -- Yes. If your doctor or nurse suspects you have sleep apnea, he or she might send you for a \"sleep study.\" Sleep studies can sometimes be done at home, but they are usually done in a sleep lab. For the study, you spend the night in " "the lab, and you are hooked up to different machines that monitor your heart rate, breathing, and other body functions. The results of the test will tell your doctor or nurse if you have the disorder.  Is there anything I can do on my own to help my sleep apnea? -- Yes. Here are some things that might help:  ?Stay off your back when sleeping. (This is not always practical, because people cannot control their position while asleep. Plus, it only helps some people.)  ?Lose weight, if you are overweight  ?Avoid alcohol, because it can make sleep apnea worse  How is sleep apnea treated? -- The most effective treatment for sleep apnea is a device that keeps your airway open while you sleep. Treatment with this device is called \"continuous positive airway pressure,\" or CPAP. People getting CPAP wear a face mask at night that keeps them breathing (figure 2).  If your doctor or nurse recommends a CPAP machine, try to be patient about using it. The mask might seem uncomfortable to wear at first, and the machine might seem noisy, but using the machine can really pay off. People with sleep apnea who use a CPAP machine feel more rested and generally feel better.  There is also another device that you wear in your mouth called an \"oral appliance\" or \"mandibular advancement device.\" It also helps keep your airway open while you sleep.  In rare cases, when nothing else helps, doctors recommend surgery to keep the airway open. Surgery to do this is not always effective, and even when it is, the problem can come back.  Is sleep apnea dangerous? -- It can be. People with sleep apnea do not get good-quality sleep, so they are often tired and not alert. This puts them at risk for car accidents and other types of accidents. Plus, studies show that people with sleep apnea are more likely than others to have high blood pressure, heart attacks, and other serious heart problems. In people with severe sleep apnea, getting treated (for " example, with a CPAP machine) can help prevent some of these problems.    GRAPHICS  Airway in a person with sleep apnea    Normally when a person sleeps, the airway remains open, and air can pass from the nose and mouth to the lungs. In a person with sleep apnea, parts of the throat and mouth drop into the airway and block off the flow of air. This can cause loud snoring and interrupt breathing for short periods.  Graphic 36939 Version 5.0    Continuous positive airway pressure (CPAP) for sleep apnea    The CPAP mask gently blows air into your nose while you sleep. It puts just enough pressure on your airway to keep it from closing. The mask in this picture fits over just the nose. Other CPAP devices have masks that fit over the nose and mouth.    Please bring your machine, mask, hose and power cord on the next clinical visit with me. Thank you.

## 2021-06-17 NOTE — PROGRESS NOTES
[facility] Progress Note    Patient: Rita Mancini  MRN: [id number]  Date of Service: 4/2/2018        Reason for visit      1. Non-small cell cancer of right lung        Assessment     1.  A very pleasant 67 y.o. woman with non-small-cell lung cancer stage III treated with cisplatin and -16 and radiation and currently in remission.  She was diagnosed in 09/2009. Finished her treatment in February 2010. Seems to have a mucus plug in the RLL bronchus.  2.  Persistent pleural effusion right side. S/p pleural biopsy and talc pleurodesis.  This looks stable.  3.  H/o Double vision secondary to left lateral rectus muscle weakness.  4.  Mild renal insufficiency. Stable slightly dilated renal collecting system.    Plan     1. RTC in 6 months with CT chest.  2. I asked her that if her double vision is not better after 2 months then we should get an MRI brain.  3. Advised to continue with exercise.  4. I advised her that she needs to be strong and take care of her health.  In particular she needs to take care of her diabetes with proper diet.    Clinical stage      Lung Cancer, Right side    Primary site: Lung (Left)    Clinical: Stage IIIA (T1b, N2, M0) signed by Herbert Foster MD on 10/9/2014 11:00 AM    Summary: Stage IIIA (T1b, N2, M0)      History     Rita Mancini is a very pleasant 67 y.o. old female with a history of nonsmall cell lung cancer located in the right upper lobe measuring 4.2 cm in size, presenting with some shortness of breath and cough in 09/2009 and biopsy suggestive of adenocarcinoma including lymphnode biopsy confirming it is stage III disease.  Subsequent to that she was treated with cisplatin and -16 for 3 cycles and Taxotere for 2 cycles afterwards.  Subsequent to that she has been in remission.  She had been fine up until 11/2013 where a CT scan actually showed some congestion in the right lower lobe and then the PET scan showed that to be slightly hypermetabolic.  Therefore,  she had that biopsied and that was negative. She had CT in September that revealed pleural effusion. This was tapped and was negative. About one litre of fluid was removed. She felt slightly better.    She was seen by pulmonary again for the fluid. Was then sent to Dr. Fair for pleural biopsy and pleurodhesis. That was done on 4/6/15. The biopsy was non malignant.      However she is under a lot of stress because her son is going through divorce and her 5 year-old grandson has been diagnosed with leukemia.    She comes in today for scheduled follow-up. Comes in today after CT scan.      Past Medical History     Past Medical History:   Diagnosis Date     COPD (chronic obstructive pulmonary disease)      Depression      Diabetes mellitus      GERD (gastroesophageal reflux disease)      Hx antineoplastic chemotherapy     lung cancer      Hx of radiation therapy     lung cancer      Hyperlipemia      Hypothyroid      Lung cancer 2008    RUL,  Non small cell cancer     Osteopenia      Pleural effusion 1/15     Sleep apnea     does not use cpap     Social History     Social History     Marital status:      Spouse name: N/A     Number of children: N/A     Years of education: N/A     Occupational History     Not on file.     Social History Main Topics     Smoking status: Former Smoker     Packs/day: 1.50     Years: 0.00     Quit date: 11/9/2008     Smokeless tobacco: Former User     Alcohol use No     Drug use: No     Sexual activity: No     Other Topics Concern     Not on file     Social History Narrative     Social History   Substance Use Topics     Smoking status: Former Smoker     Packs/day: 1.50     Years: 0.00     Quit date: 11/9/2008     Smokeless tobacco: Former User     Alcohol use No       Review of Systems   Constitutional  Constitutional (WDL): Exceptions to WDL  Fatigue: Fatigue relieved by rest  Weight Gain: 5 - <10% from baseline (up 5 lbs)  Neurosensory  Neurosensory (WDL): All neurosensory  elements are within defined limits  Cardiovascular  Cardiovascular (WDL): Exceptions to WDL  Edema: Yes  Edema Limbs: 5 - 10% inter-limb discrepancy in volume or circumference at point of greatest visible difference, swelling or obscuration of anatomic architecture on close inspection (hands, feet and ankles)  Pulmonary  Respiratory (WDL): Exceptions to WDL  Cough: Mild symptoms, nonprescription intervention indicated (productive clear phlem)  Gastrointestinal  Gastrointestinal (WDL): All gastrointestinal elements are within defined limits  Genitourinary  Genitourinary (WDL): All genitourinary elements are within defined limits (some stress incontinence)  Integumentary  Integumentary (WDL): Exceptions to WDL  Flushing: Asymptomatic, clinical or diagnostic observations only, intervention not indicated  Patient Coping  Patient Coping: Accepting  Accompanied by  Accompanied by: Alone    ECOG performance status and Distress Assessment      ECOG Performance:    ECOG Performance Status: 1    Distress Assessment  Distress Assessment Score: 3:     Pain Status  Currently in Pain: No/denies      Vital Signs     Vitals:    04/02/18 1450   BP: 110/55   Pulse: 72   Temp: 98.5  F (36.9  C)   SpO2: 97%       Physical Exam     GENERAL: No acute distress. Cooperative in conversation.   HEENT: Pupils are equal, round and reactive. Oral mucosa is clean and intact. No ulcerations or mucositis noted. No bleeding noted.  RESP:Chest symmetric lungs are clear bilaterally per auscultation. Regular respiratory rate. No wheezes or rhonchi.  CV: Normal S1 S2 Regular, rate and rhythm. No murmurs.  ABD: Nondistended, soft, nontender. Positive bowel sounds. No organomegaly.   EXTREMITIES: No lower extremity edema.   NEURO: Non- focal. Alert and oriented x3.  Cranial nerves appear intact.  PSYCH: Within normal limits. No depression or anxiety.  SKIN: Warm dry intact.    LYMPH NODES: Bilateral cervical, supraclavicular, axillary lymph node  examination was done.  Negative for any palpable adenopathy.      Lab Results     Results for orders placed or performed in visit on 04/02/18   Comprehensive Metabolic Panel   Result Value Ref Range    Sodium 140 136 - 145 mmol/L    Potassium 4.3 3.5 - 5.0 mmol/L    Chloride 99 98 - 107 mmol/L    CO2 32 (H) 22 - 31 mmol/L    Anion Gap, Calculation 9 5 - 18 mmol/L    Glucose 131 (H) 70 - 125 mg/dL    BUN 15 8 - 22 mg/dL    Creatinine 1.11 (H) 0.60 - 1.10 mg/dL    GFR MDRD Af Amer 59 (L) >60 mL/min/1.73m2    GFR MDRD Non Af Amer 49 (L) >60 mL/min/1.73m2    Bilirubin, Total 0.5 0.0 - 1.0 mg/dL    Calcium 9.5 8.5 - 10.5 mg/dL    Protein, Total 7.4 6.0 - 8.0 g/dL    Albumin 3.6 3.5 - 5.0 g/dL    Alkaline Phosphatase 84 45 - 120 U/L    AST 22 0 - 40 U/L    ALT 20 0 - 45 U/L   HM1 (CBC with Diff)   Result Value Ref Range    WBC 9.6 4.0 - 11.0 thou/uL    RBC 4.30 3.80 - 5.40 mill/uL    Hemoglobin 12.8 12.0 - 16.0 g/dL    Hematocrit 38.6 35.0 - 47.0 %    MCV 90 80 - 100 fL    MCH 29.8 27.0 - 34.0 pg    MCHC 33.2 32.0 - 36.0 g/dL    RDW 13.3 11.0 - 14.5 %    Platelets 233 140 - 440 thou/uL    MPV 10.9 8.5 - 12.5 fL    Neutrophils % 62 50 - 70 %    Lymphocytes % 25 20 - 40 %    Monocytes % 11 (H) 2 - 10 %    Eosinophils % 1 0 - 6 %    Basophils % 1 0 - 2 %    Neutrophils Absolute 5.9 2.0 - 7.7 thou/uL    Lymphocytes Absolute 2.4 0.8 - 4.4 thou/uL    Monocytes Absolute 1.1 (H) 0.0 - 0.9 thou/uL    Eosinophils Absolute 0.1 0.0 - 0.4 thou/uL    Basophils Absolute 0.1 0.0 - 0.2 thou/uL         Imaging Results     Ct Chest Without Contrast    Result Date: 3/29/2018  CT CHEST WO CONTRAST 3/29/2018 1:52 PM INDICATION: Neoplasm: chest, metastatic, rx monitor or f/u. TECHNIQUE: Routine chest. Dose reduction techniques were used. IV CONTRAST: None. COMPARISON: 03/31/2017. FINDINGS: LUNGS AND PLEURA: Right upper lobe paramediastinal post treatment change is stable. Stable small pulmonary nodules, including right lower lobe 6 mm (series  4, image 155). New 3 x 5 mm ovoid density at the bifurcation of subsegmental arteries in the right  lower lobe posteriorly (series 4, image 56). Stable postprocedural pleural change on the right with regions of hyperdensity and thickening. Stable right basilar predominant scarring and mild bibasilar bronchiectasis. Minimal right lower lobe tree-in-bud  nodules are presumed related to airways disease and infectious/inflammatory foci. MEDIASTINUM: No adenopathy. Stable right perihilar thickening. No pericardial effusion. LIMITED UPPER ABDOMEN: Right renal cyst. Normal adrenals. MUSCULOSKELETAL: Negative.     CONCLUSION: 1.  New small ovoid density at the bifurcation of right lower lobe subsegmental airways. This could represent retained airway secretions, though given ovoid configuration, recommend short-term follow up CT (4-8 weeks) or bronchoscopy if further testing is necessary. 2.  Otherwise, stable exam. No evidence of recurrent or metastatic disease. NOTE: ABNORMAL REPORT THE DICTATION ABOVE DESCRIBES AN ABNORMALITY FOR WHICH FOLLOW-UP IS NEEDED.         Herbert Foster MD

## 2021-06-17 NOTE — PATIENT INSTRUCTIONS - HE
Patient Instructions by Barry Wilkes DO at 9/9/2019  1:20 PM     Author: Barry Wilkes DO Service: -- Author Type: Physician    Filed: 9/9/2019  1:46 PM Encounter Date: 9/9/2019 Status: Signed    : Barry Wilkes DO (Physician)         Patient education: What is a sleep study?     What is a sleep study? -- A sleep study is a test that measures how well you sleep and checks for sleep problems. For some sleep studies, you stay overnight in a sleep lab at a hospital or sleep center.     What happens during a sleep study? -- Before you go to sleep, a technician attaches small, sticky patches called electrodes to your head, chest, and legs. He or she will also place a small tube beneath your nose and might wrap 1 or 2 belts around your chest.   Each of these items has wires that connect to monitors. The monitors record your movement, brain activity, breathing, and other body functions while you sleep.  If you have a history of trouble falling asleep, your doctor might prescribe a medicine to help you fall asleep in the lab. If you have never taken the medicine before, your doctor might ask you take it on a night before your sleep study to see how it affects you.   Why might my doctor order a sleep study? -- Your doctor will order a sleep study if he or she thinks you have sleep apnea or a different condition that makes you:   ?Have sudden jerking leg movements while you sleep, called periodic limb movements.   ?Feel very sleepy during the day and fall asleep all of a sudden, called narcolepsy.   ?Have trouble falling asleep or staying asleep over a long period of time, called chronic insomnia.   ?Do odd things while you sleep, such as walking.  How should I prepare for a sleep study? -- On the day of your sleep study, you should:   ?Avoid alcohol   ?Avoid drinking coffee, tea, sodas, and other drinks that have caffeine in the afternoon and evening   ?Take all of your regular medicines    The cost of  care estimate line is [phone]. They are able to give the patient an estimate of the charges and also an estimate of their insurance coverage/patient responsibility.    Please bring one tab of low dose melatonin 3 mg or less to the night of the study.    If the patient wishes to take the melatonin. It is completely voluntary.    Patient may take own melatonin after arrival to sleep center. Do not drive or operate machinery after intake of melatonin.

## 2021-06-17 NOTE — PATIENT INSTRUCTIONS - HE
Patient Instructions by Master Deleon DO at 10/1/2019  4:05 PM     Author: Master Deleon DO Service: -- Author Type: Physician    Filed: 10/1/2019  4:39 PM Encounter Date: 10/1/2019 Status: Addendum    : Master Deleon DO (Physician)    Related Notes: Original Note by Master Deleon DO (Physician) filed at 10/1/2019  4:39 PM

## 2021-06-17 NOTE — PROGRESS NOTES
Rita had a recent f/u with Dr. Foster and she received a good report.  She will return in Oct for f/u.  I sent out a letter today introducing myself and my role.  I provided my direct number and invited calls.

## 2021-06-17 NOTE — PATIENT INSTRUCTIONS - HE
Patient Instructions by Master Deleon DO at 11/27/2019 10:40 AM     Author: Master Deleon DO Service: -- Author Type: Physician    Filed: 11/27/2019 10:53 AM Encounter Date: 11/27/2019 Status: Signed    : Master Deleon DO (Physician)         Patient Education     Exercise for a Healthier Heart  You may wonder how you can improve the health of your heart. If youre thinking about exercise, youre on the right track. You dont need to become an athlete, but you do need a certain amount of brisk exercise to help strengthen your heart. If you have been diagnosed with a heart condition, your doctor may recommend exercise to help stabilize your condition. To help make exercise a habit, choose safe, fun activities.       Be sure to check with your health care provider before starting an exercise program.    Why exercise?  Exercising regularly offers many healthy rewards. It can help you do all of the following:    Improve your blood cholesterol levels to help prevent further heart trouble    Lower your blood pressure to help prevent a stroke or heart attack    Control diabetes, or reduce your risk of getting this disease    Improve your heart and lung function    Reach and maintain a healthy weight    Make your muscles stronger and more limber so you can stay active    Prevent falls and fractures by slowing the loss of bone mass (osteoporosis)    Manage stress better  Exercise tips  Ease into your routine. Set small goals. Then build on them.  Exercise on most days. Aim for a total of 150 or more minutes of moderate to  vigorous intensity activity each week. Consider 40 minutes, 3 to 4 times a week. For best results, activity should last for 40 minutes on average. It is OK to work up to the 40 minute period over time. Examples of moderate-intensity activity is walking one mile in 15 minutes or 30 to 45 minutes of yard work.  Step up your daily activity level. Along with your exercise program, try  being more active throughout the day. Walk instead of drive. Do more household tasks or yard work.  Choose one or more activities you enjoy. Walking is one of the easiest things you can do. You can also try swimming, riding a bike, or taking an exercise class.  Stop exercising and call your doctor if you:    Have chest pain or feel dizzy or lightheaded    Feel burning, tightness, pressure, or heaviness in your chest, neck, shoulders, back, or arms    Have unusual shortness of breath    Have increased joint or muscle pain    Have palpitations or an irregular heartbeat      [id number] [employer]. [address] [place] [address]. All rights reserved. This information is not intended as a substitute for professional medical care. Always follow your healthcare professional's instructions.         Patient Education   Urinary Incontinence, Female (Adult)  Urinary incontinence means loss of control of the bladder. This problem affects many women, especially as they get older. If you have incontinence, you may be embarrassed to ask for help. But know that this problem can be treated.  Types of Incontinence  There are different types of incontinence. Two of the main types are described here. You can have more than one type.    Stress incontinence. With this type, urine leaks when pressure (stress) is put on the bladder. This may happen when you cough, sneeze, or laugh. Stress incontinence most often occurs because the pelvic floor muscles that support the bladder and urethra are weak. This can happen after pregnancy and vaginal childbirth or a hysterectomy. It can also be due to excess body weight or hormone changes.    Urge incontinence (also called overactive bladder). With this type, a sudden urge to urinate is felt often. This may happen even though there may not be much urine in the bladder. The need to urinate often during the night is common. Urge incontinence most often occurs because of  bladder spasms. This may be due to bladder irritation or infection. Damage to bladder nerves or pelvic muscles, constipation, and certain medicines can also lead to urge incontinence.  Treatment of urinary incontinence depends on the cause. Further evaluation is needed to find the type you have. This will likely include an exam and certain tests. Based on the results, you and your healthcare provider can then plan treatment. Until a diagnosis is made, the home care tips below can help relieve symptoms.  Home care    Do pelvic floor muscle exercises, if they are prescribed. The pelvic floor muscles help support the bladder and urethra. Many women find that their symptoms improve when doing special exercises that strengthen these muscles. To do the exercises contract the muscles you would use to stop your stream of urine, but do this when youre not urinating. Hold for 10 seconds, then relax. Repeat 10 to 20 times in a row, at least 3 times a day. Your provider may give you other instructions for how to do the exercises and how often.    Keep a bladder diary. This helps track how often and how much you urinate over a set period of time. Bring this diary with you to your next visit with the provider. The information can help your provider learn more about your bladder problem.    Lose weight, if advised to by your provider. Excess weight puts pressure on the bladder. Your provider can help you create a weight-loss plan thats right for you. This may include exercising more and making certain diet changes.    Don't consume foods and drinks that may irritate the bladder. These can include alcohol and caffeinated drinks.    Quit smoking. Smoking and other tobacco use can lead to chronic cough that strains the pelvic floor muscles. Smoking may also damage the bladder and urethra. Talk with your provider about treatments or methods you can use to quit smoking.    If drinking large amounts of fluid causes you to have  symptoms, you may be advised to limit your fluid intake. You may also be advised to drink most of your fluids during the day and to limit fluids at night.    If youre worried about urine leakage or accidents, you may wear absorbent pads to catch urine. Change the pads often. This helps reduce discomfort. It may also reduce the risk of skin or bladder infections.  Follow-up care  Follow up with your healthcare provider, or as directed. It may take some to find the right treatment for your problem. Your treatment plan may include special therapies or medicines. Certain procedures or surgery may also be options. Be sure to discuss any questions you have with your provider.  When to seek medical advice  Call the healthcare provider right away if any of these occur:    Fever of 100.4 F (38 C) or higher, or as directed by your provider    Bladder pain or fullness    Abdominal swelling    Nausea or vomiting    Back pain    Weakness, dizziness or fainting  Date Last Reviewed: 10/1/2017    [id number] [employer]. [address] [place] [address]. All rights reserved. This information is not intended as a substitute for professional medical care. Always follow your healthcare professional's instructions.     Patient Education   Depression and Suicide in Older Adults  Nearly 2 million older Americans have some type of depression. Sadly, some of them even take their own lives. Yet depression among older adults is often ignored. Learn the warning signs. You may help spare a loved one needless pain. You may also save a life.       What Is Depression?  Depression is a mood disorder that affects the way you think and feel. The most common symptom is a feeling of deep sadness. People who are depressed also may seem tired and listless. And nothing seems to give them pleasure. Its normal to grieve or be sad sometimes. But sadness lessens or passes with time. Depression rarely goes away or improves on its own.  Other symptoms of depression are:    Sleeping more or less than normal    Eating more or less than normal    Having headaches, stomachaches, or other pains that dont go away    Feeling nervous, empty, or worthless    Crying a great deal    Thinking or talking about suicide or death    Feeling confused or forgetful  What Causes It?  The causes of depression arent fully known. Certain chemicals in the brain play a role. Depression does run in families. And life stresses can also trigger depression in some people. That may be the case with older adults. They often face great burdens, such as the death of friends or a spouse. They may have failing health. And they are more likely to be alone, lonely, or poor.  How You Can Help  Often, depressed people may not want to ask for help. When they do, they may be ignored. Or, they may receive the wrong treatment. You can help by showing parents and older friends love and support. If they seem depressed, help them find the right treatment. Talk to your doctor. Or contact a local mental health center, social service agency, or hospital. With modern treatment, no one has to suffer from depression.  Resources:    National [place] of Mental Health  246.672.4260  www.nimh.nih.gov    National [place] on Mental Illness  404.691.7148  www.lissette.org    M[facility]  673.536.7966  www.[facility].org    National Suicide Hotline  230.679.8108 (800-SUICIDE)      8171-6715 [employer]. [address] [place] [address]. All rights reserved. This information is not intended as a substitute for professional medical care. Always follow your healthcare professional's instructions.         Patient Education   Preventing Falls in the Home  As you get older, falls are more likely. Thats because your reaction time slows. Your muscles and joints may also get stiffer, making them less flexible. Illness, medications, and vision changes can also affect your balance. A fall  could leave you unable to live on your own. To make your home safer, follow these tips:    Floors    Put nonskid pads under area rugs.    Remove throw rugs.    Replace worn floor coverings.    Tack carpets firmly to each step on carpeted stairs. Put nonskid strips on the edges of uncarpeted stairs.    Keep floors and stairs free of clutter and cords.    Arrange furniture so there are clear pathways.    Clean up any spills right away.    Bathrooms    Install grab bars in the tub or shower.    Apply nonskid strips or put a nonskid rubber mat in the tub or shower.    Sit on a bath chair to bathe.    Use bathmats with nonskid backing.    Lighting    Keep a flashlight in each room.    Put a nightlight along the pathway between the bedroom and the bathroom.    0871-0745 [employer]. [address] [place] [address]. All rights reserved. This information is not intended as a substitute for professional medical care. Always follow your healthcare professional's instructions.           Advance Directive  Patients advance directive was discussed and I am comfortable with the patients wishes.  Patient Education   Personalized Prevention Plan  You are due for the preventive services outlined below.  Your care team is available to assist you in scheduling these services.  If you have already completed any of these items, please share that information with your care team to update in your medical record.  Health Maintenance   Topic Date Due   ? HEPATITIS C SCREENING  1950   ? LIPID  03/25/2016   ? DIABETIC EYE EXAM  04/03/2019   ? ZOSTER VACCINES (3 of 3) 11/26/2019   ? ADVANCE CARE PLANNING  03/25/2020   ? TD 18+ HE  10/25/2020   ? A1C  04/08/2020   ? DEPRESSION FOLLOW UP  05/27/2020   ? MICROALBUMIN  08/08/2020   ? FALL RISK ASSESSMENT  08/08/2020   ? BMP  10/16/2020   ? MEDICARE ANNUAL WELLNESS VISIT  11/27/2020   ? DIABETIC FOOT EXAM  11/27/2020   ? COLONOSCOPY  02/15/2021   ? MAMMOGRAM   11/08/2021   ? DXA SCAN  11/08/2021   ? PNEUMOCOCCAL IMMUNIZATION 65+ LOW/MEDIUM RISK  Completed   ? INFLUENZA VACCINE RULE BASED  Completed

## 2021-06-18 NOTE — PATIENT INSTRUCTIONS - HE
Patient Instructions by Michi Murillo MD at 11/19/2020 10:00 AM     Author: Michi Murillo MD Service: -- Author Type: Physician    Filed: 11/19/2020 10:08 AM Encounter Date: 11/19/2020 Status: Signed    : Michi Murillo MD (Physician)         Patient Education     Exercise for a Healthier Heart  You may wonder how you can improve the health of your heart. If youre thinking about exercise, youre on the right track. You dont need to become an athlete, but you do need a certain amount of brisk exercise to help strengthen your heart. If you have been diagnosed with a heart condition, your doctor may recommend exercise to help stabilize your condition. To help make exercise a habit, choose safe, fun activities.       Be sure to check with your health care provider before starting an exercise program.    Why exercise?  Exercising regularly offers many healthy rewards. It can help you do all of the following:    Improve your blood cholesterol levels to help prevent further heart trouble    Lower your blood pressure to help prevent a stroke or heart attack    Control diabetes, or reduce your risk of getting this disease    Improve your heart and lung function    Reach and maintain a healthy weight    Make your muscles stronger and more limber so you can stay active    Prevent falls and fractures by slowing the loss of bone mass (osteoporosis)    Manage stress better  Exercise tips  Ease into your routine. Set small goals. Then build on them.  Exercise on most days. Aim for a total of 150 or more minutes of moderate to  vigorous intensity activity each week. Consider 40 minutes, 3 to 4 times a week. For best results, activity should last for 40 minutes on average. It is OK to work up to the 40 minute period over time. Examples of moderate-intensity activity is walking one mile in 15 minutes or 30 to 45 minutes of yard work.  Step up your daily activity level. Along with your exercise program, try being more  active throughout the day. Walk instead of drive. Do more household tasks or yard work.  Choose one or more activities you enjoy. Walking is one of the easiest things you can do. You can also try swimming, riding a bike, or taking an exercise class.  Stop exercising and call your doctor if you:    Have chest pain or feel dizzy or lightheaded    Feel burning, tightness, pressure, or heaviness in your chest, neck, shoulders, back, or arms    Have unusual shortness of breath    Have increased joint or muscle pain    Have palpitations or an irregular heartbeat      [id number] [employer]. [address] [place] [address]. All rights reserved. This information is not intended as a substitute for professional medical care. Always follow your healthcare professional's instructions.         Patient Education   Urinary Incontinence, Female (Adult)  Urinary incontinence means loss of control of the bladder. This problem affects many women, especially as they get older. If you have incontinence, you may be embarrassed to ask for help. But know that this problem can be treated.  Types of Incontinence  There are different types of incontinence. Two of the main types are described here. You can have more than one type.    Stress incontinence. With this type, urine leaks when pressure (stress) is put on the bladder. This may happen when you cough, sneeze, or laugh. Stress incontinence most often occurs because the pelvic floor muscles that support the bladder and urethra are weak. This can happen after pregnancy and vaginal childbirth or a hysterectomy. It can also be due to excess body weight or hormone changes.    Urge incontinence (also called overactive bladder). With this type, a sudden urge to urinate is felt often. This may happen even though there may not be much urine in the bladder. The need to urinate often during the night is common. Urge incontinence most often occurs because of bladder  spasms. This may be due to bladder irritation or infection. Damage to bladder nerves or pelvic muscles, constipation, and certain medicines can also lead to urge incontinence.  Treatment of urinary incontinence depends on the cause. Further evaluation is needed to find the type you have. This will likely include an exam and certain tests. Based on the results, you and your healthcare provider can then plan treatment. Until a diagnosis is made, the home care tips below can help relieve symptoms.  Home care    Do pelvic floor muscle exercises, if they are prescribed. The pelvic floor muscles help support the bladder and urethra. Many women find that their symptoms improve when doing special exercises that strengthen these muscles. To do the exercises contract the muscles you would use to stop your stream of urine, but do this when youre not urinating. Hold for 10 seconds, then relax. Repeat 10 to 20 times in a row, at least 3 times a day. Your provider may give you other instructions for how to do the exercises and how often.    Keep a bladder diary. This helps track how often and how much you urinate over a set period of time. Bring this diary with you to your next visit with the provider. The information can help your provider learn more about your bladder problem.    Lose weight, if advised to by your provider. Excess weight puts pressure on the bladder. Your provider can help you create a weight-loss plan thats right for you. This may include exercising more and making certain diet changes.    Don't consume foods and drinks that may irritate the bladder. These can include alcohol and caffeinated drinks.    Quit smoking. Smoking and other tobacco use can lead to chronic cough that strains the pelvic floor muscles. Smoking may also damage the bladder and urethra. Talk with your provider about treatments or methods you can use to quit smoking.    If drinking large amounts of fluid causes you to have symptoms, you may  be advised to limit your fluid intake. You may also be advised to drink most of your fluids during the day and to limit fluids at night.    If youre worried about urine leakage or accidents, you may wear absorbent pads to catch urine. Change the pads often. This helps reduce discomfort. It may also reduce the risk of skin or bladder infections.  Follow-up care  Follow up with your healthcare provider, or as directed. It may take some to find the right treatment for your problem. Your treatment plan may include special therapies or medicines. Certain procedures or surgery may also be options. Be sure to discuss any questions you have with your provider.  When to seek medical advice  Call the healthcare provider right away if any of these occur:    Fever of 100.4 F (38 C) or higher, or as directed by your provider    Bladder pain or fullness    Abdominal swelling    Nausea or vomiting    Back pain    Weakness, dizziness or fainting  Date Last Reviewed: 10/1/2017    [id number] [employer]. [address] [place] [address]. All rights reserved. This information is not intended as a substitute for professional medical care. Always follow your healthcare professional's instructions.     Patient Education   Depression and Suicide in Older Adults  Nearly 2 million older Americans have some type of depression. Sadly, some of them even take their own lives. Yet depression among older adults is often ignored. Learn the warning signs. You may help spare a loved one needless pain. You may also save a life.       What Is Depression?  Depression is a mood disorder that affects the way you think and feel. The most common symptom is a feeling of deep sadness. People who are depressed also may seem tired and listless. And nothing seems to give them pleasure. Its normal to grieve or be sad sometimes. But sadness lessens or passes with time. Depression rarely goes away or improves on its own. Other symptoms of  depression are:    Sleeping more or less than normal    Eating more or less than normal    Having headaches, stomachaches, or other pains that dont go away    Feeling nervous, empty, or worthless    Crying a great deal    Thinking or talking about suicide or death    Feeling confused or forgetful  What Causes It?  The causes of depression arent fully known. Certain chemicals in the brain play a role. Depression does run in families. And life stresses can also trigger depression in some people. That may be the case with older adults. They often face great burdens, such as the death of friends or a spouse. They may have failing health. And they are more likely to be alone, lonely, or poor.  How You Can Help  Often, depressed people may not want to ask for help. When they do, they may be ignored. Or, they may receive the wrong treatment. You can help by showing parents and older friends love and support. If they seem depressed, help them find the right treatment. Talk to your doctor. Or contact a local mental health center, social service agency, or hospital. With modern treatment, no one has to suffer from depression.  Resources:    [employer] [place] of Mental Health  845.133.5088  www.nimh.nih.gov    National [place] on Mental Illness  437.932.2657  www.[facility].org    [facility]  743.694.3780  www.[facility].org    National Suicide Hotline  300.250.6018 (800-SUICIDE)      1856-3686 [employer] - is now [employer]. [address] [place] [address]. All rights reserved. This information is not intended as a substitute for professional medical care. Always follow your healthcare professional's instructions.           Advance Directive  Patients advance directive was discussed and I am comfortable with the patients wishes.  Patient Education   Personalized Prevention Plan  You are due for the preventive services outlined below.  Your care team is available to assist you in scheduling these services.  If you have  already completed any of these items, please share that information with your care team to update in your medical record.  Health Maintenance   Topic Date Due   ? HEPATITIS C SCREENING  1950   ? COPD ACTION PLAN  1950   ? MICROALBUMIN  08/08/2020   ? TD 18+ HE  10/25/2020   ? MEDICARE ANNUAL WELLNESS VISIT  11/27/2020   ? DIABETIC FOOT EXAM  11/27/2020   ? LIPID  11/27/2020   ? DIABETIC EYE EXAM  12/10/2020   ? COLORECTAL CANCER SCREENING  02/15/2021   ? A1C  01/23/2021   ? BMP  07/23/2021   ? MAMMOGRAM  11/08/2021   ? DXA SCAN  11/08/2021   ? FALL RISK ASSESSMENT  11/19/2021   ? ADVANCE CARE PLANNING  11/19/2025   ? DEPRESSION ACTION PLAN  Completed   ? SPIROMETRY  Completed   ? Pneumococcal Vaccine: Pediatrics (0 to 5 Years) and At-Risk Patients (6 to 64 Years)  Completed   ? Pneumococcal Vaccine: 65+ Years  Completed   ? INFLUENZA VACCINE RULE BASED  Completed   ? ZOSTER VACCINES  Completed

## 2021-06-19 NOTE — LETTER
Letter by Robert Yepez CNP at      Author: Robert Yepez CNP Service: -- Author Type: --    Filed:  Encounter Date: 2019 Status: (Other)         Patient: Rita Mancini   MR Number: [id number]   YOB: 1950   Date of Visit: 2019     [facility]    Name:   Rita Mancini  : 1950  Facility:   [facility] [[id number]]   Room:   Code Status: FULL CODE -   Fac type:   SNF (Skilled Nursing Facility, TCU) -     CHIEF COMPLAINT / REASON FOR VISIT:  Chief Complaint   Patient presents with   ? Follow-up     TCU follow-up after hospitalization for pneumosepsis, but she was also recently in the ED with a closed displaced fracture of the proximal end of the right humerus.     [facility] ED 19 (displaced fracture of proximal end of right humerus)  [facility] from 19 until 19 (pneumosepsis)    Patient was last seen by me on 19.      HPI: Rita is a 68 y.o. female was admitted to [facility] on 3/27/2019 for septic shock.   She had been to [facility] ED 11 days earlier after slipping on the ice and sustaining a closed displaced fracture of the proximal end of the right humerus.  X-rays of the right shoulder showed fracture of the proximal humerus, comminuted with displacement.  She was discharged with a shoulder immobilizer to home with orthopedic surgery follow-up.    Subsequent to that, she developed progressive shortness of breath, and the day before admission, family thought she sounded quite short of breath and confused.  The patient herself thought it was from her pain medication.  The night before admission, her son heard very heavy breathing.  He woke her up, and she was quite confused.  He helped her to the bathroom, but she took over an hour in the bathroom because she kept falling asleep.  He helped her back to bed and called 911.      EMS measured sats in the 70s and  felt she was in respiratory distress.  She was also found to be hypertensive on arrival at the ED.  She was given IV fluids and placed on BiPAP (PCO2 = 63), very elevated lactic acid (7.4), very high procalcitonin (20.85), acute renal failure (creatinine = 3.59), and hyperkalemic (6.0).  She was intubated and started on pressors.  Cultures grew group A strep as well as S. pyogenes.  There was concern for toxic shock syndrome.  She was given IVIG x3 days and clindamycin.  She was followed by ID (Dr. Ang), and chest tubes were placed.  CT showed improvement and chronic effusion.  She was switched to Zosyn but discharged on Augmentin.      MOST RECENT HOSPITALIZATION  (Per hospital discharge summary)     Pneumonia (S.pyogenes)  complicated by left-sided empyema s/p VATS/decortication 3/29  Strep biology knees bacteremia cleared  All chest tubes now removed, increase ambulation and activity  Respiratory failure-resolved, extubated 3/30   Antibiotics throughout course for broad-spectrum including vancomycin, Zosyn, clindamycin, and ultimately transition to Augmentin to complete 3 additional weeks.  Infectious disease consultant would like to follow-up in clinic in 3 weeks.  BCx 3/30 CNS- contaminant  Follow-up with general surgery after hospitalization  Pain control with small dose of oxycodone when needed, scheduled Tylenol, the latter has been enough for her pain control.     Anxiety  She is on diazepam as an outpatient, while taking more oxycodone, we will put this on hold.    COPD  Continue home nebulizers, she remains on 2-3 L/min of oxygen, wean as tolerated     Hypervolemia  She was diuresed following ICU stay, back down to baseline weight     Diabetes  At goal as outpatient (A1c <7).   Correction scale, keeping at goal 140-180  Acceptable control in the hospital, continue metformin at discharge     MONSERRAT  Home cpap use     Insomnia  Continue trazodone when needed     Right humerus fracture  Ortho assessed  recommending sling, also no DVT in right arm.  Recommendations given, will need outpatient follow-up     Lung cancer  Dx in 2009, hx of recurrent R effusion (talc pleurodesis 4/2015), currently in remission        Consult/s: pulmonary/intensive care, ID, general surgery and orthopedic surgery  Significant Diagnostic Studies:   EXAM: CT CHEST WO CONTRAST  LOCATION: [facility]  DATE/TIME: 3/28/2019 12:36 PM     INDICATION: pleural effusions, concern for empyema pleural effusions, concern for empyema  COMPARISON: Left thoracentesis from 3/27/2019, chest CT from 12/10/2018  TECHNIQUE: Helical images were obtained through the chest. Multiplanar reformats were obtained. Dose reduction techniques were used.  IV CONTRAST: None.     FINDINGS:   LUNGS AND PLEURA: Residual modest sized left pleural effusion is new compared to the previous CT. Airspace infiltrate at left lung base may represent a combination of pneumonia and atelectasis.  Small loculated pleural effusion within right major fissure. Atelectasis/consolidation right lung base new compared to the previous study.  There is no significant change in the postoperative spiculated appearance involving medial right upper lobe measuring approximately 2.5 x 1.5 cm with an adjacent suture line.     MEDIASTINUM: 2 AP window lymph nodes appear larger on image 35 and 36, each now measuring 10 mm in short axis, previously measuring 4 mm. Likely these are reactive. Endotracheal tube and NG tube are in place. Heart size normal.     LIMITED UPPER ABDOMEN: Negative.     MUSCULOSKELETAL: Negative.     IMPRESSION:   CONCLUSION:   1.  Modest residual left pleural effusion even after recent thoracentesis. Small loculated right pleural effusion. There are are no pleural findings on this noncontrast study to suggest empyema. Suggest correlating with results of yesterday's large   volume left thoracentesis.  2.  Dependent infiltrates at both lung bases suggestive of  "pneumonia.  3.  Minimal change in postoperative appearance of right upper lobe. A few small AP window lymph nodes are minimally larger, likely reactive.     Blood culture 3/27/19          Streptococcus pyogenes       EL       Ceftriaxone Sensitive       Clindamycin Sensitive       Erythromycin Sensitive       Penicillin Sensitive       Vancomycin Sensitive           Procedures:  LEFT THORACOSCOPY WITH DECORTICATION 3/29/19  US guided thoracentesis      Treatments: IV antibiotics as above       CURRENT TCU ISSUES    Today: She tells me she is feeling stronger every day.    Primary diagnosis: She did have a scheduled follow-up with infectious disease on 04/18/19.  She saw the surgeon, Dr. Ma, on 04/25/2019.  She tells me he wants her to make an appointment to see the physician who saw her in the hospital.    She saw ID on 04/18/2019.  Something was seen on x-ray that Dr. Ang found concerning, and a CT was ordered.  She was then referred to Dr. Ma.  I believe this involves the area of the pleural effusion/lung abscess in the left lobe.  She tells me that ID once to see her again tomorrow, 05/02/2019, but she is unsure why.  Lung sounds currently are very coarse.    Right humeral head fracture: Angie does want follow-up x-rays at some point.  She will be making an appointment for that.    Pain management: The pain she experiences is in her right shoulder.  She is receiving low-dose oxycodone (2.5-5 mg every 6 hours) as needed they are also applying ice, and she has a lidocaine patch.  She mentioned, \"I've had decent luck with Tylenol.\"  At an earlier visit, we scheduled 1000 mg every 8 hours (3 times daily).  The nurse told me she did not want to take any oxycodone because it made her \"goofy.\"  The side effect may not be an accurate assessment, as her previous mental state was caused by illness.  Nonetheless, we discontinued it.    When seen earlier, her daughter, who was in attendance, told me that her " "mother gets \"agitated when the pain killers make her groggy.\"  As for the patient, she stated she was not getting too much, although the pain is never completely gone.    Buttock soreness: She tells me they are using an alcohol-based product to clean her up.  She would like something without that.  Dr. Luna ordered calmoseptine.    Candidal intertrigo: Under the breasts and abdominal folds.  She is currently being treated nystatin powder.    Dry mouth/dysphagia: She requested Biotene, and we wrote orders for that.  Uncertain whether it would be covered by her insurance.  I did suggest she could also suck on hard candy or lemon drops, and I later saw her with those.  Her daughter also mentioned that she was choking with eating.  Dr. Luna ordered speech to evaluate and treat.  Her diet was recently upgraded to regular texture by SLP on 04/25/2019.    Anxiety: Earlier, we did talk a bit about her anxiety that can make her pain worse.  They do have an outside person who comes and performs healing touch, and we did write orders for this.  However, at the current time her anxiety is affecting her daily life, sleep, and ability to participate in therapy.  We ordered lorazepam 0.5 mg nightly.  She is sleeping better with this and trazodone.    Depression/cognition: She was seen by Dr. Shepherd on 04/23/2019 and scored high (16/27) on the PHQ 9 depression scale.  At that point, we increased her citalopram from 10 mg to 20 mg daily.  She was also noted by Occupational Therapy to have some cognitive issues per the Short Blessed test.  She does not exhibit overt psychotic symptoms.    COPD: She states she has lots of lung problems and is 10 years out from inoperable lung cancer for which she underwent radiation and chemotherapy.  She does a lot of coughing which is chronic.  She is always coughing up phlegm, so she may wait too long before her respiratory symptoms become serious.  Apparently, she has infections at least a " "couple times a year.  She was a smoker until 2008 when she developed lung cancer.  She is on multiple inhaled medications.  I have a feeling that she is generally noncompliant, disagreeing with her daughter how frequently she takes her nebulizer treatments, for example.  She tells me that her breathing can be somewhat labored, but her O2 sats remained good.  When seen on 04/24/2019, she was on 0.5 L/min per nasal cannula, although she believed she could do without it.  We had planned to do a taper, and oxygen was discontinued on 04/25/2019.    Dr. Luna wrote orders for scheduled albuterol nebs (in addition to as needed dosing).  She also ordered a CBC with differential and a BMP.  CBC on 04/15/2019 still showed an elevated white count of 18.1 with a slight left shift (neutrophils 77%, lymphocytes 12%).  We ordered a follow-up CBC with differential and BMP was still elevated but less so at 14.8, hemoglobin 8.0, platelets 617,000.    Dry nose: Due to oxygen per nasal cannula.  We ordered a non-petroleum-based gel to apply to the nares twice daily.  Also, Ocean nasal spray may be used hourly as needed.  While the nares are still somewhat dry, she has not needed oxygen since 04/25/2019.    Diarrhea: This appears to be her biggest concern.  We ordered Imodium 2 mg after each loose stool (maximum 16 mg/day) and Culturelle (15 billion cell) probiotic (2 caps every morning).  Dr. Luna added psyllium capsules (0.52 g 3 times daily with meals), and she states that it is helping a lot.    GERD: Another problem she complains about there is persistent heartburn.  She is already receiving 20 mg of famotidine daily.  We increased the famotidine to 20 mg twice daily.  She does not note much improvement.  She says, \"it's like you want to burp all the time.\"  We may need to switch to something else.    Diabetes: Recent blood glucose levels range between 72 and 175, mostly around 100.  She is on 1000 mg of metformin twice " daily.    Obstructive sleep apnea: She does have a CPAP machine at home but has not used it in months.  Here, she does have an incentive spirometer, and I encouraged her to use it.       ROS: No headaches, nausea or vomiting, dizziness, dysuria.    Past Medical History:   Diagnosis Date   ? COPD (chronic obstructive pulmonary disease) (H)    ? Depression    ? Diabetes mellitus (H)    ? GERD (gastroesophageal reflux disease)    ? Hx antineoplastic chemotherapy     lung cancer    ? Hx of radiation therapy     lung cancer    ? Hyperlipemia    ? Hypothyroid    ? Lung cancer (H) 2008    RUL,  Non small cell cancer   ? 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 Osteopenia    ? Pleural effusion 1/15   ? Radiation Pneumonitis     Created by Conversion    ? Sleep apnea     does not use cpap              Family History   Problem Relation Age of Onset   ? Heart disease Mother    ? Hypertension Mother    ? Alcohol abuse Mother    ? Depression Mother    ? Heart disease Father 45        mi age 45, cabg   ? Heart attack Father    ? Cancer Father         prostate   ? Hypertension Father    ? COPD Brother    ? Alcohol abuse Brother    ? Alcohol abuse Sister    ? Breast cancer Paternal Aunt    ? Leukemia Grandchild    ? Cancer Maternal Aunt 47        breast   ? Diabetes Son    ? Anxiety disorder Son    ? Depression Son    ? No Medical Problems Daughter    ? Schizophrenia Grandchild      Social History     Socioeconomic History   ? Marital status:      Spouse name: Not on file   ? Number of children: Not on file   ? Years of education: Not on file   ? Highest education level: Not on file   Occupational History   ? Not on file   Social Needs   ? Financial resource strain: Not on file   ? Food insecurity:     Worry: Not on file     Inability: Not on file   ? Transportation needs:     Medical: Not on file     Non-medical: Not on file   Tobacco Use   ? Smoking status: Former Smoker     Packs/day: 1.50     Years: 0.00     Pack  years: 0.00     Last attempt to quit: 11/9/2008     Years since quitting: 10.4   ? Smokeless tobacco: Former User   Substance and Sexual Activity   ? Alcohol use: No   ? Drug use: No   ? Sexual activity: Never   Lifestyle   ? Physical activity:     Days per week: Not on file     Minutes per session: Not on file   ? Stress: Not on file   Relationships   ? Social connections:     Talks on phone: Not on file     Gets together: Not on file     Attends Evangelical service: Not on file     Active member of club or organization: Not on file     Attends meetings of clubs or organizations: Not on file     Relationship status: Not on file   ? Intimate partner violence:     Fear of current or ex partner: Not on file     Emotionally abused: Not on file     Physically abused: Not on file     Forced sexual activity: Not on file   Other Topics Concern   ? Not on file   Social History Narrative   ? Not on file       MEDICATIONS: Reviewed from the MAR, physician orders, and/or earlier progress notes.    Current Outpatient Medications   Medication Sig   ? acetaminophen (TYLENOL) 500 MG tablet Take 2 tablets (1,000 mg total) by mouth 3 (three) times a day.   ? albuterol (PROVENTIL) 2.5 mg /3 mL (0.083 %) nebulizer solution TAKE 3ML BY MOUTH EVERY 4 HOURS AS NEEDED FOR WHEEZING   ? aspirin 81 MG EC tablet Take 81 mg by mouth daily.   ? baclofen (LIORESAL) 10 MG tablet Take 1 tablet (10 mg total) by mouth 3 (three) times a day as needed.   ? blood glucose meter (GLUCOMETER) Use 1 each As Directed as needed. Dispense glucometer brand per patient's insurance at pharmacy discretion.   ? blood glucose test (ACCU-CHEK SMARTVIEW TEST STRIP) strips Test blood sugar 3 times daily   ? calcium carbonate-vitamin D3 (CALCIUM 600 WITH VITAMIN D3) 600 mg(1,500mg) -200 unit per tablet Take 1 tablet by mouth 2 (two) times a day. Taking 1200mg of Calcium with 1000 D3   ? cetirizine 10 mg cap Take 10 mg by mouth daily with supper.          ? citalopram  (CELEXA) 20 MG tablet Take 20 mg by mouth daily.   ? famotidine (PEPCID) 20 MG tablet Take 1 tablet (20 mg total) by mouth daily. (Patient taking differently: Take 20 mg by mouth 2 (two) times a day.       )   ? fluticasone (FLONASE) 50 mcg/actuation nasal spray 2 SPRAYS INTO EACH NOSTRIL DAILY.   ? furosemide (LASIX) 20 MG tablet TAKE 2 TABLETS (40 MG TOTAL) BY MOUTH EVERY MORNING.   ? generic lancets Use 1 each As Directed daily. Dispense brand per patient's insurance at pharmacy discretion. (dx E11.9)   ? guaiFENesin ER (MUCINEX) 600 mg 12 hr tablet Take 1,200 mg by mouth 2 (two) times a day.   ? INCRUSE ELLIPTA 62.5 mcg/actuation DsDv inhaler INHALE 1 PUFF BY MOUTH DAILY   ? Lactobacillus rhamnosus GG (CULTURELLE) 15 billion cell CpSP Take 2 capsules by mouth Daily at 8:00 am..   ? levothyroxine (SYNTHROID, LEVOTHROID) 75 MCG tablet TAKE 1 TABLET BY MOUTH EVERY OTHER DAYS ALTERNATING WITH 75MCG AND 50MCG DOSE   ? lidocaine 4 % patch Place 1 patch on the skin daily. Remove and discard patch with 12 hours.  Apply to right shoulder   ? loperamide (IMODIUM A-D) 2 mg tablet Take 2 mg by mouth as needed for diarrhea (2 mg after each loose stool to a maximum of 16 mg/day).   ? LORazepam (ATIVAN) 0.5 MG tablet Take 1 tablet (0.5 mg total) by mouth at bedtime.   ? menthol-zinc oxide (CALMOSEPTINE) 0.44-20.6 % Oint ointment Apply topically 3 (three) times a day.   ? metFORMIN (GLUCOPHAGE) 1000 MG tablet Take 1 tablet (1,000 mg total) by mouth 2 (two) times a day with meals.   ? montelukast (SINGULAIR) 10 mg tablet Take 1 tablet (10 mg total) by mouth at bedtime.   ? naproxen (NAPROSYN) 500 MG tablet Take 1 tablet (500 mg total) by mouth 2 (two) times a day with meals.   ? psyllium (METAMUCIL) 0.52 gram capsule Take 0.52 g by mouth 3 (three) times a day with meals.   ? PULMICORT FLEXHALER 180 mcg/actuation inhaler INHALE 2 PUFFS 2 (TWO) TIMES A DAY.   ? simvastatin (ZOCOR) 10 MG tablet TAKE 1 TABLET BY MOUTH AT BEDTIME.  "  ? sodium chloride (OCEAN) 0.65 % nasal spray Apply 1 spray into each nostril as needed for congestion.   ? traZODone (DESYREL) 50 MG tablet Take 1 tablet (50 mg total) by mouth at bedtime as needed for sleep.   ? triamcinolone (KENALOG) 0.1 % ointment Apply topically 2 (two) times a day. hands (Patient taking differently: Apply topically daily as needed hands.      )   ? VENTOLIN HFA 90 mcg/actuation inhaler INHALE 1-2 PUFFS EVERY 4 (FOUR) HOURS AS NEEDED FOR WHEEZING.     ALLERGIES: No Known Allergies    DIET: Diabetic, regular texture, thin liquids.  Mighty Shakes Sugar-Free.    Vitals:    05/01/19 1413   BP: 97/61   Pulse: 99   Resp: 17   Temp: 97.6  F (36.4  C)   SpO2: 96%   Weight: 160 lb 6.4 oz (72.8 kg)   Height: 5' 5\" (1.651 m)     Body mass index is 26.69 kg/m .    EXAMINATION:   General: Pleasant middle-aged female, looking much older than her stated age, lying in bed, in NAD.  Head: Normocephalic and atraumatic.   Eyes: PERRLA, sclerae clear.   ENT: Moist oral mucosa.   Cardiovascular: Regular rate and rhythm.  No appreciable murmur.  Respiratory: Previously, I could appreciate bibasilar rales (left greater than right).  My last 2 visits, there appear to be coarse bilateral mid to upper lung rhonchi with an expiratory wheeze.  Most likely related to her pleural/left lung abscess effusion.  This was discussed above.    Abdomen: Soft and nontender.   Musculoskeletal/Extremities: Age-related degenerative joint disease.  Right arm in a sling.  No peripheral edema.  Integument: No rashes, clinically significant lesions, or skin breakdown.   Cognitive/Psychiatric: Alert and oriented x3 as far as I can tell.  Affect is somewhat depressed/anxious.    DIAGNOSTICS:   Results for orders placed or performed in visit on 04/30/19   Basic Metabolic Panel   Result Value Ref Range    Sodium 139 136 - 145 mmol/L    Potassium 4.7 3.5 - 5.0 mmol/L    Chloride 105 98 - 107 mmol/L    CO2 19 (L) 22 - 31 mmol/L    Anion Gap, " Calculation 15 5 - 18 mmol/L    Glucose 68 (L) 70 - 125 mg/dL    Calcium 10.0 8.5 - 10.5 mg/dL    BUN 17 8 - 22 mg/dL    Creatinine 0.92 0.60 - 1.10 mg/dL    GFR MDRD Af Amer >60 >60 mL/min/1.73m2    GFR MDRD Non Af Amer >60 >60 mL/min/1.73m2     Lab Results   Component Value Date    WBC 14.8 (H) 04/30/2019    HGB 8.0 (L) 04/30/2019    HCT 27.2 (L) 04/30/2019    MCV 92 04/30/2019     (H) 04/30/2019     Estimated Creatinine Clearance: 57.7 mL/min (by C-G formula based on SCr of 0.92 mg/dL).  Lab Results   Component Value Date    HGBA1C 6.3 (H) 02/06/2019     Lab Results   Component Value Date    TSH 1.36 08/27/2018       ASSESSMENT/Plan:      ICD-10-CM    1. History of gram-positive (S. pyogenes) pneumosepsis Z86.19    2. 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 Chronic obstructive pulmonary disease, unspecified COPD type (H) J44.9    4. Chronic pleural effusion, left (s/p Talc pleurodesis 2015) J90    5. Gastroesophageal reflux disease without esophagitis K21.9    6. Functional diarrhea K59.1    7. Non-small cell cancer of right lung, s/p radiation and chemotherapies, in remission since 3530-1821 (H) C34.91    8. Acquired hypothyroidism E03.9      CHANGES:    None.    CARE PLAN:    The care plan has been reviewed and all orders signed. Changes to care plan, if any, as noted. Otherwise, continue current plan of care.     The above has been created using voice recognition software. Please be aware that this may unintentionally  produce inaccuracies and/or nonsensical sentences.      Electronically signed by: Robert Yepez CNP

## 2021-06-19 NOTE — LETTER
Letter by Robert Yepez CNP at      Author: Robert Yepez CNP Service: -- Author Type: --    Filed:  Encounter Date: 4/10/2019 Status: (Other)         Patient: Rita Mancini   MR Number: [id number]   YOB: 1950   Date of Visit: 4/10/2019     [facility]    Name:   Rita Mancini  : 1950  Facility:   [facility] [[id number]]   Room:   Code Status: FULL CODE -   Fac type:   SNF (Skilled Nursing Facility, TCU) -     CHIEF COMPLAINT / REASON FOR VISIT:  Chief Complaint   Patient presents with   ? Follow-up     TCU follow-up after hospitalization for pneumosepsis, but she was also recently in the ED with a closed displaced fracture of the proximal end of the right humerus.     [facility] ED 19 (displaced fracture of proximal end of right humerus)    Patient was last seen by me on 19.      HPI: Rita is a 68 y.o. female was admitted to [facility] on 3/27/2019 for septic shock.   She had been to [facility] ED 11 days earlier after slipping on the ice and sustaining a closed displaced fracture of the proximal end of the right humerus.  X-rays of the right shoulder showed fracture of the proximal humerus, comminuted with displacement.  She was discharged with a shoulder immobilizer to home with orthopedic surgery follow-up.      MOST RECENT HOSPITALIZATION  (Per hospital discharge summary)     Pneumonia (S.pyogenes)  complicated by left-sided empyema s/p VATS/decortication 3/29  Strep biology knees bacteremia cleared  All chest tubes now removed, increase ambulation and activity  Respiratory failure-resolved, extubated 3/30   Antibiotics throughout course for broad-spectrum including vancomycin, Zosyn, clindamycin, and ultimately transition to Augmentin to complete 3 additional weeks.  Infectious disease consultant would like to follow-up in clinic in 3 weeks.  BCx 3/30 CNS- contaminant  Follow-up with  general surgery after hospitalization  Pain control with small dose of oxycodone when needed, scheduled Tylenol, the latter has been enough for her pain control.     Anxiety  She is on diazepam as an outpatient, while taking more oxycodone, we will put this on hold.    COPD  Continue home nebulizers, she remains on 2-3 L/min of oxygen, wean as tolerated     Hypervolemia  She was diuresed following ICU stay, back down to baseline weight     Diabetes  At goal as outpatient (A1c <7).   Correction scale, keeping at goal 140-180  Acceptable control in the hospital, continue metformin at discharge     MONSERRAT  Home cpap use     Insomnia  Continue trazodone when needed     Right humerus fracture  Ortho assessed recommending sling, also no DVT in right arm.  Recommendations given, will need outpatient follow-up     Lung cancer  Dx in 2009, hx of recurrent R effusion (talc pleurodesis 4/2015), currently in remission        Consult/s: pulmonary/intensive care, ID, general surgery and orthopedic surgery  Significant Diagnostic Studies:   EXAM: CT CHEST WO CONTRAST  LOCATION: [facility]  DATE/TIME: 3/28/2019 12:36 PM     INDICATION: pleural effusions, concern for empyema pleural effusions, concern for empyema  COMPARISON: Left thoracentesis from 3/27/2019, chest CT from 12/10/2018  TECHNIQUE: Helical images were obtained through the chest. Multiplanar reformats were obtained. Dose reduction techniques were used.  IV CONTRAST: None.     FINDINGS:   LUNGS AND PLEURA: Residual modest sized left pleural effusion is new compared to the previous CT. Airspace infiltrate at left lung base may represent a combination of pneumonia and atelectasis.  Small loculated pleural effusion within right major fissure. Atelectasis/consolidation right lung base new compared to the previous study.  There is no significant change in the postoperative spiculated appearance involving medial right upper lobe measuring approximately 2.5 x 1.5 cm  "with an adjacent suture line.     MEDIASTINUM: 2 AP window lymph nodes appear larger on image 35 and 36, each now measuring 10 mm in short axis, previously measuring 4 mm. Likely these are reactive. Endotracheal tube and NG tube are in place. Heart size normal.     LIMITED UPPER ABDOMEN: Negative.     MUSCULOSKELETAL: Negative.     IMPRESSION:   CONCLUSION:   1.  Modest residual left pleural effusion even after recent thoracentesis. Small loculated right pleural effusion. There are are no pleural findings on this noncontrast study to suggest empyema. Suggest correlating with results of yesterday's large   volume left thoracentesis.  2.  Dependent infiltrates at both lung bases suggestive of pneumonia.  3.  Minimal change in postoperative appearance of right upper lobe. A few small AP window lymph nodes are minimally larger, likely reactive.     Blood culture 3/27/19          Streptococcus pyogenes       EL       Ceftriaxone Sensitive       Clindamycin Sensitive       Erythromycin Sensitive       Penicillin Sensitive       Vancomycin Sensitive           Procedures:  LEFT THORACOSCOPY WITH DECORTICATION 3/29/19  US guided thoracentesis      Treatments: IV antibiotics as above       CURRENT TCU ISSUES    Primary diagnosis: She does have a follow-up with infectious disease on 04/18/19.  Chest tube sites can be covered with a dry dressing.  Change every 3 days and as needed.    Pain management: The pain she experiences is in her right shoulder.  She is receiving low-dose oxycodone (2.5-5 mg every 6 hours) as needed they are also applying ice, and she has a lidocaine patch.  She tells me, \"I've had decent luck with Tylenol.\"  At my last visit, we scheduled 1000 mg every 8 hours (3 times daily).    Her daughter, who is in attendance, tells me that her mother gets \"agitated when the pain killers make her groggy.\"  As for the patient, she tells me she is not getting too much, and the pain is never completely " gone.    Buttock soreness: She tells me they are using an alcohol-based product to clean her up.  She would like something without that.    Dry mouth: She is requesting Biotene.  We can write orders for that.  Uncertain whether it will be covered by her insurance.  I did suggest she could also suck on hard candy or lemon drops.    Anxiety: Earlier, we did talk a bit about her anxiety that can make her pain worse.  They do have an outside person who comes and performs healing touch, and we did write orders for this.  However, at the current time her anxiety is affecting her daily life, sleep, and ability to participate in therapy.  We will write orders for lorazepam 0.5 mg nightly.    COPD: She does a lot of coughing which is chronic.  She is always coughing up phlegm, so she may wait too long before her respiratory symptoms become serious.  Apparently, she has infections at least a couple times a year.  She was a smoker until 2008 when she developed lung cancer.  She is on multiple inhaled medications.  I have a feeling that she is generally noncompliant, disagreeing with her daughter how frequently she takes her nebulizer treatments, for example.  She tells me that her breathing is somewhat labored, but her O2 sats remained good.  PT says it is stable at about 97% on 2 L of oxygen per nasal cannula.  She says she has been using the incentive spirometer.    Diabetes: Only 2 recent blood glucose levels, 72 and 98.  She is on 1000 mg of metformin twice daily.    Obstructive sleep apnea: She does have a CPAP machine at home but has not used it in months.  Here, she does have an incentive spirometer, and I encouraged her to use it.      ROS: No headaches, nausea or vomiting, dizziness or dyspnea, dysuria, difficulty chewing or swallowing.    Past Medical History:   Diagnosis Date   ? COPD (chronic obstructive pulmonary disease) (H)    ? Depression    ? Diabetes mellitus (H)    ? GERD (gastroesophageal reflux disease)     ? Hx antineoplastic chemotherapy     lung cancer    ? Hx of radiation therapy     lung cancer    ? Hyperlipemia    ? Hypothyroid    ? Lung cancer (H) 2008    RUL,  Non small cell cancer   ? 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 Osteopenia    ? Pleural effusion 1/15   ? Radiation Pneumonitis     Created by Conversion    ? Sleep apnea     does not use cpap              Family History   Problem Relation Age of Onset   ? Heart disease Mother    ? Hypertension Mother    ? Alcohol abuse Mother    ? Depression Mother    ? Heart disease Father 45        mi age 45, cabg   ? Heart attack Father    ? Cancer Father         prostate   ? Hypertension Father    ? COPD Brother    ? Alcohol abuse Brother    ? Alcohol abuse Sister    ? Breast cancer Paternal Aunt    ? Leukemia Grandchild    ? Cancer Maternal Aunt 47        breast   ? Diabetes Son    ? Anxiety disorder Son    ? Depression Son    ? No Medical Problems Daughter    ? Schizophrenia Grandchild      Social History     Socioeconomic History   ? Marital status:      Spouse name: Not on file   ? Number of children: Not on file   ? Years of education: Not on file   ? Highest education level: Not on file   Occupational History   ? Not on file   Social Needs   ? Financial resource strain: Not on file   ? Food insecurity:     Worry: Not on file     Inability: Not on file   ? Transportation needs:     Medical: Not on file     Non-medical: Not on file   Tobacco Use   ? Smoking status: Former Smoker     Packs/day: 1.50     Years: 0.00     Pack years: 0.00     Last attempt to quit: 11/9/2008     Years since quitting: 10.4   ? Smokeless tobacco: Former User   Substance and Sexual Activity   ? Alcohol use: No   ? Drug use: No   ? Sexual activity: Never   Lifestyle   ? Physical activity:     Days per week: Not on file     Minutes per session: Not on file   ? Stress: Not on file   Relationships   ? Social connections:     Talks on phone: Not on file     Gets together:  Not on file     Attends [employer] service: Not on file     Active member of club or organization: Not on file     Attends meetings of clubs or organizations: Not on file     Relationship status: Not on file   ? Intimate partner violence:     Fear of current or ex partner: Not on file     Emotionally abused: Not on file     Physically abused: Not on file     Forced sexual activity: Not on file   Other Topics Concern   ? Not on file   Social History Narrative   ? Not on file       MEDICATIONS: Reviewed from the MAR, physician orders, and/or earlier progress notes.  Updated by me today (04/10/19) with the addition of lorazepam reflected below.  Current Outpatient Medications   Medication Sig   ? LORazepam (ATIVAN) 0.5 MG tablet Take by mouth at bedtime.   ? acetaminophen (TYLENOL) 500 MG tablet Take 2 tablets (1,000 mg total) by mouth 3 (three) times a day.   ? albuterol (PROVENTIL) 2.5 mg /3 mL (0.083 %) nebulizer solution TAKE 3ML BY MOUTH EVERY 4 HOURS AS NEEDED FOR WHEEZING   ? amLODIPine (NORVASC) 5 MG tablet Take 1 tablet (5 mg total) by mouth daily.   ? amoxicillin-clavulanate (AUGMENTIN) 875-125 mg per tablet Take 1 tablet by mouth 2 (two) times a day for 21 days. Follow up with Infectious Disease physician prior to completing antibiotics   ? aspirin 81 MG EC tablet Take 81 mg by mouth daily.   ? baclofen (LIORESAL) 10 MG tablet Take 1 tablet (10 mg total) by mouth 3 (three) times a day as needed.   ? blood glucose meter (GLUCOMETER) Use 1 each As Directed as needed. Dispense glucometer brand per patient's insurance at pharmacy discretion.   ? blood glucose test (ACCU-CHEK SMARTVIEW TEST STRIP) strips Test blood sugar 3 times daily   ? calcium carbonate-vitamin D3 (CALCIUM 600 WITH VITAMIN D3) 600 mg(1,500mg) -200 unit per tablet Take 1 tablet by mouth 2 (two) times a day. Taking 1200mg of Calcium with 1000 D3   ? cetirizine 10 mg cap Take 10 mg by mouth daily with supper.          ? citalopram (CELEXA) 10 MG  tablet TAKE 1 TABLET BY MOUTH ONCE DAILY.   ? famotidine (PEPCID) 20 MG tablet Take 1 tablet (20 mg total) by mouth daily.   ? fluticasone (FLONASE) 50 mcg/actuation nasal spray 2 SPRAYS INTO EACH NOSTRIL DAILY.   ? furosemide (LASIX) 20 MG tablet Take 2 tablets (40 mg total) by mouth every morning.   ? generic lancets Use 1 each As Directed daily. Dispense brand per patient's insurance at pharmacy discretion. (dx E11.9)   ? INCRUSE ELLIPTA 62.5 mcg/actuation DsDv inhaler INHALE 1 PUFF BY MOUTH DAILY   ? levothyroxine (SYNTHROID, LEVOTHROID) 50 MCG tablet TAKE 1 TABLET BY MOUTH EVERY OTHER DAY ALTERNATING WITH 75MG TABLET   ? levothyroxine (SYNTHROID, LEVOTHROID) 75 MCG tablet TAKE 1 TABLET BY MOUTH EVERY OTHER DAYS ALTERNATING WITH 75MCG AND 50MCG DOSE   ? lidocaine 4 % patch Place 1 patch on the skin daily. Remove and discard patch with 12 hours.  Apply to right shoulder   ? metFORMIN (GLUCOPHAGE) 1000 MG tablet Take 1 tablet (1,000 mg total) by mouth 2 (two) times a day with meals.   ? montelukast (SINGULAIR) 10 mg tablet Take 1 tablet (10 mg total) by mouth at bedtime.   ? naproxen (NAPROSYN) 500 MG tablet Take 1 tablet (500 mg total) by mouth 2 (two) times a day with meals.   ? oxyCODONE (ROXICODONE) 5 MG immediate release tablet Take 0.5-1 tablets (2.5-5 mg total) by mouth every 6 (six) hours as needed for pain.   ? PULMICORT FLEXHALER 180 mcg/actuation inhaler INHALE 2 PUFFS 2 (TWO) TIMES A DAY.   ? simvastatin (ZOCOR) 10 MG tablet TAKE 1 TABLET BY MOUTH AT BEDTIME.   ? traZODone (DESYREL) 50 MG tablet Take 1 tablet (50 mg total) by mouth at bedtime as needed for sleep.   ? triamcinolone (KENALOG) 0.1 % ointment Apply topically 2 (two) times a day. hands (Patient taking differently: Apply topically daily as needed hands.      )   ? VENTOLIN HFA 90 mcg/actuation inhaler INHALE 1-2 PUFFS EVERY 4 (FOUR) HOURS AS NEEDED FOR WHEEZING.     ALLERGIES: No Known Allergies    DIET: Diabetic, regular texture, thin  "liquids.  Mighty Shakes Sugar-Free.    Vitals:    04/10/19 1358   BP: 131/72   Pulse: 87   Resp: 18   Temp: 98.3  F (36.8  C)   SpO2: 97%   Weight: 180 lb 3.2 oz (81.7 kg)   Height: 5' 5\" (1.651 m)   Down 11.4 pounds.  She was started on nutritional supplementation.  Body mass index is 29.99 kg/m .    EXAMINATION:   General: Pleasant middle-aged female, looking much older than her stated age, sitting in a wheelchair, right arm in the sling, nasal cannula in the nose.  Her daughter is in attendance.  Head: Normocephalic and atraumatic.   Eyes: PERRLA, sclerae clear.   ENT: Moist oral mucosa.   Cardiovascular: Regular rate and rhythm.  No appreciable murmur.  Respiratory: Loose, phlegmy cough.  Able to appreciate congestion anteriorly.  Left lung rhonchi, particularly in the base.  I believe this is a chronic condition.  The right lung is pretty much clear.  Abdomen: Soft and nontender.   Musculoskeletal/Extremities: Age-related degenerative joint disease.  Right arm in a sling.  No peripheral edema.  Integument: No rashes, clinically significant lesions, or skin breakdown.   Cognitive/Psychiatric: Alert and oriented x3 as far as I can tell.  Affect is euthymic.    DIAGNOSTICS:   Results for orders placed or performed during the hospital encounter of 03/27/19   Basic Metabolic Panel   Result Value Ref Range    Sodium 138 136 - 145 mmol/L    Potassium 4.1 3.5 - 5.0 mmol/L    Chloride 102 98 - 107 mmol/L    CO2 27 22 - 31 mmol/L    Anion Gap, Calculation 9 5 - 18 mmol/L    Glucose 136 (H) 70 - 125 mg/dL    Calcium 8.3 (L) 8.5 - 10.5 mg/dL    BUN 10 8 - 22 mg/dL    Creatinine 0.76 0.60 - 1.10 mg/dL    GFR MDRD Af Amer >60 >60 mL/min/1.73m2    GFR MDRD Non Af Amer >60 >60 mL/min/1.73m2     Lab Results   Component Value Date    WBC 22.8 (H) 04/06/2019    HGB 8.3 (L) 04/06/2019    HCT 26.3 (L) 04/06/2019    MCV 92 04/06/2019     (H) 04/06/2019     Estimated Creatinine Clearance: 70.1 mL/min (by C-G formula based on " SCr of 0.76 mg/dL).  Lab Results   Component Value Date    HGBA1C 6.3 (H) 02/06/2019     Lab Results   Component Value Date    TSH 1.36 08/27/2018       ASSESSMENT/Plan:      ICD-10-CM    1. History of gram-positive (S. pyogenes) pneumosepsis Z86.19    2. Chronic obstructive pulmonary disease, unspecified COPD type (H) J44.9    3. 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 Non-small cell cancer of right lung (H) C34.91    5. 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 Chronic pleural effusion, left J90      CHANGES:    1) Lorazepam 0.5 mg nightly.  2) chest tube incision sites can be covered with dry dressing and change every 3 days and as needed.  3) Biotene spray (for dry mouth).  May use per package insert.  May not be covered by insurance.  4) is not an alcohol products for cleaning up patient.  She is complaining of a sore bottom.    CARE PLAN:    The care plan has been reviewed and all orders signed. Changes to care plan, if any, as noted. Otherwise, continue current plan of care.  Total time spent with this patient was approximately 35  minutes, with greater than 50% spent in counseling and coordination of care that included discussing multiple issues affecting her recovery today.    The above has been created using voice recognition software. Please be aware that this may unintentionally  produce inaccuracies and/or nonsensical sentences.      Electronically signed by: Robert Yepez CNP

## 2021-06-19 NOTE — PROGRESS NOTES
HPI:  SHe nots no interval issues with her ears.  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      Past medical history, surgical history, social history, family history, medications, and allergies have been reviewed with the patient and are documented above.    Review of Systems: a 10-system review was performed. Pertinent positives are noted in the HPI and on a separate scanned document in the chart.    PHYSICAL EXAMINATION:  GEN: no acute distress, normocephalic  EYES: extraocular movements are intact, pupils are equal and round. Sclera clear.   EARS: Left EAC and TM are prestine.  Right EAC shows minimal cerumen, TM normal.  NEURO: CN II-XII are intact bilaterally. alert and oriented. No nystagmus. Gait is normal.  PULM: breathing comfortably on room air, normal chest expansion with respiration  HEART: regular rate and rhythm, no peripheral edema      MEDICAL DECISION-MAKING:   I think we can switch to annual visits or certainly sooner if need be.

## 2021-06-19 NOTE — LETTER
Letter by Robert Yepez CNP at      Author: Robert Yepez CNP Service: -- Author Type: --    Filed:  Encounter Date: 4/15/2019 Status: (Other)         Patient: Rita Mancini   MR Number: [id number]   YOB: 1950   Date of Visit: 4/15/2019     [facility]    Name:   Rita Mancini  : 1950  Facility:   [facility] [[id number]]   Room:   Code Status: FULL CODE -   Fac type:   SNF (Skilled Nursing Facility, TCU) -     CHIEF COMPLAINT / REASON FOR VISIT:  Chief Complaint   Patient presents with   ? Follow-up     TCU follow-up after hospitalization for pneumosepsis, but she was also recently in the ED with a closed displaced fracture of the proximal end of the right humerus.     [facility] ED 19 (displaced fracture of proximal end of right humerus)  [facility] from 19 until 19 (pneumosepsis)    Patient was last seen by me on 04/10/19 and subsequently seen by Dr. Luna on 19.      HPI: Rita is a 68 y.o. female was admitted to [facility] on 3/27/2019 for septic shock.   She had been to [facility] ED 11 days earlier after slipping on the ice and sustaining a closed displaced fracture of the proximal end of the right humerus.  X-rays of the right shoulder showed fracture of the proximal humerus, comminuted with displacement.  She was discharged with a shoulder immobilizer to home with orthopedic surgery follow-up.      MOST RECENT HOSPITALIZATION  (Per hospital discharge summary)     Pneumonia (S.pyogenes)  complicated by left-sided empyema s/p VATS/decortication 3/29  Strep biology knees bacteremia cleared  All chest tubes now removed, increase ambulation and activity  Respiratory failure-resolved, extubated 3/30   Antibiotics throughout course for broad-spectrum including vancomycin, Zosyn, clindamycin, and ultimately transition to Augmentin to complete 3 additional weeks.   Infectious disease consultant would like to follow-up in clinic in 3 weeks.  BCx 3/30 CNS- contaminant  Follow-up with general surgery after hospitalization  Pain control with small dose of oxycodone when needed, scheduled Tylenol, the latter has been enough for her pain control.     Anxiety  She is on diazepam as an outpatient, while taking more oxycodone, we will put this on hold.    COPD  Continue home nebulizers, she remains on 2-3 L/min of oxygen, wean as tolerated     Hypervolemia  She was diuresed following ICU stay, back down to baseline weight     Diabetes  At goal as outpatient (A1c <7).   Correction scale, keeping at goal 140-180  Acceptable control in the hospital, continue metformin at discharge     MONSERRAT  Home cpap use     Insomnia  Continue trazodone when needed     Right humerus fracture  Ortho assessed recommending sling, also no DVT in right arm.  Recommendations given, will need outpatient follow-up     Lung cancer  Dx in 2009, hx of recurrent R effusion (talc pleurodesis 4/2015), currently in remission        Consult/s: pulmonary/intensive care, ID, general surgery and orthopedic surgery  Significant Diagnostic Studies:   EXAM: CT CHEST WO CONTRAST  LOCATION: [facility]  DATE/TIME: 3/28/2019 12:36 PM     INDICATION: pleural effusions, concern for empyema pleural effusions, concern for empyema  COMPARISON: Left thoracentesis from 3/27/2019, chest CT from 12/10/2018  TECHNIQUE: Helical images were obtained through the chest. Multiplanar reformats were obtained. Dose reduction techniques were used.  IV CONTRAST: None.     FINDINGS:   LUNGS AND PLEURA: Residual modest sized left pleural effusion is new compared to the previous CT. Airspace infiltrate at left lung base may represent a combination of pneumonia and atelectasis.  Small loculated pleural effusion within right major fissure. Atelectasis/consolidation right lung base new compared to the previous study.  There is no significant  "change in the postoperative spiculated appearance involving medial right upper lobe measuring approximately 2.5 x 1.5 cm with an adjacent suture line.     MEDIASTINUM: 2 AP window lymph nodes appear larger on image 35 and 36, each now measuring 10 mm in short axis, previously measuring 4 mm. Likely these are reactive. Endotracheal tube and NG tube are in place. Heart size normal.     LIMITED UPPER ABDOMEN: Negative.     MUSCULOSKELETAL: Negative.     IMPRESSION:   CONCLUSION:   1.  Modest residual left pleural effusion even after recent thoracentesis. Small loculated right pleural effusion. There are are no pleural findings on this noncontrast study to suggest empyema. Suggest correlating with results of yesterday's large   volume left thoracentesis.  2.  Dependent infiltrates at both lung bases suggestive of pneumonia.  3.  Minimal change in postoperative appearance of right upper lobe. A few small AP window lymph nodes are minimally larger, likely reactive.     Blood culture 3/27/19          Streptococcus pyogenes       EL       Ceftriaxone Sensitive       Clindamycin Sensitive       Erythromycin Sensitive       Penicillin Sensitive       Vancomycin Sensitive           Procedures:  LEFT THORACOSCOPY WITH DECORTICATION 3/29/19  US guided thoracentesis      Treatments: IV antibiotics as above       CURRENT TCU ISSUES    Primary diagnosis: She does have a follow-up with infectious disease on 04/18/19 (may have been changed).      Right humeral head fracture: Angie does want follow-up x-rays at some point.    Pain management: Most of the pain she experiences is in her right shoulder.  She is receiving low-dose oxycodone (2.5-5 mg every 6 hours), and she also has a lidocaine patch.  Acetaminophen has also been beneficial, and we scheduled 1000 mg every 8 hours (3 times daily).    When seen at an earlier visit, her daughter, who was in attendance, told me that her mother gets \"agitated when the pain killers make her " "groggy.\"  As for the patient, she stated she was not getting too much, although the pain is never completely gone.    Buttock soreness: Dr. Luna ordered calmoseptine.    Dry mouth/dysphagia: She requested Biotene, and we wrote orders for that.  Uncertain whether it would be covered by her insurance.  I did suggest she could also suck on hard candy or lemon drops, and I later saw her with those.  Her daughter also mentioned that she was choking with eating.  Dr. Luna ordered speech to evaluate and treat.    Anxiety: Earlier, we did talk a bit about her anxiety that can make her pain worse.  They do have an outside person who comes and performs healing touch, and we did write orders for this.  However, at the current time her anxiety is affecting her daily life, sleep, and ability to participate in therapy.  We ordered lorazepam 0.5 mg nightly.  I believe she is able to sleep now.    COPD: She states she has lots of lung problems and is 10 years out from inoperable lung cancer for which she underwent radiation and chemotherapy.  She does a lot of coughing which is chronic.  She is always coughing up phlegm, so she may wait too long before her respiratory symptoms become serious.  Apparently, she has infections at least a couple times a year.  She was a smoker until 2008 when she developed lung cancer.  She is on multiple inhaled medications.  I have a feeling that she is generally noncompliant, disagreeing with her daughter how frequently she takes her nebulizer treatments, for example.  She tells me that her breathing is somewhat labored, but her O2 sats remained good.  PT says it is stable at about 97% on 2 L of oxygen per nasal cannula.  She says she has been using the incentive spirometer.    She does have trouble breathing without oxygen, although Dr. Luna did write orders for a taper.  She also wrote orders for scheduled albuterol nebs (in addition to as needed dosing).      Diabetes: Recent blood " glucose levels look good on 1000 mg of metformin twice daily.    Obstructive sleep apnea: She does have a CPAP machine at home but has not used it in months.  Here, she does have an incentive spirometer which she is encouraged to use.      ROS: No headaches, fever or chills, nausea or vomiting, dizziness, dysuria, constipation or diarrhea.    Past Medical History:   Diagnosis Date   ? COPD (chronic obstructive pulmonary disease) (H)    ? Depression    ? Diabetes mellitus (H)    ? GERD (gastroesophageal reflux disease)    ? Hx antineoplastic chemotherapy     lung cancer    ? Hx of radiation therapy     lung cancer    ? Hyperlipemia    ? Hypothyroid    ? Lung cancer (H) 2008    RUL,  Non small cell cancer   ? 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 Osteopenia    ? Pleural effusion 1/15   ? Radiation Pneumonitis     Created by Conversion    ? Sleep apnea     does not use cpap              Family History   Problem Relation Age of Onset   ? Heart disease Mother    ? Hypertension Mother    ? Alcohol abuse Mother    ? Depression Mother    ? Heart disease Father 45        mi age 45, cabg   ? Heart attack Father    ? Cancer Father         prostate   ? Hypertension Father    ? COPD Brother    ? Alcohol abuse Brother    ? Alcohol abuse Sister    ? Breast cancer Paternal Aunt    ? Leukemia Grandchild    ? Cancer Maternal Aunt 47        breast   ? Diabetes Son    ? Anxiety disorder Son    ? Depression Son    ? No Medical Problems Daughter    ? Schizophrenia Grandchild      Social History     Socioeconomic History   ? Marital status:      Spouse name: Not on file   ? Number of children: Not on file   ? Years of education: Not on file   ? Highest education level: Not on file   Occupational History   ? Not on file   Social Needs   ? Financial resource strain: Not on file   ? Food insecurity:     Worry: Not on file     Inability: Not on file   ? Transportation needs:     Medical: Not on file     Non-medical: Not on  file   Tobacco Use   ? Smoking status: Former Smoker     Packs/day: 1.50     Years: 0.00     Pack years: 0.00     Last attempt to quit: 11/9/2008     Years since quitting: 10.4   ? Smokeless tobacco: Former User   Substance and Sexual Activity   ? Alcohol use: No   ? Drug use: No   ? Sexual activity: Never   Lifestyle   ? Physical activity:     Days per week: Not on file     Minutes per session: Not on file   ? Stress: Not on file   Relationships   ? Social connections:     Talks on phone: Not on file     Gets together: Not on file     Attends Evangelical service: Not on file     Active member of club or organization: Not on file     Attends meetings of clubs or organizations: Not on file     Relationship status: Not on file   ? Intimate partner violence:     Fear of current or ex partner: Not on file     Emotionally abused: Not on file     Physically abused: Not on file     Forced sexual activity: Not on file   Other Topics Concern   ? Not on file   Social History Narrative   ? Not on file       MEDICATIONS: Reviewed from the MAR, physician orders, and/or earlier progress notes.  Updated by me today (04/15/19) with the addition of scheduled albuterol nebs, guaifenesin, and calmoseptine reflected below.  Current Outpatient Medications   Medication Sig   ? albuterol (PROVENTIL) 2.5 mg /3 mL (0.083 %) nebulizer solution Take 2.5 mg by nebulization 4 (four) times a day. Also has PRN dosing.   ? guaiFENesin ER (MUCINEX) 600 mg 12 hr tablet Take 1,200 mg by mouth 2 (two) times a day.   ? menthol-zinc oxide (CALMOSEPTINE) 0.44-20.6 % Oint ointment Apply topically 3 (three) times a day.   ? acetaminophen (TYLENOL) 500 MG tablet Take 2 tablets (1,000 mg total) by mouth 3 (three) times a day.   ? albuterol (PROVENTIL) 2.5 mg /3 mL (0.083 %) nebulizer solution TAKE 3ML BY MOUTH EVERY 4 HOURS AS NEEDED FOR WHEEZING   ? amLODIPine (NORVASC) 5 MG tablet Take 1 tablet (5 mg total) by mouth daily.   ? amoxicillin-clavulanate  (AUGMENTIN) 875-125 mg per tablet Take 1 tablet by mouth 2 (two) times a day for 21 days. Follow up with Infectious Disease physician prior to completing antibiotics   ? aspirin 81 MG EC tablet Take 81 mg by mouth daily.   ? baclofen (LIORESAL) 10 MG tablet Take 1 tablet (10 mg total) by mouth 3 (three) times a day as needed.   ? blood glucose meter (GLUCOMETER) Use 1 each As Directed as needed. Dispense glucometer brand per patient's insurance at pharmacy discretion.   ? blood glucose test (ACCU-CHEK SMARTVIEW TEST STRIP) strips Test blood sugar 3 times daily   ? calcium carbonate-vitamin D3 (CALCIUM 600 WITH VITAMIN D3) 600 mg(1,500mg) -200 unit per tablet Take 1 tablet by mouth 2 (two) times a day. Taking 1200mg of Calcium with 1000 D3   ? cetirizine 10 mg cap Take 10 mg by mouth daily with supper.          ? citalopram (CELEXA) 10 MG tablet TAKE 1 TABLET BY MOUTH ONCE DAILY.   ? famotidine (PEPCID) 20 MG tablet Take 1 tablet (20 mg total) by mouth daily.   ? fluticasone (FLONASE) 50 mcg/actuation nasal spray 2 SPRAYS INTO EACH NOSTRIL DAILY.   ? furosemide (LASIX) 20 MG tablet Take 2 tablets (40 mg total) by mouth every morning.   ? generic lancets Use 1 each As Directed daily. Dispense brand per patient's insurance at pharmacy discretion. (dx E11.9)   ? INCRUSE ELLIPTA 62.5 mcg/actuation DsDv inhaler INHALE 1 PUFF BY MOUTH DAILY   ? levothyroxine (SYNTHROID, LEVOTHROID) 50 MCG tablet TAKE 1 TABLET BY MOUTH EVERY OTHER DAY ALTERNATING WITH 75MG TABLET   ? levothyroxine (SYNTHROID, LEVOTHROID) 75 MCG tablet TAKE 1 TABLET BY MOUTH EVERY OTHER DAYS ALTERNATING WITH 75MCG AND 50MCG DOSE   ? lidocaine 4 % patch Place 1 patch on the skin daily. Remove and discard patch with 12 hours.  Apply to right shoulder   ? LORazepam (ATIVAN) 0.5 MG tablet Take by mouth at bedtime.   ? metFORMIN (GLUCOPHAGE) 1000 MG tablet Take 1 tablet (1,000 mg total) by mouth 2 (two) times a day with meals.   ? montelukast (SINGULAIR) 10 mg  "tablet Take 1 tablet (10 mg total) by mouth at bedtime.   ? naproxen (NAPROSYN) 500 MG tablet Take 1 tablet (500 mg total) by mouth 2 (two) times a day with meals.   ? oxyCODONE (ROXICODONE) 5 MG immediate release tablet Take 0.5-1 tablets (2.5-5 mg total) by mouth every 6 (six) hours as needed for pain.   ? PULMICORT FLEXHALER 180 mcg/actuation inhaler INHALE 2 PUFFS 2 (TWO) TIMES A DAY.   ? simvastatin (ZOCOR) 10 MG tablet TAKE 1 TABLET BY MOUTH AT BEDTIME.   ? traZODone (DESYREL) 50 MG tablet Take 1 tablet (50 mg total) by mouth at bedtime as needed for sleep.   ? triamcinolone (KENALOG) 0.1 % ointment Apply topically 2 (two) times a day. hands (Patient taking differently: Apply topically daily as needed hands.      )   ? VENTOLIN HFA 90 mcg/actuation inhaler INHALE 1-2 PUFFS EVERY 4 (FOUR) HOURS AS NEEDED FOR WHEEZING.     ALLERGIES: No Known Allergies    DIET: Diabetic, regular texture, thin liquids.  Mighty Shakes Sugar-Free.    Vitals:    04/15/19 1159   BP: 127/69   Pulse: (!) 104   Resp: 18   Temp: 97.6  F (36.4  C)   SpO2: 96%   Weight: 181 lb 6.4 oz (82.3 kg)   Height: 5' 5\" (1.651 m)   After being down 11.4 pounds, she was started on nutritional supplementation.  Since then, weight has been stable.  Body mass index is 30.19 kg/m .    EXAMINATION:   General: Pleasant middle-aged female, looking much older than her stated age, sitting in a wheelchair, right arm in the sling.    Head: Normocephalic and atraumatic.   Eyes: PERRLA, sclerae clear.   ENT: Moist oral mucosa.   Cardiovascular: Regular rate and rhythm.  No appreciable murmur.  Respiratory: Loose, phlegmy cough.  Able to appreciate congestion anteriorly.  Rales in bilateral bases, left greater than right.  I believe this is a chronic condition.  She tells me she is doing less coughing.  Abdomen: Soft and nontender.   Musculoskeletal/Extremities: Age-related degenerative joint disease.  Right arm in a sling.  No peripheral edema.  Integument: No " rashes, clinically significant lesions, or skin breakdown.   Cognitive/Psychiatric: Alert and oriented x3 as far as I can tell.  Affect is euthymic.    DIAGNOSTICS:   Results for orders placed or performed during the hospital encounter of 03/27/19   Basic Metabolic Panel   Result Value Ref Range    Sodium 138 136 - 145 mmol/L    Potassium 4.1 3.5 - 5.0 mmol/L    Chloride 102 98 - 107 mmol/L    CO2 27 22 - 31 mmol/L    Anion Gap, Calculation 9 5 - 18 mmol/L    Glucose 136 (H) 70 - 125 mg/dL    Calcium 8.3 (L) 8.5 - 10.5 mg/dL    BUN 10 8 - 22 mg/dL    Creatinine 0.76 0.60 - 1.10 mg/dL    GFR MDRD Af Amer >60 >60 mL/min/1.73m2    GFR MDRD Non Af Amer >60 >60 mL/min/1.73m2     Lab Results   Component Value Date    WBC 22.8 (H) 04/06/2019    HGB 8.3 (L) 04/06/2019    HCT 26.3 (L) 04/06/2019    MCV 92 04/06/2019     (H) 04/06/2019     CrCl cannot be calculated (Patient's most recent lab result is older than the maximum 5 days allowed.).  Lab Results   Component Value Date    HGBA1C 6.3 (H) 02/06/2019     Lab Results   Component Value Date    TSH 1.36 08/27/2018       ASSESSMENT/Plan:      ICD-10-CM    1. History of gram-positive (S. pyogenes) pneumosepsis Z86.19    2. Chronic obstructive pulmonary disease, unspecified COPD type (H) J44.9    3. 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 Non-small cell cancer of right lung (H) C34.91    5. 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 Chronic pleural effusion, left J90      CHANGES:    None.    CARE PLAN:    The care plan has been reviewed and all orders signed. Changes to care plan, if any, as noted. Otherwise, continue current plan of care.      The above has been created using voice recognition software. Please be aware that this may unintentionally  produce inaccuracies and/or nonsensical sentences.      Electronically signed by: Robert Yepez, [email or web address]

## 2021-06-19 NOTE — LETTER
Letter by Jabari Perez MD at      Author: Jabari Perez MD Service: -- Author Type: --    Filed:  Encounter Date: 5/8/2019 Status: (Other)         [place],   [address] [facility] [address]                                  May 8, 2019    Patient: Rita Mancini   MR Number: [id number]   YOB: 1950   Date of Visit: 5/8/2019     Dear Francesca Willis, and Sav:    I saw Rita Mancini today at the [facility]. Below are the relevant portions of my assessment and plan of care.    If you have questions, please do not hesitate to call me. I look forward to following Rita along with you.    Sincerely,        Jabari Perez MD            MD Louis Armstrong MD Wilson, Andrew P, MD  5/8/2019 11:40 AM  Sign at close encounter  Pulmonary Clinic Follow-up Visit    Assessment/Plan:  68 year old female with a history of tobacco dependence in remission, COPD, MONSERRAT, non small cell lung cancer of the right upper lobe diagnosed 2009 s/p chemotherapy and radiation c/b radiation pneumonitis in remission, recurrent right pleural effusion s/p talc pleurodesis in 2015, hypothyroidism, dyslipidemia, GERD, DM, right ureteral stent, chronic anemia, recent proximal right humerus fracture treated nonoperatively (poor candidate for surgery), and recent left lung abscess and left empyema with Strep pyogenes bacteremia and toxic shock syndrome s/p hospitalization, left thoracoscopic decortication and abscess drainage, and ongoing antibiotics, presenting for post-hospital follow-up.    COPD, left lung abscess/empyema, chronic right upper lobe radiation pneumonitis: My first time meeting Ms. Mancini and her daughter. Currently stable without fever, improving cough. Followed up with Dr. Ma with thoracic surgery and Dr. Ang with ID. Chest CT images reviewed; last CT 4/22/19 with bilateral consolidations and known RUL fibrotic changes from radiation.  Continues on amoxicillin-clavulanate and taking Culturelle, but has intermittent loose stools. Has chronic exertional dyspnea; no PFTs on record. Denies fevers/chills. Currently taking umeclidinium and budesonide, with scheduled nebulized albuterol four times a day; she is interested in a more convenient regimen. Discussed pulmonary rehab, and she is interested, but with her current illness and multiple appointments it will not be feasible; wants to defer for a time. Also due for booster of Pneumovax-23, but will wait until acute process has resolved in discussion with the patient. Has thick nasal mucus and only inconsistently using nasal saline; does take cetirizine and nasal fluticasone.    Plan:  - change umeclidinium to umeclidinium-vilanterol one inhalation daily  - change nebulized albuterol to prn  - continue budesonide flexhaler 180 mcg two inhalations two times a day; rinse/gargle/spit water after use  - continue montelukast 10 mg at bedtime  - continues on furosemide 40 mg daily and appears euvolemic  - ongoing follow-up with Dr. Ang in ID clinic  - I will see her in six weeks with repeat chest CT; imaging interval can be modified by her ID or heme/onc providers if indicated  - will discuss formal PFTs and pulmonary rehab referral at follow-up; patient and her daughter wanted to defer at this point  - received Pneumovax-23 in September 2012 and Prevnar-13 in July 2015; due for booster of Pneumovax-23 which we will defer until she is more recovered from her recent infection per patient preference  - now off oxygen  - encouraged her to call any time with questions or concerning symptoms    I appreciate the opportunity to participate in the care of Ms. Mancini.  Please call any time if needed.    CCx: COPD, left lung abscess/empyema, chronic right upper lobe radiation pneumonitis    HPI: 68 year old female with a history of tobacco dependence in remission, COPD, MONSERRAT, non small cell lung cancer of the right  upper lobe diagnosed 2009 s/p chemotherapy and radiation c/b radiation pneumonitis in remission, recurrent right pleural effusion s/p talc pleurodesis, hypothyroidism, dyslipidemia, GERD, DM, right ureteral stent, chronic anemia, recent proximal right humerus fracture treated nonoperatively (poor candidate for surgery), and recent left lung abscess and left empyema with Strep pyogenes bacteremia and toxic shock syndrome s/p hospitalization, left thoracoscopic decortication and abscess drainage, and ongoing antibiotics, presenting for post-hospital follow-up. My first time meeting Ms. Mancini and her daughter. Currently stable without fever, improving cough. Followed up with Dr. Ma with thoracic surgery and Dr. Ang with ID. Chest CT images reviewed; last CT 4/22/19 with bilateral consolidations and known RUL fibrotic changes from radiation. Continues on amoxicillin-clavulanate and taking Culturelle, but has intermittent loose stools. Has chronic exertional dyspnea; no PFTs on record. Currently taking umeclidinium and budesonide, with scheduled nebulized albuterol four times a day; she is interested in a more convenient regimen. Discussed pulmonary rehab, and she is interested, but with her current illness and multiple appointments it will not be feasible; wants to defer for a time. Also due for booster of Pneumovax-23, but will wait until acute process has resolved in discussion with the patient. Has thick nasal mucus and only inconsistently using nasal saline; does take cetirizine and nasal fluticasone. Denies fevers/chills.    ROS:  A 12-system review was obtained and was negative with the exception of the symptoms endorsed in the history of present illness.    PMH:  MONSERRAT  NSCLC RUL dx'd 2019 s/p chemo and radiation c/b radiation pneumonitis with ongoing fibrotic changes RUL  Recurrent right pleural effusion s/p right talc pleurodesis in 2015  Osteopenia  Hypothyroidism  DL  GERD  DM  MDD  COPD  Right  ureteral stent  Chronic anemia  Left lung abscess/empyema, hospitalized 3/27/19-4/7/19, s/p left thoracoscopic decortication and abscess washout on 3/29/19; path negative for malignancy    PSH:  Past Surgical History:   Procedure Laterality Date   ? PORTACATH PLACEMENT      and removal   ? THORACOSCOPY Right 4/6/2015    Procedure: RIGHT THORACOSCOPY / BIOPSY PLEURAL / TALC PLEURODESIS;  Surgeon: Michi Ma MD;  Location: [facility] OR;  Service:    ? THORACOSCOPY Left 3/29/2019    Procedure: LEFT THORACOSCOPY WITH DECORTICATION;  Surgeon: Michi Ma MD;  Location: [facility] Main OR;  Service: General   ? TONSILLECTOMY  age 5   ? URETERAL STENT PLACEMENT Right 6/2010   ? US THORACENTESIS  3/27/2019       Allergies:  No Known Allergies    Family HX:  Family History   Problem Relation Age of Onset   ? Heart disease Mother    ? Hypertension Mother    ? Alcohol abuse Mother    ? Depression Mother    ? Heart disease Father 45        mi age 45, cabg   ? Heart attack Father    ? Cancer Father         prostate   ? Hypertension Father    ? COPD Brother    ? Alcohol abuse Brother    ? Alcohol abuse Sister    ? Breast cancer Paternal Aunt    ? Leukemia Grandchild    ? Cancer Maternal Aunt 47        breast   ? Diabetes Son    ? Anxiety disorder Son    ? Depression Son    ? No Medical Problems Daughter    ? Schizophrenia Grandchild        Social Hx:  Social History     Socioeconomic History   ? Marital status:      Spouse name: Not on file   ? Number of children: Not on file   ? Years of education: Not on file   ? Highest education level: Not on file   Occupational History   ? Not on file   Social Needs   ? Financial resource strain: Not on file   ? Food insecurity:     Worry: Not on file     Inability: Not on file   ? Transportation needs:     Medical: Not on file     Non-medical: Not on file   Tobacco Use   ? Smoking status: Former Smoker     Packs/day: 1.50     Years: 0.00     Pack years: 0.00      Last attempt to quit: 11/9/2008     Years since quitting: 10.4   ? Smokeless tobacco: Former User   Substance and Sexual Activity   ? Alcohol use: No   ? Drug use: No   ? Sexual activity: Never   Lifestyle   ? Physical activity:     Days per week: Not on file     Minutes per session: Not on file   ? Stress: Not on file   Relationships   ? Social connections:     Talks on phone: Not on file     Gets together: Not on file     Attends [employer] service: Not on file     Active member of club or organization: Not on file     Attends meetings of clubs or organizations: Not on file     Relationship status: Not on file   ? Intimate partner violence:     Fear of current or ex partner: Not on file     Emotionally abused: Not on file     Physically abused: Not on file     Forced sexual activity: Not on file   Other Topics Concern   ? Not on file   Social History Narrative   ? Not on file       Current Meds:  Current Outpatient Medications   Medication Sig Dispense Refill   ? acetaminophen (TYLENOL) 500 MG tablet Take 2 tablets (1,000 mg total) by mouth 3 (three) times a day.  0   ? albuterol (PROVENTIL) 2.5 mg /3 mL (0.083 %) nebulizer solution TAKE 3ML BY MOUTH EVERY 4 HOURS AS NEEDED FOR WHEEZING 900 mL 1   ? alum/mag hydrox-simethicone-diphenhydramine-lidocaine (MAGIC MOUTHWASH) suspension 15 mL 4 (four) times a day.     ? amoxicillin-clavulanate (AUGMENTIN) 875-125 mg per tablet Take 1 tablet by mouth 2 (two) times a day.  0   ? aspirin 81 MG EC tablet Take 81 mg by mouth daily.     ? blood glucose meter (GLUCOMETER) Use 1 each As Directed as needed. Dispense glucometer brand per patient's insurance at pharmacy discretion. 1 each 0   ? blood glucose test (ACCU-CHEK SMARTVIEW TEST STRIP) strips Test blood sugar 3 times daily 200 strip 3   ? calcium carbonate-vitamin D3 (CALCIUM 600 WITH VITAMIN D3) 600 mg(1,500mg) -200 unit per tablet Take 1 tablet by mouth 2 (two) times a day. Taking 1200mg of Calcium with 1000 D3     ?  cetirizine 10 mg cap Take 10 mg by mouth daily with supper.            ? citalopram (CELEXA) 20 MG tablet Take 20 mg by mouth daily.     ? famotidine (PEPCID) 20 MG tablet Take 1 tablet (20 mg total) by mouth daily. (Patient taking differently: Take 20 mg by mouth 2 (two) times a day.       )     ? fluticasone (FLONASE) 50 mcg/actuation nasal spray 2 SPRAYS INTO EACH NOSTRIL DAILY. 48 g 3   ? furosemide (LASIX) 20 MG tablet TAKE 2 TABLETS (40 MG TOTAL) BY MOUTH EVERY MORNING. 180 tablet 3   ? generic lancets Use 1 each As Directed daily. Dispense brand per patient's insurance at pharmacy discretion. (dx E11.9) 100 each 1   ? guaiFENesin ER (MUCINEX) 600 mg 12 hr tablet Take 1,200 mg by mouth 2 (two) times a day.     ? INCRUSE ELLIPTA 62.5 mcg/actuation DsDv inhaler INHALE 1 PUFF BY MOUTH DAILY 90 puff 1   ? Lactobacillus rhamnosus GG (CULTURELLE) 15 billion cell CpSP Take 2 capsules by mouth Daily at 8:00 am..     ? levothyroxine (SYNTHROID, LEVOTHROID) 75 MCG tablet TAKE 1 TABLET BY MOUTH EVERY OTHER DAYS ALTERNATING WITH 75MCG AND 50MCG DOSE 45 tablet 2   ? lidocaine 4 % patch Place 1 patch on the skin daily. Remove and discard patch with 12 hours.  Apply to right shoulder 5 patch 0   ? loperamide (IMODIUM A-D) 2 mg tablet Take 2 mg by mouth as needed for diarrhea (2 mg after each loose stool to a maximum of 16 mg/day).     ? LORazepam (ATIVAN) 0.5 MG tablet Take 1 tablet (0.5 mg total) by mouth at bedtime. 30 tablet 0   ? menthol-zinc oxide (CALMOSEPTINE) 0.44-20.6 % Oint ointment Apply topically 3 (three) times a day.     ? metFORMIN (GLUCOPHAGE) 1000 MG tablet Take 1 tablet (1,000 mg total) by mouth 2 (two) times a day with meals. 180 tablet 3   ? montelukast (SINGULAIR) 10 mg tablet Take 1 tablet (10 mg total) by mouth at bedtime. 90 tablet 3   ? naproxen (NAPROSYN) 500 MG tablet Take 1 tablet (500 mg total) by mouth 2 (two) times a day with meals. 60 tablet 2   ? psyllium (METAMUCIL) 0.52 gram capsule Take  0.52 g by mouth 3 (three) times a day with meals.     ? PULMICORT FLEXHALER 180 mcg/actuation inhaler INHALE 2 PUFFS 2 (TWO) TIMES A DAY. 3 each 3   ? saliva stimulant (BIOTENE MOISTURIZING MOUTH) oral spray Take by mouth as needed.     ? simvastatin (ZOCOR) 10 MG tablet TAKE 1 TABLET BY MOUTH AT BEDTIME. 90 tablet 2   ? sodium chloride (OCEAN) 0.65 % nasal spray Apply 1 spray into each nostril as needed for congestion.     ? traZODone (DESYREL) 50 MG tablet Take 1 tablet (50 mg total) by mouth at bedtime as needed for sleep. (Patient taking differently: Take 75 mg by mouth at bedtime as needed for sleep.       ) 30 tablet 11   ? triamcinolone (KENALOG) 0.1 % ointment Apply topically 2 (two) times a day. hands (Patient taking differently: Apply topically daily as needed hands.      ) 30 g 3   ? VENTOLIN HFA 90 mcg/actuation inhaler INHALE 1-2 PUFFS EVERY 4 (FOUR) HOURS AS NEEDED FOR WHEEZING. 54 g 0     No current facility-administered medications for this visit.        Physical Exam:  /54   Pulse 96   Resp 19   SpO2 94% Comment: RA  Gen: alert, oriented, no distress, in wheelchair  HEENT: nasal turbinates are unremarkable, no oropharyngeal lesions, no cervical or supraclavicular lymphadenopathy  CV: tachy, regular, no M/G/R  Resp: bibasilar crackles, L>R  Abd: soft, nontender, no palpable organomegaly  MSK: right arm in sling  Skin: no apparent rashes  Ext: no cyanosis, clubbing or edema  Neuro: alert, nonfocal    Labs:  reviewed    Imaging studies:  TTE (3/28/19):  - EF 50%  - normal RV sie/function  - no valvular abnormalities  - severely increased CVP    Chest CT (4/22/19):  - images directly reviewed, formal interpretation follows:  FINDINGS:  LUNGS AND PLEURA: A 2.5 x 1.6 cm left lower lobe cavitation has developed. The  surrounding left lower lobe consolidation has decreased.     Evolving irregular right lower lobe consolidation. Persistent right upper lobe  paramediastinal irregular  consolidation.     Decreased bilateral pleural effusions. Residual fluid collections in both major  and the right minor fissures.     Increased smooth appearing intralobular septal thickening, particularly on the  left.     MEDIASTINUM: Persistent mediastinal adenopathy which could be reactive or  neoplastic.     LIMITED UPPER ABDOMEN: Left para-aortic 13 x 9 mm node just medial to the  adrenal gland. This could be reactive or neoplastic.     MUSCULOSKELETAL: Proximal right humeral impacted fracture.     CONCLUSION:  1.  Evolving bilateral lung consolidations.  2.  New left lower lobe cavitation. This most likely represents cavitary  pneumonia.  3.  Increasing left interlobular septal thickening most suggestive of edema.  4.  Persistent but improved bilateral pleural effusions.  5.  Mediastinal and upper para-aortic adenopathy which could be reactive or  neoplastic.  6.  Recommend CT chest follow-up exclude the possibility of neoplasm.    Jabari Perez MD  Pulmonary and Critical Care Medicine  [facility]  Cell [phone]  Office [phone]  Pager [phone]

## 2021-06-19 NOTE — LETTER
Letter by Robert Yepez CNP at      Author: Robert Yepez CNP Service: -- Author Type: --    Filed:  Encounter Date: 2019 Status: (Other)         Patient: Rita Mancini   MR Number: [id number]   YOB: 1950   Date of Visit: 2019     [facility]    Name:   Rita Mancini  : 1950  Facility:   [facility] [[id number]]   Room:   Code Status: FULL CODE -   Fac type:   SNF (Skilled Nursing Facility, TCU) -     CHIEF COMPLAINT / REASON FOR VISIT:  Chief Complaint   Patient presents with   ? Follow-up     TCU follow-up after hospitalization for pneumosepsis, but she was also recently in the ED with a closed displaced fracture of the proximal end of the right humerus.      ED 19 (displaced fracture of proximal end of right humerus)  [facility] from 19 until 19 (pneumosepsis)  [facility] TCU from 2019 until 2019 (expected discharge date)    Patient was last seen by me on 2019.      HPI: Rita is a 68 y.o. female was admitted to [facility] on 3/27/2019 for septic shock.   She had been to [facility] ED 11 days earlier after slipping on the ice and sustaining a closed displaced fracture of the proximal end of the right humerus.  X-rays of the right shoulder showed fracture of the proximal humerus, comminuted with displacement.  She was discharged with a shoulder immobilizer to home with orthopedic surgery follow-up.    Subsequent to that, she developed progressive shortness of breath, and the day before admission, family thought she sounded quite short of breath and confused.  The patient herself thought it was from her pain medication.  The night before admission, her son heard very heavy breathing.  He woke her up, and she was quite confused.  He helped her to the bathroom, but she took over an hour in the bathroom because she kept falling  asleep.  He helped her back to bed and called 911.      EMS measured sats in the 70s and felt she was in respiratory distress.  She was also found to be hypertensive on arrival at the ED.  She was given IV fluids and placed on BiPAP (PCO2 = 63), very elevated lactic acid (7.4), very high procalcitonin (20.85), acute renal failure (creatinine = 3.59), and hyperkalemic (6.0).  She was intubated and started on pressors.  Cultures grew group A strep as well as S. pyogenes.  There was concern for toxic shock syndrome.  She was given IVIG x3 days and clindamycin.  She was followed by ID (Dr. Ang), and chest tubes were placed.  CT showed improvement and chronic effusion.  She was switched to Zosyn but discharged on Augmentin.      MOST RECENT HOSPITALIZATION  (Per hospital discharge summary)     Pneumonia (S.pyogenes)  complicated by left-sided empyema s/p VATS/decortication 3/29  Strep biology knees bacteremia cleared  All chest tubes now removed, increase ambulation and activity  Respiratory failure-resolved, extubated 3/30   Antibiotics throughout course for broad-spectrum including vancomycin, Zosyn, clindamycin, and ultimately transition to Augmentin to complete 3 additional weeks.  Infectious disease consultant would like to follow-up in clinic in 3 weeks.  BCx 3/30 CNS- contaminant  Follow-up with general surgery after hospitalization  Pain control with small dose of oxycodone when needed, scheduled Tylenol, the latter has been enough for her pain control.     Anxiety  She is on diazepam as an outpatient, while taking more oxycodone, we will put this on hold.    COPD  Continue home nebulizers, she remains on 2-3 L/min of oxygen, wean as tolerated     Hypervolemia  She was diuresed following ICU stay, back down to baseline weight     Diabetes  At goal as outpatient (A1c <7).   Correction scale, keeping at goal 140-180  Acceptable control in the hospital, continue metformin at discharge     MONSERRAT  Home cpap  use     Insomnia  Continue trazodone when needed     Right humerus fracture  Ortho assessed recommending sling, also no DVT in right arm.  Recommendations given, will need outpatient follow-up     Lung cancer  Dx in 2009, hx of recurrent R effusion (talc pleurodesis 4/2015), currently in remission        Consult/s: pulmonary/intensive care, ID, general surgery and orthopedic surgery  Significant Diagnostic Studies:   EXAM: CT CHEST WO CONTRAST  LOCATION: [facility]  DATE/TIME: 3/28/2019 12:36 PM     INDICATION: pleural effusions, concern for empyema pleural effusions, concern for empyema  COMPARISON: Left thoracentesis from 3/27/2019, chest CT from 12/10/2018  TECHNIQUE: Helical images were obtained through the chest. Multiplanar reformats were obtained. Dose reduction techniques were used.  IV CONTRAST: None.     FINDINGS:   LUNGS AND PLEURA: Residual modest sized left pleural effusion is new compared to the previous CT. Airspace infiltrate at left lung base may represent a combination of pneumonia and atelectasis.  Small loculated pleural effusion within right major fissure. Atelectasis/consolidation right lung base new compared to the previous study.  There is no significant change in the postoperative spiculated appearance involving medial right upper lobe measuring approximately 2.5 x 1.5 cm with an adjacent suture line.     MEDIASTINUM: 2 AP window lymph nodes appear larger on image 35 and 36, each now measuring 10 mm in short axis, previously measuring 4 mm. Likely these are reactive. Endotracheal tube and NG tube are in place. Heart size normal.     LIMITED UPPER ABDOMEN: Negative.     MUSCULOSKELETAL: Negative.     IMPRESSION:   CONCLUSION:   1.  Modest residual left pleural effusion even after recent thoracentesis. Small loculated right pleural effusion. There are are no pleural findings on this noncontrast study to suggest empyema. Suggest correlating with results of yesterday's large   volume  left thoracentesis.  2.  Dependent infiltrates at both lung bases suggestive of pneumonia.  3.  Minimal change in postoperative appearance of right upper lobe. A few small AP window lymph nodes are minimally larger, likely reactive.     Blood culture 3/27/19          Streptococcus pyogenes       EL       Ceftriaxone Sensitive       Clindamycin Sensitive       Erythromycin Sensitive       Penicillin Sensitive       Vancomycin Sensitive           Procedures:  LEFT THORACOSCOPY WITH DECORTICATION 3/29/19  US guided thoracentesis      Treatments: IV antibiotics as above    Subsequent Chest CT on 04/22/2019:  CONCLUSION (per Dr. Jabari Perez):  1.  Evolving bilateral lung consolidations.  2.  New left lower lobe cavitation. This most likely represents cavitary  pneumonia.  3.  Increasing left interlobular septal thickening most suggestive of edema.  4.  Persistent but improved bilateral pleural effusions.  5.  Mediastinal and upper para-aortic adenopathy which could be reactive or  neoplastic.  6.  Recommend CT chest follow-up exclude the possibility of neoplasm.      CURRENT TCU ISSUES    Note: Approximately 15 to 20 minutes was spent today explaining what her most recent visits to pulmonology and ortho meant so that she could best understand.    Primary diagnosis: She did have a scheduled follow-up with infectious disease on 04/18/19.  She saw the surgeon, Dr. Ma, on 04/25/2019.  She tells me he wants her to make an appointment to see the physician who saw her in the hospital.  She did have an appointment with Dr. Jabari Perez (pulmonology) on 05/08/2019 with a diagnoses of COPD, left lung abscess/empyema, and chronic right upper lobe radiation pneumonitis.  Orders called for discontinuing Incruse and starting Anoro Ellipta.  Albuterol nebs were also changed every 4 hours as needed for dyspnea.  Also, Ocean Spray was ordered in each nostril daily on a scheduled basis plus up to 4 times daily as needed.     She  "saw ID on 04/18/2019.  Something was seen on x-ray that Dr. Ang found concerning, and a CT was ordered.  She was then referred to Dr. Ma.  I believe this involves the area of the pleural effusion/lung abscess in the left lobe.  She tells me that ID wanted to see her again on 05/02/2019, and she was continued on antibiotics.  Lung sounds currently are very coarse.  Current Augmentin is being continued after being seen by Dr. Perez and referred to ID.  White count was still elevated at 16.2 on 05/08/2019.    Right humeral head fracture: She was seen in follow-up at [facility] by Dr. Jase Anaya.  She appears to be doing well with minimal residual swelling/bruising or complaint of pain.  X-rays showed grossly maintained alignment and abundant fracture callus.  Orders were to wean from the sling as comfort permits and PT/OT to continue with PROM as tolerated.  Still to remain nonweightbearing with the right upper extremity until follow-up on 06/07/2019.    Pain management: The pain she experiences is in her right shoulder.  She is receiving low-dose oxycodone (2.5-5 mg every 6 hours) as needed they are also applying ice, and she has a lidocaine patch.  She mentioned, \"I've had decent luck with Tylenol.\"  At an earlier visit, we scheduled 1000 mg every 8 hours (3 times daily).  The nurse told me she did not want to take any oxycodone because it made her \"goofy.\"  The side effect may not be an accurate assessment, as her previous mental state was caused by illness.  Nonetheless, we discontinued it.    When seen earlier, her daughter, who was in attendance, told me that her mother gets \"agitated when the pain killers make her groggy.\"  As for the patient, she stated she was not getting too much, although the pain is never completely gone.    Buttock soreness: She tells me they are using an alcohol-based product to clean her up.  She would like something without that.  Dr. Luna ordered calmoseptine.  " It is much better now.    Candidal intertrigo: Under the breasts and abdominal folds.  With nystatin powder, this is now resolved.    Dry mouth/dysphagia: She requested Biotene, and we wrote orders for that.  Uncertain whether it would be covered by her insurance.  I did suggest she could also suck on hard candy or lemon drops, and I later saw her with those.  Her daughter also mentioned that she was choking with eating.  Dr. Luna ordered speech to evaluate and treat.  Her diet was recently upgraded to regular texture by SLP on 04/25/2019.  She now claims she is not needing to use the Biotene as much.    Anxiety/insomnia: Earlier, we did talk a bit about her anxiety that can make her pain worse.  They do have an outside person who comes and performs healing touch, and we did write orders for this.  However, at the current time her anxiety is affecting her daily life, sleep, and ability to participate in therapy.  We ordered lorazepam 0.5 mg nightly.  She is sleeping a little better with this and trazodone, although she cannot seem to sleep past 4 AM.  We increased the trazodone from 50 mg to 75 mg nightly, and this seems to be helping.    Depression/cognition: She was seen by Dr. Shepherd on 04/23/2019 and scored high (16/27) on the PHQ 9 depression scale.  At that point, we increased her citalopram from 10 mg to 20 mg daily.  She was also noted by Occupational Therapy to have some cognitive issues per the Short Blessed test.  She does not exhibit overt psychotic symptoms.    COPD: She states she has lots of lung problems and is 10 years out from inoperable lung cancer for which she underwent radiation and chemotherapy.  She does a lot of coughing which is chronic.  She is always coughing up phlegm, so she may wait too long before her respiratory symptoms become serious.  Apparently, she has infections at least a couple times a year.  She was a smoker until 2008 when she developed lung cancer.  She is on multiple  "inhaled medications.  I have a feeling that she is generally noncompliant, disagreeing with her daughter how frequently she takes her nebulizer treatments, for example.  She tells me that her breathing can be somewhat labored, but her O2 sats remained good.  When seen on 04/24/2019, she was on 0.5 L/min per nasal cannula, although she believed she could do without it.  We had planned to do a taper, and oxygen was discontinued on 04/25/2019.    Dr. Luna wrote orders for scheduled albuterol nebs (in addition to as needed dosing).  She also ordered a CBC with differential and a BMP.  CBC on 04/15/2019 still showed an elevated white count of 18.1 with a slight left shift (neutrophils 77%, lymphocytes 12%).  We ordered a follow-up CBC with differential and BMP was still elevated but less so at 14.8, hemoglobin 8.0, platelets 617,000.    Diarrhea: They are loose and frequent and always seems to occur when she takes any antibiotics.  We ordered Imodium 2 mg after each loose stool (maximum 16 mg/day) and Culturelle (15 billion cell) probiotic (2 caps every morning).  Dr. Luna added psyllium capsules (0.52 g 3 times daily with meals), and this has helped somewhat.    GERD: Another problem she complains about there is persistent heartburn.  She is already receiving 20 mg of famotidine daily.  We increased the famotidine to 20 mg twice daily.  This is now much improved, although it did take some time.  She tells me the heartburn is \"all gone.\"    Diabetes: Recent blood glucose levels range between 70 and 175, mostly around 100.  She is on 1000 mg of metformin twice daily.    Anemia: This may or may not be the cause, but she tells me she gets tired very easily.  Her latest hemoglobin was 8.0.  With the previous white count of 14.8 and elevated platelets, we rechecked a hemogram-2.  White count is still elevated at 16.2.  Apparently, some dosing of her antibiotic was administered.    Obstructive sleep apnea: She does have " a CPAP machine at home but has not used it in months.  Here, she does have an incentive spirometer, and I encouraged her to use it.       ROS: No headaches, nausea or vomiting, dizziness, dysuria.    Past Medical History:   Diagnosis Date   ? COPD (chronic obstructive pulmonary disease) (H)    ? Depression    ? Diabetes mellitus (H)    ? GERD (gastroesophageal reflux disease)    ? Hx antineoplastic chemotherapy     lung cancer    ? Hx of radiation therapy     lung cancer    ? Hyperlipemia    ? Hypothyroid    ? Lung cancer (H) 2008    RUL,  Non small cell cancer   ? 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 Osteopenia    ? Pleural effusion 1/15   ? Radiation Pneumonitis     Created by Conversion    ? Sleep apnea     does not use cpap              Family History   Problem Relation Age of Onset   ? Heart disease Mother    ? Hypertension Mother    ? Alcohol abuse Mother    ? Depression Mother    ? Heart disease Father 45        mi age 45, cabg   ? Heart attack Father    ? Cancer Father         prostate   ? Hypertension Father    ? COPD Brother    ? Alcohol abuse Brother    ? Alcohol abuse Sister    ? Breast cancer Paternal Aunt    ? Leukemia Grandchild    ? Cancer Maternal Aunt 47        breast   ? Diabetes Son    ? Anxiety disorder Son    ? Depression Son    ? No Medical Problems Daughter    ? Schizophrenia Grandchild      Social History     Socioeconomic History   ? Marital status:      Spouse name: Not on file   ? Number of children: Not on file   ? Years of education: Not on file   ? Highest education level: Not on file   Occupational History   ? Not on file   Social Needs   ? Financial resource strain: Not on file   ? Food insecurity:     Worry: Not on file     Inability: Not on file   ? Transportation needs:     Medical: Not on file     Non-medical: Not on file   Tobacco Use   ? Smoking status: Former Smoker     Packs/day: 1.50     Years: 0.00     Pack years: 0.00     Last attempt to quit: 11/9/2008      Years since quitting: 10.5   ? Smokeless tobacco: Former User   Substance and Sexual Activity   ? Alcohol use: No   ? Drug use: No   ? Sexual activity: Never   Lifestyle   ? Physical activity:     Days per week: Not on file     Minutes per session: Not on file   ? Stress: Not on file   Relationships   ? Social connections:     Talks on phone: Not on file     Gets together: Not on file     Attends Rastafari service: Not on file     Active member of club or organization: Not on file     Attends meetings of clubs or organizations: Not on file     Relationship status: Not on file   ? Intimate partner violence:     Fear of current or ex partner: Not on file     Emotionally abused: Not on file     Physically abused: Not on file     Forced sexual activity: Not on file   Other Topics Concern   ? Not on file   Social History Narrative   ? Not on file       MEDICATIONS: Reviewed from the MAR, physician orders, and/or earlier progress notes.  Updated by me today (05/13/2019) with changes made by Dr. Perez reflected below.  Current Outpatient Medications   Medication Sig   ? acetaminophen (TYLENOL) 500 MG tablet Take 2 tablets (1,000 mg total) by mouth 3 (three) times a day.   ? albuterol (PROVENTIL) 2.5 mg /3 mL (0.083 %) nebulizer solution TAKE 3ML BY MOUTH EVERY 4 HOURS AS NEEDED FOR WHEEZING   ? alum/mag hydrox-simethicone-diphenhydramine-lidocaine (MAGIC MOUTHWASH) suspension 15 mL 4 (four) times a day as needed. Change from scheduled to as needed on 05/13/2019.         ? amoxicillin-clavulanate (AUGMENTIN) 875-125 mg per tablet Take 1 tablet by mouth 2 (two) times a day.   ? aspirin 81 MG EC tablet Take 81 mg by mouth daily.   ? blood glucose meter (GLUCOMETER) Use 1 each As Directed as needed. Dispense glucometer brand per patient's insurance at pharmacy discretion.   ? blood glucose test (ACCU-CHEK SMARTVIEW TEST STRIP) strips Test blood sugar 3 times daily   ? calcium carbonate-vitamin D3 (CALCIUM 600 WITH VITAMIN  D3) 600 mg(1,500mg) -200 unit per tablet Take 1 tablet by mouth 2 (two) times a day. Taking 1200mg of Calcium with 1000 D3   ? cetirizine 10 mg cap Take 10 mg by mouth daily with supper.          ? citalopram (CELEXA) 20 MG tablet Take 20 mg by mouth daily.   ? famotidine (PEPCID) 20 MG tablet Take 1 tablet (20 mg total) by mouth daily. (Patient taking differently: Take 20 mg by mouth 2 (two) times a day.       )   ? fluticasone (FLONASE) 50 mcg/actuation nasal spray 2 SPRAYS INTO EACH NOSTRIL DAILY.   ? furosemide (LASIX) 20 MG tablet TAKE 2 TABLETS (40 MG TOTAL) BY MOUTH EVERY MORNING.   ? generic lancets Use 1 each As Directed daily. Dispense brand per patient's insurance at pharmacy discretion. (dx E11.9)   ? guaiFENesin ER (MUCINEX) 600 mg 12 hr tablet Take 1,200 mg by mouth 2 (two) times a day.   ? INCRUSE ELLIPTA 62.5 mcg/actuation DsDv inhaler INHALE 1 PUFF BY MOUTH DAILY   ? Lactobacillus rhamnosus GG (CULTURELLE) 15 billion cell CpSP Take 2 capsules by mouth Daily at 8:00 am..   ? levothyroxine (SYNTHROID, LEVOTHROID) 75 MCG tablet TAKE 1 TABLET BY MOUTH EVERY OTHER DAYS ALTERNATING WITH 75MCG AND 50MCG DOSE   ? lidocaine 4 % patch Place 1 patch on the skin daily. Remove and discard patch with 12 hours.  Apply to right shoulder   ? loperamide (IMODIUM A-D) 2 mg tablet Take 2 mg by mouth as needed for diarrhea (2 mg after each loose stool to a maximum of 16 mg/day).   ? LORazepam (ATIVAN) 0.5 MG tablet Take 1 tablet (0.5 mg total) by mouth at bedtime.   ? menthol-zinc oxide (CALMOSEPTINE) 0.44-20.6 % Oint ointment Apply topically 3 (three) times a day.   ? metFORMIN (GLUCOPHAGE) 1000 MG tablet Take 1 tablet (1,000 mg total) by mouth 2 (two) times a day with meals.   ? montelukast (SINGULAIR) 10 mg tablet Take 1 tablet (10 mg total) by mouth at bedtime.   ? naproxen (NAPROSYN) 500 MG tablet Take 1 tablet (500 mg total) by mouth 2 (two) times a day with meals.   ? psyllium (METAMUCIL) 0.52 gram capsule Take  "0.52 g by mouth 3 (three) times a day with meals.   ? PULMICORT FLEXHALER 180 mcg/actuation inhaler INHALE 2 PUFFS 2 (TWO) TIMES A DAY.   ? saliva stimulant (BIOTENE MOISTURIZING MOUTH) oral spray Take by mouth as needed.   ? simvastatin (ZOCOR) 10 MG tablet TAKE 1 TABLET BY MOUTH AT BEDTIME.   ? sodium chloride (OCEAN) 0.65 % nasal spray Apply 1 spray into each nostril as needed for congestion.   ? traZODone (DESYREL) 50 MG tablet Take 1 tablet (50 mg total) by mouth at bedtime as needed for sleep. (Patient taking differently: Take 75 mg by mouth at bedtime as needed for sleep.       )   ? triamcinolone (KENALOG) 0.1 % ointment Apply topically 2 (two) times a day. hands (Patient taking differently: Apply topically daily as needed hands.      )   ? VENTOLIN HFA 90 mcg/actuation inhaler INHALE 1-2 PUFFS EVERY 4 (FOUR) HOURS AS NEEDED FOR WHEEZING.     ALLERGIES: No Known Allergies    DIET: Diabetic, regular texture, thin liquids.  Mighty Shakes Sugar-Free.    Vitals:    05/13/19 1759   BP: 108/56   Pulse: (!) 108   Resp: 18   Temp: 98.7  F (37.1  C)   SpO2: 96%   Weight: 161 lb (73 kg)   Height: 5' 5\" (1.651 m)     Body mass index is 26.79 kg/m .    EXAMINATION:   General: Pleasant middle-aged female, looking much older than her stated age, sitting in a recliner, in NAD.  Head: Normocephalic and atraumatic.   Eyes: PERRLA, sclerae clear.   ENT: Moist oral mucosa.   Cardiovascular: Regular rate and rhythm.  No appreciable murmur.  Respiratory: Still has a loose cough (Mucinex helps).  Earlier, I could appreciate bibasilar rales (left greater than right).  At later visits, there appeared to be coarse bilateral mid to upper lung rhonchi with an expiratory wheeze.  Most likely related to her pleural/left lung abscess effusion discussed above.  These persist, right greater than left.  Abdomen: Soft and nontender.   Musculoskeletal/Extremities: Age-related degenerative joint disease.  Right arm in a sling.  No peripheral " edema.  Integument: No rashes, clinically significant lesions, or skin breakdown.   Cognitive/Psychiatric: Alert and oriented x3 as far as I can tell.  Affect is somewhat depressed/anxious.    DIAGNOSTICS:   Results for orders placed or performed in visit on 04/30/19   Basic Metabolic Panel   Result Value Ref Range    Sodium 139 136 - 145 mmol/L    Potassium 4.7 3.5 - 5.0 mmol/L    Chloride 105 98 - 107 mmol/L    CO2 19 (L) 22 - 31 mmol/L    Anion Gap, Calculation 15 5 - 18 mmol/L    Glucose 68 (L) 70 - 125 mg/dL    Calcium 10.0 8.5 - 10.5 mg/dL    BUN 17 8 - 22 mg/dL    Creatinine 0.92 0.60 - 1.10 mg/dL    GFR MDRD Af Amer >60 >60 mL/min/1.73m2    GFR MDRD Non Af Amer >60 >60 mL/min/1.73m2     Lab Results   Component Value Date    WBC 16.2 (H) 05/08/2019    HGB 8.4 (L) 05/08/2019    HCT 28.9 (L) 05/08/2019    MCV 92 05/08/2019     (H) 05/08/2019     CrCl cannot be calculated (Patient's most recent lab result is older than the maximum 5 days allowed.).  Lab Results   Component Value Date    HGBA1C 6.3 (H) 02/06/2019     Lab Results   Component Value Date    TSH 1.36 08/27/2018       ASSESSMENT/Plan:      ICD-10-CM    1. History of gram-positive (S. pyogenes) pneumosepsis Z86.19    2. Cavitary pneumonia J18.9     J98.4    3. Chronic pleural effusion, left (s/p Talc pleurodesis 2015) J90    4. 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 Chronic obstructive pulmonary disease, unspecified COPD type (H) J44.9    6. Psychophysiological insomnia F51.04    7. Functional diarrhea K59.1    8. Anemia, unspecified type D64.9    9. Gastroesophageal reflux disease without esophagitis K21.9    10. Acquired hypothyroidism E03.9    11. Non-small cell cancer of right lung, s/p radiation and chemotherapies, in remission since 7570-0148 (H) C34.91      CHANGES:    None.    CARE PLAN:    The care plan has been reviewed and all orders signed. Changes to care  plan, if any, as noted. Otherwise, continue current plan of care.  Total time spent with this patient was approximately 35 minutes, with greater than 50% spent in counseling and coordination of care that included a review of recent consults (pulmonology and ortho) and discussing this with the patient stated that she could best understand.    The above has been created using voice recognition software. Please be aware that this may unintentionally  produce inaccuracies and/or nonsensical sentences.      Electronically signed by: Robert Yepez CNP

## 2021-06-19 NOTE — LETTER
Letter by Robert Yepez CNP at      Author: Robert Yepez CNP Service: -- Author Type: --    Filed:  Encounter Date: 2019 Status: (Other)         Patient: Rita Mancini   MR Number: [id number]   YOB: 1950   Date of Visit: 2019     [facility]    Name:   Rita Mancini  : 1950  Facility:   [facility] [[id number]]   Room:   Code Status: FULL CODE -   Fac type:   SNF (Skilled Nursing Facility, TCU) -     CHIEF COMPLAINT / REASON FOR VISIT:  Chief Complaint   Patient presents with   ? Follow-up     TCU follow-up after hospitalization for pneumosepsis, but she was also recently in the ED with a closed displaced fracture of the proximal end of the right humerus.  Also addressing diarrhea and depression.     [facility] ED 19 (displaced fracture of proximal end of right humerus)  [facility] from 19 until 19 (pneumosepsis)    Patient was last seen by me on 19.      HPI: Rita is a 68 y.o. female was admitted to [facility] on 3/27/2019 for septic shock.   She had been to [facility] ED 11 days earlier after slipping on the ice and sustaining a closed displaced fracture of the proximal end of the right humerus.  X-rays of the right shoulder showed fracture of the proximal humerus, comminuted with displacement.  She was discharged with a shoulder immobilizer to home with orthopedic surgery follow-up.      MOST RECENT HOSPITALIZATION  (Per hospital discharge summary)     Pneumonia (S.pyogenes)  complicated by left-sided empyema s/p VATS/decortication 3/29  Strep biology knees bacteremia cleared  All chest tubes now removed, increase ambulation and activity  Respiratory failure-resolved, extubated 3/30   Antibiotics throughout course for broad-spectrum including vancomycin, Zosyn, clindamycin, and ultimately transition to Augmentin to complete 3 additional weeks.  Infectious  disease consultant would like to follow-up in clinic in 3 weeks.  BCx 3/30 CNS- contaminant  Follow-up with general surgery after hospitalization  Pain control with small dose of oxycodone when needed, scheduled Tylenol, the latter has been enough for her pain control.     Anxiety  She is on diazepam as an outpatient, while taking more oxycodone, we will put this on hold.    COPD  Continue home nebulizers, she remains on 2-3 L/min of oxygen, wean as tolerated     Hypervolemia  She was diuresed following ICU stay, back down to baseline weight     Diabetes  At goal as outpatient (A1c <7).   Correction scale, keeping at goal 140-180  Acceptable control in the hospital, continue metformin at discharge     MONSERRAT  Home cpap use     Insomnia  Continue trazodone when needed     Right humerus fracture  Ortho assessed recommending sling, also no DVT in right arm.  Recommendations given, will need outpatient follow-up     Lung cancer  Dx in 2009, hx of recurrent R effusion (talc pleurodesis 4/2015), currently in remission        Consult/s: pulmonary/intensive care, ID, general surgery and orthopedic surgery  Significant Diagnostic Studies:   EXAM: CT CHEST WO CONTRAST  LOCATION: [facility]  DATE/TIME: 3/28/2019 12:36 PM     INDICATION: pleural effusions, concern for empyema pleural effusions, concern for empyema  COMPARISON: Left thoracentesis from 3/27/2019, chest CT from 12/10/2018  TECHNIQUE: Helical images were obtained through the chest. Multiplanar reformats were obtained. Dose reduction techniques were used.  IV CONTRAST: None.     FINDINGS:   LUNGS AND PLEURA: Residual modest sized left pleural effusion is new compared to the previous CT. Airspace infiltrate at left lung base may represent a combination of pneumonia and atelectasis.  Small loculated pleural effusion within right major fissure. Atelectasis/consolidation right lung base new compared to the previous study.  There is no significant change in the  "postoperative spiculated appearance involving medial right upper lobe measuring approximately 2.5 x 1.5 cm with an adjacent suture line.     MEDIASTINUM: 2 AP window lymph nodes appear larger on image 35 and 36, each now measuring 10 mm in short axis, previously measuring 4 mm. Likely these are reactive. Endotracheal tube and NG tube are in place. Heart size normal.     LIMITED UPPER ABDOMEN: Negative.     MUSCULOSKELETAL: Negative.     IMPRESSION:   CONCLUSION:   1.  Modest residual left pleural effusion even after recent thoracentesis. Small loculated right pleural effusion. There are are no pleural findings on this noncontrast study to suggest empyema. Suggest correlating with results of yesterday's large   volume left thoracentesis.  2.  Dependent infiltrates at both lung bases suggestive of pneumonia.  3.  Minimal change in postoperative appearance of right upper lobe. A few small AP window lymph nodes are minimally larger, likely reactive.     Blood culture 3/27/19          Streptococcus pyogenes       EL       Ceftriaxone Sensitive       Clindamycin Sensitive       Erythromycin Sensitive       Penicillin Sensitive       Vancomycin Sensitive           Procedures:  LEFT THORACOSCOPY WITH DECORTICATION 3/29/19  US guided thoracentesis      Treatments: IV antibiotics as above       CURRENT TCU ISSUES    Primary diagnosis: She did have a scheduled follow-up with infectious disease on 04/18/19.  She saw the surgeon, Dr. Ma, on 04/25/2019.  She tells me he wants her to make an appointment to see the physician who saw her in the hospital.    Right humeral head fracture: Angie does want follow-up x-rays at some point.  She will be making an appointment for that.    Pain management: The pain she experiences is in her right shoulder.  She is receiving low-dose oxycodone (2.5-5 mg every 6 hours) as needed they are also applying ice, and she has a lidocaine patch.  She mentioned, \"I've had decent luck with " "Tylenol.\"  At an earlier visit, we scheduled 1000 mg every 8 hours (3 times daily).  The nurse tells me she does not want to take any oxycodone because it makes her \"goofy.\"  We will discontinue it.    When seen earlier, her daughter, who was in attendance, told me that her mother gets \"agitated when the pain killers make her groggy.\"  As for the patient, she stated she was not getting too much, although the pain is never completely gone.    Buttock soreness: She tells me they are using an alcohol-based product to clean her up.  She would like something without that.  Dr. Luna ordered calmoseptine.    Candidal intertrigo: Under the breasts and abdominal folds.  She is currently being treated nystatin powder.    Dry mouth/dysphagia: She requested Biotene, and we wrote orders for that.  Uncertain whether it would be covered by her insurance.  I did suggest she could also suck on hard candy or lemon drops, and I later saw her with those.  Her daughter also mentioned that she was choking with eating.  Dr. Luna ordered speech to evaluate and treat.  Her diet was recently upgraded to regular texture by SLP on 04/25/2019.    Anxiety: Earlier, we did talk a bit about her anxiety that can make her pain worse.  They do have an outside person who comes and performs healing touch, and we did write orders for this.  However, at the current time her anxiety is affecting her daily life, sleep, and ability to participate in therapy.  We ordered lorazepam 0.5 mg nightly.  She is sleeping better with this and trazodone.    Depression/cognition: She was seen by Dr. Shepherd on 04/23/2019 and scored high (16/27) on the PHQ 9 depression scale.  At that point, we increased her citalopram from 10 mg to 20 mg daily.  She was also noted by Occupational Therapy to have some cognitive issues per the Short Blessed test.  She does not exhibit overt psychotic symptoms.    COPD: She states she has lots of lung problems and is 10 years out " from inoperable lung cancer for which she underwent radiation and chemotherapy.  She does a lot of coughing which is chronic.  She is always coughing up phlegm, so she may wait too long before her respiratory symptoms become serious.  Apparently, she has infections at least a couple times a year.  She was a smoker until 2008 when she developed lung cancer.  She is on multiple inhaled medications.  I have a feeling that she is generally noncompliant, disagreeing with her daughter how frequently she takes her nebulizer treatments, for example.  She tells me that her breathing can be somewhat labored, but her O2 sats remained good.  When seen on 04/24/2019, she was on 0.5 L/min per nasal cannula, although she believed she could do without it.  We had planned to do a taper, and oxygen was discontinued on 04/25/2019.    Dr. Luna wrote orders for scheduled albuterol nebs (in addition to as needed dosing).  She also ordered a CBC with differential and a BMP.  CBC on 04/15/2019 still showed an elevated white count of 18.1 with a slight left shift (neutrophils 77%, lymphocytes 12%).  We ordered a follow-up CBC with differential and BMP for 04/26/2019.  At this time, they are not available in the chart.    Dry nose: Due to oxygen per nasal cannula.  We ordered a non-petroleum-based gel to apply to the nares twice daily.  Also, Ocean nasal spray may be used hourly as needed.  While the nares are still somewhat dry, she has not needed oxygen since 04/25/2019.    Diarrhea: This appears to be her biggest concern.  We ordered Imodium 2 mg after each loose stool (maximum 16 mg/day) and Culturelle (15 billion cell) probiotic (2 caps every morning).  Dr. Luna added psyllium capsules (0.52 g 3 times daily with meals), and she states that it is helping a lot.    GERD: Another problem she complains about there is persistent heartburn.  She is already receiving 20 mg of famotidine daily.  We increased the famotidine to 20 mg twice  "daily.  She does not note much improvement.  She says, \"it's like you want to burp all the time.\"  We may need to switch to something else.    Diabetes: Recent blood glucose levels range between 72 and 175, mostly around 100.  She is on 1000 mg of metformin twice daily.    Obstructive sleep apnea: She does have a CPAP machine at home but has not used it in months.  Here, she does have an incentive spirometer, and I encouraged her to use it.       ROS: No headaches, nausea or vomiting, dizziness, dysuria.    Past Medical History:   Diagnosis Date   ? COPD (chronic obstructive pulmonary disease) (H)    ? Depression    ? Diabetes mellitus (H)    ? GERD (gastroesophageal reflux disease)    ? Hx antineoplastic chemotherapy     lung cancer    ? Hx of radiation therapy     lung cancer    ? Hyperlipemia    ? Hypothyroid    ? Lung cancer (H) 2008    RUL,  Non small cell cancer   ? 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 Osteopenia    ? Pleural effusion 1/15   ? Radiation Pneumonitis     Created by Conversion    ? Sleep apnea     does not use cpap              Family History   Problem Relation Age of Onset   ? Heart disease Mother    ? Hypertension Mother    ? Alcohol abuse Mother    ? Depression Mother    ? Heart disease Father 45        mi age 45, cabg   ? Heart attack Father    ? Cancer Father         prostate   ? Hypertension Father    ? COPD Brother    ? Alcohol abuse Brother    ? Alcohol abuse Sister    ? Breast cancer Paternal Aunt    ? Leukemia Grandchild    ? Cancer Maternal Aunt 47        breast   ? Diabetes Son    ? Anxiety disorder Son    ? Depression Son    ? No Medical Problems Daughter    ? Schizophrenia Grandchild      Social History     Socioeconomic History   ? Marital status:      Spouse name: Not on file   ? Number of children: Not on file   ? Years of education: Not on file   ? Highest education level: Not on file   Occupational History   ? Not on file   Social Needs   ? Financial resource " strain: Not on file   ? Food insecurity:     Worry: Not on file     Inability: Not on file   ? Transportation needs:     Medical: Not on file     Non-medical: Not on file   Tobacco Use   ? Smoking status: Former Smoker     Packs/day: 1.50     Years: 0.00     Pack years: 0.00     Last attempt to quit: 11/9/2008     Years since quitting: 10.4   ? Smokeless tobacco: Former User   Substance and Sexual Activity   ? Alcohol use: No   ? Drug use: No   ? Sexual activity: Never   Lifestyle   ? Physical activity:     Days per week: Not on file     Minutes per session: Not on file   ? Stress: Not on file   Relationships   ? Social connections:     Talks on phone: Not on file     Gets together: Not on file     Attends Tenriism service: Not on file     Active member of club or organization: Not on file     Attends meetings of clubs or organizations: Not on file     Relationship status: Not on file   ? Intimate partner violence:     Fear of current or ex partner: Not on file     Emotionally abused: Not on file     Physically abused: Not on file     Forced sexual activity: Not on file   Other Topics Concern   ? Not on file   Social History Narrative   ? Not on file       MEDICATIONS: Reviewed from the MAR, physician orders, and/or earlier progress notes.  Updated by me today (04/29/2019) with discontinuation of oxycodone and the addition of psyllium reflected below.  Current Outpatient Medications   Medication Sig   ? psyllium (METAMUCIL) 0.52 gram capsule Take 0.52 g by mouth 3 (three) times a day with meals.   ? acetaminophen (TYLENOL) 500 MG tablet Take 2 tablets (1,000 mg total) by mouth 3 (three) times a day.   ? albuterol (PROVENTIL) 2.5 mg /3 mL (0.083 %) nebulizer solution TAKE 3ML BY MOUTH EVERY 4 HOURS AS NEEDED FOR WHEEZING   ? aspirin 81 MG EC tablet Take 81 mg by mouth daily.   ? baclofen (LIORESAL) 10 MG tablet Take 1 tablet (10 mg total) by mouth 3 (three) times a day as needed.   ? blood glucose meter (GLUCOMETER)  Use 1 each As Directed as needed. Dispense glucometer brand per patient's insurance at pharmacy discretion.   ? blood glucose test (ACCU-CHEK SMARTVIEW TEST STRIP) strips Test blood sugar 3 times daily   ? calcium carbonate-vitamin D3 (CALCIUM 600 WITH VITAMIN D3) 600 mg(1,500mg) -200 unit per tablet Take 1 tablet by mouth 2 (two) times a day. Taking 1200mg of Calcium with 1000 D3   ? cetirizine 10 mg cap Take 10 mg by mouth daily with supper.          ? citalopram (CELEXA) 20 MG tablet Take 20 mg by mouth daily.   ? famotidine (PEPCID) 20 MG tablet Take 1 tablet (20 mg total) by mouth daily. (Patient taking differently: Take 20 mg by mouth 2 (two) times a day.       )   ? fluticasone (FLONASE) 50 mcg/actuation nasal spray 2 SPRAYS INTO EACH NOSTRIL DAILY.   ? furosemide (LASIX) 20 MG tablet TAKE 2 TABLETS (40 MG TOTAL) BY MOUTH EVERY MORNING.   ? generic lancets Use 1 each As Directed daily. Dispense brand per patient's insurance at pharmacy discretion. (dx E11.9)   ? guaiFENesin ER (MUCINEX) 600 mg 12 hr tablet Take 1,200 mg by mouth 2 (two) times a day.   ? INCRUSE ELLIPTA 62.5 mcg/actuation DsDv inhaler INHALE 1 PUFF BY MOUTH DAILY   ? Lactobacillus rhamnosus GG (CULTURELLE) 15 billion cell CpSP Take 2 capsules by mouth Daily at 8:00 am..   ? levothyroxine (SYNTHROID, LEVOTHROID) 75 MCG tablet TAKE 1 TABLET BY MOUTH EVERY OTHER DAYS ALTERNATING WITH 75MCG AND 50MCG DOSE   ? lidocaine 4 % patch Place 1 patch on the skin daily. Remove and discard patch with 12 hours.  Apply to right shoulder   ? loperamide (IMODIUM A-D) 2 mg tablet Take 2 mg by mouth as needed for diarrhea (2 mg after each loose stool to a maximum of 16 mg/day).   ? LORazepam (ATIVAN) 0.5 MG tablet Take by mouth at bedtime.   ? menthol-zinc oxide (CALMOSEPTINE) 0.44-20.6 % Oint ointment Apply topically 3 (three) times a day.   ? metFORMIN (GLUCOPHAGE) 1000 MG tablet Take 1 tablet (1,000 mg total) by mouth 2 (two) times a day with meals.   ?  "montelukast (SINGULAIR) 10 mg tablet Take 1 tablet (10 mg total) by mouth at bedtime.   ? naproxen (NAPROSYN) 500 MG tablet Take 1 tablet (500 mg total) by mouth 2 (two) times a day with meals.   ? PULMICORT FLEXHALER 180 mcg/actuation inhaler INHALE 2 PUFFS 2 (TWO) TIMES A DAY.   ? simvastatin (ZOCOR) 10 MG tablet TAKE 1 TABLET BY MOUTH AT BEDTIME.   ? sodium chloride (OCEAN) 0.65 % nasal spray Apply 1 spray into each nostril as needed for congestion.   ? traZODone (DESYREL) 50 MG tablet Take 1 tablet (50 mg total) by mouth at bedtime as needed for sleep.   ? triamcinolone (KENALOG) 0.1 % ointment Apply topically 2 (two) times a day. hands (Patient taking differently: Apply topically daily as needed hands.      )   ? VENTOLIN HFA 90 mcg/actuation inhaler INHALE 1-2 PUFFS EVERY 4 (FOUR) HOURS AS NEEDED FOR WHEEZING.     ALLERGIES: No Known Allergies    DIET: Diabetic, regular texture, thin liquids.  Mighty Shakes Sugar-Free.    Vitals:    04/29/19 1245   BP: 104/56   Pulse: 100   Resp: 18   Temp: 97.6  F (36.4  C)   SpO2: 95%   Weight: 163 lb 3.2 oz (74 kg)   Height: 5' 5\" (1.651 m)     Body mass index is 27.16 kg/m .    EXAMINATION:   General: Pleasant middle-aged female, looking much older than her stated age, sitting in a wheelchair, in NAD.  Head: Normocephalic and atraumatic.   Eyes: PERRLA, sclerae clear.   ENT: Moist oral mucosa.   Cardiovascular: Regular rate and rhythm.  No appreciable murmur.  Respiratory: Previously, I could appreciate bibasilar rales (left greater than right).  Now, there appear to be bilateral mid to upper lung rhonchi with an expiratory wheeze.  I believe this is a chronic condition.  In addition to her COPD, she does have a left lung abscess.  Otherwise, lungs appear clear.  Abdomen: Soft and nontender.   Musculoskeletal/Extremities: Age-related degenerative joint disease.  Right arm in a sling.  No peripheral edema.  Integument: No rashes, clinically significant lesions, or skin " breakdown.   Cognitive/Psychiatric: Alert and oriented x3 as far as I can tell.  Affect is somewhat depressed/anxious.    DIAGNOSTICS:   Results for orders placed or performed during the hospital encounter of 03/27/19   Basic Metabolic Panel   Result Value Ref Range    Sodium 138 136 - 145 mmol/L    Potassium 4.1 3.5 - 5.0 mmol/L    Chloride 102 98 - 107 mmol/L    CO2 27 22 - 31 mmol/L    Anion Gap, Calculation 9 5 - 18 mmol/L    Glucose 136 (H) 70 - 125 mg/dL    Calcium 8.3 (L) 8.5 - 10.5 mg/dL    BUN 10 8 - 22 mg/dL    Creatinine 0.76 0.60 - 1.10 mg/dL    GFR MDRD Af Amer >60 >60 mL/min/1.73m2    GFR MDRD Non Af Amer >60 >60 mL/min/1.73m2     Lab Results   Component Value Date    WBC 18.3 (H) 04/18/2019    WBC 18.6 (H) 04/18/2019    HGB 8.4 (L) 04/18/2019    HGB 8.6 (L) 04/18/2019    HCT 28.5 (L) 04/18/2019    HCT 27.7 (L) 04/18/2019    MCV 94 04/18/2019    MCV 91 04/18/2019     (H) 04/18/2019     (H) 04/18/2019     CrCl cannot be calculated (Patient's most recent lab result is older than the maximum 5 days allowed.).  Lab Results   Component Value Date    HGBA1C 6.3 (H) 02/06/2019     Lab Results   Component Value Date    TSH 1.36 08/27/2018       ASSESSMENT/Plan:      ICD-10-CM    1. History of gram-positive (S. pyogenes) pneumosepsis Z86.19    2. 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 Gastroesophageal reflux disease without esophagitis K21.9    4. Functional diarrhea K59.1    5. Chronic obstructive pulmonary disease, unspecified COPD type (H) J44.9    6. Non-small cell cancer of right lung (H) C34.91    7. 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 Chronic pleural effusion, left J90    9. Current moderate episode of major depressive disorder, unspecified whether recurrent (H) F32.1    10. Acquired hypothyroidism E03.9      CHANGES:    Please obtain previously requested BMP and CBC with differential.    CARE PLAN:     The care plan has been reviewed and all orders signed. Changes to care plan, if any, as noted. Otherwise, continue current plan of care.     The above has been created using voice recognition software. Please be aware that this may unintentionally  produce inaccuracies and/or nonsensical sentences.      Electronically signed by: Robert Yepez CNP

## 2021-06-19 NOTE — LETTER
Letter by Kristan Luna MD at      Author: Kristan Luna MD Service: -- Author Type: --    Filed:  Encounter Date: 5/3/2019 Status: (Other)         Patient: Rita Mancini   MR Number: [id number]   YOB: 1950   Date of Visit: 5/3/2019     [facility]      Facility:    [facility] [[id number]]    Code Status: FULL CODE   Resides at the [facility]: [facility] ( independent appts)      Chief Complaint/Reason for Visit:  Chief Complaint   Patient presents with   ? Review Of Multiple Medical Conditions       HPI:   Rita is a 68 y.o. female with known medical history of colon  Non-small cell carcinoma of the right lung (status post chemotherapy and radiation therapy remission since [id number])  Pleural and pleural effusion, s/p  Talc pleurodesis in 2015  Hypothyroid, acquired  Diabetes type 2  COPD  Obstructive sleep apnea ( not usingCPAP    Weeks before admission, she had cough.  She had been to the clinic, chest x-ray from 3/11 did not show acute pneumonia.  She was put on a Z-Kevin because of her emphysema.  A week later, she fell on the ice and had a right proximal humeral shaft fracture.  She had a nonoperative management with a sling and pain medications.    Since that time she had progressive shortness of breath, the day before admission family thought she sounded quite short of breath and confused.  Patient herself thought it was from the pain medication.  The night before admission, her son heard her heavy breathing.  He woke her up and she was quite confused.  He helped her to the bathroom but she took over an hour in the bathroom because she kept falling asleep.  He helped her back to bed and thought she was really significantly confused.  He called 911.    EMS measured a saturation in the 70s, felt she is in respiratory distress.  On arrival to the emergency department she was hypotensive.  She was given IV fluids and placed on  BiPAP (PCO2 = 63), very elevated lactic acid at 7.4, procalcitonin was very high at 20.85 acute renal failure (creatinine = 3.59) , hyperkalemia (6.0) FVC = 20.85..  She was intubated, started on pressors.  Cultures were growing group A strep.  There was concern for toxic shock syndrome.  She was given IVIG times 3 days and clindamycin.  She had sided empyema, that fluid was growing group A streptococcus as well as Streptococcus pyogenous.  She had a VATS/decortication procedure on 3/29.  Followed by infectious disease.  Chest tubes were placed, CT showed improvement and and effusions.   She was switched to Zosyn.  Discharge she was changed to Augmentin.    There was no DVT found in the right arm.  She will need to follow-up with orthopedic department as an outpatient.    Transferred to [facility]    UPDATE:Went to Dr Ang,ID yesterday, did CXR:  FINDINGS: Increase vague opacification in the right midlung with continued fluid  superiorly within the right major fissure. Small right effusion is unchanged.     Heterogeneous opacities in the left base with left pleural effusion are  unchanged. Left apical pleural thickening medially is also unchanged.  He did want to continue with Augmentin another 4 weeks based on the CXR    She also saw Dr Ma, he did not think she needed surgery    He ordered labs 4/18, [facility] had ordered labs 4/15:    Ref Range & Units 4/18/19 1044 4/15/19 1405 4/6/19 1050    WBC 4.0 - 11.0 thou/uL 18.6High   18.1High   22.8High      Hemoglobin 12.0 - 16.0 g/dL 8.6Low   8.4Low   8.3Low      MCHC 32.0 - 36.0 g/dL 31.0Low   29.8Low   31.6Low      RDW 11.0 - 14.5 % 16.1High   16.7High   16.2High      Platelets 140 - 440 thou/uL 748High   597High   611High          Ref Range & Units  4/18/19 3/27/19    Total Neutrophils % 50 - 70 % 78High   89High      Lymphocytes % 20 - 40 % 12Low   5Low      Monocytes % 2 - 10 % 8  5     Eosinophils %  0 - 6 % 1  0     Basophils % 0 - 2 % 1  0      Myelocytes % <=1 % 1  1     Total Neutrophils Absolute 2.0 - 7.7 thou/ul 14.3High   17.7High      Lymphocytes Absolute 0.8 - 4.4 thou/uL 2.2  0.9     Monocytes Absolute 0.0 - 0.9 thou/uL 1.5High   0.9            Ref Range & Units 4/18/19     Procalcitonin 0.00 - 0.49 ng/mL 0.25        Ref Range & Units 3/27/19     Procalcitonin 0.00 - 0.49 ng/mL 20.85           She had a HOME EVALUATION: not ready to go home, needs ongoing therapy  She will need DME, toilet seat raiser  Recliner ( electric)    SLP upgraded her diet to regular      Past Medical History:  Past Medical History:   Diagnosis Date   ? COPD (chronic obstructive pulmonary disease) (H)    ? Depression    ? Diabetes mellitus (H)    ? GERD (gastroesophageal reflux disease)    ? Hx antineoplastic chemotherapy     lung cancer    ? Hx of radiation therapy     lung cancer    ? Hyperlipemia    ? Hypothyroid    ? Lung cancer (H) 2008    RUL,  Non small cell cancer   ? 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 Osteopenia    ? Pleural effusion 1/15   ? Radiation Pneumonitis     Created by Conversion    ? Sleep apnea     does not use cpap           Surgical History:  Past Surgical History:   Procedure Laterality Date   ? PORTACATH PLACEMENT      and removal   ? THORACOSCOPY Right 4/6/2015    Procedure: RIGHT THORACOSCOPY / BIOPSY PLEURAL / TALC PLEURODESIS;  Surgeon: Michi Ma MD;  Location: [facility];  Service:    ? THORACOSCOPY Left 3/29/2019    Procedure: LEFT THORACOSCOPY WITH DECORTICATION;  Surgeon: Michi Ma MD;  Location: [facility];  Service: General   ? TONSILLECTOMY  age 5   ? URETERAL STENT PLACEMENT Right 6/2010   ? US THORACENTESIS  3/27/2019                Review of Systems     Magic Mouthwash has been very helpful: Does not have much mouth pain, able to eat  She is a poor sleeper at home, that continues to be an issue here  Her weight has gone down there may be a difference between the TCU and hospital scales, I  think she also had diuresed some fluid .    Blood pressure 109/58, pulse 82, temperature 97  F (36.1  C), resp. rate 16, SpO2 95 %      Physical Exam  Constitutional:   female, appears tired , talkative, meeting with PT,2 dtrs, 1 son,Soc Service and nurse manager re Home evaluation.  Cardiovascular: Regular rhythm and normal heart sounds.   Pulmonary/Chest: few rhonchi on left, occasional cough but nonproductive  Abdominal: Soft. Bowel sounds are normal. .   Musculoskeletal: Right arm in a sling , uses other 3 extremities without problem   Neurological: She is alert and oriented to person, place, and time.   Skin: Skin is dry. No rash noted. No erythema. Pale  Positioned in a recliner, not able to examine buttocks skin   Psychiatric: She has a normal mood . Thought content normal.       No Known Allergies    Medication List:  Current Outpatient Medications   Medication Sig   ? acetaminophen (TYLENOL) 500 MG tablet Take 2 tablets (1,000 mg total) by mouth 3 (three) times a day.   ? albuterol (PROVENTIL) 2.5 mg /3 mL (0.083 %) nebulizer solution TAKE 3ML BY MOUTH EVERY 4 HOURS AS NEEDED FOR WHEEZING   ? alum/mag hydrox-simethicone-diphenhydramine-lidocaine (MAGIC MOUTHWASH) suspension 15 mL 4 (four) times a day.   ? amoxicillin-clavulanate (AUGMENTIN) 875-125 mg per tablet Take 1 tablet by mouth 2 (two) times a day.   ? aspirin 81 MG EC tablet Take 81 mg by mouth daily.   ? baclofen (LIORESAL) 10 MG tablet Take 1 tablet (10 mg total) by mouth 3 (three) times a day as needed.   ? blood glucose meter (GLUCOMETER) Use 1 each As Directed as needed. Dispense glucometer brand per patient's insurance at pharmacy discretion.   ? blood glucose test (ACCU-CHEK SMARTVIEW TEST STRIP) strips Test blood sugar 3 times daily   ? calcium carbonate-vitamin D3 (CALCIUM 600 WITH VITAMIN D3) 600 mg(1,500mg) -200 unit per tablet Take 1 tablet by mouth 2 (two) times a day. Taking 1200mg of Calcium with 1000 D3   ? cetirizine 10 mg  cap Take 10 mg by mouth daily with supper.          ? citalopram (CELEXA) 20 MG tablet Take 20 mg by mouth daily.   ? famotidine (PEPCID) 20 MG tablet Take 1 tablet (20 mg total) by mouth daily. (Patient taking differently: Take 20 mg by mouth 2 (two) times a day.       )   ? fluticasone (FLONASE) 50 mcg/actuation nasal spray 2 SPRAYS INTO EACH NOSTRIL DAILY.   ? furosemide (LASIX) 20 MG tablet TAKE 2 TABLETS (40 MG TOTAL) BY MOUTH EVERY MORNING.   ? generic lancets Use 1 each As Directed daily. Dispense brand per patient's insurance at pharmacy discretion. (dx E11.9)   ? guaiFENesin ER (MUCINEX) 600 mg 12 hr tablet Take 1,200 mg by mouth 2 (two) times a day.   ? INCRUSE ELLIPTA 62.5 mcg/actuation DsDv inhaler INHALE 1 PUFF BY MOUTH DAILY   ? Lactobacillus rhamnosus GG (CULTURELLE) 15 billion cell CpSP Take 2 capsules by mouth Daily at 8:00 am..   ? levothyroxine (SYNTHROID, LEVOTHROID) 75 MCG tablet TAKE 1 TABLET BY MOUTH EVERY OTHER DAYS ALTERNATING WITH 75MCG AND 50MCG DOSE   ? lidocaine 4 % patch Place 1 patch on the skin daily. Remove and discard patch with 12 hours.  Apply to right shoulder   ? loperamide (IMODIUM A-D) 2 mg tablet Take 2 mg by mouth as needed for diarrhea (2 mg after each loose stool to a maximum of 16 mg/day).   ? LORazepam (ATIVAN) 0.5 MG tablet Take 1 tablet (0.5 mg total) by mouth at bedtime.   ? menthol-zinc oxide (CALMOSEPTINE) 0.44-20.6 % Oint ointment Apply topically 3 (three) times a day.   ? metFORMIN (GLUCOPHAGE) 1000 MG tablet Take 1 tablet (1,000 mg total) by mouth 2 (two) times a day with meals.   ? montelukast (SINGULAIR) 10 mg tablet Take 1 tablet (10 mg total) by mouth at bedtime.   ? naproxen (NAPROSYN) 500 MG tablet Take 1 tablet (500 mg total) by mouth 2 (two) times a day with meals.   ? psyllium (METAMUCIL) 0.52 gram capsule Take 0.52 g by mouth 3 (three) times a day with meals.   ? PULMICORT FLEXHALER 180 mcg/actuation inhaler INHALE 2 PUFFS 2 (TWO) TIMES A DAY.   ?  saliva stimulant (BIOTENE MOISTURIZING MOUTH) oral spray Take by mouth as needed.   ? simvastatin (ZOCOR) 10 MG tablet TAKE 1 TABLET BY MOUTH AT BEDTIME.   ? sodium chloride (OCEAN) 0.65 % nasal spray Apply 1 spray into each nostril as needed for congestion.   ? traZODone (DESYREL) 50 MG tablet Take 1 tablet (50 mg total) by mouth at bedtime as needed for sleep.   ? triamcinolone (KENALOG) 0.1 % ointment Apply topically 2 (two) times a day. hands (Patient taking differently: Apply topically daily as needed hands.      )   ? VENTOLIN HFA 90 mcg/actuation inhaler INHALE 1-2 PUFFS EVERY 4 (FOUR) HOURS AS NEEDED FOR WHEEZING.       Labs:        Ref Range & Units 4/6/19  4/4/19  3/27/19     Procalcitonin 0.00 - 0.49 ng/mL 0.28  0.51High   20.85High             Assessment / Plan:    ICD-10-CM    1. Pleural empyema (H) and  pneumonia J86.9  oxygen per nasal cannula.  Wean orders to keep saturation at or greater than 90%.  The next to help mobilize phlegm in her airways.  On Augmentin through 4/28    R65.21    2. 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   3. Physical deconditioning R53.81  therapy as able, DME needs for apartment   4. 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 Chronic obstructive pulmonary disease, unspecified COPD type (H) J44.  her inhalers have been as needed, will schedule albuterol 4 times a day as a neb   6. Mild episode of recurrent major depressive disorder (H) F33.0  citalopram for mood disorder.  She agrees with her family about a psychology assessment         Electronically signed by: Kristan Luna MD

## 2021-06-19 NOTE — LETTER
Letter by Kristan Luna MD at      Author: Kristan Luna MD Service: -- Author Type: --    Filed:  Encounter Date: 4/12/2019 Status: (Other)         Patient: Rita Mancini   MR Number: [id number]   YOB: 1950   Date of Visit: 4/12/2019     [facility]      Facility:    [facility] [[id number]]    Code Status: FULL CODE   Resides at the [facility] ( independent appts)      Chief Complaint/Reason for Visit:  Chief Complaint   Patient presents with   ? H & P       HPI:   Rita is a 68 y.o. female with known medical history of colon  Non-small cell carcinoma of the right lung (status post chemotherapy and radiation therapy remission since [id number])  Pleural and pleural effusion, s/p  Talc pleurodesis in 2015  Hypothyroid, acquired  Diabetes type 2  COPD  Obstructive sleep apnea ( not usingCPAP    Weeks before admission, she had cough.  She had been to the clinic, chest x-ray from 3/11 did not show acute pneumonia.  She was put on a Z-Kevin because of her emphysema.  A week later, she fell on the ice and had a right proximal humeral shaft fracture.  She had a nonoperative management with a sling and pain medications.    Since that time she had progressive shortness of breath, the day before admission family thought she sounded quite short of breath and confused.  Patient herself thought it was from the pain medication.  The night before admission, her son heard her heavy breathing.  He woke her up and she was quite confused.  He helped her to the bathroom but she took over an hour in the bathroom because she kept falling asleep.  He helped her back to bed and thought she was really significantly confused.  He called 911.    EMS measured a saturation in the 70s, felt she is in respiratory distress.  On arrival to the emergency department she was hypotensive.  She was given IV fluids and placed on BiPAP (PCO2 = 63), very elevated  lactic acid at 7.4, procalcitonin was very high at 20.85 acute renal failure (creatinine = 3.59) , hyperkalemia (6.0) FVC = 20.85..  She was intubated, started on pressors.  Cultures were growing group A strep.  There was concern for toxic shock syndrome.  She was given IVIG times 3 days and clindamycin.  She had sided empyema, that fluid was growing group A streptococcus as well as Streptococcus pyogenous.  She had a VATS/decortication procedure on 3/29.  Followed by infectious disease.  Chest tubes were placed, CT showed improvement and and effusions.   She was switched to Zosyn.  Discharge she was changed to Augmentin.    There was no DVT found in the right arm.  She will need to follow-up with orthopedic department as an outpatient.    Past Medical History:  Past Medical History:   Diagnosis Date   ? COPD (chronic obstructive pulmonary disease) (H)    ? Depression    ? Diabetes mellitus (H)    ? GERD (gastroesophageal reflux disease)    ? Hx antineoplastic chemotherapy     lung cancer    ? Hx of radiation therapy     lung cancer    ? Hyperlipemia    ? Hypothyroid    ? Lung cancer (H) 2008    RUL,  Non small cell cancer   ? 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 Osteopenia    ? Pleural effusion 1/15   ? Radiation Pneumonitis     Created by Conversion    ? Sleep apnea     does not use cpap           Surgical History:  Past Surgical History:   Procedure Laterality Date   ? PORTACATH PLACEMENT      and removal   ? THORACOSCOPY Right 4/6/2015    Procedure: RIGHT THORACOSCOPY / BIOPSY PLEURAL / TALC PLEURODESIS;  Surgeon: Michi Ma MD;  Location: [facility];  Service:    ? THORACOSCOPY Left 3/29/2019    Procedure: LEFT THORACOSCOPY WITH DECORTICATION;  Surgeon: Michi Ma MD;  Location: [facility] OR;  Service: General   ? TONSILLECTOMY  age 5   ? URETERAL STENT PLACEMENT Right 6/2010   ? US THORACENTESIS  3/27/2019       Family History:   Family History   Problem Relation Age of  Onset   ? Heart disease Mother    ? Hypertension Mother    ? Alcohol abuse Mother    ? Depression Mother    ? Heart disease Father 45        mi age 45, cabg   ? Heart attack Father    ? Cancer Father         prostate   ? Hypertension Father    ? COPD Brother    ? Alcohol abuse Brother    ? Alcohol abuse Sister    ? Breast cancer Paternal Aunt    ? Leukemia Grandchild    ? Cancer Maternal Aunt 47        breast   ? Diabetes Son    ? Anxiety disorder Son    ? Depression Son    ? No Medical Problems Daughter    ? Schizophrenia Grandchild        Social History:    Social History     Socioeconomic History   ? Marital status:      Spouse name: Not on file   ? Number of children: Not on file   ? Years of education: Not on file   ? Highest education level: Not on file   Occupational History   ? Not on file   Social Needs   ? Financial resource strain: Not on file   ? Food insecurity:     Worry: Not on file     Inability: Not on file   ? Transportation needs:     Medical: Not on file     Non-medical: Not on file   Tobacco Use   ? Smoking status: Former Smoker     Packs/day: 1.50     Years: 0.00     Pack years: 0.00     Last attempt to quit: 11/9/2008     Years since quitting: 10.4   ? Smokeless tobacco: Former User   Substance and Sexual Activity   ? Alcohol use: No   ? Drug use: No   ? Sexual activity: Never   Lifestyle   ? Physical activity:     Days per week: Not on file     Minutes per session: Not on file   ? Stress: Not on file   Relationships   ? Social connections:     Talks on phone: Not on file     Gets together: Not on file     Attends Confucianism service: Not on file     Active member of club or organization: Not on file     Attends meetings of clubs or organizations: Not on file     Relationship status: Not on file   ? Intimate partner violence:     Fear of current or ex partner: Not on file     Emotionally abused: Not on file     Physically abused: Not on file     Forced sexual activity: Not on file  "  Other Topics Concern   ? Not on file   Social History Narrative   ? Not on file          Review of Systems   Feels totally wiped out.  She is uncomfortable with being totally dependent.  Her cough is not productive of phlegm but she hears a lot of phlegm rattling around.  She has a poor appetite  Her tongue is sore as well as her throat.  She states \"I am never comfy\".  Worried about details of her insurance coverage for the TCU stay.  Comprehensive review of systems is negative       and floor nurse manager: Care conference yesterday yielded:  Family asking for psychology evaluation  She has a stage I ulcer in both buttocks, nursing asked for calmoseptine  Family asked that her nebulizations be taken from as needed to scheduled as well as a trazodone  Daughter has noted that her mother chokes with eating, will request speech language path evaluation  Daughter noted that throughout the hospital stay her white count actually increased, her discharge white count 2800.    Blood pressure 118/64, pulse 88, temperature 98.4  F (36.9  C), resp. rate 18, SpO2 97 %, not currently breastfeeding.  MI = 32      Physical Exam   Constitutional: She is oriented to person, place, and time. No distress.   Appearing  female, appears her stated age   HENT:   Nose: Nose normal.   No pharyngeal erythema or exudate, hard palate without exudate, surface of the tongue somewhat dry but no white matter   Eyes: Conjunctivae and EOM are normal. No scleral icterus.   Cardiovascular: Regular rhythm and normal heart sounds.   Pulmonary/Chest: She has no wheezes. She has rales.    coarse rhonchi throughout  Cough non productive  Expiratory wheezes bilaterally   Abdominal: Soft. Bowel sounds are normal. She exhibits no distension. There is no tenderness.   Musculoskeletal:   Fatigue limits  motor exam  Right upper arm with tenderness and resolving bruising, right arm in a sling   Lymphadenopathy:     She has no cervical " adenopathy.   Neurological: She is alert and oriented to person, place, and time.   Skin: Skin is dry. No rash noted. No erythema.   Pale  Positioned in a recliner, not able to examine buttocks skin   Psychiatric: She has a normal mood and affect. Thought content normal.       Medication List:  Current Outpatient Medications   Medication Sig   ? acetaminophen (TYLENOL) 500 MG tablet Take 2 tablets (1,000 mg total) by mouth 3 (three) times a day.   ? albuterol (PROVENTIL) 2.5 mg /3 mL (0.083 %) nebulizer solution TAKE 3ML BY MOUTH EVERY 4 HOURS AS NEEDED FOR WHEEZING   ? albuterol (PROVENTIL) 2.5 mg /3 mL (0.083 %) nebulizer solution Take 2.5 mg by nebulization 4 (four) times a day. Also has PRN dosing.   ? amLODIPine (NORVASC) 5 MG tablet Take 1 tablet (5 mg total) by mouth daily.   ? amoxicillin-clavulanate (AUGMENTIN) 875-125 mg per tablet Take 1 tablet by mouth 2 (two) times a day for 21 days. Follow up with Infectious Disease physician prior to completing antibiotics   ? aspirin 81 MG EC tablet Take 81 mg by mouth daily.   ? baclofen (LIORESAL) 10 MG tablet Take 1 tablet (10 mg total) by mouth 3 (three) times a day as needed.   ? blood glucose meter (GLUCOMETER) Use 1 each As Directed as needed. Dispense glucometer brand per patient's insurance at pharmacy discretion.   ? blood glucose test (ACCU-CHEK SMARTVIEW TEST STRIP) strips Test blood sugar 3 times daily   ? calcium carbonate-vitamin D3 (CALCIUM 600 WITH VITAMIN D3) 600 mg(1,500mg) -200 unit per tablet Take 1 tablet by mouth 2 (two) times a day. Taking 1200mg of Calcium with 1000 D3   ? cetirizine 10 mg cap Take 10 mg by mouth daily with supper.          ? citalopram (CELEXA) 10 MG tablet TAKE 1 TABLET BY MOUTH ONCE DAILY.   ? famotidine (PEPCID) 20 MG tablet Take 1 tablet (20 mg total) by mouth daily.   ? fluticasone (FLONASE) 50 mcg/actuation nasal spray 2 SPRAYS INTO EACH NOSTRIL DAILY.   ? furosemide (LASIX) 20 MG tablet Take 2 tablets (40 mg total) by  mouth every morning.   ? generic lancets Use 1 each As Directed daily. Dispense brand per patient's insurance at pharmacy discretion. (dx E11.9)   ? guaiFENesin ER (MUCINEX) 600 mg 12 hr tablet Take 1,200 mg by mouth 2 (two) times a day.   ? INCRUSE ELLIPTA 62.5 mcg/actuation DsDv inhaler INHALE 1 PUFF BY MOUTH DAILY   ? levothyroxine (SYNTHROID, LEVOTHROID) 50 MCG tablet TAKE 1 TABLET BY MOUTH EVERY OTHER DAY ALTERNATING WITH 75MG TABLET   ? levothyroxine (SYNTHROID, LEVOTHROID) 75 MCG tablet TAKE 1 TABLET BY MOUTH EVERY OTHER DAYS ALTERNATING WITH 75MCG AND 50MCG DOSE   ? lidocaine 4 % patch Place 1 patch on the skin daily. Remove and discard patch with 12 hours.  Apply to right shoulder   ? LORazepam (ATIVAN) 0.5 MG tablet Take by mouth at bedtime.   ? menthol-zinc oxide (CALMOSEPTINE) 0.44-20.6 % Oint ointment Apply topically 3 (three) times a day.   ? metFORMIN (GLUCOPHAGE) 1000 MG tablet Take 1 tablet (1,000 mg total) by mouth 2 (two) times a day with meals.   ? montelukast (SINGULAIR) 10 mg tablet Take 1 tablet (10 mg total) by mouth at bedtime.   ? naproxen (NAPROSYN) 500 MG tablet Take 1 tablet (500 mg total) by mouth 2 (two) times a day with meals.   ? oxyCODONE (ROXICODONE) 5 MG immediate release tablet Take 0.5-1 tablets (2.5-5 mg total) by mouth every 6 (six) hours as needed for pain.   ? PULMICORT FLEXHALER 180 mcg/actuation inhaler INHALE 2 PUFFS 2 (TWO) TIMES A DAY.   ? simvastatin (ZOCOR) 10 MG tablet TAKE 1 TABLET BY MOUTH AT BEDTIME.   ? traZODone (DESYREL) 50 MG tablet Take 1 tablet (50 mg total) by mouth at bedtime as needed for sleep.   ? triamcinolone (KENALOG) 0.1 % ointment Apply topically 2 (two) times a day. hands (Patient taking differently: Apply topically daily as needed hands.      )   ? VENTOLIN HFA 90 mcg/actuation inhaler INHALE 1-2 PUFFS EVERY 4 (FOUR) HOURS AS NEEDED FOR WHEEZING.       Labs:    Ref Range & Units 4/6/19 1050 4/4/19 0737 4/3/19 1014 4/2/19 0659    WBC 4.0 - 11.0  thou/uL 22.8High   26.0High   27.2High   26.4High      RBC 3.80 - 5.40 mill/uL 2.86Low   3.41Low   3.15Low   3.35Low      Hemoglobin 12.0 - 16.0 g/dL 8.3Low   9.9Low   9.2Low   9.7Low      Hematocrit 35.0 - 47.0 % 26.3Low   31.1Low   28.1Low   29.1Low      MCV 80 - 100 fL 92  91  89  87     MCH 27.0 - 34.0 pg 29.0  29.0  29.2  29.0     MCHC 32.0 - 36.0 g/dL 31.6Low   31.8Low   32.7  33.3     RDW 11.0 - 14.5 % 16.2High   16.2High   15.6High   14.8High      Platelets 140 - 440 thou/uL 611High   483High   431  354        Ref Range & Units 4/6/19 1050 4/5/19 0702 4/4/19 0737    Sodium 136 - 145 mmol/L 138  138  135Low      Potassium 3.5 - 5.0 mmol/L 4.1  3.6  4.0     Chloride 98 - 107 mmol/L 102  101  98     CO2 22 - 31 mmol/L 27 28  28     Anion Gap, Calculation 5 - 18 mmol/L 9  9  9     Glucose 70 - 125 mg/dL 136High   128High   118     Calcium 8.5 - 10.5 mg/dL 8.3Low   8.5  8.1Low      BUN 8 - 22 mg/dL 10  13  13     Creatinine 0.60 - 1.10 mg/dL 0.76  0.79  0.74     GFR MDRD Non Af Amer >60 mL/min/1.73m2 >60  >60  >60        Ref Range & Units 4/6/19 1050 4/4/19 1906 3/27/19 0827    Procalcitonin 0.00 - 0.49 ng/mL 0.28  0.51High   20.85High             Assessment / Plan:    ICD-10-CM    1. Pleural empyema (H) and  pneumonia J86.9  oxygen per nasal cannula.  Wean orders to keep saturation at or greater than 90%.  The next to help mobilize phlegm in her airways.  On Augmentin through 4/28   2. Septic shock (H)/ Bactermia Group A strep and Strep pyogenes A41.9  need to follow electrolytes, white count which is still elevated, renal function.    R65.21    3. Leukocytosis, unspecified type D72.829  WHITE count still quite elevated   4. 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   5. Physical deconditioning R53.81  therapy as able   6. 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 Chronic obstructive  pulmonary disease, unspecified COPD type (H) J44.  her inhalers have been as needed, will schedule albuterol 4 times a day as a neb   8. Mild episode of recurrent major depressive disorder (H) F33.0  citalopram for mood disorder.  She agrees with her family about a psychology assessment   9. Acquired hypothyroidism E03.9  levothyroxine       Total time 55 minutes, greater than 50% face-to-face with Rita reviewing her symptoms, hospitalization, discussions from her care conference yesterday, formulating care plan, as well as review of hospital records.          Electronically signed by: Kristan Luna MD

## 2021-06-19 NOTE — LETTER
Letter by Robert Yepez CNP at      Author: Robert Yepez CNP Service: -- Author Type: --    Filed:  Encounter Date: 5/15/2019 Status: (Other)         Patient: Rita Mancini   MR Number: [id number]   YOB: 1950   Date of Visit: 5/15/2019     [facility]    Name:   Rita Mancini  : 1950  Facility:   [facility] [[id number]]   Room:   Code Status: FULL CODE -   Fac type:   SNF (Skilled Nursing Facility, TCU) -     CHIEF COMPLAINT / REASON FOR VISIT:  Chief Complaint   Patient presents with   ? Discharge Summary     TCU discharge after hospitalization for pneumosepsis, but she was also recently in the ED with a closed displaced fracture of the proximal end of the right humerus.     [facility] ED 19 (displaced fracture of proximal end of right humerus)  [facility] from 19 until 19 (pneumosepsis)  [facility] TCU from 2019 until 2019 (expected discharge date)      HPI: Rita is a 68 y.o. female was admitted to [facility] on 3/27/2019 for septic shock.   She had been to [facility] ED 11 days earlier after slipping on the ice and sustaining a closed displaced fracture of the proximal end of the right humerus.  X-rays of the right shoulder showed fracture of the proximal humerus, comminuted with displacement.  She was discharged with a shoulder immobilizer to home with orthopedic surgery follow-up.    Subsequent to that, she developed progressive shortness of breath, and the day before admission, family thought she sounded quite short of breath and confused.  The patient herself thought it was from her pain medication.  The night before admission, her son heard very heavy breathing.  He woke her up, and she was quite confused.  He helped her to the bathroom, but she took over an hour in the bathroom because she kept falling asleep.  He helped her back to bed and  called 911.      EMS measured sats in the 70s and felt she was in respiratory distress.  She was also found to be hypertensive on arrival at the ED.  She was given IV fluids and placed on BiPAP (PCO2 = 63), very elevated lactic acid (7.4), very high procalcitonin (20.85), acute renal failure (creatinine = 3.59), and hyperkalemic (6.0).  She was intubated and started on pressors.  Cultures grew group A strep as well as S. pyogenes.  There was concern for toxic shock syndrome.  She was given IVIG x3 days and clindamycin.  She was followed by ID (Dr. Ang), and chest tubes were placed.  CT showed improvement and chronic effusion.  She was switched to Zosyn but discharged on Augmentin.      MOST RECENT HOSPITALIZATION  (Per hospital discharge summary)     Pneumonia (S.pyogenes)  complicated by left-sided empyema s/p VATS/decortication 3/29  Strep biology knees bacteremia cleared  All chest tubes now removed, increase ambulation and activity  Respiratory failure-resolved, extubated 3/30   Antibiotics throughout course for broad-spectrum including vancomycin, Zosyn, clindamycin, and ultimately transition to Augmentin to complete 3 additional weeks.  Infectious disease consultant would like to follow-up in clinic in 3 weeks.  BCx 3/30 CNS- contaminant  Follow-up with general surgery after hospitalization  Pain control with small dose of oxycodone when needed, scheduled Tylenol, the latter has been enough for her pain control.     Anxiety  She is on diazepam as an outpatient, while taking more oxycodone, we will put this on hold.    COPD  Continue home nebulizers, she remains on 2-3 L/min of oxygen, wean as tolerated     Hypervolemia  She was diuresed following ICU stay, back down to baseline weight     Diabetes  At goal as outpatient (A1c <7).   Correction scale, keeping at goal 140-180  Acceptable control in the hospital, continue metformin at discharge     MONSERRAT  Home cpap use     Insomnia  Continue trazodone when  needed     Right humerus fracture  Ortho assessed recommending sling, also no DVT in right arm.  Recommendations given, will need outpatient follow-up     Lung cancer  Dx in 2009, hx of recurrent R effusion (talc pleurodesis 4/2015), currently in remission        Consult/s: pulmonary/intensive care, ID, general surgery and orthopedic surgery  Significant Diagnostic Studies:   EXAM: CT CHEST WO CONTRAST  LOCATION: [facility]  DATE/TIME: 3/28/2019 12:36 PM     INDICATION: pleural effusions, concern for empyema pleural effusions, concern for empyema  COMPARISON: Left thoracentesis from 3/27/2019, chest CT from 12/10/2018  TECHNIQUE: Helical images were obtained through the chest. Multiplanar reformats were obtained. Dose reduction techniques were used.  IV CONTRAST: None.     FINDINGS:   LUNGS AND PLEURA: Residual modest sized left pleural effusion is new compared to the previous CT. Airspace infiltrate at left lung base may represent a combination of pneumonia and atelectasis.  Small loculated pleural effusion within right major fissure. Atelectasis/consolidation right lung base new compared to the previous study.  There is no significant change in the postoperative spiculated appearance involving medial right upper lobe measuring approximately 2.5 x 1.5 cm with an adjacent suture line.     MEDIASTINUM: 2 AP window lymph nodes appear larger on image 35 and 36, each now measuring 10 mm in short axis, previously measuring 4 mm. Likely these are reactive. Endotracheal tube and NG tube are in place. Heart size normal.     LIMITED UPPER ABDOMEN: Negative.     MUSCULOSKELETAL: Negative.     IMPRESSION:   CONCLUSION:   1.  Modest residual left pleural effusion even after recent thoracentesis. Small loculated right pleural effusion. There are are no pleural findings on this noncontrast study to suggest empyema. Suggest correlating with results of yesterday's large   volume left thoracentesis.  2.  Dependent  infiltrates at both lung bases suggestive of pneumonia.  3.  Minimal change in postoperative appearance of right upper lobe. A few small AP window lymph nodes are minimally larger, likely reactive.     Blood culture 3/27/19          Streptococcus pyogenes       EL       Ceftriaxone Sensitive       Clindamycin Sensitive       Erythromycin Sensitive       Penicillin Sensitive       Vancomycin Sensitive           Procedures:  LEFT THORACOSCOPY WITH DECORTICATION 3/29/19  US guided thoracentesis      Treatments: IV antibiotics as above    Subsequent Chest CT on 04/22/2019:  CONCLUSION (per Dr. Jabari Perez):  1.  Evolving bilateral lung consolidations.  2.  New left lower lobe cavitation. This most likely represents cavitary  pneumonia.  3.  Increasing left interlobular septal thickening most suggestive of edema.  4.  Persistent but improved bilateral pleural effusions.  5.  Mediastinal and upper para-aortic adenopathy which could be reactive or  neoplastic.  6.  Recommend CT chest follow-up exclude the possibility of neoplasm.      CURRENT TCU ISSUES    Note: She does have some concerns about possible cancer as presented in the differential diagnosis from her CT scans.  I suggested that she follow-up with pulmonology.    Primary diagnosis: She did have a scheduled follow-up with infectious disease on 04/18/19.  She saw the surgeon, Dr. Ma, on 04/25/2019.  She tells me he wants her to make an appointment to see the physician who saw her in the hospital.  She did have an appointment with Dr. Jabari Perez (pulmonology) on 05/08/2019 with a diagnoses of COPD, left lung abscess/empyema, and chronic right upper lobe radiation pneumonitis.  Orders called for discontinuing Incruse and starting Anoro Ellipta.  Albuterol nebs were also changed every 4 hours as needed for dyspnea.  Also, Ocean Spray was ordered in each nostril daily on a scheduled basis plus up to 4 times daily as needed.     She saw ID on 04/18/2019.   "Something was seen on x-ray that Dr. nAg found concerning, and a CT was ordered.  She was then referred to Dr. Ma.  I believe this involves the area of the pleural effusion/lung abscess in the left lobe.  She tells me that ID wanted to see her again on 05/02/2019, and she was continued on antibiotics.  Lung sounds currently are very coarse.  Current Augmentin is being continued after being seen by Dr. Perez and referred to ID.  White count was still elevated at 16.2 on 05/08/2019.    Right humeral head fracture: She was seen in follow-up at [facility] by Dr. Jase Anaya.  She appears to be doing well with minimal residual swelling/bruising or complaint of pain.  X-rays showed grossly maintained alignment and abundant fracture callus.  Orders were to wean from the sling as comfort permits and PT/OT to continue with PROM as tolerated.  Still to remain nonweightbearing with the right upper extremity until follow-up on 06/07/2019.    Pain management: The pain she experiences is in her right shoulder.  She is receiving low-dose oxycodone (2.5-5 mg every 6 hours) as needed they are also applying ice, and she has a lidocaine patch.  She mentioned, \"I've had decent luck with Tylenol.\"  At an earlier visit, we scheduled 1000 mg every 8 hours (3 times daily).  The nurse told me she did not want to take any oxycodone because it made her \"goofy.\"  The side effect may not be an accurate assessment, as her previous mental state was caused by illness.  Nonetheless, we discontinued it.    When seen earlier, her daughter, who was in attendance, told me that her mother gets \"agitated when the pain killers make her groggy.\"  As for the patient, she stated she was not getting too much, although the pain is never completely gone.    Buttock soreness: She tells me they are using an alcohol-based product to clean her up.  She would like something without that.  Dr. Luna ordered calmoseptine.  It is still present but " much better now.    Candidal intertrigo: Under the breasts and abdominal folds.  With nystatin powder, this is now pretty much resolved.    Dry mouth/dysphagia: She requested Biotene, and we wrote orders for that.  Uncertain whether it would be covered by her insurance.  I did suggest she could also suck on hard candy or lemon drops, and I later saw her with those.  Her daughter also mentioned that she was choking with eating.  Dr. Luna ordered speech to evaluate and treat.  Her diet was recently upgraded to regular texture by SLP on 04/25/2019.  She now claims she is not needing to use the Biotene as much.    Anxiety/insomnia: Earlier, we did talk a bit about her anxiety that can make her pain worse.  They do have an outside person who comes and performs healing touch, and we did write orders for this.  However, at the current time her anxiety is affecting her daily life, sleep, and ability to participate in therapy.  We ordered lorazepam 0.5 mg nightly.  She is sleeping a little better with this and trazodone, although she cannot seem to sleep past 4 AM.  We increased the trazodone from 50 mg to 75 mg nightly, and this seems to be helping.    Depression/cognition: She was seen by Dr. Shepherd on 04/23/2019 and scored high (16/27) on the PHQ 9 depression scale.  At that point, we increased her citalopram from 10 mg to 20 mg daily.  She was also noted by Occupational Therapy to have some cognitive issues per the Short Blessed test.  She does not exhibit overt psychotic symptoms.    COPD: She states she has lots of lung problems and is 10 years out from inoperable lung cancer for which she underwent radiation and chemotherapy.  She does a lot of coughing which is chronic.  She is always coughing up phlegm, so she may wait too long before her respiratory symptoms become serious.  Apparently, she has infections at least a couple times a year.  She was a smoker until 2008 when she developed lung cancer.  She is on  "multiple inhaled medications.  I have a feeling that she is generally noncompliant, disagreeing with her daughter how frequently she takes her nebulizer treatments, for example.  She tells me that her breathing can be somewhat labored, but her O2 sats remained good.  When seen on 04/24/2019, she was on 0.5 L/min per nasal cannula, although she believed she could do without it.  We had planned to do a taper, and oxygen was discontinued on 04/25/2019.    Dr. Luna wrote orders for scheduled albuterol nebs (in addition to as needed dosing).  She also ordered a CBC with differential and a BMP.  CBC on 04/15/2019 still showed an elevated white count of 18.1 with a slight left shift (neutrophils 77%, lymphocytes 12%).  We ordered a follow-up CBC with differential and BMP was still elevated but less so at 14.8, hemoglobin 8.0, platelets 617,000.    Diarrhea: They are loose and frequent and always seems to occur when she takes any antibiotics.  We ordered Imodium 2 mg after each loose stool (maximum 16 mg/day) and Culturelle (15 billion cell) probiotic (2 caps every morning).  Dr. Luna added psyllium capsules (0.52 g 3 times daily with meals).  This and Imodium has helped somewhat.    GERD: Another problem she complains about there is persistent heartburn.  She is already receiving 20 mg of famotidine daily.  We increased the famotidine to 20 mg twice daily.  This is now much improved, although it did take some time.  She has indicated that her heartburn is \"all gone.\"    Diabetes: Recent blood glucose levels range between 70 and 175, mostly around 100.  She is on 1000 mg of metformin twice daily.    Anemia: This may or may not be the cause, but she tells me she gets tired very easily.  Her latest hemoglobin was 8.0.  With the previous white count of 14.8 and elevated platelets, we rechecked a hemogram-2.  White count is still elevated at 16.2.  Apparently, some dosing of her antibiotic was administered.    Obstructive " sleep apnea: She does have a CPAP machine at home but has not used it in months.  Here, she does have an incentive spirometer, and I encouraged her to use it.       ROS: No headaches, nausea or vomiting, dizziness, dysuria.    Past Medical History:   Diagnosis Date   ? COPD (chronic obstructive pulmonary disease) (H)    ? Depression    ? Diabetes mellitus (H)    ? GERD (gastroesophageal reflux disease)    ? Hx antineoplastic chemotherapy     lung cancer    ? Hx of radiation therapy     lung cancer    ? Hyperlipemia    ? Hypothyroid    ? Lung cancer (H) 2008    RUL,  Non small cell cancer   ? 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 Osteopenia    ? Pleural effusion 1/15   ? Radiation Pneumonitis     Created by Conversion    ? Sleep apnea     does not use cpap              Family History   Problem Relation Age of Onset   ? Heart disease Mother    ? Hypertension Mother    ? Alcohol abuse Mother    ? Depression Mother    ? Heart disease Father 45        mi age 45, cabg   ? Heart attack Father    ? Cancer Father         prostate   ? Hypertension Father    ? COPD Brother    ? Alcohol abuse Brother    ? Alcohol abuse Sister    ? Breast cancer Paternal Aunt    ? Leukemia Grandchild    ? Cancer Maternal Aunt 47        breast   ? Diabetes Son    ? Anxiety disorder Son    ? Depression Son    ? No Medical Problems Daughter    ? Schizophrenia Grandchild      Social History     Socioeconomic History   ? Marital status:      Spouse name: Not on file   ? Number of children: Not on file   ? Years of education: Not on file   ? Highest education level: Not on file   Occupational History   ? Not on file   Social Needs   ? Financial resource strain: Not on file   ? Food insecurity:     Worry: Not on file     Inability: Not on file   ? Transportation needs:     Medical: Not on file     Non-medical: Not on file   Tobacco Use   ? Smoking status: Former Smoker     Packs/day: 1.50     Years: 0.00     Pack years: 0.00     Last  attempt to quit: 11/9/2008     Years since quitting: 10.5   ? Smokeless tobacco: Former User   Substance and Sexual Activity   ? Alcohol use: No   ? Drug use: No   ? Sexual activity: Never   Lifestyle   ? Physical activity:     Days per week: Not on file     Minutes per session: Not on file   ? Stress: Not on file   Relationships   ? Social connections:     Talks on phone: Not on file     Gets together: Not on file     Attends [employer] service: Not on file     Active member of club or organization: Not on file     Attends meetings of clubs or organizations: Not on file     Relationship status: Not on file   ? Intimate partner violence:     Fear of current or ex partner: Not on file     Emotionally abused: Not on file     Physically abused: Not on file     Forced sexual activity: Not on file   Other Topics Concern   ? Not on file   Social History Narrative   ? Not on file       MEDICATIONS: Reviewed from the MAR, physician orders, and/or earlier progress notes.    Current Outpatient Medications   Medication Sig   ? acetaminophen (TYLENOL) 500 MG tablet Take 2 tablets (1,000 mg total) by mouth 3 (three) times a day.   ? albuterol (PROVENTIL) 2.5 mg /3 mL (0.083 %) nebulizer solution TAKE 3ML BY MOUTH EVERY 4 HOURS AS NEEDED FOR WHEEZING   ? alum/mag hydrox-simethicone-diphenhydramine-lidocaine (MAGIC MOUTHWASH) suspension 15 mL 4 (four) times a day as needed. Change from scheduled to as needed on 05/13/2019.         ? amoxicillin-clavulanate (AUGMENTIN) 875-125 mg per tablet Take 1 tablet by mouth 2 (two) times a day.   ? aspirin 81 MG EC tablet Take 81 mg by mouth daily.   ? blood glucose meter (GLUCOMETER) Use 1 each As Directed as needed. Dispense glucometer brand per patient's insurance at pharmacy discretion.   ? blood glucose test (ACCU-CHEK SMARTVIEW TEST STRIP) strips Test blood sugar 3 times daily   ? calcium carbonate-vitamin D3 (CALCIUM 600 WITH VITAMIN D3) 600 mg(1,500mg) -200 unit per tablet Take 1  tablet by mouth 2 (two) times a day. Taking 1200mg of Calcium with 1000 D3   ? cetirizine 10 mg cap Take 10 mg by mouth daily with supper.          ? citalopram (CELEXA) 20 MG tablet Take 20 mg by mouth daily.   ? famotidine (PEPCID) 20 MG tablet Take 1 tablet (20 mg total) by mouth daily. (Patient taking differently: Take 20 mg by mouth 2 (two) times a day.       )   ? fluticasone (FLONASE) 50 mcg/actuation nasal spray 2 SPRAYS INTO EACH NOSTRIL DAILY.   ? furosemide (LASIX) 20 MG tablet TAKE 2 TABLETS (40 MG TOTAL) BY MOUTH EVERY MORNING.   ? generic lancets Use 1 each As Directed daily. Dispense brand per patient's insurance at pharmacy discretion. (dx E11.9)   ? guaiFENesin ER (MUCINEX) 600 mg 12 hr tablet Take 1,200 mg by mouth 2 (two) times a day.   ? Lactobacillus rhamnosus GG (CULTURELLE) 15 billion cell CpSP Take 2 capsules by mouth Daily at 8:00 am..   ? levothyroxine (SYNTHROID, LEVOTHROID) 75 MCG tablet TAKE 1 TABLET BY MOUTH EVERY OTHER DAYS ALTERNATING WITH 75MCG AND 50MCG DOSE   ? lidocaine 4 % patch Place 1 patch on the skin daily. Remove and discard patch with 12 hours.  Apply to right shoulder   ? loperamide (IMODIUM A-D) 2 mg tablet Take 2 mg by mouth as needed for diarrhea (2 mg after each loose stool to a maximum of 16 mg/day).   ? LORazepam (ATIVAN) 0.5 MG tablet Take 1 tablet (0.5 mg total) by mouth at bedtime.   ? menthol-zinc oxide (CALMOSEPTINE) 0.44-20.6 % Oint ointment Apply topically 3 (three) times a day.   ? metFORMIN (GLUCOPHAGE) 1000 MG tablet Take 1 tablet (1,000 mg total) by mouth 2 (two) times a day with meals.   ? montelukast (SINGULAIR) 10 mg tablet Take 1 tablet (10 mg total) by mouth at bedtime.   ? naproxen (NAPROSYN) 500 MG tablet Take 1 tablet (500 mg total) by mouth 2 (two) times a day with meals.   ? psyllium (METAMUCIL) 0.52 gram capsule Take 0.52 g by mouth 3 (three) times a day with meals.   ? PULMICORT FLEXHALER 180 mcg/actuation inhaler INHALE 2 PUFFS 2 (TWO) TIMES A  "DAY.   ? saliva stimulant (BIOTENE MOISTURIZING MOUTH) oral spray Take by mouth as needed.   ? simvastatin (ZOCOR) 10 MG tablet TAKE 1 TABLET BY MOUTH AT BEDTIME.   ? sodium chloride (OCEAN) 0.65 % nasal spray Apply 1 spray into each nostril as needed for congestion.   ? traZODone (DESYREL) 50 MG tablet Take 1 tablet (50 mg total) by mouth at bedtime as needed for sleep. (Patient taking differently: Take 75 mg by mouth at bedtime as needed for sleep.       )   ? triamcinolone (KENALOG) 0.1 % ointment Apply topically 2 (two) times a day. hands (Patient taking differently: Apply topically daily as needed hands.      )   ? umeclidinium-vilanterol (ANORO ELLIPTA) 62.5-25 mcg/actuation inhaler Inhale 1 puff daily.   ? VENTOLIN HFA 90 mcg/actuation inhaler INHALE 1-2 PUFFS EVERY 4 (FOUR) HOURS AS NEEDED FOR WHEEZING.     ALLERGIES: No Known Allergies    DIET: Diabetic, regular texture, thin liquids.  Mighty Shakes Sugar-Free twice daily.    Vitals:    05/15/19 1412   BP: 107/61   Pulse: 90   Resp: 18   Temp: 97.9  F (36.6  C)   SpO2: 94%   Weight: 161 lb 6.4 oz (73.2 kg)   Height: 5' 5\" (1.651 m)     Body mass index is 26.86 kg/m .    EXAMINATION:   General: Pleasant middle-aged female, looking much older than her stated age, sitting in a recliner, in NAD.  Head: Normocephalic and atraumatic.   Eyes: PERRLA, sclerae clear.   ENT: Moist oral mucosa.   Cardiovascular: Regular rate and rhythm.  No appreciable murmur.  Respiratory: Still has an occasional loose cough (Mucinex helps).  Earlier, I could appreciate bibasilar rales (left greater than right).  At later visits, there appeared to be coarse bilateral mid to upper lung rhonchi with an expiratory wheeze.  Most likely related to her pleural/left lung abscess effusion discussed above.  These persist, right greater than left, and expiratory wheezes can be appreciated midlung.  Abdomen: Soft and nontender.   Musculoskeletal/Extremities: Age-related degenerative joint " disease.  Right arm in a sling.  No peripheral edema.  Integument: No rashes, clinically significant lesions, or skin breakdown.   Cognitive/Psychiatric: Alert and oriented x3 as far as I can tell.  Affect is somewhat depressed/anxious.    DIAGNOSTICS:   Results for orders placed or performed in visit on 04/30/19   Basic Metabolic Panel   Result Value Ref Range    Sodium 139 136 - 145 mmol/L    Potassium 4.7 3.5 - 5.0 mmol/L    Chloride 105 98 - 107 mmol/L    CO2 19 (L) 22 - 31 mmol/L    Anion Gap, Calculation 15 5 - 18 mmol/L    Glucose 68 (L) 70 - 125 mg/dL    Calcium 10.0 8.5 - 10.5 mg/dL    BUN 17 8 - 22 mg/dL    Creatinine 0.92 0.60 - 1.10 mg/dL    GFR MDRD Af Amer >60 >60 mL/min/1.73m2    GFR MDRD Non Af Amer >60 >60 mL/min/1.73m2     Lab Results   Component Value Date    WBC 15.5 (H) 05/15/2019    HGB 9.5 (L) 05/15/2019    HCT 32.5 (L) 05/15/2019    MCV 91 05/15/2019     (H) 05/15/2019     CrCl cannot be calculated (Patient's most recent lab result is older than the maximum 5 days allowed.).  Lab Results   Component Value Date    HGBA1C 6.3 (H) 02/06/2019     Lab Results   Component Value Date    TSH 1.36 08/27/2018       ASSESSMENT/Plan:      ICD-10-CM    1. History of gram-positive (S. pyogenes) pneumosepsis Z86.19    2. Cavitary pneumonia J18.9     J98.4    3. Chronic pleural effusion, left (s/p Talc pleurodesis 2015) J90    4. 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 Chronic obstructive pulmonary disease, unspecified COPD type (H) J44.9    6. Psychophysiological insomnia F51.04    7. Functional diarrhea K59.1    8. Anemia, unspecified type D64.9    9. Gastroesophageal reflux disease without esophagitis K21.9    10. Acquired hypothyroidism E03.9    11. Non-small cell cancer of right lung, s/p radiation and chemotherapies, in remission since 6427-3099 (H) C34.91      DISCHARGE PLAN/FACE TO FACE:  I certify that this patient is  under my care and that I had a face-to-face encounter that meets the physician face-to-face encounter requirements with this patient.     Date of Face-to-Face Encounter: 05/15/2019     I certify that, based on my findings, the following services are medically necessary home health services: Home health aide, home health nurse, home PT, home OT    My clinical findings support the need for the above skilled services because: (Please write a brief narrative summary that describes what the RN, PT, SLP, or other services will be doing in the home. A list of diagnoses in this section does not meet the CMS requirements): Home health aide to assist with bathing, home health nurse to monitor vitals and for medication management, home PT and home OT for mobility and safety in the home.    This patient is homebound because: (Please write a brief narrative summmary describing the functional limitations as to why this patient is homebound and specifically what makes this patient homebound.):  Above services necessarily need to be performed in the home to be of benefit.    The patient is, or has been, under my care and I have initiated the establishment of the plan of care. This patient will be followed by a physician who will periodically review the plan of care.  Initial follow-up should be within 7-10 days.    Approximate time spent with this patient was greater than 30 minutes with greater than 50% spent in discussions regarding services required upon discharge.      The above has been created using voice recognition software. Please be aware that this may unintentionally  produce inaccuracies and/or nonsensical sentences.      Electronically signed by: Robert Yepez CNP

## 2021-06-19 NOTE — LETTER
Letter by Robert Yepez CNP at      Author: Robert Yepez CNP Service: -- Author Type: --    Filed:  Encounter Date: 2019 Status: (Other)         Patient: Rita Mancini   MR Number: [id number]   YOB: 1950   Date of Visit: 2019     [facility]    Name:   Rita Mancini  : 1950  Facility:   [facility] [[id number]]   Room:   Code Status: FULL CODE -   Fac type:   SNF (Skilled Nursing Facility, TCU) -     CHIEF COMPLAINT / REASON FOR VISIT:  Chief Complaint   Patient presents with   ? Follow-up     TCU follow-up after hospitalization for pneumosepsis, but she was also recently in the ED with a closed displaced fracture of the proximal end of the right humerus.     [facility] ED 19 (displaced fracture of proximal end of right humerus)    HPI: Rita is a 68 y.o. female was admitted to [facility] on 3/27/2019 for septic shock.    She had been to [facility] ED 11 days earlier after slipping on the ice and sustaining a closed displaced fracture of the proximal end of the right humerus.  X-rays of the right shoulder showed fracture of the proximal humerus, comminuted with displacement.  She was discharged with a shoulder immobilizer to home with orthopedic surgery follow-up.      MOST RECENT HOSPITALIZATION  (Per hospital discharge summary)     Pneumonia (S.pyogenes)  complicated by left-sided empyema s/p VATS/decortication 3/29  Strep biology knees bacteremia cleared  All chest tubes now removed, increase ambulation and activity  Respiratory failure-resolved, extubated 3/30   Antibiotics throughout course for broad-spectrum including vancomycin, Zosyn, clindamycin, and ultimately transition to Augmentin to complete 3 additional weeks.  Infectious disease consultant would like to follow-up in clinic in 3 weeks.  BCx 3/30 CNS- contaminant  Follow-up with general surgery after hospitalization  Pain  control with small dose of oxycodone when needed, scheduled Tylenol, the latter has been enough for her pain control.     Anxiety  She is on diazepam as an outpatient, while taking more oxycodone, we will put this on hold.    COPD  Continue home nebulizers, she remains on 2-3 L/min of oxygen, wean as tolerated     Hypervolemia  She was diuresed following ICU stay, back down to baseline weight     Diabetes  At goal as outpatient (A1c <7).   Correction scale, keeping at goal 140-180  Acceptable control in the hospital, continue metformin at discharge     MONSERRAT  Home cpap use     Insomnia  Continue trazodone when needed     Right humerus fracture  Ortho assessed recommending sling, also no DVT in right arm.  Recommendations given, will need outpatient follow-up     Lung cancer  Dx in 2009, hx of recurrent R effusion (talc pleurodesis 4/2015), currently in remission        Consult/s: pulmonary/intensive care, ID, general surgery and orthopedic surgery  Significant Diagnostic Studies:   EXAM: CT CHEST WO CONTRAST  LOCATION: [facility]  DATE/TIME: 3/28/2019 12:36 PM     INDICATION: pleural effusions, concern for empyema pleural effusions, concern for empyema  COMPARISON: Left thoracentesis from 3/27/2019, chest CT from 12/10/2018  TECHNIQUE: Helical images were obtained through the chest. Multiplanar reformats were obtained. Dose reduction techniques were used.  IV CONTRAST: None.     FINDINGS:   LUNGS AND PLEURA: Residual modest sized left pleural effusion is new compared to the previous CT. Airspace infiltrate at left lung base may represent a combination of pneumonia and atelectasis.  Small loculated pleural effusion within right major fissure. Atelectasis/consolidation right lung base new compared to the previous study.  There is no significant change in the postoperative spiculated appearance involving medial right upper lobe measuring approximately 2.5 x 1.5 cm with an adjacent suture line.     MEDIASTINUM:  "2 AP window lymph nodes appear larger on image 35 and 36, each now measuring 10 mm in short axis, previously measuring 4 mm. Likely these are reactive. Endotracheal tube and NG tube are in place. Heart size normal.     LIMITED UPPER ABDOMEN: Negative.     MUSCULOSKELETAL: Negative.     IMPRESSION:   CONCLUSION:   1.  Modest residual left pleural effusion even after recent thoracentesis. Small loculated right pleural effusion. There are are no pleural findings on this noncontrast study to suggest empyema. Suggest correlating with results of yesterday's large   volume left thoracentesis.  2.  Dependent infiltrates at both lung bases suggestive of pneumonia.  3.  Minimal change in postoperative appearance of right upper lobe. A few small AP window lymph nodes are minimally larger, likely reactive.     Blood culture 3/27/19          Streptococcus pyogenes       EL       Ceftriaxone Sensitive       Clindamycin Sensitive       Erythromycin Sensitive       Penicillin Sensitive       Vancomycin Sensitive           Procedures:  LEFT THORACOSCOPY WITH DECORTICATION 3/29/19  US guided thoracentesis      Treatments: IV antibiotics as above       CURRENT TCU ISSUES    Primary diagnosis: She does have a follow-up with infectious disease on 04/18/19.    Pain management: The pain she experiences is in her right shoulder.  She is receiving low-dose oxycodone (2.5-5 mg every 6 hours) as needed they are also applying ice, and she has a lidocaine patch.    Her daughter, who is in attendance, tells me that her mother gets \"agitated when the pain killers make her groggy.\"  As for the patient, she tells me she is not getting too much, and the pain is never completely gone.    Anxiety: We did talk a bit about her anxiety that can make her pain worse.  They do have an outside person who comes and performs healing touch, and we will write orders for this.    COPD: She does a lot of coughing which is chronic.  She is always coughing up " phlegm, so she may wait too long before her respiratory symptoms become serious.  Apparently, she has infections at least a couple times a year.  She was a smoker until 2008 when she developed lung cancer.  She is on multiple inhaled medications.  I have a feeling that she is generally noncompliant, disagreeing with her daughter how frequently she takes her nebulizer treatments, for example.  She tells me that her breathing is somewhat labored, but her O2 sats remained good.  PT says it is stable at about 97% on 2 L of oxygen per nasal cannula.    Diabetes: Only 2 recent blood glucose levels, 72 and 98.  She is on 1000 mg of metformin twice daily.    Obstructive sleep apnea: She does have a CPAP machine at home but has not used it in months.  Here, she does have an incentive spirometer, and I encouraged her to use it.      ROS:      Past Medical History:   Diagnosis Date   ? COPD (chronic obstructive pulmonary disease) (H)    ? Depression    ? Diabetes mellitus (H)    ? GERD (gastroesophageal reflux disease)    ? Hx antineoplastic chemotherapy     lung cancer    ? Hx of radiation therapy     lung cancer    ? Hyperlipemia    ? Hypothyroid    ? Lung cancer (H) 2008    RUL,  Non small cell cancer   ? 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 Osteopenia    ? Pleural effusion 1/15   ? Radiation Pneumonitis     Created by Conversion    ? Sleep apnea     does not use cpap              Family History   Problem Relation Age of Onset   ? Heart disease Mother    ? Hypertension Mother    ? Alcohol abuse Mother    ? Depression Mother    ? Heart disease Father 45        mi age 45, cabg   ? Heart attack Father    ? Cancer Father         prostate   ? Hypertension Father    ? COPD Brother    ? Alcohol abuse Brother    ? Alcohol abuse Sister    ? Breast cancer Paternal Aunt    ? Leukemia Grandchild    ? Cancer Maternal Aunt 47        breast   ? Diabetes Son    ? Anxiety disorder Son    ? Depression Son    ? No Medical Problems  Daughter    ? Schizophrenia Grandchild      Social History     Socioeconomic History   ? Marital status:      Spouse name: Not on file   ? Number of children: Not on file   ? Years of education: Not on file   ? Highest education level: Not on file   Occupational History   ? Not on file   Social Needs   ? Financial resource strain: Not on file   ? Food insecurity:     Worry: Not on file     Inability: Not on file   ? Transportation needs:     Medical: Not on file     Non-medical: Not on file   Tobacco Use   ? Smoking status: Former Smoker     Packs/day: 1.50     Years: 0.00     Pack years: 0.00     Last attempt to quit: 11/9/2008     Years since quitting: 10.4   ? Smokeless tobacco: Former User   Substance and Sexual Activity   ? Alcohol use: No   ? Drug use: No   ? Sexual activity: Never   Lifestyle   ? Physical activity:     Days per week: Not on file     Minutes per session: Not on file   ? Stress: Not on file   Relationships   ? Social connections:     Talks on phone: Not on file     Gets together: Not on file     Attends Druze service: Not on file     Active member of club or organization: Not on file     Attends meetings of clubs or organizations: Not on file     Relationship status: Not on file   ? Intimate partner violence:     Fear of current or ex partner: Not on file     Emotionally abused: Not on file     Physically abused: Not on file     Forced sexual activity: Not on file   Other Topics Concern   ? Not on file   Social History Narrative   ? Not on file       MEDICATIONS: Reviewed from the MAR, physician orders, and/or earlier progress notes.  Current Outpatient Medications   Medication Sig   ? acetaminophen (TYLENOL) 500 MG tablet Take 2 tablets (1,000 mg total) by mouth 3 (three) times a day.   ? albuterol (PROVENTIL) 2.5 mg /3 mL (0.083 %) nebulizer solution TAKE 3ML BY MOUTH EVERY 4 HOURS AS NEEDED FOR WHEEZING   ? amLODIPine (NORVASC) 5 MG tablet Take 1 tablet (5 mg total) by mouth  daily.   ? amoxicillin-clavulanate (AUGMENTIN) 875-125 mg per tablet Take 1 tablet by mouth 2 (two) times a day for 21 days. Follow up with Infectious Disease physician prior to completing antibiotics   ? aspirin 81 MG EC tablet Take 81 mg by mouth daily.   ? baclofen (LIORESAL) 10 MG tablet Take 1 tablet (10 mg total) by mouth 3 (three) times a day as needed.   ? blood glucose meter (GLUCOMETER) Use 1 each As Directed as needed. Dispense glucometer brand per patient's insurance at pharmacy discretion.   ? blood glucose test (ACCU-CHEK SMARTVIEW TEST STRIP) strips Test blood sugar 3 times daily   ? calcium carbonate-vitamin D3 (CALCIUM 600 WITH VITAMIN D3) 600 mg(1,500mg) -200 unit per tablet Take 1 tablet by mouth 2 (two) times a day. Taking 1200mg of Calcium with 1000 D3   ? cetirizine 10 mg cap Take 10 mg by mouth daily with supper.          ? citalopram (CELEXA) 10 MG tablet TAKE 1 TABLET BY MOUTH ONCE DAILY.   ? famotidine (PEPCID) 20 MG tablet Take 1 tablet (20 mg total) by mouth daily.   ? fluticasone (FLONASE) 50 mcg/actuation nasal spray 2 SPRAYS INTO EACH NOSTRIL DAILY.   ? furosemide (LASIX) 20 MG tablet Take 2 tablets (40 mg total) by mouth every morning.   ? generic lancets Use 1 each As Directed daily. Dispense brand per patient's insurance at pharmacy discretion. (dx E11.9)   ? INCRUSE ELLIPTA 62.5 mcg/actuation DsDv inhaler INHALE 1 PUFF BY MOUTH DAILY   ? levothyroxine (SYNTHROID, LEVOTHROID) 50 MCG tablet TAKE 1 TABLET BY MOUTH EVERY OTHER DAY ALTERNATING WITH 75MG TABLET   ? levothyroxine (SYNTHROID, LEVOTHROID) 75 MCG tablet TAKE 1 TABLET BY MOUTH EVERY OTHER DAYS ALTERNATING WITH 75MCG AND 50MCG DOSE   ? lidocaine 4 % patch Place 1 patch on the skin daily. Remove and discard patch with 12 hours.  Apply to right shoulder   ? metFORMIN (GLUCOPHAGE) 1000 MG tablet Take 1 tablet (1,000 mg total) by mouth 2 (two) times a day with meals.   ? montelukast (SINGULAIR) 10 mg tablet Take 1 tablet (10 mg  "total) by mouth at bedtime.   ? naproxen (NAPROSYN) 500 MG tablet Take 1 tablet (500 mg total) by mouth 2 (two) times a day with meals.   ? oxyCODONE (ROXICODONE) 5 MG immediate release tablet Take 0.5-1 tablets (2.5-5 mg total) by mouth every 6 (six) hours as needed for pain.   ? PULMICORT FLEXHALER 180 mcg/actuation inhaler INHALE 2 PUFFS 2 (TWO) TIMES A DAY.   ? simvastatin (ZOCOR) 10 MG tablet TAKE 1 TABLET BY MOUTH AT BEDTIME.   ? traZODone (DESYREL) 50 MG tablet Take 1 tablet (50 mg total) by mouth at bedtime as needed for sleep.   ? triamcinolone (KENALOG) 0.1 % ointment Apply topically 2 (two) times a day. hands (Patient taking differently: Apply topically daily as needed hands.      )   ? VENTOLIN HFA 90 mcg/actuation inhaler INHALE 1-2 PUFFS EVERY 4 (FOUR) HOURS AS NEEDED FOR WHEEZING.     ALLERGIES: No Known Allergies    DIET: Diabetic, regular texture, thin liquids.    Vitals:    04/08/19 1128   BP: 123/69   Pulse: 69   Resp: 18   Temp: 97.8  F (36.6  C)   SpO2: 96%   Weight: 191 lb 9.6 oz (86.9 kg)   Height: 5' 5\" (1.651 m)     Body mass index is 31.88 kg/m .    EXAMINATION:   General: Pleasant middle-aged female, looking much older than her stated age, sitting in a wheelchair, right arm in the sling, nasal cannula in the nose.  Her daughter is in attendance.  Head: Normocephalic and atraumatic.   Eyes: PERRLA, sclerae clear.   ENT: Moist oral mucosa.   Cardiovascular: Regular rate and rhythm.  No appreciable murmur.  Respiratory: Left lung rhonchi, particularly in the base.  I believe this is a chronic condition.  The right lung is pretty much cleared.  Abdomen: Soft and nontender.   Musculoskeletal/Extremities: Age-related degenerative joint disease.  Right arm in a sling.  No peripheral edema.  Integument: No rashes, clinically significant lesions, or skin breakdown.   Cognitive/Psychiatric: Alert and oriented x3 as far as I can tell.  Affect is euthymic.    DIAGNOSTICS:   Results for orders placed " or performed during the hospital encounter of 03/27/19   Basic Metabolic Panel   Result Value Ref Range    Sodium 138 136 - 145 mmol/L    Potassium 4.1 3.5 - 5.0 mmol/L    Chloride 102 98 - 107 mmol/L    CO2 27 22 - 31 mmol/L    Anion Gap, Calculation 9 5 - 18 mmol/L    Glucose 136 (H) 70 - 125 mg/dL    Calcium 8.3 (L) 8.5 - 10.5 mg/dL    BUN 10 8 - 22 mg/dL    Creatinine 0.76 0.60 - 1.10 mg/dL    GFR MDRD Af Amer >60 >60 mL/min/1.73m2    GFR MDRD Non Af Amer >60 >60 mL/min/1.73m2     Lab Results   Component Value Date    WBC 22.8 (H) 04/06/2019    HGB 8.3 (L) 04/06/2019    HCT 26.3 (L) 04/06/2019    MCV 92 04/06/2019     (H) 04/06/2019     Estimated Creatinine Clearance: 72.3 mL/min (by C-G formula based on SCr of 0.76 mg/dL).  Lab Results   Component Value Date    HGBA1C 6.3 (H) 02/06/2019     Lab Results   Component Value Date    TSH 1.36 08/27/2018       ASSESSMENT/Plan:      ICD-10-CM    1. History of gram-positive (S pyogenes) pneumosepsis Z86.19    2. Chronic obstructive pulmonary disease, unspecified COPD type (H) J44.9    3. 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 Non-small cell cancer of right lung (H) C34.91    5. 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 Chronic pleural effusion, left J90      CHANGES:    Request for healing touch therapy.    CARE PLAN:    The care plan has been reviewed and all orders signed. Changes to care plan, if any, as noted. Otherwise, continue current plan of care.  Total time spent with this patient was approximately 40 minutes, with greater than 50% spent in counseling and coordination of care that included discussing a variety of the patient's issues with the patient herself, her daughter, and PT, who was also in the room.    The above has been created using voice recognition software. Please be aware that this may unintentionally  produce inaccuracies and/or nonsensical  sentences.      Electronically signed by: Robert Yepez, CNP

## 2021-06-19 NOTE — LETTER
"Letter by Jabari Perez MD at      Author: Jabari Perez MD Service: -- Author Type: --    Filed:  Encounter Date: 6/17/2019 Status: (Other)         Master Deleon DO  [address] [facility] [address]                                  June 18, 2019    Patient: Rita Mancini   MR Number: [id number]   YOB: 1950   Date of Visit: 6/17/2019     Dear Dr. Pancho DO:    I saw Rita Mancini on the above date at the [facility]. Below are the relevant portions of my assessment and plan of care.    If you have questions, please do not hesitate to call me. I look forward to following Rita along with you.    Sincerely,        Jabari Perez MD          CC  No Recipients  Jabari Perez MD  6/18/2019  8:20 AM  Sign at close encounter  Pulmonary Clinic Follow-up Visit    Assessment/Plan:  68 year old female with a history of tobacco dependence in remission, COPD, MONSERRAT, non small cell lung cancer of the right upper lobe diagnosed 2009 s/p chemotherapy and radiation c/b radiation pneumonitis in remission, recurrent right pleural effusion s/p talc pleurodesis in 2015, hypothyroidism, dyslipidemia, GERD, DM, right ureteral stent, chronic anemia, recent proximal right humerus fracture treated nonoperatively (poor candidate for surgery), and recent left lung abscess and left empyema with Strep pyogenes bacteremia and toxic shock syndrome s/p hospitalization, left thoracoscopic decortication and abscess drainage, and ongoing antibiotics, presenting for follow-up.     COPD, left lung abscess/empyema, chronic right upper lobe radiation pneumonitis: Feels very \"tired and wobbly,\" overall weak. Breathing feels okay. Occasional cough, similar to previous, usually with light yellow but occasionally green phlegm. Feels some benefit from umeclidinium-vilanterol. No leg edema. Denies fevers/chills. WBC count has been decreasing and following with Dr. Ang in ID clinic. Chest CT today " shows marked improvement in the left lung pneumonia and abscess with the process appearing to organize and contract. Also has significant daytime hypersomnolence and untreated MONSERRAT; she is interested in a sleep medicine referral. Also discussed getting formal PFTs and getting her into pulmonary rehabilitation.     Plan:  - continue umeclidinium-vilanterol one inhalation daily  - continue budesonide flexhaler 180 mcg two inhalations two times a day; rinse/gargle/spit water after use  - continue montelukast 10 mg at bedtime  - continues on furosemide 40 mg daily and appears euvolemic  - continues on amoxicillin-clavulanate with ongoing follow-up with Dr. Ang in ID clinic; appreciated  - referral to sleep medicine clinic for untreated MONSERRAT  - PFTs and 6MWT; if she qualifies, will refer her to pulmonary rehabilitation  - annual high-dose influenza vaccination  - received Pneumovax-23 in September 2012 and Prevnar-13 in July 2015; due for booster of Pneumovax-23 which we will defer until she is more recovered from her recent infection per patient preference  - follow up in 6 months  - encouraged her to call any time with questions or concerning symptoms     I appreciate the opportunity to participate in the care of Ms. Mancini.  Please call any time if needed.     CCx: COPD, left lung abscess/empyema, chronic right upper lobe radiation pneumonitis    HPI: 68 year old female with a history of tobacco dependence in remission, COPD, MONSERRAT, non small cell lung cancer of the right upper lobe diagnosed 2009 s/p chemotherapy and radiation c/b radiation pneumonitis in remission, recurrent right pleural effusion s/p talc pleurodesis in 2015, hypothyroidism, dyslipidemia, GERD, DM, right ureteral stent, chronic anemia, recent proximal right humerus fracture treated nonoperatively (poor candidate for surgery), and recent left lung abscess and left empyema with Strep pyogenes bacteremia and toxic shock syndrome s/p hospitalization,  "left thoracoscopic decortication and abscess drainage, and ongoing antibiotics, presenting for follow-up. Feels very \"tired and wobbly,\" overall weak. Breathing feels okay. Occasional cough, similar to previous, usually with light yellow but occasionally green phlegm. Feels some benefit from umeclidinium-vilanterol. No leg edema. Denies fevers/chills. WBC count has been decreasing and following with Dr. Ang in ID clinic. Chest CT today shows marked improvement in the left lung pneumonia and abscess with the process appearing to organize and contract. Also has significant daytime hypersomnolence and untreated MONSERRAT; she is interested in a sleep medicine referral. Also discussed getting formal PFTs and getting her into pulmonary rehabilitation.    ROS:  A 12-system review was obtained and was negative with the exception of the symptoms endorsed in the history of present illness.    PMH:  Untreated MONSERRAT  NSCLC RUL dx'd 2019 s/p chemo and radiation c/b radiation pneumonitis with ongoing fibrotic changes RUL  Recurrent right pleural effusion s/p right talc pleurodesis in 2015  Osteopenia  Hypothyroidism  DL  GERD  DM  MDD  COPD  Right ureteral stent  Chronic anemia  Left lung abscess/empyema, hospitalized 3/27/19-4/7/19, s/p left thoracoscopic decortication and abscess washout on 3/29/19; path negative for malignancy; remains on amoxicillin-clavulanate as of 6/17/19    PSH:  Past Surgical History:   Procedure Laterality Date   ? PORTACATH PLACEMENT      and removal   ? THORACOSCOPY Right 4/6/2015    Procedure: RIGHT THORACOSCOPY / BIOPSY PLEURAL / TALC PLEURODESIS;  Surgeon: Michi Ma MD;  Location: [facility];  Service:    ? THORACOSCOPY Left 3/29/2019    Procedure: LEFT THORACOSCOPY WITH DECORTICATION;  Surgeon: Michi Ma MD;  Location: [facility];  Service: General   ? TONSILLECTOMY  age 5   ? URETERAL STENT PLACEMENT Right 6/2010   ? US THORACENTESIS  3/27/2019       Allergies:  No " Known Allergies    Family HX:  Family History   Problem Relation Age of Onset   ? Heart disease Mother    ? Hypertension Mother    ? Alcohol abuse Mother    ? Depression Mother    ? Heart disease Father 45        mi age 45, cabg   ? Heart attack Father    ? Cancer Father         prostate   ? Hypertension Father    ? COPD Brother    ? Alcohol abuse Brother    ? Alcohol abuse Sister    ? Breast cancer Paternal Aunt    ? Leukemia Grandchild    ? Cancer Maternal Aunt 47        breast   ? Diabetes Son    ? Anxiety disorder Son    ? Depression Son    ? No Medical Problems Daughter    ? Schizophrenia Grandchild        Social Hx:  Social History     Socioeconomic History   ? Marital status:      Spouse name: Not on file   ? Number of children: Not on file   ? Years of education: Not on file   ? Highest education level: Not on file   Occupational History   ? Not on file   Social Needs   ? Financial resource strain: Not on file   ? Food insecurity:     Worry: Not on file     Inability: Not on file   ? Transportation needs:     Medical: Not on file     Non-medical: Not on file   Tobacco Use   ? Smoking status: Former Smoker     Packs/day: 1.50     Years: 0.00     Pack years: 0.00     Last attempt to quit: 11/9/2008     Years since quitting: 10.6   ? Smokeless tobacco: Former User   Substance and Sexual Activity   ? Alcohol use: No   ? Drug use: No   ? Sexual activity: Never   Lifestyle   ? Physical activity:     Days per week: Not on file     Minutes per session: Not on file   ? Stress: Not on file   Relationships   ? Social connections:     Talks on phone: Not on file     Gets together: Not on file     Attends Zoroastrian service: Not on file     Active member of club or organization: Not on file     Attends meetings of clubs or organizations: Not on file     Relationship status: Not on file   ? Intimate partner violence:     Fear of current or ex partner: Not on file     Emotionally abused: Not on file     Physically  abused: Not on file     Forced sexual activity: Not on file   Other Topics Concern   ? Not on file   Social History Narrative   ? Not on file       Current Meds:  Current Outpatient Medications   Medication Sig Dispense Refill   ? acetaminophen (TYLENOL) 500 MG tablet Take 2 tablets (1,000 mg total) by mouth 3 (three) times a day.  0   ? albuterol (PROVENTIL) 2.5 mg /3 mL (0.083 %) nebulizer solution TAKE 3ML BY MOUTH EVERY 4 HOURS AS NEEDED FOR WHEEZING 900 mL 1   ? amoxicillin-clavulanate (AUGMENTIN) 875-125 mg per tablet Take 1 tablet by mouth 2 (two) times a day. 28 tablet 1   ? aspirin 81 MG EC tablet Take 81 mg by mouth daily.     ? blood glucose meter (GLUCOMETER) Use 1 each As Directed as needed. Dispense glucometer brand per patient's insurance at pharmacy discretion. 1 each 0   ? blood glucose test (ACCU-CHEK SMARTVIEW TEST STRIP) strips Test blood sugar 3 times daily 200 strip 3   ? blood glucose test (ACCU-CHEK SMARTVIEW TEST STRIP) strips PT TO TEST BLOOD SUGAR 2-3 TIMES DAILY 300 strip 3   ? cetirizine 10 mg cap Take 10 mg by mouth daily with supper.            ? citalopram (CELEXA) 10 MG tablet Take 1 tablet (10 mg total) by mouth daily. 90 tablet 3   ? famotidine (PEPCID) 20 MG tablet Take 1 tablet (20 mg total) by mouth daily.     ? fluticasone (FLONASE) 50 mcg/actuation nasal spray 2 SPRAYS INTO EACH NOSTRIL DAILY. 48 g 3   ? furosemide (LASIX) 20 MG tablet TAKE 2 TABLETS (40 MG TOTAL) BY MOUTH EVERY MORNING. 180 tablet 3   ? generic lancets Use 1 each As Directed daily. Dispense brand per patient's insurance at pharmacy discretion. (dx E11.9) 100 each 1   ? Lactobacillus rhamnosus GG (CULTURELLE) 15 billion cell CpSP Take 1 capsule by mouth 2 (two) times a day.            ? levothyroxine (SYNTHROID, LEVOTHROID) 75 MCG tablet TAKE 1 TABLET BY MOUTH EVERY OTHER DAYS ALTERNATING WITH 75MCG AND 50MCG DOSE 45 tablet 2   ? loperamide (IMODIUM A-D) 2 mg tablet Take 2 mg by mouth as needed for diarrhea (2  "mg after each loose stool to a maximum of 16 mg/day).     ? menthol-zinc oxide (CALMOSEPTINE) 0.44-20.6 % Oint ointment Apply topically 3 (three) times a day.     ? metFORMIN (GLUCOPHAGE) 1000 MG tablet Take 1 tablet (1,000 mg total) by mouth 2 (two) times a day with meals. 180 tablet 3   ? montelukast (SINGULAIR) 10 mg tablet Take 1 tablet (10 mg total) by mouth at bedtime. 90 tablet 3   ? multivit with calcium,iron,min (MULTIVITAMIN-CA-IRON-MINERALS ORAL) Take by mouth.     ? naproxen (NAPROSYN) 500 MG tablet Take 1 tablet (500 mg total) by mouth 2 (two) times a day with meals. 180 tablet 3   ? PULMICORT FLEXHALER 180 mcg/actuation inhaler INHALE 2 PUFFS 2 (TWO) TIMES A DAY. 3 each 3   ? simvastatin (ZOCOR) 10 MG tablet TAKE 1 TABLET BY MOUTH AT BEDTIME. 90 tablet 2   ? traZODone (DESYREL) 100 MG tablet Take 1 tablet (100 mg total) by mouth at bedtime as needed for sleep. (Patient taking differently: Take 100 mg by mouth at bedtime as needed for sleep. Alternative with 50 mg      ) 90 tablet 3   ? triamcinolone (KENALOG) 0.1 % ointment Apply topically 2 (two) times a day. hands (Patient taking differently: Apply topically daily as needed hands.      ) 30 g 3   ? umeclidinium-vilanterol (ANORO ELLIPTA) 62.5-25 mcg/actuation inhaler Inhale 1 puff daily. 90 puff 3   ? VENTOLIN HFA 90 mcg/actuation inhaler INHALE 1-2 PUFFS EVERY 4 (FOUR) HOURS AS NEEDED FOR WHEEZING. 54 g 0   ? calcium carbonate-vitamin D3 (CALCIUM 600 WITH VITAMIN D3) 600 mg(1,500mg) -200 unit per tablet Take 1 tablet by mouth 2 (two) times a day. Taking 1200mg of Calcium with 1000 D3     ? sodium chloride (OCEAN) 0.65 % nasal spray Apply 1 spray into each nostril as needed for congestion.       No current facility-administered medications for this visit.        Physical Exam:  /60   Pulse 90   Resp 24   Ht 5' 5\" (1.651 m)   Wt 152 lb 12.8 oz (69.3 kg)   SpO2 96%   BMI 25.43 kg/m     Gen: alert, oriented, no distress  HEENT: nasal " turbinates are unremarkable, no oropharyngeal lesions, no cervical or supraclavicular lymphadenopathy  CV: RRR, no M/G/R  Resp: rhonchi toward the bases, no new findings  Abd: soft, nontender, no palpable organomegaly  Skin: no apparent rashes  Ext: no cyanosis, clubbing or edema  Neuro: alert, nonfocal    Labs:  reviewed    Imaging studies:  TTE (3/28/19):  - EF 50%  - normal RV sie/function  - no valvular abnormalities  - severely increased CVP     Chest CT (4/22/19):  - images directly reviewed, formal interpretation follows:  FINDINGS:  LUNGS AND PLEURA: A 2.5 x 1.6 cm left lower lobe cavitation has developed. The  surrounding left lower lobe consolidation has decreased.     Evolving irregular right lower lobe consolidation. Persistent right upper lobe  paramediastinal irregular consolidation.     Decreased bilateral pleural effusions. Residual fluid collections in both major  and the right minor fissures.     Increased smooth appearing intralobular septal thickening, particularly on the  left.     MEDIASTINUM: Persistent mediastinal adenopathy which could be reactive or  neoplastic.     LIMITED UPPER ABDOMEN: Left para-aortic 13 x 9 mm node just medial to the  adrenal gland. This could be reactive or neoplastic.     MUSCULOSKELETAL: Proximal right humeral impacted fracture.     CONCLUSION:  1.  Evolving bilateral lung consolidations.  2.  New left lower lobe cavitation. This most likely represents cavitary  pneumonia.  3.  Increasing left interlobular septal thickening most suggestive of edema.  4.  Persistent but improved bilateral pleural effusions.  5.  Mediastinal and upper para-aortic adenopathy which could be reactive or  neoplastic.  6.  Recommend CT chest follow-up exclude the possibility of neoplasm.    CT chest (6/17/19):  - images directly reviewed, formal interpretation follows:  FINDINGS:   LUNGS AND PLEURA: The central airways are clear. Bilateral pleural effusions, left greater than right are  improved since the previous exam. Mild left lower lobar consolidation with associated mild cavitation, improved since the prior exam. The previously   seen right lower lobar consolidation and adjacent airspace opacities are also improved. Stable irregular medial right apical opacity with associated traction bronchiectasis and distortion, measuring up to 2.1 cm. Previously noted left-sided loculated   pleural fluid along the major fissure has resolved.      MEDIASTINUM: Previously seen mediastinal adenopathy has improved with a representative prevascular node measuring 4 mm short axis dimension versus 11 mm previously. A subcarinal node measures 10 mm short axis dimension versus 17 mm previously. Normal   heart size. Trace pericardial fluid. Mild to moderate coronary artery calcifications.     LIMITED UPPER ABDOMEN: Fullness of the right renal pelvis may reflect peripelvic cysts.     MUSCULOSKELETAL: Old comminuted right proximal humerus fracture with surrounding callus formation. Multiple old healing bilateral rib fractures.     IMPRESSION:   CONCLUSION:   1.  Interval improved bilateral lower lobar consolidation and airspace opacities. Minimal residual cavitation in the left lower lobar consolidation. This is favored to reflect cavitary pneumonia. Recommend continued follow-up to resolution.  2.  Stable spiculated right apical mass.  3.  Trace residual bilateral pleural effusions, improved since the prior exam.  4.  Interval near complete resolution of the previously seen mediastinal adenopathy.    Jabari Perez MD  Pulmonary and Critical Care Medicine  [facility]  Cell [phone]  Office [phone]  Pager [phone]

## 2021-06-19 NOTE — LETTER
Letter by Robert Yepez CNP at      Author: Robert Yepez CNP Service: -- Author Type: --    Filed:  Encounter Date: 2019 Status: (Other)         Patient: Rita Mancini   MR Number: [id number]   YOB: 1950   Date of Visit: 2019     [facility]    Name:   Rita Mancini  : 1950  Facility:   [facility] [[id number]]   Room:   Code Status: FULL CODE -   Fac type:   SNF (Skilled Nursing Facility, TCU) -     CHIEF COMPLAINT / REASON FOR VISIT:  Chief Complaint   Patient presents with   ? Follow-up     TCU follow-up after hospitalization for pneumosepsis, but she was also recently in the ED with a closed displaced fracture of the proximal end of the right humerus.  Also addressing diarrhea and depression.     [facility] ED 19 (displaced fracture of proximal end of right humerus)  [facility] from 19 until 19 (pneumosepsis)    Patient was last seen by me on 04/10/19 and subsequently seen by Dr. Luna on 19.      HPI: Rita is a 68 y.o. female was admitted to [facility] on 3/27/2019 for septic shock.   She had been to [facility] ED 11 days earlier after slipping on the ice and sustaining a closed displaced fracture of the proximal end of the right humerus.  X-rays of the right shoulder showed fracture of the proximal humerus, comminuted with displacement.  She was discharged with a shoulder immobilizer to home with orthopedic surgery follow-up.      MOST RECENT HOSPITALIZATION  (Per hospital discharge summary)     Pneumonia (S.pyogenes)  complicated by left-sided empyema s/p VATS/decortication 3/29  Strep biology knees bacteremia cleared  All chest tubes now removed, increase ambulation and activity  Respiratory failure-resolved, extubated 3/30   Antibiotics throughout course for broad-spectrum including vancomycin, Zosyn, clindamycin, and ultimately transition to  Augmentin to complete 3 additional weeks.  Infectious disease consultant would like to follow-up in clinic in 3 weeks.  BCx 3/30 CNS- contaminant  Follow-up with general surgery after hospitalization  Pain control with small dose of oxycodone when needed, scheduled Tylenol, the latter has been enough for her pain control.     Anxiety  She is on diazepam as an outpatient, while taking more oxycodone, we will put this on hold.    COPD  Continue home nebulizers, she remains on 2-3 L/min of oxygen, wean as tolerated     Hypervolemia  She was diuresed following ICU stay, back down to baseline weight     Diabetes  At goal as outpatient (A1c <7).   Correction scale, keeping at goal 140-180  Acceptable control in the hospital, continue metformin at discharge     MONSERRAT  Home cpap use     Insomnia  Continue trazodone when needed     Right humerus fracture  Ortho assessed recommending sling, also no DVT in right arm.  Recommendations given, will need outpatient follow-up     Lung cancer  Dx in 2009, hx of recurrent R effusion (talc pleurodesis 4/2015), currently in remission        Consult/s: pulmonary/intensive care, ID, general surgery and orthopedic surgery  Significant Diagnostic Studies:   EXAM: CT CHEST WO CONTRAST  LOCATION: [facility]  DATE/TIME: 3/28/2019 12:36 PM     INDICATION: pleural effusions, concern for empyema pleural effusions, concern for empyema  COMPARISON: Left thoracentesis from 3/27/2019, chest CT from 12/10/2018  TECHNIQUE: Helical images were obtained through the chest. Multiplanar reformats were obtained. Dose reduction techniques were used.  IV CONTRAST: None.     FINDINGS:   LUNGS AND PLEURA: Residual modest sized left pleural effusion is new compared to the previous CT. Airspace infiltrate at left lung base may represent a combination of pneumonia and atelectasis.  Small loculated pleural effusion within right major fissure. Atelectasis/consolidation right lung base new compared to the  "previous study.  There is no significant change in the postoperative spiculated appearance involving medial right upper lobe measuring approximately 2.5 x 1.5 cm with an adjacent suture line.     MEDIASTINUM: 2 AP window lymph nodes appear larger on image 35 and 36, each now measuring 10 mm in short axis, previously measuring 4 mm. Likely these are reactive. Endotracheal tube and NG tube are in place. Heart size normal.     LIMITED UPPER ABDOMEN: Negative.     MUSCULOSKELETAL: Negative.     IMPRESSION:   CONCLUSION:   1.  Modest residual left pleural effusion even after recent thoracentesis. Small loculated right pleural effusion. There are are no pleural findings on this noncontrast study to suggest empyema. Suggest correlating with results of yesterday's large   volume left thoracentesis.  2.  Dependent infiltrates at both lung bases suggestive of pneumonia.  3.  Minimal change in postoperative appearance of right upper lobe. A few small AP window lymph nodes are minimally larger, likely reactive.     Blood culture 3/27/19          Streptococcus pyogenes       EL       Ceftriaxone Sensitive       Clindamycin Sensitive       Erythromycin Sensitive       Penicillin Sensitive       Vancomycin Sensitive           Procedures:  LEFT THORACOSCOPY WITH DECORTICATION 3/29/19  US guided thoracentesis      Treatments: IV antibiotics as above       CURRENT TCU ISSUES    Primary diagnosis: She did have a scheduled follow-up with infectious disease on 04/18/19.      Right humeral head fracture: Angie does want follow-up x-rays at some point.    Pain management: The pain she experiences is in her right shoulder.  She is receiving low-dose oxycodone (2.5-5 mg every 6 hours) as needed they are also applying ice, and she has a lidocaine patch.  She mentioned, \"I've had decent luck with Tylenol.\"  At an earlier visit, we scheduled 1000 mg every 8 hours (3 times daily).    When seen earlier, her daughter, who was in attendance, " "told me that her mother gets \"agitated when the pain killers make her groggy.\"  As for the patient, she stated she was not getting too much, although the pain is never completely gone.    Buttock soreness: She tells me they are using an alcohol-based product to clean her up.  She would like something without that.  Dr. Luna ordered calmoseptine.    Dry mouth/dysphagia: She requested Biotene, and we wrote orders for that.  Uncertain whether it would be covered by her insurance.  I did suggest she could also suck on hard candy or lemon drops, and I later saw her with those.  Her daughter also mentioned that she was choking with eating.  Dr. Luna ordered speech to evaluate and treat.    Anxiety: Earlier, we did talk a bit about her anxiety that can make her pain worse.  They do have an outside person who comes and performs healing touch, and we did write orders for this.  However, at the current time her anxiety is affecting her daily life, sleep, and ability to participate in therapy.  We ordered lorazepam 0.5 mg nightly.  I believe she is able to sleep now.    Depression: She was seen by Dr. Shepherd and scored high on a depression scale.  We are increasing her citalopram from 10 mg daily to 20 mg daily.    COPD: She states she has lots of lung problems and is 10 years out from inoperable lung cancer for which she underwent radiation and chemotherapy.  She does a lot of coughing which is chronic.  She is always coughing up phlegm, so she may wait too long before her respiratory symptoms become serious.  Apparently, she has infections at least a couple times a year.  She was a smoker until 2008 when she developed lung cancer.  She is on multiple inhaled medications.  I have a feeling that she is generally noncompliant, disagreeing with her daughter how frequently she takes her nebulizer treatments, for example.  She tells me that her breathing can be somewhat labored, but her O2 sats remained good.  At the current " time, she is on 0.5 L/min per nasal cannula.  At this juncture, she believes that she could do without it.  We do plan to taper as able (orders written by Dr. Luna).  She did say she was using the incentive spirometer.    Dr. Luna wrote orders for scheduled albuterol nebs (in addition to as needed dosing).  She also ordered a CBC with differential and a BMP.  CBC on 04/15/2019 still showed an elevated white count of 18.1 with a slight left shift (neutrophils 77%, lymphocytes 12%).  We will order a follow-up for 04/26/2019 along with another BMP.    Dry nose: Due to oxygen per nasal cannula.  We are ordering a non-petroleum-based gel to apply to the nostrils twice daily.  Also, Ocean nasal spray may be used hourly as needed.    Diarrhea: This appears to be her biggest concern.  We are going to order Imodium 2 mg after each loose stool, maximum 16 mg/day.  We are also going to order Culturelle (15 billion cell) probiotic, 2 caps every morning.    GERD: Another problem she complains about there is persistent heartburn.  She is already receiving 20 mg of famotidine daily.  We will increase the famotidine to 20 mg twice daily.    Diabetes: Recent blood glucose levels range between 72 and 175, mostly around 100.  She is on 1000 mg of metformin twice daily.    Obstructive sleep apnea: She does have a CPAP machine at home but has not used it in months.  Here, she does have an incentive spirometer, and I encouraged her to use it.       ROS: No headaches, nausea or vomiting, dizziness, dysuria.    Past Medical History:   Diagnosis Date   ? COPD (chronic obstructive pulmonary disease) (H)    ? Depression    ? Diabetes mellitus (H)    ? GERD (gastroesophageal reflux disease)    ? Hx antineoplastic chemotherapy     lung cancer    ? Hx of radiation therapy     lung cancer    ? Hyperlipemia    ? Hypothyroid    ? Lung cancer (H) 2008    RUL,  Non small cell cancer   ? 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 Osteopenia     ? Pleural effusion 1/15   ? Radiation Pneumonitis     Created by Conversion    ? Sleep apnea     does not use cpap              Family History   Problem Relation Age of Onset   ? Heart disease Mother    ? Hypertension Mother    ? Alcohol abuse Mother    ? Depression Mother    ? Heart disease Father 45        mi age 45, cabg   ? Heart attack Father    ? Cancer Father         prostate   ? Hypertension Father    ? COPD Brother    ? Alcohol abuse Brother    ? Alcohol abuse Sister    ? Breast cancer Paternal Aunt    ? Leukemia Grandchild    ? Cancer Maternal Aunt 47        breast   ? Diabetes Son    ? Anxiety disorder Son    ? Depression Son    ? No Medical Problems Daughter    ? Schizophrenia Grandchild      Social History     Socioeconomic History   ? Marital status:      Spouse name: Not on file   ? Number of children: Not on file   ? Years of education: Not on file   ? Highest education level: Not on file   Occupational History   ? Not on file   Social Needs   ? Financial resource strain: Not on file   ? Food insecurity:     Worry: Not on file     Inability: Not on file   ? Transportation needs:     Medical: Not on file     Non-medical: Not on file   Tobacco Use   ? Smoking status: Former Smoker     Packs/day: 1.50     Years: 0.00     Pack years: 0.00     Last attempt to quit: 11/9/2008     Years since quitting: 10.4   ? Smokeless tobacco: Former User   Substance and Sexual Activity   ? Alcohol use: No   ? Drug use: No   ? Sexual activity: Never   Lifestyle   ? Physical activity:     Days per week: Not on file     Minutes per session: Not on file   ? Stress: Not on file   Relationships   ? Social connections:     Talks on phone: Not on file     Gets together: Not on file     Attends Muslim service: Not on file     Active member of club or organization: Not on file     Attends meetings of clubs or organizations: Not on file     Relationship status: Not on file   ? Intimate partner violence:     Fear of  current or ex partner: Not on file     Emotionally abused: Not on file     Physically abused: Not on file     Forced sexual activity: Not on file   Other Topics Concern   ? Not on file   Social History Narrative   ? Not on file       MEDICATIONS: Reviewed from the MAR, physician orders, and/or earlier progress notes.  Updated by me today (04/24/2019) with multiple changes reflected below.  Current Outpatient Medications   Medication Sig   ? citalopram (CELEXA) 20 MG tablet Take 20 mg by mouth daily.   ? Lactobacillus rhamnosus GG (CULTURELLE) 15 billion cell CpSP Take 2 capsules by mouth Daily at 8:00 am..   ? loperamide (IMODIUM A-D) 2 mg tablet Take 2 mg by mouth as needed for diarrhea (2 mg after each loose stool to a maximum of 16 mg/day).   ? sodium chloride (OCEAN) 0.65 % nasal spray Apply 1 spray into each nostril as needed for congestion.   ? acetaminophen (TYLENOL) 500 MG tablet Take 2 tablets (1,000 mg total) by mouth 3 (three) times a day.   ? albuterol (PROVENTIL) 2.5 mg /3 mL (0.083 %) nebulizer solution TAKE 3ML BY MOUTH EVERY 4 HOURS AS NEEDED FOR WHEEZING   ? amoxicillin-clavulanate (AUGMENTIN) 875-125 mg per tablet Take 1 tablet by mouth 2 (two) times a day for 21 days. Follow up with Infectious Disease physician prior to completing antibiotics   ? aspirin 81 MG EC tablet Take 81 mg by mouth daily.   ? baclofen (LIORESAL) 10 MG tablet Take 1 tablet (10 mg total) by mouth 3 (three) times a day as needed.   ? blood glucose meter (GLUCOMETER) Use 1 each As Directed as needed. Dispense glucometer brand per patient's insurance at pharmacy discretion.   ? blood glucose test (ACCU-CHEK SMARTVIEW TEST STRIP) strips Test blood sugar 3 times daily   ? calcium carbonate-vitamin D3 (CALCIUM 600 WITH VITAMIN D3) 600 mg(1,500mg) -200 unit per tablet Take 1 tablet by mouth 2 (two) times a day. Taking 1200mg of Calcium with 1000 D3   ? cetirizine 10 mg cap Take 10 mg by mouth daily with supper.          ?  famotidine (PEPCID) 20 MG tablet Take 1 tablet (20 mg total) by mouth daily. (Patient taking differently: Take 20 mg by mouth 2 (two) times a day.       )   ? fluticasone (FLONASE) 50 mcg/actuation nasal spray 2 SPRAYS INTO EACH NOSTRIL DAILY.   ? furosemide (LASIX) 20 MG tablet TAKE 2 TABLETS (40 MG TOTAL) BY MOUTH EVERY MORNING.   ? generic lancets Use 1 each As Directed daily. Dispense brand per patient's insurance at pharmacy discretion. (dx E11.9)   ? guaiFENesin ER (MUCINEX) 600 mg 12 hr tablet Take 1,200 mg by mouth 2 (two) times a day.   ? INCRUSE ELLIPTA 62.5 mcg/actuation DsDv inhaler INHALE 1 PUFF BY MOUTH DAILY   ? levothyroxine (SYNTHROID, LEVOTHROID) 75 MCG tablet TAKE 1 TABLET BY MOUTH EVERY OTHER DAYS ALTERNATING WITH 75MCG AND 50MCG DOSE   ? lidocaine 4 % patch Place 1 patch on the skin daily. Remove and discard patch with 12 hours.  Apply to right shoulder   ? LORazepam (ATIVAN) 0.5 MG tablet Take by mouth at bedtime.   ? menthol-zinc oxide (CALMOSEPTINE) 0.44-20.6 % Oint ointment Apply topically 3 (three) times a day.   ? metFORMIN (GLUCOPHAGE) 1000 MG tablet Take 1 tablet (1,000 mg total) by mouth 2 (two) times a day with meals.   ? montelukast (SINGULAIR) 10 mg tablet Take 1 tablet (10 mg total) by mouth at bedtime.   ? naproxen (NAPROSYN) 500 MG tablet Take 1 tablet (500 mg total) by mouth 2 (two) times a day with meals.   ? oxyCODONE (ROXICODONE) 5 MG immediate release tablet Take 0.5-1 tablets (2.5-5 mg total) by mouth every 6 (six) hours as needed for pain.   ? PULMICORT FLEXHALER 180 mcg/actuation inhaler INHALE 2 PUFFS 2 (TWO) TIMES A DAY.   ? simvastatin (ZOCOR) 10 MG tablet TAKE 1 TABLET BY MOUTH AT BEDTIME.   ? traZODone (DESYREL) 50 MG tablet Take 1 tablet (50 mg total) by mouth at bedtime as needed for sleep.   ? triamcinolone (KENALOG) 0.1 % ointment Apply topically 2 (two) times a day. hands (Patient taking differently: Apply topically daily as needed hands.      )   ? VENTOLIN HFA  "90 mcg/actuation inhaler INHALE 1-2 PUFFS EVERY 4 (FOUR) HOURS AS NEEDED FOR WHEEZING.     ALLERGIES: No Known Allergies    DIET: Diabetic, regular texture, thin liquids.  Mighty Shakes Sugar-Free.    Vitals:    04/24/19 1415   BP: 102/57   Pulse: 88   Resp: 20   Temp: 98  F (36.7  C)   SpO2: 95%   Weight: 165 lb 8 oz (75.1 kg)   Height: 5' 5\" (1.651 m)   Significant variance and weight.  Previous weight of 181.4 pounds was obtained on a wheelchair.  Now, it is obtained standing.  Body mass index is 27.54 kg/m .    EXAMINATION:   General: Pleasant middle-aged female, looking much older than her stated age, sitting in a wheelchair, right arm in the sling, nasal cannula in the nose.    Head: Normocephalic and atraumatic.   Eyes: PERRLA, sclerae clear.   ENT: Moist oral mucosa.   Cardiovascular: Regular rate and rhythm.  No appreciable murmur.  Respiratory: Rales in bilateral bases, left greater than right.  I believe this is a chronic condition.  Otherwise, lungs appear clear.  Abdomen: Soft and nontender.   Musculoskeletal/Extremities: Age-related degenerative joint disease.  Right arm in a sling.  No peripheral edema.  Integument: No rashes, clinically significant lesions, or skin breakdown.   Cognitive/Psychiatric: Alert and oriented x3 as far as I can tell.  Affect is somewhat depressed/anxious.    DIAGNOSTICS:   Results for orders placed or performed during the hospital encounter of 03/27/19   Basic Metabolic Panel   Result Value Ref Range    Sodium 138 136 - 145 mmol/L    Potassium 4.1 3.5 - 5.0 mmol/L    Chloride 102 98 - 107 mmol/L    CO2 27 22 - 31 mmol/L    Anion Gap, Calculation 9 5 - 18 mmol/L    Glucose 136 (H) 70 - 125 mg/dL    Calcium 8.3 (L) 8.5 - 10.5 mg/dL    BUN 10 8 - 22 mg/dL    Creatinine 0.76 0.60 - 1.10 mg/dL    GFR MDRD Af Amer >60 >60 mL/min/1.73m2    GFR MDRD Non Af Amer >60 >60 mL/min/1.73m2     Lab Results   Component Value Date    WBC 18.3 (H) 04/18/2019    WBC 18.6 (H) 04/18/2019    HGB " 8.4 (L) 04/18/2019    HGB 8.6 (L) 04/18/2019    HCT 28.5 (L) 04/18/2019    HCT 27.7 (L) 04/18/2019    MCV 94 04/18/2019    MCV 91 04/18/2019     (H) 04/18/2019     (H) 04/18/2019     CrCl cannot be calculated (Patient's most recent lab result is older than the maximum 5 days allowed.).  Lab Results   Component Value Date    HGBA1C 6.3 (H) 02/06/2019     Lab Results   Component Value Date    TSH 1.36 08/27/2018       ASSESSMENT/Plan:      ICD-10-CM    1. History of gram-positive (S. pyogenes) pneumosepsis Z86.19    2. 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 Gastroesophageal reflux disease without esophagitis K21.9    4. Functional diarrhea K59.1    5. Chronic obstructive pulmonary disease, unspecified COPD type (H) J44.9    6. Non-small cell cancer of right lung (H) C34.91    7. 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 Chronic pleural effusion, left J90    9. Current moderate episode of major depressive disorder, unspecified whether recurrent (H) F32.1      CHANGES:    1) Imodium 2 mg after each loose stool (maximum 16 mg/day).  2) Culturelle-15 Billion Cell caps, 2 caps daily.  3) increase famotidine from 20 mg daily to 20 mg twice daily for persistent GERD symptoms.  4) sodium chloride (Ocean) 0.65% nasal spray hourly as needed for dry nose secondary to oxygen per nasal cannula.  5) non-petroleum-based gel to nostrils twice daily.  6) increase citalopram from 10 mg to 20 mg daily due to persistent depression.    CARE PLAN:    The care plan has been reviewed and all orders signed. Changes to care plan, if any, as noted. Otherwise, continue current plan of care.  Total time spent with this patient was approximately 40 minutes, with greater than 50% spent in counseling and coordination of care that involved a review of multiple medical conditions, addressing them with new medications or changes.    The above has been  created using voice recognition software. Please be aware that this may unintentionally  produce inaccuracies and/or nonsensical sentences.      Electronically signed by: Robert Yepez CNP

## 2021-06-19 NOTE — LETTER
Letter by Master Deleon DO at      Author: Master Deleon DO Service: -- Author Type: --    Filed:  Encounter Date: 8/8/2019 Status: (Other)         August 8, 2019     Patient: Rita Mancini   YOB: 1950   Date of Visit: 8/8/2019       To Whom It May Concern:    It is my medical opinion that Rita Mancini should not travel through the end of August as she is recovering from an illness. Rita was recently hospitalized, and I do not believe she will be ready to fly on an airplane by 8/26/2019, which was her planned departure date. Please assist in providing Rita with reimbursement for the trip.     If you have any questions or concerns, please don't hesitate to call.    Sincerely,        Electronically signed by Master Deleon DO

## 2021-06-19 NOTE — LETTER
Letter by Kristan Luna MD at      Author: Kristan Luna MD Service: -- Author Type: --    Filed:  Encounter Date: 4/19/2019 Status: (Other)         Patient: Rita Mancini   MR Number: [id number]   YOB: 1950   Date of Visit: 4/19/2019     [facility]      Facility:    [facility] [[id number]]    Code Status: FULL CODE   Resides at the [facility] ( independent appts)      Chief Complaint/Reason for Visit:  Chief Complaint   Patient presents with   ? Review Of Multiple Medical Conditions     Empyema / Right proximal humerus fracture       HPI:   Rita is a 68 y.o. female with known medical history of colon  Non-small cell carcinoma of the right lung (status post chemotherapy and radiation therapy remission since [id number])  Pleural and pleural effusion, s/p  Talc pleurodesis in 2015  Hypothyroid, acquired  Diabetes type 2  COPD  Obstructive sleep apnea ( not usingCPAP    Weeks before admission, she had cough.  She had been to the clinic, chest x-ray from 3/11 did not show acute pneumonia.  She was put on a Z-Kevin because of her emphysema.  A week later, she fell on the ice and had a right proximal humeral shaft fracture.  She had a nonoperative management with a sling and pain medications.    Since that time she had progressive shortness of breath, the day before admission family thought she sounded quite short of breath and confused.  Patient herself thought it was from the pain medication.  The night before admission, her son heard her heavy breathing.  He woke her up and she was quite confused.  He helped her to the bathroom but she took over an hour in the bathroom because she kept falling asleep.  He helped her back to bed and thought she was really significantly confused.  He called 911.    EMS measured a saturation in the 70s, felt she is in respiratory distress.  On arrival to the emergency department she was  hypotensive.  She was given IV fluids and placed on BiPAP (PCO2 = 63), very elevated lactic acid at 7.4, procalcitonin was very high at 20.85 acute renal failure (creatinine = 3.59) , hyperkalemia (6.0) FVC = 20.85..  She was intubated, started on pressors.  Cultures were growing group A strep.  There was concern for toxic shock syndrome.  She was given IVIG times 3 days and clindamycin.  She had sided empyema, that fluid was growing group A streptococcus as well as Streptococcus pyogenous.  She had a VATS/decortication procedure on 3/29.  Followed by infectious disease.  Chest tubes were placed, CT showed improvement and and effusions.   She was switched to Zosyn.  Discharge she was changed to Augmentin.    There was no DVT found in the right arm.  She will need to follow-up with orthopedic department as an outpatient.    Transferred to [facility]    UPDATE:Went to Dr Ang,ID yesterday, did CXR:  FINDINGS: Increase vague opacification in the right midlung with continued fluid  superiorly within the right major fissure. Small right effusion is unchanged.     Heterogeneous opacities in the left base with left pleural effusion are  unchanged. Left apical pleural thickening medially is also unchanged    He ordered labs 4/18, Dr Luna had ordered labs 4/15:    Ref Range & Units 4/18/19 1044 4/15/19 1405 4/6/19 1050    WBC 4.0 - 11.0 thou/uL 18.6High   18.1High   22.8High      Hemoglobin 12.0 - 16.0 g/dL 8.6Low   8.4Low   8.3Low      MCHC 32.0 - 36.0 g/dL 31.0Low   29.8Low   31.6Low      RDW 11.0 - 14.5 % 16.1High   16.7High   16.2High      Platelets 140 - 440 thou/uL 748High   597High   611High          Ref Range & Units  4/18/19 3/27/19    Total Neutrophils % 50 - 70 % 78High   89High      Lymphocytes % 20 - 40 % 12Low   5Low      Monocytes % 2 - 10 % 8  5     Eosinophils %  0 - 6 % 1  0     Basophils % 0 - 2 % 1  0     Myelocytes % <=1 % 1  1     Total Neutrophils Absolute 2.0 - 7.7 thou/ul  14.3High   17.7High      Lymphocytes Absolute 0.8 - 4.4 thou/uL 2.2  0.9     Monocytes Absolute 0.0 - 0.9 thou/uL 1.5High   0.9           Ref Range & Units 4/18/19 1044 4/6/19 1050 4/4/19 1906    Procalcitonin 0.00 - 0.49 ng/mL 0.25        Ref Range & Units 4/4/19 1906 3/27/19 0827     Procalcitonin 0.00 - 0.49 ng/mL 0.51High   20.85High                He also ordered chest CT chest for 4/22 and see surgeon ( Dr Ma) again.  Her cough is not productive of phlegm but she hears a lot of phlegm rattling around.    She has a poor appetite, worse because sore to swallow  Her tongue is sore as well as her throat: Biotene spray with mint burns her mouth    Tongue is sore, Biotene spray with mint burns her mouth, only helps an hour or so.  Has numerous very soft stools daily: bothersome. Not diarrhea.  Also large volume urination, not having dysuria  SLP therapy has downgraded her diet to dysphagia advanced      Past Medical History:  Past Medical History:   Diagnosis Date   ? COPD (chronic obstructive pulmonary disease) (H)    ? Depression    ? Diabetes mellitus (H)    ? GERD (gastroesophageal reflux disease)    ? Hx antineoplastic chemotherapy     lung cancer    ? Hx of radiation therapy     lung cancer    ? Hyperlipemia    ? Hypothyroid    ? Lung cancer (H) 2008    RUL,  Non small cell cancer   ? 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 Osteopenia    ? Pleural effusion 1/15   ? Radiation Pneumonitis     Created by Conversion    ? Sleep apnea     does not use cpap           Surgical History:  Past Surgical History:   Procedure Laterality Date   ? PORTACATH PLACEMENT      and removal   ? THORACOSCOPY Right 4/6/2015    Procedure: RIGHT THORACOSCOPY / BIOPSY PLEURAL / TALC PLEURODESIS;  Surgeon: Michi Ma MD;  Location: [facility];  Service:    ? THORACOSCOPY Left 3/29/2019    Procedure: LEFT THORACOSCOPY WITH DECORTICATION;  Surgeon: Michi Ma MD;  Location: [facility];  Service:  General   ? TONSILLECTOMY  age 5   ? URETERAL STENT PLACEMENT Right 6/2010   ? US THORACENTESIS  3/27/2019                Review of Systems     As above    Blood pressure 108/69, pulse (!) 102, temperature 98  F (36.7  C), resp. rate 19, SpO2 98 %, not currently breastfeeding.  BMI = 32      Physical Exam     Constitutional:   female, appears her stated age , talkative, visiting with her daughter/grand daughter  HENT: No pharyngeal erythema or exudate, hard palate without exudate, surface of the tongue somewhat dry but no white growths  Cardiovascular: Regular rhythm and normal heart sounds.   Pulmonary/Chest: Coarse rhonchi central airways, frequent cough but nonproductive  Abdominal: Soft. Bowel sounds are normal. She exhibits no distension. There is no tenderness.   Musculoskeletal: Rightarm in a sling .  Adjust her body position in her recliner, reach for things on her bedside table   Neurological: She is alert and oriented to person, place, and time.   Skin: Skin is dry. No rash noted. No erythema. Pale  Positioned in a recliner, not able to examine buttocks skin   Psychiatric: She has a normal mood and affect. Thought content normal.       No Known Allergies    Medication List:  Current Outpatient Medications   Medication Sig   ? acetaminophen (TYLENOL) 500 MG tablet Take 2 tablets (1,000 mg total) by mouth 3 (three) times a day.   ? albuterol (PROVENTIL) 2.5 mg /3 mL (0.083 %) nebulizer solution TAKE 3ML BY MOUTH EVERY 4 HOURS AS NEEDED FOR WHEEZING   ? amoxicillin-clavulanate (AUGMENTIN) 875-125 mg per tablet Take 1 tablet by mouth 2 (two) times a day for 21 days. Follow up with Infectious Disease physician prior to completing antibiotics   ? aspirin 81 MG EC tablet Take 81 mg by mouth daily.   ? baclofen (LIORESAL) 10 MG tablet Take 1 tablet (10 mg total) by mouth 3 (three) times a day as needed.   ? blood glucose meter (GLUCOMETER) Use 1 each As Directed as needed. Dispense glucometer brand per  patient's insurance at pharmacy discretion.   ? blood glucose test (ACCU-CHEK SMARTVIEW TEST STRIP) strips Test blood sugar 3 times daily   ? calcium carbonate-vitamin D3 (CALCIUM 600 WITH VITAMIN D3) 600 mg(1,500mg) -200 unit per tablet Take 1 tablet by mouth 2 (two) times a day. Taking 1200mg of Calcium with 1000 D3   ? cetirizine 10 mg cap Take 10 mg by mouth daily with supper.          ? citalopram (CELEXA) 10 MG tablet TAKE 1 TABLET BY MOUTH ONCE DAILY.   ? famotidine (PEPCID) 20 MG tablet Take 1 tablet (20 mg total) by mouth daily.   ? fluticasone (FLONASE) 50 mcg/actuation nasal spray 2 SPRAYS INTO EACH NOSTRIL DAILY.   ? furosemide (LASIX) 20 MG tablet TAKE 2 TABLETS (40 MG TOTAL) BY MOUTH EVERY MORNING.   ? generic lancets Use 1 each As Directed daily. Dispense brand per patient's insurance at pharmacy discretion. (dx E11.9)   ? guaiFENesin ER (MUCINEX) 600 mg 12 hr tablet Take 1,200 mg by mouth 2 (two) times a day.   ? INCRUSE ELLIPTA 62.5 mcg/actuation DsDv inhaler INHALE 1 PUFF BY MOUTH DAILY   ? levothyroxine (SYNTHROID, LEVOTHROID) 75 MCG tablet TAKE 1 TABLET BY MOUTH EVERY OTHER DAYS ALTERNATING WITH 75MCG AND 50MCG DOSE   ? lidocaine 4 % patch Place 1 patch on the skin daily. Remove and discard patch with 12 hours.  Apply to right shoulder   ? LORazepam (ATIVAN) 0.5 MG tablet Take by mouth at bedtime.   ? menthol-zinc oxide (CALMOSEPTINE) 0.44-20.6 % Oint ointment Apply topically 3 (three) times a day.   ? metFORMIN (GLUCOPHAGE) 1000 MG tablet Take 1 tablet (1,000 mg total) by mouth 2 (two) times a day with meals.   ? montelukast (SINGULAIR) 10 mg tablet Take 1 tablet (10 mg total) by mouth at bedtime.   ? naproxen (NAPROSYN) 500 MG tablet Take 1 tablet (500 mg total) by mouth 2 (two) times a day with meals.   ? oxyCODONE (ROXICODONE) 5 MG immediate release tablet Take 0.5-1 tablets (2.5-5 mg total) by mouth every 6 (six) hours as needed for pain.   ? PULMICORT FLEXHALER 180 mcg/actuation inhaler  INHALE 2 PUFFS 2 (TWO) TIMES A DAY.   ? simvastatin (ZOCOR) 10 MG tablet TAKE 1 TABLET BY MOUTH AT BEDTIME.   ? traZODone (DESYREL) 50 MG tablet Take 1 tablet (50 mg total) by mouth at bedtime as needed for sleep.   ? triamcinolone (KENALOG) 0.1 % ointment Apply topically 2 (two) times a day. hands (Patient taking differently: Apply topically daily as needed hands.      )   ? VENTOLIN HFA 90 mcg/actuation inhaler INHALE 1-2 PUFFS EVERY 4 (FOUR) HOURS AS NEEDED FOR WHEEZING.       Labs:        Ref Range & Units 4/6/19  4/4/19  3/27/19     Procalcitonin 0.00 - 0.49 ng/mL 0.28  0.51High   20.85High             Assessment / Plan:    ICD-10-CM    1. Pleural empyema (H) and  pneumonia J86.9  oxygen per nasal cannula.  Wean orders to keep saturation at or greater than 90%.  The next to help mobilize phlegm in her airways.  On Augmentin through 4/28   2. Septic shock (H)/ Bactermia Group A strep and Strep pyogenes A41.9  need to follow electrolytes, white count which is still elevated, renal function.    R65.21    3. Leukocytosis, unspecified type D72.829  WHITE count still  elevated   4. Pain in oral cavity K13.79  no lesions are observed.  Magic mouthwash is ordered.  There is a Biotene gel that her family can get her that is made without any meant.   5. 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  her inhalers have been as needed, will schedule albuterol 4 times a day as a neb   9. Mild episode of recurrent major depressive disorder (H) F33.0  citalopram for mood disorder.  She agrees with her family about a psychology assessment         Electronically signed by: Kristan Luna MD

## 2021-06-20 NOTE — PROGRESS NOTES
[facility] Note    Rita Mancini   68 y.o. female    Date of Visit: 8/27/2018  Chief Complaint   Patient presents with     Diabetes     follow up     Medication Refill     Immunizations     flu shot       ASSESSMENT/PLAN  1. Diabetes 1.5, managed as type 2 (H)  metFORMIN (GLUCOPHAGE) 1000 MG tablet    Glycosylated Hemoglobin A1c    Microalbumin, Random Urine   2. MONSERRAT on CPAP     3. Hypothyroidism, unspecified type  Thyroid Stimulating Hormone (TSH)   4. Keratosis obturans of external ear canal, right     5. Neuropathy of left abducens nerve     6. Non-small cell cancer of right lung (H)       ---------------------------------------------    1.  Diabetes, has been well controlled lately, last A1c under 7.  7.0 should be her goal, check microalbumin if she is able to produce a urine sample.  Previously negative.  She is up-to-date on eye exams.    2.  Encouraged use CPAP, is likely why she is fatigue    3.  Update TSH, and other cause of fatigue potentially    4.  Follow-up with ENT regularly yearly    5.  Of note, the left abducens nerve palsy spontaneously resolved    6.  Continues to be in remission, follows with Dr. Foster.    Return in about 6 months (around 2/27/2019) for Recheck.      SUBJECTIVE  Rita Mancini is a 68-year-old woman with history of non-small cell lung cancer of the right lung, COPD, diabetes, who presents for follow-up.    In regards to diabetes, she has noticed that her blood sugars have been high a little bit lately because she has been on probiotics.  She was warned that probiotics could cause an elevation in the glucose.  She has also been stressed with family stressors.    She sleeps 12 hours a night, does not feel rested.  She is not using CPAP, which previously allowed her to have excellent sleep.  She will try to use it, then wake up with the machine on the floor.    She asked about some of the newer continuous glucose monitors, but we establish she would not qualify  for.    She saw ENT, they recommended annual check because she has ongoing buildup of debris in the ear.    She has diazepam which is listed under the diagnosis of anxiety, but is usually used for insomnia, Dr. Herbert Foster is the prescriber of this, she wonders if she can get this through me exclusively.    She has history of peripheral neuropathy, has some decreased sensation under the balls of the feet.    Her granddaughter is here with her today.    Overall, she feels well and healthy.  She is very fortunate she tells me to be a survivor of lung cancer.  She follows up with Dr. Foster regularly.      Medications, allergies, and problem list were reviewed and updated    Patient Active Problem List   Diagnosis     Depression     Non-small cell cancer of right lung (H)     Hypothyroidism     Hyperlipidemia     Diabetes 1.5, managed as type 2 (H)     COPD (chronic obstructive pulmonary disease) (H)     MONSERRAT on CPAP     Diverticulosis     Bronchiectasis (H)     Eczema of both hands     Keratosis obturans of external ear canal, right     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     Osteopenia      Pleural effusion 1/15     Radiation Pneumonitis     Created by Conversion      Sleep apnea     does not use cpap     Current Outpatient Prescriptions   Medication Sig Dispense Refill     albuterol (PROVENTIL) 2.5 mg /3 mL (0.083 %) nebulizer solution TAKE 3ML BY MOUTH EVERY 4 HOURS AS NEEDED FOR WHEEZING 900 mL 1     aspirin 81 MG EC tablet Take 81 mg by mouth daily.       blood glucose meter (GLUCOMETER) Use 1 each As Directed as needed. Dispense  glucometer brand per patient's insurance at pharmacy discretion. 1 each 0     blood glucose test (ACCU-CHEK SMARTVIEW TEST STRIP) strips Test blood sugar 2-3 times daily 200 strip 0     calcium carbonate-vitamin D3 (CALCIUM 600 WITH VITAMIN D3) 600 mg(1,500mg) -200 unit per tablet Take 1 tablet by mouth 2 (two) times a day. Taking 1200mg of Calcium with 1000 D3       cetirizine 10 mg cap Take 10 mg by mouth bedtime.        citalopram (CELEXA) 10 MG tablet TAKE 1 TABLET BY MOUTH ONCE DAILY. 90 tablet 2     diazePAM (VALIUM) 10 MG tablet TAKE 1 TABLET (10 MG TOTAL) BY MOUTH EVERY 12 (TWELVE) HOURS AS NEEDED FOR ANXIETY. 30 tablet 3     famotidine (PEPCID) 20 MG tablet TAKE 1 TABLET (20 MG TOTAL) BY MOUTH 2 (TWO) TIMES A DAY. 180 tablet 1     fluticasone (FLONASE) 50 mcg/actuation nasal spray 2 sprays into each nostril daily. 48 g 3     furosemide (LASIX) 20 MG tablet Take 2 tablets (40 mg total) by mouth every morning. 180 tablet 2     generic lancets Use 1 each As Directed daily. Dispense brand per patient's insurance at pharmacy discretion. (dx E11.9) 100 each 1     INCRUSE ELLIPTA 62.5 mcg/actuation DsDv inhaler INHALE 1 PUFF BY MOUTH DAILY 90 puff 1     levothyroxine (SYNTHROID, LEVOTHROID) 50 MCG tablet TAKE 1 TABLET BY MOUTH EVERY OTHER DAY ALTERNATING WITH 75MG TABLET 45 tablet 0     levothyroxine (SYNTHROID, LEVOTHROID) 75 MCG tablet TAKE 1 TABLET BY MOUTH EVERY OTHER DAYS ALTERNATING WITH 75MCG AND 50MCG DOSE 45 tablet 1     metFORMIN (GLUCOPHAGE) 1000 MG tablet Take 1 tablet (1,000 mg total) by mouth 2 (two) times a day with meals. 180 tablet 3     montelukast (SINGULAIR) 10 mg tablet TAKE 1 TABLET BY MOUTH AT BEDTIME 90 tablet 1     PULMICORT FLEXHALER 180 mcg/actuation inhaler INHALE 2 PUFFS 2 (TWO) TIMES A DAY. 3 each 3     simvastatin (ZOCOR) 10 MG tablet TAKE 1 TABLET BY MOUTH AT BEDTIME. 90 tablet 2     triamcinolone (KENALOG) 0.1 % ointment Apply topically 2 (two) times a day. hands 30 g 3      VENTOLIN HFA 90 mcg/actuation inhaler INHALE 1-2 PUFFS EVERY 4 (FOUR) HOURS AS NEEDED FOR WHEEZING. 54 g 0     No current facility-administered medications for this visit.      No Known Allergies    EXAM  Vitals:    08/27/18 1437   BP: 100/50   Patient Site: Left Arm   Patient Position: Sitting   Cuff Size: Adult Regular   Pulse: 76   SpO2: 98%   Weight: 185 lb (83.9 kg)         General: Alert, no distress  Heart: Regular rate and rhythm, no murmurs  Lungs: Expiratory wheezes bilaterally, nonlabored breathing, no crackles  Neurologic: Sensed 3/6 areas in the right plantar aspect of the foot, 2/6 areas on the left.    Results reviewed: Viewed oncology note, ENT note, summarized above, labs ordered    Data points: 3    Master Deleon DO  Internal Medicine  [facility]

## 2021-06-21 NOTE — LETTER
Letter by Michi Murillo MD at      Author: Michi Murillo MD Service: -- Author Type: --    Filed:  Encounter Date: 2/19/2021 Status: (Other)         Rita Mancini  [address]  [place] [address]     February 19, 2021     Dear Ms. Mancini,    Below are the results from your recent visit:    Resulted Orders   Hepatitis C Antibody (Anti-HCV)   Result Value Ref Range    Hepatitis C Ab Negative Negative   Glycosylated Hemoglobin A1c   Result Value Ref Range    Hemoglobin A1c 6.6 (H) <=5.6 %   Comprehensive Metabolic Panel   Result Value Ref Range    Sodium 138 136 - 145 mmol/L    Potassium 4.7 3.5 - 5.0 mmol/L    Chloride 103 98 - 107 mmol/L    CO2 26 22 - 31 mmol/L    Anion Gap, Calculation 9 5 - 18 mmol/L    Glucose 100 70 - 125 mg/dL    BUN 24 8 - 28 mg/dL    Creatinine 1.31 (H) 0.60 - 1.10 mg/dL    GFR MDRD Af Amer 49 (L) >60 mL/min/1.73m2    GFR MDRD Non Af Amer 40 (L) >60 mL/min/1.73m2    Bilirubin, Total 0.6 0.0 - 1.0 mg/dL    Calcium 9.4 8.5 - 10.5 mg/dL    Protein, Total 7.4 6.0 - 8.0 g/dL    Albumin 4.1 3.5 - 5.0 g/dL    Alkaline Phosphatase 87 45 - 120 U/L    AST 19 0 - 40 U/L    ALT 14 0 - 45 U/L    Narrative    Fasting Glucose reference range is 70-99 mg/dL per  American Diabetes Association (ADA) guidelines.       Your tests are good.  The minor abnormalities are not significant.     Please call with questions or contact us using [employer].    Sincerely,        Electronically signed by Michi Murillo MD

## 2021-06-21 NOTE — LETTER
Letter by Michi Murillo MD at      Author: Michi Murillo MD Service: -- Author Type: --    Filed:  Encounter Date: 11/24/2020 Status: (Other)         Rita Mancini  [address]  [place] [address]     November 24, 2020       Dear Ms. Mancini,    Below are the results from your recent visit:    Resulted Orders   Glycosylated Hemoglobin A1c   Result Value Ref Range    Hemoglobin A1c 6.9 (H) <=5.6 %      Comment:      Normal <5.7% Prediabete 5.7-6.4% Diabletes 6.5% or higher - adopted from ADA consensus guidelines   Basic Metabolic Panel   Result Value Ref Range    Sodium 137 136 - 145 mmol/L    Potassium 4.8 3.5 - 5.0 mmol/L    Chloride 102 98 - 107 mmol/L    CO2 27 22 - 31 mmol/L    Anion Gap, Calculation 8 5 - 18 mmol/L    Glucose 130 (H) 70 - 125 mg/dL    Calcium 9.9 8.5 - 10.5 mg/dL    BUN 24 8 - 28 mg/dL    Creatinine 1.51 (H) 0.60 - 1.10 mg/dL    GFR MDRD Af Amer 41 (L) >60 mL/min/1.73m2    GFR MDRD Non Af Amer 34 (L) >60 mL/min/1.73m2    Narrative    Fasting Glucose reference range is 70-99 mg/dL per  American Diabetes Association (ADA) guidelines.   T4, Free   Result Value Ref Range    Free T4 1.1 0.7 - 1.8 ng/dL       Your tests are satisfactory. The minor abnormalities are not significant.     Please call with questions or contact us using [employer].    Sincerely,        Electronically signed by Michi Murillo MD

## 2021-06-22 NOTE — PROGRESS NOTES
[facility] Clinic Note    Rita Mancini   68 y.o. female    Date of Visit: 12/31/2018  Chief Complaint   Patient presents with     Cough     productive cough sometimes with deep green to light green mucus since 11/17/18     Shortness of Breath     coughing attacks causing her to have SOB       ASSESSMENT/PLAN  1. Acute bronchitis, unspecified organism     2. Chronic obstructive pulmonary disease, unspecified COPD type (H)     3. Visit for screening mammogram  Mammo Screening Bilateral   4. Non-small cell cancer of right lung (H)     5. Hypothyroidism, unspecified type     6. Diabetes 1.5, managed as type 2 (H)     7. Functional diarrhea       ---------------------------------------------    1-2.  Rita is a 68-year-old woman with a history of COPD who presents for 6 weeks of respiratory infection symptoms most consistent with bronchitis, seems a little bit better today.  She was treated with levofloxacin and prednisone at the end of November, will treat with azithromycin at this time, which she has responded to well in the past.    3.  Screening mammogram ordered    4.  She has history of non-small cell lung cancer of the right lung, has a chronic right effusion, a chest CT was stable one performed on 12/10, this was since the onset of her symptoms.  I do not see need to repeat lung imaging at this time.    5.  History of hypothyroidism, TSH within normal range back in August.  See #7    6.  Diabetes, well controlled, diabetes check in 2-3 months.    7.  She has episodes of diarrhea 30 minutes after eating, not clear what is causing this.  I do not believe it is related to the gallbladder since she has an intact gallbladder.  It is likely a food sensitivity.  We did talk out loud about considering a repeat colonoscopy earlier than ordinarily scheduled, but is not necessarily a change in her bowel habit.  For the last 3 years she has occasionally had this after eating.  Once she was woken up with it in the  middle of the night.  She is doing a food diary, think this is a good start.  Low threshold to consult with GI if symptoms worsen or become more frequent.  This is not clearly IBS as there is not a pain component.    Return in about 2 months (around 2/28/2019) for Diabetes.      SUBJECTIVE    Rita Mancini is here for acute illness.  The onset of the coughing was on 11/17.  Two weeks ago she had a productive cough (green), but now dry.  She does not feel she is getting quite enough air.  She has a stuffy nose, she had headache for 4 days.      She has trouble bringing anything up.  When she takes a deep breath and has discomfort in the right lower lung. She has a history of chronic R pleural effusion.      SHe ahs getting pink streaky sputum.  One day she flet delirious, dizziness, slept 24h one day.      She checked her blood sugar and was 290.      She saw Dr. Spaulding on 11/28, was prescribed levofloxacin and prednisone.      She notes that the urine stinks.  A friend thinks that she is having gallbladder problems.  She will occasionally have explosive diarrhea, only has about 2 min warning before getting to toilet.  It is semiformed, often after 30 min.  It is not bloody.  Once it woke her up.  This has been going on several years.  TSH was therapeutic last Aug.  She has a history of sensitivities to several foods including salad.  She may go 2 months without.    She is going to [place] in a few days, hopes to be well enough to fly.  She does not have a refillable to good.    She is concerned about her son Wojciech and her grandson who has cancer.  Wojciech is currently homeless.  She is thinking about writing to our senator as they had the paperwork in for food assistance in time, but it was not processed in time.        Lab Results   Component Value Date    HGBA1C 6.8 (H) 08/27/2018            ROS:   Per HPI, all other systems negative     Medications, allergies, and problem list were reviewed and updated    Patient  Active Problem List   Diagnosis     Depression     Non-small cell cancer of right lung (H)     Hypothyroidism     Hyperlipidemia     Diabetes 1.5, managed as type 2 (H)     COPD (chronic obstructive pulmonary disease) (H)     MONSERRAT on CPAP     Diverticulosis     Bronchiectasis (H)     Eczema of both hands     Keratosis obturans of external ear canal, right     Functional diarrhea     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       blood glucose meter (GLUCOMETER) Use 1 each As Directed as needed. Dispense glucometer brand per patient's insurance at pharmacy discretion. 1 each 0     blood glucose test (ACCU-CHEK SMARTVIEW TEST STRIP) strips Test blood sugar 3 times daily 200 strip 3     calcium carbonate-vitamin D3 (CALCIUM 600 WITH VITAMIN D3) 600 mg(1,500mg) -200 unit per tablet Take 1 tablet by mouth 2 (two) times a day. Taking 1200mg of Calcium with 1000 D3       cetirizine 10 mg cap Take 10 mg by mouth bedtime.        citalopram (CELEXA) 10 MG tablet TAKE 1 TABLET BY MOUTH ONCE DAILY. 90 tablet 3     diazePAM (VALIUM) 10 MG tablet TAKE 1 TABLET (10 MG TOTAL) BY MOUTH EVERY 12 (TWELVE) HOURS AS NEEDED FOR ANXIETY. 30 tablet 3     famotidine (PEPCID) 20 MG tablet TAKE  1 TABLET (20 MG TOTAL) BY MOUTH 2 (TWO) TIMES A DAY. (Patient taking differently: TAKE 1 TABLET (20 MG TOTAL) BY MOUTH daily) 180 tablet 1     fluticasone (FLONASE) 50 mcg/actuation nasal spray 2 sprays into each nostril daily. (Patient taking differently: 2 sprays into each nostril daily as needed.       ) 48 g 3     furosemide (LASIX) 20 MG tablet Take 2 tablets (40 mg total) by mouth every morning. 180 tablet 2     generic lancets Use 1 each As Directed daily. Dispense brand per patient's insurance at pharmacy discretion. (dx E11.9) 100 each 1     INCRUSE ELLIPTA 62.5 mcg/actuation DsDv inhaler INHALE 1 PUFF BY MOUTH DAILY 90 puff 1     levothyroxine (SYNTHROID, LEVOTHROID) 75 MCG tablet TAKE 1 TABLET BY MOUTH EVERY OTHER DAYS ALTERNATING WITH 75MCG AND 50MCG DOSE 45 tablet 2     metFORMIN (GLUCOPHAGE) 1000 MG tablet Take 1 tablet (1,000 mg total) by mouth 2 (two) times a day with meals. 180 tablet 3     montelukast (SINGULAIR) 10 mg tablet Take 1 tablet (10 mg total) by mouth at bedtime. 90 tablet 3     PULMICORT FLEXHALER 180 mcg/actuation inhaler INHALE 2 PUFFS 2 (TWO) TIMES A DAY. 3 each 3     simvastatin (ZOCOR) 10 MG tablet TAKE 1 TABLET BY MOUTH AT BEDTIME. 90 tablet 2     triamcinolone (KENALOG) 0.1 % ointment Apply topically 2 (two) times a day. hands (Patient taking differently: Apply topically daily as needed hands.      ) 30 g 3     VENTOLIN HFA 90 mcg/actuation inhaler INHALE 1-2 PUFFS EVERY 4 (FOUR) HOURS AS NEEDED FOR WHEEZING. 54 g 0     azithromycin (ZITHROMAX) 250 MG tablet Take 500mg (2 tablets) day one, and then 250mg (1 tablet) days 2-5 6 tablet 0     levothyroxine (SYNTHROID, LEVOTHROID) 50 MCG tablet TAKE 1 TABLET BY MOUTH EVERY OTHER DAY ALTERNATING WITH 75MG TABLET 45 tablet 3     No current facility-administered medications for this visit.      No Known Allergies    EXAM  Vitals:    12/31/18 0951   BP: 108/60   Patient Site: Left Arm   Patient Position: Sitting   Cuff Size: Adult Regular  "  Pulse: 80   SpO2: 94%   Weight: 180 lb 4.8 oz (81.8 kg)   Height: 5' 5\" (1.651 m)         General: Alert, no distress  Lungs: Clear to auscultation, aside from some upper airway wheezing with exhalation  Heart: Regular rate and rhythm, no murmurs    This visit lasted a total of 25 minutes.  Over 50% of the time was spent counseling regarding he management of bronchitis, diarrhea.       Master Deleon, DO  Internal Medicine  [facility]    "

## 2021-06-22 NOTE — PROGRESS NOTES
1. COPD exacerbation (H)          Medications Ordered   Medications     levoFLOXacin (LEVAQUIN) 500 MG tablet     Sig: Take 1 tablet (500 mg total) by mouth daily for 10 days.     Dispense:  10 tablet     Refill:  0     predniSONE (DELTASONE) 10 mg tablet     Sig: Take 4 tabs daily x 3 days, then 3 tabs daily x 3 days, then 2 tabs daily for 3 days, then 1 tab daily for 3 days, then stop..     Dispense:  30 tablet     Refill:  0        Plan: Antibiotic given as described above also I gave her a prednisone taper that she can use over the next 12 days.  She is to continue with her inhaler use.  She should follow-up with us here if she is not improving.    Subjective: 68-year-old female with a history of COPD who is here today with a one-week history of illness.  He states that she started getting a sore throat and a cough.  She has COPD so she often degenerates into bronchitis or some other sort of secondary infection.  She has been wheezing more and has been coughing more as well she is not producing much sputum.  She does not have a lot of sinus pressure.  Her ears are not bothering her.  Over-the-counter medications are not helpful.  Her inhaler helps temporarily.    Objective: Mildly ill-appearing female but in no acute respiratory distress.  Vital signs as noted.  Ears are clear bilaterally.  Chest wheezes throughout with decreased air movement.  No rhonchi or rales heard.

## 2021-06-22 NOTE — PROGRESS NOTES
[facility] Cancer Care Progress Note    Patient: Rita Mancini  MRN: [id number]  Date of Service: 12/12/2018        Reason for visit      1. Non-small cell cancer of right lung (H)        Assessment     1.  A very pleasant 68 y.o. woman with non-small-cell lung cancer stage III treated with cisplatin and -16 and radiation and currently in remission.  She was diagnosed in 09/2009. Finished her treatment in February 2010.  No evidence of recurrence.  2.  Persistent pleural effusion right side. S/p pleural biopsy and talc pleurodesis.  This looks stable.  3.  H/o Double vision secondary to left lateral rectus muscle weakness.  Seems to have resolved a little bit.  4.  Mild renal insufficiency. Stable slightly dilated renal collecting system.  This is stable.  5.  Slightly elevated hemoglobin A1c.    Plan     1. RTC in 8 months with CT chest.  2. Follow-up with your primary care physician for other medical issues.  3. Advised to continue with exercise.  4. I advised her that she needs to be strong and take care of her health.  In particular she needs to take care of her diabetes with proper diet.    Clinical stage      Lung Cancer, Right side    Primary site: Lung (Left)    Clinical: Stage IIIA (T1b, N2, M0) signed by Herbert Foster MD on 10/9/2014 11:00 AM    Summary: Stage IIIA (T1b, N2, M0)      History     Rita Mancini is a very pleasant 68 y.o. old female with a history of nonsmall cell lung cancer located in the right upper lobe measuring 4.2 cm in size, presenting with some shortness of breath and cough in 09/2009 and biopsy suggestive of adenocarcinoma including lymphnode biopsy confirming it is stage III disease.  Subsequent to that she was treated with cisplatin and -16 for 3 cycles and Taxotere for 2 cycles afterwards.  Subsequent to that she has been in remission.  She had been fine up until 11/2013 where a CT scan actually showed some congestion in the right lower lobe and then the PET scan  showed that to be slightly hypermetabolic.  Therefore, she had that biopsied and that was negative. She had CT in September 2015 that revealed pleural effusion. This was tapped and was negative. About one litre of fluid was removed. She felt slightly better.    She was seen by pulmonary again for the fluid. Was then sent to Dr. Fair for pleural biopsy and pleurodhesis. That was done on 4/6/15. The biopsy was non malignant.      She has since been followed by me and pulmonary with CT scans.  She is doing fine from the lung cancer standpoint.  Comes in today for scheduled follow-up with a CT scan.      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     Osteopenia      Pleural effusion 1/15     Radiation Pneumonitis     Created by Conversion      Sleep apnea     does not use cpap     Social History     Socioeconomic History     Marital status:      Spouse name: Not on file     Number of children: Not on file     Years of education: Not on file     Highest education level: Not on file   Social Needs     Financial resource strain: Not on file     Food insecurity - worry: Not on file     Food insecurity - inability: Not on file     Transportation needs - medical: Not on file     Transportation needs - non-medical: Not on file   Occupational History     Not on file   Tobacco Use     Smoking status: Former Smoker     Packs/day: 1.50     Years: 0.00     Pack years: 0.00     Last attempt to quit: 11/9/2008     Years since quitting: 10.0     Smokeless tobacco: Former User   Substance and Sexual Activity     Alcohol use: No      Drug use: No     Sexual activity: No   Other Topics Concern     Not on file   Social History Narrative     Not on file     Social History     Tobacco Use     Smoking status: Former Smoker     Packs/day: 1.50     Years: 0.00     Pack years: 0.00     Last attempt to quit: 11/9/2008     Years since quitting: 10.0     Smokeless tobacco: Former User   Substance Use Topics     Alcohol use: No     Drug use: No       Review of Systems   Constitutional  Constitutional (WDL): Exceptions to WDL  Fatigue: Fatigue not relieved by rest - Limiting instrumental ADL  Neurosensory  Neurosensory (WDL): Exceptions to WDL  Peripheral Sensory Neuropathy: Asymptomatic, loss of deep tendon reflexes or paresthesia(slight in hands and feet)  Cardiovascular  Cardiovascular (WDL): All cardiovascular elements are within defined limits  Pulmonary  Respiratory (WDL): Exceptions to WDL  Cough: Moderate symptoms, medical intervention indicated, limiting instrumental ADL(phlegm light green/ COPD)  Dyspnea: Shortness of breath with minimal exertion, limiting instrumental ADL  Gastrointestinal  Gastrointestinal (WDL): Exceptions to WDL  Diarrhea: Increase of <4 stools per day over baseline, mild increase in ostomy output compared to baseline  Dysgeusia: Altered taste but no change in diet(possible thrush)  Genitourinary  Genitourinary (WDL): All genitourinary elements are within defined limits  Integumentary  Integumentary (WDL): All integumentary elements are within defined limits  Patient Coping  Patient Coping: Accepting  Accompanied by  Accompanied by: Alone    ECOG performance status and Distress Assessment      ECOG Performance:    ECOG Performance Status: 1    Distress Assessment  Distress Assessment Score: 2:     Pain Status  Currently in Pain: No/denies      Vital Signs     Vitals:    12/12/18 1412   BP: 121/60   Pulse: 78   Temp: 98.2  F (36.8  C)   SpO2: 95%       Physical Exam     GENERAL: No acute distress. Cooperative in conversation.    HEENT: Pupils are equal, round and reactive. Oral mucosa is clean and intact. No ulcerations or mucositis noted. No bleeding noted.  RESP:Chest symmetric lungs are clear bilaterally per auscultation. Regular respiratory rate. No wheezes or rhonchi.  CV: Normal S1 S2 Regular, rate and rhythm. No murmurs.  ABD: Nondistended, soft, nontender. Positive bowel sounds. No organomegaly.   EXTREMITIES: No lower extremity edema.   NEURO: Non- focal. Alert and oriented x3.  Cranial nerves appear intact.  PSYCH: Within normal limits. No depression or anxiety.  SKIN: Warm dry intact.    LYMPH NODES: Bilateral cervical, supraclavicular, axillary lymph node examination was done.  Negative for any palpable adenopathy.      Lab Results     Results for orders placed or performed in visit on 08/27/18   Glycosylated Hemoglobin A1c   Result Value Ref Range    Hemoglobin A1c 6.8 (H) 3.5 - 6.0 %   Microalbumin, Random Urine   Result Value Ref Range    Microalbumin, Random Urine <0.50 0.00 - 1.99 mg/dL    Creatinine, Urine 20.8 mg/dL    Microalbumin/Creatinine Ratio Random Urine  <=19.9 mg/g   Thyroid Stimulating Hormone (TSH)   Result Value Ref Range    TSH 1.36 0.30 - 5.00 uIU/mL         Imaging Results     Ct Chest Without Contrast    Result Date: 12/10/2018  CT CHEST WO CONTRAST 12/10/2018 11:27 AM INDICATION: Neoplasm: chest, lung, rx monitor or f/u TECHNIQUE: Routine chest. Dose reduction techniques were used. IV CONTRAST: None COMPARISON: CT chest exam 03/29/2018 FINDINGS: LUNGS AND PLEURA: No change spiculated mass right upper lobe along the mediastinum measuring 2.5 x 1.3 cm on image 57. Adjacent irregular pleural thickening and calcifications, also stable. Scarring and irregular pleural thickening right lower lobe posteriorly is unchanged. 6 mm pulmonary nodule right lower lobe on image 161 is stable. Additional nodularity along the right major fissure is unchanged. 3 x 5 mm ovoid density at the bifurcation of the segmental  arteries right lower lobe on image 59 is  unchanged. Mild bronchiectasis within both lower lobes is unchanged. No new nodules or infiltrates. MEDIASTINUM: Stable right perihilar thickening. No mediastinal adenopathy. Calcified lymph node adjacent to the right aspect of the esophagus. LIMITED UPPER ABDOMEN: Right renal cyst or dilated right renal pelvis, unchanged. MUSCULOSKELETAL: No concerning skeletal lesions.     CONCLUSION: 1.  Stable exam. No evidence for recurrent or metastatic disease.        Herbert Foster MD

## 2021-06-23 NOTE — PROGRESS NOTES
[facility] Clinic Note    Rita Mancini   68 y.o. female    Date of Visit: 2/6/2019  Chief Complaint   Patient presents with     Mass     to the right of the belly button, not painful, sometimes gets as large as a grapefruit       ASSESSMENT/PLAN  1. Ventral hernia without obstruction or gangrene     2. Diabetes 1.5, managed as type 2 (H)  Glycosylated Hemoglobin A1c   3. Gastroesophageal reflux disease without esophagitis  famotidine (PEPCID) 20 MG tablet     ---------------------------------------------    1.  Reassurance provided for ventral hernia, easily reducible.  Talked about pros and cons of surgical correction, I am recommending against surgery or further workup at this time given the certainty of the diagnosis.  If it causes further problems, could pursue CT abdomen.    2.  Check A1c today, at goal less than 7, follow-up in 4-5 months    3.  Updated dosing on this according to what she reported.    Return in about 5 months (around 7/6/2019) for Recheck.      SUBJECTIVE    Rita Mancini is here for abdominal bulge.  There is no pain, it waxes and wanes in size.  Her children thought she should be seen.      Overall, she is doing well.  She again told me that her grandson Stalin is cancer free, but is on some sort of cancer suppressant therapy currently.  He was told that he probably would have organ failure by age 30 or something like that, so it has been very hard on her son Wojciech, who still struggles with mental health issues.  Apparently, he is having a hard time finding stable housing currently.    ROS:   Per HPI, all other systems negative     Medications, allergies, and problem list were reviewed and updated    Patient Active Problem List   Diagnosis     Depression     Non-small cell cancer of right lung (H)     Hypothyroidism     Hyperlipidemia     Diabetes 1.5, managed as type 2 (H)     COPD (chronic obstructive pulmonary disease) (H)     MONSERRAT on CPAP     Diverticulosis     Bronchiectasis  (H)     Eczema of both hands     Keratosis obturans of external ear canal, right     Functional diarrhea     Ventral hernia without obstruction or gangrene     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       blood glucose meter (GLUCOMETER) Use 1 each As Directed as needed. Dispense glucometer brand per patient's insurance at pharmacy discretion. 1 each 0     blood glucose test (ACCU-CHEK SMARTVIEW TEST STRIP) strips Test blood sugar 3 times daily 200 strip 3     calcium carbonate-vitamin D3 (CALCIUM 600 WITH VITAMIN D3) 600 mg(1,500mg) -200 unit per tablet Take 1 tablet by mouth 2 (two) times a day. Taking 1200mg of Calcium with 1000 D3       cetirizine 10 mg cap Take 10 mg by mouth bedtime.        citalopram (CELEXA) 10 MG tablet TAKE 1 TABLET BY MOUTH ONCE DAILY. 90 tablet 3     diazePAM (VALIUM) 10 MG tablet TAKE 1 TABLET (10 MG TOTAL) BY MOUTH EVERY 12 (TWELVE) HOURS AS NEEDED FOR ANXIETY. 30 tablet 3     famotidine (PEPCID) 20 MG tablet Take 1 tablet (20 mg total) by mouth daily.       fluticasone (FLONASE) 50 mcg/actuation nasal spray 2 sprays into each nostril daily. (Patient taking differently: 2 sprays into each nostril daily as needed.       ) 48 g 3      "furosemide (LASIX) 20 MG tablet Take 2 tablets (40 mg total) by mouth every morning. 180 tablet 2     generic lancets Use 1 each As Directed daily. Dispense brand per patient's insurance at pharmacy discretion. (dx E11.9) 100 each 1     INCRUSE ELLIPTA 62.5 mcg/actuation DsDv inhaler INHALE 1 PUFF BY MOUTH DAILY 90 puff 1     levothyroxine (SYNTHROID, LEVOTHROID) 50 MCG tablet TAKE 1 TABLET BY MOUTH EVERY OTHER DAY ALTERNATING WITH 75MG TABLET 45 tablet 3     levothyroxine (SYNTHROID, LEVOTHROID) 75 MCG tablet TAKE 1 TABLET BY MOUTH EVERY OTHER DAYS ALTERNATING WITH 75MCG AND 50MCG DOSE 45 tablet 2     metFORMIN (GLUCOPHAGE) 1000 MG tablet Take 1 tablet (1,000 mg total) by mouth 2 (two) times a day with meals. 180 tablet 3     montelukast (SINGULAIR) 10 mg tablet Take 1 tablet (10 mg total) by mouth at bedtime. 90 tablet 3     PULMICORT FLEXHALER 180 mcg/actuation inhaler INHALE 2 PUFFS 2 (TWO) TIMES A DAY. 3 each 3     simvastatin (ZOCOR) 10 MG tablet TAKE 1 TABLET BY MOUTH AT BEDTIME. 90 tablet 2     triamcinolone (KENALOG) 0.1 % ointment Apply topically 2 (two) times a day. hands (Patient taking differently: Apply topically daily as needed hands.      ) 30 g 3     VENTOLIN HFA 90 mcg/actuation inhaler INHALE 1-2 PUFFS EVERY 4 (FOUR) HOURS AS NEEDED FOR WHEEZING. 54 g 0     No current facility-administered medications for this visit.      No Known Allergies    EXAM  Vitals:    02/06/19 1014   BP: 106/66   Patient Site: Left Arm   Patient Position: Sitting   Cuff Size: Adult Regular   Pulse: 81   SpO2: 97%   Weight: 181 lb 14.4 oz (82.5 kg)   Height: 5' 5\" (1.651 m)         General: Alert, no distress  Abdomen: Soft, there is a midline abdominal bulge present with Valsalva and standing, easily reducible, nontender, it is not present in supine position.  This would be consistent with ventral hernia.  There is no periumbilical hernia.    Results reviewed: A1c checked, see below    Lab Results   Component Value Date "    HGBA1C 6.3 (H) 02/06/2019        Data points: 1    Master Deleon,   Internal Medicine  [facility]

## 2021-06-23 NOTE — TELEPHONE ENCOUNTER
Refill Approved    Rx renewed per Medication Renewal Policy. Medication was last renewed on 17.    Lydia Woo, [facility] Triage/Med Refill 2019     Requested Prescriptions   Pending Prescriptions Disp Refills     fluticasone (FLONASE) 50 mcg/actuation nasal spray [Pharmacy Med Name: FLUTICASONE PROP 50 MCG SPRAY]  3     Si SPRAYS INTO EACH NOSTRIL DAILY.    Nasal Steroid Refill Protocol Passed - 2019  9:48 AM       Passed - Patient has had office visit/physical in last 2 years    Last office visit with prescriber/PCP: 2018 OR same dept: 2018 Master Deleon DO OR same specialty: 2018 Master Deleon DO Last physical: Visit date not found Last MTM visit: Visit date not found    Next appt within 3 mo: 2019 Master Deleon DO  Next physical within 3 mo: Visit date not found  Prescriber OR PCP: Master Deleon DO  Last diagnosis associated with med order: There are no diagnoses linked to this encounter.   If protocol passes may refill for 12 months if within 3 months of last provider visit (or a total of 15 months).

## 2021-06-24 NOTE — TELEPHONE ENCOUNTER
Fax received from [facility], they have started the Prior Authorization Process via Cover My Meds    CoverMyMeds Key: [id number]    Medication Name: Lidocaine 5% patch    Insurance Plan: [employer]  PBM: [facility]  Patient ID: not provided on fax    Please complete the PA process

## 2021-06-24 NOTE — TELEPHONE ENCOUNTER
Central PA team  [phone]  Pool: HE PA MED ([id number])    PA has been initiated.       PA form completed and faxed insurance via Cover My Meds     Key:  [id number]     Medication:  LIDOCAINE 5% PATCH    Insurance:  [facility]        Response will be received via fax and may take up to 5-10 business days depending on plan

## 2021-06-24 NOTE — TELEPHONE ENCOUNTER
It looks like this was prescribed by Dr. Tabor.  I have had little to no success in getting insurance to pay for lidocaine patches.  There are lidocaine patches available over-the-counter, as well as lidocaine cream.  Either of these would be acceptable alternatives.

## 2021-06-24 NOTE — PATIENT INSTRUCTIONS - HE
Pain on the outside of the hip is trochanteric bursitis, not hip joint arthritis    When the leg gives way, we think pinched nerve from the back stops the nerve impulse to the leg and you drop    I see no rib fracture.  It could be cracked.  You certainly have pleurisy     Your hip joint shows a little arthritis.  Not bad.  The bursa is more the problem    Your back looks good bones, and good discs, but we cannot see rupture or bulging    Your diabetes is pretty good        For the pain of the ribs and the pleurisy and the bursitis and the back:  Naproxen 500 mgs twice a day with food for 2 weeks for inflammation  Steroid for inflammation, prednisone 20 mgs once a day for 7 days, best in morning   Topical patch like lidocaine patch either to the ribs or the bursa  Narcotics if needed for a week or two.  Oxycodone 5 mgs as needed  Baclofen 10 mgs every 8 hours as needed for muscle spasm   Tylenol 1000 mgs twice a day        Hold Simvastatin completely for March and see how much of your pain is related  
stated

## 2021-06-24 NOTE — TELEPHONE ENCOUNTER
RN declined refill request for Pulmicort inhaler since Rx just filled on 7/25/18.  Pharmacy informed. Hayley Gonzales RN, [facility] Med Refill/Triage, 2/19/2019 12:30 PM        delivery delivered

## 2021-06-24 NOTE — TELEPHONE ENCOUNTER
"Pt calls to report a \"fall\" -> \"fell onto a cardboard box and hurt ribs.\"  Occurred six days ago.  Reason for fall -> \"Hip gave out.\"    Pt states \"My ribs are so sore where I hit the edge of the box.\"  No visible rib bruising externally.  However pt states \"My COPD causes lots of coughing, and the causes is so painful in those ribs.\"  \"Can breathe okay, but not pleasant.\"    Pt agrees to clinical eval today per triage disposition.  Warm transferred to a  for this purpose now.    Ciera Marr RN BSBA  [facility] RN Triage     Reason for Disposition    High-risk adult (e.g., age > 60, osteoporosis, chronic steroid use)    Protocols used: CHEST INJURY-A-OH      "

## 2021-06-24 NOTE — PROGRESS NOTES
ASSESSMENT:  1. Fall, initial encounter  Right lung pain suggests rib fracture however x-ray negative.  History suggests pleurisy.  Begin aggressive anti-inflammatory medication.    2. Rib pain on right side  No evidence of fracture.  Begin aggressive treatment including short course narcotics.  Pleural effusion apparently is chronic per history and comparison with old films  - XR Ribs Right W PA Chest; Future  - naproxen (NAPROSYN) 500 MG tablet; Take 1 tablet (500 mg total) by mouth 2 (two) times a day with meals.  Dispense: 60 tablet; Refill: 2  - lidocaine (LIDODERM) 5 %; Remove & Discard patch within 12 hours or as directed by MD  Dispense: 30 patch; Refill: 0  - predniSONE (DELTASONE) 20 MG tablet; Take 20 mg by mouth daily for 7 days.  Dispense: 7 tablet; Refill: 0  - oxyCODONE (ROXICODONE) 5 MG immediate release tablet; Take 1 tablet (5 mg total) by mouth every 4 (four) hours as needed for pain.  Dispense: 13 tablet; Refill: 0    3. Hip pain, left  Mild arthritis of hip.  History and exam suggestive of trochanteric bursitis  - XR Hip Left 2 or More VWS; Future    4. Chronic left-sided low back pain with left-sided sciatica  Leg weakness could suggest disc.  X-rays show well-preserved joint spaces but cannot exclude spinal stenosis.  Trial off simvastatin.  Trial of baclofen, anti-inflammatories, and prednisone.    History of lung cancer always makes back pain, leg weakness, and any bony pain more ominous however she reports being cured  - baclofen (LIORESAL) 10 MG tablet; Take 1 tablet (10 mg total) by mouth 3 (three) times a day as needed.  Dispense: 30 tablet; Refill: 0    5. Diabetes 1.5, managed as type 2 (H)  Reviewed last glycosylated hemoglobin and excellent control        PLAN:  Patient Instructions   Pain on the outside of the hip is trochanteric bursitis, not hip joint arthritis    When the leg gives way, we think pinched nerve from the back stops the nerve impulse to the leg and you drop    I see  "no rib fracture.  It could be cracked.  You certainly have pleurisy     Your hip joint shows a little arthritis.  Not bad.  The bursa is more the problem    Your back looks good bones, and good discs, but we cannot see rupture or bulging    Your diabetes is pretty good        For the pain of the ribs and the pleurisy and the bursitis and the back:  Naproxen 500 mgs twice a day with food for 2 weeks for inflammation  Steroid for inflammation, prednisone 20 mgs once a day for 7 days, best in morning   Topical patch like lidocaine patch either to the ribs or the bursa  Narcotics if needed for a week or two.  Oxycodone 5 mgs as needed  Baclofen 10 mgs every 8 hours as needed for muscle spasm   Tylenol 1000 mgs twice a day        Hold Simvastatin completely for March and see how much of your pain is related      Orders Placed This Encounter   Procedures     XR Ribs Right W PA Chest     Standing Status:   Future     Number of Occurrences:   1     Standing Expiration Date:   3/11/2020     Order Specific Question:   Can the procedure be changed per Radiologist protocol?     Answer:   Yes     XR Hip Left 2 or More VWS     Standing Status:   Future     Number of Occurrences:   1     Standing Expiration Date:   3/11/2020     Order Specific Question:   Can the procedure be changed per Radiologist protocol?     Answer:   Yes     There are no discontinued medications.    Return in about 2 months (around 5/11/2019) for to see dr deleon, email me next week with an update.      CHIEF COMPLAINT:  Chief Complaint   Patient presents with     Fall     Fell on Tuesday, \"It hurt on underneath left breast  through the back, can't sleep at night because of th e pain\"       HISTORY OF PRESENT ILLNESS:  Rita is a 68 y.o. female Dr. Deleon patient presenting to the clinic today with complaints of a fall and rib pain.     Fall: She was stepping over something six days ago when her left leg gave out on her and she fell, landing with her right " "side on a cardboard box. She has had fairly significant pain in her right side and ribs since then. She does not think she broke anything; she has never broken a bone before. It has been difficult to breathe, and coughing is excruciating. She has not been sleeping well because the pain wakes her up anytime she coughs or changes position. She has been taking two aspirin every four hours, which has helped but does not completely take the pain away. She had a few extra Percocet at home that helped as well. Vicodin and tramadol have not helped her pain in the past. She has never tried Aleve, Tylenol, or Advil. Her stomach has been able to tolerate the aspirin so far, but she is worried it will become irritated soon. The pain fluctuates in severity but does not seem to be getting better. She notes that it does not hurt as bad if she pushes on her right side while coughing. The pain occasionally radiates towards her back and to under her right breast, but she does not have any pain on the left.     Hip Pain: Her left hip has been bothering her more in the past four months. She notes that she was always able to \"pop the hip out of the socket\" when she was a kid and wonders if that could have damaged the joint. The pain is more in the lateral hip than the groin. She is not sure if it popped out of joint when she fell; it happened very fast.     Back Pain: She has some discomfort in her lumbar spine at her baseline. She now has a newer pain a little higher up since her fall. It comes and goes; she thinks it depends on her quality of sleep. She denies pain shooting down her legs.    Hyperlipidemia: She develops muscle cramps in her back and legs and wonders if they could be related to her simvastatin. She has never tried discontinuing it for a month to see how much of the pain is actually statin related.     Diabetes: She states she is very worried about her diabetes and that she works hard to keep it under control. Her last " "hemoglobin A1c was 6.3% on 2/6/2019.     Hx Lung Cancer: She has a history of lung cancer but has been in remission for ten years.      REVIEW OF SYSTEMS:   Denies bowel or bladder problems. She states she always has some fluid in her right lung base.  All other systems are negative.    PFSH:  Reviewed, as below.     TOBACCO USE:  Social History     Tobacco Use   Smoking Status Former Smoker     Packs/day: 1.50     Years: 0.00     Pack years: 0.00     Last attempt to quit: 11/9/2008     Years since quitting: 10.3   Smokeless Tobacco Former User       VITALS:  Vitals:    03/11/19 1137   BP: 126/70   Patient Site: Left Arm   Patient Position: Sitting   Cuff Size: Adult Regular   Pulse: 80   SpO2: 98%   Weight: 182 lb (82.6 kg)   Height: 5' 5\" (1.651 m)     Wt Readings from Last 3 Encounters:   03/11/19 182 lb (82.6 kg)   02/06/19 181 lb 14.4 oz (82.5 kg)   12/31/18 180 lb 4.8 oz (81.8 kg)     Body mass index is 30.29 kg/m .    PHYSICAL EXAM:  Constitutional:  Reveals an alert, pleasant, talkative woman. Winces in pain with changes in position and coughing.  Vitals:  Per nursing notes.  Lungs: Rales, rhonchi, and a rub on the right.   MSK: Tenderness to palpation right ribs   Skin:  A little yellowish, old bruising laterally under the bra line on the right.   Neurologic:  Cranial nerves II-XII intact. 4+ patellar hyperreflexia    Psychiatric:  Mood appropriate, memory intact.     Right Rib X-ray: No rib fractures, old blunting of right costophrenic angle.   Left Hip X-ray: Slight superior joint space narrowing.  Lumbar Spine X-ray: No compression fracture, well preserved joint spaces.     MEDICATIONS:  Current Outpatient Medications   Medication Sig Dispense Refill     albuterol (PROVENTIL) 2.5 mg /3 mL (0.083 %) nebulizer solution TAKE 3ML BY MOUTH EVERY 4 HOURS AS NEEDED FOR WHEEZING 900 mL 1     aspirin 81 MG EC tablet Take 81 mg by mouth daily.       blood glucose meter (GLUCOMETER) Use 1 each As Directed as " needed. Dispense glucometer brand per patient's insurance at pharmacy discretion. 1 each 0     blood glucose test (ACCU-CHEK SMARTVIEW TEST STRIP) strips Test blood sugar 3 times daily 200 strip 3     calcium carbonate-vitamin D3 (CALCIUM 600 WITH VITAMIN D3) 600 mg(1,500mg) -200 unit per tablet Take 1 tablet by mouth 2 (two) times a day. Taking 1200mg of Calcium with 1000 D3       cetirizine 10 mg cap Take 10 mg by mouth bedtime.        citalopram (CELEXA) 10 MG tablet TAKE 1 TABLET BY MOUTH ONCE DAILY. 90 tablet 3     diazePAM (VALIUM) 10 MG tablet TAKE 1 TABLET (10 MG TOTAL) BY MOUTH EVERY 12 (TWELVE) HOURS AS NEEDED FOR ANXIETY. 30 tablet 3     famotidine (PEPCID) 20 MG tablet Take 1 tablet (20 mg total) by mouth daily.       fluticasone (FLONASE) 50 mcg/actuation nasal spray 2 SPRAYS INTO EACH NOSTRIL DAILY. 48 g 3     furosemide (LASIX) 20 MG tablet Take 2 tablets (40 mg total) by mouth every morning. 180 tablet 2     generic lancets Use 1 each As Directed daily. Dispense brand per patient's insurance at pharmacy discretion. (dx E11.9) 100 each 1     INCRUSE ELLIPTA 62.5 mcg/actuation DsDv inhaler INHALE 1 PUFF BY MOUTH DAILY 90 puff 1     levothyroxine (SYNTHROID, LEVOTHROID) 50 MCG tablet TAKE 1 TABLET BY MOUTH EVERY OTHER DAY ALTERNATING WITH 75MG TABLET 45 tablet 3     levothyroxine (SYNTHROID, LEVOTHROID) 75 MCG tablet TAKE 1 TABLET BY MOUTH EVERY OTHER DAYS ALTERNATING WITH 75MCG AND 50MCG DOSE 45 tablet 2     metFORMIN (GLUCOPHAGE) 1000 MG tablet Take 1 tablet (1,000 mg total) by mouth 2 (two) times a day with meals. 180 tablet 3     montelukast (SINGULAIR) 10 mg tablet Take 1 tablet (10 mg total) by mouth at bedtime. 90 tablet 3     PULMICORT FLEXHALER 180 mcg/actuation inhaler INHALE 2 PUFFS 2 (TWO) TIMES A DAY. 3 each 3     simvastatin (ZOCOR) 10 MG tablet TAKE 1 TABLET BY MOUTH AT BEDTIME. 90 tablet 2     triamcinolone (KENALOG) 0.1 % ointment Apply topically 2 (two) times a day. hands (Patient  taking differently: Apply topically daily as needed hands.      ) 30 g 3     VENTOLIN HFA 90 mcg/actuation inhaler INHALE 1-2 PUFFS EVERY 4 (FOUR) HOURS AS NEEDED FOR WHEEZING. 54 g 0     baclofen (LIORESAL) 10 MG tablet Take 1 tablet (10 mg total) by mouth 3 (three) times a day as needed. 30 tablet 0     lidocaine (LIDODERM) 5 % Remove & Discard patch within 12 hours or as directed by MD 30 patch 0     naproxen (NAPROSYN) 500 MG tablet Take 1 tablet (500 mg total) by mouth 2 (two) times a day with meals. 60 tablet 2     oxyCODONE (ROXICODONE) 5 MG immediate release tablet Take 1 tablet (5 mg total) by mouth every 4 (four) hours as needed for pain. 13 tablet 0     predniSONE (DELTASONE) 20 MG tablet Take 20 mg by mouth daily for 7 days. 7 tablet 0     No current facility-administered medications for this visit.        ADDITIONAL HISTORY SUMMARIZED (2): Reviewed nurse triage from earlier today regarding fall and rib pain.   DECISION TO OBTAIN EXTRA INFORMATION (1): None.   RADIOLOGY TESTS (1): X-rays of lumbar spine, left hip, and right ribs ordered.   LABS (1): Reviewed A1c from 2/6/2018 - A1c 6.3%  MEDICINE TESTS (1): None.  INDEPENDENT REVIEW (2 each): Personally reviewed X-rays of lumbar spine, left hip, and right ribs.     The visit lasted a total of 42 minutes face to face with the patient. Over 50% of the time was spent counseling and educating the patient about her fall and rib pain.    ICharles, am scribing for and in the presence of, Dr. Tabor.    I, Dr. Tabor, personally performed the services described in this documentation, as scribed by Charles Dudley in my presence, and it is both accurate and complete.    Total data points: 10

## 2021-06-25 NOTE — TELEPHONE ENCOUNTER
RN cannot approve Refill Request    RN can NOT refill this medication med is not covered by policy/route to provider. Last office visit: 3/11/2019 Lazaro Tabor MD Last Physical: Visit date not found Last MTM visit: Visit date not found Last visit same specialty: 3/11/2019 Lazaro Tabor MD.  Next visit within 3 mo: Visit date not found  Next physical within 3 mo: Visit date not found      Jil Arrington, [facility] Triage/Med Refill 3/17/2019    Requested Prescriptions   Pending Prescriptions Disp Refills     predniSONE (DELTASONE) 20 MG tablet [Pharmacy Med Name: PREDNISONE 20 MG TABLET] 7 tablet 0     Sig: TAKE 1 TAB (20 MG) BY MOUTH DAILY FOR 7 DAYS.    There is no refill protocol information for this order

## 2021-06-27 NOTE — PROGRESS NOTES
Progress Notes by Kristan Luna MD at 4/12/2019 11:59 PM     Author: Kristan Luna MD Service: -- Author Type: Physician    Filed: 4/17/2019 11:33 AM Encounter Date: 4/12/2019 Status: Signed    : Kristan Luna MD (Physician)       [facility]      Facility:    [facility] [[id number]]    Code Status: FULL CODE   Resides at the [facility] ( independent appts)      Chief Complaint/Reason for Visit:  Chief Complaint   Patient presents with   ? H & P       HPI:   Rita is a 68 y.o. female with known medical history of colon  Non-small cell carcinoma of the right lung (status post chemotherapy and radiation therapy remission since [id number])  Pleural and pleural effusion, s/p  Talc pleurodesis in 2015  Hypothyroid, acquired  Diabetes type 2  COPD  Obstructive sleep apnea ( not usingCPAP    Weeks before admission, she had cough.  She had been to the clinic, chest x-ray from 3/11 did not show acute pneumonia.  She was put on a Z-Kevin because of her emphysema.  A week later, she fell on the ice and had a right proximal humeral shaft fracture.  She had a nonoperative management with a sling and pain medications.    Since that time she had progressive shortness of breath, the day before admission family thought she sounded quite short of breath and confused.  Patient herself thought it was from the pain medication.  The night before admission, her son heard her heavy breathing.  He woke her up and she was quite confused.  He helped her to the bathroom but she took over an hour in the bathroom because she kept falling asleep.  He helped her back to bed and thought she was really significantly confused.  He called 911.    EMS measured a saturation in the 70s, felt she is in respiratory distress.  On arrival to the emergency department she was hypotensive.  She was given IV fluids and placed on BiPAP (PCO2 = 63), very elevated lactic acid at  7.4, procalcitonin was very high at 20.85 acute renal failure (creatinine = 3.59) , hyperkalemia (6.0) FVC = 20.85..  She was intubated, started on pressors.  Cultures were growing group A strep.  There was concern for toxic shock syndrome.  She was given IVIG times 3 days and clindamycin.  She had sided empyema, that fluid was growing group A streptococcus as well as Streptococcus pyogenous.  She had a VATS/decortication procedure on 3/29.  Followed by infectious disease.  Chest tubes were placed, CT showed improvement and and effusions.   She was switched to Zosyn.  Discharge she was changed to Augmentin.    There was no DVT found in the right arm.  She will need to follow-up with orthopedic department as an outpatient.    Past Medical History:  Past Medical History:   Diagnosis Date   ? COPD (chronic obstructive pulmonary disease) (H)    ? Depression    ? Diabetes mellitus (H)    ? GERD (gastroesophageal reflux disease)    ? Hx antineoplastic chemotherapy     lung cancer    ? Hx of radiation therapy     lung cancer    ? Hyperlipemia    ? Hypothyroid    ? Lung cancer (H) 2008    RUL,  Non small cell cancer   ? 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 Osteopenia    ? Pleural effusion 1/15   ? Radiation Pneumonitis     Created by Conversion    ? Sleep apnea     does not use cpap           Surgical History:  Past Surgical History:   Procedure Laterality Date   ? PORTACATH PLACEMENT      and removal   ? THORACOSCOPY Right 4/6/2015    Procedure: RIGHT THORACOSCOPY / BIOPSY PLEURAL / TALC PLEURODESIS;  Surgeon: Michi Ma MD;  Location: [facility] OR;  Service:    ? THORACOSCOPY Left 3/29/2019    Procedure: LEFT THORACOSCOPY WITH DECORTICATION;  Surgeon: Michi Ma MD;  Location: [facility] OR;  Service: General   ? TONSILLECTOMY  age 5   ? URETERAL STENT PLACEMENT Right 6/2010   ? US THORACENTESIS  3/27/2019       Family History:   Family History   Problem Relation Age of Onset   ? Heart  disease Mother    ? Hypertension Mother    ? Alcohol abuse Mother    ? Depression Mother    ? Heart disease Father 45        mi age 45, cabg   ? Heart attack Father    ? Cancer Father         prostate   ? Hypertension Father    ? COPD Brother    ? Alcohol abuse Brother    ? Alcohol abuse Sister    ? Breast cancer Paternal Aunt    ? Leukemia Grandchild    ? Cancer Maternal Aunt 47        breast   ? Diabetes Son    ? Anxiety disorder Son    ? Depression Son    ? No Medical Problems Daughter    ? Schizophrenia Grandchild        Social History:    Social History     Socioeconomic History   ? Marital status:      Spouse name: Not on file   ? Number of children: Not on file   ? Years of education: Not on file   ? Highest education level: Not on file   Occupational History   ? Not on file   Social Needs   ? Financial resource strain: Not on file   ? Food insecurity:     Worry: Not on file     Inability: Not on file   ? Transportation needs:     Medical: Not on file     Non-medical: Not on file   Tobacco Use   ? Smoking status: Former Smoker     Packs/day: 1.50     Years: 0.00     Pack years: 0.00     Last attempt to quit: 11/9/2008     Years since quitting: 10.4   ? Smokeless tobacco: Former User   Substance and Sexual Activity   ? Alcohol use: No   ? Drug use: No   ? Sexual activity: Never   Lifestyle   ? Physical activity:     Days per week: Not on file     Minutes per session: Not on file   ? Stress: Not on file   Relationships   ? Social connections:     Talks on phone: Not on file     Gets together: Not on file     Attends Zoroastrian service: Not on file     Active member of club or organization: Not on file     Attends meetings of clubs or organizations: Not on file     Relationship status: Not on file   ? Intimate partner violence:     Fear of current or ex partner: Not on file     Emotionally abused: Not on file     Physically abused: Not on file     Forced sexual activity: Not on file   Other Topics Concern  "  ? Not on file   Social History Narrative   ? Not on file          Review of Systems   Feels totally wiped out.  She is uncomfortable with being totally dependent.  Her cough is not productive of phlegm but she hears a lot of phlegm rattling around.  She has a poor appetite  Her tongue is sore as well as her throat.  She states \"I am never comfy\".  Worried about details of her insurance coverage for the TCU stay.  Comprehensive review of systems is negative       and floor nurse manager: Care conference yesterday yielded:  Family asking for psychology evaluation  She has a stage I ulcer in both buttocks, nursing asked for calmoseptine  Family asked that her nebulizations be taken from as needed to scheduled as well as a trazodone  Daughter has noted that her mother chokes with eating, will request speech language path evaluation  Daughter noted that throughout the hospital stay her white count actually increased, her discharge white count 2800.    Blood pressure 118/64, pulse 88, temperature 98.4  F (36.9  C), resp. rate 18, SpO2 97 %, not currently breastfeeding.  MI = 32      Physical Exam   Constitutional: She is oriented to person, place, and time. No distress.   Appearing  female, appears her stated age   HENT:   Nose: Nose normal.   No pharyngeal erythema or exudate, hard palate without exudate, surface of the tongue somewhat dry but no white matter   Eyes: Conjunctivae and EOM are normal. No scleral icterus.   Cardiovascular: Regular rhythm and normal heart sounds.   Pulmonary/Chest: She has no wheezes. She has rales.    coarse rhonchi throughout  Cough non productive  Expiratory wheezes bilaterally   Abdominal: Soft. Bowel sounds are normal. She exhibits no distension. There is no tenderness.   Musculoskeletal:   Fatigue limits  motor exam  Right upper arm with tenderness and resolving bruising, right arm in a sling   Lymphadenopathy:     She has no cervical adenopathy.   Neurological: " She is alert and oriented to person, place, and time.   Skin: Skin is dry. No rash noted. No erythema.   Pale  Positioned in a recliner, not able to examine buttocks skin   Psychiatric: She has a normal mood and affect. Thought content normal.       Medication List:  Current Outpatient Medications   Medication Sig   ? acetaminophen (TYLENOL) 500 MG tablet Take 2 tablets (1,000 mg total) by mouth 3 (three) times a day.   ? albuterol (PROVENTIL) 2.5 mg /3 mL (0.083 %) nebulizer solution TAKE 3ML BY MOUTH EVERY 4 HOURS AS NEEDED FOR WHEEZING   ? albuterol (PROVENTIL) 2.5 mg /3 mL (0.083 %) nebulizer solution Take 2.5 mg by nebulization 4 (four) times a day. Also has PRN dosing.   ? amLODIPine (NORVASC) 5 MG tablet Take 1 tablet (5 mg total) by mouth daily.   ? amoxicillin-clavulanate (AUGMENTIN) 875-125 mg per tablet Take 1 tablet by mouth 2 (two) times a day for 21 days. Follow up with Infectious Disease physician prior to completing antibiotics   ? aspirin 81 MG EC tablet Take 81 mg by mouth daily.   ? baclofen (LIORESAL) 10 MG tablet Take 1 tablet (10 mg total) by mouth 3 (three) times a day as needed.   ? blood glucose meter (GLUCOMETER) Use 1 each As Directed as needed. Dispense glucometer brand per patient's insurance at pharmacy discretion.   ? blood glucose test (ACCU-CHEK SMARTVIEW TEST STRIP) strips Test blood sugar 3 times daily   ? calcium carbonate-vitamin D3 (CALCIUM 600 WITH VITAMIN D3) 600 mg(1,500mg) -200 unit per tablet Take 1 tablet by mouth 2 (two) times a day. Taking 1200mg of Calcium with 1000 D3   ? cetirizine 10 mg cap Take 10 mg by mouth daily with supper.          ? citalopram (CELEXA) 10 MG tablet TAKE 1 TABLET BY MOUTH ONCE DAILY.   ? famotidine (PEPCID) 20 MG tablet Take 1 tablet (20 mg total) by mouth daily.   ? fluticasone (FLONASE) 50 mcg/actuation nasal spray 2 SPRAYS INTO EACH NOSTRIL DAILY.   ? furosemide (LASIX) 20 MG tablet Take 2 tablets (40 mg total) by mouth every morning.   ?  generic lancets Use 1 each As Directed daily. Dispense brand per patient's insurance at pharmacy discretion. (dx E11.9)   ? guaiFENesin ER (MUCINEX) 600 mg 12 hr tablet Take 1,200 mg by mouth 2 (two) times a day.   ? INCRUSE ELLIPTA 62.5 mcg/actuation DsDv inhaler INHALE 1 PUFF BY MOUTH DAILY   ? levothyroxine (SYNTHROID, LEVOTHROID) 50 MCG tablet TAKE 1 TABLET BY MOUTH EVERY OTHER DAY ALTERNATING WITH 75MG TABLET   ? levothyroxine (SYNTHROID, LEVOTHROID) 75 MCG tablet TAKE 1 TABLET BY MOUTH EVERY OTHER DAYS ALTERNATING WITH 75MCG AND 50MCG DOSE   ? lidocaine 4 % patch Place 1 patch on the skin daily. Remove and discard patch with 12 hours.  Apply to right shoulder   ? LORazepam (ATIVAN) 0.5 MG tablet Take by mouth at bedtime.   ? menthol-zinc oxide (CALMOSEPTINE) 0.44-20.6 % Oint ointment Apply topically 3 (three) times a day.   ? metFORMIN (GLUCOPHAGE) 1000 MG tablet Take 1 tablet (1,000 mg total) by mouth 2 (two) times a day with meals.   ? montelukast (SINGULAIR) 10 mg tablet Take 1 tablet (10 mg total) by mouth at bedtime.   ? naproxen (NAPROSYN) 500 MG tablet Take 1 tablet (500 mg total) by mouth 2 (two) times a day with meals.   ? oxyCODONE (ROXICODONE) 5 MG immediate release tablet Take 0.5-1 tablets (2.5-5 mg total) by mouth every 6 (six) hours as needed for pain.   ? PULMICORT FLEXHALER 180 mcg/actuation inhaler INHALE 2 PUFFS 2 (TWO) TIMES A DAY.   ? simvastatin (ZOCOR) 10 MG tablet TAKE 1 TABLET BY MOUTH AT BEDTIME.   ? traZODone (DESYREL) 50 MG tablet Take 1 tablet (50 mg total) by mouth at bedtime as needed for sleep.   ? triamcinolone (KENALOG) 0.1 % ointment Apply topically 2 (two) times a day. hands (Patient taking differently: Apply topically daily as needed hands.      )   ? VENTOLIN HFA 90 mcg/actuation inhaler INHALE 1-2 PUFFS EVERY 4 (FOUR) HOURS AS NEEDED FOR WHEEZING.       Labs:    Ref Range & Units 4/6/19 1050 4/4/19 0737 4/3/19 1014 4/2/19 0659    WBC 4.0 - 11.0 thou/uL 22.8High   26.0High    27.2High   26.4High      RBC 3.80 - 5.40 mill/uL 2.86Low   3.41Low   3.15Low   3.35Low      Hemoglobin 12.0 - 16.0 g/dL 8.3Low   9.9Low   9.2Low   9.7Low      Hematocrit 35.0 - 47.0 % 26.3Low   31.1Low   28.1Low   29.1Low      MCV 80 - 100 fL 92  91  89  87     MCH 27.0 - 34.0 pg 29.0  29.0  29.2  29.0     MCHC 32.0 - 36.0 g/dL 31.6Low   31.8Low   32.7  33.3     RDW 11.0 - 14.5 % 16.2High   16.2High   15.6High   14.8High      Platelets 140 - 440 thou/uL 611High   483High   431  354        Ref Range & Units 4/6/19 1050 4/5/19 0702 4/4/19 0737    Sodium 136 - 145 mmol/L 138  138  135Low      Potassium 3.5 - 5.0 mmol/L 4.1  3.6  4.0     Chloride 98 - 107 mmol/L 102  101  98     CO2 22 - 31 mmol/L 27  28  28     Anion Gap, Calculation 5 - 18 mmol/L 9  9  9     Glucose 70 - 125 mg/dL 136High   128High   118     Calcium 8.5 - 10.5 mg/dL 8.3Low   8.5  8.1Low      BUN 8 - 22 mg/dL 10  13  13     Creatinine 0.60 - 1.10 mg/dL 0.76  0.79  0.74     GFR MDRD Non Af Amer >60 mL/min/1.73m2 >60  >60  >60        Ref Range & Units 4/6/19 1050 4/4/19 1906 3/27/19 0827    Procalcitonin 0.00 - 0.49 ng/mL 0.28  0.51High   20.85High             Assessment / Plan:    ICD-10-CM    1. Pleural empyema (H) and  pneumonia J86.9  oxygen per nasal cannula.  Wean orders to keep saturation at or greater than 90%.  The next to help mobilize phlegm in her airways.  On Augmentin through 4/28   2. Septic shock (H)/ Bactermia Group A strep and Strep pyogenes A41.9  need to follow electrolytes, white count which is still elevated, renal function.    R65.21    3. Leukocytosis, unspecified type D72.829  WHITE count still quite elevated   4. 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   5. Physical deconditioning R53.81  therapy as able   6. 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 Chronic obstructive pulmonary disease,  unspecified COPD type (H) J44.  her inhalers have been as needed, will schedule albuterol 4 times a day as a neb   8. Mild episode of recurrent major depressive disorder (H) F33.0  citalopram for mood disorder.  She agrees with her family about a psychology assessment   9. Acquired hypothyroidism E03.9  levothyroxine       Total time 55 minutes, greater than 50% face-to-face with Rita reviewing her symptoms, hospitalization, discussions from her care conference yesterday, formulating care plan, as well as review of hospital records.          Electronically signed by: Kristan Luna MD

## 2021-06-27 NOTE — PROGRESS NOTES
Progress Notes by Kristan Luna MD at 4/19/2019  3:12 PM     Author: Kristan Luna MD Service: -- Author Type: Physician    Filed: 4/22/2019  7:50 PM Encounter Date: 4/19/2019 Status: Signed    : Kristan Luna MD (Physician)       [facility]      Facility:    [facility] [[id number]]    Code Status: FULL CODE   Resides at the [facility] ( independent appts)      Chief Complaint/Reason for Visit:  Chief Complaint   Patient presents with   ? Review Of Multiple Medical Conditions     Empyema / Right proximal humerus fracture       HPI:   Rita is a 68 y.o. female with known medical history of colon  Non-small cell carcinoma of the right lung (status post chemotherapy and radiation therapy remission since 5330-5515)  Pleural and pleural effusion, s/p  Talc pleurodesis in 2015  Hypothyroid, acquired  Diabetes type 2  COPD  Obstructive sleep apnea ( not usingCPAP    Weeks before admission, she had cough.  She had been to the clinic, chest x-ray from 3/11 did not show acute pneumonia.  She was put on a Z-Kevin because of her emphysema.  A week later, she fell on the ice and had a right proximal humeral shaft fracture.  She had a nonoperative management with a sling and pain medications.    Since that time she had progressive shortness of breath, the day before admission family thought she sounded quite short of breath and confused.  Patient herself thought it was from the pain medication.  The night before admission, her son heard her heavy breathing.  He woke her up and she was quite confused.  He helped her to the bathroom but she took over an hour in the bathroom because she kept falling asleep.  He helped her back to bed and thought she was really significantly confused.  He called 911.    EMS measured a saturation in the 70s, felt she is in respiratory distress.  On arrival to the emergency department she was hypotensive.  She was  given IV fluids and placed on BiPAP (PCO2 = 63), very elevated lactic acid at 7.4, procalcitonin was very high at 20.85 acute renal failure (creatinine = 3.59) , hyperkalemia (6.0) FVC = 20.85..  She was intubated, started on pressors.  Cultures were growing group A strep.  There was concern for toxic shock syndrome.  She was given IVIG times 3 days and clindamycin.  She had sided empyema, that fluid was growing group A streptococcus as well as Streptococcus pyogenous.  She had a VATS/decortication procedure on 3/29.  Followed by infectious disease.  Chest tubes were placed, CT showed improvement and and effusions.   She was switched to Zosyn.  Discharge she was changed to Augmentin.    There was no DVT found in the right arm.  She will need to follow-up with orthopedic department as an outpatient.    Transferred to [facility]    UPDATE:Went to [facility] yesterday, did CXR:  FINDINGS: Increase vague opacification in the right midlung with continued fluid  superiorly within the right major fissure. Small right effusion is unchanged.     Heterogeneous opacities in the left base with left pleural effusion are  unchanged. Left apical pleural thickening medially is also unchanged    He ordered labs 4/18, Dr Luna had ordered labs 4/15:    Ref Range & Units 4/18/19 1044 4/15/19 1405 4/6/19 1050    WBC 4.0 - 11.0 thou/uL 18.6High   18.1High   22.8High      Hemoglobin 12.0 - 16.0 g/dL 8.6Low   8.4Low   8.3Low      MCHC 32.0 - 36.0 g/dL 31.0Low   29.8Low   31.6Low      RDW 11.0 - 14.5 % 16.1High   16.7High   16.2High      Platelets 140 - 440 thou/uL 748High   597High   611High          Ref Range & Units  4/18/19 3/27/19    Total Neutrophils % 50 - 70 % 78High   89High      Lymphocytes % 20 - 40 % 12Low   5Low      Monocytes % 2 - 10 % 8  5     Eosinophils %  0 - 6 % 1  0     Basophils % 0 - 2 % 1  0     Myelocytes % <=1 % 1  1     Total Neutrophils Absolute 2.0 - 7.7 thou/ul 14.3High   17.7High       Lymphocytes Absolute 0.8 - 4.4 thou/uL 2.2  0.9     Monocytes Absolute 0.0 - 0.9 thou/uL 1.5High   0.9           Ref Range & Units 4/18/19 1044 4/6/19 1050 4/4/19 1906    Procalcitonin 0.00 - 0.49 ng/mL 0.25        Ref Range & Units 4/4/19 1906 3/27/19 0827     Procalcitonin 0.00 - 0.49 ng/mL 0.51High   20.85High                He also ordered chest CT chest for 4/22 and see surgeon ( Dr Ma) again.  Her cough is not productive of phlegm but she hears a lot of phlegm rattling around.    She has a poor appetite, worse because sore to swallow  Her tongue is sore as well as her throat: Biotene spray with mint burns her mouth    Tongue is sore, Biotene spray with mint burns her mouth, only helps an hour or so.  Has numerous very soft stools daily: bothersome. Not diarrhea.  Also large volume urination, not having dysuria  SLP therapy has downgraded her diet to dysphagia advanced      Past Medical History:  Past Medical History:   Diagnosis Date   ? COPD (chronic obstructive pulmonary disease) (H)    ? Depression    ? Diabetes mellitus (H)    ? GERD (gastroesophageal reflux disease)    ? Hx antineoplastic chemotherapy     lung cancer    ? Hx of radiation therapy     lung cancer    ? Hyperlipemia    ? Hypothyroid    ? Lung cancer (H) 2008    RUL,  Non small cell cancer   ? 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 Osteopenia    ? Pleural effusion 1/15   ? Radiation Pneumonitis     Created by Conversion    ? Sleep apnea     does not use cpap           Surgical History:  Past Surgical History:   Procedure Laterality Date   ? PORTACATH PLACEMENT      and removal   ? THORACOSCOPY Right 4/6/2015    Procedure: RIGHT THORACOSCOPY / BIOPSY PLEURAL / TALC PLEURODESIS;  Surgeon: Michi Ma MD;  Location: [facility] OR;  Service:    ? THORACOSCOPY Left 3/29/2019    Procedure: LEFT THORACOSCOPY WITH DECORTICATION;  Surgeon: Michi Ma MD;  Location: [facility] OR;  Service: General   ? TONSILLECTOMY   age 5   ? URETERAL STENT PLACEMENT Right 6/2010   ? US THORACENTESIS  3/27/2019                Review of Systems     As above    Blood pressure 108/69, pulse (!) 102, temperature 98  F (36.7  C), resp. rate 19, SpO2 98 %, not currently breastfeeding.  BMI = 32      Physical Exam     Constitutional:   female, appears her stated age , talkative, visiting with her daughter/grand daughter  HENT: No pharyngeal erythema or exudate, hard palate without exudate, surface of the tongue somewhat dry but no white growths  Cardiovascular: Regular rhythm and normal heart sounds.   Pulmonary/Chest: Coarse rhonchi central airways, frequent cough but nonproductive  Abdominal: Soft. Bowel sounds are normal. She exhibits no distension. There is no tenderness.   Musculoskeletal: Rightarm in a sling .  Adjust her body position in her recliner, reach for things on her bedside table   Neurological: She is alert and oriented to person, place, and time.   Skin: Skin is dry. No rash noted. No erythema. Pale  Positioned in a recliner, not able to examine buttocks skin   Psychiatric: She has a normal mood and affect. Thought content normal.       No Known Allergies    Medication List:  Current Outpatient Medications   Medication Sig   ? acetaminophen (TYLENOL) 500 MG tablet Take 2 tablets (1,000 mg total) by mouth 3 (three) times a day.   ? albuterol (PROVENTIL) 2.5 mg /3 mL (0.083 %) nebulizer solution TAKE 3ML BY MOUTH EVERY 4 HOURS AS NEEDED FOR WHEEZING   ? amoxicillin-clavulanate (AUGMENTIN) 875-125 mg per tablet Take 1 tablet by mouth 2 (two) times a day for 21 days. Follow up with Infectious Disease physician prior to completing antibiotics   ? aspirin 81 MG EC tablet Take 81 mg by mouth daily.   ? baclofen (LIORESAL) 10 MG tablet Take 1 tablet (10 mg total) by mouth 3 (three) times a day as needed.   ? blood glucose meter (GLUCOMETER) Use 1 each As Directed as needed. Dispense glucometer brand per patient's insurance at  pharmacy discretion.   ? blood glucose test (ACCU-CHEK SMARTVIEW TEST STRIP) strips Test blood sugar 3 times daily   ? calcium carbonate-vitamin D3 (CALCIUM 600 WITH VITAMIN D3) 600 mg(1,500mg) -200 unit per tablet Take 1 tablet by mouth 2 (two) times a day. Taking 1200mg of Calcium with 1000 D3   ? cetirizine 10 mg cap Take 10 mg by mouth daily with supper.          ? citalopram (CELEXA) 10 MG tablet TAKE 1 TABLET BY MOUTH ONCE DAILY.   ? famotidine (PEPCID) 20 MG tablet Take 1 tablet (20 mg total) by mouth daily.   ? fluticasone (FLONASE) 50 mcg/actuation nasal spray 2 SPRAYS INTO EACH NOSTRIL DAILY.   ? furosemide (LASIX) 20 MG tablet TAKE 2 TABLETS (40 MG TOTAL) BY MOUTH EVERY MORNING.   ? generic lancets Use 1 each As Directed daily. Dispense brand per patient's insurance at pharmacy discretion. (dx E11.9)   ? guaiFENesin ER (MUCINEX) 600 mg 12 hr tablet Take 1,200 mg by mouth 2 (two) times a day.   ? INCRUSE ELLIPTA 62.5 mcg/actuation DsDv inhaler INHALE 1 PUFF BY MOUTH DAILY   ? levothyroxine (SYNTHROID, LEVOTHROID) 75 MCG tablet TAKE 1 TABLET BY MOUTH EVERY OTHER DAYS ALTERNATING WITH 75MCG AND 50MCG DOSE   ? lidocaine 4 % patch Place 1 patch on the skin daily. Remove and discard patch with 12 hours.  Apply to right shoulder   ? LORazepam (ATIVAN) 0.5 MG tablet Take by mouth at bedtime.   ? menthol-zinc oxide (CALMOSEPTINE) 0.44-20.6 % Oint ointment Apply topically 3 (three) times a day.   ? metFORMIN (GLUCOPHAGE) 1000 MG tablet Take 1 tablet (1,000 mg total) by mouth 2 (two) times a day with meals.   ? montelukast (SINGULAIR) 10 mg tablet Take 1 tablet (10 mg total) by mouth at bedtime.   ? naproxen (NAPROSYN) 500 MG tablet Take 1 tablet (500 mg total) by mouth 2 (two) times a day with meals.   ? oxyCODONE (ROXICODONE) 5 MG immediate release tablet Take 0.5-1 tablets (2.5-5 mg total) by mouth every 6 (six) hours as needed for pain.   ? PULMICORT FLEXHALER 180 mcg/actuation inhaler INHALE 2 PUFFS 2 (TWO)  TIMES A DAY.   ? simvastatin (ZOCOR) 10 MG tablet TAKE 1 TABLET BY MOUTH AT BEDTIME.   ? traZODone (DESYREL) 50 MG tablet Take 1 tablet (50 mg total) by mouth at bedtime as needed for sleep.   ? triamcinolone (KENALOG) 0.1 % ointment Apply topically 2 (two) times a day. hands (Patient taking differently: Apply topically daily as needed hands.      )   ? VENTOLIN HFA 90 mcg/actuation inhaler INHALE 1-2 PUFFS EVERY 4 (FOUR) HOURS AS NEEDED FOR WHEEZING.       Labs:        Ref Range & Units 4/6/19  4/4/19  3/27/19     Procalcitonin 0.00 - 0.49 ng/mL 0.28  0.51High   20.85High             Assessment / Plan:    ICD-10-CM    1. Pleural empyema (H) and  pneumonia J86.9  oxygen per nasal cannula.  Wean orders to keep saturation at or greater than 90%.  The next to help mobilize phlegm in her airways.  On Augmentin through 4/28   2. Septic shock (H)/ Bactermia Group A strep and Strep pyogenes A41.9  need to follow electrolytes, white count which is still elevated, renal function.    R65.21    3. Leukocytosis, unspecified type D72.829  WHITE count still  elevated   4. Pain in oral cavity K13.79  no lesions are observed.  Magic mouthwash is ordered.  There is a Biotene gel that her family can get her that is made without any meant.   5. 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  her inhalers have been as needed, will schedule albuterol 4 times a day as a neb   9. Mild episode of recurrent major depressive disorder (H) F33.0  citalopram for mood disorder.  She agrees with her family about a psychology assessment         Electronically signed by: Kristan Luna MD

## 2021-06-27 NOTE — PROGRESS NOTES
Progress Notes by Kristan Luna MD at 5/3/2019  5:52 PM     Author: Kristan Luna MD Service: -- Author Type: Physician    Filed: 5/5/2019  7:02 PM Encounter Date: 5/3/2019 Status: Signed    : Kristan Luna MD (Physician)       [facility]      Facility:    [facility] [[id number]]    Code Status: FULL CODE   Resides at the [facility] Home: [facility] ( independent appts)      Chief Complaint/Reason for Visit:  Chief Complaint   Patient presents with   ? Review Of Multiple Medical Conditions       HPI:   Rita is a 68 y.o. female with known medical history of colon  Non-small cell carcinoma of the right lung (status post chemotherapy and radiation therapy remission since 3394-3401)  Pleural and pleural effusion, s/p  Talc pleurodesis in 2015  Hypothyroid, acquired  Diabetes type 2  COPD  Obstructive sleep apnea ( not usingCPAP    Weeks before admission, she had cough.  She had been to the clinic, chest x-ray from 3/11 did not show acute pneumonia.  She was put on a Z-Kevin because of her emphysema.  A week later, she fell on the ice and had a right proximal humeral shaft fracture.  She had a nonoperative management with a sling and pain medications.    Since that time she had progressive shortness of breath, the day before admission family thought she sounded quite short of breath and confused.  Patient herself thought it was from the pain medication.  The night before admission, her son heard her heavy breathing.  He woke her up and she was quite confused.  He helped her to the bathroom but she took over an hour in the bathroom because she kept falling asleep.  He helped her back to bed and thought she was really significantly confused.  He called 911.    EMS measured a saturation in the 70s, felt she is in respiratory distress.  On arrival to the emergency department she was hypotensive.  She was given IV fluids and placed on BiPAP (PCO2 =  63), very elevated lactic acid at 7.4, procalcitonin was very high at 20.85 acute renal failure (creatinine = 3.59) , hyperkalemia (6.0) FVC = 20.85..  She was intubated, started on pressors.  Cultures were growing group A strep.  There was concern for toxic shock syndrome.  She was given IVIG times 3 days and clindamycin.  She had sided empyema, that fluid was growing group A streptococcus as well as Streptococcus pyogenous.  She had a VATS/decortication procedure on 3/29.  Followed by infectious disease.  Chest tubes were placed, CT showed improvement and and effusions.   She was switched to Zosyn.  Discharge she was changed to Augmentin.    There was no DVT found in the right arm.  She will need to follow-up with orthopedic department as an outpatient.    Transferred to [facility]    UPDATE:Went to Dr Ang,ID yesterday, did CXR:  FINDINGS: Increase vague opacification in the right midlung with continued fluid  superiorly within the right major fissure. Small right effusion is unchanged.     Heterogeneous opacities in the left base with left pleural effusion are  unchanged. Left apical pleural thickening medially is also unchanged.  He did want to continue with Augmentin another 4 weeks based on the CXR    She also saw Dr Ma, he did not think she needed surgery    He ordered labs 4/18, [facility] had ordered labs 4/15:    Ref Range & Units 4/18/19 1044 4/15/19 1405 4/6/19 1050    WBC 4.0 - 11.0 thou/uL 18.6High   18.1High   22.8High      Hemoglobin 12.0 - 16.0 g/dL 8.6Low   8.4Low   8.3Low      MCHC 32.0 - 36.0 g/dL 31.0Low   29.8Low   31.6Low      RDW 11.0 - 14.5 % 16.1High   16.7High   16.2High      Platelets 140 - 440 thou/uL 748High   597High   611High          Ref Range & Units  4/18/19 3/27/19    Total Neutrophils % 50 - 70 % 78High   89High      Lymphocytes % 20 - 40 % 12Low   5Low      Monocytes % 2 - 10 % 8  5     Eosinophils %  0 - 6 % 1  0     Basophils % 0 - 2 % 1  0     Myelocytes %  <=1 % 1  1     Total Neutrophils Absolute 2.0 - 7.7 thou/ul 14.3High   17.7High      Lymphocytes Absolute 0.8 - 4.4 thou/uL 2.2  0.9     Monocytes Absolute 0.0 - 0.9 thou/uL 1.5High   0.9            Ref Range & Units 4/18/19     Procalcitonin 0.00 - 0.49 ng/mL 0.25        Ref Range & Units 3/27/19     Procalcitonin 0.00 - 0.49 ng/mL 20.85           She had a HOME EVALUATION: not ready to go home, needs ongoing therapy  She will need DME, toilet seat raiser  Recliner ( electric)    SLP upgraded her diet to regular      Past Medical History:  Past Medical History:   Diagnosis Date   ? COPD (chronic obstructive pulmonary disease) (H)    ? Depression    ? Diabetes mellitus (H)    ? GERD (gastroesophageal reflux disease)    ? Hx antineoplastic chemotherapy     lung cancer    ? Hx of radiation therapy     lung cancer    ? Hyperlipemia    ? Hypothyroid    ? Lung cancer (H) 2008    RUL,  Non small cell cancer   ? 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 Osteopenia    ? Pleural effusion 1/15   ? Radiation Pneumonitis     Created by Conversion    ? Sleep apnea     does not use cpap           Surgical History:  Past Surgical History:   Procedure Laterality Date   ? PORTACATH PLACEMENT      and removal   ? THORACOSCOPY Right 4/6/2015    Procedure: RIGHT THORACOSCOPY / BIOPSY PLEURAL / TALC PLEURODESIS;  Surgeon: Michi Ma MD;  Location: [facility];  Service:    ? THORACOSCOPY Left 3/29/2019    Procedure: LEFT THORACOSCOPY WITH DECORTICATION;  Surgeon: Michi Ma MD;  Location: [facility];  Service: General   ? TONSILLECTOMY  age 5   ? URETERAL STENT PLACEMENT Right 6/2010   ? US THORACENTESIS  3/27/2019                Review of Systems     Magic Mouthwash has been very helpful: Does not have much mouth pain, able to eat  She is a poor sleeper at home, that continues to be an issue here  Her weight has gone down there may be a difference between the TCU and hospital scales, I think she also  had diuresed some fluid .    Blood pressure 109/58, pulse 82, temperature 97  F (36.1  C), resp. rate 16, SpO2 95 %      Physical Exam  Constitutional:   female, appears tired , talkative, meeting with PT,2 dtrs, 1 son,Soc Service and nurse manager re Home evaluation.  Cardiovascular: Regular rhythm and normal heart sounds.   Pulmonary/Chest: few rhonchi on left, occasional cough but nonproductive  Abdominal: Soft. Bowel sounds are normal. .   Musculoskeletal: Right arm in a sling , uses other 3 extremities without problem   Neurological: She is alert and oriented to person, place, and time.   Skin: Skin is dry. No rash noted. No erythema. Pale  Positioned in a recliner, not able to examine buttocks skin   Psychiatric: She has a normal mood . Thought content normal.       No Known Allergies    Medication List:  Current Outpatient Medications   Medication Sig   ? acetaminophen (TYLENOL) 500 MG tablet Take 2 tablets (1,000 mg total) by mouth 3 (three) times a day.   ? albuterol (PROVENTIL) 2.5 mg /3 mL (0.083 %) nebulizer solution TAKE 3ML BY MOUTH EVERY 4 HOURS AS NEEDED FOR WHEEZING   ? alum/mag hydrox-simethicone-diphenhydramine-lidocaine (MAGIC MOUTHWASH) suspension 15 mL 4 (four) times a day.   ? amoxicillin-clavulanate (AUGMENTIN) 875-125 mg per tablet Take 1 tablet by mouth 2 (two) times a day.   ? aspirin 81 MG EC tablet Take 81 mg by mouth daily.   ? baclofen (LIORESAL) 10 MG tablet Take 1 tablet (10 mg total) by mouth 3 (three) times a day as needed.   ? blood glucose meter (GLUCOMETER) Use 1 each As Directed as needed. Dispense glucometer brand per patient's insurance at pharmacy discretion.   ? blood glucose test (ACCU-CHEK SMARTVIEW TEST STRIP) strips Test blood sugar 3 times daily   ? calcium carbonate-vitamin D3 (CALCIUM 600 WITH VITAMIN D3) 600 mg(1,500mg) -200 unit per tablet Take 1 tablet by mouth 2 (two) times a day. Taking 1200mg of Calcium with 1000 D3   ? cetirizine 10 mg cap Take 10 mg  by mouth daily with supper.          ? citalopram (CELEXA) 20 MG tablet Take 20 mg by mouth daily.   ? famotidine (PEPCID) 20 MG tablet Take 1 tablet (20 mg total) by mouth daily. (Patient taking differently: Take 20 mg by mouth 2 (two) times a day.       )   ? fluticasone (FLONASE) 50 mcg/actuation nasal spray 2 SPRAYS INTO EACH NOSTRIL DAILY.   ? furosemide (LASIX) 20 MG tablet TAKE 2 TABLETS (40 MG TOTAL) BY MOUTH EVERY MORNING.   ? generic lancets Use 1 each As Directed daily. Dispense brand per patient's insurance at pharmacy discretion. (dx E11.9)   ? guaiFENesin ER (MUCINEX) 600 mg 12 hr tablet Take 1,200 mg by mouth 2 (two) times a day.   ? INCRUSE ELLIPTA 62.5 mcg/actuation DsDv inhaler INHALE 1 PUFF BY MOUTH DAILY   ? Lactobacillus rhamnosus GG (CULTURELLE) 15 billion cell CpSP Take 2 capsules by mouth Daily at 8:00 am..   ? levothyroxine (SYNTHROID, LEVOTHROID) 75 MCG tablet TAKE 1 TABLET BY MOUTH EVERY OTHER DAYS ALTERNATING WITH 75MCG AND 50MCG DOSE   ? lidocaine 4 % patch Place 1 patch on the skin daily. Remove and discard patch with 12 hours.  Apply to right shoulder   ? loperamide (IMODIUM A-D) 2 mg tablet Take 2 mg by mouth as needed for diarrhea (2 mg after each loose stool to a maximum of 16 mg/day).   ? LORazepam (ATIVAN) 0.5 MG tablet Take 1 tablet (0.5 mg total) by mouth at bedtime.   ? menthol-zinc oxide (CALMOSEPTINE) 0.44-20.6 % Oint ointment Apply topically 3 (three) times a day.   ? metFORMIN (GLUCOPHAGE) 1000 MG tablet Take 1 tablet (1,000 mg total) by mouth 2 (two) times a day with meals.   ? montelukast (SINGULAIR) 10 mg tablet Take 1 tablet (10 mg total) by mouth at bedtime.   ? naproxen (NAPROSYN) 500 MG tablet Take 1 tablet (500 mg total) by mouth 2 (two) times a day with meals.   ? psyllium (METAMUCIL) 0.52 gram capsule Take 0.52 g by mouth 3 (three) times a day with meals.   ? PULMICORT FLEXHALER 180 mcg/actuation inhaler INHALE 2 PUFFS 2 (TWO) TIMES A DAY.   ? saliva stimulant  (BIOTENE MOISTURIZING MOUTH) oral spray Take by mouth as needed.   ? simvastatin (ZOCOR) 10 MG tablet TAKE 1 TABLET BY MOUTH AT BEDTIME.   ? sodium chloride (OCEAN) 0.65 % nasal spray Apply 1 spray into each nostril as needed for congestion.   ? traZODone (DESYREL) 50 MG tablet Take 1 tablet (50 mg total) by mouth at bedtime as needed for sleep.   ? triamcinolone (KENALOG) 0.1 % ointment Apply topically 2 (two) times a day. hands (Patient taking differently: Apply topically daily as needed hands.      )   ? VENTOLIN HFA 90 mcg/actuation inhaler INHALE 1-2 PUFFS EVERY 4 (FOUR) HOURS AS NEEDED FOR WHEEZING.       Labs:        Ref Range & Units 4/6/19  4/4/19  3/27/19     Procalcitonin 0.00 - 0.49 ng/mL 0.28  0.51High   20.85High             Assessment / Plan:    ICD-10-CM    1. Pleural empyema (H) and  pneumonia J86.9  oxygen per nasal cannula.  Wean orders to keep saturation at or greater than 90%.  The next to help mobilize phlegm in her airways.  On Augmentin through 4/28    R65.21    2. 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   3. Physical deconditioning R53.81  therapy as able, DME needs for apartment   4. 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 Chronic obstructive pulmonary disease, unspecified COPD type (H) J44.  her inhalers have been as needed, will schedule albuterol 4 times a day as a neb   6. Mild episode of recurrent major depressive disorder (H) F33.0  citalopram for mood disorder.  She agrees with her family about a psychology assessment         Electronically signed by: Kristan Luna MD

## 2021-06-28 NOTE — PROGRESS NOTES
Progress Notes by Tawnya Maria EPS at 12/12/2019 12:30 PM     Author: Tawnya Maria EPS Service: -- Author Type: Exercise Phys Spec    Filed: 12/12/2019  3:25 PM Encounter Date: 12/12/2019 Status: Signed    : Tawnya Maria EPS (Exercise Phys Spec)            Zestar Study Consent Visit    Study description: ECG and PPG Study: Zestar Study      Note time seated: 12:42pm     Rita Mancini a 69 y.o. female , was seen in [facility] [place] today to discuss participation in the Zestar study.   The consent discussion began on December 11, 2019.  Please refer to phone call note from Gayathri ALCARAZ for more details.  The consent form was reviewed with the patient.     The review of the study included:    Study purpose     Conflict of interest    Device description      Study visits    Risks of participation    Benefits (if any)    Alternatives    Voluntary participation    Confidentiality     Compensation/costs of participation    Study stipends    Injury and legal rights    The subject was provided time to review the consent form and consider participation. her questions were answered to her satisfaction.   The patient has voluntarily agreed to participate in the above noted study.     The consent form version 25 November 2019 and HIPAA form version 11 June 2019 was signed 12/12/19 at 12:55pm.    The subject was provided with a copy of the consent form and HIPAA. A copy of the signed forms was forwarded to medical records.    No study procedures were done prior to Rita Mancini providing informed consent.     DANICA Conte    Subject Restrictions During Study -Confirmed with Subject prior to any study procedures completed    Restrictions on jewelry, recreational drugs, caffeine, and exercise few days prior and during study.   1. Subjects should not consume excessive amount of caffeine (6 or more 8-oz cups of coffee, or more than 570 mg of caffeine from energy drinks, pills or similar substance) during their  "participation in the study.   2. Subjects should not consume excessive amount of alcohol for the duration of their participation in the study. A typical moderate amount is allowed during stage 3.   3. Subjects should not take any recreational drugs (including, but not limited to methamphetamines, cocaine, opioids, cannabis, LSD) for the duration of their participation in the study.   4. Subjects should not wear underwire bra or jewelry during the in-lab study (to not interfere with electrode placement and ECG data recordings).   5. Subject will not be permitted to have their cell phone or any electronic recording device on or with them during the in-lab test session(s).   6. Subjects under 22 years old will not be permitted to take ECG recordings through the ECG trish on the wrist-worn devices.     For study stage 3 only   1. Subjects should only do high intensity exercise (e.g. sprinting, heavy lifting, etc.) in the morning upon awakening or else not at all   2. Subjects should abstain from swimming during the time of the study   3. Subjects should only shower in the morning upon awakening (or else not at all)   4. Female subjects are strongly suggested to wear non-underwire bras throughout this stage of the study     DANICA Conte      Study Data collections   Vitals  (TPBP)     Vitals:    12/12/19 1302 12/12/19 1303 12/12/19 1304   BP: 120/59 121/62 119/59   Patient Site: Left Arm Left Arm Left Arm   Patient Position: Sitting Sitting Sitting   Cuff Size: Adult Regular Adult Regular Adult Regular   Pulse: 66 67 65   Resp: 20     Temp:  98.2  F (36.8  C)    Weight:   168 lb 12.8 oz (76.6 kg)   Height:   5' 5\" (1.651 m)      VS taken after 5 min rest     MAP 1    80  MAP 2      75   MAP 3             72          Body mass index is 28.09 kg/m .  female  1950  69 y.o.      Note time patient placed in supine position: 1:07pm    Ethnicity   []   or    [x]  Not  or   Race   []  "  or    []    []  Black or   []   or Other   [x]  White  Physical Activity Level  per subject report:   []  0- Extremely Inactive [x]  1- Sedentary []  2- Moderately Active  []  3- Vigorously Active []  4- Extremely Active  Trained Athlete   [] Yes  [x] No     Rodrigues's' Skin type   [] Type 1 [] Type 2 [x] Type 3    [] Type 4 [] Type 5 [] Type 6    Subject participated in previous ECG study at [facility]: [] Yes    [x] No    Past Medical History:   Diagnosis Date   ? Anxiety and depression 1962   ? Bigeminy 03/17/2019   ? COPD (chronic obstructive pulmonary disease) (H) 2009   ? Diabetes mellitus (H) 2008   ? Functional diarrhea 12/31/2018   ? GERD (gastroesophageal reflux disease)    ? HCAP (healthcare-associated pneumonia)    ? Hx antineoplastic chemotherapy     lung cancer    ? Hx of radiation therapy     lung cancer    ? Hyperlipemia 2009   ? Hypothyroid    ? Lung cancer (H) 2008    RUL,  Non small cell cancer   ? 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 Osteopenia    ? 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 Pleural effusion 1/15   ? Pneumonia    ? Radiation Pneumonitis     Created by Conversion    ? Sepsis due to Escherichia coli with acute renal failure without septic shock, unspecified acute renal failure type (H)    ? Sleep apnea 2010    does not use cpap       HISTORY OF HEART RHYTHM ABNORMALITIES (check only group subject is 'randomized[' to-only 1)  []  Group 1: History of paroxysmal atrial fibrillation (no excluded medications)  []  Group 2: History of paroxysmal atrial fibrillation (on excluded medications)    []  Group 3: History of high-rate atrial fibrillation   []  Group 4: History of atrial fibrillation with rate control medications    []  Group 5: Permanent/persistent atrial fibrillation    []  Group 6: history of atrial flutter  []   Group 7: Frequent PVCs  []  Group 8: Frequent PACs  []  Group 9: BBB (left or right)  []  Group 10: History of 2nd Heart Block (any type)  [x]  Group 11: History of bigeminy, trigeminy, and/or quadgeminy  []  Group 12: History of tachycardia     Special interest allergies: active allergic skin reactions  No Known Allergies    Current Outpatient Medications:   ?  acetaminophen (TYLENOL) 500 MG tablet, TAKE 1-2 TABLETS (500-1,000 MG) BY MOUTH EVERY 6 HOURS AS NEEDED FOR MILD PAIN, Disp: 100 tablet, Rfl: 0  ?  aspirin 81 MG EC tablet, Take 81 mg by mouth at bedtime.    , Disp: , Rfl:   ?  blood glucose meter (GLUCOMETER), Use 1 each As Directed daily. Dispense glucometer brand per patient's insurance at pharmacy discretion., Disp: 1 each, Rfl: 0  ?  blood glucose test (ACCU-CHEK SMARTVIEW TEST STRIP) strips, PT TO TEST BLOOD SUGAR DAILY, Disp: 300 strip, Rfl: 3  ?  cetirizine (ZYRTEC) 10 MG tablet, Take 10 mg by mouth daily with supper., Disp: , Rfl:   ?  citalopram (CELEXA) 10 MG tablet, Take 1 tablet (10 mg total) by mouth daily., Disp: 90 tablet, Rfl: 3  ?  docosahexanoic acid/epa (FISH OIL ORAL), Take 1 g by mouth daily.    , Disp: , Rfl:   ?  famotidine (PEPCID) 20 MG tablet, Take 1 tablet (20 mg total) by mouth daily., Disp: 90 tablet, Rfl: 3  ?  ferrous sulfate 325 (65 FE) MG tablet, Take 1 tablet (325 mg total) by mouth daily with breakfast., Disp: , Rfl: 0  ?  fluticasone (FLONASE) 50 mcg/actuation nasal spray, 2 SPRAYS INTO EACH NOSTRIL DAILY., Disp: 48 g, Rfl: 3  ?  generic lancets, Use 1 each As Directed daily. Dispense brand per patient's insurance at pharmacy discretion. (dx E11.9), Disp: 100 each, Rfl: 1  ?  levothyroxine (SYNTHROID, LEVOTHROID) 50 MCG tablet, TAKE 1 TABLET BY MOUTH EVERY OTHER DAY ALTERNATING WITH 75MG TABLET, Disp: 45 tablet, Rfl: 3  ?  levothyroxine (SYNTHROID, LEVOTHROID) 75 MCG tablet, Take daily 5 days a week (except Monday and Friday), Disp: 90 tablet, Rfl: 3  ?  montelukast  "(SINGULAIR) 10 mg tablet, Take 1 tablet (10 mg total) by mouth at bedtime., Disp: 90 tablet, Rfl: 3  ?  multivitamin therapeutic tablet, Take 1 tablet by mouth daily., Disp: , Rfl:   ?  omeprazole (PRILOSEC) 20 MG capsule, Take 1 capsule (20 mg total) by mouth daily before breakfast., Disp: 30 capsule, Rfl: 1  ?  PULMICORT FLEXHALER 180 mcg/actuation inhaler, TAKE 2 PUFFS BY MOUTH TWICE A DAY, Disp: 3 each, Rfl: 3  ?  simvastatin (ZOCOR) 10 MG tablet, Take 1 tablet (10 mg total) by mouth at bedtime., Disp: 90 tablet, Rfl: 3  ?  traZODone (DESYREL) 100 MG tablet, Take 100 mg by mouth at bedtime as needed for sleep.    , Disp: , Rfl:   ?  triamcinolone (KENALOG) 0.1 % ointment, Apply 1 application topically 2 (two) times a day as needed (dry skin on hands).    , Disp: , Rfl:   ?  umeclidinium-vilanterol (ANORO ELLIPTA) 62.5-25 mcg/actuation inhaler, Inhale 1 puff daily., Disp: 90 puff, Rfl: 3  ?  VENTOLIN HFA 90 mcg/actuation inhaler, INHALE 1-2 PUFFS EVERY 4 (FOUR) HOURS AS NEEDED FOR WHEEZING., Disp: 54 g, Rfl: 0      10-sec 12-lead ECG & 30-sec 12-lead ECG rhythm strip done; reviewed by & PE done by Winnie Vail CNP.  Subject Questionnaire    OCCUPATION: Retired   Predominately works outdoors  [] Yes    [x] No      Hours/week spent outdoors (total, not only for work): 1 hour    Frequently participates in \"hand intensive\" activities [] Yes [x] No  Caffeine  []  Never  [] Occasionally        []  Daily (1 cup/day)     [x]  Daily (>1 cup/day)    Alcohol   [x]  Never  [] Light (drink or 2 occasionally) []  Moderate (a drink or 2 almost daily)   []  Occasional-heavy (more than a few drinks <2x / month)  [] Heavy (more than a few drinks >2x / month)    Tobacco/nicotine  [x]  Never  [] Rarely  []  Frequently/ Daily     Mattress Information    Mattress type:  []  Memory foam [] Gel  [x] Innerspring (coil)  [] Airbed  [] Waterbed [] Shikibuton  [] Hybrid  [] No mattress  [] Other (comment):    Mattress foundation   [] " "Mattress on floor/ground    [] Mattress on foundation/box spring on floor/ground  [x] Mattress on foundation/box spring on bed frame  [] Mattress on tatami on floor/ground  [] Other (comment):    Mattress topper   [] No mattress topper  [x] Pillow top  [] Foam top - flat style  [] Foam top - \"egg crate\" style  [] Other (comment):    Co-sleeper    []  Yes  [x]  No    CPAP use   [] Yes  [x] No      Dominant hand [] left  [] right [x] ambidextrous  Preferred Wrist to wear band on   [x]  left   []  right   Were screening day & study day: [x]  same [] different   Same: wrist circumference:      160   mm  Device wearing wrist skin fold thickness:       3.4  mm  Wrist Band Size:     [x]  Flush Fit S/M  []  Non-Flush Fit S/M   []  Flush Fit M/L  []  Non-Flush Fit M/L  []  Flush Fit XL  []  Non-Flush Fit XL    Preferred/natural band notch: 4  Secure band notch: 4  Crown orientation:  []  left   [x]  right  Device wearing wrist hairiness:     [x]  Light []  Medium       []  Heavy  Spectophotometer   L A B   Reading #1  68.05  6.31  17.42   Reading #2  67.30  7.05  17.28   Reading #3  67.58  6.50  17.46     Pregnancy test    [] WOCBP (age <55 yrs, no tubal ligation, no hysterectomy)    [x] n/a male or female not child bearing potential  For Stage 2: subject will use exercise bike for exercise portion of the study  Room Temperature: 23 C  CS Laptop ID: [id number]  CS Cam ID:[id number]  Device Set ID:[id number]  Wrist Device ID:[id number]  Subject has now completed their in-house participation in the Zestar study. Subject will complete Stage 3 at home for the next 3 days and return the equipment on December 16, 2019 [device return date].  DANICA Conte         "

## 2021-06-28 NOTE — PROGRESS NOTES
Progress Notes by Sheila Crandall at 12/16/2019 10:42 AM     Author: Sheila Crandall Service: -- Author Type: Patient Access    Filed: 12/16/2019 10:42 AM Encounter Date: 12/16/2019 Status: Signed    : Sheila Crandall (Patient Access)         Zestar Study Device Return    Rita Mancini returned all the devices for the Zestar study.  Rita Mancini denies medication changes or adverse events since last visit.    Rita Mancini has now completed their participation in the Zestar study.   Thank you for your gift of participation.    Sheila Crandall

## 2021-06-28 NOTE — PROGRESS NOTES
Progress Notes by Wally Carr at 3/9/2020  1:00 PM     Author: Wally Carr Service: -- Author Type: Patient Access    Filed: 3/9/2020  3:15 PM Encounter Date: 3/9/2020 Status: Signed    : Wally Carr (Patient Access)         [place] Study  Post Data Collection  Question 1: How would you rate the comfort of your watch band?  [x] 1 Very comfortable [] 2 Comfortable [] 3 Neutral [] 4 Uncomfortable [] 5 Very uncomfortable    Question 2: How would you normally wear your watch band?  [] 1 Tighter   [x] 2 As is  [] 3 Looser    Post study procedure skin temperatures  Skin Temperature Left Finger: 25.5   Skin Temperature Left Wrist: 32.2  C   Skin Temperature Right Finger: 25  C  Skin Temperature Right Wrist: 32.4  C      Subject has now completed their participation in the [facility]ultana study.   The subject did not experience any adverse events.      Wally Carr

## 2021-06-28 NOTE — PROGRESS NOTES
"Progress Notes by Wally Carr at 3/9/2020  1:00 PM     Author: Wally Carr Service: -- Author Type: Patient Access    Filed: 3/9/2020  3:15 PM Encounter Date: 3/9/2020 Status: Signed    : Wally Carr (Patient Access)           [place] Study Consent Visit    Study description Data Collection Study: [place] Study      Rita Mancini a 69 y.o., was seen in 2500 today to discuss participation in the [place] study.   The consent form was reviewed with the patient.     The review of the study included:    Study purpose     Conflict of interest    Device description      Study visits    Risks of participation    Benefits (if any)    Alternatives    Voluntary participation    Confidentiality     Compensation/costs of participation    Study stipends    Injury and legal rights    The subject was provided time to review the consent form and consider participation. her questions were answered to her satisfaction.   The patient has voluntarily agreed to participate in the above noted study.     The consent form version 13 Feb 2020 was signed 03/09/20 at 1323    The subject was provided with a copy of the consent form. A copy of the signed forms was forwarded to medical records.    No study procedures were done prior to Rita R Live providing informed consent.     Wally Carr    Study Data collections      Vitals:    03/09/20 1329   BP: 113/56   Patient Site: Left Arm   Patient Position: Sitting   Cuff Size: Adult Regular   Pulse: 78   Resp: 20   Temp: 97.8  F (36.6  C)   TempSrc: Oral   SpO2: 95%   Weight: 187 lb (84.8 kg)   Height: 5' 5\" (1.651 m)     VS inlcude: Temp, Pulse, Resp. Rate, BP, Pulse oximetry (w/ O2 supplementation level)  female  1950    ETHNICITY:     [x]      []  or          [] East                    [] Central       [] Southeast           [] Tibetan      [] South                  [] North       [] West                   [] " , or       [] [place] citizen                      [] Other  ethnicity not otherwise specified  []  or   [] -American (Not of  origin)  []  or Alaskan Native     [] Kei     [] Other         ETHNICITY (SECONDARY):  [x] N/A (did not identify as  or )  []      []  or          [] East                      [] Central       [] Southeast           [] Tibetan      [] South                  [] North       [] West                    [] , or       [] [place] citizen                      [] Other  ethnicity not otherwise specified  []  or   [] -American (Not of  origin)  []  or Alaskan Native     [] Kei     [] Other    Respiratory Condition  []  Asthma [x]  COPD []  Not applicable  Medical History Reviewed [x] Yes  [] No   Asthma subjects  [x] NA COPD subject  Asthma Control Test (ACT) score:  Date     Historical:   Date   [x] NA  Condition Severity: []  Not well controlled Asthma (ACT 16-19) []  Very poorly controlled Asthma (15 or less)        COPD subjects  [] NA Asthma subject  Spirometry (most recent result) []  FEV1< 30% [] ?30% FEV1 < 50%  [x]  FEV1 > 50% - <80% []  Not applicable      [x]  COPD stage 2  []  COPD stage 3 []  COPD stage 4      Lifestyle  Physical Activity Level per subject report: 60 minutes per week  Type of activity    [x] Cardio [] Strength  []  Both []  None    Time subject last ate   []  None  []  Less than 0.5 hour   [] Less than 1 hour      []   Between 1-2 hours    [x]  More than 2 hours    Coffee   When was the last time you drank coffee?        []  None []  Less than 0.5 hour   [] Less than 1 hour      [x]   Between 1-2 hours     []  More than 2 hours  Cups of coffee today: 3    Tobacco  []  Never  [x] Prior []  Current  If prior or current:   Packs per day:   2    Number of years smokin    Dominant hand [] left  [x] right [] ambidextrous  Preferred Wrist to wear band on   [x]  left   []  right   Subject applied moisturizer (cream, lotion, etc.) at wrists today  [] Yes  [x] No   Measurements  Wrist circumference:      160   mm  Device wearing wrist skin fold thickness:       6  mm     Device wearing wrist hairiness:     [x] A. Fine hair and thinly distributed  []  B. Coarse hair and thinly distributed       []  C. Fine hair and thickly distributed []  D. Coarse hair and thickly distributed       Spectrophotometer   L A B   Reading   66.98  6.84  17.92     Room Temperature: 21.6 C  Barometric Pressure: 29.05   Humidity:  38.7%  Melanin Index: 0  Pre study procedure skin temperatures  Skin Temperature Left Wrist: 32.6  C  Skin Temperature Left Finger: 24.1  C   Skin Temperature Right Wrist: 32.1  C  Skin Temperature Right Finger: 27.2  C  Time measurements completed: 1405 (24 hour clock)    Band type:  Sport  Secure band notch: 6  Lux level near wrist (IP side-allow to stabilize): 250  Wally Carr

## 2021-06-28 NOTE — PROGRESS NOTES
Progress Notes by Shila Joy PA-C at 3/9/2020  1:00 PM     Author: Shila Joy PA-C Service: -- Author Type: Physician Assistant    Filed: 3/9/2020  3:15 PM Encounter Date: 3/9/2020 Status: Signed    : Shila Joy PA-C (Physician Assistant)         [place] Study    Physical Examination  For abnormal findings, please evaluate if the finding is Clinically Significant (by 'CS') or Not Clinically Significant (by 'NCS')  General Appearance Normal  Head and Neck  Normal  Lungs    Abnormal- diffuse rhonci/rales bilaterally  Cardiovascular  Normal  Abdomen   Normal  Musculoskeletal/Extremities Normal   Lymph Nodes  Normal  Skin    Normal  Neurological   Normal    Per my assessment, this patient is   [] Asthma, not well controlled   [] Asthma, very poorly controlled   [x] COPD, moderate   [] COPD, severe   [] COPD, very severe    Shila Joy PA-C

## 2021-06-28 NOTE — PROGRESS NOTES
Progress Notes by Tiffanie Vail CNP at 12/12/2019 12:30 PM     Author: Tiffanie Vail CNP Service: -- Author Type: Nurse Practitioner    Filed: 12/12/2019  4:21 PM Encounter Date: 12/12/2019 Status: Addendum    : Tiffanie Vail CNP (Nurse Practitioner)    Related Notes: Original Note by Tiffanie Vail CNP (Nurse Practitioner) filed at 12/12/2019  3:25 PM        Zestar Study    Physical Examination  For abnormal findings, please evaluate if the finding is Clinically Significant (by 'CS') or Not Clinically Significant (by 'NCS')  General Appearance Normal  Head and Neck  Normal  Lungs               NCS bilateral congestion posterior more on the right than the left    Cardiovascular  Normal  Abdomen   Normal  Musculoskeletal/Extremities Normal   Lymph Nodes  Normal  Skin    Normal  Neurological   Normal   Tremor absent     If present, evaluate severity on 1-10 scale    Tiffanie Vail CNP

## 2021-06-28 NOTE — PROGRESS NOTES
Progress Notes by Tawnya Maria EPS at 12/12/2019 12:30 PM     Author: Tawnya Maria EPS Service: -- Author Type: Exercise Phys Spec    Filed: 12/12/2019  3:25 PM Encounter Date: 12/12/2019 Status: Cosign Needed    : Tawnya Maria EPS (Exercise Phys Spec) Cosign Required: Yes           [facility] Study Inclusion / Exclusion Criteria  Protocol Version 4.0 ([id number])    Inclusion Criteria    Yes No Criteria # Subject must meet all inclusion criteria:      [x]    []  1 At least 18 years old for NSR and 22 years old for Non-NSR. Inclusive for both cohorts, at time of screening, with no upper limit on age.        [x]      []  2 To be enrolled as a non-NSR subject the volunteer must have one of the following conditions: Permanent/Persistent AF, Hx of paroxysmal AF, Hx of High-rate AF, AF + rate control medication, Hx Atrial Flutter, PVC burden >1%, Frequent PACs, Hx BBB, Hx 2nd degree block (any type), Hx Bigeminy/Trigeminy/Quadgeminy, Hx Tachycardia. For subjects with any of the following diagnoses, the condition must be present at the time of screening:   ? Permanent/persistent AF  ? PVC [place]  ? Frequent PACs   Note: If not present at screening, subjects may not be enrolled as a non-NSR subject or NSR subject.         3  [x] N/A HE site For Subjects to be enrolled as NSR they must be in NSR at the time of screening as determined by the investigator. For subjects ?65 years old with PVC burden of ?1% or infrequent PACs (<3 ectopic beats in 30 seconds), they may qualify as individuals in the NSR cohort as long as they don't have a medical history/diagnosis of significant ectopic burden. For subjects ? 66 years old with PVC burden > 1% but ?10%, they may qualify as individuals in the NSR cohort as long as they don't have a medical history/diagnosis of significant ectopic burden.    [x]  []  4 Able to read and understand a written ICF    [x]  []  5 Willing and able to participate in the study procedures and  comply with its restrictions    [x]  []  6 Able to communicate effectively with study staff as well as understand and follow directions    All must be Yes    Exclusion Criteria-all must be no  Yes No Criteria # Subject must not meet any exclusion criteria:    []  [x]  1 Physical disability that prevents safe and adequate testing.   []  [x]  2 Pregnant women or women planning to become pregnant   []  [x]  3 Any acute illness or condition that may interfere with study procedures (e.g. cough, fever, sore throat, headache, sunburn, etc.)   []  [x]  4 Clinically significant hand tremors, as judged by the Investigator   []  [x]  5 Resting hypertension with systolic blood pressure ?161 mmHg or diastolic blood pressure ?101 mmHg (if at least 2 of 3 measurements meet this criteria)   []  [x]  6 Subjects with a pacemaker or an automated implantable cardioverter- defibrillator (AICD)   []  [x]  7 Acute myocardial infarction (MI) within 90 days from the screening visit   []  [x]  8 Other cardiovascular disease that increases the risk to the subject or would render the data uninterpretable in the opinion of the Investigator (e.g., recent or ongoing unstable angina, significant valvular heart disease or chronic heart failure, myocarditis or pericarditis)    []  [x]  9 Acute pulmonary embolism, pulmonary infarction, or deep vein thrombosis within 90 days from the screening visit    []  [x]  10 Stroke or transient ischemic attack within 90 days from the screening visit    []  [x]  11 Known untreated medical conditions as determined by the Investigator, such as but not limited to significant anemia, important electrolyte imbalance and untreated or uncontrolled thyroid disease.    []  [x]  12 Any history of wrist surgery with scarring in the area of the sensor location on the wrist where the subject will be wearing the watch;    []  [x]  13 Open wound(s) on the wrist and forearm where the subject will be wearing the watch    []  [x]   14 Severe symptomatic (or active) overly dry/injured skin, skin disorders, or allergic skin reactions such as eczema, rosacea, impetigo, dermatomyositis or allergic contact dermatitis on wrist and locations where the electrodes will be placed (e.g. chest, forearms, stomach), as determined by the investigator.    []  [x]  15 Tattoos, scars or moles in the area of the sensor location on the wrist where the subject will be wearing the watch    []  [x]  16 Device wearing Wrist circumference ? 129 mm or ? 246 mm    []  [x]  17 Known significant sensitivity to medical adhesives or isopropyl alcohol (for ECG electrode placement)    []  [x]  18 Known allergy or sensitivity to fluorocarbon-based synthetic rubber, such as contact dermatitis with fluoroelastomer bands primarily used in wrist worn fitness devices    []  [x]  19 Subjects with any Medical History, Physical exam, vital sign or any other study procedure finding/assessment that in the opinion of the investigator could compromise subject safety during study participation or interfere with the study integrity and/or the accurate assessment of the study objectives    []  [x]  20 Subject works for a company that develops or sells medical and/or fitness devices (e.g., ECG monitors, wearable fitness bands, sleep monitors, etc.) or are technology journalists (e.g., professional bloggers, TV, magazine, newspaper reporters, etc.)    []  [x]  21 Weight > 181 kg for subjects using the stationary bike and/or treadmill. Weight of ?138 kg for NSR subjects.    []  [x]  22 Subject is employed in shift work, or otherwise does not maintain a reasonably consistent day/night schedule (e.g. Subjects who go to bed after 4am).    []  [x]  23 Overnight travel planned during data collection nights    []  [x]  24 Non-NSR subjects should not have partaken in strenuous physical activity within 12 hours prior to screening    []  [x]  25 Non-NSR subjects with Atrial fibrillation categories:  Subjects taking Class 1 or Class 3 antiarrhythmic agents such as the following may not take part in any stage of the study: amiodarone, sotalol, dronedarone, ibutilide, dofetilide, propafenone, quinidine, procainamide, disopyramide, flecainide (Subjects taking class 2, 4 or 5 antiarrhythmic agents may take part the study).    []  [x]  26 Subjects who have both a history of paroxysmal AF and a Rodrigues skin type measurement of VI    []  [x]  27 Subjects who have missing index fingers on both hands      Subject has met all inclusion criteria and no exclusion criteria have been met.   Subject is ready to fully enrolled in the [facility] study.    Tawnya Maria, EPS

## 2021-06-28 NOTE — PROGRESS NOTES
"Progress Notes by Wally Carr at 3/9/2020  1:00 PM     Author: Wally Carr Service: -- Author Type: Patient Access    Filed: 3/9/2020  3:15 PM Encounter Date: 3/9/2020 Status: Attested    : Wally Carr (Patient Access) Cosigner: Crystal Tse MD at 3/17/2020  3:31 PM    Attestation signed by Crystal Tse MD at 3/17/2020  3:31 PM    Reviewed.                   [place] Study    Protocol Version 2.0 [id number]    Inclusion Criteria   Yes   No  Criteria #   Inclusion Criteria  - all must be \"yes\"    [x]   []  1  Male and Female subjects at least 18 years old at time of screening visit.     [x]   []  2  Preferred wrist circumference of 120 mm - 245 mm (inclusive).    [x]   []  3  Must have the ability to understand and provide written informed consent.    [x]   []  4  Must be willing and able to comply with study procedures, activities, and duration as described in the consent form    [x]   []  5  Subjects having one of the conditions below:     5a  []  Not well controlled asthma with an Asthma Control Test (ACT) score of ?19 and >15 (16-19 inclusive)     5b  []   Very poorly controlled asthma with an Asthma Control Test (ACT) score of ?15     5c  [x]  A GOLD stage 2 COPD as per medical history or with 50%? FEV1 < 80% (50%-79% inclusive) (medical history or spirometry test conducted as part of screening)      5d  []   A GOLD stage 3 COPD as per medical history or with 30% ? FEV1 <50% (30%-49% inclusive) (medical history or spirometry test conducted as part of screening)     5e  []  A GOLD stage 4 COPD as per medical history or with FEV1<30%  (medical history or spirometry test conducted as part of screening)     Exclusion Criteria  Yes No Criteria #  Exclusion Criteria-all must be \"no\"   []  [x]  1  Individuals with severe contact allergies to standard adhesives, or other materials found in pulse oximetry sensors, ECG electrodes, respiration monitor electrodes, Wearables devices bands and " watch surfaces   []  [x]  2  Individuals that do not have both ring fingers intact   []  [x]  3  Open wound(s) on study wrists at watch wear locations   []  [x]  4  Physical disability that prevents safe and adequate testing     []    [x]  5  Individuals with a pacemaker or an automated implantable cardioverter-defibrillator (AICD)   []  [x]  6  Individuals with physical scars, tattoos, or other skin markings on wrists where study watch faces are to be worn   []  [x]  7  Individuals with clinically significant hand tremors, as judged by a Study Investigator   []  [x]  8  Pregnant women     []    [x]  9  Subjects with any medical history, physical exam, vital sign or any other study procedure finding/assessment that in the opinion of the Investigator could compromise subject safety during study participation or interfere with the study integrity and/or the accurate assessment of the study objectives        Subject has met all inclusion criteria and no exclusion criteria have been met.   Subject is ready to fully enrolled in the [place] study.    Wally Carr

## 2021-07-03 NOTE — ADDENDUM NOTE
Addendum Note by Analia Deleon DO at 10/16/2019 10:15 AM     Author: Analia Deleon DO Service: -- Author Type: Physician    Filed: 10/18/2019  2:56 PM Encounter Date: 10/16/2019 Status: Signed    : Analia Deleon DO (Physician)    Addended by: ANALIA DELEON on: 10/18/2019 02:56 PM        Modules accepted: Orders

## 2021-07-03 NOTE — ADDENDUM NOTE
Addendum Note by Analia Deleon DO at 8/8/2019 11:05 AM     Author: Analia Deleon DO Service: -- Author Type: Physician    Filed: 8/9/2019  7:02 AM Encounter Date: 8/8/2019 Status: Signed    : Analia Deleon DO (Physician)    Addended by: ANALIA DELEON on: 8/9/2019 07:02 AM        Modules accepted: Orders

## 2021-07-03 NOTE — ADDENDUM NOTE
Addendum Note by Gavino Middleton MD at 12/6/2017 12:11 PM     Author: Gavino Middleton MD Service: -- Author Type: Physician    Filed: 12/6/2017 12:11 PM Encounter Date: 12/6/2017 Status: Signed    : Gavino Middleton MD (Physician)    Addended by: GAVINO MIDDLETON on: 12/6/2017 12:11 PM        Modules accepted: Orders

## 2021-07-03 NOTE — ADDENDUM NOTE
Addendum Note by Analia Deleon DO at 6/5/2019  9:50 AM     Author: Analia Deleon DO Service: -- Author Type: Physician    Filed: 6/7/2019  7:12 AM Encounter Date: 6/5/2019 Status: Signed    : Analia Deleon DO (Physician)    Addended by: ANALIA DELEON on: 6/7/2019 07:12 AM        Modules accepted: Orders

## 2021-07-03 NOTE — ADDENDUM NOTE
Addendum Note by Ronna Begum MD at 9/7/2018  2:10 PM     Author: Ronna Begum MD Service: -- Author Type: Physician    Filed: 9/7/2018  2:10 PM Encounter Date: 9/1/2018 Status: Signed    : Ronna Begum MD (Physician)    Addended by: RONNA BEGUM on: 9/7/2018 02:10 PM        Modules accepted: Orders

## 2021-07-03 NOTE — ADDENDUM NOTE
Addendum Note by Louis Ang MD at 4/18/2019  9:30 AM     Author: Louis Ang MD Service: -- Author Type: Physician    Filed: 4/18/2019 12:15 PM Encounter Date: 4/18/2019 Status: Signed    : Louis Ang MD (Physician)    Addended by: LOUIS ANG on: 4/18/2019 12:15 PM        Modules accepted: Orders

## 2021-07-03 NOTE — ADDENDUM NOTE
Addendum Note by Vijay Crespo LPN at 9/7/2018  1:25 PM     Author: Vijay Crespo LPN Service: -- Author Type: Licensed Nurse    Filed: 9/7/2018  1:25 PM Encounter Date: 9/1/2018 Status: Signed    : Vijay Crespo LPN (Licensed Nurse)    Addended by: VIJAY CRESPO on: 9/7/2018 01:25 PM        Modules accepted: Orders

## 2021-07-03 NOTE — ADDENDUM NOTE
Addendum Note by Analia Deleon DO at 11/27/2019 10:40 AM     Author: Analia Deleon DO Service: -- Author Type: Physician    Filed: 11/29/2019  1:16 PM Encounter Date: 11/27/2019 Status: Signed    : Analia Deleon DO (Physician)    Addended by: ANALIA DELEON on: 11/29/2019 01:16 PM        Modules accepted: Orders

## 2021-07-14 PROBLEM — N17.9 AKI (ACUTE KIDNEY INJURY) (H): Status: RESOLVED | Noted: 2019-03-28 | Resolved: 2019-10-02

## 2021-07-21 ENCOUNTER — RECORDS - HEALTHEAST (OUTPATIENT)
Dept: ADMINISTRATIVE | Facility: CLINIC | Age: 71
End: 2021-07-21

## 2021-08-03 PROBLEM — R65.21 SEPTIC SHOCK (H): Status: RESOLVED | Noted: 2019-03-28 | Resolved: 2019-08-08

## 2021-08-03 PROBLEM — A41.9 SEPSIS (H): Status: RESOLVED | Noted: 2019-07-28 | Resolved: 2019-08-08

## 2021-08-03 PROBLEM — A41.9 SEVERE SEPSIS (H): Status: RESOLVED | Noted: 2019-10-07 | Resolved: 2019-11-27

## 2021-08-03 PROBLEM — A41.9 SEPTIC SHOCK (H): Status: RESOLVED | Noted: 2019-03-28 | Resolved: 2019-08-08

## 2021-08-03 PROBLEM — R65.20 SEVERE SEPSIS (H): Status: RESOLVED | Noted: 2019-10-07 | Resolved: 2019-11-27

## 2021-08-03 PROBLEM — J86.9 EMPYEMA (H): Status: RESOLVED | Noted: 2019-03-27 | Resolved: 2019-10-02

## 2021-08-11 ENCOUNTER — TELEPHONE (OUTPATIENT)
Dept: INTERNAL MEDICINE | Facility: CLINIC | Age: 71
End: 2021-08-11

## 2021-08-11 DIAGNOSIS — J42 CHRONIC BRONCHITIS, UNSPECIFIED CHRONIC BRONCHITIS TYPE (H): ICD-10-CM

## 2021-08-11 DIAGNOSIS — E78.5 HYPERLIPIDEMIA LDL GOAL <100: ICD-10-CM

## 2021-08-11 DIAGNOSIS — K21.9 GASTROESOPHAGEAL REFLUX DISEASE WITHOUT ESOPHAGITIS: ICD-10-CM

## 2021-08-11 DIAGNOSIS — F41.1 GAD (GENERALIZED ANXIETY DISORDER): Primary | ICD-10-CM

## 2021-08-11 DIAGNOSIS — E03.9 ACQUIRED HYPOTHYROIDISM: ICD-10-CM

## 2021-08-11 NOTE — TELEPHONE ENCOUNTER
Reason for Call:  Other call back    Detailed comments: Need to transfer the following medications to:  [facility] - pt is doing a study sponsored by [facility]    1.  Anora Elipta  62.5  2.  Citalopram 10mg  3.  Levothyrzine 50mg  4.  Famotidine 20mg  5.  Simvastatin 10mg    [facility] - patient will call back with fax number    Phone Number Patient can be reached at: Cell number on file:    Telephone Information:   Mobile [phone]   Mobile [phone]       Best Time: anytime    Can we leave a detailed message on this number? YES    Call taken on 8/11/2021 at 3:11 PM by Kristan Mireles

## 2021-08-12 RX ORDER — LEVOTHYROXINE SODIUM 50 UG/1
TABLET ORAL
Qty: 24 TABLET | Refills: 3 | Status: SHIPPED | OUTPATIENT
Start: 2021-08-12 | End: 2022-03-28

## 2021-08-12 RX ORDER — SIMVASTATIN 10 MG
10 TABLET ORAL
COMMUNITY
Start: 2020-08-31 | End: 2021-08-12

## 2021-08-12 RX ORDER — SIMVASTATIN 10 MG
10 TABLET ORAL AT BEDTIME
Qty: 90 TABLET | Refills: 3 | Status: SHIPPED | OUTPATIENT
Start: 2021-08-12 | End: 2022-08-02

## 2021-08-12 RX ORDER — FAMOTIDINE 20 MG/1
20 TABLET, FILM COATED ORAL
COMMUNITY
Start: 2020-08-31 | End: 2021-08-12

## 2021-08-12 RX ORDER — LEVOTHYROXINE SODIUM 50 UG/1
TABLET ORAL
COMMUNITY
Start: 2021-03-07 | End: 2021-08-12

## 2021-08-12 RX ORDER — FAMOTIDINE 20 MG/1
20 TABLET, FILM COATED ORAL DAILY
Qty: 90 TABLET | Refills: 3 | Status: SHIPPED | OUTPATIENT
Start: 2021-08-12 | End: 2022-08-02

## 2021-08-12 RX ORDER — CITALOPRAM HYDROBROMIDE 10 MG/1
10 TABLET ORAL DAILY
Qty: 90 TABLET | Refills: 3 | Status: SHIPPED | OUTPATIENT
Start: 2021-08-12 | End: 2021-12-23

## 2021-09-22 ENCOUNTER — OFFICE VISIT (OUTPATIENT)
Dept: INTERNAL MEDICINE | Facility: CLINIC | Age: 71
End: 2021-09-22
Payer: MEDICARE

## 2021-09-22 VITALS
BODY MASS INDEX: 31.26 KG/M2 | WEIGHT: 185 LBS | OXYGEN SATURATION: 96 % | DIASTOLIC BLOOD PRESSURE: 70 MMHG | HEART RATE: 80 BPM | SYSTOLIC BLOOD PRESSURE: 100 MMHG

## 2021-09-22 DIAGNOSIS — R25.2 MUSCLE CRAMPING: Primary | ICD-10-CM

## 2021-09-22 DIAGNOSIS — F10.21 HISTORY OF ALCOHOLISM (H): ICD-10-CM

## 2021-09-22 DIAGNOSIS — E03.9 ACQUIRED HYPOTHYROIDISM: ICD-10-CM

## 2021-09-22 DIAGNOSIS — Z13.220 SCREENING FOR HYPERLIPIDEMIA: ICD-10-CM

## 2021-09-22 DIAGNOSIS — E13.9 DIABETES 1.5, MANAGED AS TYPE 2 (H): ICD-10-CM

## 2021-09-22 LAB
ALBUMIN SERPL-MCNC: 4 G/DL (ref 3.5–5)
ALP SERPL-CCNC: 95 U/L (ref 45–120)
ALT SERPL W P-5'-P-CCNC: 14 U/L (ref 0–45)
ANION GAP SERPL CALCULATED.3IONS-SCNC: 12 MMOL/L (ref 5–18)
AST SERPL W P-5'-P-CCNC: 20 U/L (ref 0–40)
BILIRUB SERPL-MCNC: 0.7 MG/DL (ref 0–1)
BUN SERPL-MCNC: 23 MG/DL (ref 8–28)
CALCIUM SERPL-MCNC: 10.1 MG/DL (ref 8.5–10.5)
CHLORIDE BLD-SCNC: 104 MMOL/L (ref 98–107)
CO2 SERPL-SCNC: 25 MMOL/L (ref 22–31)
CREAT SERPL-MCNC: 1.37 MG/DL (ref 0.6–1.1)
CREAT UR-MCNC: 130 MG/DL
GFR SERPL CREATININE-BSD FRML MDRD: 39 ML/MIN/1.73M2
GLUCOSE BLD-MCNC: 114 MG/DL (ref 70–125)
HBA1C MFR BLD: 6.6 % (ref 0–5.6)
MAGNESIUM SERPL-MCNC: 2.1 MG/DL (ref 1.8–2.6)
MICROALBUMIN UR-MCNC: 1.72 MG/DL (ref 0–1.99)
MICROALBUMIN/CREAT UR: 13.2 MG/G CR
POTASSIUM BLD-SCNC: 5.7 MMOL/L (ref 3.5–5)
PROT SERPL-MCNC: 7.5 G/DL (ref 6–8)
SODIUM SERPL-SCNC: 141 MMOL/L (ref 136–145)
T4 FREE SERPL-MCNC: 1.14 NG/DL (ref 0.7–1.8)
TSH SERPL DL<=0.005 MIU/L-ACNC: 0.75 UIU/ML (ref 0.3–5)

## 2021-09-22 PROCEDURE — 84439 ASSAY OF FREE THYROXINE: CPT | Performed by: INTERNAL MEDICINE

## 2021-09-22 PROCEDURE — 83036 HEMOGLOBIN GLYCOSYLATED A1C: CPT | Performed by: INTERNAL MEDICINE

## 2021-09-22 PROCEDURE — 99214 OFFICE O/P EST MOD 30 MIN: CPT | Performed by: INTERNAL MEDICINE

## 2021-09-22 PROCEDURE — 82043 UR ALBUMIN QUANTITATIVE: CPT | Performed by: INTERNAL MEDICINE

## 2021-09-22 PROCEDURE — 84443 ASSAY THYROID STIM HORMONE: CPT | Performed by: INTERNAL MEDICINE

## 2021-09-22 PROCEDURE — 80053 COMPREHEN METABOLIC PANEL: CPT | Performed by: INTERNAL MEDICINE

## 2021-09-22 PROCEDURE — 36415 COLL VENOUS BLD VENIPUNCTURE: CPT | Performed by: INTERNAL MEDICINE

## 2021-09-22 PROCEDURE — 83735 ASSAY OF MAGNESIUM: CPT | Performed by: INTERNAL MEDICINE

## 2021-09-22 RX ORDER — DULAGLUTIDE 1.5 MG/.5ML
INJECTION, SOLUTION SUBCUTANEOUS
COMMUNITY
Start: 2021-08-27 | End: 2021-11-04

## 2021-09-22 RX ORDER — LEVOTHYROXINE SODIUM 75 UG/1
TABLET ORAL
COMMUNITY
Start: 2021-09-16 | End: 2022-02-08

## 2021-09-22 RX ORDER — BUDESONIDE 180 UG/1
AEROSOL, POWDER RESPIRATORY (INHALATION)
COMMUNITY
Start: 2021-08-19 | End: 2021-12-20

## 2021-09-22 ASSESSMENT — PATIENT HEALTH QUESTIONNAIRE - PHQ9: SUM OF ALL RESPONSES TO PHQ QUESTIONS 1-9: 7

## 2021-09-22 NOTE — PATIENT INSTRUCTIONS
1. Take one tums daily, and one magnesium tablet    2. Lab testing today.     3. Follow up in 6 months.     4. No changes in medications.

## 2021-09-22 NOTE — LETTER
September 28, 2021      Rita Mancini  [facility] [facility]   [place] [address]        Dear [facility],    We are writing to inform you of your test results.    Your tests are all satisfactory.  The minor abnormalities are not significant.     Resulted Orders   Albumin Random Urine Quantitative with Creat Ratio   Result Value Ref Range    Microalbumin Urine mg/dL 1.72 0.00 - 1.99 mg/dL    Creatinine Urine mg/dL 130 mg/dL    Microalbumin Urine mg/g Cr 13.2 <=19.9 mg/g Cr    Narrative    Microalbumin, Random Urine   <2.0 mg/dL . . . . . . . . Normal   3.0-30.0 mg/dL . . . . . . Microalbuminuria   >30.0 mg/dL . . . . . .  . Clinical Proteinuria     Microalbumin/Creatinine Ratio, Random Urine   <20 mg/g . . . . .. . . . Normal    mg/g . . . . . . . Microalbuminuria   >300 mg/g . . . . . . . . Clinical Proteinuria   HEMOGLOBIN A1C   Result Value Ref Range    Hemoglobin A1C 6.6 (H) 0.0 - 5.6 %      Comment:      Normal <5.7%   Prediabetes 5.7-6.4%    Diabetes 6.5% or higher     Note: Adopted from ADA consensus guidelines.   Comprehensive metabolic panel   Result Value Ref Range    Sodium 141 136 - 145 mmol/L    Potassium 5.7 (H) 3.5 - 5.0 mmol/L    Chloride 104 98 - 107 mmol/L    Carbon Dioxide (CO2) 25 22 - 31 mmol/L    Anion Gap 12 5 - 18 mmol/L    Urea Nitrogen 23 8 - 28 mg/dL    Creatinine 1.37 (H) 0.60 - 1.10 mg/dL    Calcium 10.1 8.5 - 10.5 mg/dL    Glucose 114 70 - 125 mg/dL    Alkaline Phosphatase 95 45 - 120 U/L    AST 20 0 - 40 U/L    ALT 14 0 - 45 U/L    Protein Total 7.5 6.0 - 8.0 g/dL    Albumin 4.0 3.5 - 5.0 g/dL    Bilirubin Total 0.7 0.0 - 1.0 mg/dL    GFR Estimate 39 (L) >60 mL/min/1.73m2      Comment:      As of July 11, 2021, eGFR is calculated by the CKD-EPI creatinine equation, without race adjustment. eGFR can be influenced by muscle mass, exercise, and diet. The reported eGFR is an estimation only and is only applicable if the renal function is stable.   Magnesium   Result  Value Ref Range    Magnesium 2.1 1.8 - 2.6 mg/dL   T4, free   Result Value Ref Range    Free T4 1.14 0.70 - 1.80 ng/dL      Comment:      Performance of the Free T4 test has not been established with  specimens (<= 2 months of age).     TSH   Result Value Ref Range    TSH 0.75 0.30 - 5.00 uIU/mL       If you have any questions or concerns, please call the clinic at the number listed above.       Sincerely,      Michi Murillo MD

## 2021-09-22 NOTE — PROGRESS NOTES
ASSESSMENT AND PLAN:    1. Chronic kidney disease, stage 3  Will monitor.  Last creatinine 1.3    2. Diabetes 1.5, managed as type 2  Asymptomatic will do A1c.     4. History of alcoholism   Remote, sober many years    5. Muscle cramps  Diffuse, infrequent but intense.  Not on diurectic.  Will advise to take calcium and vitamin D, and magnesium, check chemistries.     Patient Instructions   1. Take one tums daily, and one magnesium tablet    2. Lab testing today.     3. Follow up in 6 months.     4. No changes in medications.     CHIEF COMPLAINT:  Follow up     HISTORY OF PRESENT ILLNESS:  Rita Mancini is a 71 year old female in follow up. She has episodes of intense, brief muscle spasms, usually in LE, but at times in neck, and chest sides.  No focal weakness or numbness.  No new gait disorder, or dizziness or imbalance, no headache.  Overall feels well.      REVIEW OF SYSTEMS:   See HPI, all other systems on review are negative.    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, subsequent encounter 04/08/2019     Pleural effusion 01/01/2015     Sepsis due to Escherichia coli with acute renal failure without septic shock, unspecified acute renal failure type (H)      Sleep apnea 01/01/2010    does not use cpap     History   Smoking Status     Former Smoker     Packs/day: 1.50     Years: 0.00     Quit  date: 11/9/2008   Smokeless Tobacco     Former User     Family History   Problem Relation Age of Onset     Heart Disease Mother      Hypertension Mother      Alcoholism Mother      Depression Mother      Heart Disease Father 45        mi age 45, cabg     Coronary Artery Disease Father      Cancer Father         prostate     Hypertension Father      Snoring Father      Alcoholism Sister      Chronic Obstructive Pulmonary Disease Brother      Alcoholism Brother      Obesity Daughter      Obesity Daughter      Diabetes Son      Anxiety Disorder Son      Depression Son      Post-Traumatic Stress Disorder (PTSD) Son      Cancer Maternal Aunt 47        breast     Breast Cancer Paternal Aunt      Leukemia Grandchild      Schizophrenia Grandchild      Past Surgical History:   Procedure Laterality Date     IR MISCELLANEOUS PROCEDURE  12/10/2009     PORTACATH PLACEMENT      and removal     THORACOSCOPY Right 4/6/2015    Procedure: RIGHT THORACOSCOPY / BIOPSY PLEURAL / TALC PLEURODESIS;  Surgeon: Michi Ma MD;  Location: [facility] OR;  Service:      THORACOSCOPY Left 3/29/2019    Procedure: LEFT THORACOSCOPY WITH DECORTICATION;  Surgeon: Michi Ma MD;  Location: [facility] OR;  Service: General     TONSILLECTOMY  age 5     URETERAL STENT PLACEMENT Right 6/2010      THORACENTESIS  3/27/2019     VITALS:  Vitals:    09/22/21 1420   BP: 100/70   BP Location: Left arm   Patient Position: Sitting   Cuff Size: Adult Large   Pulse: 80   SpO2: 96%   Weight: 83.9 kg (185 lb)     Wt Readings from Last 3 Encounters:   09/22/21 83.9 kg (185 lb)   03/18/21 86.1 kg (189 lb 12.8 oz)   02/18/21 87.1 kg (192 lb 1.6 oz)     PHYSICAL EXAM:  Constitutional:  In NAD, alert and oriented  Neck: no cervical or axillary adenopathy  Cardiac:  S1 S2   Lungs: Clear   Abdomen:   Soft, flat and non-tender, without guarding, rebound, or mass.    Extremities: no joint effusion, no significant edema  Neurologic:  Speech clear, no  arm or leg weakness, gait normal     Psychiatric:  Mood and behavior appropriate, thinking is clear.     DECISION TO OBTAIN OLD RECORDS AND/OR OBTAIN HISTORY FROM SOMEONE OTHER THAN PATIENT, AND/OR ACCESSING CARE EVERYWHERE):  1  0     REVIEW AND SUMMARIZATION OF OLD RECORDS, AND/OR OBTAINING HISTORY FROM SOMEONE OTHER THAN PATIENT, AND/OR DISCUSSION OF CASE WITH ANOTHER HEALTH CARE PROVIDER:  2 reviewed past health evaluation    REVIEW AND/OR ORDER OF OF CLINICAL LAB TESTS: 1  ordered.    REVIEW AND/OR ORDER OF RADIOLOGY TESTS: 1 0.    REVIEW AND/OR ORDER OF MEDICAL TESTS (EKG/ECHO/COLONOSCOPY/EGD): 1  0    INDEPENDENT  VISUALIZATION OF IMAGE, TRACING, OR SPECIMEN ITSELF (2 EACH):  0     TOTAL: 2    Current Outpatient Medications   Medication Sig Dispense Refill     acetaminophen (TYLENOL) 500 MG tablet Take 1-2 tablets (500-1,000 mg) by mouth every 6 hours as needed for mild pain 100 tablet 0     albuterol (PROAIR HFA/PROVENTIL HFA/VENTOLIN HFA) 108 (90 Base) MCG/ACT inhaler Inhale 2 puffs into the lungs every 6 hours       citalopram (CELEXA) 10 MG tablet Take 1 tablet (10 mg) by mouth daily 90 tablet 3     famotidine (PEPCID) 20 MG tablet Take 1 tablet (20 mg) by mouth daily 90 tablet 3     levothyroxine (SYNTHROID/LEVOTHROID) 50 MCG tablet TAKE ON EMPTY STOMACH EVERY MONDAY AND FRIDAY (SEE OTHER DOSE FOR REMAINING DAYS OF WEEK) 24 tablet 3     montelukast (SINGULAIR) 10 MG tablet Take 10 mg by mouth At Bedtime       multivitamin w/minerals (MULTI-VITAMIN) tablet Take 1 tablet by mouth daily       saccharomyces boulardii (FLORASTOR) 250 MG capsule Take 250 mg by mouth 2 times daily       simvastatin (ZOCOR) 10 MG tablet Take 1 tablet (10 mg) by mouth At Bedtime 90 tablet 3     umeclidinium-vilanterol (ANORO ELLIPTA) 62.5-25 MCG/INH oral inhaler TAKE 1 PUFF BY MOUTH EVERY DAY 90 each 3     UNKNOWN TO PATIENT        levothyroxine (SYNTHROID/LEVOTHROID) 75 MCG tablet TAKE 1 TAB BY MOUTH ON TUE WED THR SAT SUN        PULMICORT FLEXHALER 180 MCG/ACT inhaler INHALE 2 PUFFS BY MOUTH TWICE A DAY       TRULICITY 1.5 MG/0.5ML pen INJECT 0.5ML(1.5MG) SUBCUTANEOUSLY ONCE WEEKLY       Michi Murillo MD  Internal Medicine  [facility]

## 2021-10-11 ENCOUNTER — TELEPHONE (OUTPATIENT)
Dept: INTERNAL MEDICINE | Facility: CLINIC | Age: 71
End: 2021-10-11

## 2021-10-11 DIAGNOSIS — J44.9 CHRONIC OBSTRUCTIVE PULMONARY DISEASE, UNSPECIFIED COPD TYPE (H): Primary | ICD-10-CM

## 2021-10-11 RX ORDER — MONTELUKAST SODIUM 10 MG/1
10 TABLET ORAL AT BEDTIME
Qty: 90 TABLET | Refills: 3 | Status: SHIPPED | OUTPATIENT
Start: 2021-10-11 | End: 2021-10-14

## 2021-10-11 NOTE — TELEPHONE ENCOUNTER
I have signed the prescription for local print.  Not sure where it printed.  Apparently, it is to be faxed.  Dr. Lidia MD

## 2021-10-11 NOTE — TELEPHONE ENCOUNTER
Reason for Call:  Other call back    Detailed comments: Checking on the status for a hard copy to be sent to pharmacy for:  Montelukast, this should be sent to [facility]  Fax:  [phone]    Phone Number Patient can be reached at: Other phone number:  [phone]    Best Time: anytime    Can we leave a detailed message on this number? YES    Call taken on 10/11/2021 at 10:09 AM by Kristan Mireles

## 2021-10-14 DIAGNOSIS — J44.9 CHRONIC OBSTRUCTIVE PULMONARY DISEASE, UNSPECIFIED COPD TYPE (H): ICD-10-CM

## 2021-10-14 RX ORDER — MONTELUKAST SODIUM 10 MG/1
10 TABLET ORAL AT BEDTIME
Qty: 90 TABLET | Refills: 3 | Status: SHIPPED | OUTPATIENT
Start: 2021-10-14 | End: 2022-10-12

## 2021-10-18 ENCOUNTER — TRANSFERRED RECORDS (OUTPATIENT)
Dept: HEALTH INFORMATION MANAGEMENT | Facility: CLINIC | Age: 71
End: 2021-10-18

## 2021-10-18 LAB — RETINOPATHY: NEGATIVE

## 2021-11-04 ENCOUNTER — TELEPHONE (OUTPATIENT)
Dept: INTERNAL MEDICINE | Facility: CLINIC | Age: 71
End: 2021-11-04

## 2021-11-04 DIAGNOSIS — E13.9 DIABETES 1.5, MANAGED AS TYPE 2 (H): Primary | ICD-10-CM

## 2021-11-04 RX ORDER — DULAGLUTIDE 1.5 MG/.5ML
1.5 INJECTION, SOLUTION SUBCUTANEOUS
Qty: 3 ML | Refills: 0 | Status: SHIPPED | OUTPATIENT
Start: 2021-11-04 | End: 2021-12-20

## 2021-11-04 NOTE — TELEPHONE ENCOUNTER
Reason for Call:  Other call back    Detailed comments: Pt very low on medication for the:  Trulicity 1.5mg/0.5ml pen -- needs a 90 day supply    Pharm:  [facility] [address]    Phone Number Patient can be reached at: Home number on file [phone] (home)    Best Time: anytime    Can we leave a detailed message on this number? YES    Call taken on 11/4/2021 at 9:33 AM by Kristan Mireles

## 2021-12-06 ENCOUNTER — OFFICE VISIT (OUTPATIENT)
Dept: INTERNAL MEDICINE | Facility: CLINIC | Age: 71
End: 2021-12-06
Payer: MEDICARE

## 2021-12-06 VITALS
WEIGHT: 185.2 LBS | HEART RATE: 78 BPM | DIASTOLIC BLOOD PRESSURE: 60 MMHG | SYSTOLIC BLOOD PRESSURE: 102 MMHG | HEIGHT: 65 IN | BODY MASS INDEX: 30.86 KG/M2

## 2021-12-06 DIAGNOSIS — Z01.818 PREOPERATIVE EXAMINATION: ICD-10-CM

## 2021-12-06 DIAGNOSIS — H25.013 CORTICAL AGE-RELATED CATARACT OF BOTH EYES: ICD-10-CM

## 2021-12-06 PROBLEM — N13.39 OTHER HYDRONEPHROSIS: Status: ACTIVE | Noted: 2019-07-30

## 2021-12-06 PROBLEM — H25.9 AGE-RELATED CATARACT: Status: ACTIVE | Noted: 2021-12-06

## 2021-12-06 PROCEDURE — 99213 OFFICE O/P EST LOW 20 MIN: CPT | Performed by: INTERNAL MEDICINE

## 2021-12-06 RX ORDER — MELATONIN 10 MG
CAPSULE ORAL
COMMUNITY

## 2021-12-06 RX ORDER — PREDNISOLONE ACETATE 10 MG/ML
SUSPENSION/ DROPS OPHTHALMIC
COMMUNITY
Start: 2021-10-28 | End: 2022-08-09

## 2021-12-06 RX ORDER — CALCIUM CARBONATE 500 MG/1
1 TABLET, CHEWABLE ORAL DAILY
COMMUNITY

## 2021-12-06 RX ORDER — TRIAMCINOLONE ACETONIDE 1 MG/G
CREAM TOPICAL 2 TIMES DAILY
COMMUNITY

## 2021-12-06 RX ORDER — CETIRIZINE HYDROCHLORIDE 10 MG/1
10 TABLET, CHEWABLE ORAL DAILY
COMMUNITY

## 2021-12-06 RX ORDER — FLUTICASONE PROPIONATE 50 MCG
SPRAY, SUSPENSION (ML) NASAL
COMMUNITY
Start: 2021-12-04 | End: 2022-10-15

## 2021-12-06 ASSESSMENT — MIFFLIN-ST. JEOR: SCORE: 1350.32

## 2021-12-06 NOTE — PROGRESS NOTES
[facility]  [address] [address]  Phone: [phone]  Fax: [phone]  Primary Provider: Kj Ball  Pre-op Performing Provider: KJ BALL    PREOPERATIVE EVALUATION:  Today's date: 12/6/2021    Rita Mancini is a 71 year old female who presents for a preoperative evaluation.    Surgical Information:  Surgery/Procedure: cataract surgery  Surgery Location: [facility]  Surgeon: Dr. Jones   Surgery Date: 12//16/21 and 12/30/21  Time of Surgery: am  Where patient plans to recover: At home with family  Fax number for surgical facility: [phone]    Type of Anesthesia Anticipated: to be determined    Assessment & Plan     The proposed surgical procedure is considered LOW risk.    Cortical age-related cataract of both eyes    Preoperative Evaluation.     Symptomatic. She has consented to cataract extraction.  She is medically stable, without COPD exacerbation.  No history of recurrent lung cancer.  I find no contraindication to having cataract extraction and I recommend proceeding as planned.      RECOMMENDATION:  APPROVAL GIVEN to proceed with proposed procedure, without further diagnostic evaluation.  [id number]}  Subjective     HPI related to upcoming procedure:  She feels well overall.  Chronic non-productive cough due to bronchiectasis,  is not changed. She has symptomatic cataracts.  No unusual dyspnea. Denies bowel or bladder issues.      Preop Questions 12/6/2021   1. Have you ever had a heart attack or stroke? No   2. Have you ever had surgery on your heart or blood vessels, such as a stent placement, a coronary artery bypass, or surgery on an artery in your head, neck, heart, or legs? No   3. Do you have chest pain with activity? No   4. Do you have a history of  heart failure? No   5. Do you currently have a cold, bronchitis or symptoms of other infection? No   6. Do you have a cough, shortness of  breath, or wheezing? YES   7. Do you or anyone in your family have previous history of blood clots? No   8. Do you or does anyone in your family have a serious bleeding problem such as prolonged bleeding following surgeries or cuts? No   9. Have you ever had problems with anemia or been told to take iron pills? YES   10. Have you had any abnormal blood loss such as black, tarry or bloody stools, or abnormal vaginal bleeding? No   11. Have you ever had a blood transfusion? No   12. Are you willing to have a blood transfusion if it is medically needed before, during, or after your surgery? Yes   13. Have you or any of your relatives ever had problems with anesthesia? No   14. Do you have sleep apnea, excessive snoring or daytime drowsiness? YES    14a. Do you have a CPAP machine? Yes   15. Do you have any artifical heart valves or other implanted medical devices like a pacemaker, defibrillator, or continuous glucose monitor? No   16. Do you have artificial joints? No   17. Are you allergic to latex? YES   5  Review of Systems  See HPI    Patient Active Problem List    Diagnosis Date Noted     History of alcoholism (H)      Priority: Medium     Chronic kidney disease, stage 3 (H) 02/18/2021     Priority: Medium     History of gram negative sepsis 11/19/2020     Priority: Medium     Non-small cell cancer of right lung, s/p radiation and chemotherapies, in remission since [id number] (H)      Priority: Medium     Created by Conversion         Diabetes 1.5, managed as type 2 (H)      Priority: Medium     Created by Conversion         COPD (chronic obstructive pulmonary disease) (H)      Priority: Medium     Created by Conversion         MONSERRAT on CPAP      Priority: Medium     Created by Conversion         Lymph node enlargement 08/08/2019     Priority: Medium     Paratracheal lymph node         Hydronephrosis, right 07/30/2019     Priority: Medium     Episode of recurrent major depressive disorder (H)      Priority: Medium      Created by Conversion         Hypothyroidism      Priority: Medium     Created by Conversion  Replacement Utility updated for latest IMO load         Dyslipidemia      Priority: Medium     Created by Conversion         Anemia 05/06/2019     Priority: Medium     Current moderate episode of major depressive disorder (H) 04/24/2019     Priority: Medium     Gastroesophageal reflux disease without esophagitis 04/24/2019     Priority: Medium     Ventral hernia without obstruction or gangrene 02/06/2019     Priority: Medium     Keratosis obturans of external ear canal, right 01/24/2018     Priority: Medium     Eczema of both hands 10/24/2017     Priority: Medium     Diverticulosis      Priority: Medium     Created by Conversion  [facility] Annotation: Feb 24 2011  9:18AM - Maritza Ramos: Per   Colonoscopy   2/2011.  Replacement Utility updated for latest IMO load         Bronchiectasis (H) 12/01/2015     Priority: Medium     Chronic pleural effusion, left (s/p Talc pleurodesis 2015) 01/15/2015     Priority: Medium      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, subsequent encounter 04/08/2019     Pleural effusion 01/01/2015     Sepsis due to Escherichia coli with acute renal failure without septic shock, unspecified acute renal failure type (H)      Sleep apnea 01/01/2010    does not use cpap     Past Surgical  History:   Procedure Laterality Date     IR MISCELLANEOUS PROCEDURE  12/10/2009     PORTACATH PLACEMENT      and removal     THORACOSCOPY Right 4/6/2015    Procedure: RIGHT THORACOSCOPY / BIOPSY PLEURAL / TALC PLEURODESIS;  Surgeon: Michi Ma MD;  Location: [facility];  Service:      THORACOSCOPY Left 3/29/2019    Procedure: LEFT THORACOSCOPY WITH DECORTICATION;  Surgeon: Michi Ma MD;  Location: [facility] OR;  Service: General     TONSILLECTOMY  age 5     URETERAL STENT PLACEMENT Right 6/2010     US THORACENTESIS  3/27/2019     Current Outpatient Medications   Medication Sig Dispense Refill     acetaminophen (TYLENOL) 500 MG tablet Take 1-2 tablets (500-1,000 mg) by mouth every 6 hours as needed for mild pain 100 tablet 0     albuterol (PROAIR HFA/PROVENTIL HFA/VENTOLIN HFA) 108 (90 Base) MCG/ACT inhaler Inhale 2 puffs into the lungs every 6 hours       calcium carbonate (TUMS) 500 MG chewable tablet Take 1 chew tab by mouth daily       cetirizine (ZYRTEC) 10 MG CHEW Take 10 mg by mouth daily       citalopram (CELEXA) 10 MG tablet Take 1 tablet (10 mg) by mouth daily 90 tablet 3     famotidine (PEPCID) 20 MG tablet Take 1 tablet (20 mg) by mouth daily 90 tablet 3     fluticasone (FLONASE) 50 MCG/ACT nasal spray        levothyroxine (SYNTHROID/LEVOTHROID) 50 MCG tablet TAKE ON EMPTY STOMACH EVERY MONDAY AND FRIDAY (SEE OTHER DOSE FOR REMAINING DAYS OF WEEK) 24 tablet 3     levothyroxine (SYNTHROID/LEVOTHROID) 75 MCG tablet TAKE 1 TAB BY MOUTH ON TUE WED THR SAT SUN       Melatonin 10 MG CAPS        montelukast (SINGULAIR) 10 MG tablet Take 1 tablet (10 mg) by mouth At Bedtime 90 tablet 3     multivitamin w/minerals (MULTI-VITAMIN) tablet Take 1 tablet by mouth daily       PULMICORT FLEXHALER 180 MCG/ACT inhaler INHALE 2 PUFFS BY MOUTH TWICE A DAY       simvastatin (ZOCOR) 10 MG tablet Take 1 tablet (10 mg) by mouth At Bedtime 90 tablet 3     triamcinolone (KENALOG) 0.1 % external cream  "Apply topically 2 times daily       TRULICITY 1.5 MG/0.5ML pen Inject 1.5 mg Subcutaneous every 7 days 3 mL 0     umeclidinium-vilanterol (ANORO ELLIPTA) 62.5-25 MCG/INH oral inhaler TAKE 1 PUFF BY MOUTH EVERY DAY 90 each 3     prednisoLONE acetate (PRED FORTE) 1 % ophthalmic suspension        Allergies   Allergen Reactions     Seasonal Allergies       Social History     Tobacco Use     Smoking status: Former Smoker     Packs/day: 1.50     Years: 0.00     Pack years: 0.00     Quit date: 2008     Years since quittin.0     Smokeless tobacco: Former User   Substance Use Topics     Alcohol use: No     Family History   Problem Relation Age of Onset     Heart Disease Mother      Hypertension Mother      Alcoholism Mother      Depression Mother      Heart Disease Father 45        mi age 45, cabg     Coronary Artery Disease Father      Cancer Father         prostate     Hypertension Father      Snoring Father      Alcoholism Sister      Chronic Obstructive Pulmonary Disease Brother      Alcoholism Brother      Obesity Daughter      Obesity Daughter      Diabetes Son      Anxiety Disorder Son      Depression Son      Post-Traumatic Stress Disorder (PTSD) Son      Cancer Maternal Aunt 47        breast     Breast Cancer Paternal Aunt      Leukemia Grandchild      Schizophrenia Grandchild      History   Drug Use No     Objective     PHYSICAL EXAM:  General Appearance: In no acute distress  /60 (BP Location: Right arm, Patient Position: Left side, Cuff Size: Adult Regular)   Pulse 78   Ht 1.65 m (5' 4.96\")   Wt 84 kg (185 lb 3.2 oz)   BMI 30.86 kg/m    NECK:  without cervical or axillary adenopathy  RESPIRATORY:  A few bilateral dry crackles, no wheezes or rales  CARDIOVASCULAR: S1, S2  ABDOMEN: soft, flat, and non-tender  EXTREMITIES: No joint swelling, no ulcer or edema  NEUROLOGIC: No arm or leg  weakness, speech is clear, gait is normal    Revised Cardiac Risk Index (RCRI):  The patient has the following " serious cardiovascular risks for perioperative complications:   - No serious cardiac risks = 0 points     RCRI Interpretation: 0 points: Class I (very low risk - 0.4% complication rate)    Signed Electronically by: Michi Murillo MD  Copy of this evaluation report is provided to requesting physician.

## 2021-12-17 DIAGNOSIS — E13.9 DIABETES 1.5, MANAGED AS TYPE 2 (H): ICD-10-CM

## 2021-12-20 DIAGNOSIS — J44.9 CHRONIC OBSTRUCTIVE PULMONARY DISEASE, UNSPECIFIED COPD TYPE (H): Primary | ICD-10-CM

## 2021-12-20 RX ORDER — DULAGLUTIDE 1.5 MG/.5ML
INJECTION, SOLUTION SUBCUTANEOUS
Qty: 3 ML | Refills: 3 | Status: SHIPPED | OUTPATIENT
Start: 2021-12-20 | End: 2022-04-27

## 2021-12-20 NOTE — TELEPHONE ENCOUNTER
"Routing refill request to provider for review/approval because:  Labs out of range:  Creatinine     Last Written Prescription Date:  11/4/21  Last Fill Quantity: 3 ml,  # refills: 0   Last office visit provider:  12/6/21     Requested Prescriptions   Pending Prescriptions Disp Refills     TRULICITY 1.5 MG/0.5ML pen [Pharmacy Med Name: TRULICITY 1.5 MG/0.5 ML PEN]       Sig: INJECT 1.5 MG SUBCUTANEOUSLY EVERY 7 DAYS       GLP-1 Agonists Protocol Failed - 12/17/2021 11:06 AM        Failed - Normal serum creatinine on file in past 12 months     Recent Labs   Lab Test 09/22/21  1505   CR 1.37*       Ok to refill medication if creatinine is low          Passed - HgbA1C in past 3 or 6 months     If HgbA1C is 8 or greater, it needs to be on file within the past 3 months.  If less than 8, must be on file within the past 6 months.     Recent Labs   Lab Test 09/22/21  1504   A1C 6.6*             Passed - Medication is active on med list        Passed - Patient is age 18 or older        Passed - No active pregnancy on record        Passed - No positive pregnancy test in past 12 months        Passed - Recent (6 mo) or future (30 days) visit within the authorizing provider's specialty     Patient had office visit in the last 6 months or has a visit in the next 30 days with authorizing provider.  See \"Patient Info\" tab in inbasket, or \"Choose Columns\" in Meds & Orders section of the refill encounter.                 Junior Prince RN 12/20/21 11:39 AM  "

## 2021-12-21 DIAGNOSIS — F41.1 GAD (GENERALIZED ANXIETY DISORDER): ICD-10-CM

## 2021-12-22 RX ORDER — BUDESONIDE 180 UG/1
AEROSOL, POWDER RESPIRATORY (INHALATION)
Qty: 1 EACH | Refills: 11 | Status: SHIPPED | OUTPATIENT
Start: 2021-12-22 | End: 2022-12-27

## 2021-12-22 NOTE — TELEPHONE ENCOUNTER
"Routing refill request to provider for review/approval because:  Due to RN not being able to verify medication information in former E chart RN will route to provider to review and fill if appropriate    Last Written Prescription Date:  ???  Last Fill Quantity: ???,  # refills: ???   Last office visit provider:  12/6/21     Requested Prescriptions   Pending Prescriptions Disp Refills     PULMICORT FLEXHALER 180 MCG/ACT inhaler         Inhaled Steroids Protocol Passed - 12/20/2021 11:27 AM        Passed - Patient is age 12 or older        Passed - Recent (12 mo) or future (30 days) visit within the authorizing provider's specialty     Patient has had an office visit with the authorizing provider or a provider within the authorizing providers department within the previous 12 mos or has a future within next 30 days. See \"Patient Info\" tab in inbasket, or \"Choose Columns\" in Meds & Orders section of the refill encounter.              Passed - Medication is active on med list             Junior Prince RN 12/22/21 1:54 PM  "

## 2021-12-23 RX ORDER — CITALOPRAM HYDROBROMIDE 10 MG/1
10 TABLET ORAL DAILY
Qty: 90 TABLET | Refills: 1 | Status: SHIPPED | OUTPATIENT
Start: 2021-12-23 | End: 2022-08-02

## 2022-01-01 ENCOUNTER — HEALTH MAINTENANCE LETTER (OUTPATIENT)
Age: 72
End: 2022-01-01

## 2022-01-24 ENCOUNTER — TRANSFERRED RECORDS (OUTPATIENT)
Dept: HEALTH INFORMATION MANAGEMENT | Facility: CLINIC | Age: 72
End: 2022-01-24
Payer: COMMERCIAL

## 2022-01-24 LAB — RETINOPATHY: NEGATIVE

## 2022-01-31 ENCOUNTER — TELEPHONE (OUTPATIENT)
Dept: INTERNAL MEDICINE | Facility: CLINIC | Age: 72
End: 2022-01-31
Payer: COMMERCIAL

## 2022-01-31 DIAGNOSIS — N62 HYPERTROPHY OF BREAST: Primary | ICD-10-CM

## 2022-01-31 DIAGNOSIS — Z12.31 VISIT FOR SCREENING MAMMOGRAM: ICD-10-CM

## 2022-01-31 NOTE — TELEPHONE ENCOUNTER
Reason for Call: patient is calling to request an order for a 3D mammogram. States her left breast is larger, having some discomfort, feels martinez, and some tenderness under her arm    Detailed comments: would like to have it at [place], but if not able, will like to go to [facility].    Phone Number Patient can be reached at: Home number on file [phone] (home)    Best Time: any    Can we leave a detailed message on this number? YES    Call taken on 1/31/2022 at 1:04 PM by Laverne Mock

## 2022-02-03 ENCOUNTER — TRANSFERRED RECORDS (OUTPATIENT)
Dept: HEALTH INFORMATION MANAGEMENT | Facility: CLINIC | Age: 72
End: 2022-02-03
Payer: MEDICARE

## 2022-02-03 LAB — RETINOPATHY: NEGATIVE

## 2022-02-07 DIAGNOSIS — E03.9 ACQUIRED HYPOTHYROIDISM: Primary | ICD-10-CM

## 2022-02-07 NOTE — TELEPHONE ENCOUNTER
Reason for Call:  Medication or medication refill:    Do you use a [facility]?  Name of the pharmacy and phone number for the current request:  [email or web address]    Name of the medication requested:     levothyroxine (SYNTHROID/LEVOTHROID) 75 MCG tablet   9/16/2021  --   Sig: TAKE 1 TAB BY MOUTH ON TUE WED THR SAT SUN         Other request: have faxed 3 times and no response to [phone]    Can we leave a detailed message on this number? YES    Phone number patient can be reached at: Home number on file [phone] (home)    Best Time: any    Call taken on 2/7/2022 at 9:07 AM by Pam J. Behr

## 2022-02-08 RX ORDER — LEVOTHYROXINE SODIUM 75 UG/1
TABLET ORAL
Qty: 90 TABLET | Refills: 0 | Status: SHIPPED | OUTPATIENT
Start: 2022-02-08 | End: 2023-02-02

## 2022-02-16 ENCOUNTER — ANCILLARY PROCEDURE (OUTPATIENT)
Dept: MAMMOGRAPHY | Facility: CLINIC | Age: 72
End: 2022-02-16
Attending: INTERNAL MEDICINE
Payer: MEDICARE

## 2022-02-16 DIAGNOSIS — Z12.31 VISIT FOR SCREENING MAMMOGRAM: ICD-10-CM

## 2022-02-16 PROCEDURE — 77067 SCR MAMMO BI INCL CAD: CPT | Mod: TC | Performed by: RADIOLOGY

## 2022-03-17 DIAGNOSIS — E13.9 DIABETES 1.5, MANAGED AS TYPE 2 (H): Primary | ICD-10-CM

## 2022-04-01 ENCOUNTER — TELEPHONE (OUTPATIENT)
Dept: ONCOLOGY | Facility: HOSPITAL | Age: 72
End: 2022-04-01
Payer: MEDICARE

## 2022-04-11 DIAGNOSIS — E03.9 ACQUIRED HYPOTHYROIDISM: ICD-10-CM

## 2022-04-11 RX ORDER — LEVOTHYROXINE SODIUM 50 UG/1
TABLET ORAL
Qty: 24 TABLET | Refills: 3 | Status: SHIPPED | OUTPATIENT
Start: 2022-04-11 | End: 2023-03-21

## 2022-04-11 NOTE — TELEPHONE ENCOUNTER
Prescription approved per [facility] Refill Protocol.    Xu Jj RN  [facility]

## 2022-04-11 NOTE — TELEPHONE ENCOUNTER
Patient requesting to have medication attached to [facility].     Please don't use [facility] for this prescription, patient requested.    Call back   [phone]

## 2022-04-20 ENCOUNTER — TELEPHONE (OUTPATIENT)
Dept: INTERNAL MEDICINE | Facility: CLINIC | Age: 72
End: 2022-04-20

## 2022-04-20 NOTE — TELEPHONE ENCOUNTER
Reason for Call:  Medication or medication refill:    Do you use a [facility]?  Name of the pharmacy and phone number for the current request[email or web address]    Name of the medication requested:   Requesting 90 day script please  TRULICITY 1.5 MG/0.5ML pen 3 mL 3 12/20/2021  Yes   Sig: INJECT 1.5 MG SUBCUTANEOUSLY EVERY 7 DAYS         Other request: na    Can we leave a detailed message on this number? YES    Phone number patient can be reached at: Cell number on file:    Telephone Information:   Mobile [phone]       Best Time: any    Call taken on 4/20/2022 at 2:12 PM by Pam J. Behr

## 2022-04-23 ENCOUNTER — HEALTH MAINTENANCE LETTER (OUTPATIENT)
Age: 72
End: 2022-04-23

## 2022-04-26 DIAGNOSIS — E13.9 DIABETES 1.5, MANAGED AS TYPE 2 (H): ICD-10-CM

## 2022-04-26 NOTE — TELEPHONE ENCOUNTER
Reason for Call:  Medication or medication refill:    Do you use a [facility]?  Name of the pharmacy and phone number for the current request:    [facility]    Name of the medication requested:   trulicity with 3 90 day refills    Pt is currently out of this    Other request: past due for injection    Can we leave a detailed message on this number? YES    Phone number patient can be reached at: Home number on file [phone] (home)    Best Time: anytime    Call taken on 4/26/2022 at 11:37 AM by Kristan Mireles

## 2022-04-27 DIAGNOSIS — E13.9 DIABETES 1.5, MANAGED AS TYPE 2 (H): ICD-10-CM

## 2022-04-27 RX ORDER — DULAGLUTIDE 1.5 MG/.5ML
1.5 INJECTION, SOLUTION SUBCUTANEOUS
Qty: 3 ML | Refills: 3 | Status: SHIPPED | OUTPATIENT
Start: 2022-04-27 | End: 2022-04-27

## 2022-04-30 NOTE — TELEPHONE ENCOUNTER
"Routing refill request to provider for review/approval because:  Labs not current:  A1C, Cr    Last Written Prescription Date:  4/27/22  Last Fill Quantity: 3 mL,  # refills: 3   Last office visit provider:  12/6/21     Requested Prescriptions   Pending Prescriptions Disp Refills     TRULICITY 1.5 MG/0.5ML pen 9 mL 2     Sig: Inject 1.5 mg Subcutaneous every 7 days       GLP-1 Agonists Protocol Failed - 4/27/2022 10:46 AM        Failed - HgbA1C in past 3 or 6 months     If HgbA1C is 8 or greater, it needs to be on file within the past 3 months.  If less than 8, must be on file within the past 6 months.     Recent Labs   Lab Test 09/22/21  1504   A1C 6.6*             Failed - Normal serum creatinine on file in past 12 months     Recent Labs   Lab Test 09/22/21  1505   CR 1.37*       Ok to refill medication if creatinine is low          Passed - Medication is active on med list        Passed - Patient is age 18 or older        Passed - No active pregnancy on record        Passed - No positive pregnancy test in past 12 months        Passed - Recent (6 mo) or future (30 days) visit within the authorizing provider's specialty     Patient had office visit in the last 6 months or has a visit in the next 30 days with authorizing provider.  See \"Patient Info\" tab in inbasket, or \"Choose Columns\" in Meds & Orders section of the refill encounter.                 Annabel Cortez 04/30/22 4:06 PM  "

## 2022-05-02 DIAGNOSIS — J42 CHRONIC BRONCHITIS, UNSPECIFIED CHRONIC BRONCHITIS TYPE (H): ICD-10-CM

## 2022-05-02 NOTE — TELEPHONE ENCOUNTER
Reason for Call:  Other call back    Detailed comments: Pt out of medication for:  umeclidinium-vilanterol - pt requesting 90 day supply    [facility] [address] [facility] [place]    Phone Number Patient can be reached at: Home number on file [phone] (home)    Best Time: n/a    Can we leave a detailed message on this number? YES    Call taken on 5/2/2022 at 9:14 AM by Kristan Mireles

## 2022-05-03 ENCOUNTER — HOSPITAL ENCOUNTER (OUTPATIENT)
Dept: CT IMAGING | Facility: HOSPITAL | Age: 72
Discharge: HOME | End: 2022-05-03
Attending: INTERNAL MEDICINE
Payer: MEDICARE

## 2022-05-03 ENCOUNTER — LAB (OUTPATIENT)
Dept: INFUSION THERAPY | Facility: HOSPITAL | Age: 72
End: 2022-05-03
Attending: INTERNAL MEDICINE
Payer: MEDICARE

## 2022-05-03 DIAGNOSIS — C34.91 NON-SMALL CELL CANCER OF RIGHT LUNG (H): ICD-10-CM

## 2022-05-03 LAB
ALBUMIN SERPL-MCNC: 3.8 G/DL (ref 3.5–5)
ALP SERPL-CCNC: 86 U/L (ref 45–120)
ALT SERPL W P-5'-P-CCNC: 16 U/L (ref 0–45)
ANION GAP SERPL CALCULATED.3IONS-SCNC: 6 MMOL/L (ref 5–18)
AST SERPL W P-5'-P-CCNC: 19 U/L (ref 0–40)
BASOPHILS # BLD AUTO: 0.1 10E3/UL (ref 0–0.2)
BASOPHILS NFR BLD AUTO: 1 %
BILIRUB SERPL-MCNC: 0.7 MG/DL (ref 0–1)
BUN SERPL-MCNC: 19 MG/DL (ref 8–28)
CALCIUM SERPL-MCNC: 9.5 MG/DL (ref 8.5–10.5)
CHLORIDE BLD-SCNC: 103 MMOL/L (ref 98–107)
CO2 SERPL-SCNC: 29 MMOL/L (ref 22–31)
CREAT SERPL-MCNC: 1.48 MG/DL (ref 0.6–1.1)
EOSINOPHIL # BLD AUTO: 0.2 10E3/UL (ref 0–0.7)
EOSINOPHIL NFR BLD AUTO: 2 %
ERYTHROCYTE [DISTWIDTH] IN BLOOD BY AUTOMATED COUNT: 13 % (ref 10–15)
GFR SERPL CREATININE-BSD FRML MDRD: 37 ML/MIN/1.73M2
GLUCOSE BLD-MCNC: 123 MG/DL (ref 70–125)
HCT VFR BLD AUTO: 42.2 % (ref 35–47)
HGB BLD-MCNC: 13.7 G/DL (ref 11.7–15.7)
IMM GRANULOCYTES # BLD: 0.1 10E3/UL
IMM GRANULOCYTES NFR BLD: 1 %
LYMPHOCYTES # BLD AUTO: 2.9 10E3/UL (ref 0.8–5.3)
LYMPHOCYTES NFR BLD AUTO: 29 %
MCH RBC QN AUTO: 29.9 PG (ref 26.5–33)
MCHC RBC AUTO-ENTMCNC: 32.5 G/DL (ref 31.5–36.5)
MCV RBC AUTO: 92 FL (ref 78–100)
MONOCYTES # BLD AUTO: 1.2 10E3/UL (ref 0–1.3)
MONOCYTES NFR BLD AUTO: 12 %
NEUTROPHILS # BLD AUTO: 5.7 10E3/UL (ref 1.6–8.3)
NEUTROPHILS NFR BLD AUTO: 55 %
NRBC # BLD AUTO: 0 10E3/UL
NRBC BLD AUTO-RTO: 0 /100
PLATELET # BLD AUTO: 242 10E3/UL (ref 150–450)
POTASSIUM BLD-SCNC: 4.8 MMOL/L (ref 3.5–5)
PROT SERPL-MCNC: 7.5 G/DL (ref 6–8)
RBC # BLD AUTO: 4.58 10E6/UL (ref 3.8–5.2)
SODIUM SERPL-SCNC: 138 MMOL/L (ref 136–145)
WBC # BLD AUTO: 10 10E3/UL (ref 4–11)

## 2022-05-03 PROCEDURE — 36415 COLL VENOUS BLD VENIPUNCTURE: CPT

## 2022-05-03 PROCEDURE — 80053 COMPREHEN METABOLIC PANEL: CPT

## 2022-05-03 PROCEDURE — 71250 CT THORAX DX C-: CPT | Mod: MG

## 2022-05-03 PROCEDURE — 85004 AUTOMATED DIFF WBC COUNT: CPT

## 2022-05-03 RX ORDER — DULAGLUTIDE 1.5 MG/.5ML
1.5 INJECTION, SOLUTION SUBCUTANEOUS
Qty: 9 ML | Refills: 2 | Status: SHIPPED | OUTPATIENT
Start: 2022-05-03 | End: 2023-03-21

## 2022-05-04 ENCOUNTER — ONCOLOGY VISIT (OUTPATIENT)
Dept: ONCOLOGY | Facility: HOSPITAL | Age: 72
End: 2022-05-04
Attending: INTERNAL MEDICINE
Payer: MEDICARE

## 2022-05-04 ENCOUNTER — PATIENT OUTREACH (OUTPATIENT)
Dept: ONCOLOGY | Facility: HOSPITAL | Age: 72
End: 2022-05-04

## 2022-05-04 VITALS
TEMPERATURE: 99 F | BODY MASS INDEX: 31.32 KG/M2 | HEART RATE: 85 BPM | RESPIRATION RATE: 20 BRPM | OXYGEN SATURATION: 95 % | WEIGHT: 188 LBS | SYSTOLIC BLOOD PRESSURE: 100 MMHG | DIASTOLIC BLOOD PRESSURE: 67 MMHG

## 2022-05-04 DIAGNOSIS — C34.91 NON-SMALL CELL CANCER OF RIGHT LUNG (H): Primary | ICD-10-CM

## 2022-05-04 DIAGNOSIS — J90 CHRONIC PLEURAL EFFUSION: ICD-10-CM

## 2022-05-04 PROCEDURE — G0463 HOSPITAL OUTPT CLINIC VISIT: HCPCS

## 2022-05-04 PROCEDURE — 99214 OFFICE O/P EST MOD 30 MIN: CPT | Performed by: INTERNAL MEDICINE

## 2022-05-04 ASSESSMENT — PAIN SCALES - GENERAL: PAINLEVEL: NO PAIN (0)

## 2022-05-04 NOTE — PROGRESS NOTES
"Oncology Rooming Note    May 4, 2022 2:46 PM   Rita Mancini is a 71 year old female who presents for:    Chief Complaint   Patient presents with     Oncology Clinic Visit     Initial Vitals: /67   Pulse 85   Temp 99  F (37.2  C)   Resp 20   Wt 85.3 kg (188 lb)   SpO2 95%   BMI 31.32 kg/m   Estimated body mass index is 31.32 kg/m  as calculated from the following:    Height as of 12/6/21: 1.65 m (5' 4.96\").    Weight as of this encounter: 85.3 kg (188 lb). Body surface area is 1.98 meters squared.  No Pain (0) Comment: Data Unavailable   No LMP recorded.  Allergies reviewed: Yes  Medications reviewed: Yes    Medications: Medication refills not needed today.  Pharmacy name entered into EPIC:    [id number] [place] - [address] [facility] [id number] - [place], [place] - [address] [facility]  [id number] [place] - [address]    Clinical concerns: None       Martine Matta LPN            "

## 2022-05-04 NOTE — LETTER
"    5/4/2022         RE: Rita Mancini  [address]  [place] [address]        Dear Colleague,    Thank you for referring your patient, Rita Mancini, to the [facility]. Please see a copy of my visit note below.    Oncology Rooming Note    May 4, 2022 2:46 PM   Rita Mancini is a 71 year old female who presents for:    Chief Complaint   Patient presents with     Oncology Clinic Visit     Initial Vitals: /67   Pulse 85   Temp 99  F (37.2  C)   Resp 20   Wt 85.3 kg (188 lb)   SpO2 95%   BMI 31.32 kg/m   Estimated body mass index is 31.32 kg/m  as calculated from the following:    Height as of 12/6/21: 1.65 m (5' 4.96\").    Weight as of this encounter: 85.3 kg (188 lb). Body surface area is 1.98 meters squared.  No Pain (0) Comment: Data Unavailable   No LMP recorded.  Allergies reviewed: Yes  Medications reviewed: Yes    Medications: Medication refills not needed today.  Pharmacy name entered into "[employer]:    [id number] - [place], [place] - [address] AT [id number] - [place], [place] - [address]  [id number] - [place], [place] - [address]    Clinical concerns: None       Martine Matta LPN              [facility] Hematology and Oncology Progress Note    Patient: Rita Mancini  MRN: [id number]  Date of Service: May 4, 2022           Reason for visit         Problem List Items Addressed This Visit        Respiratory    Non-small cell cancer of right lung, s/p radiation and chemotherapies, in remission since [id number] (H) - Primary    Relevant Orders    CT Chest w/o Contrast    Chronic pleural effusion, left (s/p Talc pleurodesis 2015)    Relevant Orders    CT Chest w/o Contrast            Assessment      1.  A very pleasant 71 year old  woman with non-small-cell lung cancer stage III treated with cisplatin and -16 and radiation and currently under " active surveillance.  She was diagnosed in 09/2009. Finished her treatment in February 2010.  Her CT scan is revealing chronic changes.  2.   History of hospitalization for pneumonia and then repeat hospitalization for hospital associated pneumonia.  She is done with the antibiotics.  She had it on both sides and needed a chest tubes in 2019.  So far she has not had any more recurrence of that.  3.  H/o Double vision secondary to left lateral rectus muscle weakness.  Seems to have resolved a little bit.  4.  Mild renal insufficiency. Stable slightly dilated renal collecting system.  This is most likely secondary to her diabetes.  5.  Diabetes is improved since she has lost all that weight.    Plan      1.  I think she needs very close follow-up.  We will see her back in a years time with another CT scan of the chest.  2.  Advised to drink plenty of fluid to keep her kidneys flushed.  3.  Proper management of her diabetes.  4.  Call if she has any new symptoms.      Medical decision making      Moderately complex.      Risk      Moderate risk of morbidity mortality.  Significant risk of side effects from intervention.      Cancer Staging  No matching staging information was found for the patient.        History of present illness        Ms. Rita Mancini is a very pleasant 71 year old woman with a diagnosis of nonsmall cell lung cancer located in the right upper lobe measuring 4.2 cm in size, presenting with some shortness of breath and cough in 09/2009 and biopsy suggestive of adenocarcinoma including lymphnode biopsy confirming it is stage III disease.  Subsequent to that she was treated with cisplatin and -16 for 3 cycles and Taxotere for 2 cycles afterwards.  Subsequent to that she has been in remission.  She had been fine up until 11/2013 where a CT scan actually showed some congestion in the right lower lobe and then the PET scan showed that to be slightly hypermetabolic.  Therefore, she had that biopsied  and that was negative. She had CT in September 2015 that revealed pleural effusion. This was tapped and was negative. About one litre of fluid was removed. She felt slightly better.    She was seen by pulmonary again for the fluid. Was then sent to Dr. Fair for pleural biopsy and pleurodhesis. That was done on 4/6/15. The biopsy was non malignant.      She has since been followed by me and pulmonary with CT scans.      Rita was admitted at [facility] on March 27, 2019 with septic shock.  She was found to have bilateral pneumonia but more so on the left side.  Needed to be intubated and ventilated.  Needed chest tube.  He was in the hospital for several days.  Repeat hospitalization.  She was  again in the hospital 31 July with some coughing and shortness of breath.  Found to have another infiltrate in the right lower lobe.    Comes in today for scheduled follow-up.  She has been doing okay.  Struggling with some coughing and breathing issues.  Due to her emphysema she is producing copious amount of sputum which is bothering her.  Continues to have some occasional coughing etc.  Otherwise feels good.  Weight is stable.  No other new worrisome symptoms.      Review of system      Details noted in the history of present illness.  A 14 point review of systems is otherwise negative.        Past medical history      Past Medical History:   Diagnosis Date     Age-related cataract 12/06/2021     Anxiety and depression 01/01/1962     Bigeminy 03/17/2019     Bronchiectasis without complication (H)      COPD (chronic obstructive pulmonary disease) (H) 01/01/2009     Diabetes mellitus (H) 01/01/2008     Functional diarrhea 12/31/2018     GERD (gastroesophageal reflux disease)      History of alcoholism (H)      Hx antineoplastic chemotherapy     lung cancer      Hx of radiation therapy     lung cancer      Hyperlipemia 01/01/2009     Hypothyroid      Lung cancer (H) 01/01/2008    RUL,  Non small cell cancer      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     Osteopenia      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, subsequent encounter 04/08/2019     Other hydronephrosis      Pleural effusion 01/01/2015     Preoperative examination 12/06/2021     Sepsis due to Escherichia coli with acute renal failure without septic shock, unspecified acute renal failure type (H)      Sleep apnea 01/01/2010    does not use cpap       Past Surgical History:   Procedure Laterality Date     IR MISCELLANEOUS PROCEDURE  12/10/2009     PORTACATH PLACEMENT      and removal     THORACOSCOPY Right 4/6/2015    Procedure: RIGHT THORACOSCOPY / BIOPSY PLEURAL / TALC PLEURODESIS;  Surgeon: Michi Ma MD;  Location: [facility];  Service:      THORACOSCOPY Left 3/29/2019    Procedure: LEFT THORACOSCOPY WITH DECORTICATION;  Surgeon: Michi Ma MD;  Location: [facility];  Service: General     TONSILLECTOMY  age 5     URETERAL STENT PLACEMENT Right 6/2010     US THORACENTESIS  3/27/2019       Physical exam        /67   Pulse 85   Temp 99  F (37.2  C)   Resp 20   Wt 85.3 kg (188 lb)   SpO2 95%   BMI 31.32 kg/m          GENERAL: Alert and oriented to time place and person.  In no distress.    HEAD: Atraumatic and normocephalic.    EYES: OLIVER, EOMI. No pallor. No icterus.    Oral cavity: no mucosal lesion or tonsillar enlargement.    NECK: supple. JVP normal.No thyroid enlargement.    LYMPH NODES: No palpable, cervical, axillary or inguinal lymphadenopathy.    CHEST:  Symmetrical breath movements bilaterally.  Bronchial breath sounds and some rhonchi on the right side.    CVS: S1 and S2 are Regular rate and rhythm. No murmur heard. No peripheral edema.    ABDOMEN: Soft. Not tender. Not distended. No palpable hepatomegaly or splenomegaly. No other mass palpable. Bowel sounds heard.    EXTREMITIES: Warm.    SKIN: no rash, or bruising or purpura.      Lab results      Recent Results  (from the past 168 hour(s))   Comprehensive metabolic panel   Result Value Ref Range    Sodium 138 136 - 145 mmol/L    Potassium 4.8 3.5 - 5.0 mmol/L    Chloride 103 98 - 107 mmol/L    Carbon Dioxide (CO2) 29 22 - 31 mmol/L    Anion Gap 6 5 - 18 mmol/L    Urea Nitrogen 19 8 - 28 mg/dL    Creatinine 1.48 (H) 0.60 - 1.10 mg/dL    Calcium 9.5 8.5 - 10.5 mg/dL    Glucose 123 70 - 125 mg/dL    Alkaline Phosphatase 86 45 - 120 U/L    AST 19 0 - 40 U/L    ALT 16 0 - 45 U/L    Protein Total 7.5 6.0 - 8.0 g/dL    Albumin 3.8 3.5 - 5.0 g/dL    Bilirubin Total 0.7 0.0 - 1.0 mg/dL    GFR Estimate 37 (L) >60 mL/min/1.73m2   CBC with platelets and differential   Result Value Ref Range    WBC Count 10.0 4.0 - 11.0 10e3/uL    RBC Count 4.58 3.80 - 5.20 10e6/uL    Hemoglobin 13.7 11.7 - 15.7 g/dL    Hematocrit 42.2 35.0 - 47.0 %    MCV 92 78 - 100 fL    MCH 29.9 26.5 - 33.0 pg    MCHC 32.5 31.5 - 36.5 g/dL    RDW 13.0 10.0 - 15.0 %    Platelet Count 242 150 - 450 10e3/uL    % Neutrophils 55 %    % Lymphocytes 29 %    % Monocytes 12 %    % Eosinophils 2 %    % Basophils 1 %    % Immature Granulocytes 1 %    NRBCs per 100 WBC 0 <1 /100    Absolute Neutrophils 5.7 1.6 - 8.3 10e3/uL    Absolute Lymphocytes 2.9 0.8 - 5.3 10e3/uL    Absolute Monocytes 1.2 0.0 - 1.3 10e3/uL    Absolute Eosinophils 0.2 0.0 - 0.7 10e3/uL    Absolute Basophils 0.1 0.0 - 0.2 10e3/uL    Absolute Immature Granulocytes 0.1 <=0.4 10e3/uL    Absolute NRBCs 0.0 10e3/uL       Imaging results        CT Chest w/o Contrast    Result Date: 5/3/2022  EXAM: CT CHEST W/O CONTRAST LOCATION: [facility] DATE/TIME: 5/3/2022 1:46 PM INDICATION:  Non-small cell cancer of right lung (H) COMPARISON: CT chest exams 03/17/2021, 03/12/2020, 7/30/2019 and 4/22/2019 TECHNIQUE: CT chest without IV contrast. Multiplanar reformats were obtained. Dose reduction techniques were used. CONTRAST: None. FINDINGS: LUNGS AND PLEURA: Post right thoracentesis with similar  slightly irregular pleural thickening and calcific densities. Mildly increased right lower lobar dependent consolidation. Irregular medial right apical and upper right paramediastinal consolidative opacity with associated traction bronchiectasis and architectural distortion is unchanged since 04/22/2019. Similar appearance of mild bilateral lower lobar predominant bronchiectasis with bronchial wall thickening and multifocal endobronchial mucoid impaction consistent with chronic bronchiolitis. Stable basilar predominant proximal scarring is also noted. Trace loculated pleural fluid along the anteromedial right upper lobe. Stable nodules along the right major fissure. MEDIASTINUM/AXILLAE: Normal heart size and no pericardial effusion. Mildly enlarged subcarinal lymph nodes with the larger now measuring 13 mm short axis dimension, previously 8 mm. Tiny hiatal hernia. CORONARY ARTERY CALCIFICATION: None. UPPER ABDOMEN: Stable severe dilatation of the visualized right renal collecting system. Tiny left renal cortical cyst. No follow-up is indicated. Stable borderline likely reactive portal caval node measuring 10 mm short axis dimension. MUSCULOSKELETAL: Stable healed left eighth rib fracture. Spinal degenerative changes. No suspicious osseous lesion.     IMPRESSION: 1.  No findings convincing for tumor recurrence. 2.  Mildly increased right lower lobar dependent consolidation likely representing atelectasis or infiltrates and enlarging borderline subcarinal lymph nodes which are favored to be reactive. As a precaution, recommend 3 month CT follow-up. 3.  Stable basilar predominant chronic bronchiolitis. 4.  Stable right pleurodesis changes and stable right upper lobe findings presumably related to treated lung cancer.     CT scan personally reviewed.  Compared to the scan from 2020.    Total time spent was over 30 minutes.    Signed by: Herbert Foster MD,      This note has been dictated using voice recognition  software. Any grammatical or context distortions are unintentional and inherent to the software        Again, thank you for allowing me to participate in the care of your patient.        Sincerely,        Herbert Foster MD, MD

## 2022-05-04 NOTE — PROGRESS NOTES
[facility] Hematology and Oncology Progress Note    Patient: Rita Mancini  MRN: [id number]  Date of Service: May 4, 2022           Reason for visit         Problem List Items Addressed This Visit        Respiratory    Non-small cell cancer of right lung, s/p radiation and chemotherapies, in remission since 2674-8640 (H) - Primary    Relevant Orders    CT Chest w/o Contrast    Chronic pleural effusion, left (s/p Talc pleurodesis 2015)    Relevant Orders    CT Chest w/o Contrast            Assessment      1.  A very pleasant 71 year old  woman with non-small-cell lung cancer stage III treated with cisplatin and -16 and radiation and currently under active surveillance.  She was diagnosed in 09/2009. Finished her treatment in February 2010.  Her CT scan is revealing chronic changes.  2.   History of hospitalization for pneumonia and then repeat hospitalization for hospital associated pneumonia.  She is done with the antibiotics.  She had it on both sides and needed a chest tubes in 2019.  So far she has not had any more recurrence of that.  3.  H/o Double vision secondary to left lateral rectus muscle weakness.  Seems to have resolved a little bit.  4.  Mild renal insufficiency. Stable slightly dilated renal collecting system.  This is most likely secondary to her diabetes.  5.  Diabetes is improved since she has lost all that weight.    Plan      1.  I think she needs very close follow-up.  We will see her back in a years time with another CT scan of the chest.  2.  Advised to drink plenty of fluid to keep her kidneys flushed.  3.  Proper management of her diabetes.  4.  Call if she has any new symptoms.      Medical decision making      Moderately complex.      Risk      Moderate risk of morbidity mortality.  Significant risk of side effects from intervention.      Cancer Staging  No matching staging information was found for the patient.        History of present illness        Ms. Rita Mancini is a  very pleasant 71 year old woman with a diagnosis of nonsmall cell lung cancer located in the right upper lobe measuring 4.2 cm in size, presenting with some shortness of breath and cough in 09/2009 and biopsy suggestive of adenocarcinoma including lymphnode biopsy confirming it is stage III disease.  Subsequent to that she was treated with cisplatin and -16 for 3 cycles and Taxotere for 2 cycles afterwards.  Subsequent to that she has been in remission.  She had been fine up until 11/2013 where a CT scan actually showed some congestion in the right lower lobe and then the PET scan showed that to be slightly hypermetabolic.  Therefore, she had that biopsied and that was negative. She had CT in September 2015 that revealed pleural effusion. This was tapped and was negative. About one litre of fluid was removed. She felt slightly better.    She was seen by pulmonary again for the fluid. Was then sent to Dr. Fair for pleural biopsy and pleurodhesis. That was done on 4/6/15. The biopsy was non malignant.      She has since been followed by me and pulmonary with CT scans.      Rita was admitted at [facility] on March 27, 2019 with septic shock.  She was found to have bilateral pneumonia but more so on the left side.  Needed to be intubated and ventilated.  Needed chest tube.  He was in the hospital for several days.  Repeat hospitalization.  She was  again in the hospital 31 July with some coughing and shortness of breath.  Found to have another infiltrate in the right lower lobe.    Comes in today for scheduled follow-up.  She has been doing okay.  Struggling with some coughing and breathing issues.  Due to her emphysema she is producing copious amount of sputum which is bothering her.  Continues to have some occasional coughing etc.  Otherwise feels good.  Weight is stable.  No other new worrisome symptoms.      Review of system      Details noted in the history of present illness.  A 14 point review  of systems is otherwise negative.        Past medical history      Past Medical History:   Diagnosis Date     Age-related cataract 12/06/2021     Anxiety and depression 01/01/1962     Bigeminy 03/17/2019     Bronchiectasis without complication (H)      COPD (chronic obstructive pulmonary disease) (H) 01/01/2009     Diabetes mellitus (H) 01/01/2008     Functional diarrhea 12/31/2018     GERD (gastroesophageal reflux disease)      History of alcoholism (H)      Hx antineoplastic chemotherapy     lung cancer      Hx of radiation therapy     lung cancer      Hyperlipemia 01/01/2009     Hypothyroid      Lung cancer (H) 01/01/2008    RUL,  Non small cell cancer     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     Osteopenia      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, subsequent encounter 04/08/2019     Other hydronephrosis      Pleural effusion 01/01/2015     Preoperative examination 12/06/2021     Sepsis due to Escherichia coli with acute renal failure without septic shock, unspecified acute renal failure type (H)      Sleep apnea 01/01/2010    does not use cpap       Past Surgical History:   Procedure Laterality Date     IR MISCELLANEOUS PROCEDURE  12/10/2009     PORTACATH PLACEMENT      and removal     THORACOSCOPY Right 4/6/2015    Procedure: RIGHT THORACOSCOPY / BIOPSY PLEURAL / TALC PLEURODESIS;  Surgeon: Michi Ma MD;  Location: [facility] OR;  Service:      THORACOSCOPY Left 3/29/2019    Procedure: LEFT THORACOSCOPY WITH DECORTICATION;  Surgeon: Michi Ma MD;  Location: [facility];  Service: General     TONSILLECTOMY  age 5     URETERAL STENT PLACEMENT Right 6/2010     US THORACENTESIS  3/27/2019       Physical exam        /67   Pulse 85   Temp 99  F (37.2  C)   Resp 20   Wt 85.3 kg (188 lb)   SpO2 95%   BMI 31.32 kg/m          GENERAL: Alert and oriented to time place and person.  In no distress.    HEAD: Atraumatic and  normocephalic.    EYES: OLIVER, EOMI. No pallor. No icterus.    Oral cavity: no mucosal lesion or tonsillar enlargement.    NECK: supple. JVP normal.No thyroid enlargement.    LYMPH NODES: No palpable, cervical, axillary or inguinal lymphadenopathy.    CHEST:  Symmetrical breath movements bilaterally.  Bronchial breath sounds and some rhonchi on the right side.    CVS: S1 and S2 are Regular rate and rhythm. No murmur heard. No peripheral edema.    ABDOMEN: Soft. Not tender. Not distended. No palpable hepatomegaly or splenomegaly. No other mass palpable. Bowel sounds heard.    EXTREMITIES: Warm.    SKIN: no rash, or bruising or purpura.      Lab results      Recent Results (from the past 168 hour(s))   Comprehensive metabolic panel   Result Value Ref Range    Sodium 138 136 - 145 mmol/L    Potassium 4.8 3.5 - 5.0 mmol/L    Chloride 103 98 - 107 mmol/L    Carbon Dioxide (CO2) 29 22 - 31 mmol/L    Anion Gap 6 5 - 18 mmol/L    Urea Nitrogen 19 8 - 28 mg/dL    Creatinine 1.48 (H) 0.60 - 1.10 mg/dL    Calcium 9.5 8.5 - 10.5 mg/dL    Glucose 123 70 - 125 mg/dL    Alkaline Phosphatase 86 45 - 120 U/L    AST 19 0 - 40 U/L    ALT 16 0 - 45 U/L    Protein Total 7.5 6.0 - 8.0 g/dL    Albumin 3.8 3.5 - 5.0 g/dL    Bilirubin Total 0.7 0.0 - 1.0 mg/dL    GFR Estimate 37 (L) >60 mL/min/1.73m2   CBC with platelets and differential   Result Value Ref Range    WBC Count 10.0 4.0 - 11.0 10e3/uL    RBC Count 4.58 3.80 - 5.20 10e6/uL    Hemoglobin 13.7 11.7 - 15.7 g/dL    Hematocrit 42.2 35.0 - 47.0 %    MCV 92 78 - 100 fL    MCH 29.9 26.5 - 33.0 pg    MCHC 32.5 31.5 - 36.5 g/dL    RDW 13.0 10.0 - 15.0 %    Platelet Count 242 150 - 450 10e3/uL    % Neutrophils 55 %    % Lymphocytes 29 %    % Monocytes 12 %    % Eosinophils 2 %    % Basophils 1 %    % Immature Granulocytes 1 %    NRBCs per 100 WBC 0 <1 /100    Absolute Neutrophils 5.7 1.6 - 8.3 10e3/uL    Absolute Lymphocytes 2.9 0.8 - 5.3 10e3/uL    Absolute Monocytes 1.2 0.0 - 1.3  10e3/uL    Absolute Eosinophils 0.2 0.0 - 0.7 10e3/uL    Absolute Basophils 0.1 0.0 - 0.2 10e3/uL    Absolute Immature Granulocytes 0.1 <=0.4 10e3/uL    Absolute NRBCs 0.0 10e3/uL       Imaging results        CT Chest w/o Contrast    Result Date: 5/3/2022  EXAM: CT CHEST W/O CONTRAST LOCATION: [facility] DATE/TIME: 5/3/2022 1:46 PM INDICATION:  Non-small cell cancer of right lung (H) COMPARISON: CT chest exams 03/17/2021, 03/12/2020, 7/30/2019 and 4/22/2019 TECHNIQUE: CT chest without IV contrast. Multiplanar reformats were obtained. Dose reduction techniques were used. CONTRAST: None. FINDINGS: LUNGS AND PLEURA: Post right thoracentesis with similar slightly irregular pleural thickening and calcific densities. Mildly increased right lower lobar dependent consolidation. Irregular medial right apical and upper right paramediastinal consolidative opacity with associated traction bronchiectasis and architectural distortion is unchanged since 04/22/2019. Similar appearance of mild bilateral lower lobar predominant bronchiectasis with bronchial wall thickening and multifocal endobronchial mucoid impaction consistent with chronic bronchiolitis. Stable basilar predominant proximal scarring is also noted. Trace loculated pleural fluid along the anteromedial right upper lobe. Stable nodules along the right major fissure. MEDIASTINUM/AXILLAE: Normal heart size and no pericardial effusion. Mildly enlarged subcarinal lymph nodes with the larger now measuring 13 mm short axis dimension, previously 8 mm. Tiny hiatal hernia. CORONARY ARTERY CALCIFICATION: None. UPPER ABDOMEN: Stable severe dilatation of the visualized right renal collecting system. Tiny left renal cortical cyst. No follow-up is indicated. Stable borderline likely reactive portal caval node measuring 10 mm short axis dimension. MUSCULOSKELETAL: Stable healed left eighth rib fracture. Spinal degenerative changes. No suspicious osseous  lesion.     IMPRESSION: 1.  No findings convincing for tumor recurrence. 2.  Mildly increased right lower lobar dependent consolidation likely representing atelectasis or infiltrates and enlarging borderline subcarinal lymph nodes which are favored to be reactive. As a precaution, recommend 3 month CT follow-up. 3.  Stable basilar predominant chronic bronchiolitis. 4.  Stable right pleurodesis changes and stable right upper lobe findings presumably related to treated lung cancer.     CT scan personally reviewed.  Compared to the scan from 2020.    Total time spent was over 30 minutes.    Signed by: Herbert Foster MD,      This note has been dictated using voice recognition software. Any grammatical or context distortions are unintentional and inherent to the software

## 2022-05-05 NOTE — TELEPHONE ENCOUNTER
Spoke with the pharmacy and confirmed that the prescription was sent. Pharmacy stated they needed permission from provider to fill 90 day supply of medication. Writer gave permission to fill. Spoke with patient and relayed information obtained at the pharmacy. Patient grateful for getting prescription filled at correct pharmacy.    Xu Jj RN  [facility]

## 2022-05-05 NOTE — TELEPHONE ENCOUNTER
Pt is calling as she is out of medication still.    The other refill went to the wrong pharmacy-please send to [facility] on [address].

## 2022-06-30 ASSESSMENT — PATIENT HEALTH QUESTIONNAIRE - PHQ9
10. IF YOU CHECKED OFF ANY PROBLEMS, HOW DIFFICULT HAVE THESE PROBLEMS MADE IT FOR YOU TO DO YOUR WORK, TAKE CARE OF THINGS AT HOME, OR GET ALONG WITH OTHER PEOPLE: NOT DIFFICULT AT ALL
SUM OF ALL RESPONSES TO PHQ QUESTIONS 1-9: 6
SUM OF ALL RESPONSES TO PHQ QUESTIONS 1-9: 6

## 2022-07-07 ENCOUNTER — TELEPHONE (OUTPATIENT)
Dept: CARDIOLOGY | Facility: CLINIC | Age: 72
End: 2022-07-07
Payer: MEDICARE

## 2022-07-07 ENCOUNTER — OFFICE VISIT (OUTPATIENT)
Dept: INTERNAL MEDICINE | Facility: CLINIC | Age: 72
End: 2022-07-07
Payer: MEDICARE

## 2022-07-07 VITALS
SYSTOLIC BLOOD PRESSURE: 118 MMHG | DIASTOLIC BLOOD PRESSURE: 64 MMHG | OXYGEN SATURATION: 98 % | HEART RATE: 73 BPM | WEIGHT: 187 LBS | BODY MASS INDEX: 31.16 KG/M2

## 2022-07-07 DIAGNOSIS — G47.33 OSA ON CPAP: ICD-10-CM

## 2022-07-07 DIAGNOSIS — E13.9 DIABETES 1.5, MANAGED AS TYPE 2 (H): Primary | ICD-10-CM

## 2022-07-07 DIAGNOSIS — N18.31 STAGE 3A CHRONIC KIDNEY DISEASE (H): ICD-10-CM

## 2022-07-07 DIAGNOSIS — H61.21 IMPACTED CERUMEN OF RIGHT EAR: ICD-10-CM

## 2022-07-07 LAB
ALBUMIN SERPL BCG-MCNC: 4.6 G/DL (ref 3.5–5.2)
ALP SERPL-CCNC: 84 U/L (ref 35–104)
ALT SERPL W P-5'-P-CCNC: 14 U/L (ref 10–35)
ANION GAP SERPL CALCULATED.3IONS-SCNC: 8 MMOL/L (ref 7–15)
AST SERPL W P-5'-P-CCNC: 24 U/L (ref 10–35)
BILIRUB SERPL-MCNC: 0.6 MG/DL
BUN SERPL-MCNC: 21.2 MG/DL (ref 8–23)
CALCIUM SERPL-MCNC: 10 MG/DL (ref 8.8–10.2)
CHLORIDE SERPL-SCNC: 96 MMOL/L (ref 98–107)
CREAT SERPL-MCNC: 1.33 MG/DL (ref 0.51–0.95)
DEPRECATED HCO3 PLAS-SCNC: 27 MMOL/L (ref 22–29)
GFR SERPL CREATININE-BSD FRML MDRD: 42 ML/MIN/1.73M2
GLUCOSE SERPL-MCNC: 109 MG/DL (ref 70–99)
HBA1C MFR BLD: 7.1 % (ref 0–5.6)
POTASSIUM SERPL-SCNC: 5 MMOL/L (ref 3.4–5.3)
PROT SERPL-MCNC: 8.1 G/DL (ref 6.4–8.3)
SODIUM SERPL-SCNC: 131 MMOL/L (ref 136–145)

## 2022-07-07 PROCEDURE — 99207 PR NO CHARGE NURSE ONLY: CPT

## 2022-07-07 PROCEDURE — 99214 OFFICE O/P EST MOD 30 MIN: CPT | Performed by: INTERNAL MEDICINE

## 2022-07-07 PROCEDURE — 80053 COMPREHEN METABOLIC PANEL: CPT | Performed by: INTERNAL MEDICINE

## 2022-07-07 PROCEDURE — 83036 HEMOGLOBIN GLYCOSYLATED A1C: CPT | Performed by: INTERNAL MEDICINE

## 2022-07-07 PROCEDURE — 36415 COLL VENOUS BLD VENIPUNCTURE: CPT | Performed by: INTERNAL MEDICINE

## 2022-07-07 RX ORDER — ASPIRIN 81 MG/1
81 TABLET ORAL DAILY
COMMUNITY

## 2022-07-07 NOTE — PROGRESS NOTES
ASSESSMENT AND PLAN:    1. Diabetes 1.5, managed as type 2   Ongoing treatment with trulicity.  Will assess A1c.    2. Stage 3a chronic kidney disease   Last creatinine 1.37    3. MONSERRAT on CPAP  Ongoing use of CPAP    4. Cerumen impaction/dizziness  Non-occlusive but significant cerumen right ear. Suspect there is a subacute episode of labyrinthitis, with some element of episodic positional vertigo.  Neurologic exam reveals no significant abnormalities.     CHIEF COMPLAINT:  Dizziness     HISTORY OF PRESENT ILLNESS:  Rita Mancini is a 72 year old female with a recent sense of dizziness.  It started one day with a persistent sense of poor position sense, with some sense of movement. She wonders about her ears, the right ear feels a bit plugged.  No tinnitus, or hearing impairment.  No visual disturbance, or arm or leg symptoms.  No headache.  Since that initial day, there is periodic sense of dizziness, unsteadiness, mild sense of motion, at times triggered by head movement.  No falls, not severe, seems to be getting better.     REVIEW OF SYSTEMS:   See HPI, all other systems on review are negative.    Past Medical History:   Diagnosis Date     Age-related cataract 12/06/2021     Anxiety and depression 01/01/1962     Bigeminy 03/17/2019     Bronchiectasis without complication (H)      COPD (chronic obstructive pulmonary disease) (H) 01/01/2009     Diabetes mellitus (H) 01/01/2008     Functional diarrhea 12/31/2018     GERD (gastroesophageal reflux disease)      History of alcoholism (H)      Hx antineoplastic chemotherapy     lung cancer      Hx of radiation therapy     lung cancer      Hyperlipemia 01/01/2009     Hypothyroid      Lung cancer (H) 01/01/2008    RUL,  Non small cell cancer     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     Osteopenia      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, subsequent encounter 04/08/2019     Other hydronephrosis      Pleural effusion  01/01/2015     Preoperative examination 12/06/2021     Sepsis due to Escherichia coli with acute renal failure without septic shock, unspecified acute renal failure type (H)      Sleep apnea 01/01/2010    does not use cpap     History   Smoking Status     Former Smoker     Packs/day: 1.50     Years: 0.00     Quit date: 11/9/2008   Smokeless Tobacco     Former User     Family History   Problem Relation Age of Onset     Heart Disease Mother      Hypertension Mother      Alcoholism Mother      Depression Mother      Heart Disease Father 45        mi age 45, cabg     Coronary Artery Disease Father      Cancer Father         prostate     Hypertension Father      Snoring Father      Alcoholism Sister      Chronic Obstructive Pulmonary Disease Brother      Alcoholism Brother      Obesity Daughter      Obesity Daughter      Diabetes Son      Anxiety Disorder Son      Depression Son      Post-Traumatic Stress Disorder (PTSD) Son      Cancer Maternal Aunt 47        breast     Breast Cancer Paternal Aunt      Leukemia Grandchild      Schizophrenia Grandchild      Past Surgical History:   Procedure Laterality Date     IR MISCELLANEOUS PROCEDURE  12/10/2009     PORTACATH PLACEMENT      and removal     THORACOSCOPY Right 4/6/2015    Procedure: RIGHT THORACOSCOPY / BIOPSY PLEURAL / TALC PLEURODESIS;  Surgeon: Michi Ma MD;  Location: [facility] OR;  Service:      THORACOSCOPY Left 3/29/2019    Procedure: LEFT THORACOSCOPY WITH DECORTICATION;  Surgeon: Michi Ma MD;  Location: [facility] OR;  Service: General     TONSILLECTOMY  age 5     URETERAL STENT PLACEMENT Right 6/2010     US THORACENTESIS  3/27/2019     VITALS:  Vitals:    07/07/22 1356   BP: 118/64   Pulse: 73   SpO2: 98%   Weight: 84.8 kg (187 lb)     Wt Readings from Last 3 Encounters:   07/07/22 84.8 kg (187 lb)   05/04/22 85.3 kg (188 lb)   12/06/21 84 kg (185 lb 3.2 oz)     PHYSICAL EXAM:  Constitutional:  In NAD, alert and oriented  EYE:  fundi are unremarkable, discs flat  Neck: no cervical or axillary adenopathy  Cardiac:  S1 S2   Lungs: Clear   Abdomen:   Soft, flat and non-tender  Neurologic:  Speech clear, no arm or leg weakness, gait normal, romberg has mild sway, reflexes symmetric, cranial nerves are symmetric, cerebellar function is normal.        DECISION TO OBTAIN OLD RECORDS AND/OR OBTAIN HISTORY FROM SOMEONE OTHER THAN PATIENT, AND/OR ACCESSING CARE EVERYWHERE):  1  0     REVIEW AND SUMMARIZATION OF OLD RECORDS, AND/OR OBTAINING HISTORY FROM SOMEONE OTHER THAN PATIENT, AND/OR DISCUSSION OF CASE WITH ANOTHER HEALTH CARE PROVIDER:  2 0    REVIEW AND/OR ORDER OF OF CLINICAL LAB TESTS: 1  ordered.    REVIEW AND/OR ORDER OF RADIOLOGY TESTS: 1 0.    REVIEW AND/OR ORDER OF MEDICAL TESTS (EKG/ECHO/COLONOSCOPY/EGD): 1  0    INDEPENDENT  VISUALIZATION OF IMAGE, TRACING, OR SPECIMEN ITSELF (2 EACH):  0     TOTAL: 1    Current Outpatient Medications   Medication Sig Dispense Refill     acetaminophen (TYLENOL) 500 MG tablet Take 1-2 tablets (500-1,000 mg) by mouth every 6 hours as needed for mild pain 100 tablet 0     albuterol (PROAIR HFA/PROVENTIL HFA/VENTOLIN HFA) 108 (90 Base) MCG/ACT inhaler Inhale 2 puffs into the lungs every 6 hours       aspirin 81 MG EC tablet Take 81 mg by mouth daily       blood glucose (NO BRAND SPECIFIED) test strip Use to test blood sugar 2 times daily or as directed. 200 strip 3     calcium carbonate (TUMS) 500 MG chewable tablet Take 1 chew tab by mouth daily       cetirizine (ZYRTEC) 10 MG CHEW Take 10 mg by mouth daily       citalopram (CELEXA) 10 MG tablet Take 1 tablet (10 mg) by mouth daily 90 tablet 1     famotidine (PEPCID) 20 MG tablet Take 1 tablet (20 mg) by mouth daily 90 tablet 3     fluticasone (FLONASE) 50 MCG/ACT nasal spray        levothyroxine (SYNTHROID/LEVOTHROID) 50 MCG tablet TAKE ON EMPTY STOMACH EVERY MONDAY AND FRIDAY (SEE OTHER DOSE FOR REMAINING DAYS OF WEEK) 24 tablet 3     levothyroxine  (SYNTHROID/LEVOTHROID) 75 MCG tablet TAKE 1 TAB BY MOUTH ON TUE WED THR SAT SUN 90 tablet 0     magnesium 500 MG TABS        Melatonin 10 MG CAPS        montelukast (SINGULAIR) 10 MG tablet Take 1 tablet (10 mg) by mouth At Bedtime 90 tablet 3     multivitamin w/minerals (THERA-VIT-M) tablet Take 1 tablet by mouth daily       prednisoLONE acetate (PRED FORTE) 1 % ophthalmic suspension        PULMICORT FLEXHALER 180 MCG/ACT inhaler INHALE 2 PUFFS BY MOUTH TWICE A DAY 1 each 11     simvastatin (ZOCOR) 10 MG tablet Take 1 tablet (10 mg) by mouth At Bedtime 90 tablet 3     triamcinolone (KENALOG) 0.1 % external cream Apply topically 2 times daily       TRULICITY 1.5 MG/0.5ML pen Inject 1.5 mg Subcutaneous every 7 days 9 mL 2     umeclidinium-vilanterol (ANORO ELLIPTA) 62.5-25 MCG/INH oral inhaler TAKE 1 PUFF BY MOUTH EVERY DAY 90 each 3     Michi Murillo MD  Internal Medicine  [facility]

## 2022-07-07 NOTE — TELEPHONE ENCOUNTER
"  Oban Study Pre-Visit Call      Study description:   Multiconditions PPG Study. The purpose of this research study is to collect data related to health for the development of mobile technologies. This data will include physiological signal recordings from medical devices and data collection software on Apple Watches (\"study watches\"). This study is not to provide any treatment nor assess safety and effectiveness, but rather to collect information for research and  purposes.     Rita Mancini a 72 year old female, was called today to discuss participation in the Oban study. The following was reviewed with the patient.       You agree to comply with COVID precautionary measures required by local public health ordinances, workplace health and safety protocols, and/or the Study Team. Such measure may include, for example, wearing a mask, complying with social distancing guidelines, and/ or providing evidence of negative COVID-19 test result Yes       You are fully vaccinated per the most recent CDC guidelines Yes      You do not have any of the following symptoms: fever or chills; difficulty breathing; sustained loss of taste smell, or appetite. Yes      You do not have any of the following unexplained symptoms: fatigue or nausea; whole body muscle aches; new or unexpected headache; sore throat; congestion or runny nose; diarrhea or vomiting Yes      You have not had close contact with someone who is suspected or confirmed to have active COVID-19 in the last 14 days.Yes      Reminders    Please come 10 minutes early for your scheduled appointment time.    Bring your vaccination card with you to your scheduled appointment.     No smoking 2 hours prior to your appointment time.    Wear loose shirt.    Eat before you arrive.       Pamela Lim RN       "

## 2022-07-08 ENCOUNTER — ALLIED HEALTH/NURSE VISIT (OUTPATIENT)
Dept: CARDIOLOGY | Facility: CLINIC | Age: 72
End: 2022-07-08

## 2022-07-08 ENCOUNTER — OFFICE VISIT (OUTPATIENT)
Dept: CARDIOLOGY | Facility: CLINIC | Age: 72
End: 2022-07-08
Payer: MEDICARE

## 2022-07-08 VITALS
SYSTOLIC BLOOD PRESSURE: 108 MMHG | TEMPERATURE: 98.3 F | BODY MASS INDEX: 31.15 KG/M2 | DIASTOLIC BLOOD PRESSURE: 53 MMHG | HEART RATE: 78 BPM | RESPIRATION RATE: 12 BRPM | WEIGHT: 186.95 LBS | OXYGEN SATURATION: 94 % | HEIGHT: 65 IN

## 2022-07-08 DIAGNOSIS — Z00.6 EXAMINATION OF PARTICIPANT OR CONTROL IN CLINICAL RESEARCH: Primary | ICD-10-CM

## 2022-07-08 DIAGNOSIS — J44.9 CHRONIC OBSTRUCTIVE PULMONARY DISEASE, UNSPECIFIED COPD TYPE (H): Primary | ICD-10-CM

## 2022-07-08 PROCEDURE — 99207 PR NO CHARGE-RESEARCH SERVICE: CPT

## 2022-07-08 PROCEDURE — 99207 PR NO CHARGE NURSE ONLY: CPT

## 2022-07-08 NOTE — PROGRESS NOTES
Oban Study At-Home Consent      Study description:   Multiconditions PPG Sub-Study. The purpose of this research study is to collect data related to health for the development of mobile technologies. This data will include physiological signal recordings from medical devices and smart watch data collection software. This study is not to provide any treatment. This study will only collect information for research and  purposes.     Rita Mancini a 72 year old female, was onsite today to discuss participation in the At-Home portion of the Oban study.   The consent form was reviewed with the patient.     The review of the study included:    Study Purpose     COVID-19 Criteria     At-Home Study Participation    Participant Responsibilities      Study Data and Devices    Benefits and Risks of Participation    Compensation and Costs of Participation    Voluntary Participation    Confidentiality     Injury and Legal Rights    The subject was queried in regards to her willingness to continue and her questions were answered to her satisfaction.     The patient has given her agreement to volunteer to participate in the above noted study.     The At-Home consent form and HIPPA form version 17 Jun 2022 was signed 8-JUL-2022 onsite in the Clinic Research Unit.     A copy of the At-Home Oban consent will be placed in subject's medical record.  A copy of the consent form was given to the subject today.    Study data is directly entered into Epic per protocol.     No study procedures were done prior to Rita Mancini providing informed consent.       Royer Givens

## 2022-07-08 NOTE — PROGRESS NOTES
David Study At-Home Note      Study description: Multiconditions PPG Sub-Study. The purpose of this research study is to collect data related to health for the development of mobile technologies. This data will include physiological signal recordings from medical devices and smart watch data collection software. This study is not to provide any treatment. This study will only collect information for research and  purposes.     Subject ID:  [id number]       SCREENING     Demographic Info  Rita Mancini   1950          72 year old  female    Past Medical History:   Diagnosis Date     Age-related cataract 12/06/2021     Anxiety and depression 01/01/1962     Bigeminy 03/17/2019     Bronchiectasis without complication (H)      COPD (chronic obstructive pulmonary disease) (H) 01/01/2009     Diabetes mellitus (H) 01/01/2008     Functional diarrhea 12/31/2018     GERD (gastroesophageal reflux disease)      History of alcoholism (H)      Hx antineoplastic chemotherapy     lung cancer      Hx of radiation therapy     lung cancer      Hyperlipemia 01/01/2009     Hypothyroid      Lung cancer (H) 01/01/2008    RUL,  Non small cell cancer     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     Osteopenia      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, subsequent encounter 04/08/2019     Other hydronephrosis      Pleural effusion 01/01/2015     Preoperative examination 12/06/2021     Sepsis due to Escherichia coli with acute renal failure without septic shock, unspecified acute renal failure type (H)      Sleep apnea 01/01/2010    does not use cpap       Current Outpatient Medications:      acetaminophen (TYLENOL) 500 MG tablet, Take 1-2 tablets (500-1,000 mg) by mouth every 6 hours as needed for mild pain, Disp: 100 tablet, Rfl: 0     albuterol (PROAIR HFA/PROVENTIL HFA/VENTOLIN HFA) 108 (90 Base) MCG/ACT inhaler, Inhale 2 puffs into the lungs every 6 hours, Disp: ,  Rfl:      aspirin 81 MG EC tablet, Take 81 mg by mouth daily, Disp: , Rfl:      blood glucose (NO BRAND SPECIFIED) test strip, Use to test blood sugar 2 times daily or as directed., Disp: 200 strip, Rfl: 3     calcium carbonate (TUMS) 500 MG chewable tablet, Take 1 chew tab by mouth daily, Disp: , Rfl:      cetirizine (ZYRTEC) 10 MG CHEW, Take 10 mg by mouth daily, Disp: , Rfl:      citalopram (CELEXA) 10 MG tablet, Take 1 tablet (10 mg) by mouth daily, Disp: 90 tablet, Rfl: 1     famotidine (PEPCID) 20 MG tablet, Take 1 tablet (20 mg) by mouth daily, Disp: 90 tablet, Rfl: 3     fluticasone (FLONASE) 50 MCG/ACT nasal spray, , Disp: , Rfl:      levothyroxine (SYNTHROID/LEVOTHROID) 50 MCG tablet, TAKE ON EMPTY STOMACH EVERY MONDAY AND FRIDAY (SEE OTHER DOSE FOR REMAINING DAYS OF WEEK), Disp: 24 tablet, Rfl: 3     levothyroxine (SYNTHROID/LEVOTHROID) 75 MCG tablet, TAKE 1 TAB BY MOUTH ON TUE WED THR SAT SUN, Disp: 90 tablet, Rfl: 0     magnesium 500 MG TABS, , Disp: , Rfl:      Melatonin 10 MG CAPS, , Disp: , Rfl:      montelukast (SINGULAIR) 10 MG tablet, Take 1 tablet (10 mg) by mouth At Bedtime, Disp: 90 tablet, Rfl: 3     multivitamin w/minerals (THERA-VIT-M) tablet, Take 1 tablet by mouth daily, Disp: , Rfl:      prednisoLONE acetate (PRED FORTE) 1 % ophthalmic suspension, , Disp: , Rfl:      PULMICORT FLEXHALER 180 MCG/ACT inhaler, INHALE 2 PUFFS BY MOUTH TWICE A DAY, Disp: 1 each, Rfl: 11     simvastatin (ZOCOR) 10 MG tablet, Take 1 tablet (10 mg) by mouth At Bedtime, Disp: 90 tablet, Rfl: 3     triamcinolone (KENALOG) 0.1 % external cream, Apply topically 2 times daily, Disp: , Rfl:      TRULICITY 1.5 MG/0.5ML pen, Inject 1.5 mg Subcutaneous every 7 days, Disp: 9 mL, Rfl: 2     umeclidinium-vilanterol (ANORO ELLIPTA) 62.5-25 MCG/INH oral inhaler, TAKE 1 PUFF BY MOUTH EVERY DAY, Disp: 90 each, Rfl: 3    Allergies   Allergen Reactions     Seasonal Allergies         Past Surgical History:   Procedure Laterality  "Date     IR MISCELLANEOUS PROCEDURE  12/10/2009     PORTACATH PLACEMENT       THORACOSCOPY Right 4/6/2015     THORACOSCOPY Left 3/29/2019     TONSILLECTOMY  age 5     URETERAL STENT PLACEMENT Right 6/2010     US THORACENTESIS  3/27/2019            Child-Bearing Potential?: No    Race: White  Race (Secondary): N/A    : No    Ethnicity: Non-/     Vitals:  Time Subject Sat: 13:00   /53 (BP Location: Left arm, Patient Position: Sitting, Cuff Size: Adult Large)   Pulse 78   Temp 98.3  F (36.8  C)   Resp 12   Ht 1.66 m (5' 5.35\")   Wt 84.8 kg (186 lb 15.2 oz)   SpO2 94%   BMI 30.77 kg/m       Sponsor Expected Values   Blood Pressure: SBP: ; DBP: 40-90  Pulse:  bpm  Temp: 35.5-37.5  C  Respiration: 10-23  Ht: in cm  Wt: in kg  SpO2%: %  BMI: Rounded to nearest whole number      Screening Info:  Respiratory Conditions: COPD    Spirometer Test Results (FEV%): 66    Condition Severity: Moderate - Stage 2 (FEV 50-79%)    Sleep Conditions:   Sleep Apnea Diagnosis: Yes  Use of CPAP at Night: No  Stop Breathing or Gasping/Gurgling Sounds at Night: Yes  Snoring at Night: Yes    SPO2%  ? 95% during 3 minutes? Yes    Oxygen Therapy: No    Minutes of Exercise per Week: <20  Type of Activity: Cardio      Measurements & Preferences:  Dominant Hand: Right   Preferred Watch Hand: Left    Volunteer-Reported Rodrigues Scale: 2  Staff-Recorded Rodrigues Scale: 2    Hairiness Level: A: Thin Hair, Low Density     Wrist Circumference:  Left: 162 mm       Right: 160 mm    Spectrometer Values:            Left:   L*: 65.60    A*: 7.88   B*: 19.22      Right: L*: 66.84    A*: 6.70   B*: 20.22         STUDY PROCEDURE DATA      Study Date: 07/08/22  Study Time (Education Start Time): 14:2000   Device IDs:  Day Watch ID 1: [id number]    Night Watch ID 2: [id number]    Watch Enclosure 1: Aluminum      Watch Enclosure 2: Aluminum   Watch Size 1: 40 mm  Watch Size 2: 40 mm  Nonin ID 1: [id number] "   Nonin ID 2: [id number]   Lifestyle:  Moisturizing Cream/Lotion applied at wrist? No  Additional Comments (Device/participant issues): none     8-JUL-2022  Royer Givens

## 2022-07-08 NOTE — PROGRESS NOTES
"Oban Study Physical Exam      Medical History Reviewed? Yes    Physical Examination  For abnormal findings, please evaluate if the finding is Clinically Significant (by 'CS') or Not Clinically Significant (by 'NCS')  General Appearance   Normal  Head and Neck   Abnormal; NCS wears glasses; missing 1 tooth left lower and 1 tooth right lower  Lungs     Abnormal; NCS mild expiratory wheezing heard all lobes except upper left lobe  Cardiovascular   Normal  Abdomen    Normal  Musculoskeletal/Extremities  Abnormal; NCS frail skin with few hematomas dorsal surface bilateral forearms from carrying bags; no open areas or bleeding noted   Lymph Nodes    Normal  Skin     Normal  Neurological    Normal    Tremor (If present document)  Absent    Vitals:  /53 (BP Location: Left arm, Patient Position: Sitting, Cuff Size: Adult Large)   Pulse 78   Temp 98.3  F (36.8  C)   Resp 12   Ht 1.66 m (5' 5.35\")   Wt 84.8 kg (186 lb 15.2 oz)   SpO2 94%   BMI 30.77 kg/m    BSA 1.98 m       COVID: No symptoms, chills, shortness of breath, or difficulty breathing, muscle or body aches, headache, loss of taste or smell, sore throat, runny nose, congestion, nausea, vomiting or diarrhea according to the [place] Department of Health and Human Services based on the CARES Act.     COVID Vaccinations:   Immunization History   Administered Date(s) Administered     COVID-19,PF,Moderna 02/01/2021, 03/01/2021, 11/05/2021     COVID-19,PF,Moderna Booster 05/05/2022     FLU 6-35 months 09/23/2009     Flu, Unspecified 10/25/2010, 09/27/2011, 10/01/2014, 10/13/2020     W4a2-17 Novel Flu 03/07/2010     HepB, Unspecified 02/17/2006, 03/17/2006     HepB-Adult 09/20/2006     Influenza (High Dose) 3 valent vaccine 10/02/2014, 10/12/2016, 10/24/2017, 08/27/2018, 10/01/2019     Influenza (IIV3) PF 10/25/2010, 09/29/2011     Influenza, Quad, High Dose, Pf, 65yr+ (Fluzone HD) 08/27/2018, 10/12/2020, 09/22/2021     Pneumo Conj 13-V (2010&after) 07/27/2015     " Pneumococcal 23 valent 09/14/2012, 10/01/2019     Td (Adult), Adsorbed 02/17/2006     Tdap (Adacel,Boostrix) 10/25/2010     Twinrix A/B 02/17/2006     Zoster vaccine recombinant adjuvanted (SHINGRIX) 10/01/2019, 01/11/2020     Zoster vaccine, live 04/06/2011       Smoking History  Are you currently smoking or vaping? No  How Many Years Have You Smoked or Vaped? 38 years (started age 17; quit for 3 years at one time: restarted, then quit for good 11/9/2008)  Packs or E-Cigs Per Day: 1.5 PPD     Electrocardiogram   ECG not applicable as subject is in the respiratory cohort.     Respiratory Conditions:   COPD    Spirometer Test Results (FEV%): 66  Condition Severity: Moderate - Stage 2 (FEV 50-79%)      Natalie Duque PA-C

## 2022-07-08 NOTE — PROGRESS NOTES
At-Home David Inclusion/Exclusion Criteria:     Study Name: David  : Anson Means MD    Protocol version: 4.0 - [id number]    Criteria #  Inclusion Criterita (ALL MUST BE YES)  YES/NO/N/A   1   Male and female subjects at least 18 years old at the time of the screening visit.  Yes   2   Wrist circumference and 120mm-245mm (inclusive).  Yes   3   Ability to understand and provide written informed consent.  Yes   4   Willing and able to comply with study procedures, activities, and duration as described in the ICF.   Yes   5  If not vaccinated and up to date with COVID -19 vaccinations, willing to take a rapid AG test, or PCR test, and produce a negative result within 3 days of the study visit(s).   NA   6   Didn't smoke at least 2 hours before screening (or study procedures).  Yes   7   Neither subject, nor any individuals living with subject, have had new development in the following within the last 14 days prior to study screening:        a. Have failed to comply with any country, state, and local travel restrictions.         b. Have had any unexpected flu-like symptoms (such as fever, chills, cough, shortness of breath, diarrhea, sore throat, runny nose, or trouble breathing).        c. Have had any contact with people confirmed COVID-19.         d. Have been confirmed to have COVID-19 and have not subsequently received a negative COVID-19 test result.    Yes   8   If Cohort 1 (In-Lab Cardiac Conditions):         a. Indication of a rhythm disorder (dated up to 5 years ago) as outlined in Table A (see Protocol page 9), and be present at the time of screening.     NA   9   If Cohort 2 (In-Lab Respiratory Conditions):          a. Prior diagnosis of one of the following conditions, within 5 years: 1) Moderate (GOLD Stage 2) COPD, 2) Severe or Very Severe (GOLD Stage 3 or 4) COPD, 3) Idiopathic pulmonary fibrosis.          b. Record of spirometry FEV% result (within 5 years) are available.    NA   10    If Cohort 3 (At-Home respiratory Conditions):         a. Prior diagnosis of one of the following conditions, within 5 years: 1) Moderate (GOLD Stage 2) COPD, 2) Severe or Very Severe (GOLD Stage 3 or 4) COPD, 3) Idiopathic pulmonary fibrosis.          b. Record of spirometry FEV% result (within 5 years) are available.          c. Willing and able to use a study provided iPhone and navigate study Raisa flow.          d. Stable WIFI at home and are able to connect it to study iPhones     Yes       Criteria # Exclusion Criteria (ALL MUST BE NO) YES/NO/N/A   1   Individuals with severe contact allergies to standard adhesives, or other materials found in pulse oximetry sensors, ECG electrodes, respiration monitor electrodes, wearables device bands and watch surfaces.    No   2   Individuals that do not have at least 2 intact fingers (excluding thumb, *pinky will be excluded only for cohort 1 and cohort 2) on non-preferred hand to wear a watch.    No   3   Open wound(s) or active infections on wrists at study watch wear locations or where the ECG electrodes may be placed.    No   4   Physical disability that prevents safe and adequate testing.  No   5   Individuals with a pacemaker or an automated implantable cardioverter-defibrillator (AICD).    No   6   Individuals with physical scars, tattoos, or other skin markings on wrists where sensors or finger sensor are to be worn.    No   7   Individuals with clinically significant hand tremors, as judged by a Study Investigator.    No   8   Pregnant women.     No   9   Subjects with any medical history, physical exam, vital sign or any other study procedure finding/assessment that in the opinion of the Investigator could compromise subject safety during study participation or interfere with the study integrity and/or the accurate assessment of the study objectives.    No   10   Presence of skin conditions or disease at the fingers of SpO2% application sites that could  interfere with SpO2% sensor placement or the accuracy of measurement. Such conditions include, but are not limited to: extensive scarring, skin lesions, redness, infection or edema at target measurement sites.     No   11   Presence of long fingernails that interfere with the placement of the SpO2% sensor or nail polish at the fingers of SpO2% application sites.    No   12   Medical history or physical assessment finding that makes the subject inappropriate for participation, according to the investigator.    No     Patient does fulfill study inclusion criteria and no exclusion criteria are found.     Anson Means MD    8-JUL-2022    Royer Givens

## 2022-07-11 ENCOUNTER — VIRTUAL VISIT (OUTPATIENT)
Dept: CARDIOLOGY | Facility: CLINIC | Age: 72
End: 2022-07-11
Payer: MEDICARE

## 2022-07-11 DIAGNOSIS — Z00.6 RESEARCH SUBJECT: Primary | ICD-10-CM

## 2022-07-11 PROCEDURE — 99207 PR NO CHARGE NURSE ONLY: CPT

## 2022-07-11 NOTE — PROGRESS NOTES
"Oban Study At-Home Phone Visit    Study description:   Multiconditions PPG Sub-Study. The purpose of this research study is to collect data related to health for the development of mobile technologies. This data will include physiological signal recordings from medical devices and smart watch data collection software. This study is not to provide any treatment. This study will only collect information for research and  purposes.       Subject Number: [id number]    Study Day: Day 4    Oban study subject was called today to;     Confirm subjects are following the subject instructions     Address any technical difficulties     Answer any questions    Reminders Checklist:   [x]  Unlock the watches: Passcode - [id number]    -When placing them on the  or on themselves   [x]  Visually confirm Wi-Fi connection and charging setup  [x]  Unlock phones   -Unlock before placing on   -Should NOT show lock icon  [x]  Check Flubber trish for incomplete surveys   -Morning surveys: End of Night task on NIGHT phone    -Evening surveys: End of Day task on DAY phone  [x]  Take devices off before showering/getting them wet  [x]  Make sure to dismiss any update notifications. -Tap \"Not Now\"  [x]  Good Nonin wear    -While resting/relaxing, not while typing doing anything hand intensive   [x]  Remind them of next appointment with us     Adverse Events & Con Med Assessment Performed?   [x] None (If yes, please generate adverse event report for PI to cosign)     Participant does not recall if clicking 'next' and 'done'. Participant agreeable to click 'done' after surveys moving forward.       11-JUL-2022    Pamela Lim RN    "

## 2022-07-12 ENCOUNTER — ALLIED HEALTH/NURSE VISIT (OUTPATIENT)
Dept: CARDIOLOGY | Facility: CLINIC | Age: 72
End: 2022-07-12
Payer: MEDICARE

## 2022-07-12 DIAGNOSIS — Z00.6 EXAMINATION OF PARTICIPANT OR CONTROL IN CLINICAL RESEARCH: Primary | ICD-10-CM

## 2022-07-12 PROCEDURE — 99207 PR NO CHARGE NURSE ONLY: CPT

## 2022-07-12 NOTE — PROGRESS NOTES
David Study At-Home On-Site Visit    Study description:   Multiconditions PPG Sub-Study. The purpose of this research study is to collect data related to health for the development of mobile technologies. This data will include physiological signal recordings from medical devices and smart watch data collection software. This study is not to provide any treatment. This study will only collect information for research and  purposes.       Subject Number: [id number]    Study Day: Day 5    Rita Mancini a 72 year old female, was onsite today to return their first Nonin device and receive their third Nonin devices, per study protocol. With the new device, the subject was reminded to continue following the subject instructions. All questions were answered to their satisfaction.     Nonin ID 3: [id number]     Adverse Events & Con Med Assessment Performed?   [x]     12- JUL-2022    Sara Behmanesh

## 2022-07-13 ENCOUNTER — TELEPHONE (OUTPATIENT)
Dept: CARDIOLOGY | Facility: CLINIC | Age: 72
End: 2022-07-13
Payer: MEDICARE

## 2022-07-13 DIAGNOSIS — J42 CHRONIC BRONCHITIS, UNSPECIFIED CHRONIC BRONCHITIS TYPE (H): ICD-10-CM

## 2022-07-13 PROCEDURE — 99207 PR NO CHARGE NURSE ONLY: CPT

## 2022-07-13 NOTE — TELEPHONE ENCOUNTER
Oban Study At-Home Unscheduled Outbound Call    Study description:   Multiconditions PPG Sub-Study. The purpose of this research study is to collect data related to health for the development of mobile technologies. This data will include physiological signal recordings from medical devices and smart watch data collection software. This study is not to provide any treatment. This study will only collect information for research and  purposes.       Subject Number: [id number]    Study Day: Day 6    Oban study subject was called today to;     Confirm subjects are following the subject instructions     Address any technical difficulties     Answer any questions    Notably, the patient was called today to discuss Compliance Report .   Unlocked night watch, day watch was already unlocked, unlocked both phones. Participant stated unlocking phones/watches per protocol. Writer suggested unlocking all devices periodically throughout the day whenever possible.  Speed Test Results:  Upload: 3.29 mbps  Download: 11.1 mbps      13-JUL-2022    Pamela Lim RN

## 2022-07-14 ENCOUNTER — TELEPHONE (OUTPATIENT)
Dept: CARDIOLOGY | Facility: CLINIC | Age: 72
End: 2022-07-14
Payer: MEDICARE

## 2022-07-14 DIAGNOSIS — Z00.6 EXAMINATION OF PARTICIPANT OR CONTROL IN CLINICAL RESEARCH: Primary | ICD-10-CM

## 2022-07-14 PROCEDURE — 99207 PR NO CHARGE NURSE ONLY: CPT

## 2022-07-14 NOTE — TELEPHONE ENCOUNTER
Oban Study At-Home Unscheduled Inbound Call    Study description:   Multiconditions PPG Sub-Study. The purpose of this research study is to collect data related to health for the development of mobile technologies. This data will include physiological signal recordings from medical devices and smart watch data collection software. This study is not to provide any treatment. This study will only collect information for research and  purposes.       Subject Number: [id number]    Study Day: Day 7    Oban study subject was called today to;     Confirm subjects are following the subject instructions     Address any technical difficulties     Answer any questions    Notably, the patient was called today to discuss Nonin. The subject stated she attempted to switch to her Yellow Nonin around 2300 before nighttime wear last night as she had reached 37 hours of recording on her Purple Nonin. However, when she plugged the sensor into the Yellow Nonin it did not register. Subject did not wear any devices last night due to this and called this morning to troubleshoot. This research assistant talked her through device issue management including resetting the sensor in the Nonin and pressing the reset button. Subject confirmed device was working appropriately before call was ended.         14-JUL-2022    Rand Burris

## 2022-07-14 NOTE — TELEPHONE ENCOUNTER
"Routing refill request to provider for review/approval because:  Early refill requested.    Last Written Prescription Date:  8/12/21  Last Fill Quantity: 90,  # refills: 3   Last office visit provider:  7/7/22     Requested Prescriptions   Pending Prescriptions Disp Refills     umeclidinium-vilanterol (ANORO ELLIPTA) 62.5-25 MCG/INH oral inhaler 180 each 3     Sig: TAKE 1 PUFF BY MOUTH EVERY DAY       Asthma Maintenance Inhalers - Anticholinergics Passed - 7/14/2022 12:47 PM        Passed - Patient is age 12 years or older        Passed - Recent (12 mo) or future (30 days) visit within the authorizing provider's specialty     Patient has had an office visit with the authorizing provider or a provider within the authorizing providers department within the previous 12 mos or has a future within next 30 days. See \"Patient Info\" tab in inbasket, or \"Choose Columns\" in Meds & Orders section of the refill encounter.              Passed - Medication is active on med list       Inhaled Steroids Protocol Passed - 7/14/2022 12:47 PM        Passed - Patient is age 12 or older        Passed - Recent (12 mo) or future (30 days) visit within the authorizing provider's specialty     Patient has had an office visit with the authorizing provider or a provider within the authorizing providers department within the previous 12 mos or has a future within next 30 days. See \"Patient Info\" tab in inbasket, or \"Choose Columns\" in Meds & Orders section of the refill encounter.              Passed - Medication is active on med list             Junior Prince RN 07/14/22 12:48 PM  "

## 2022-07-15 ENCOUNTER — VIRTUAL VISIT (OUTPATIENT)
Dept: CARDIOLOGY | Facility: CLINIC | Age: 72
End: 2022-07-15
Payer: MEDICARE

## 2022-07-15 DIAGNOSIS — Z00.6 EXAMINATION OF PARTICIPANT OR CONTROL IN CLINICAL RESEARCH: Primary | ICD-10-CM

## 2022-07-15 PROCEDURE — 99207 PR NO CHARGE NURSE ONLY: CPT

## 2022-07-15 NOTE — PROGRESS NOTES
"Oban Study At-Home Phone Visit    Study description:   Multiconditions PPG Sub-Study. The purpose of this research study is to collect data related to health for the development of mobile technologies. This data will include physiological signal recordings from medical devices and smart watch data collection software. This study is not to provide any treatment. This study will only collect information for research and  purposes.       Subject Number: [id number]    Study Day: Day 8     Oban study subject was called today to;     Confirm subjects are following the subject instructions     Address any technical difficulties     Answer any questions    Reminders Checklist:   [x]  Unlock the watches: Passcode - [id number]    -When placing them on the  or on themselves   [x]  Visually confirm Wi-Fi connection and charging setup  [x]  Unlock phones   -Unlock before placing on   -Should NOT show lock icon  [x]  Check Flubber trish for incomplete surveys   -Morning surveys: End of Night task on NIGHT phone    -Evening surveys: End of Day task on DAY phone  [x]  Take devices off before showering/getting them wet  [x]  Make sure to dismiss any update notifications. -Tap \"Not Now\"  [x]  Good Nonin wear    -While resting/relaxing, not while typing doing anything hand intensive   [x]  Remind them of next appointment with us     Adverse Events & Con Med Assessment Performed?   [x]  (If yes, please generate adverse event report for PI to rubi)    Subject reported she wore her yellow Nonin for 12 hours through yesterday and last evening but only 9 hours were showing on her Nonin. Issues with the device and the sensor were identified and trouble shot over the phone. Through this it was determined the finger sensor was no longer working. Research staff were able to deliver a replacement finger sensor to her residence so she could achieve her weekend wear time requirements.     In addition, the most recent " compliance report was discussed with the subject. She states that she has been wearing the devices during the day as well as following the survey to place the day watch on the  and unlock it to allow for data upload. Subject also visually confirmed both of her study phones were still connected to her home WIFI. She was instructed to continue following study procedures.     15-JUL-2022    Rand Burris

## 2022-07-18 ENCOUNTER — VIRTUAL VISIT (OUTPATIENT)
Dept: CARDIOLOGY | Facility: CLINIC | Age: 72
End: 2022-07-18
Payer: MEDICARE

## 2022-07-18 DIAGNOSIS — Z00.6 EXAMINATION OF PARTICIPANT OR CONTROL IN CLINICAL RESEARCH: Primary | ICD-10-CM

## 2022-07-18 PROCEDURE — 99207 PR NO CHARGE NURSE ONLY: CPT

## 2022-07-18 NOTE — PROGRESS NOTES
Oban Study At-Home Phone Visit    Study description:   Multiconditions PPG Sub-Study. The purpose of this research study is to collect data related to health for the development of mobile technologies. This data will include physiological signal recordings from medical devices and smart [employer] data collection software. This study is not to provide any treatment. This study will only collect information for research and  purposes.       Subject Number: [id number]     Study Day: Day 11    Oban study subject was called today to;     Confirm subjects are following the subject instructions     Address any technical difficulties     Answer any questions    Participant put purple oximeter on, worn it for quite a while, but had issues with it prior to running out of battery. Participant troubleshooted per protocol, by unplugging and plugging the finger sensor. Participant is now wearing yellow pulse oximeter, and will continue to wear it until the battery runs out. Participant had no other questions or concerns at this time.     Adverse Events & Con Med Assessment Performed?   [x]     18-JUL-2022    Sara Behmanesh

## 2022-07-19 DIAGNOSIS — H35.30 ARMD (AGE-RELATED MACULAR DEGENERATION), BILATERAL: Primary | ICD-10-CM

## 2022-07-22 ENCOUNTER — ALLIED HEALTH/NURSE VISIT (OUTPATIENT)
Dept: CARDIOLOGY | Facility: CLINIC | Age: 72
End: 2022-07-22
Payer: MEDICARE

## 2022-07-22 DIAGNOSIS — Z00.6 EXAMINATION OF PARTICIPANT OR CONTROL IN CLINICAL RESEARCH: Primary | ICD-10-CM

## 2022-07-22 PROCEDURE — 99207 PR NO CHARGE NURSE ONLY: CPT

## 2022-07-22 NOTE — PROGRESS NOTES
David At-Home Study End Note    Study Description:   Multiconditions PPG Sub-Study. The purpose of this research study is to collect data related to health for the development of mobile technologies. This data will include physiological signal recordings from medical devices and smart watch data collection software. This study is not to provide any treatment. This study will only collect information for research and  purposes.     Adverse Events & Con Med Assessment Performed?   [x]     Did the Subject Complete the At-Home Session? Yes    If no, Termination Reason: N/A    Study Termination/Completion Date: 22-JUL-2022    Subject returned devices today and this completes this study for the subject. Subject reported her replacement finger sensor lasted her 2 days before also dying on her. She was able to complete her weekend requirement before then.     Rand Burris

## 2022-08-01 DIAGNOSIS — K21.9 GASTROESOPHAGEAL REFLUX DISEASE WITHOUT ESOPHAGITIS: ICD-10-CM

## 2022-08-01 DIAGNOSIS — F41.1 GAD (GENERALIZED ANXIETY DISORDER): ICD-10-CM

## 2022-08-01 DIAGNOSIS — E78.5 HYPERLIPIDEMIA LDL GOAL <100: ICD-10-CM

## 2022-08-02 ENCOUNTER — OFFICE VISIT (OUTPATIENT)
Dept: OPHTHALMOLOGY | Facility: CLINIC | Age: 72
End: 2022-08-02
Attending: OPHTHALMOLOGY
Payer: MEDICARE

## 2022-08-02 DIAGNOSIS — H04.123 DRY EYE SYNDROME OF BOTH EYES: ICD-10-CM

## 2022-08-02 DIAGNOSIS — H35.30 ARMD (AGE-RELATED MACULAR DEGENERATION), BILATERAL: Primary | ICD-10-CM

## 2022-08-02 DIAGNOSIS — Z96.1 PSEUDOPHAKIA OF BOTH EYES: ICD-10-CM

## 2022-08-02 PROCEDURE — 92250 FUNDUS PHOTOGRAPHY W/I&R: CPT | Performed by: OPHTHALMOLOGY

## 2022-08-02 PROCEDURE — 92134 CPTRZ OPH DX IMG PST SGM RTA: CPT | Performed by: OPHTHALMOLOGY

## 2022-08-02 PROCEDURE — 92242 FLUORESCEIN&ICG ANGIOGRAPHY: CPT | Performed by: OPHTHALMOLOGY

## 2022-08-02 PROCEDURE — 99207 FUNDUS AUTOFLUORESCENCE IMAGE (FAF) OU (BOTH EYES): CPT | Mod: 26 | Performed by: OPHTHALMOLOGY

## 2022-08-02 PROCEDURE — 92015 DETERMINE REFRACTIVE STATE: CPT | Mod: GY

## 2022-08-02 PROCEDURE — 92004 COMPRE OPH EXAM NEW PT 1/>: CPT | Performed by: OPHTHALMOLOGY

## 2022-08-02 PROCEDURE — G0463 HOSPITAL OUTPT CLINIC VISIT: HCPCS

## 2022-08-02 RX ORDER — CITALOPRAM HYDROBROMIDE 10 MG/1
10 TABLET ORAL DAILY
Qty: 90 TABLET | Refills: 1 | Status: SHIPPED | OUTPATIENT
Start: 2022-08-02 | End: 2023-02-02

## 2022-08-02 RX ORDER — FAMOTIDINE 20 MG/1
20 TABLET, FILM COATED ORAL DAILY
Qty: 90 TABLET | Refills: 3 | Status: SHIPPED | OUTPATIENT
Start: 2022-08-02 | End: 2023-05-03

## 2022-08-02 RX ORDER — SIMVASTATIN 10 MG
10 TABLET ORAL AT BEDTIME
Qty: 90 TABLET | Refills: 3 | Status: SHIPPED | OUTPATIENT
Start: 2022-08-02 | End: 2023-05-03

## 2022-08-02 ASSESSMENT — REFRACTION_MANIFEST
OD_AXIS: 010
OS_ADD: +2.50
OD_ADD: +2.50
OD_SPHERE: PLANO
OS_AXIS: 175
OS_CYLINDER: +0.25
OD_CYLINDER: +0.75
OS_SPHERE: +0.50

## 2022-08-02 ASSESSMENT — VISUAL ACUITY
OS_SC: 20/40
OS_SC+: -1
METHOD: SNELLEN - LINEAR
OS_PH_SC+: -1
OS_PH_SC: 20/30
OD_SC: 20/25
OD_SC+: -2

## 2022-08-02 ASSESSMENT — TONOMETRY
IOP_METHOD: TONOPEN
OD_IOP_MMHG: 12
OS_IOP_MMHG: 14

## 2022-08-02 ASSESSMENT — CONF VISUAL FIELD
METHOD: COUNTING FINGERS
OD_NORMAL: 1
OS_NORMAL: 1

## 2022-08-02 ASSESSMENT — SLIT LAMP EXAM - LIDS
COMMENTS: NORMAL
COMMENTS: NORMAL

## 2022-08-02 ASSESSMENT — EXTERNAL EXAM - RIGHT EYE: OD_EXAM: NORMAL

## 2022-08-02 ASSESSMENT — EXTERNAL EXAM - LEFT EYE: OS_EXAM: NORMAL

## 2022-08-02 NOTE — TELEPHONE ENCOUNTER
"Routing refill request to provider for review/approval because:  Labs out of range:  ldl    Last Written Prescription Date:  8/12/21  Last Fill Quantity: 90,  # refills: 3   Last office visit provider:  7/7/22     Requested Prescriptions   Pending Prescriptions Disp Refills     citalopram (CELEXA) 10 MG tablet 90 tablet 1     Sig: Take 1 tablet (10 mg) by mouth daily       SSRIs Protocol Passed - 8/1/2022 11:52 AM        Passed - Recent (12 mo) or future (30 days) visit within the authorizing provider's specialty     Patient has had an office visit with the authorizing provider or a provider within the authorizing providers department within the previous 12 mos or has a future within next 30 days. See \"Patient Info\" tab in inbasket, or \"Choose Columns\" in Meds & Orders section of the refill encounter.              Passed - Medication is active on med list        Passed - Patient is age 18 or older        Passed - No active pregnancy on record        Passed - No positive pregnancy test in last 12 months           simvastatin (ZOCOR) 10 MG tablet 90 tablet 3     Sig: Take 1 tablet (10 mg) by mouth At Bedtime       Statins Protocol Failed - 8/1/2022 11:52 AM        Failed - LDL on file in past 12 months     Recent Labs   Lab Test 11/27/19  1112   LDL 99             Passed - No abnormal creatine kinase in past 12 months     No lab results found.             Passed - Recent (12 mo) or future (30 days) visit within the authorizing provider's specialty     Patient has had an office visit with the authorizing provider or a provider within the authorizing providers department within the previous 12 mos or has a future within next 30 days. See \"Patient Info\" tab in inbasket, or \"Choose Columns\" in Meds & Orders section of the refill encounter.              Passed - Medication is active on med list        Passed - Patient is age 18 or older        Passed - No active pregnancy on record        Passed - No positive pregnancy " "test in past 12 months           famotidine (PEPCID) 20 MG tablet 90 tablet 3     Sig: Take 1 tablet (20 mg) by mouth daily       H2 Blockers Protocol Passed - 8/1/2022 11:52 AM        Passed - Patient is age 12 or older        Passed - Recent (12 mo) or future (30 days) visit within the authorizing provider's specialty     Patient has had an office visit with the authorizing provider or a provider within the authorizing providers department within the previous 12 mos or has a future within next 30 days. See \"Patient Info\" tab in inbasket, or \"Choose Columns\" in Meds & Orders section of the refill encounter.              Passed - Medication is active on med list             Lydia Woo RN 08/02/22 3:06 PM  "

## 2022-08-02 NOTE — PROGRESS NOTES
CC -  Macular degeneration     INTERVAL HISTORY - Initial visit    HPI -   Rita Mancini is a  72 year old year-old patient presenting for evaluation for macular degeneration. Was referred by her cataract surgeon for AMD. Saw Dr. KRISTIE Duarte ~6 months ago. 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 I personally obtained the chief complaint(s) and history of present illness.  I confirmed and edited as necessary the review of systems, past medical/surgical history, family history, social history, and examination findings as documented by others; and I examined the patient myself. I personally reviewed the relevant tests, images, and reports as documented above. I formulated and edited as necessary the assessment and plan and discussed the findings and management plan with the patient and family.     Malvin Rizzo MD

## 2022-08-02 NOTE — NURSING NOTE
Chief Complaints and History of Present Illnesses   Patient presents with     Macular Degeneration Evaluation     Chief Complaint(s) and History of Present Illness(es)     Macular Degeneration Evaluation     Laterality: both eyes    Quality: blurred vision    Associated symptoms: floaters.  Negative for dryness, photophobia and flashes    Pain scale: 0/10              Comments     Macular degeneration evaluation.  The patient states she has AMD in the left eye.  She takes AREDs Preservision vitamins.  The patient has unchanged floaters.  Sophie Richardson, COA, COA 1:07 PM 08/02/2022

## 2022-08-02 NOTE — LETTER
August 2, 2022      Re: Rita Mancini   1950      To Whom It May Concern:    This is to confirm that the above patient was seen on 8/2/2022.  Rita Mancini needs to take AREDS 2 vitamins twice daily.    Thank you for your cooperation in this matter.  Please do not hesitate to contact me if you have any further questions.      Sincerely,    Electronically signed  RIOS ROTHMAN

## 2022-08-09 ENCOUNTER — VIRTUAL VISIT (OUTPATIENT)
Dept: FAMILY MEDICINE | Facility: CLINIC | Age: 72
End: 2022-08-09
Payer: MEDICARE

## 2022-08-09 DIAGNOSIS — F32.1 CURRENT MODERATE EPISODE OF MAJOR DEPRESSIVE DISORDER, UNSPECIFIED WHETHER RECURRENT (H): ICD-10-CM

## 2022-08-09 DIAGNOSIS — F10.21 HISTORY OF ALCOHOLISM (H): ICD-10-CM

## 2022-08-09 DIAGNOSIS — U07.1 CLINICAL DIAGNOSIS OF COVID-19: Primary | ICD-10-CM

## 2022-08-09 DIAGNOSIS — J44.9 CHRONIC OBSTRUCTIVE PULMONARY DISEASE, UNSPECIFIED COPD TYPE (H): ICD-10-CM

## 2022-08-09 PROCEDURE — 99213 OFFICE O/P EST LOW 20 MIN: CPT | Mod: CS | Performed by: PHYSICIAN ASSISTANT

## 2022-08-09 PROCEDURE — 96127 BRIEF EMOTIONAL/BEHAV ASSMT: CPT | Mod: 95 | Performed by: PHYSICIAN ASSISTANT

## 2022-08-09 ASSESSMENT — ANXIETY QUESTIONNAIRES
GAD7 TOTAL SCORE: 0
2. NOT BEING ABLE TO STOP OR CONTROL WORRYING: NOT AT ALL
6. BECOMING EASILY ANNOYED OR IRRITABLE: NOT AT ALL
5. BEING SO RESTLESS THAT IT IS HARD TO SIT STILL: NOT AT ALL
GAD7 TOTAL SCORE: 0
3. WORRYING TOO MUCH ABOUT DIFFERENT THINGS: NOT AT ALL
1. FEELING NERVOUS, ANXIOUS, OR ON EDGE: NOT AT ALL
7. FEELING AFRAID AS IF SOMETHING AWFUL MIGHT HAPPEN: NOT AT ALL
4. TROUBLE RELAXING: NOT AT ALL
7. FEELING AFRAID AS IF SOMETHING AWFUL MIGHT HAPPEN: NOT AT ALL
GAD7 TOTAL SCORE: 0

## 2022-08-09 ASSESSMENT — PATIENT HEALTH QUESTIONNAIRE - PHQ9
SUM OF ALL RESPONSES TO PHQ QUESTIONS 1-9: 8
10. IF YOU CHECKED OFF ANY PROBLEMS, HOW DIFFICULT HAVE THESE PROBLEMS MADE IT FOR YOU TO DO YOUR WORK, TAKE CARE OF THINGS AT HOME, OR GET ALONG WITH OTHER PEOPLE: NOT DIFFICULT AT ALL
SUM OF ALL RESPONSES TO PHQ QUESTIONS 1-9: 8

## 2022-08-09 NOTE — PATIENT INSTRUCTIONS
HOLD SIMVASTATIN WHILE ON 5 DAYS OF ANTIVIRAL     OK TO USE ACETAMINOPHEN (GENERIC TYLENOL) FOR THROAT PAIN    Instructions for Patients      What are the symptoms of COVID-19?  Symptoms can include fever, cough, shortness of breath, chills, headache, muscle pain sore throat, fatigue, runny or stuffy nose, and loss of taste and smell. Other less common symptoms include nausea, vomiting, or diarrhea (watery stools).    Know when to call 911. Emergency warning signs include:  Trouble breathing or shortness of breath  Pain or pressure in the chest that doesn't go away  Feeling confused like you haven't felt before, or not being able to wake up  Bluish-colored lips or face    How can I take care of myself?  Get lots of rest. Drink extra fluids (unless a doctor has told you not to).  Take Tylenol (acetaminophen) for fever or pain. If you have liver or kidney problems, ask your family doctor if it's okay to take Tylenol   Adults:   650 mg (two 325 mg pills or tablets) every 4 to 6 hours, or...   1,000 mg (two 500 mg pills or tablets) every 8 hours as needed.  Note: Don't take more than 3,000 mg in one day. Acetaminophen is found in many medicines (both prescribed and over the counter). Read all labels to be sure you don't take too much.  For children, check the Tylenol bottle for the right dose. The dose is based on the child's age or weight.  Take over the counter medicines for your symptoms as needed. Talk to your pharmacist.  If you have other health problems (like cancer, heart failure, an organ transplant, or severe kidney disease): Call your specialty clinic if you don't feel better in the next 2 days.    These guidelines are for isolating and quarantining before returning to work, school or .   For employers, schools and day cares: This is an official notice for this person s medical guidelines for returning in-person.   For health care sites: The CDC gives different isolation and quarantine guidelines for  healthcare sites, please check with these sites before arriving.     How do I self-isolate?  You isolate when you have symptoms of COVID or a test shows you have COVID, even if you don t have symptoms.   If you DO have symptoms:  Stay home and away from others  For at least 5 days after your symptoms started, AND   You are fever free for 24 hours (with no medicine that reduces fever), AND  Your other symptoms are better.  Wear a mask for 10 full days any time you are around others.  If you DON T have symptoms:  Stay at home and away from others for at least 5 days after your positive test.  Wear a mask for 10 full days any time you are around others.    How and when do I quarantine?  You quarantine when you may have been exposed to the virus and DON T have symptoms.   Stay home and away from others.   You must quarantine for 5 days after your last contact with a person who has COVID.  Note: If you are fully vaccinated, you don t need to quarantine. You should still follow the steps below.   Wear a mask for 10 full days any time you re around others.  Get tested at least 5 days after you were exposed, even if you don t have symptoms.   If you start to have symptoms, isolate right away and get tested.    Where can I get more information?  [facility] COVID-19 Resource Hub: [email or web address]   CDC Quarantine & Isolation: https://www.cdc.gov/coronavirus/2019-ncov/your-health/quarantine-isolation.html   CDC - What to Do If You're Sick: https://www.cdc.gov/coronavirus/2019-ncov/if-you-are-sick/index.html  [facility] clinical trials (COVID-19 research studies): clinicalaffairs.[facility]/umn-clinical-trials  [place] Department of Health COVID-19 Public Hotline: 1-886.172.5205

## 2022-08-09 NOTE — PROGRESS NOTES
"Answers for HPI/ROS submitted by the patient on 8/9/2022  If you checked off any problems, how difficult have these problems made it for you to do your work, take care of things at home, or get along with other people?: Not difficult at all  PHQ9 TOTAL SCORE: 8  CARLOS ALBERTO 7 TOTAL SCORE: 0    Rita is a 72 year old who is being evaluated via a billable video visit.      How would you like to obtain your AVS? MyChart  If the video visit is dropped, the invitation should be resent by: [phone]  Will anyone else be joining your video visit? No      Assessment & Plan     Clinical diagnosis of COVID-19  Patient meets criteria for renal dose adjustment Paxlovig.  Hold simvastatin while on Paxlovig.  No other medication interactions noted.  Hydration efforts encouraged.  Acetaminophen recommended for throat discomfort in addition to supportive cares.  Monitoring O2 saturations recommended and seeking evaluation if less than 90%.  - nirmatrelvir and ritonavir (PAXLOVID) therapy pack  Dispense: 30 each; Refill: 0       BMI:   Estimated body mass index is 30.77 kg/m  as calculated from the following:    Height as of 7/8/22: 1.66 m (5' 5.35\").    Weight as of 7/8/22: 84.8 kg (186 lb 15.2 oz).   Weight management plan: Patient was referred to their PCP to discuss a diet and exercise plan.      Return today (on 8/9/2022) for  & start paxlovig.    Hayley Stephens PA-C  [facility]   Rita is a 72 year old, presenting for the following health issues:  COVID Treatment      HPI       COVID-19 Symptom Review  How many days ago did these symptoms start? 8/5/2022 - tested positive 8/8/2022    Are any of the following symptoms significant for you?  New or worsening difficulty breathing? Yes    Please describe what kind of difficulty you are having breathing:Moderate dyspnea (short of breath with ADLs, shortness of breath that limits the ability to walk up one flight of stairs without " needing to rest)    Worsening cough? Yes, I am coughing up mucus.    Fever or chills? Yes, I felt feverish or had chills.    Headache: YES    Sore throat: YES - worst symptom    Chest pain: No    Diarrhea: No    Body aches? YES    What treatments has patient tried? Asprin   Does patient live in a nursing home, group home, or shelter? No  Does patient have a way to get food/medications during quarantined? Yes, I have a friend or family member who can help me.    Review of Systems   Constitutional, HEENT, cardiovascular, pulmonary, GI, , musculoskeletal, neuro, skin, endocrine and psych systems are negative, except as otherwise noted.      Objective           Vitals:  No vitals were obtained today due to virtual visit.    Physical Exam   GENERAL: Healthy, alert and no distress  EYES: Eyes grossly normal to inspection.  No discharge or erythema, or obvious scleral/conjunctival abnormalities.  RESP: No audible wheeze, cough, or visible cyanosis.  No visible retractions or increased work of breathing.    SKIN: Visible skin clear. No significant rash, abnormal pigmentation or lesions.  NEURO: Cranial nerves grossly intact.  Mentation and speech appropriate for age.  PSYCH: Mentation appears normal, affect normal/bright, judgement and insight intact, normal speech and appearance well-groomed.                Video-Visit Details    Video Start Time: 10:22 AM    Type of service:  Video Visit    Video End Time:10:32 AM    Originating Location (pt. Location): Home    Distant Location (provider location):  [facility]     Platform used for Video Visit: Star[employer]  ..

## 2022-10-11 DIAGNOSIS — J44.9 CHRONIC OBSTRUCTIVE PULMONARY DISEASE, UNSPECIFIED COPD TYPE (H): ICD-10-CM

## 2022-10-12 RX ORDER — MONTELUKAST SODIUM 10 MG/1
10 TABLET ORAL AT BEDTIME
Qty: 90 TABLET | Refills: 2 | Status: SHIPPED | OUTPATIENT
Start: 2022-10-12 | End: 2023-05-03

## 2022-10-12 NOTE — TELEPHONE ENCOUNTER
"Last Written Prescription Date:  10/14/21  Last Fill Quantity: 90,  # refills: 3   Last office visit provider:  7/7/22     Requested Prescriptions   Pending Prescriptions Disp Refills     montelukast (SINGULAIR) 10 MG tablet 90 tablet 3     Sig: Take 1 tablet (10 mg) by mouth At Bedtime       Leukotriene Inhibitors Protocol Passed - 10/12/2022  9:53 AM        Passed - Patient is age 12 or older     If patient is under 16, ok to refill using age based dosing.           Passed - Recent (12 mo) or future (30 days) visit within the authorizing provider's specialty     Patient has had an office visit with the authorizing provider or a provider within the authorizing providers department within the previous 12 mos or has a future within next 30 days. See \"Patient Info\" tab in inbasket, or \"Choose Columns\" in Meds & Orders section of the refill encounter.              Passed - Medication is active on med list             Junior Prince RN 10/12/22 9:53 AM  "

## 2022-10-13 DIAGNOSIS — J30.2 SEASONAL ALLERGIC RHINITIS, UNSPECIFIED TRIGGER: Primary | ICD-10-CM

## 2022-10-15 RX ORDER — FLUTICASONE PROPIONATE 50 MCG
1 SPRAY, SUSPENSION (ML) NASAL 2 TIMES DAILY
Qty: 9.9 G | Refills: 11 | Status: SHIPPED | OUTPATIENT
Start: 2022-10-15 | End: 2022-10-25

## 2022-10-24 ENCOUNTER — TELEPHONE (OUTPATIENT)
Dept: INTERNAL MEDICINE | Facility: CLINIC | Age: 72
End: 2022-10-24

## 2022-10-24 DIAGNOSIS — J30.2 SEASONAL ALLERGIC RHINITIS, UNSPECIFIED TRIGGER: ICD-10-CM

## 2022-10-24 NOTE — TELEPHONE ENCOUNTER
General Call      Reason for Call:  Pt calling stating that the Flonase should have been sent to:  [facility] [address]    This medication has been sent to patient from [facility] in [place] and was only for a 30 day supply    Pt is asking for a additional 60 day supply this is to go to [facility] in [place]            What are your questions or concerns:  n/a    Date of last appointment with provider: n/a    Could we send this information to you in [facility] or would you prefer to receive a phone call?:   Patient would prefer a phone call   Okay to leave a detailed message?: Yes at Home number on file [phone] (home)

## 2022-10-26 RX ORDER — FLUTICASONE PROPIONATE 50 MCG
1 SPRAY, SUSPENSION (ML) NASAL 2 TIMES DAILY
Qty: 16 G | Refills: 11 | Status: SHIPPED | OUTPATIENT
Start: 2022-10-26 | End: 2023-08-16

## 2022-10-30 ENCOUNTER — HEALTH MAINTENANCE LETTER (OUTPATIENT)
Age: 72
End: 2022-10-30

## 2022-11-29 DIAGNOSIS — H35.30 ARMD (AGE-RELATED MACULAR DEGENERATION), BILATERAL: Primary | ICD-10-CM

## 2022-12-06 ENCOUNTER — OFFICE VISIT (OUTPATIENT)
Dept: OPHTHALMOLOGY | Facility: CLINIC | Age: 72
End: 2022-12-06
Attending: OPHTHALMOLOGY
Payer: MEDICARE

## 2022-12-06 DIAGNOSIS — H35.30 ARMD (AGE-RELATED MACULAR DEGENERATION), BILATERAL: ICD-10-CM

## 2022-12-06 DIAGNOSIS — H35.3231 EXUDATIVE AGE-RELATED MACULAR DEGENERATION OF BOTH EYES WITH ACTIVE CHOROIDAL NEOVASCULARIZATION (H): Primary | ICD-10-CM

## 2022-12-06 DIAGNOSIS — Z96.1 PSEUDOPHAKIA OF BOTH EYES: ICD-10-CM

## 2022-12-06 DIAGNOSIS — H04.123 DRY EYE SYNDROME OF BOTH EYES: ICD-10-CM

## 2022-12-06 PROCEDURE — 67028 INJECTION EYE DRUG: CPT | Mod: LT | Performed by: OPHTHALMOLOGY

## 2022-12-06 PROCEDURE — 92134 CPTRZ OPH DX IMG PST SGM RTA: CPT | Performed by: OPHTHALMOLOGY

## 2022-12-06 PROCEDURE — 250N000011 HC RX IP 250 OP 636: Performed by: OPHTHALMOLOGY

## 2022-12-06 PROCEDURE — 99214 OFFICE O/P EST MOD 30 MIN: CPT | Mod: 25 | Performed by: OPHTHALMOLOGY

## 2022-12-06 PROCEDURE — 99207 FUNDUS PHOTOS OU (BOTH EYES): CPT | Mod: 26 | Performed by: OPHTHALMOLOGY

## 2022-12-06 PROCEDURE — G0463 HOSPITAL OUTPT CLINIC VISIT: HCPCS | Mod: 25

## 2022-12-06 PROCEDURE — 92250 FUNDUS PHOTOGRAPHY W/I&R: CPT | Performed by: OPHTHALMOLOGY

## 2022-12-06 RX ADMIN — Medication 1.25 MG: at 15:38

## 2022-12-06 ASSESSMENT — VISUAL ACUITY
OD_PH_SC: 20/25
OS_SC+: -2
OS_PH_SC: 20/30
OS_PH_SC+: -2
OD_SC+: +2
OD_SC: 20/30
OS_SC: 20/60
METHOD: SNELLEN - LINEAR

## 2022-12-06 ASSESSMENT — CONF VISUAL FIELD
METHOD: COUNTING FINGERS
OS_SUPERIOR_NASAL_RESTRICTION: 0
OD_SUPERIOR_TEMPORAL_RESTRICTION: 0
OS_SUPERIOR_TEMPORAL_RESTRICTION: 0
OD_NORMAL: 1
OS_NORMAL: 1
OS_INFERIOR_TEMPORAL_RESTRICTION: 0
OD_INFERIOR_NASAL_RESTRICTION: 0
OD_INFERIOR_TEMPORAL_RESTRICTION: 0
OD_SUPERIOR_NASAL_RESTRICTION: 0
OS_INFERIOR_NASAL_RESTRICTION: 0

## 2022-12-06 ASSESSMENT — TONOMETRY
OD_IOP_MMHG: 16
OS_IOP_MMHG: 14
IOP_METHOD: TONOPEN

## 2022-12-06 ASSESSMENT — SLIT LAMP EXAM - LIDS
COMMENTS: NORMAL
COMMENTS: NORMAL

## 2022-12-06 ASSESSMENT — EXTERNAL EXAM - LEFT EYE: OS_EXAM: NORMAL

## 2022-12-06 ASSESSMENT — EXTERNAL EXAM - RIGHT EYE: OD_EXAM: NORMAL

## 2022-12-06 NOTE — PROGRESS NOTES
CC -  Macular degeneration     INTERVAL HISTORY - Initial visit    HPI -   Rita Mancini is a  72 year old year-old patient presenting for evaluation for macular degeneration. Was referred by her cataract surgeon for AMD. Saw Dr. KRISTIE Duarte ~6 months ago. 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 Observation recommended  No current smoking   No known FH of AMD, glaucoma     PAST OCULAR SURGERY  CE IOL each eye 2021    RETINAL IMAGING:  OCT  8/2/22: each eye pachychoroid; right eye few drusen; left eye      ASSESSMENT & PLAN    # Age related macular degeneration each eye  Right eye just a few small drusen  Left eye [facility] PED with SIRE and increased SRF compared to 8/2022; vision more blurry  pachychoroid +  Recommend Avastin injection left eye x 3 (#1 injection today)  AREDS II supplements  Amsler grid education given   RTC 4 w for avastin #2; injection only appt   Repeat OCT and DFE at 3rd injeciton     # pseudophakia each eye  Observe    # dry eye each eye   ATs as  Needed    Complete documentation of historical and exam elements from today's encounter can be found in the full encounter summary report (not reduplicated in this progress note). I personally obtained the chief complaint(s) and history of present illness.  I confirmed and edited as necessary the review of systems, past medical/surgical history, family history, social history, and examination findings as documented by others; and I examined the patient myself. I personally reviewed the relevant tests, images, and reports as documented above. I formulated and edited as necessary the assessment and plan and discussed the findings and management plan with the patient and family.     Malvin Rizzo MD

## 2022-12-14 DIAGNOSIS — H35.30 ARMD (AGE-RELATED MACULAR DEGENERATION), BILATERAL: Primary | ICD-10-CM

## 2022-12-21 ENCOUNTER — TELEPHONE (OUTPATIENT)
Dept: INTERNAL MEDICINE | Facility: CLINIC | Age: 72
End: 2022-12-21

## 2022-12-26 DIAGNOSIS — J44.9 CHRONIC OBSTRUCTIVE PULMONARY DISEASE, UNSPECIFIED COPD TYPE (H): ICD-10-CM

## 2022-12-27 RX ORDER — BUDESONIDE 180 UG/1
AEROSOL, POWDER RESPIRATORY (INHALATION)
Qty: 1 EACH | Refills: 6 | Status: SHIPPED | OUTPATIENT
Start: 2022-12-27 | End: 2023-11-08

## 2022-12-28 NOTE — TELEPHONE ENCOUNTER
"Last Written Prescription Date:  12/22/21  Last Fill Quantity: 1,  # refills: 11   Last office visit provider:  7/7/22     Requested Prescriptions   Pending Prescriptions Disp Refills     PULMICORT FLEXHALER 180 MCG/ACT inhaler 1 each 11     Sig: INHALE 2 PUFFS BY MOUTH TWICE A DAY       Inhaled Steroids Protocol Passed - 12/26/2022  5:37 PM        Passed - Patient is age 12 or older        Passed - Recent (12 mo) or future (30 days) visit within the authorizing provider's specialty     Patient has had an office visit with the authorizing provider or a provider within the authorizing providers department within the previous 12 mos or has a future within next 30 days. See \"Patient Info\" tab in inbasket, or \"Choose Columns\" in Meds & Orders section of the refill encounter.              Passed - Medication is active on med Lydia Lima RN 12/27/22 7:40 PM  "

## 2023-02-01 ENCOUNTER — OFFICE VISIT (OUTPATIENT)
Dept: OPHTHALMOLOGY | Facility: CLINIC | Age: 73
End: 2023-02-01
Attending: OPHTHALMOLOGY
Payer: MEDICARE

## 2023-02-01 DIAGNOSIS — H04.123 DRY EYE SYNDROME OF BOTH EYES: ICD-10-CM

## 2023-02-01 DIAGNOSIS — H35.30 ARMD (AGE-RELATED MACULAR DEGENERATION), BILATERAL: ICD-10-CM

## 2023-02-01 DIAGNOSIS — Z96.1 PSEUDOPHAKIA OF BOTH EYES: ICD-10-CM

## 2023-02-01 DIAGNOSIS — F41.1 GAD (GENERALIZED ANXIETY DISORDER): ICD-10-CM

## 2023-02-01 DIAGNOSIS — E03.9 ACQUIRED HYPOTHYROIDISM: ICD-10-CM

## 2023-02-01 DIAGNOSIS — H35.3231 EXUDATIVE AGE-RELATED MACULAR DEGENERATION OF BOTH EYES WITH ACTIVE CHOROIDAL NEOVASCULARIZATION (H): Primary | ICD-10-CM

## 2023-02-01 PROCEDURE — G0463 HOSPITAL OUTPT CLINIC VISIT: HCPCS | Mod: 25

## 2023-02-01 PROCEDURE — 67028 INJECTION EYE DRUG: CPT | Mod: LT | Performed by: OPHTHALMOLOGY

## 2023-02-01 PROCEDURE — 92134 CPTRZ OPH DX IMG PST SGM RTA: CPT | Performed by: OPHTHALMOLOGY

## 2023-02-01 PROCEDURE — 250N000011 HC RX IP 250 OP 636: Performed by: OPHTHALMOLOGY

## 2023-02-01 PROCEDURE — 99214 OFFICE O/P EST MOD 30 MIN: CPT | Mod: 25 | Performed by: OPHTHALMOLOGY

## 2023-02-01 RX ADMIN — Medication 1.25 MG: at 13:30

## 2023-02-01 ASSESSMENT — VISUAL ACUITY
OD_SC: 20/25
METHOD: SNELLEN - LINEAR
OS_PH_SC: 20/30
OS_SC: 20/40
OS_PH_SC+: -1
OS_SC+: -2
OD_SC+: -3

## 2023-02-01 ASSESSMENT — CONF VISUAL FIELD
OD_SUPERIOR_TEMPORAL_RESTRICTION: 0
METHOD: COUNTING FINGERS
OD_SUPERIOR_NASAL_RESTRICTION: 0
OS_SUPERIOR_TEMPORAL_RESTRICTION: 0
OS_INFERIOR_NASAL_RESTRICTION: 0
OS_NORMAL: 1
OD_INFERIOR_NASAL_RESTRICTION: 0
OS_INFERIOR_TEMPORAL_RESTRICTION: 0
OS_SUPERIOR_NASAL_RESTRICTION: 0
OD_INFERIOR_TEMPORAL_RESTRICTION: 0
OD_NORMAL: 1

## 2023-02-01 ASSESSMENT — SLIT LAMP EXAM - LIDS
COMMENTS: NORMAL
COMMENTS: NORMAL

## 2023-02-01 ASSESSMENT — TONOMETRY
IOP_METHOD: TONOPEN
OS_IOP_MMHG: 13
OD_IOP_MMHG: 13

## 2023-02-01 ASSESSMENT — EXTERNAL EXAM - LEFT EYE: OS_EXAM: NORMAL

## 2023-02-01 ASSESSMENT — EXTERNAL EXAM - RIGHT EYE: OD_EXAM: NORMAL

## 2023-02-01 NOTE — PROGRESS NOTES
CC -  Macular degeneration     INTERVAL HISTORY - s/p avastin injection x 2 (12/6/22 and 1/5/23)    HPI -   Rita Mancini is a  72 year old year-old patient presenting for evaluation for macular degeneration. Was referred by her cataract surgeon for AMD. Saw Dr. KRISTIE Duarte ~6 months ago. 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 Observation recommended  No current smoking   No known FH of AMD, glaucoma     PAST OCULAR SURGERY  CE IOL each eye 2021    RETINAL IMAGING:  OCT  8/2/22: each eye pachychoroid; right eye few drusen; left eye      ASSESSMENT & PLAN    # Age related macular degeneration each eye  Right eye just a few small drusen  Left eye [facility] PED with SIRE and increased SRF compared to 8/2022; vision more blurry  pachychoroid +  S/P Avastin injection left eye x 2 (12/6/22 and 1/5/23); today for #3 injection; RTC 6 w  OCT: resolved SRF    AREDS II supplements  Amsler grid education given     # pseudophakia each eye  Observe    # dry eye each eye   ATs as  Needed    Complete documentation of historical and exam elements from today's encounter can be found in the full encounter summary report (not reduplicated in this progress note). I personally obtained the chief complaint(s) and history of present illness.  I confirmed and edited as necessary the review of systems, past medical/surgical history, family history, social history, and examination findings as documented by others; and I examined the patient myself. I personally reviewed the relevant tests, images, and reports as documented above. I formulated and edited as necessary the assessment and plan and discussed the findings and management plan with the patient and family.     Malvin Rizzo MD

## 2023-02-01 NOTE — NURSING NOTE
Chief Complaints and History of Present Illnesses   Patient presents with     Follow Up       Follow up of ARMD each eye.      Chief Complaint(s) and History of Present Illness(es)     Follow Up            Comments:   Follow up of ARMD each eye.          Comments    Eye meds: systante each eye as needed    Left eye floater returned.  No floater right eye.  Denies light flashing either eye.  Some eye pain after injection last time per patient.  Currently no pain either eye.    CLARITA Pires 2/1/2023 12:51 PM

## 2023-02-02 RX ORDER — CITALOPRAM HYDROBROMIDE 10 MG/1
10 TABLET ORAL DAILY
Qty: 90 TABLET | Refills: 1 | Status: SHIPPED | OUTPATIENT
Start: 2023-02-02 | End: 2023-05-03

## 2023-02-02 RX ORDER — LEVOTHYROXINE SODIUM 75 UG/1
TABLET ORAL
Qty: 90 TABLET | Refills: 0 | Status: SHIPPED | OUTPATIENT
Start: 2023-02-02 | End: 2023-05-03

## 2023-02-02 NOTE — TELEPHONE ENCOUNTER
"Routing refill request to provider for review/approval because:  A break in medication    Last Written Prescription Date:  02/08/2022  Last Fill Quantity: 90,  # refills: 0   Last office visit provider:  07/07/2022          levothyroxine (SYNTHROID/LEVOTHROID) 75 MCG tablet 90 tablet 0     Sig: TAKE 1 TAB BY MOUTH ON TUE WED THR SAT SUN       Thyroid Protocol Failed - 2/2/2023  2:37 PM        Failed - Normal TSH on file in past 12 months     Recent Labs   Lab Test 09/22/21  1504   TSH 0.75              Passed - Patient is 12 years or older        Passed - Recent (12 mo) or future (30 days) visit within the authorizing provider's specialty     Patient has had an office visit with the authorizing provider or a provider within the authorizing providers department within the previous 12 mos or has a future within next 30 days. See \"Patient Info\" tab in inSplashCastet, or \"Choose Columns\" in Meds & Orders section of the refill encounter.              Passed - Medication is active on med list        Passed - No active pregnancy on record     If patient is pregnant or has had a positive pregnancy test, please check TSH.          Passed - No positive pregnancy test in past 12 months     If patient is pregnant or has had a positive pregnancy test, please check TSH.               Last Written Prescription Date:  08/02/2022  Last Fill Quantity: 90,  # refills: 1   Last office visit provider:  07/07/2022     Requested Prescriptions   Pending Prescriptions Disp Refills     citalopram (CELEXA) 10 MG tablet 90 tablet 1     Sig: Take 1 tablet (10 mg) by mouth daily       SSRIs Protocol Passed - 2/2/2023  2:37 PM        Passed - Recent (12 mo) or future (30 days) visit within the authorizing provider's specialty     Patient has had an office visit with the authorizing provider or a provider within the authorizing providers department within the previous 12 mos or has a future within next 30 days. See \"Patient Info\" tab in inSplashCastet, or " "\"Choose Columns\" in Meds & Orders section of the refill encounter.              Passed - Medication is active on med list        Passed - Patient is age 18 or older        Passed - No active pregnancy on record        Passed - No positive pregnancy test in last 12 months                      Antoinette Sutton RN 02/02/23 2:37 PM  "

## 2023-02-13 ENCOUNTER — TELEPHONE (OUTPATIENT)
Dept: INTERNAL MEDICINE | Facility: CLINIC | Age: 73
End: 2023-02-13

## 2023-02-13 NOTE — TELEPHONE ENCOUNTER
Order/Referral Request    Who is requesting: patient called to schedule screening and was told to have mammogram/US instead    Orders being requested: 3D diagnostic mammo and US    Reason service is needed/diagnosis: pt had some tenderness in left breast and also that breast is a bit larger as well.  NO lumps that she knows of    When are orders needed by: ASAP    Has this been discussed with Provider: Yes    Does patient have a preference on a Group/Provider/Facility? [facility]    Does patient have an appointment scheduled?: No    Where to send orders: epic    Could we send this information to you [facility] or would you prefer to receive a phone call?:   Patient would prefer a phone call   Okay to leave a detailed message?: Yes at Home number on file [phone] (home)

## 2023-02-20 ENCOUNTER — TELEPHONE (OUTPATIENT)
Dept: OPHTHALMOLOGY | Facility: CLINIC | Age: 73
End: 2023-02-20
Payer: MEDICARE

## 2023-03-01 ASSESSMENT — ENCOUNTER SYMPTOMS
CONSTIPATION: 0
HEMATURIA: 0
PALPITATIONS: 0
DIZZINESS: 0
NERVOUS/ANXIOUS: 1
WEAKNESS: 0
COUGH: 1
MYALGIAS: 1
SORE THROAT: 0
DYSURIA: 0
FREQUENCY: 0
BREAST MASS: 0
SHORTNESS OF BREATH: 1
JOINT SWELLING: 0
DIARRHEA: 0
PARESTHESIAS: 0
ABDOMINAL PAIN: 0
EYE PAIN: 0
FEVER: 0
HEMATOCHEZIA: 0
CHILLS: 0
NAUSEA: 0
ARTHRALGIAS: 1
HEARTBURN: 1
HEADACHES: 0

## 2023-03-01 ASSESSMENT — ACTIVITIES OF DAILY LIVING (ADL): CURRENT_FUNCTION: NO ASSISTANCE NEEDED

## 2023-03-06 ENCOUNTER — OFFICE VISIT (OUTPATIENT)
Dept: INTERNAL MEDICINE | Facility: CLINIC | Age: 73
End: 2023-03-06
Payer: MEDICARE

## 2023-03-06 VITALS
HEIGHT: 63 IN | RESPIRATION RATE: 16 BRPM | TEMPERATURE: 97.6 F | OXYGEN SATURATION: 98 % | SYSTOLIC BLOOD PRESSURE: 103 MMHG | BODY MASS INDEX: 33.13 KG/M2 | HEART RATE: 87 BPM | WEIGHT: 187 LBS | DIASTOLIC BLOOD PRESSURE: 60 MMHG

## 2023-03-06 DIAGNOSIS — E03.9 ACQUIRED HYPOTHYROIDISM: Primary | ICD-10-CM

## 2023-03-06 DIAGNOSIS — E78.5 HYPERLIPIDEMIA LDL GOAL <100: ICD-10-CM

## 2023-03-06 DIAGNOSIS — Z23 NEED FOR DIPHTHERIA-TETANUS-PERTUSSIS (TDAP) VACCINE: ICD-10-CM

## 2023-03-06 DIAGNOSIS — U07.1 INFECTION DUE TO 2019 NOVEL CORONAVIRUS: ICD-10-CM

## 2023-03-06 DIAGNOSIS — E13.9 DIABETES 1.5, MANAGED AS TYPE 2 (H): ICD-10-CM

## 2023-03-06 DIAGNOSIS — F33.41 RECURRENT MAJOR DEPRESSIVE DISORDER, IN PARTIAL REMISSION (H): ICD-10-CM

## 2023-03-06 PROBLEM — Z01.818 PREOPERATIVE EXAMINATION: Status: RESOLVED | Noted: 2021-12-06 | Resolved: 2023-03-06

## 2023-03-06 LAB
ERYTHROCYTE [DISTWIDTH] IN BLOOD BY AUTOMATED COUNT: 12.9 % (ref 10–15)
HBA1C MFR BLD: 7.5 % (ref 0–5.6)
HCT VFR BLD AUTO: 41 % (ref 35–47)
HGB BLD-MCNC: 13.4 G/DL (ref 11.7–15.7)
MCH RBC QN AUTO: 29.8 PG (ref 26.5–33)
MCHC RBC AUTO-ENTMCNC: 32.7 G/DL (ref 31.5–36.5)
MCV RBC AUTO: 91 FL (ref 78–100)
PLATELET # BLD AUTO: 230 10E3/UL (ref 150–450)
RBC # BLD AUTO: 4.5 10E6/UL (ref 3.8–5.2)
WBC # BLD AUTO: 12.1 10E3/UL (ref 4–11)

## 2023-03-06 PROCEDURE — 82043 UR ALBUMIN QUANTITATIVE: CPT | Performed by: INTERNAL MEDICINE

## 2023-03-06 PROCEDURE — 99214 OFFICE O/P EST MOD 30 MIN: CPT | Performed by: INTERNAL MEDICINE

## 2023-03-06 PROCEDURE — 80053 COMPREHEN METABOLIC PANEL: CPT | Performed by: INTERNAL MEDICINE

## 2023-03-06 PROCEDURE — 83036 HEMOGLOBIN GLYCOSYLATED A1C: CPT | Performed by: INTERNAL MEDICINE

## 2023-03-06 PROCEDURE — 36415 COLL VENOUS BLD VENIPUNCTURE: CPT | Performed by: INTERNAL MEDICINE

## 2023-03-06 PROCEDURE — 85027 COMPLETE CBC AUTOMATED: CPT | Performed by: INTERNAL MEDICINE

## 2023-03-06 PROCEDURE — 82570 ASSAY OF URINE CREATININE: CPT | Performed by: INTERNAL MEDICINE

## 2023-03-06 PROCEDURE — 84443 ASSAY THYROID STIM HORMONE: CPT | Performed by: INTERNAL MEDICINE

## 2023-03-06 RX ORDER — MIRTAZAPINE 7.5 MG/1
7.5 TABLET, FILM COATED ORAL AT BEDTIME
Qty: 45 TABLET | Refills: 3 | Status: SHIPPED | OUTPATIENT
Start: 2023-03-06 | End: 2023-05-09

## 2023-03-06 RX ORDER — MIRTAZAPINE 7.5 MG/1
7.5 TABLET, FILM COATED ORAL AT BEDTIME
Qty: 45 TABLET | Refills: 3 | Status: SHIPPED | OUTPATIENT
Start: 2023-03-06 | End: 2023-03-06

## 2023-03-06 ASSESSMENT — PATIENT HEALTH QUESTIONNAIRE - PHQ9
10. IF YOU CHECKED OFF ANY PROBLEMS, HOW DIFFICULT HAVE THESE PROBLEMS MADE IT FOR YOU TO DO YOUR WORK, TAKE CARE OF THINGS AT HOME, OR GET ALONG WITH OTHER PEOPLE: NOT DIFFICULT AT ALL
SUM OF ALL RESPONSES TO PHQ QUESTIONS 1-9: 12
SUM OF ALL RESPONSES TO PHQ QUESTIONS 1-9: 12

## 2023-03-06 NOTE — PROGRESS NOTES
ASSESSMENT AND PLAN:    1. Need for diphtheria-tetanus-pertussis (Tdap) vaccine  She will get this at pharmacy.     2. Diabetes 1.5, managed as type 2   On trulicity, feels well, will assess her response.     3. Chronic kidney disease, stage 3   Has not had albuminuria, will monitor    4. Hypothyroidism  Ongoing replacement therapy.     5. Hyperlipidemia LDL goal <100  Ongoing statin therapy.     6. Infection due to 2019 novel coronavirus  One year ago. After that her chronic rhinitis seems to have resolved!    7. Depression  Situational exacerbation with grand-daughter having lymphoma.  Will add mirtazapine, low dose 1/2 7.5 mg tablet nightly - a low risk for adding to citalopram, for serotonin effect.     8. Screening for colon cancer  Negative colonoscopy 2020 good for 10 years.     Follow up in three months.     CHIEF COMPLAINT:  Follow up and depression    HISTORY OF PRESENT ILLNESS:  Rita Mancini is a 72 year old female with recent news about grand daughter getting lymphoma.  She has a chronic sense of fatigue, not really new. Sleep is OK, some anxiety feeling, no overt panic.  Denies unusual dyspnea or cough, or recent acute illness.  Bowel function OK, bladder function OK.     REVIEW OF SYSTEMS:   See HPI, all other systems on review are negative.    Past Medical History:   Diagnosis Date     Age-related cataract 12/06/2021     Anxiety and depression 01/01/1962     Bigeminy 03/17/2019     Bronchiectasis without complication (H)      COPD (chronic obstructive pulmonary disease) (H) 01/01/2009     Functional diarrhea 12/31/2018     GERD (gastroesophageal reflux disease)      History of alcoholism (H)      Hx antineoplastic chemotherapy     lung cancer      Hx of radiation therapy     lung cancer      Hyperlipidemia LDL goal <100      Hyperlipidemia LDL goal <100 3/6/2023     Hypothyroid      Infection due to 2019 novel coronavirus 3/6/2023     Lung cancer (H) 01/01/2008    RUL,  Non  small cell cancer     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     Osteopenia      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, subsequent encounter 04/08/2019     Other hydronephrosis      Pleural effusion 01/01/2015     Preoperative examination 12/06/2021     Sepsis due to Escherichia coli with acute renal failure without septic shock, unspecified acute renal failure type (H)      Sleep apnea 01/01/2010    does not use cpap     Type 2 diabetes, HbA1C goal < 8% (H)      History   Smoking Status     Former     Packs/day: 1.50     Years: 0.00     Quit date: 11/9/2008   Smokeless Tobacco     Former     Family History   Problem Relation Age of Onset     Heart Disease Mother      Hypertension Mother      Alcoholism Mother      Depression Mother      Heart Disease Father 45        mi age 45, cabg     Coronary Artery Disease Father      Cancer Father         prostate     Hypertension Father      Snoring Father      Alcoholism Sister      Chronic Obstructive Pulmonary Disease Brother      Alcoholism Brother      Obesity Daughter      Obesity Daughter      Diabetes Son      Anxiety Disorder Son      Depression Son      Post-Traumatic Stress Disorder (PTSD) Son      Cancer Maternal Aunt 47        breast     Breast Cancer Paternal Aunt      Leukemia Grandchild      Schizophrenia Grandchild      Lymphoma Grandchild      Past Surgical History:   Procedure Laterality Date     CATARACT IOL, RT/LT Bilateral 12/2021     IR MISCELLANEOUS PROCEDURE  12/10/2009     PORTACATH PLACEMENT      and removal     THORACOSCOPY Right 04/06/2015    Procedure: RIGHT THORACOSCOPY / BIOPSY PLEURAL / TALC PLEURODESIS;  Surgeon: Michi Ma MD;  Location: [facility] OR;  Service:      THORACOSCOPY Left 03/29/2019    Procedure: LEFT THORACOSCOPY WITH DECORTICATION;  Surgeon: Michi Ma MD;  Location: [facility];  Service: General     TONSILLECTOMY  age 5     URETERAL STENT PLACEMENT  "Right 06/01/2010     US THORACENTESIS  03/27/2019     VITALS:  Vitals:    03/06/23 1608   BP: 103/60   BP Location: Left arm   Patient Position: Sitting   Cuff Size: Adult Regular   Pulse: 87   Resp: 16   Temp: 97.6  F (36.4  C)   TempSrc: Tympanic   SpO2: 98%   Weight: 84.8 kg (187 lb)   Height: 1.6 m (5' 3\")     Wt Readings from Last 3 Encounters:   03/06/23 84.8 kg (187 lb)   07/08/22 84.8 kg (186 lb 15.2 oz)   07/07/22 84.8 kg (187 lb)     PHYSICAL EXAM:  Constitutional:  In NAD, alert and oriented  Neck: no cervical or axillary adenopathy  Cardiac:  S1 S2   Lungs: Clear   Abdomen:   Soft, flat and non-tenderExtremities: no joint effusion, no significant edema  Psychiatric:  Mood and behavior appropriate, sad but animated, thinking is clear.     DECISION TO OBTAIN OLD RECORDS AND/OR OBTAIN HISTORY FROM SOMEONE OTHER THAN PATIENT, AND/OR ACCESSING CARE EVERYWHERE):  1  0     REVIEW AND SUMMARIZATION OF OLD RECORDS, AND/OR OBTAINING HISTORY FROM SOMEONE OTHER THAN PATIENT, AND/OR DISCUSSION OF CASE WITH ANOTHER HEALTH CARE PROVIDER:  2 reviewed past health evaluation    REVIEW AND/OR ORDER OF OF CLINICAL LAB TESTS: 1  ordered.    REVIEW AND/OR ORDER OF RADIOLOGY TESTS: 1 0.    REVIEW AND/OR ORDER OF MEDICAL TESTS (EKG/ECHO/COLONOSCOPY/EGD): 1  Reviewed colonoscopy    INDEPENDENT  VISUALIZATION OF IMAGE, TRACING, OR SPECIMEN ITSELF (2 EACH):  0     TOTAL: 4    Current Outpatient Medications   Medication Sig Dispense Refill     aspirin 81 MG EC tablet Take 81 mg by mouth daily       blood glucose (NO BRAND SPECIFIED) test strip Use to test blood sugar 2 times daily or as directed. 200 strip 3     calcium carbonate (TUMS) 500 MG chewable tablet Take 1 chew tab by mouth daily       cetirizine (ZYRTEC) 10 MG CHEW Take 10 mg by mouth daily       citalopram (CELEXA) 10 MG tablet Take 1 tablet (10 mg) by mouth daily 90 tablet 1     famotidine (PEPCID) 20 MG tablet Take 1 tablet (20 mg) by mouth daily 90 tablet 3     " fluticasone (FLONASE) 50 MCG/ACT nasal spray Spray 1 spray into both nostrils 2 times daily 16 g 11     levothyroxine (SYNTHROID/LEVOTHROID) 50 MCG tablet TAKE ON EMPTY STOMACH EVERY MONDAY AND FRIDAY (SEE OTHER DOSE FOR REMAINING DAYS OF WEEK) 24 tablet 3     levothyroxine (SYNTHROID/LEVOTHROID) 75 MCG tablet TAKE 1 TAB BY MOUTH ON TUE WED THR SAT SUN 90 tablet 0     magnesium 500 MG TABS        Melatonin 10 MG CAPS        montelukast (SINGULAIR) 10 MG tablet Take 1 tablet (10 mg) by mouth At Bedtime 90 tablet 2     multivitamin w/minerals (THERA-VIT-M) tablet Take 1 tablet by mouth daily       PULMICORT FLEXHALER 180 MCG/ACT inhaler INHALE 2 PUFFS BY MOUTH TWICE A DAY 1 each 6     simvastatin (ZOCOR) 10 MG tablet Take 1 tablet (10 mg) by mouth At Bedtime 90 tablet 3     triamcinolone (KENALOG) 0.1 % external cream Apply topically 2 times daily       TRULICITY 1.5 MG/0.5ML pen Inject 1.5 mg Subcutaneous every 7 days 9 mL 2     umeclidinium-vilanterol (ANORO ELLIPTA) 62.5-25 MCG/INH oral inhaler TAKE 1 PUFF BY MOUTH EVERY  each 3     Michi Murillo MD  Internal Medicine  [facility]

## 2023-03-07 LAB
ALBUMIN SERPL BCG-MCNC: 4.5 G/DL (ref 3.5–5.2)
ALP SERPL-CCNC: 87 U/L (ref 35–104)
ALT SERPL W P-5'-P-CCNC: 16 U/L (ref 10–35)
ANION GAP SERPL CALCULATED.3IONS-SCNC: 12 MMOL/L (ref 7–15)
AST SERPL W P-5'-P-CCNC: 30 U/L (ref 10–35)
BILIRUB SERPL-MCNC: 0.6 MG/DL
BUN SERPL-MCNC: 24.6 MG/DL (ref 8–23)
CALCIUM SERPL-MCNC: 10.2 MG/DL (ref 8.8–10.2)
CHLORIDE SERPL-SCNC: 102 MMOL/L (ref 98–107)
CREAT SERPL-MCNC: 1.78 MG/DL (ref 0.51–0.95)
CREAT UR-MCNC: 183 MG/DL
DEPRECATED HCO3 PLAS-SCNC: 28 MMOL/L (ref 22–29)
GFR SERPL CREATININE-BSD FRML MDRD: 30 ML/MIN/1.73M2
GLUCOSE SERPL-MCNC: 135 MG/DL (ref 70–99)
MICROALBUMIN UR-MCNC: 17.9 MG/L
MICROALBUMIN/CREAT UR: 9.78 MG/G CR (ref 0–25)
POTASSIUM SERPL-SCNC: 5.5 MMOL/L (ref 3.4–5.3)
PROT SERPL-MCNC: 7.8 G/DL (ref 6.4–8.3)
SODIUM SERPL-SCNC: 142 MMOL/L (ref 136–145)
TSH SERPL DL<=0.005 MIU/L-ACNC: 1.98 UIU/ML (ref 0.3–4.2)

## 2023-03-14 DIAGNOSIS — H35.3231 EXUDATIVE AGE-RELATED MACULAR DEGENERATION OF BOTH EYES WITH ACTIVE CHOROIDAL NEOVASCULARIZATION (H): Primary | ICD-10-CM

## 2023-03-20 ENCOUNTER — TELEPHONE (OUTPATIENT)
Dept: INTERNAL MEDICINE | Facility: CLINIC | Age: 73
End: 2023-03-20

## 2023-03-20 DIAGNOSIS — E13.9 DIABETES 1.5, MANAGED AS TYPE 2 (H): ICD-10-CM

## 2023-03-20 DIAGNOSIS — E03.9 ACQUIRED HYPOTHYROIDISM: ICD-10-CM

## 2023-03-20 NOTE — TELEPHONE ENCOUNTER
General Call      Reason for Call:  Pt has an order for Mammogram, need to add to the diagnosis that her left breast is larger than her right breast    Please advise  Current order is dated:  02/13/2023 - please add the comment and resend to radiology for scheduling    What are your questions or concerns:  n/a    Date of last appointment with provider: n/a    Could we [facility] this [facility] to you [facility] or would you prefer to receive a phone call?:   Patient would prefer a phone call   Okay to leave a detailed message?: Yes at Home number on file [phone] (home)

## 2023-03-21 ENCOUNTER — OFFICE VISIT (OUTPATIENT)
Dept: OPHTHALMOLOGY | Facility: CLINIC | Age: 73
End: 2023-03-21
Attending: OPHTHALMOLOGY
Payer: MEDICARE

## 2023-03-21 DIAGNOSIS — Z96.1 PSEUDOPHAKIA OF BOTH EYES: ICD-10-CM

## 2023-03-21 DIAGNOSIS — H04.123 DRY EYE SYNDROME OF BOTH EYES: ICD-10-CM

## 2023-03-21 DIAGNOSIS — H35.3231 EXUDATIVE AGE-RELATED MACULAR DEGENERATION OF BOTH EYES WITH ACTIVE CHOROIDAL NEOVASCULARIZATION (H): Primary | ICD-10-CM

## 2023-03-21 DIAGNOSIS — H35.30 ARMD (AGE-RELATED MACULAR DEGENERATION), BILATERAL: ICD-10-CM

## 2023-03-21 PROCEDURE — G0463 HOSPITAL OUTPT CLINIC VISIT: HCPCS | Mod: 25 | Performed by: OPHTHALMOLOGY

## 2023-03-21 PROCEDURE — 67028 INJECTION EYE DRUG: CPT | Mod: LT | Performed by: OPHTHALMOLOGY

## 2023-03-21 PROCEDURE — 250N000011 HC RX IP 250 OP 636: Performed by: OPHTHALMOLOGY

## 2023-03-21 PROCEDURE — 99214 OFFICE O/P EST MOD 30 MIN: CPT | Mod: 25 | Performed by: OPHTHALMOLOGY

## 2023-03-21 PROCEDURE — 92134 CPTRZ OPH DX IMG PST SGM RTA: CPT | Performed by: OPHTHALMOLOGY

## 2023-03-21 RX ORDER — DULAGLUTIDE 1.5 MG/.5ML
1.5 INJECTION, SOLUTION SUBCUTANEOUS
Qty: 9 ML | Refills: 2 | Status: SHIPPED | OUTPATIENT
Start: 2023-03-21 | End: 2024-01-12

## 2023-03-21 RX ORDER — LEVOTHYROXINE SODIUM 50 UG/1
TABLET ORAL
Qty: 24 TABLET | Refills: 3 | Status: SHIPPED | OUTPATIENT
Start: 2023-03-21 | End: 2024-02-19

## 2023-03-21 RX ADMIN — Medication 1.25 MG: at 13:57

## 2023-03-21 ASSESSMENT — VISUAL ACUITY
OS_SC: 20/40
OS_SC+: +2
OD_SC+: -3
METHOD: SNELLEN - LINEAR
OD_SC: 20/25

## 2023-03-21 ASSESSMENT — CONF VISUAL FIELD
OS_SUPERIOR_NASAL_RESTRICTION: 0
OS_INFERIOR_TEMPORAL_RESTRICTION: 0
METHOD: COUNTING FINGERS
OD_SUPERIOR_NASAL_RESTRICTION: 0
OS_SUPERIOR_TEMPORAL_RESTRICTION: 0
OD_INFERIOR_TEMPORAL_RESTRICTION: 0
OS_INFERIOR_NASAL_RESTRICTION: 0
OD_SUPERIOR_TEMPORAL_RESTRICTION: 0
OD_INFERIOR_NASAL_RESTRICTION: 0
OD_NORMAL: 1
OS_NORMAL: 1

## 2023-03-21 ASSESSMENT — TONOMETRY
OS_IOP_MMHG: 12
OD_IOP_MMHG: 12
IOP_METHOD: ICARE

## 2023-03-21 ASSESSMENT — SLIT LAMP EXAM - LIDS
COMMENTS: NORMAL
COMMENTS: NORMAL

## 2023-03-21 ASSESSMENT — EXTERNAL EXAM - LEFT EYE: OS_EXAM: NORMAL

## 2023-03-21 ASSESSMENT — EXTERNAL EXAM - RIGHT EYE: OD_EXAM: NORMAL

## 2023-03-21 NOTE — NURSING NOTE
Chief Complaints and History of Present Illnesses   Patient presents with     Macular Degeneration Follow Up     Chief Complaint(s) and History of Present Illness(es)     Macular Degeneration Follow Up            Laterality: both eyes    Associated symptoms: Negative for flashes and itching    Treatments tried: artificial tears    Pain scale: 0/10          Comments    Macular degeneration follow up.    The patient reports stable vision.  MICHELLE Barraza, COA 12:27 PM 03/21/2023

## 2023-03-21 NOTE — TELEPHONE ENCOUNTER
"levothyroxine (SYNTHROID/LEVOTHROID) 50 MCG tablet  Last Written Prescription Date:  4/11/2022  Last Fill Quantity: 24,  # refills: 3   Last office visit provider:  3/6/2023     Routing refill request to provider for review/approval because:  Labs out of range:  CR    TRULICITY 1.5 MG/0.5ML pen  Last Written Prescription Date:  5/3/2022  Last Fill Quantity: 9ml,  # refills: 2   Last office visit provider:  3/6/2023     Requested Prescriptions   Pending Prescriptions Disp Refills     TRULICITY 1.5 MG/0.5ML pen 9 mL 2     Sig: Inject 1.5 mg Subcutaneous every 7 days       GLP-1 Agonists Protocol Failed - 3/21/2023 12:58 AM        Failed - Normal serum creatinine on file in past 12 months     Recent Labs   Lab Test 03/06/23  1649   CR 1.78*       Ok to refill medication if creatinine is low          Passed - HgbA1C in past 3 or 6 months     If HgbA1C is 8 or greater, it needs to be on file within the past 3 months.  If less than 8, must be on file within the past 6 months.     Recent Labs   Lab Test 03/06/23  1649   A1C 7.5*             Passed - Medication is active on med list        Passed - Patient is age 18 or older        Passed - No active pregnancy on record        Passed - No positive pregnancy test in past 12 months        Passed - Recent (6 mo) or future (30 days) visit within the authorizing provider's specialty     Patient had office visit in the last 6 months or has a visit in the next 30 days with authorizing provider.  See \"Patient Info\" tab in inbasket, or \"Choose Columns\" in Meds & Orders section of the refill encounter.               levothyroxine (SYNTHROID/LEVOTHROID) 50 MCG tablet 24 tablet 3     Sig: TAKE ON EMPTY STOMACH EVERY MONDAY AND FRIDAY (SEE OTHER DOSE FOR REMAINING DAYS OF WEEK)       Thyroid Protocol Passed - 3/20/2023  3:52 PM        Passed - Patient is 12 years or older        Passed - Recent (12 mo) or future (30 days) visit within the authorizing provider's specialty     Patient has " "had an office visit with the authorizing provider or a provider within the authorizing providers department within the previous 12 mos or has a future within next 30 days. See \"Patient Info\" tab in inbasket, or \"Choose Columns\" in Meds & Orders section of the refill encounter.              Passed - Medication is active on med list        Passed - Normal TSH on file in past 12 months     Recent Labs   Lab Test 03/06/23  1649   TSH 1.98              Passed - No active pregnancy on record     If patient is pregnant or has had a positive pregnancy test, please check TSH.          Passed - No positive pregnancy test in past 12 months     If patient is pregnant or has had a positive pregnancy test, please check TSH.               Sherine Contreras RN 03/21/23 12:59 AM  "

## 2023-03-21 NOTE — PROGRESS NOTES
CC -  Macular degeneration     INTERVAL HISTORY - s/p avastin injection x 3 (12/6/22 and 1/5/23 and 2/1/23)    HPI -   Rita Mancini is a  72 year old year-old patient presenting for evaluation for macular degeneration. Was referred by her cataract surgeon for AMD. Saw Dr. KRISTIE Duarte ~6 months ago. 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 Observation recommended  No current smoking   No known FH of AMD, glaucoma     PAST OCULAR SURGERY  CE IOL each eye 2021    RETINAL IMAGING:  OCT 3/21/23: each eye pachychoroid; right eye few drusen; left eye SRF resolved; SIRE present     ASSESSMENT & PLAN    # Age related macular degeneration each eye  Right eye just a few small drusen  Left eye [facility] PED with SIRE and increased SRF compared to 8/2022; vision more blurry  pachychoroid +  S/P Avastin injection left eye x 3 (12/6/22 and 1/5/23 and 2/1/23); last injection 7 weeks ago; OCT with no SRF  Discussed treat and extend plan; agrees; will do avastin today and RTC 9-10 w for DFE/OCT/Avastin left eye       AREDS II supplements  Amsler grid education given     # pseudophakia each eye  Observe    # dry eye each eye   ATs as  Needed      Dispo:  RTC 9-10 w for DFE/OCT/Avastin left eye     Complete documentation of historical and exam elements from today's encounter can be found in the full encounter summary report (not reduplicated in this progress note). I personally obtained the chief complaint(s) and history of present illness.  I confirmed and edited as necessary the review of systems, past medical/surgical history, family history, social history, and examination findings as documented by others; and I examined the patient myself. I personally reviewed the relevant tests, images, and reports as documented above. I formulated and edited as necessary the assessment and plan and discussed the findings and management plan with the patient and family.     Malvin Rizzo MD

## 2023-03-26 ENCOUNTER — NURSE TRIAGE (OUTPATIENT)
Dept: INTERNAL MEDICINE | Facility: CLINIC | Age: 73
End: 2023-03-26

## 2023-03-26 ENCOUNTER — ANCILLARY PROCEDURE (OUTPATIENT)
Dept: GENERAL RADIOLOGY | Facility: CLINIC | Age: 73
End: 2023-03-26
Attending: PHYSICIAN ASSISTANT
Payer: MEDICARE

## 2023-03-26 ENCOUNTER — OFFICE VISIT (OUTPATIENT)
Dept: URGENT CARE | Facility: URGENT CARE | Age: 73
End: 2023-03-26
Payer: MEDICARE

## 2023-03-26 VITALS
WEIGHT: 189 LBS | HEIGHT: 63 IN | SYSTOLIC BLOOD PRESSURE: 106 MMHG | HEART RATE: 86 BPM | OXYGEN SATURATION: 94 % | TEMPERATURE: 99.3 F | BODY MASS INDEX: 33.49 KG/M2 | DIASTOLIC BLOOD PRESSURE: 46 MMHG | RESPIRATION RATE: 18 BRPM

## 2023-03-26 DIAGNOSIS — U07.1 INFECTION DUE TO 2019 NOVEL CORONAVIRUS: Primary | ICD-10-CM

## 2023-03-26 DIAGNOSIS — U07.1 INFECTION DUE TO 2019 NOVEL CORONAVIRUS: ICD-10-CM

## 2023-03-26 PROCEDURE — 99214 OFFICE O/P EST MOD 30 MIN: CPT | Mod: CS | Performed by: PHYSICIAN ASSISTANT

## 2023-03-26 PROCEDURE — 71046 X-RAY EXAM CHEST 2 VIEWS: CPT | Mod: TC | Performed by: RADIOLOGY

## 2023-03-26 RX ORDER — GUAIFENESIN 600 MG/1
600 TABLET, EXTENDED RELEASE ORAL 2 TIMES DAILY
Qty: 30 TABLET | Refills: 0 | Status: SHIPPED | OUTPATIENT
Start: 2023-03-26 | End: 2023-05-09

## 2023-03-26 RX ORDER — PREDNISONE 20 MG/1
40 TABLET ORAL DAILY
Qty: 10 TABLET | Refills: 0 | Status: SHIPPED | OUTPATIENT
Start: 2023-03-26 | End: 2023-03-31

## 2023-03-26 NOTE — PROGRESS NOTES
Assessment & Plan     1. Infection due to 2019 novel coronavirus  Discussed that she is no longer a candidate for Paxlovid given her GFR, RX for molnupiravir given  Prednisone for wheezing, O2 at 94%, advised her to continue with inhalers. If O2 drops below 91, or she has CP / SOB to follow-up in ER  Mucinex for cough  Fluids and rest  Discussed indications for follow-up   - molnupiravir (LAGEVRIO) 200 MG capsule; Take 4 capsules (800 mg) by mouth every 12 hours for 5 days  Dispense: 40 each; Refill: 0  - predniSONE (DELTASONE) 20 MG tablet; Take 2 tablets (40 mg) by mouth daily for 5 days  Dispense: 10 tablet; Refill: 0  - XR Chest 2 Views; Future  - guaiFENesin (MUCINEX) 600 MG 12 hr tablet; Take 1 tablet (600 mg) by mouth 2 times daily  Dispense: 30 tablet; Refill: 0        Return in about 3 days (around 3/29/2023), or if symptoms worsen or fail to improve.    Diagnosis and treatment plan was reviewed with patient and/or family.   We went over any labs or imaging. Discussed worsening symptoms or little to no relief despite treatment plan to follow-up with PCP or return to clinic.  Patient verbalizes understanding. All questions were addressed and answered.     Lorena Camejo PA-C  [facility] [place]    CHIEF COMPLAINT:   Chief Complaint   Patient presents with     Urgent Care     Covid Concern     Started getting sick 2 days ago, tested positive this morning. Would like Paxlovid. Temp of 103 this morning.      Abel Salinas is a 72 year old female who presents to clinic today for evaluation and treatment. Patient started to feel ill 2-3 days ago. Took a COVID test this AM and was positive.   Endorses fever, chills, fatigue, cough and congestion. Mild SOB.       Past Medical History:   Diagnosis Date     Age-related cataract 12/06/2021     Anxiety and depression 01/01/1962     Bigeminy 03/17/2019     Bronchiectasis without complication (H)      COPD (chronic obstructive  pulmonary disease) (H) 2009     Functional diarrhea 2018     GERD (gastroesophageal reflux disease)      History of alcoholism (H)      Hx antineoplastic chemotherapy     lung cancer      Hx of radiation therapy     lung cancer      Hyperlipidemia LDL goal <100      Hyperlipidemia LDL goal <100 3/6/2023     Hypothyroid      Infection due to 2019 novel coronavirus 3/6/2023     Lung cancer (H) 2008    RUL,  Non small cell cancer     Neuropathy of left abducens nerve 2017     Osteopenia      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, subsequent encounter 2019     Other hydronephrosis      Pleural effusion 2015     Preoperative examination 2021     Sepsis due to Escherichia coli with acute renal failure without septic shock, unspecified acute renal failure type (H)      Sleep apnea 2010    does not use cpap     Type 2 diabetes, HbA1C goal < 8% (H)      Past Surgical History:   Procedure Laterality Date     CATARACT IOL, RT/LT Bilateral 2021     IR MISCELLANEOUS PROCEDURE  12/10/2009     PORTACATH PLACEMENT      and removal     THORACOSCOPY Right 2015    Procedure: RIGHT THORACOSCOPY / BIOPSY PLEURAL / TALC PLEURODESIS;  Surgeon: Michi Ma MD;  Location: [facility];  Service:      THORACOSCOPY Left 2019    Procedure: LEFT THORACOSCOPY WITH DECORTICATION;  Surgeon: Michi Ma MD;  Location: [facility];  Service: General     TONSILLECTOMY  age 5     URETERAL STENT PLACEMENT Right 2010      THORACENTESIS  2019     Social History     Tobacco Use     Smoking status: Former     Packs/day: 1.50     Years: 0.00     Pack years: 0.00     Types: Cigarettes     Quit date: 2008     Years since quittin.3     Smokeless tobacco: Former   Substance Use Topics     Alcohol use: No     Current Outpatient Medications   Medication     aspirin 81 MG EC tablet     calcium carbonate (TUMS)  "500 MG chewable tablet     cetirizine (ZYRTEC) 10 MG CHEW     citalopram (CELEXA) 10 MG tablet     famotidine (PEPCID) 20 MG tablet     fluticasone (FLONASE) 50 MCG/ACT nasal spray     guaiFENesin (MUCINEX) 600 MG 12 hr tablet     levothyroxine (SYNTHROID/LEVOTHROID) 50 MCG tablet     levothyroxine (SYNTHROID/LEVOTHROID) 75 MCG tablet     magnesium 500 MG TABS     Melatonin 10 MG CAPS     mirtazapine (REMERON) 7.5 MG tablet     molnupiravir (LAGEVRIO) 200 MG capsule     montelukast (SINGULAIR) 10 MG tablet     multivitamin w/minerals (THERA-VIT-M) tablet     predniSONE (DELTASONE) 20 MG tablet     PULMICORT FLEXHALER 180 MCG/ACT inhaler     simvastatin (ZOCOR) 10 MG tablet     TRULICITY 1.5 MG/0.5ML pen     umeclidinium-vilanterol (ANORO ELLIPTA) 62.5-25 MCG/INH oral inhaler     UNKNOWN TO PATIENT     blood glucose (NO BRAND SPECIFIED) test strip     triamcinolone (KENALOG) 0.1 % external cream     Current Facility-Administered Medications   Medication     bevacizumab (AVASTIN) intravitreal inj 1.25 mg     Allergies   Allergen Reactions     Seasonal Allergies        10 point ROS of systems were all negative except for pertinent positives noted in my HPI.      Exam:   /46   Pulse 86   Temp 99.3  F (37.4  C) (Temporal)   Resp 18   Ht 1.6 m (5' 3\")   Wt 85.7 kg (189 lb)   SpO2 94%   BMI 33.48 kg/m    Constitutional: healthy, alert and no distress  Head: Normocephalic, atraumatic.  Eyes: conjunctiva clear, no drainage  ENT: TMs clear and shiny ashish, nasal mucosa pink and moist, throat erythematous  Neck: neck is supple, no cervical lymphadenopathy or nuchal rigidity  Cardiovascular: RRR  Respiratory:Expiratory wheezing throughout  Gastrointestinal: soft and nontender  Skin: no rashes  Neurologic: Speech clear, gait normal. Moves all extremities.    Results for orders placed or performed in visit on 03/26/23   XR Chest 2 Views     Status: None    Narrative    EXAM: XR CHEST 2 VIEWS  LOCATION: [facility]  DATE/TIME: 3/26/2023 2:33 PM    INDICATION: COVID +, SOB  COMPARISON: 10/07/2019      Impression    IMPRESSION: Slight hyperinflation with some minimal scattered fibrosis similar to previous. No acute new findings.

## 2023-03-26 NOTE — TELEPHONE ENCOUNTER
"COVID Positive/Requesting COVID treatment    Patient is positive for COVID and requesting treatment options.    Date of positive COVID test (PCR or at home)? 3/26/23  Current COVID symptoms: fever or chills, cough, shortness of breath or difficulty breathing, fatigue, headache, sore throat and congestion or runny nose  Date COVID symptoms began: 3/24/23    Message should be routed to clinic RN pool. Best phone number to use for call back: [phone]    \"I got a sore throat the other day and I thought it was allergies. The sore throat is horrific and last night I was shivering (99) and this morning my temperature was 103.3 (oral). Down to 100 now with Tylenol and ice water. Temp 99.7 on Friday.\" Rita calling with Covid symptoms that started on Friday and tested positive today. Patient denies severe breathing difficulty and severe chest pain/ pressure. Headache present and rating pain 7-8/10, down to 6/10 with Tylenol. Patient does have a history of COPD and lung cancer and breathing symptoms are a little worse than her baseline (i.e. mild difficulty breathing and mild chest pain with breathing). \"Sometimes it feels like breathing through a straw.\" Patient has been drinking fluids and urinating with frequency. Otherwise, Rita is awake, alert, and responding appropriately (speaking in complete sentences).     Triage guidelines recommend to see a provider within 4 hours or PCP triage. FNA RN reviewed care advice per protocol. Rita was initially calling to request a Paxlovid prescription, but will opt to be see in Urgent Care to evaluate symptoms. RN advised to call back with any changes, worsening of symptoms, and questions or concerns. Rita verbalized understanding of and agreement with plan and had no further questions.     Reason for Disposition    MILD difficulty breathing (e.g., minimal/no SOB at rest, SOB with walking, pulse <100)    Additional Information    Negative: SEVERE difficulty breathing (e.g., " struggling for each breath, speaks in single words)    Negative: Difficult to awaken or acting confused (e.g., disoriented, slurred speech)    Negative: Bluish (or gray) lips or face now    Negative: Shock suspected (e.g., cold/pale/clammy skin, too weak to stand, low BP, rapid pulse)    Negative: Sounds like a life-threatening emergency to the triager    Negative: SEVERE or constant chest pain or pressure  (Exception: Mild central chest pain, present only when coughing.)    Negative: MODERATE difficulty breathing (e.g., speaks in phrases, SOB even at rest, pulse 100-120)    Negative: [1] Headache AND [2] stiff neck (can't touch chin to chest)    Negative: Oxygen level (e.g., pulse oximetry) 90 percent or lower    Negative: Chest pain or pressure    Negative: Patient sounds very sick or weak to the triager    Protocols used: CORONAVIRUS (COVID-19) DIAGNOSED OR [id number] Calvillo, RN-BSN  [facility]

## 2023-03-26 NOTE — TELEPHONE ENCOUNTER
Patient Returning Call    Reason for call:  Positive ocvid    Information relayed to patient:  n\a    Patient has additional questions:  No    What are your questions/concerns:  n\a    Could we send this information to you in [facility] or would you prefer to receive a phone call?:   Patient would prefer a phone call   Okay to leave a detailed message?: Yes at Cell number on file:    Telephone Information:   Mobile [phone]   Mobile [phone]

## 2023-04-08 ENCOUNTER — HEALTH MAINTENANCE LETTER (OUTPATIENT)
Age: 73
End: 2023-04-08

## 2023-04-12 DIAGNOSIS — F33.41 RECURRENT MAJOR DEPRESSIVE DISORDER, IN PARTIAL REMISSION (H): ICD-10-CM

## 2023-04-13 RX ORDER — MIRTAZAPINE 7.5 MG/1
7.5 TABLET, FILM COATED ORAL AT BEDTIME
Qty: 45 TABLET | Refills: 3 | OUTPATIENT
Start: 2023-04-13

## 2023-04-13 NOTE — TELEPHONE ENCOUNTER
Refused as there should already be refills on file.  Filled 3/6/2023 disp 45 refills 3  Wandy Beckman RN   04/13/23 11:38 AM  [facility] Nurse Advisor

## 2023-05-02 ENCOUNTER — TELEPHONE (OUTPATIENT)
Dept: INTERNAL MEDICINE | Facility: CLINIC | Age: 73
End: 2023-05-02
Payer: MEDICARE

## 2023-05-02 DIAGNOSIS — E03.9 ACQUIRED HYPOTHYROIDISM: ICD-10-CM

## 2023-05-02 DIAGNOSIS — E78.5 HYPERLIPIDEMIA LDL GOAL <100: ICD-10-CM

## 2023-05-02 DIAGNOSIS — N63.0 BREAST SWELLING: Primary | ICD-10-CM

## 2023-05-02 DIAGNOSIS — Z12.31 ENCOUNTER FOR SCREENING MAMMOGRAM FOR MALIGNANT NEOPLASM OF BREAST: ICD-10-CM

## 2023-05-02 DIAGNOSIS — R92.8 OTHER ABNORMAL AND INCONCLUSIVE FINDINGS ON DIAGNOSTIC IMAGING OF BREAST: ICD-10-CM

## 2023-05-02 DIAGNOSIS — K21.9 GASTROESOPHAGEAL REFLUX DISEASE WITHOUT ESOPHAGITIS: ICD-10-CM

## 2023-05-02 DIAGNOSIS — J44.9 CHRONIC OBSTRUCTIVE PULMONARY DISEASE, UNSPECIFIED COPD TYPE (H): ICD-10-CM

## 2023-05-02 DIAGNOSIS — F41.1 GAD (GENERALIZED ANXIETY DISORDER): ICD-10-CM

## 2023-05-02 NOTE — TELEPHONE ENCOUNTER
Order/Referral Request    Who is requesting: Patient     Orders being requested:     Mammogram ultrasound - patient stated she needs the one that screen deeper and this is her 3rd time requesting.     Reason service is needed/diagnosis: Need to get another one check    When are orders needed by: ASAP    Has this been discussed with Provider: No    Does patient have a preference on a Group/Provider/Facility? N/A    Does patient have an appointment scheduled?: No    Where to send orders: Place orders within Epic    Could we send this information to you in [facility] or would you prefer to receive a phone call?:   Patient would prefer a phone call   Okay to leave a detailed message?: Yes at Home number on file [phone] (home)

## 2023-05-03 RX ORDER — FAMOTIDINE 20 MG/1
20 TABLET, FILM COATED ORAL DAILY
Qty: 90 TABLET | Refills: 0 | Status: SHIPPED | OUTPATIENT
Start: 2023-05-03 | End: 2023-08-21

## 2023-05-03 RX ORDER — MONTELUKAST SODIUM 10 MG/1
10 TABLET ORAL AT BEDTIME
Qty: 90 TABLET | Refills: 3 | Status: SHIPPED | OUTPATIENT
Start: 2023-05-03 | End: 2024-04-03

## 2023-05-03 RX ORDER — SIMVASTATIN 10 MG
10 TABLET ORAL AT BEDTIME
Qty: 90 TABLET | Refills: 0 | Status: SHIPPED | OUTPATIENT
Start: 2023-05-03 | End: 2023-08-21

## 2023-05-03 RX ORDER — CITALOPRAM HYDROBROMIDE 10 MG/1
10 TABLET ORAL DAILY
Qty: 90 TABLET | Refills: 0 | Status: SHIPPED | OUTPATIENT
Start: 2023-05-03 | End: 2023-08-21

## 2023-05-03 RX ORDER — LEVOTHYROXINE SODIUM 75 UG/1
TABLET ORAL
Qty: 60 TABLET | Refills: 3 | Status: SHIPPED | OUTPATIENT
Start: 2023-05-03 | End: 2024-02-05

## 2023-05-03 NOTE — TELEPHONE ENCOUNTER
"Last Written Prescription Date:  2/2/23  Last Fill Quantity: 90,  # refills: 0   Last office visit provider:  3/6/23     Requested Prescriptions   Pending Prescriptions Disp Refills     montelukast (SINGULAIR) 10 MG tablet 90 tablet 2     Sig: Take 1 tablet (10 mg) by mouth At Bedtime       Leukotriene Inhibitors Protocol Passed - 5/2/2023 12:25 PM        Passed - Patient is age 12 or older     If patient is under 16, ok to refill using age based dosing.           Passed - Recent (12 mo) or future (30 days) visit within the authorizing provider's specialty     Patient has had an office visit with the authorizing provider or a provider within the authorizing providers department within the previous 12 mos or has a future within next 30 days. See \"Patient Info\" tab in inbasket, or \"Choose Columns\" in Meds & Orders section of the refill encounter.              Passed - Medication is active on med list           levothyroxine (SYNTHROID/LEVOTHROID) 75 MCG tablet 90 tablet 0     Sig: TAKE 1 TAB BY MOUTH ON TUE WED THR SAT SUN       Thyroid Protocol Passed - 5/2/2023 12:25 PM        Passed - Patient is 12 years or older        Passed - Recent (12 mo) or future (30 days) visit within the authorizing provider's specialty     Patient has had an office visit with the authorizing provider or a provider within the authorizing providers department within the previous 12 mos or has a future within next 30 days. See \"Patient Info\" tab in inbasket, or \"Choose Columns\" in Meds & Orders section of the refill encounter.              Passed - Medication is active on med list        Passed - Normal TSH on file in past 12 months     Recent Labs   Lab Test 03/06/23  1649   TSH 1.98              Passed - No active pregnancy on record     If patient is pregnant or has had a positive pregnancy test, please check TSH.          Passed - No positive pregnancy test in past 12 months     If patient is pregnant or has had a positive pregnancy " "test, please check TSH.           Signed Prescriptions Disp Refills    simvastatin (ZOCOR) 10 MG tablet 90 tablet 0     Sig: Take 1 tablet (10 mg) by mouth At Bedtime       Statins Protocol Failed - 5/3/2023  3:22 PM        Failed - LDL on file in past 12 months     Recent Labs   Lab Test 11/27/19  1112   LDL 99             Passed - No abnormal creatine kinase in past 12 months     No lab results found.             Passed - Recent (12 mo) or future (30 days) visit within the authorizing provider's specialty     Patient has had an office visit with the authorizing provider or a provider within the authorizing providers department within the previous 12 mos or has a future within next 30 days. See \"Patient Info\" tab in inbasket, or \"Choose Columns\" in Meds & Orders section of the refill encounter.              Passed - Medication is active on med list        Passed - Patient is age 18 or older        Passed - No active pregnancy on record        Passed - No positive pregnancy test in past 12 months          famotidine (PEPCID) 20 MG tablet 90 tablet 0     Sig: Take 1 tablet (20 mg) by mouth daily       H2 Blockers Protocol Passed - 5/2/2023 12:25 PM        Passed - Patient is age 12 or older        Passed - Recent (12 mo) or future (30 days) visit within the authorizing provider's specialty     Patient has had an office visit with the authorizing provider or a provider within the authorizing providers department within the previous 12 mos or has a future within next 30 days. See \"Patient Info\" tab in inbasket, or \"Choose Columns\" in Meds & Orders section of the refill encounter.              Passed - Medication is active on med list          citalopram (CELEXA) 10 MG tablet 90 tablet 0     Sig: Take 1 tablet (10 mg) by mouth daily       SSRIs Protocol Passed - 5/2/2023 12:25 PM        Passed - Recent (12 mo) or future (30 days) visit within the authorizing provider's specialty     Patient has had an office visit with " "the authorizing provider or a provider within the authorizing providers department within the previous 12 mos or has a future within next 30 days. See \"Patient Info\" tab in inbasket, or \"Choose Columns\" in Meds & Orders section of the refill encounter.              Passed - Medication is active on med list        Passed - Patient is age 18 or older        Passed - No active pregnancy on record        Passed - No positive pregnancy test in last 12 months             Junior Prince RN 05/03/23 3:25 PM  "

## 2023-05-03 NOTE — TELEPHONE ENCOUNTER
Refilled 2/2/23.  Pharmacy change.  Refills adjusted.  Refilled 8/2/22.  Pharmacy change.  Refills adjusted.

## 2023-05-03 NOTE — TELEPHONE ENCOUNTER
"Routing refill request to provider for review/approval because:  Early refill requested.    Last Written Prescription Date:  10/12/22  Last Fill Quantity: 90,  # refills: 2   Last office visit provider:  3/6/23     Requested Prescriptions   Pending Prescriptions Disp Refills     montelukast (SINGULAIR) 10 MG tablet 90 tablet 2     Sig: Take 1 tablet (10 mg) by mouth At Bedtime       Leukotriene Inhibitors Protocol Passed - 5/2/2023 12:25 PM        Passed - Patient is age 12 or older     If patient is under 16, ok to refill using age based dosing.           Passed - Recent (12 mo) or future (30 days) visit within the authorizing provider's specialty     Patient has had an office visit with the authorizing provider or a provider within the authorizing providers department within the previous 12 mos or has a future within next 30 days. See \"Patient Info\" tab in inbasket, or \"Choose Columns\" in Meds & Orders section of the refill encounter.              Passed - Medication is active on med list           levothyroxine (SYNTHROID/LEVOTHROID) 75 MCG tablet 90 tablet 0     Sig: TAKE 1 TAB BY MOUTH ON TUE WED THR SAT SUN       Thyroid Protocol Passed - 5/2/2023 12:25 PM        Passed - Patient is 12 years or older        Passed - Recent (12 mo) or future (30 days) visit within the authorizing provider's specialty     Patient has had an office visit with the authorizing provider or a provider within the authorizing providers department within the previous 12 mos or has a future within next 30 days. See \"Patient Info\" tab in inbasket, or \"Choose Columns\" in Meds & Orders section of the refill encounter.              Passed - Medication is active on med list        Passed - Normal TSH on file in past 12 months     Recent Labs   Lab Test 03/06/23  1649   TSH 1.98              Passed - No active pregnancy on record     If patient is pregnant or has had a positive pregnancy test, please check TSH.          Passed - No positive " "pregnancy test in past 12 months     If patient is pregnant or has had a positive pregnancy test, please check TSH.           Signed Prescriptions Disp Refills    simvastatin (ZOCOR) 10 MG tablet 90 tablet 0     Sig: Take 1 tablet (10 mg) by mouth At Bedtime       Statins Protocol Failed - 5/3/2023  3:22 PM        Failed - LDL on file in past 12 months     Recent Labs   Lab Test 11/27/19  1112   LDL 99             Passed - No abnormal creatine kinase in past 12 months     No lab results found.             Passed - Recent (12 mo) or future (30 days) visit within the authorizing provider's specialty     Patient has had an office visit with the authorizing provider or a provider within the authorizing providers department within the previous 12 mos or has a future within next 30 days. See \"Patient Info\" tab in inbasket, or \"Choose Columns\" in Meds & Orders section of the refill encounter.              Passed - Medication is active on med list        Passed - Patient is age 18 or older        Passed - No active pregnancy on record        Passed - No positive pregnancy test in past 12 months          famotidine (PEPCID) 20 MG tablet 90 tablet 0     Sig: Take 1 tablet (20 mg) by mouth daily       H2 Blockers Protocol Passed - 5/2/2023 12:25 PM        Passed - Patient is age 12 or older        Passed - Recent (12 mo) or future (30 days) visit within the authorizing provider's specialty     Patient has had an office visit with the authorizing provider or a provider within the authorizing providers department within the previous 12 mos or has a future within next 30 days. See \"Patient Info\" tab in inbasket, or \"Choose Columns\" in Meds & Orders section of the refill encounter.              Passed - Medication is active on med list          citalopram (CELEXA) 10 MG tablet 90 tablet 0     Sig: Take 1 tablet (10 mg) by mouth daily       SSRIs Protocol Passed - 5/2/2023 12:25 PM        Passed - Recent (12 mo) or future (30 days) " "visit within the authorizing provider's specialty     Patient has had an office visit with the authorizing provider or a provider within the authorizing providers department within the previous 12 mos or has a future within next 30 days. See \"Patient Info\" tab in inbasket, or \"Choose Columns\" in Meds & Orders section of the refill encounter.              Passed - Medication is active on med list        Passed - Patient is age 18 or older        Passed - No active pregnancy on record        Passed - No positive pregnancy test in last 12 months             Junior Prince RN 05/03/23 3:24 PM  "

## 2023-05-04 NOTE — TELEPHONE ENCOUNTER
I spoke with patient for more information regarding this request. Her left breast is enlarging and it is now larger than her right breast. She does not think there is a lump but needed imaging.     I let her know that I will connect with [facility] technician for further information regarding mammogram ordering.        Left message for patient to call the office back.

## 2023-05-04 NOTE — TELEPHONE ENCOUNTER
Spoke with [facility] technician.     Due to patient's diagnostic need for a mammogram, an order is needed. The referral placed in February was for a routine mammogram screening.

## 2023-05-12 DIAGNOSIS — H35.3231 EXUDATIVE AGE-RELATED MACULAR DEGENERATION OF BOTH EYES WITH ACTIVE CHOROIDAL NEOVASCULARIZATION (H): Primary | ICD-10-CM

## 2023-05-16 ENCOUNTER — OFFICE VISIT (OUTPATIENT)
Dept: INTERNAL MEDICINE | Facility: CLINIC | Age: 73
End: 2023-05-16
Payer: MEDICARE

## 2023-05-16 ENCOUNTER — TELEPHONE (OUTPATIENT)
Dept: INTERNAL MEDICINE | Facility: CLINIC | Age: 73
End: 2023-05-16

## 2023-05-16 ENCOUNTER — ANCILLARY PROCEDURE (OUTPATIENT)
Dept: GENERAL RADIOLOGY | Facility: CLINIC | Age: 73
End: 2023-05-16
Attending: INTERNAL MEDICINE
Payer: MEDICARE

## 2023-05-16 VITALS
BODY MASS INDEX: 33.58 KG/M2 | DIASTOLIC BLOOD PRESSURE: 60 MMHG | RESPIRATION RATE: 14 BRPM | OXYGEN SATURATION: 95 % | WEIGHT: 189.5 LBS | TEMPERATURE: 98.6 F | HEART RATE: 78 BPM | HEIGHT: 63 IN | SYSTOLIC BLOOD PRESSURE: 122 MMHG

## 2023-05-16 DIAGNOSIS — D63.1 ANEMIA OF CHRONIC RENAL FAILURE, STAGE 3 (MODERATE), UNSPECIFIED WHETHER STAGE 3A OR 3B CKD (H): Primary | ICD-10-CM

## 2023-05-16 DIAGNOSIS — J32.0 CHRONIC MAXILLARY SINUSITIS: ICD-10-CM

## 2023-05-16 DIAGNOSIS — N18.30 ANEMIA OF CHRONIC RENAL FAILURE, STAGE 3 (MODERATE), UNSPECIFIED WHETHER STAGE 3A OR 3B CKD (H): Primary | ICD-10-CM

## 2023-05-16 DIAGNOSIS — E13.9 DIABETES 1.5, MANAGED AS TYPE 2 (H): ICD-10-CM

## 2023-05-16 DIAGNOSIS — Z23 NEED FOR DIPHTHERIA-TETANUS-PERTUSSIS (TDAP) VACCINE: ICD-10-CM

## 2023-05-16 DIAGNOSIS — E78.5 HYPERLIPIDEMIA LDL GOAL <100: ICD-10-CM

## 2023-05-16 PROCEDURE — 80048 BASIC METABOLIC PNL TOTAL CA: CPT | Performed by: INTERNAL MEDICINE

## 2023-05-16 PROCEDURE — 80061 LIPID PANEL: CPT | Performed by: INTERNAL MEDICINE

## 2023-05-16 PROCEDURE — 36415 COLL VENOUS BLD VENIPUNCTURE: CPT | Performed by: INTERNAL MEDICINE

## 2023-05-16 PROCEDURE — 99214 OFFICE O/P EST MOD 30 MIN: CPT | Performed by: INTERNAL MEDICINE

## 2023-05-16 PROCEDURE — 70210 X-RAY EXAM OF SINUSES: CPT | Mod: TC | Performed by: RADIOLOGY

## 2023-05-16 RX ORDER — LOSARTAN POTASSIUM 25 MG/1
25 TABLET ORAL DAILY
Qty: 90 TABLET | Refills: 3 | Status: SHIPPED | OUTPATIENT
Start: 2023-05-16 | End: 2023-11-08

## 2023-05-16 ASSESSMENT — PAIN SCALES - GENERAL: PAINLEVEL: NO PAIN (0)

## 2023-05-16 ASSESSMENT — PATIENT HEALTH QUESTIONNAIRE - PHQ9
SUM OF ALL RESPONSES TO PHQ QUESTIONS 1-9: 6
10. IF YOU CHECKED OFF ANY PROBLEMS, HOW DIFFICULT HAVE THESE PROBLEMS MADE IT FOR YOU TO DO YOUR WORK, TAKE CARE OF THINGS AT HOME, OR GET ALONG WITH OTHER PEOPLE: SOMEWHAT DIFFICULT
SUM OF ALL RESPONSES TO PHQ QUESTIONS 1-9: 6

## 2023-05-16 NOTE — PROGRESS NOTES
ASSESSMENT AND PLAN:    1. Need for diphtheria-tetanus-pertussis (Tdap) vaccine  Referred to pharmacy    2. Diabetes 1.5, managed as type 2  Ongoing treatment with trulicity. A1c levels have been mid 7's.      3. Chronic renal failure, stage III  She has recently had upcreeping creatinine.  Urine albumin has been negative. Blood pressure is good.  Will get US kidneys and assess, repeat creatinine, and add losartan low dose for renal protection and follow.      4. Diabetic foot exam  Today there is minimal indication of sensory loss on the left foot, otherwise exam is negative for dystrophy, hyperkeratosis, deformity or ischemia.     5. Sinusitis  Exam and history and xray of sinuses is consistent with maxillary sinusitis. Will treat with augmentin 825mg po bid for 8 days.  Follow up if fails to improve for referral for ENT.    CHIEF COMPLAINT:  Follow up DM    HISTORY OF PRESENT ILLNESS:  Rita Mancini is a 72 year old female had a recent assessment at a private clinic, for her diabetes.  Told she has neuropathy. She actually feels well overall.  No chest pain or dyspnea or cough.  No bowel or bladder issues.     REVIEW OF SYSTEMS:   See HPI, all other systems on review are negative.    Past Medical History:   Diagnosis Date     Age-related cataract 12/06/2021     Anxiety and depression 01/01/1962     Bigeminy 03/17/2019     Bronchiectasis without complication (H)      COPD (chronic obstructive pulmonary disease) (H) 01/01/2009     Functional diarrhea 12/31/2018     GERD (gastroesophageal reflux disease)      History of alcoholism (H)      Hx antineoplastic chemotherapy     lung cancer      Hx of radiation therapy     lung cancer      Hyperlipidemia LDL goal <100      Hyperlipidemia LDL goal <100 3/6/2023     Hypothyroid      Infection due to 2019 novel coronavirus 3/6/2023     Lung cancer (H) 01/01/2008    RUL,  Non small cell cancer     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     Osteopenia      Other  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, subsequent encounter 04/08/2019     Other hydronephrosis      Pleural effusion 01/01/2015     Preoperative examination 12/06/2021     Sepsis due to Escherichia coli with acute renal failure without septic shock, unspecified acute renal failure type (H)      Sleep apnea 01/01/2010    does not use cpap     Type 2 diabetes, HbA1C goal < 8% (H)      History   Smoking Status     Former     Packs/day: 1.50     Years: 0.00     Types: Cigarettes     Quit date: 11/9/2008   Smokeless Tobacco     Never     Family History   Problem Relation Age of Onset     Heart Disease Mother      Hypertension Mother      Alcoholism Mother      Depression Mother      Heart Disease Father 45        mi age 45, cabg     Coronary Artery Disease Father      Cancer Father         prostate     Hypertension Father      Snoring Father      Alcoholism Sister      Chronic Obstructive Pulmonary Disease Brother      Alcoholism Brother      Obesity Daughter      Obesity Daughter      Diabetes Son      Anxiety Disorder Son      Depression Son      Post-Traumatic Stress Disorder (PTSD) Son      Cancer Maternal Aunt 47        breast     Breast Cancer Paternal Aunt      Leukemia Grandchild      Schizophrenia Grandchild      Lymphoma Grandchild      Past Surgical History:   Procedure Laterality Date     CATARACT IOL, RT/LT Bilateral 12/2021     IR MISCELLANEOUS PROCEDURE  12/10/2009     PORTACATH PLACEMENT      and removal     THORACOSCOPY Right 04/06/2015    Procedure: RIGHT THORACOSCOPY / BIOPSY PLEURAL / TALC PLEURODESIS;  Surgeon: Michi Ma MD;  Location: [facility] OR;  Service:      THORACOSCOPY Left 03/29/2019    Procedure: LEFT THORACOSCOPY WITH DECORTICATION;  Surgeon: Michi Ma MD;  Location: [facility] OR;  Service: General     TONSILLECTOMY  age 5     URETERAL STENT PLACEMENT Right 06/01/2010     US THORACENTESIS  03/27/2019     VITALS:  Vitals:     "05/16/23 0916   BP: 122/60   BP Location: Left arm   Patient Position: Sitting   Cuff Size: Adult Regular   Pulse: 78   Resp: 14   Temp: 98.6  F (37  C)   TempSrc: Oral   SpO2: 95%   Weight: 86 kg (189 lb 8 oz)   Height: 1.6 m (5' 2.99\")     Wt Readings from Last 3 Encounters:   05/16/23 86 kg (189 lb 8 oz)   03/26/23 85.7 kg (189 lb)   03/06/23 84.8 kg (187 lb)     PHYSICAL EXAM:  Constitutional:  In NAD, alert and oriented  HEENT; nasal voice, right nares may have polyp?    Neck: no cervical or axillary adenopathy  Cardiac:  S1 S2   Lungs: Clear  Abdomen:   Soft, flat and non-tender  Neurologic:  Speech clear, diabetic foot exam has decreased sensory in areas on left foot, otherwise negative.  gait normal       DECISION TO OBTAIN OLD RECORDS AND/OR OBTAIN HISTORY FROM SOMEONE OTHER THAN PATIENT, AND/OR ACCESSING CARE EVERYWHERE):  1  Reviewed private clinic paper work.      REVIEW AND SUMMARIZATION OF OLD RECORDS, AND/OR OBTAINING HISTORY FROM SOMEONE OTHER THAN PATIENT, AND/OR DISCUSSION OF CASE WITH ANOTHER HEALTH CARE PROVIDER:  2 reviewed recent notes    REVIEW AND/OR ORDER OF OF CLINICAL LAB TESTS: 1  ordered.    REVIEW AND/OR ORDER OF RADIOLOGY TESTS: 1 ordered.    REVIEW AND/OR ORDER OF MEDICAL TESTS (EKG/ECHO/COLONOSCOPY/EGD): 1  0    INDEPENDENT  VISUALIZATION OF IMAGE, TRACING, OR SPECIMEN ITSELF (2 EACH):  2 personally viewed and interpreted the sinus xray and reviewed the films with the patient.      TOTAL: 7    Current Outpatient Medications   Medication Sig Dispense Refill     aspirin 81 MG EC tablet Take 81 mg by mouth daily       blood glucose (NO BRAND SPECIFIED) test strip Use to test blood sugar 2 times daily or as directed. 200 strip 3     calcium carbonate (TUMS) 500 MG chewable tablet Take 1 chew tab by mouth daily       cetirizine (ZYRTEC) 10 MG CHEW Take 10 mg by mouth daily       citalopram (CELEXA) 10 MG tablet Take 1 tablet (10 mg) by mouth daily 90 tablet 0     famotidine (PEPCID) 20 MG " tablet Take 1 tablet (20 mg) by mouth daily 90 tablet 0     fluticasone (FLONASE) 50 MCG/ACT nasal spray Spray 1 spray into both nostrils 2 times daily 16 g 11     levothyroxine (SYNTHROID/LEVOTHROID) 50 MCG tablet TAKE ON EMPTY STOMACH EVERY MONDAY AND FRIDAY (SEE OTHER DOSE FOR REMAINING DAYS OF WEEK) 24 tablet 3     levothyroxine (SYNTHROID/LEVOTHROID) 75 MCG tablet TAKE 1 TAB BY MOUTH ON TUE WED THR SAT SUN 60 tablet 3     magnesium 500 MG TABS        Melatonin 10 MG CAPS        montelukast (SINGULAIR) 10 MG tablet Take 1 tablet (10 mg) by mouth At Bedtime 90 tablet 3     multivitamin w/minerals (THERA-VIT-M) tablet Take 1 tablet by mouth daily       PULMICORT FLEXHALER 180 MCG/ACT inhaler INHALE 2 PUFFS BY MOUTH TWICE A DAY 1 each 6     simvastatin (ZOCOR) 10 MG tablet Take 1 tablet (10 mg) by mouth At Bedtime 90 tablet 0     triamcinolone (KENALOG) 0.1 % external cream Apply topically 2 times daily       TRULICITY 1.5 MG/0.5ML pen Inject 1.5 mg Subcutaneous every 7 days 9 mL 2     umeclidinium-vilanterol (ANORO ELLIPTA) 62.5-25 MCG/INH oral inhaler TAKE 1 PUFF BY MOUTH EVERY  each 3     UNKNOWN TO PATIENT Eye vitamin       Michi Murillo MD  Internal Medicine  [facility]

## 2023-05-16 NOTE — TELEPHONE ENCOUNTER
General Call    Contacts       Type Contact Phone/Fax    05/16/2023 02:39 PM CDT Phone (Incoming) Live Rita R (Self) [phone] (H)        Reason for Call:  testing    What are your questions or concerns:  Pt got a new script sent to pharmacy but she states she normally only tests 1 time per day, does Dr Murillo want her to test 2 times a day?  She doesn't remember him saying that.      Please advise    Date of last appointment with provider: 5/16/23    Could we send this information to you in [place] or would you prefer to receive a phone call?:   Patient would prefer a phone call   Okay to leave a detailed message?: Yes at Cell number on file:    Telephone Information:   home [phone]

## 2023-05-17 LAB
ANION GAP SERPL CALCULATED.3IONS-SCNC: 15 MMOL/L (ref 7–15)
BUN SERPL-MCNC: 27.1 MG/DL (ref 8–23)
CALCIUM SERPL-MCNC: 9.8 MG/DL (ref 8.8–10.2)
CHLORIDE SERPL-SCNC: 99 MMOL/L (ref 98–107)
CHOLEST SERPL-MCNC: 164 MG/DL
CREAT SERPL-MCNC: 1.28 MG/DL (ref 0.51–0.95)
DEPRECATED HCO3 PLAS-SCNC: 23 MMOL/L (ref 22–29)
GFR SERPL CREATININE-BSD FRML MDRD: 44 ML/MIN/1.73M2
GLUCOSE SERPL-MCNC: 159 MG/DL (ref 70–99)
HDLC SERPL-MCNC: 46 MG/DL
LDLC SERPL CALC-MCNC: 80 MG/DL
NONHDLC SERPL-MCNC: 118 MG/DL
POTASSIUM SERPL-SCNC: 4.8 MMOL/L (ref 3.4–5.3)
SODIUM SERPL-SCNC: 137 MMOL/L (ref 136–145)
TRIGL SERPL-MCNC: 188 MG/DL

## 2023-05-17 NOTE — TELEPHONE ENCOUNTER
Read Dr Murillo's nesponse and pt requested a new script to be sent with only testing 1 per day please.

## 2023-05-17 NOTE — TELEPHONE ENCOUNTER
When patient returns call please relay following message regarding how often to test her blood sugars:    Please tell her that once daily is good enough. Dr. Lidia MD

## 2023-05-22 ENCOUNTER — HOSPITAL ENCOUNTER (OUTPATIENT)
Dept: ULTRASOUND IMAGING | Facility: HOSPITAL | Age: 73
Discharge: HOME | End: 2023-05-22
Attending: INTERNAL MEDICINE
Payer: MEDICARE

## 2023-05-22 ENCOUNTER — ANCILLARY PROCEDURE (OUTPATIENT)
Dept: MAMMOGRAPHY | Facility: CLINIC | Age: 73
End: 2023-05-22
Attending: INTERNAL MEDICINE
Payer: MEDICARE

## 2023-05-22 DIAGNOSIS — R92.8 OTHER ABNORMAL AND INCONCLUSIVE FINDINGS ON DIAGNOSTIC IMAGING OF BREAST: ICD-10-CM

## 2023-05-22 DIAGNOSIS — N18.30 ANEMIA OF CHRONIC RENAL FAILURE, STAGE 3 (MODERATE), UNSPECIFIED WHETHER STAGE 3A OR 3B CKD (H): ICD-10-CM

## 2023-05-22 DIAGNOSIS — N63.0 BREAST SWELLING: ICD-10-CM

## 2023-05-22 DIAGNOSIS — D63.1 ANEMIA OF CHRONIC RENAL FAILURE, STAGE 3 (MODERATE), UNSPECIFIED WHETHER STAGE 3A OR 3B CKD (H): ICD-10-CM

## 2023-05-22 PROCEDURE — 76770 US EXAM ABDO BACK WALL COMP: CPT

## 2023-05-22 PROCEDURE — 77062 BREAST TOMOSYNTHESIS BI: CPT

## 2023-05-24 ENCOUNTER — OFFICE VISIT (OUTPATIENT)
Dept: OPHTHALMOLOGY | Facility: CLINIC | Age: 73
End: 2023-05-24
Attending: OPHTHALMOLOGY
Payer: MEDICARE

## 2023-05-24 DIAGNOSIS — H04.123 DRY EYE SYNDROME OF BOTH EYES: ICD-10-CM

## 2023-05-24 DIAGNOSIS — Z96.1 PSEUDOPHAKIA OF BOTH EYES: ICD-10-CM

## 2023-05-24 DIAGNOSIS — H35.3231 EXUDATIVE AGE-RELATED MACULAR DEGENERATION OF BOTH EYES WITH ACTIVE CHOROIDAL NEOVASCULARIZATION (H): Primary | ICD-10-CM

## 2023-05-24 PROCEDURE — 99214 OFFICE O/P EST MOD 30 MIN: CPT | Mod: 25 | Performed by: OPHTHALMOLOGY

## 2023-05-24 PROCEDURE — 92134 CPTRZ OPH DX IMG PST SGM RTA: CPT | Performed by: OPHTHALMOLOGY

## 2023-05-24 PROCEDURE — 67028 INJECTION EYE DRUG: CPT | Mod: LT | Performed by: OPHTHALMOLOGY

## 2023-05-24 PROCEDURE — 250N000011 HC RX IP 250 OP 636: Performed by: OPHTHALMOLOGY

## 2023-05-24 PROCEDURE — G0463 HOSPITAL OUTPT CLINIC VISIT: HCPCS | Mod: 25 | Performed by: OPHTHALMOLOGY

## 2023-05-24 RX ADMIN — Medication 1.25 MG: at 13:49

## 2023-05-24 ASSESSMENT — CONF VISUAL FIELD
OS_INFERIOR_TEMPORAL_RESTRICTION: 0
OS_SUPERIOR_NASAL_RESTRICTION: 0
OS_INFERIOR_NASAL_RESTRICTION: 0
OS_NORMAL: 1
OD_INFERIOR_NASAL_RESTRICTION: 0
METHOD: COUNTING FINGERS
OS_SUPERIOR_TEMPORAL_RESTRICTION: 0
OD_NORMAL: 1
OD_SUPERIOR_NASAL_RESTRICTION: 0
OD_INFERIOR_TEMPORAL_RESTRICTION: 0
OD_SUPERIOR_TEMPORAL_RESTRICTION: 0

## 2023-05-24 ASSESSMENT — REFRACTION_WEARINGRX
SPECS_TYPE: PAL
OD_CYLINDER: +0.75
OS_CYLINDER: +0.25
OS_SPHERE: +0.50
OD_SPHERE: PLANO
OD_AXIS: 010
OS_AXIS: 175
OD_ADD: +2.50
OS_ADD: +2.50

## 2023-05-24 ASSESSMENT — TONOMETRY
OD_IOP_MMHG: 16
IOP_METHOD: TONOPEN
OS_IOP_MMHG: 14

## 2023-05-24 ASSESSMENT — EXTERNAL EXAM - RIGHT EYE: OD_EXAM: NORMAL

## 2023-05-24 ASSESSMENT — VISUAL ACUITY
OS_SC+: -2
OS_SC: 20/30
METHOD: SNELLEN - LINEAR
OD_SC+: -1+1
OD_SC: 20/25

## 2023-05-24 ASSESSMENT — SLIT LAMP EXAM - LIDS
COMMENTS: NORMAL
COMMENTS: NORMAL

## 2023-05-24 ASSESSMENT — EXTERNAL EXAM - LEFT EYE: OS_EXAM: NORMAL

## 2023-05-24 NOTE — NURSING NOTE
Chief Complaints and History of Present Illnesses   Patient presents with     Macular Degeneration Follow Up     2 month follow up  Age related macular degeneration each eye     Chief Complaint(s) and History of Present Illness(es)     Macular Degeneration Follow Up            Laterality: both eyes    Associated symptoms: dryness.  Negative for eye pain, flashes and floaters    Treatments tried: artificial tears    Pain scale: 0/10    Comments: 2 month follow up  Age related macular degeneration each eye          Comments    Pt states no vision changes since last visit.   Dryness 1-2x weekly, uses AT PRN for relief.  No flashes or floaters. Pt has been forgetting to use [employer] Grid     Ocular meds:  AREDS twice daily    Dawson Desir 12:53 PM May 24, 2023

## 2023-05-24 NOTE — PROGRESS NOTES
CC -  Macular degeneration     INTERVAL HISTORY - Pt states no vision changes since last visit.   Dryness 1-2x weekly, uses AT PRN for relief.  No flashes or floaters. Pt has been forgetting to use [employer] Grid     HPI -   Rita Mancini is a  72 year old year-old patient presenting for evaluation for macular degeneration. Was referred by her cataract surgeon for AMD. Saw Dr. KRISTIE Duarte ~6 months ago. 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 Observation recommended  No current smoking   No known FH of AMD, glaucoma     PAST OCULAR SURGERY  CE IOL each eye 2021    RETINAL IMAGING:  OCT 05/24/23: each eye pachychoroid; right eye few drusen; left eye SRF resolved; SIRE present -stable    ASSESSMENT & PLAN    # Age related macular degeneration each eye  Right eye just a few small drusen  Left eye [facility] PED with SIRE and increased SRF compared to 8/2022; vision more blurry  pachychoroid +  S/P Avastin injection left eye x 4 (12/6/22 and 1/5/23, 2/1/23 and 3/21/23); last injection 9 weeks ago;   OCT with no SRF  Discussed treat and extend plan; agrees; will do avastin today and RTC 10-11 w for DFE/OCT/Avastin left eye     AREDS II supplements  Amsler grid education given     # pseudophakia each eye  Observe    # dry eye each eye   ATs as  Needed      Dispo:  RTC 10-11 w for DFE/OCT/Avastin left eye     Miguel Garvey MD MPH  Vitreoretinal Fellow PGY-5  [facility]       Complete documentation of historical and exam elements from today's encounter can be found in the full encounter summary report (not reduplicated in this progress note). I personally obtained the chief complaint(s) and history of present illness.  I confirmed and edited as necessary the review of systems, past medical/surgical history, family history, social history, and examination findings as documented by others; and I examined the patient myself. I personally reviewed the relevant tests, images, and reports  as documented above. I formulated and edited as necessary the assessment and plan and discussed the findings and management plan with the patient and family.     Malvin Rizzo MD

## 2023-06-04 NOTE — TELEPHONE ENCOUNTER
Drug Change Request  Who is calling?:  Pharmacy   What is the current medication?:     Disp  Refills  Start  End     blood glucose test (ACCU-CHEK SMARTVIEW TEST STRIP) strips  100 strip  3  6/16/2020      Sig: PT TO TEST BLOOD SUGAR DAILY     Sent to pharmacy as: blood sugar diagnostic strips (Accu-Chek SmartView Test Strip)     Notes to Pharmacy: Non insulin dependent; ICD 10 code:   E10.9     E-Prescribing Status: Receipt confirmed by pharmacy (6/16/2020  9:02 AM CDT)        What alternative is being requested?:   Why the request to change?:  Per pharmacy request; Medicare states diagnosis code changed and we need new prescription send to medicare part-B.   Requested Pharmacy?: [facility]  Okay to leave a detailed message?:  Yes      
This was a shared visit with the JESSICA. I reviewed and verified the documentation and independently performed the documented:

## 2023-06-06 ENCOUNTER — HOSPITAL ENCOUNTER (OUTPATIENT)
Dept: CT IMAGING | Facility: HOSPITAL | Age: 73
Discharge: HOME | End: 2023-06-06
Attending: INTERNAL MEDICINE | Admitting: INTERNAL MEDICINE
Payer: MEDICARE

## 2023-06-06 DIAGNOSIS — C34.91 NON-SMALL CELL CANCER OF RIGHT LUNG (H): ICD-10-CM

## 2023-06-06 DIAGNOSIS — J90 CHRONIC PLEURAL EFFUSION: ICD-10-CM

## 2023-06-06 PROCEDURE — 71250 CT THORAX DX C-: CPT | Mod: ME

## 2023-06-29 ENCOUNTER — ONCOLOGY VISIT (OUTPATIENT)
Dept: ONCOLOGY | Facility: HOSPITAL | Age: 73
End: 2023-06-29
Payer: MEDICARE

## 2023-06-29 VITALS
DIASTOLIC BLOOD PRESSURE: 56 MMHG | HEIGHT: 63 IN | HEART RATE: 69 BPM | BODY MASS INDEX: 33.13 KG/M2 | SYSTOLIC BLOOD PRESSURE: 113 MMHG | OXYGEN SATURATION: 95 % | WEIGHT: 187 LBS | RESPIRATION RATE: 16 BRPM

## 2023-06-29 DIAGNOSIS — J90 CHRONIC PLEURAL EFFUSION: ICD-10-CM

## 2023-06-29 DIAGNOSIS — C34.91 NON-SMALL CELL CANCER OF RIGHT LUNG (H): Primary | ICD-10-CM

## 2023-06-29 PROCEDURE — G0463 HOSPITAL OUTPT CLINIC VISIT: HCPCS | Performed by: INTERNAL MEDICINE

## 2023-06-29 PROCEDURE — 99214 OFFICE O/P EST MOD 30 MIN: CPT | Performed by: INTERNAL MEDICINE

## 2023-06-29 ASSESSMENT — PAIN SCALES - GENERAL: PAINLEVEL: NO PAIN (0)

## 2023-06-29 NOTE — LETTER
"    6/29/2023         RE: Rita Mancini  [address]  [place] [address]        Dear Colleague,    Thank you for referring your patient, Rita Mancini, to the [facility]. Please see a copy of my visit note below.    Oncology Rooming Note    June 29, 2023 2:03 PM   Rita Mancini is a 73 year old female who presents for:    Chief Complaint   Patient presents with     Oncology Clinic Visit     Non-small cell cancer of right lung, s/p radiation and chemotherapies, in remission since 2934-7294     Initial Vitals: /56   Pulse 69   Resp 16   Ht 1.6 m (5' 3\")   Wt 84.8 kg (187 lb)   LMP  (LMP Unknown)   SpO2 95%   BMI 33.13 kg/m   Estimated body mass index is 33.13 kg/m  as calculated from the following:    Height as of this encounter: 1.6 m (5' 3\").    Weight as of this encounter: 84.8 kg (187 lb). Body surface area is 1.94 meters squared.  No Pain (0) Comment: Data Unavailable   No LMP recorded (lmp unknown). Patient is postmenopausal.  Allergies reviewed: Yes  Medications reviewed: Yes    Medications: Medication refills not needed today.  Pharmacy name entered into Aut[employer]:    [id number] - [place], [place] - [address] AT [facility]  [id number] [place] [place] [facility] [address] [facility]    Clinical concerns: 1 year follow up     Ketty Horne              [facility] Hematology and Oncology Progress Note    Patient: Rita Mancini  MRN: [id number]  Date of Service: Jun 29, 2023           Reason for visit         Problem List Items Addressed This Visit        Respiratory    Non-small cell cancer of right lung, s/p radiation and chemotherapies, in remission since 4138-3339 (H) - Primary    Chronic pleural effusion, left (s/p Talc pleurodesis 2015)         Assessment      1.  A very pleasant 73 year old  woman with non-small-cell lung cancer stage III treated with cisplatin and -16 and radiation and " currently under active surveillance.  She was diagnosed in 09/2009. Finished her treatment in February 2010.  CT scan shows no worrisome changes.  In fact the lower part of the left as well as the right lung is more expanded.  2.   History of hospitalization for pneumonia and then repeat hospitalization for hospital associated pneumonia.  She is done with the antibiotics.  She had it on both sides and needed a chest tubes in 2019.  So far she has not had any more recurrence of that.  3.  H/o Double vision secondary to left lateral rectus muscle weakness.  Seems to have resolved a little bit.  4.  Mild renal insufficiency. Stable slightly dilated renal collecting system.  This is most likely secondary to her diabetes.  5.  Diabetes is improved since she has lost all that weight.  6.  Had another COVID bout in the month of March.  This is her second episode.  She recovered quite well.  In fact she feels her lung symptoms are better.    Plan      1.  At this time we will continue with yearly CT scan follow-up.  She will not need any contrast with her scans anymore.  2.  Continue good diet and exercise.  3.  Continue with pandemic precautions.  4.  Management of diabetes.  5.  Good diet and exercise.      Medical decision making      Moderately complex.      Risk      Moderate risk of morbidity mortality.  Significant risk of side effects from intervention.       Cancer Staging   No matching staging information was found for the patient.        History of present illness        Ms. Rita Mancini is a very pleasant 73 year old woman with a diagnosis of nonsmall cell lung cancer located in the right upper lobe measuring 4.2 cm in size, presenting with some shortness of breath and cough in 09/2009 and biopsy suggestive of adenocarcinoma including lymphnode biopsy confirming it is stage III disease.  Subsequent to that she was treated with cisplatin and -16 for 3 cycles and Taxotere for 2 cycles afterwards.  Subsequent  to that she has been in remission.  She had been fine up until 11/2013 where a CT scan actually showed some congestion in the right lower lobe and then the PET scan showed that to be slightly hypermetabolic.  Therefore, she had that biopsied and that was negative. She had CT in September 2015 that revealed pleural effusion. This was tapped and was negative. About one litre of fluid was removed. She felt slightly better.    She was seen by pulmonary again for the fluid. Was then sent to Dr. Fair for pleural biopsy and pleurodhesis. That was done on 4/6/15. The biopsy was non malignant.      She has since been followed by me and pulmonary with CT scans.      Rita was admitted at [facility] on March 27, 2019 with septic shock.  She was found to have bilateral pneumonia but more so on the left side.  Needed to be intubated and ventilated.  Needed chest tube.  He was in the hospital for several days.  Repeat hospitalization.  She was  again in the hospital 31 July with some coughing and shortness of breath.  Found to have another infiltrate in the right lower lobe.    Comes in today for scheduled follow-up.  Had a CT scan earlier this month.  Did have COVID again in March 2023.  Recovered quite well.  Feels that her lungs are actually better.  Not having as much nose drainage etc.      Review of system      A 14 point review of systems was obtained.  Positive findings noted in the history.  Rest of the review of system is otherwise negative.     Past medical history      Past Medical History:   Diagnosis Date     Age-related cataract 12/06/2021     Anxiety and depression 01/01/1962     Bigeminy 03/17/2019     Bronchiectasis without complication (H)      COPD (chronic obstructive pulmonary disease) (H) 01/01/2009     Functional diarrhea 12/31/2018     GERD (gastroesophageal reflux disease)      History of alcoholism (H)      Hx antineoplastic chemotherapy     lung cancer      Hx of radiation therapy      "lung cancer      Hydronephrosis      Hyperlipidemia LDL goal <100 03/06/2023     Hypothyroid      Infection due to 2019 novel coronavirus 03/06/2023     Lung cancer (H) 01/01/2008    RUL,  Non small cell cancer     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     Osteopenia      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, subsequent encounter 04/08/2019     Pleural effusion 01/01/2015     Sepsis due to Escherichia coli with acute renal failure without septic shock, unspecified acute renal failure type (H)      Sleep apnea 01/01/2010    does not use cpap     Type 2 diabetes, HbA1C goal < 8% (H)        Past Surgical History:   Procedure Laterality Date     CATARACT IOL, RT/LT Bilateral 12/2021     IR MISCELLANEOUS PROCEDURE  12/10/2009     PORTACATH PLACEMENT      and removal     THORACOSCOPY Right 04/06/2015    Procedure: RIGHT THORACOSCOPY / BIOPSY PLEURAL / TALC PLEURODESIS;  Surgeon: Michi Ma MD;  Location: [facility];  Service:      THORACOSCOPY Left 03/29/2019    Procedure: LEFT THORACOSCOPY WITH DECORTICATION;  Surgeon: Michi Ma MD;  Location: [facility];  Service: General     TONSILLECTOMY  age 5     URETERAL STENT PLACEMENT Right 06/01/2010     US THORACENTESIS  03/27/2019       Physical exam        /56   Pulse 69   Resp 16   Ht 1.6 m (5' 3\")   Wt 84.8 kg (187 lb)   LMP  (LMP Unknown)   SpO2 95%   BMI 33.13 kg/m      GENERAL: No acute distress. Cooperative in conversation.   HEENT:  Pupils are equal, round and reactive. Oral mucosa is clean and intact. No ulcerations or mucositis noted. No bleeding noted.  RESP:Chest symmetric lungs are clear bilaterally per auscultation. Regular respiratory rate. No wheezes has occasional right-sided rhonchi.  CV: Normal S1 S2 Regular, rate and rhythm. No murmurs.    ABD: Nondistended, soft, nontender. Positive bowel sounds. No organomegaly.   EXTREMITIES: No lower extremity edema.   NEURO: " Non- focal. Alert and oriented x3.  Cranial nerves appear intact.  PSYCH: Within normal limits. No depression or anxiety.  SKIN: Warm dry intact.       Lab results      No results found for this or any previous visit (from the past 168 hour(s)).    Imaging results        CT Chest w/o Contrast    Result Date: 6/7/2023  EXAM: CT CHEST W/O CONTRAST LOCATION: [facility] DATE/TIME: 6/6/2023 11:46 AM CDT INDICATION: Non-small cell lung cancer, metastatic, assess treatment response COMPARISON: 05/03/2022. 03/17/2021. TECHNIQUE: CT chest without IV contrast. Multiplanar reformats were obtained. Dose reduction techniques were used. CONTRAST: None. FINDINGS: LUNGS AND PLEURA: Stable pleural irregularity and calcification of the right related to prior pleurodesis. Stable consolidation, bronchiectasis, and architectural distortion of the superior medial right upper lobe extending to the hilum. Stable small consolidative opacity at the base of the right lower lobe associated with some pleural thickening. Stable chronic bronchiectasis and streaky scarring at the bases of both lower lobes. No change in the 11 mm smoothly marginated nodule in the right lower lobe (image 27 of series 4). A few smaller nodules along the right oblique fissure are also unchanged MEDIASTINUM/AXILLAE: Mildly enlarged subcarinal lymph nodes measuring up to 13 mm are unchanged. Very small hiatal hernia. CORONARY ARTERY CALCIFICATION: None. UPPER ABDOMEN: Stable chronic dilatation of the right renal collecting system incompletely imaged. MUSCULOSKELETAL: Old healed fracture of the left lateral eighth rib. No concerning lytic or blastic bone lesions.     IMPRESSION: 1.  Stable appearance of the chest. No definite tumor recurrence. 2.  Stable consolidative opacity of the right upper lobe presumably related to treated lung cancer. 3.  Stable 11 mm smoothly marginated nodule in the right lower lobe. 4.  Stable small consolidative  opacity at the base of the right lower lobe probably inflammatory. 5.  Chronic bronchiectasis and scarring in both lower lobes. 6.  Chronic changes of pleurodesis on the right.     CT scan personally reviewed.  Compared to the scan from 2020.    Total time spent was over 35 minutes.  This includes chart prep time, review of clinical data including notes from outside physicians, labs, review of medical imaging, discussion about  plan of care, documentation and .     Signed by: Herbert Foster MD,      This note has been dictated using voice recognition software. Any grammatical or context distortions are unintentional and inherent to the software        Again, thank you for allowing me to participate in the care of your patient.        Sincerely,        Herbert Foster MD, MD

## 2023-06-29 NOTE — PROGRESS NOTES
"Oncology Rooming Note    June 29, 2023 2:03 PM   Rita Mancini is a 73 year old female who presents for:    Chief Complaint   Patient presents with     Oncology Clinic Visit     Non-small cell cancer of right lung, s/p radiation and chemotherapies, in remission since 1259-3740     Initial Vitals: /56   Pulse 69   Resp 16   Ht 1.6 m (5' 3\")   Wt 84.8 kg (187 lb)   LMP  (LMP Unknown)   SpO2 95%   BMI 33.13 kg/m   Estimated body mass index is 33.13 kg/m  as calculated from the following:    Height as of this encounter: 1.6 m (5' 3\").    Weight as of this encounter: 84.8 kg (187 lb). Body surface area is 1.94 meters squared.  No Pain (0) Comment: Data Unavailable   No LMP recorded (lmp unknown). Patient is postmenopausal.  Allergies reviewed: Yes  Medications reviewed: Yes    Medications: Medication refills not needed today.  Pharmacy name entered into EPIC:    [id number] - [place], [place] - [address] AT [facility]  [id number] - [place], [place] - [address]    Clinical concerns: 1 year follow up     Ketty Horne            "

## 2023-06-29 NOTE — PROGRESS NOTES
[facility] Hematology and Oncology Progress Note    Patient: Rita Mancini  MRN: [id number]  Date of Service: Jun 29, 2023           Reason for visit         Problem List Items Addressed This Visit        Respiratory    Non-small cell cancer of right lung, s/p radiation and chemotherapies, in remission since 9371-7824 (H) - Primary    Chronic pleural effusion, left (s/p Talc pleurodesis 2015)         Assessment      1.  A very pleasant 73 year old  woman with non-small-cell lung cancer stage III treated with cisplatin and -16 and radiation and currently under active surveillance.  She was diagnosed in 09/2009. Finished her treatment in February 2010.  CT scan shows no worrisome changes.  In fact the lower part of the left as well as the right lung is more expanded.  2.   History of hospitalization for pneumonia and then repeat hospitalization for hospital associated pneumonia.  She is done with the antibiotics.  She had it on both sides and needed a chest tubes in 2019.  So far she has not had any more recurrence of that.  3.  H/o Double vision secondary to left lateral rectus muscle weakness.  Seems to have resolved a little bit.  4.  Mild renal insufficiency. Stable slightly dilated renal collecting system.  This is most likely secondary to her diabetes.  5.  Diabetes is improved since she has lost all that weight.  6.  Had another COVID bout in the month of March.  This is her second episode.  She recovered quite well.  In fact she feels her lung symptoms are better.    Plan      1.  At this time we will continue with yearly CT scan follow-up.  She will not need any contrast with her scans anymore.  2.  Continue good diet and exercise.  3.  Continue with pandemic precautions.  4.  Management of diabetes.  5.  Good diet and exercise.      Medical decision making      Moderately complex.      Risk      Moderate risk of morbidity mortality.  Significant risk of side effects from intervention.       Cancer  Staging   No matching staging information was found for the patient.        History of present illness        Ms. Rita Mancini is a very pleasant 73 year old woman with a diagnosis of nonsmall cell lung cancer located in the right upper lobe measuring 4.2 cm in size, presenting with some shortness of breath and cough in 09/2009 and biopsy suggestive of adenocarcinoma including lymphnode biopsy confirming it is stage III disease.  Subsequent to that she was treated with cisplatin and -16 for 3 cycles and Taxotere for 2 cycles afterwards.  Subsequent to that she has been in remission.  She had been fine up until 11/2013 where a CT scan actually showed some congestion in the right lower lobe and then the PET scan showed that to be slightly hypermetabolic.  Therefore, she had that biopsied and that was negative. She had CT in September 2015 that revealed pleural effusion. This was tapped and was negative. About one litre of fluid was removed. She felt slightly better.    She was seen by pulmonary again for the fluid. Was then sent to Dr. Fair for pleural biopsy and pleurodhesis. That was done on 4/6/15. The biopsy was non malignant.      She has since been followed by me and pulmonary with CT scans.      Rita was admitted at [facility] on March 27, 2019 with septic shock.  She was found to have bilateral pneumonia but more so on the left side.  Needed to be intubated and ventilated.  Needed chest tube.  He was in the hospital for several days.  Repeat hospitalization.  She was  again in the hospital 31 July with some coughing and shortness of breath.  Found to have another infiltrate in the right lower lobe.    Comes in today for scheduled follow-up.  Had a CT scan earlier this month.  Did have COVID again in March 2023.  Recovered quite well.  Feels that her lungs are actually better.  Not having as much nose drainage etc.      Review of system      A 14 point review of systems was obtained.   "Positive findings noted in the history.  Rest of the review of system is otherwise negative.     Past medical history      Past Medical History:   Diagnosis Date     Age-related cataract 12/06/2021     Anxiety and depression 01/01/1962     Bigeminy 03/17/2019     Bronchiectasis without complication (H)      COPD (chronic obstructive pulmonary disease) (H) 01/01/2009     Functional diarrhea 12/31/2018     GERD (gastroesophageal reflux disease)      History of alcoholism (H)      Hx antineoplastic chemotherapy     lung cancer      Hx of radiation therapy     lung cancer      Hydronephrosis      Hyperlipidemia LDL goal <100 03/06/2023     Hypothyroid      Infection due to 2019 novel coronavirus 03/06/2023     Lung cancer (H) 01/01/2008    RUL,  Non small cell cancer     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     Osteopenia      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, subsequent encounter 04/08/2019     Pleural effusion 01/01/2015     Sepsis due to Escherichia coli with acute renal failure without septic shock, unspecified acute renal failure type (H)      Sleep apnea 01/01/2010    does not use cpap     Type 2 diabetes, HbA1C goal < 8% (H)        Past Surgical History:   Procedure Laterality Date     CATARACT IOL, RT/LT Bilateral 12/2021     IR MISCELLANEOUS PROCEDURE  12/10/2009     PORTACATH PLACEMENT      and removal     THORACOSCOPY Right 04/06/2015    Procedure: RIGHT THORACOSCOPY / BIOPSY PLEURAL / TALC PLEURODESIS;  Surgeon: Michi Ma MD;  Location: [facility];  Service:      THORACOSCOPY Left 03/29/2019    Procedure: LEFT THORACOSCOPY WITH DECORTICATION;  Surgeon: Michi Ma MD;  Location: [facility];  Service: General     TONSILLECTOMY  age 5     URETERAL STENT PLACEMENT Right 06/01/2010     US THORACENTESIS  03/27/2019       Physical exam        /56   Pulse 69   Resp 16   Ht 1.6 m (5' 3\")   Wt 84.8 kg (187 lb)   LMP  (LMP " Unknown)   SpO2 95%   BMI 33.13 kg/m      GENERAL: No acute distress. Cooperative in conversation.   HEENT:  Pupils are equal, round and reactive. Oral mucosa is clean and intact. No ulcerations or mucositis noted. No bleeding noted.  RESP:Chest symmetric lungs are clear bilaterally per auscultation. Regular respiratory rate. No wheezes has occasional right-sided rhonchi.  CV: Normal S1 S2 Regular, rate and rhythm. No murmurs.    ABD: Nondistended, soft, nontender. Positive bowel sounds. No organomegaly.   EXTREMITIES: No lower extremity edema.   NEURO: Non- focal. Alert and oriented x3.  Cranial nerves appear intact.  PSYCH: Within normal limits. No depression or anxiety.  SKIN: Warm dry intact.       Lab results      No results found for this or any previous visit (from the past 168 hour(s)).    Imaging results        CT Chest w/o Contrast    Result Date: 6/7/2023  EXAM: CT CHEST W/O CONTRAST LOCATION: [facility] DATE/TIME: 6/6/2023 11:46 AM CDT INDICATION: Non-small cell lung cancer, metastatic, assess treatment response COMPARISON: 05/03/2022. 03/17/2021. TECHNIQUE: CT chest without IV contrast. Multiplanar reformats were obtained. Dose reduction techniques were used. CONTRAST: None. FINDINGS: LUNGS AND PLEURA: Stable pleural irregularity and calcification of the right related to prior pleurodesis. Stable consolidation, bronchiectasis, and architectural distortion of the superior medial right upper lobe extending to the hilum. Stable small consolidative opacity at the base of the right lower lobe associated with some pleural thickening. Stable chronic bronchiectasis and streaky scarring at the bases of both lower lobes. No change in the 11 mm smoothly marginated nodule in the right lower lobe (image 27 of series 4). A few smaller nodules along the right oblique fissure are also unchanged MEDIASTINUM/AXILLAE: Mildly enlarged subcarinal lymph nodes measuring up to 13 mm are unchanged.  Very small hiatal hernia. CORONARY ARTERY CALCIFICATION: None. UPPER ABDOMEN: Stable chronic dilatation of the right renal collecting system incompletely imaged. MUSCULOSKELETAL: Old healed fracture of the left lateral eighth rib. No concerning lytic or blastic bone lesions.     IMPRESSION: 1.  Stable appearance of the chest. No definite tumor recurrence. 2.  Stable consolidative opacity of the right upper lobe presumably related to treated lung cancer. 3.  Stable 11 mm smoothly marginated nodule in the right lower lobe. 4.  Stable small consolidative opacity at the base of the right lower lobe probably inflammatory. 5.  Chronic bronchiectasis and scarring in both lower lobes. 6.  Chronic changes of pleurodesis on the right.     CT scan personally reviewed.  Compared to the scan from 2020.    Total time spent was over 35 minutes.  This includes chart prep time, review of clinical data including notes from outside physicians, labs, review of medical imaging, discussion about  plan of care, documentation and .     Signed by: Herbert Foster MD,      This note has been dictated using voice recognition software. Any grammatical or context distortions are unintentional and inherent to the software

## 2023-07-21 DIAGNOSIS — H35.3231 EXUDATIVE AGE-RELATED MACULAR DEGENERATION OF BOTH EYES WITH ACTIVE CHOROIDAL NEOVASCULARIZATION (H): Primary | ICD-10-CM

## 2023-08-01 ENCOUNTER — TELEPHONE (OUTPATIENT)
Dept: OPHTHALMOLOGY | Facility: CLINIC | Age: 73
End: 2023-08-01
Payer: MEDICARE

## 2023-08-01 NOTE — TELEPHONE ENCOUNTER
LVM for pt explaining we can try some different methods to make her more comfortable in the clinic (ie, subconjunctival numbing and/or ice before the treatment). I explained that Tylenol will likely not help with this.

## 2023-08-01 NOTE — TELEPHONE ENCOUNTER
[facility] Call Center    Phone Message    May a detailed message be left on voicemail: yes     Reason for Call: pt states each time she gets an eye injection it gets more painful each time and pt wants to know if she can take some tylenol prior to her appt?  Please contact pt to discuss options. Thank you!    Action Taken: Message routed to:  [facility] ([facility] eye    Travel Screening: Not Applicable

## 2023-08-02 ENCOUNTER — OFFICE VISIT (OUTPATIENT)
Dept: OPHTHALMOLOGY | Facility: CLINIC | Age: 73
End: 2023-08-02
Attending: OPHTHALMOLOGY
Payer: MEDICARE

## 2023-08-02 DIAGNOSIS — Z96.1 PSEUDOPHAKIA OF BOTH EYES: ICD-10-CM

## 2023-08-02 DIAGNOSIS — H04.123 DRY EYE SYNDROME OF BOTH EYES: ICD-10-CM

## 2023-08-02 DIAGNOSIS — H35.30 ARMD (AGE-RELATED MACULAR DEGENERATION), BILATERAL: ICD-10-CM

## 2023-08-02 DIAGNOSIS — H35.3231 EXUDATIVE AGE-RELATED MACULAR DEGENERATION OF BOTH EYES WITH ACTIVE CHOROIDAL NEOVASCULARIZATION (H): Primary | ICD-10-CM

## 2023-08-02 PROCEDURE — 92134 CPTRZ OPH DX IMG PST SGM RTA: CPT | Performed by: OPHTHALMOLOGY

## 2023-08-02 PROCEDURE — G0463 HOSPITAL OUTPT CLINIC VISIT: HCPCS | Mod: 25 | Performed by: OPHTHALMOLOGY

## 2023-08-02 PROCEDURE — 99214 OFFICE O/P EST MOD 30 MIN: CPT | Mod: 25 | Performed by: OPHTHALMOLOGY

## 2023-08-02 PROCEDURE — 250N000011 HC RX IP 250 OP 636: Performed by: OPHTHALMOLOGY

## 2023-08-02 PROCEDURE — 67028 INJECTION EYE DRUG: CPT | Mod: LT | Performed by: OPHTHALMOLOGY

## 2023-08-02 RX ADMIN — Medication 1.25 MG: at 14:03

## 2023-08-02 ASSESSMENT — VISUAL ACUITY
OD_SC: 20/20
METHOD: SNELLEN - LINEAR
OS_SC+: -2
OS_SC: 20/30
OD_SC+: -1

## 2023-08-02 ASSESSMENT — SLIT LAMP EXAM - LIDS
COMMENTS: NORMAL
COMMENTS: NORMAL

## 2023-08-02 ASSESSMENT — TONOMETRY
OD_IOP_MMHG: 14
IOP_METHOD: TONOPEN
OS_IOP_MMHG: 10

## 2023-08-02 ASSESSMENT — EXTERNAL EXAM - RIGHT EYE: OD_EXAM: NORMAL

## 2023-08-02 ASSESSMENT — EXTERNAL EXAM - LEFT EYE: OS_EXAM: NORMAL

## 2023-08-02 NOTE — PROGRESS NOTES
CC -  Macular degeneration     INTERVAL HISTORY - Pt states no vision changes since last visit.   Dryness 1-2x weekly, uses AT PRN for relief.  No flashes or floaters. Pt has been forgetting to use [employer]     HPI -   Rita Mancini is a  73 year old year-old patient presenting for evaluation for macular degeneration. Was referred by her cataract surgeon for AMD. Saw Dr. KRISTIE Duarte ~6 months ago. 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 Observation recommended  No current smoking   No known FH of AMD, glaucoma     PAST OCULAR SURGERY  CE IOL each eye 2021    RETINAL IMAGING:  OCT 08/02/23:   Right eye: Normal foveal contour, no intraretinal or subretinal fluid. Pachychoroid.  Left eye:  SubRPE scar with subretinal fluid, worse compared to previous scan, SIRE present -stable    ASSESSMENT & PLAN    # Age related macular degeneration each eye  Right eye just a few small drusen  Left eye [facility] PED with SIRE and increased SRF compared to 5/2023; vision stable  pachychoroid +  S/P Avastin injection left eye x 4 (12/6/22 and 1/5/23, 2/1/23 and 3/21/23; 5/24/23); last injection 10 weeks ago;  08/02/2023: OCT with SRF, worse  Discussed treat and extend plan; agrees; will do avastin today and RTC 8 weeks for DFE/OCT/Avastin left eye     AREDS II supplements  [employer]ms[employer] grid education given     # pseudophakia each eye  Observe    # dry eye each eye   ATs as  Needed      Dispo:  RTC 8 w for OCT/Avastin left eye (no dilation)    Benjamín Gonzalez MD  PGY-5 Vitreo-retina surgery Fellow  Department of Ophthalmology   [facility]        Complete documentation of historical and exam elements from today's encounter can be found in the full encounter summary report (not reduplicated in this progress note). I personally obtained the chief complaint(s) and history of present illness.  I confirmed and edited as necessary the review of systems, past medical/surgical history, family  history, social history, and examination findings as documented by others; and I examined the patient myself. I personally reviewed the relevant tests, images, and reports as documented above. I formulated and edited as necessary the assessment and plan and discussed the findings and management plan with the patient and family.     Malvin Rizzo MD

## 2023-08-07 ENCOUNTER — TELEPHONE (OUTPATIENT)
Dept: OPHTHALMOLOGY | Facility: CLINIC | Age: 73
End: 2023-08-07
Payer: MEDICARE

## 2023-08-11 DIAGNOSIS — J42 CHRONIC BRONCHITIS, UNSPECIFIED CHRONIC BRONCHITIS TYPE (H): ICD-10-CM

## 2023-08-11 RX ORDER — UMECLIDINIUM BROMIDE AND VILANTEROL TRIFENATATE 62.5; 25 UG/1; UG/1
1 POWDER RESPIRATORY (INHALATION) DAILY
Qty: 180 EACH | Refills: 3 | Status: SHIPPED | OUTPATIENT
Start: 2023-08-11 | End: 2023-11-01

## 2023-08-11 NOTE — TELEPHONE ENCOUNTER
"  Last Written Prescription Date:  7/14/2022  Last Fill Quantity: 180,  # refills: 3   Last office visit provider:  5/16/2023     Requested Prescriptions   Pending Prescriptions Disp Refills    ANORO ELLIPTA 62.5-25 MCG/ACT oral inhaler [Pharmacy Med Name: ANORO ELLIPTA 62.5-25 MCG INH]  3     Sig: INHALE 1 PUFF BY MOUTH EVERY DAY       Asthma Maintenance Inhalers - Anticholinergics Passed - 8/11/2023  9:13 AM        Passed - Patient is age 12 years or older        Passed - Recent (12 mo) or future (30 days) visit within the authorizing provider's specialty     Patient has had an office visit with the authorizing provider or a provider within the authorizing providers department within the previous 12 mos or has a future within next 30 days. See \"Patient Info\" tab in inbasket, or \"Choose Columns\" in Meds & Orders section of the refill encounter.              Passed - Medication is active on med list       Inhaled Steroids Protocol Passed - 8/11/2023  9:13 AM        Passed - Patient is age 12 or older        Passed - Recent (12 mo) or future (30 days) visit within the authorizing provider's specialty     Patient has had an office visit with the authorizing provider or a provider within the authorizing providers department within the previous 12 mos or has a future within next 30 days. See \"Patient Info\" tab in inbasket, or \"Choose Columns\" in Meds & Orders section of the refill encounter.              Passed - Medication is active on med list             Bridget Martin RN 08/11/23 9:14 AM  "

## 2023-08-16 DIAGNOSIS — J30.2 SEASONAL ALLERGIC RHINITIS, UNSPECIFIED TRIGGER: ICD-10-CM

## 2023-08-16 RX ORDER — FLUTICASONE PROPIONATE 50 MCG
1 SPRAY, SUSPENSION (ML) NASAL 2 TIMES DAILY
Qty: 16 G | Refills: 3 | Status: SHIPPED | OUTPATIENT
Start: 2023-08-16 | End: 2023-10-30

## 2023-08-16 NOTE — TELEPHONE ENCOUNTER
"Last Written Prescription Date:  10/26/2022  Last Fill Quantity: 16g,  # refills: 11   Last office visit provider:  5/16/2023     Requested Prescriptions   Pending Prescriptions Disp Refills    fluticasone (FLONASE) 50 MCG/ACT nasal spray 16 g 11     Sig: Spray 1 spray into both nostrils 2 times daily       Nasal Allergy Protocol Passed - 8/16/2023  1:13 PM        Passed - Patient is age 12 or older        Passed - Recent (12 mo) or future (30 days) visit within the authorizing provider's specialty     Patient has had an office visit with the authorizing provider or a provider within the authorizing providers department within the previous 12 mos or has a future within next 30 days. See \"Patient Info\" tab in inbasket, or \"Choose Columns\" in Meds & Orders section of the refill encounter.              Passed - Medication is active on med list             Irene Ash RN 08/16/23 4:11 PM  "

## 2023-08-21 DIAGNOSIS — E78.5 HYPERLIPIDEMIA LDL GOAL <100: ICD-10-CM

## 2023-08-21 DIAGNOSIS — F41.1 GAD (GENERALIZED ANXIETY DISORDER): ICD-10-CM

## 2023-08-21 DIAGNOSIS — K21.9 GASTROESOPHAGEAL REFLUX DISEASE WITHOUT ESOPHAGITIS: ICD-10-CM

## 2023-08-22 RX ORDER — SIMVASTATIN 10 MG
10 TABLET ORAL AT BEDTIME
Qty: 90 TABLET | Refills: 2 | Status: SHIPPED | OUTPATIENT
Start: 2023-08-22 | End: 2024-04-29

## 2023-08-22 RX ORDER — CITALOPRAM HYDROBROMIDE 10 MG/1
10 TABLET ORAL DAILY
Qty: 90 TABLET | Refills: 2 | Status: SHIPPED | OUTPATIENT
Start: 2023-08-22 | End: 2024-04-29

## 2023-08-22 RX ORDER — FAMOTIDINE 20 MG/1
20 TABLET, FILM COATED ORAL DAILY
Qty: 90 TABLET | Refills: 2 | Status: SHIPPED | OUTPATIENT
Start: 2023-08-22 | End: 2024-04-29

## 2023-08-22 NOTE — TELEPHONE ENCOUNTER
"Last Written CITALOPRAM Date:  5/3/2023  Last Fill Quantity: 90,  # refills: 0     Last Written FAMOTIDINE Date:  5/3/2023  Last Fill Quantity: 90,  # refills: 0     Last Written SIMVASTATIN Date:  5/3/2023  Last Fill Quantity: 90,  # refills: 0     Last office visit provider:  5/16/2023     Requested Prescriptions   Pending Prescriptions Disp Refills    famotidine (PEPCID) 20 MG tablet 90 tablet 0     Sig: Take 1 tablet (20 mg) by mouth daily       H2 Blockers Protocol Passed - 8/21/2023  3:08 PM        Passed - Patient is age 12 or older        Passed - Recent (12 mo) or future (30 days) visit within the authorizing provider's specialty     Patient has had an office visit with the authorizing provider or a provider within the authorizing providers department within the previous 12 mos or has a future within next 30 days. See \"Patient Info\" tab in inbasket, or \"Choose Columns\" in Meds & Orders section of the refill encounter.              Passed - Medication is active on med list          citalopram (CELEXA) 10 MG tablet 90 tablet 0     Sig: Take 1 tablet (10 mg) by mouth daily       SSRIs Protocol Passed - 8/21/2023  3:08 PM        Passed - Recent (12 mo) or future (30 days) visit within the authorizing provider's specialty     Patient has had an office visit with the authorizing provider or a provider within the authorizing providers department within the previous 12 mos or has a future within next 30 days. See \"Patient Info\" tab in inbasket, or \"Choose Columns\" in Meds & Orders section of the refill encounter.              Passed - Medication is active on med list        Passed - Patient is age 18 or older        Passed - No active pregnancy on record        Passed - No positive pregnancy test in last 12 months          simvastatin (ZOCOR) 10 MG tablet 90 tablet 0     Sig: Take 1 tablet (10 mg) by mouth At Bedtime       Statins Protocol Passed - 8/21/2023  3:08 PM        Passed - LDL on file in past 12 months    " " Recent Labs   Lab Test 05/16/23  1036   LDL 80             Passed - No abnormal creatine kinase in past 12 months     No lab results found.             Passed - Recent (12 mo) or future (30 days) visit within the authorizing provider's specialty     Patient has had an office visit with the authorizing provider or a provider within the authorizing providers department within the previous 12 mos or has a future within next 30 days. See \"Patient Info\" tab in inbasket, or \"Choose Columns\" in Meds & Orders section of the refill encounter.              Passed - Medication is active on med list        Passed - Patient is age 18 or older        Passed - No active pregnancy on record        Passed - No positive pregnancy test in past 12 months             Simona Marr RN 08/22/23 9:22 AM  "

## 2023-09-12 ENCOUNTER — OFFICE VISIT (OUTPATIENT)
Dept: INTERNAL MEDICINE | Facility: CLINIC | Age: 73
End: 2023-09-12
Payer: MEDICARE

## 2023-09-12 VITALS
DIASTOLIC BLOOD PRESSURE: 71 MMHG | WEIGHT: 190.2 LBS | OXYGEN SATURATION: 96 % | TEMPERATURE: 98.1 F | HEART RATE: 72 BPM | SYSTOLIC BLOOD PRESSURE: 121 MMHG | BODY MASS INDEX: 31.69 KG/M2 | HEIGHT: 65 IN | RESPIRATION RATE: 16 BRPM

## 2023-09-12 DIAGNOSIS — E13.9 DIABETES 1.5, MANAGED AS TYPE 2 (H): Primary | ICD-10-CM

## 2023-09-12 DIAGNOSIS — N13.30 HYDRONEPHROSIS, UNSPECIFIED HYDRONEPHROSIS TYPE: ICD-10-CM

## 2023-09-12 DIAGNOSIS — R39.11 HESITANCY: ICD-10-CM

## 2023-09-12 LAB
ALBUMIN UR-MCNC: NEGATIVE MG/DL
APPEARANCE UR: CLEAR
BACTERIA #/AREA URNS HPF: ABNORMAL /HPF
BILIRUB UR QL STRIP: NEGATIVE
COLOR UR AUTO: YELLOW
GLUCOSE UR STRIP-MCNC: NEGATIVE MG/DL
HBA1C MFR BLD: 7.5 % (ref 0–5.6)
HGB UR QL STRIP: NEGATIVE
KETONES UR STRIP-MCNC: NEGATIVE MG/DL
LEUKOCYTE ESTERASE UR QL STRIP: ABNORMAL
MUCOUS THREADS #/AREA URNS LPF: PRESENT /LPF
NITRATE UR QL: NEGATIVE
PH UR STRIP: 6.5 [PH] (ref 5–8)
RBC #/AREA URNS AUTO: ABNORMAL /HPF
SP GR UR STRIP: 1.01 (ref 1–1.03)
SQUAMOUS #/AREA URNS AUTO: ABNORMAL /LPF
UROBILINOGEN UR STRIP-ACNC: 0.2 E.U./DL
WBC #/AREA URNS AUTO: ABNORMAL /HPF

## 2023-09-12 PROCEDURE — 99214 OFFICE O/P EST MOD 30 MIN: CPT | Performed by: INTERNAL MEDICINE

## 2023-09-12 PROCEDURE — 83036 HEMOGLOBIN GLYCOSYLATED A1C: CPT | Performed by: INTERNAL MEDICINE

## 2023-09-12 PROCEDURE — 81001 URINALYSIS AUTO W/SCOPE: CPT | Performed by: INTERNAL MEDICINE

## 2023-09-12 PROCEDURE — 36415 COLL VENOUS BLD VENIPUNCTURE: CPT | Performed by: INTERNAL MEDICINE

## 2023-09-12 PROCEDURE — 80048 BASIC METABOLIC PNL TOTAL CA: CPT | Performed by: INTERNAL MEDICINE

## 2023-09-12 PROCEDURE — 87086 URINE CULTURE/COLONY COUNT: CPT | Performed by: INTERNAL MEDICINE

## 2023-09-12 ASSESSMENT — PATIENT HEALTH QUESTIONNAIRE - PHQ9
SUM OF ALL RESPONSES TO PHQ QUESTIONS 1-9: 9
SUM OF ALL RESPONSES TO PHQ QUESTIONS 1-9: 9
10. IF YOU CHECKED OFF ANY PROBLEMS, HOW DIFFICULT HAVE THESE PROBLEMS MADE IT FOR YOU TO DO YOUR WORK, TAKE CARE OF THINGS AT HOME, OR GET ALONG WITH OTHER PEOPLE: NOT DIFFICULT AT ALL

## 2023-09-12 NOTE — PROGRESS NOTES
ASSESSMENT AND PLAN:    1. Diabetes 1.5, managed as type 2   Asymptomatic, weight is stable.  Will assess her control.   - HEMOGLOBIN A1C; Future      2. Hesitancy  Symptoms are suggestive of BEN and incomplete voiding related to pelvic floor issues. Doubt infection.  We discussed   GYN evaluation and possible bladder suspension or pessary. She prefers to follow for now.  Will r/o infection and assess renal function.   - UA Macroscopic with reflex to Microscopic and Culture - Lab Collect; Future  - Basic metabolic panel  (Ca, Cl, CO2, Creat, Gluc, K, Na, BUN); Future    3. Hydronephrosis, unspecified hydronephrosis type  Right hydronephrosis since at least 2019. 5/2023 it was also present. Renal function has fluctuated.  Need to make sure hydro is stable, and renal cysts are stable  - US Renal Complete Non-Vascular; Future    Follow up 3 months.     CHIEF COMPLAINT:  Follow up type 1.5 diabetes    HISTORY OF PRESENT ILLNESS:  Rita Mancini is a 73 year old female here in follow up. She feels well overall.  Has noticed, the past month, a need to 'push hard' for urination.  No dysuria, or hematuria and no bowel symptoms. She also does have BEN with coughing, and getting up to standing, at times, quickly.  Not new.  Mild nocturia that has not changed.  No dyspnea or cough, tolerating trulicity well.     REVIEW OF SYSTEMS:   See HPI, all other systems on review are negative.    Past Medical History:   Diagnosis Date    Age-related cataract 12/06/2021    Anxiety and depression 01/01/1962    Bigeminy 03/17/2019    Bronchiectasis without complication (H)     COPD (chronic obstructive pulmonary disease) (H) 01/01/2009    Functional diarrhea 12/31/2018    GERD (gastroesophageal reflux disease)     History of alcoholism (H)     Hx antineoplastic chemotherapy     lung cancer     Hx of radiation therapy     lung cancer     Hydronephrosis     right kidney since 2019    Hyperlipidemia LDL goal <100 03/06/2023    Hypothyroid      Infection due to 2019 novel coronavirus 03/06/2023    Lung cancer (H) 01/01/2008    RUL,  Non small cell cancer    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    Osteopenia     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, subsequent encounter 04/08/2019    Pleural effusion 01/01/2015    Sepsis due to Escherichia coli with acute renal failure without septic shock, unspecified acute renal failure type (H)     Sleep apnea 01/01/2010    does not use cpap    Type 2 diabetes, HbA1C goal < 8% (H)      History   Smoking Status    Former    Packs/day: 1.50    Years: 0.00    Types: Cigarettes    Quit date: 11/9/2008   Smokeless Tobacco    Never     Family History   Problem Relation Age of Onset    Heart Disease Mother     Hypertension Mother     Alcoholism Mother     Depression Mother     Heart Disease Father 45        mi age 45, cabg    Coronary Artery Disease Father     Cancer Father         prostate    Hypertension Father     Snoring Father     Alcoholism Sister     Chronic Obstructive Pulmonary Disease Brother     Alcoholism Brother     Obesity Daughter     Obesity Daughter     Diabetes Son     Anxiety Disorder Son     Depression Son     Post-Traumatic Stress Disorder (PTSD) Son     Cancer Maternal Aunt 47        breast    Breast Cancer Paternal Aunt     Leukemia Grandchild     Schizophrenia Grandchild     Lymphoma Grandchild      Past Surgical History:   Procedure Laterality Date    CATARACT IOL, RT/LT Bilateral 12/2021    IR MISCELLANEOUS PROCEDURE  12/10/2009    PORTACATH PLACEMENT      and removal    THORACOSCOPY Right 04/06/2015    Procedure: RIGHT THORACOSCOPY / BIOPSY PLEURAL / TALC PLEURODESIS;  Surgeon: Michi Ma MD;  Location: [facility] OR;  Service:     THORACOSCOPY Left 03/29/2019    Procedure: LEFT THORACOSCOPY WITH DECORTICATION;  Surgeon: Michi Ma MD;  Location: [facility] OR;  Service: General    TONSILLECTOMY  age 5    URETERAL STENT  "PLACEMENT Right 06/01/2010    US THORACENTESIS  03/27/2019     VITALS:  Vitals:    09/12/23 1350   BP: 121/71   BP Location: Left arm   Patient Position: Sitting   Cuff Size: Adult Large   Pulse: 72   Resp: 16   Temp: 98.1  F (36.7  C)   TempSrc: Oral   SpO2: 96%   Weight: 86.3 kg (190 lb 3.2 oz)   Height: 1.661 m (5' 5.4\")     Wt Readings from Last 3 Encounters:   09/12/23 86.3 kg (190 lb 3.2 oz)   06/29/23 84.8 kg (187 lb)   05/16/23 86 kg (189 lb 8 oz)     PHYSICAL EXAM:  Constitutional:  In NAD, alert and oriented  Cardiac:  S1 S2     DECISION TO OBTAIN OLD RECORDS AND/OR OBTAIN HISTORY FROM SOMEONE OTHER THAN PATIENT, AND/OR ACCESSING CARE EVERYWHERE):  1  0     REVIEW AND SUMMARIZATION OF OLD RECORDS, AND/OR OBTAINING HISTORY FROM SOMEONE OTHER THAN PATIENT, AND/OR DISCUSSION OF CASE WITH ANOTHER HEALTH CARE PROVIDER:  2 reviewed past health notes    REVIEW AND/OR ORDER OF OF CLINICAL LAB TESTS: 1  ordered.    REVIEW AND/OR ORDER OF RADIOLOGY TESTS: 1 ordered.    REVIEW AND/OR ORDER OF MEDICAL TESTS (EKG/ECHO/COLONOSCOPY/EGD): 1  0    INDEPENDENT  VISUALIZATION OF IMAGE, TRACING, OR SPECIMEN ITSELF (2 EACH):  0     TOTAL: 4    Current Outpatient Medications   Medication Sig Dispense Refill    aspirin 81 MG EC tablet Take 81 mg by mouth daily      blood glucose (NO BRAND SPECIFIED) test strip Use to test blood sugar one time daily or as directed. 100 strip 3    calcium carbonate (TUMS) 500 MG chewable tablet Take 1 chew tab by mouth daily      cetirizine (ZYRTEC) 10 MG CHEW Take 10 mg by mouth daily      citalopram (CELEXA) 10 MG tablet Take 1 tablet (10 mg) by mouth daily 90 tablet 2    famotidine (PEPCID) 20 MG tablet Take 1 tablet (20 mg) by mouth daily 90 tablet 2    fluticasone (FLONASE) 50 MCG/ACT nasal spray Spray 1 spray into both nostrils 2 times daily 16 g 3    levothyroxine (SYNTHROID/LEVOTHROID) 50 MCG tablet TAKE ON EMPTY STOMACH EVERY MONDAY AND FRIDAY (SEE OTHER DOSE FOR REMAINING DAYS OF WEEK) " 24 tablet 3    levothyroxine (SYNTHROID/LEVOTHROID) 75 MCG tablet TAKE 1 TAB BY MOUTH ON TUE WED THR SAT SUN 60 tablet 3    losartan (COZAAR) 25 MG tablet Take 1 tablet (25 mg) by mouth daily 90 tablet 3    magnesium 500 MG TABS       Melatonin 10 MG CAPS       montelukast (SINGULAIR) 10 MG tablet Take 1 tablet (10 mg) by mouth At Bedtime 90 tablet 3    multivitamin w/minerals (THERA-VIT-M) tablet Take 1 tablet by mouth daily      PULMICORT FLEXHALER 180 MCG/ACT inhaler INHALE 2 PUFFS BY MOUTH TWICE A DAY 1 each 6    simvastatin (ZOCOR) 10 MG tablet Take 1 tablet (10 mg) by mouth At Bedtime 90 tablet 2    triamcinolone (KENALOG) 0.1 % external cream Apply topically 2 times daily      TRULICITY 1.5 MG/0.5ML pen Inject 1.5 mg Subcutaneous every 7 days 9 mL 2    umeclidinium-vilanterol (ANORO ELLIPTA) 62.5-25 MCG/ACT oral inhaler INHALE 1 PUFF BY MOUTH EVERY  each 3    UNKNOWN TO PATIENT Eye vitamin       Michi Murillo MD  Internal Medicine  [facility]

## 2023-09-13 DIAGNOSIS — N30.00 ACUTE CYSTITIS WITHOUT HEMATURIA: Primary | ICD-10-CM

## 2023-09-13 LAB
ANION GAP SERPL CALCULATED.3IONS-SCNC: 11 MMOL/L (ref 7–15)
BUN SERPL-MCNC: 24.8 MG/DL (ref 8–23)
CALCIUM SERPL-MCNC: 9.7 MG/DL (ref 8.8–10.2)
CHLORIDE SERPL-SCNC: 102 MMOL/L (ref 98–107)
CREAT SERPL-MCNC: 1.4 MG/DL (ref 0.51–0.95)
DEPRECATED HCO3 PLAS-SCNC: 25 MMOL/L (ref 22–29)
EGFRCR SERPLBLD CKD-EPI 2021: 39 ML/MIN/1.73M2
GLUCOSE SERPL-MCNC: 117 MG/DL (ref 70–99)
POTASSIUM SERPL-SCNC: 5 MMOL/L (ref 3.4–5.3)
SODIUM SERPL-SCNC: 138 MMOL/L (ref 136–145)

## 2023-09-13 RX ORDER — NITROFURANTOIN 25; 75 MG/1; MG/1
100 CAPSULE ORAL 2 TIMES DAILY
Qty: 14 CAPSULE | Refills: 0 | Status: SHIPPED | OUTPATIENT
Start: 2023-09-13 | End: 2023-09-20

## 2023-09-13 NOTE — PROGRESS NOTES
We discussed that her urine suggests infection. Will treat with macrobid 100 mg po bid for 7 days.  It is possible that bladder prolapse leads to infections. She doesn't want GYN evaluation unless infection is too frequent.  Dr. Lidia MD

## 2023-09-14 LAB — BACTERIA UR CULT: NORMAL

## 2023-09-18 ENCOUNTER — HOSPITAL ENCOUNTER (OUTPATIENT)
Dept: ULTRASOUND IMAGING | Facility: HOSPITAL | Age: 73
Discharge: HOME | End: 2023-09-18
Attending: INTERNAL MEDICINE | Admitting: INTERNAL MEDICINE
Payer: MEDICARE

## 2023-09-18 DIAGNOSIS — N13.30 HYDRONEPHROSIS, UNSPECIFIED HYDRONEPHROSIS TYPE: ICD-10-CM

## 2023-09-18 PROCEDURE — 76770 US EXAM ABDO BACK WALL COMP: CPT

## 2023-10-02 ENCOUNTER — NURSE TRIAGE (OUTPATIENT)
Dept: NURSING | Facility: CLINIC | Age: 73
End: 2023-10-02
Payer: MEDICARE

## 2023-10-03 ENCOUNTER — OFFICE VISIT (OUTPATIENT)
Dept: URGENT CARE | Facility: URGENT CARE | Age: 73
End: 2023-10-03
Payer: MEDICARE

## 2023-10-03 VITALS
SYSTOLIC BLOOD PRESSURE: 97 MMHG | HEART RATE: 70 BPM | BODY MASS INDEX: 30.74 KG/M2 | TEMPERATURE: 97.1 F | RESPIRATION RATE: 18 BRPM | DIASTOLIC BLOOD PRESSURE: 61 MMHG | OXYGEN SATURATION: 96 % | WEIGHT: 187 LBS

## 2023-10-03 DIAGNOSIS — N18.32 STAGE 3B CHRONIC KIDNEY DISEASE (H): ICD-10-CM

## 2023-10-03 DIAGNOSIS — E13.9 DIABETES 1.5, MANAGED AS TYPE 2 (H): ICD-10-CM

## 2023-10-03 DIAGNOSIS — B96.89 BV (BACTERIAL VAGINOSIS): ICD-10-CM

## 2023-10-03 DIAGNOSIS — R30.0 DYSURIA: Primary | ICD-10-CM

## 2023-10-03 DIAGNOSIS — N76.0 BV (BACTERIAL VAGINOSIS): ICD-10-CM

## 2023-10-03 LAB
ALBUMIN UR-MCNC: NEGATIVE MG/DL
APPEARANCE UR: CLEAR
BACTERIA #/AREA URNS HPF: ABNORMAL /HPF
BILIRUB UR QL STRIP: NEGATIVE
CLUE CELLS: ABNORMAL
CLUE CELLS: PRESENT
COLOR UR AUTO: YELLOW
GLUCOSE UR STRIP-MCNC: NEGATIVE MG/DL
HGB UR QL STRIP: ABNORMAL
KETONES UR STRIP-MCNC: NEGATIVE MG/DL
LEUKOCYTE ESTERASE UR QL STRIP: ABNORMAL
NITRATE UR QL: NEGATIVE
PH UR STRIP: 6.5 [PH] (ref 5–7)
RBC #/AREA URNS AUTO: ABNORMAL /HPF
SP GR UR STRIP: 1.01 (ref 1–1.03)
TRICHOMONAS, WET PREP: ABNORMAL
TRICHOMONAS, WET PREP: ABNORMAL
UROBILINOGEN UR STRIP-ACNC: 0.2 E.U./DL
WBC #/AREA URNS AUTO: ABNORMAL /HPF
WBC'S/HIGH POWER FIELD, WET PREP: ABNORMAL
WBC'S/HIGH POWER FIELD, WET PREP: ABNORMAL
YEAST, WET PREP: ABNORMAL
YEAST, WET PREP: ABNORMAL

## 2023-10-03 PROCEDURE — 99214 OFFICE O/P EST MOD 30 MIN: CPT | Performed by: NURSE PRACTITIONER

## 2023-10-03 PROCEDURE — 87186 SC STD MICRODIL/AGAR DIL: CPT | Performed by: NURSE PRACTITIONER

## 2023-10-03 PROCEDURE — 81001 URINALYSIS AUTO W/SCOPE: CPT | Performed by: NURSE PRACTITIONER

## 2023-10-03 PROCEDURE — 87210 SMEAR WET MOUNT SALINE/INK: CPT | Mod: 76 | Performed by: NURSE PRACTITIONER

## 2023-10-03 PROCEDURE — 87086 URINE CULTURE/COLONY COUNT: CPT | Performed by: NURSE PRACTITIONER

## 2023-10-03 RX ORDER — METRONIDAZOLE 500 MG/1
500 TABLET ORAL 2 TIMES DAILY
Qty: 14 TABLET | Refills: 0 | Status: SHIPPED | OUTPATIENT
Start: 2023-10-03 | End: 2023-10-10

## 2023-10-03 RX ORDER — CEFDINIR 300 MG/1
300 CAPSULE ORAL 2 TIMES DAILY
Qty: 14 CAPSULE | Refills: 0 | Status: SHIPPED | OUTPATIENT
Start: 2023-10-03 | End: 2023-10-03

## 2023-10-03 NOTE — PATIENT INSTRUCTIONS
Take full course of antibiotics.  Recommend Witch Hazel Vaginal wipes over the counter   Cotton, loose fitting underwear    Your results show you have bacterial Vaginosis.   Take antibiotics as directed: Metronidazole- Take 1 tablet (500 mg) by mouth 2 times daily for 7 days    Do not drink any alcohol while on the metronidazole.   Relieve itching with cold pack or a cool bath.   Do not wash your vulva more than once a day. Use plain water or a mild, unscented soap. Do not douche.      Urine culture pending, we will call you only if culture shows resistance and change of antibiotic is required or if no growth to stop antibiotics and to follow-up with your primary care provider.    Drink plenty of fluids. Limit caffeine and alcohol as these are bladder irritants.  May take tylenol or ibuprofen as needed for discomfort.   If you develop any vomiting, high fevers or lower back pain, these can be signs of a kidney infection and you should be seen in urgent care or in the ER.  Prevention of future infections by drinking cranberry juice, urination after intercourse and wiping from front to back after using the toilet.  Please follow up with primary care provider if symptoms return, if you're not improving, worsening or new symptoms or for any adverse reactions to medications.

## 2023-10-03 NOTE — PROGRESS NOTES
Chief Complaint   Patient presents with    Urgent Care    Vaginal Problem     C/o yeast infection x5 days.      SUBJECTIVE:  Rita Mancini is a 73 year old female who presents today for a possible UTI.   She has symptoms of dysuria itch malodorous fishy vaginal discharge and burning that have been going on for 5 day(s).  Symptom timing described as gradual onset and moderate in severity.    This patient does have a history of urinary tract infections.  There is no history of hematuria, flank pain, fever, chills, nausea or vomiting.   Patient has a relevant past medical history of diabetes CKD with last GFR 39 creatinine 1.4 and hydronephrosis.  Of note she recently finished Macrobid started 9/13/2023 with urine culture revealing mixed urogenital erin and possibility of bladder prolapse was noted.    Past Medical History:   Diagnosis Date    Age-related cataract 12/06/2021    Anxiety and depression 01/01/1962    Bigeminy 03/17/2019    Bronchiectasis without complication (H)     COPD (chronic obstructive pulmonary disease) (H) 01/01/2009    Functional diarrhea 12/31/2018    GERD (gastroesophageal reflux disease)     History of alcoholism (H)     Hx antineoplastic chemotherapy     lung cancer     Hx of radiation therapy     lung cancer     Hydronephrosis     right kidney since 2019    Hyperlipidemia LDL goal <100 03/06/2023    Hypothyroid     Infection due to 2019 novel coronavirus 03/06/2023    Lung cancer (H) 01/01/2008    RUL,  Non small cell cancer    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    Osteopenia     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 100 strip 3    calcium carbonate (TUMS) 500 MG chewable tablet Take 1 chew tab by mouth daily      cetirizine (ZYRTEC) 10 MG CHEW Take 10 mg by mouth daily      citalopram (CELEXA) 10 MG tablet Take 1 tablet (10 mg) by mouth daily 90 tablet 2    famotidine (PEPCID) 20 MG tablet Take 1 tablet (20 mg) by mouth daily 90 tablet 2    fluticasone (FLONASE) 50 MCG/ACT nasal spray Spray 1 spray into both nostrils 2 times daily 16 g 3    levothyroxine (SYNTHROID/LEVOTHROID) 50 MCG tablet TAKE ON EMPTY STOMACH EVERY MONDAY AND FRIDAY (SEE OTHER DOSE FOR REMAINING DAYS OF WEEK) 24 tablet 3    levothyroxine (SYNTHROID/LEVOTHROID) 75 MCG tablet TAKE 1 TAB BY MOUTH ON TUE WED THR SAT SUN 60 tablet 3    losartan (COZAAR) 25 MG tablet Take 1 tablet (25 mg) by mouth daily 90 tablet 3    magnesium 500 MG TABS       Melatonin 10 MG CAPS       metroNIDAZOLE (FLAGYL) 500 MG tablet Take 1 tablet (500 mg) by mouth 2 times daily for 7 days 14 tablet 0    montelukast (SINGULAIR) 10 MG tablet Take 1 tablet (10 mg) by mouth At Bedtime 90 tablet 3    multivitamin w/minerals (THERA-VIT-M) tablet Take 1 tablet by mouth daily      PULMICORT FLEXHALER 180 MCG/ACT inhaler INHALE 2 PUFFS BY MOUTH TWICE A DAY 1 each 6    simvastatin (ZOCOR) 10 MG tablet Take 1 tablet (10 mg) by mouth At Bedtime 90 tablet 2    triamcinolone (KENALOG) 0.1 % external cream Apply topically 2 times daily      TRULICITY 1.5 MG/0.5ML pen Inject 1.5 mg Subcutaneous every 7 days 9 mL 2    umeclidinium-vilanterol (ANORO ELLIPTA) 62.5-25 MCG/ACT oral inhaler INHALE 1 PUFF BY MOUTH EVERY  each 3    UNKNOWN TO PATIENT Eye vitamin       Social History     Tobacco Use    Smoking status: Former     Packs/day: 1.50     Years: 0.00     Pack years: 0.00     Types: Cigarettes     Quit date: 11/9/2008      Years since quittin.9    Smokeless tobacco: Never   Substance Use Topics    Alcohol use: No     Allergies   Allergen Reactions    Seasonal Allergies      Review of Systems  All systems negative except for those listed above in HPI.    OBJECTIVE:  BP 97/61   Pulse 70   Temp 97.1  F (36.2  C) (Temporal)   Resp 18   Wt 84.8 kg (187 lb)   LMP  (LMP Unknown)   SpO2 96%   BMI 30.74 kg/m      Physical Exam  Vitals reviewed.   Constitutional:       General: She is not in acute distress.     Appearance: Normal appearance. She is well-developed. She is not ill-appearing, toxic-appearing or diaphoretic.   Cardiovascular:      Pulses: Normal pulses.   Pulmonary:      Effort: Pulmonary effort is normal.   Abdominal:      General: Bowel sounds are normal.      Palpations: Abdomen is soft.      Tenderness: There is no abdominal tenderness. There is no right CVA tenderness or left CVA tenderness.   Musculoskeletal:         General: Normal range of motion.   Skin:     General: Skin is warm and dry.      Capillary Refill: Capillary refill takes less than 2 seconds.      Coloration: Skin is not jaundiced or pale.   Neurological:      General: No focal deficit present.      Mental Status: She is alert and oriented to person, place, and time.   Psychiatric:         Mood and Affect: Mood normal.         Behavior: Behavior normal.       Results for orders placed or performed in visit on 10/03/23   UA Macroscopic with reflex to Microscopic and Culture - Clinic Collect     Status: Abnormal    Specimen: Urine, Midstream   Result Value Ref Range    Color Urine Yellow Colorless, Straw, Light Yellow, Yellow    Appearance Urine Clear Clear    Glucose Urine Negative Negative mg/dL    Bilirubin Urine Negative Negative    Ketones Urine Negative Negative mg/dL    Specific Gravity Urine 1.015 1.003 - 1.035    Blood Urine Trace (A) Negative    pH Urine 6.5 5.0 - 7.0    Protein Albumin Urine Negative Negative mg/dL    Urobilinogen Urine 0.2  0.2, 1.0 E.U./dL    Nitrite Urine Negative Negative    Leukocyte Esterase Urine Large (A) Negative   Urine Microscopic Exam     Status: Abnormal   Result Value Ref Range    Bacteria Urine Many (A) None Seen /HPF    RBC Urine 0-2 0-2 /HPF /HPF    WBC Urine 10-25 (A) 0-5 /HPF /HPF   Wet prep - Clinic Collect     Status: Abnormal    Specimen: Vagina; Swab   Result Value Ref Range    Trichomonas Absent Absent    Yeast Absent Absent    Clue Cells Absent Absent    WBCs/high power field 2+ (A) None   Wet prep - Clinic Collect     Status: Abnormal    Specimen: Vagina; Swab   Result Value Ref Range    Trichomonas Absent Absent    Yeast Absent Absent    Clue Cells Present (A) Absent    WBCs/high power field 2+ (A) None       ASSESSMENT:    ICD-10-CM    1. Acute cystitis without hematuria  N30.00 DISCONTINUED: cefdinir (OMNICEF) 300 MG capsule      2. Dysuria  R30.0 Wet prep - Clinic Collect     UA Macroscopic with reflex to Microscopic and Culture - Clinic Collect     Urine Microscopic Exam     Urine Culture     Wet prep - Clinic Collect      3. Diabetes 1.5, managed as type 2 (H)  E13.9       4. Stage 3b chronic kidney disease (H)  N18.32       5. BV (bacterial vaginosis)  N76.0 metroNIDAZOLE (FLAGYL) 500 MG tablet    B96.89         PLAN:   Patient Instructions   Take full course of antibiotics.  Recommend Witch Hazel Vaginal wipes over the counter   Cotton, loose fitting underwear    Your results show you have bacterial Vaginosis.   Take antibiotics as directed: Metronidazole- Take 1 tablet (500 mg) by mouth 2 times daily for 7 days    Do not drink any alcohol while on the metronidazole.   Relieve itching with cold pack or a cool bath.   Do not wash your vulva more than once a day. Use plain water or a mild, unscented soap. Do not douche.      Urine culture pending, we will call you only if culture shows resistance and change of antibiotic is required or if no growth to stop antibiotics and to follow-up with your primary  care provider.    Drink plenty of fluids. Limit caffeine and alcohol as these are bladder irritants.  May take tylenol or ibuprofen as needed for discomfort.   If you develop any vomiting, high fevers or lower back pain, these can be signs of a kidney infection and you should be seen in urgent care or in the ER.  Prevention of future infections by drinking cranberry juice, urination after intercourse and wiping from front to back after using the toilet.  Please follow up with primary care provider if symptoms return, if you're not improving, worsening or new symptoms or for any adverse reactions to medications.    Follow up with primary care provider with any problems, questions or concerns or if symptoms worsen or fail to improve. Patient agreed to plan and verbalized understanding.    Glenis Coyne, [id number]  [facility] CARE [place]

## 2023-10-03 NOTE — PROGRESS NOTES
Chief Complaint   Patient presents with    Urgent Care    Vaginal Problem     C/o yeast infection x5 days.      SUBJECTIVE:  Rita Mancini is a 73 year old female who presents today for a possible UTI.   She has symptoms of dysuria itch malodorous fishy vaginal discharge and burning that have been going on for 5 day(s).  Symptom timing described as gradual onset and moderate in severity.    This patient does have a history of urinary tract infections.  There is no history of hematuria, flank pain, fever, chills, nausea or vomiting.   Patient has a relevant past medical history of diabetes CKD with last GFR 39 creatinine 1.4 and hydronephrosis.  Of note she recently finished Macrobid started 9/13/2023 with urine culture revealing mixed urogenital erin and possibility of bladder prolapse was noted.    Past Medical History:   Diagnosis Date    Age-related cataract 12/06/2021    Anxiety and depression 01/01/1962    Bigeminy 03/17/2019    Bronchiectasis without complication (H)     COPD (chronic obstructive pulmonary disease) (H) 01/01/2009    Functional diarrhea 12/31/2018    GERD (gastroesophageal reflux disease)     History of alcoholism (H)     Hx antineoplastic chemotherapy     lung cancer     Hx of radiation therapy     lung cancer     Hydronephrosis     right kidney since 2019    Hyperlipidemia LDL goal <100 03/06/2023    Hypothyroid     Infection due to 2019 novel coronavirus 03/06/2023    Lung cancer (H) 01/01/2008    RUL,  Non small cell cancer    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    Osteopenia     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 100 strip 3    calcium carbonate (TUMS) 500 MG chewable tablet Take 1 chew tab by mouth daily      cetirizine (ZYRTEC) 10 MG CHEW Take 10 mg by mouth daily      citalopram (CELEXA) 10 MG tablet Take 1 tablet (10 mg) by mouth daily 90 tablet 2    famotidine (PEPCID) 20 MG tablet Take 1 tablet (20 mg) by mouth daily 90 tablet 2    fluticasone (FLONASE) 50 MCG/ACT nasal spray Spray 1 spray into both nostrils 2 times daily 16 g 3    levothyroxine (SYNTHROID/LEVOTHROID) 50 MCG tablet TAKE ON EMPTY STOMACH EVERY MONDAY AND FRIDAY (SEE OTHER DOSE FOR REMAINING DAYS OF WEEK) 24 tablet 3    levothyroxine (SYNTHROID/LEVOTHROID) 75 MCG tablet TAKE 1 TAB BY MOUTH ON TUE WED THR SAT SUN 60 tablet 3    losartan (COZAAR) 25 MG tablet Take 1 tablet (25 mg) by mouth daily 90 tablet 3    magnesium 500 MG TABS       Melatonin 10 MG CAPS       metroNIDAZOLE (FLAGYL) 500 MG tablet Take 1 tablet (500 mg) by mouth 2 times daily for 7 days 14 tablet 0    montelukast (SINGULAIR) 10 MG tablet Take 1 tablet (10 mg) by mouth At Bedtime 90 tablet 3    multivitamin w/minerals (THERA-VIT-M) tablet Take 1 tablet by mouth daily      PULMICORT FLEXHALER 180 MCG/ACT inhaler INHALE 2 PUFFS BY MOUTH TWICE A DAY 1 each 6    simvastatin (ZOCOR) 10 MG tablet Take 1 tablet (10 mg) by mouth At Bedtime 90 tablet 2    triamcinolone (KENALOG) 0.1 % external cream Apply topically 2 times daily      TRULICITY 1.5 MG/0.5ML pen Inject 1.5 mg Subcutaneous every 7 days 9 mL 2    umeclidinium-vilanterol (ANORO ELLIPTA) 62.5-25 MCG/ACT oral inhaler INHALE 1 PUFF BY MOUTH EVERY  each 3    UNKNOWN TO PATIENT Eye vitamin       Social History     Tobacco Use    Smoking status: Former     Packs/day: 1.50     Years: 0.00     Pack years: 0.00     Types: Cigarettes     Quit date: 11/9/2008      Years since quittin.9    Smokeless tobacco: Never   Substance Use Topics    Alcohol use: No     Allergies   Allergen Reactions    Seasonal Allergies      Review of Systems  All systems negative except for those listed above in HPI.    OBJECTIVE:  BP 97/61   Pulse 70   Temp 97.1  F (36.2  C) (Temporal)   Resp 18   Wt 84.8 kg (187 lb)   LMP  (LMP Unknown)   SpO2 96%   BMI 30.74 kg/m      Physical Exam  Vitals reviewed.   Constitutional:       General: She is not in acute distress.     Appearance: Normal appearance. She is well-developed. She is not ill-appearing, toxic-appearing or diaphoretic.   Cardiovascular:      Pulses: Normal pulses.   Pulmonary:      Effort: Pulmonary effort is normal.   Abdominal:      General: Bowel sounds are normal.      Palpations: Abdomen is soft.      Tenderness: There is no abdominal tenderness. There is no right CVA tenderness or left CVA tenderness.   Musculoskeletal:         General: Normal range of motion.   Skin:     General: Skin is warm and dry.      Capillary Refill: Capillary refill takes less than 2 seconds.      Coloration: Skin is not jaundiced or pale.   Neurological:      General: No focal deficit present.      Mental Status: She is alert and oriented to person, place, and time.   Psychiatric:         Mood and Affect: Mood normal.         Behavior: Behavior normal.       Results for orders placed or performed in visit on 10/03/23   UA Macroscopic with reflex to Microscopic and Culture - Clinic Collect     Status: Abnormal    Specimen: Urine, Midstream   Result Value Ref Range    Color Urine Yellow Colorless, Straw, Light Yellow, Yellow    Appearance Urine Clear Clear    Glucose Urine Negative Negative mg/dL    Bilirubin Urine Negative Negative    Ketones Urine Negative Negative mg/dL    Specific Gravity Urine 1.015 1.003 - 1.035    Blood Urine Trace (A) Negative    pH Urine 6.5 5.0 - 7.0    Protein Albumin Urine Negative Negative mg/dL    Urobilinogen Urine 0.2  0.2, 1.0 E.U./dL    Nitrite Urine Negative Negative    Leukocyte Esterase Urine Large (A) Negative   Urine Microscopic Exam     Status: Abnormal   Result Value Ref Range    Bacteria Urine Many (A) None Seen /HPF    RBC Urine 0-2 0-2 /HPF /HPF    WBC Urine 10-25 (A) 0-5 /HPF /HPF   Wet prep - Clinic Collect     Status: Abnormal    Specimen: Vagina; Swab   Result Value Ref Range    Trichomonas Absent Absent    Yeast Absent Absent    Clue Cells Absent Absent    WBCs/high power field 2+ (A) None   Wet prep - Clinic Collect     Status: Abnormal    Specimen: Vagina; Swab   Result Value Ref Range    Trichomonas Absent Absent    Yeast Absent Absent    Clue Cells Present (A) Absent    WBCs/high power field 2+ (A) None       ASSESSMENT:    ICD-10-CM    1. Acute cystitis without hematuria  N30.00 DISCONTINUED: cefdinir (OMNICEF) 300 MG capsule      2. Dysuria  R30.0 Wet prep - Clinic Collect     UA Macroscopic with reflex to Microscopic and Culture - Clinic Collect     Urine Microscopic Exam     Urine Culture     Wet prep - Clinic Collect      3. Diabetes 1.5, managed as type 2 (H)  E13.9       4. Stage 3b chronic kidney disease (H)  N18.32       5. BV (bacterial vaginosis)  N76.0 metroNIDAZOLE (FLAGYL) 500 MG tablet    B96.89         PLAN:   Patient Instructions   Take full course of antibiotics.  Recommend Witch Hazel Vaginal wipes over the counter   Cotton, loose fitting underwear    Your results show you have bacterial Vaginosis.   Take antibiotics as directed: Metronidazole- Take 1 tablet (500 mg) by mouth 2 times daily for 7 days    Do not drink any alcohol while on the metronidazole.   Relieve itching with cold pack or a cool bath.   Do not wash your vulva more than once a day. Use plain water or a mild, unscented soap. Do not douche.      Urine culture pending, we will call you only if culture shows resistance and change of antibiotic is required or if no growth to stop antibiotics and to follow-up with your primary  care provider.    Drink plenty of fluids. Limit caffeine and alcohol as these are bladder irritants.  May take tylenol or ibuprofen as needed for discomfort.   If you develop any vomiting, high fevers or lower back pain, these can be signs of a kidney infection and you should be seen in urgent care or in the ER.  Prevention of future infections by drinking cranberry juice, urination after intercourse and wiping from front to back after using the toilet.  Please follow up with primary care provider if symptoms return, if you're not improving, worsening or new symptoms or for any adverse reactions to medications.    Follow up with primary care provider with any problems, questions or concerns or if symptoms worsen or fail to improve. Patient agreed to plan and verbalized understanding.    Glenis Coyne, RAJEEV-BC  [facility] [place]

## 2023-10-03 NOTE — TELEPHONE ENCOUNTER
"Rita reports progressively worsening Vaginal itching and burning with Urination    - Today, the itching is Severe  - Itching & Burning started ~Fri, 9/29/23    She was recently treated for a UTI  - Macrobid x7d ordered on 9/13/23    In addition, she's been experiencing GI symptoms  - Vomiting - Sat 9/23, Tue 9/26 and Thu 9/28  - Also on these days, had very small BMs that were Just mucus  - With the small (\"tablespoon\") BMs - Painful Abdominal cramps  - Other days, she had normal Bms    She is not prone to UTIs or Vaginal Yeast overgrowth    She gets some decrease in the discomfort with:  - Antihistamine helps  - Cold cloth to area    Advised to see PCP within 24 hours  Care Advice reviewed    Transferred to scheduling    Brisa Spangler RN  [facility]      Reason for Disposition   MODERATE-SEVERE itching (i.e., interferes with school, work, or sleep)    Additional Information   Negative: Sounds like a life-threatening emergency to the triager   Negative: Patient sounds very sick or weak to the triager   Negative: [1] SEVERE pain AND [2] not improved 2 hours after pain medicine   Negative: [1] Genital area looks infected (e.g., draining sore, spreading redness) AND [2] fever   Negative: [1] Something is hanging out of the vagina AND [2] can't easily be pushed back inside    Protocols used: Vaginal Symptoms-A-AH    "

## 2023-10-03 NOTE — PROGRESS NOTES
Verito Parr, NP student participated in this patient exam, shared decision making, and treatment plan with patient consent. I independently preformed my own assessment and HPI of this patient and agree with the following note.    Dysuria  - Wet prep - Clinic Collect  - UA Macroscopic with reflex to Microscopic and Culture - Clinic Collect  - Urine Microscopic Exam  - Urine Culture  - Wet prep - Clinic Collect    Diabetes 1.5, managed as type 2 (H)  - no yeast on wet prep    Stage 3b chronic kidney disease (H)  Renally dosed prescriptions considered    BV (bacterial vaginosis)  - provider recollected wet prep while performing exam which did reveal clue cells  - metroNIDAZOLE (FLAGYL) 500 MG tablet; Take 1 tablet (500 mg) by mouth 2 times daily for 7 days    PLAN:  Antibiotics sent to pharmacy for BV treatment  Plan to wait until the urine culture comes back and we will call if any bacteria grows needing further antibiotic treatment  Recommend witch hazel wipes over the counter  No alcohol on flagyl     Follow-up:   Patient was advised to return to clinic for reevaluation (either UC or PCP) if symptoms do not improve after antibiotic course. Patient educated on red flag symptoms and asked to go directly to the ED if these symptoms present themselves.     Chief Complaint   Patient presents with    Urgent Care    Vaginal Problem     C/o yeast infection x5 days.        Subjective   HPI   Rita Mancini is a 73 year old female who presents with 5 days of vaginal itchiness and pain with urination. She says she felt like there was an itchy hive on her vulva last night. She has also noted a bit of a fishy odor with urination lately. She had a recent UTI and was treated with macrobid 9/13/23. She is not sexually active, no concerns for STIs today. Denies fevers, hematuria, low back or flank pain, shortness of breath.       Past Medical History:   Diagnosis Date    Age-related cataract 12/06/2021    Anxiety and depression  01/01/1962    Bigeminy 03/17/2019    Bronchiectasis without complication (H)     COPD (chronic obstructive pulmonary disease) (H) 01/01/2009    Functional diarrhea 12/31/2018    GERD (gastroesophageal reflux disease)     History of alcoholism (H)     Hx antineoplastic chemotherapy     lung cancer     Hx of radiation therapy     lung cancer     Hydronephrosis     right kidney since 2019    Hyperlipidemia LDL goal <100 03/06/2023    Hypothyroid     Infection due to 2019 novel coronavirus 03/06/2023    Lung cancer (H) 01/01/2008    RUL,  Non small cell cancer    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    Osteopenia     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 100 strip 3    calcium carbonate (TUMS) 500 MG chewable tablet Take 1 chew tab by mouth daily      cetirizine (ZYRTEC) 10 MG CHEW Take 10 mg by mouth daily      citalopram (CELEXA) 10 MG tablet Take 1 tablet (10 mg) by mouth daily 90 tablet 2    famotidine (PEPCID) 20 MG tablet Take 1 tablet (20 mg) by mouth daily 90 tablet 2    fluticasone (FLONASE) 50 MCG/ACT nasal spray Spray 1 spray into both nostrils 2 times daily 16 g 3    levothyroxine (SYNTHROID/LEVOTHROID) 50 MCG tablet TAKE ON EMPTY STOMACH EVERY MONDAY AND FRIDAY (SEE OTHER DOSE FOR REMAINING DAYS OF WEEK) 24 tablet 3    levothyroxine (SYNTHROID/LEVOTHROID) 75 MCG tablet TAKE 1 TAB BY MOUTH ON TUE WED THR SAT SUN 60 tablet 3    losartan (COZAAR) 25 MG tablet Take 1  tablet (25 mg) by mouth daily 90 tablet 3    magnesium 500 MG TABS       Melatonin 10 MG CAPS       metroNIDAZOLE (FLAGYL) 500 MG tablet Take 1 tablet (500 mg) by mouth 2 times daily for 7 days 14 tablet 0    montelukast (SINGULAIR) 10 MG tablet Take 1 tablet (10 mg) by mouth At Bedtime 90 tablet 3    multivitamin w/minerals (THERA-VIT-M) tablet Take 1 tablet by mouth daily      PULMICORT FLEXHALER 180 MCG/ACT inhaler INHALE 2 PUFFS BY MOUTH TWICE A DAY 1 each 6    simvastatin (ZOCOR) 10 MG tablet Take 1 tablet (10 mg) by mouth At Bedtime 90 tablet 2    triamcinolone (KENALOG) 0.1 % external cream Apply topically 2 times daily      TRULICITY 1.5 MG/0.5ML pen Inject 1.5 mg Subcutaneous every 7 days 9 mL 2    umeclidinium-vilanterol (ANORO ELLIPTA) 62.5-25 MCG/ACT oral inhaler INHALE 1 PUFF BY MOUTH EVERY  each 3    UNKNOWN TO PATIENT Eye vitamin       Social History     Tobacco Use    Smoking status: Former     Packs/day: 1.50     Years: 0.00     Pack years: 0.00     Types: Cigarettes     Quit date: 2008     Years since quittin.9    Smokeless tobacco: Never   Substance Use Topics    Alcohol use: No        Allergies   Allergen Reactions    Seasonal Allergies          Review of Systems   Review of Systems   All systems negative except for those listed above in HPI.        Objective    Temp: 97.1  F (36.2  C) Temp src: Temporal BP: 97/61 Pulse: 70   Resp: 18 SpO2: 96 %       Physical Exam   Physical Exam  Vitals reviewed. Chaperone present: patient declined chaperone.   Cardiovascular:      Rate and Rhythm: Normal rate and regular rhythm.      Pulses: Normal pulses.      Heart sounds: Normal heart sounds.   Pulmonary:      Effort: Pulmonary effort is normal. No respiratory distress.      Breath sounds: Wheezing (COPD, patient reports mild wheezing at baseline) present.   Abdominal:      General: There is no distension.      Palpations: Abdomen is soft.      Tenderness: There is no right CVA tenderness  or left CVA tenderness.   Genitourinary:     Labia:         Left: Lesion (pinpoint non-vesicular red lesion) present.       Urethra: No urethral swelling.      Vagina: Vaginal discharge present. No erythema.   Skin:     General: Skin is warm and dry.   Neurological:      Mental Status: She is alert.   Psychiatric:         Mood and Affect: Mood normal.         Behavior: Behavior normal.          Results for orders placed or performed in visit on 10/03/23 (from the past 24 hour(s))   Wet prep - Clinic Collect    Specimen: Vagina; Swab   Result Value Ref Range    Trichomonas Absent Absent    Yeast Absent Absent    Clue Cells Absent Absent    WBCs/high power field 2+ (A) None   UA Macroscopic with reflex to Microscopic and Culture - Clinic Collect    Specimen: Urine, Midstream   Result Value Ref Range    Color Urine Yellow Colorless, Straw, Light Yellow, Yellow    Appearance Urine Clear Clear    Glucose Urine Negative Negative mg/dL    Bilirubin Urine Negative Negative    Ketones Urine Negative Negative mg/dL    Specific Gravity Urine 1.015 1.003 - 1.035    Blood Urine Trace (A) Negative    pH Urine 6.5 5.0 - 7.0    Protein Albumin Urine Negative Negative mg/dL    Urobilinogen Urine 0.2 0.2, 1.0 E.U./dL    Nitrite Urine Negative Negative    Leukocyte Esterase Urine Large (A) Negative   Urine Microscopic Exam   Result Value Ref Range    Bacteria Urine Many (A) None Seen /HPF    RBC Urine 0-2 0-2 /HPF /HPF    WBC Urine 10-25 (A) 0-5 /HPF /HPF   Wet prep - Clinic Collect    Specimen: Vagina; Swab   Result Value Ref Range    Trichomonas Absent Absent    Yeast Absent Absent    Clue Cells Present (A) Absent    WBCs/high power field 2+ (A) None       30 minutes spent on visit gathering history physical ordering interpreting lab results review of prior notes prescriptions plan of care documentation    Verito Parr NP Student  [facility]

## 2023-10-04 LAB — BACTERIA UR CULT: ABNORMAL

## 2023-10-05 ENCOUNTER — TELEPHONE (OUTPATIENT)
Dept: URGENT CARE | Facility: URGENT CARE | Age: 73
End: 2023-10-05
Payer: MEDICARE

## 2023-10-05 DIAGNOSIS — N39.0 URINARY TRACT INFECTION WITHOUT HEMATURIA, SITE UNSPECIFIED: Primary | ICD-10-CM

## 2023-10-05 RX ORDER — CEPHALEXIN 500 MG/1
500 CAPSULE ORAL 2 TIMES DAILY
Qty: 10 CAPSULE | Refills: 0 | Status: SHIPPED | OUTPATIENT
Start: 2023-10-05 | End: 2023-10-10

## 2023-10-05 NOTE — TELEPHONE ENCOUNTER
Positive UC with E. Coli.   Will treat with Keflex course.     Please call patient and notify of positive results and antibiotic sent.     Ana العلي PA-C

## 2023-10-05 NOTE — TELEPHONE ENCOUNTER
Talked with patient told her about positive E Coli lab and the prescription being sent to pharmacy. Will follow up if anything worsen

## 2023-10-11 ENCOUNTER — OFFICE VISIT (OUTPATIENT)
Dept: OPHTHALMOLOGY | Facility: CLINIC | Age: 73
End: 2023-10-11
Attending: OPHTHALMOLOGY
Payer: MEDICARE

## 2023-10-11 DIAGNOSIS — H04.123 DRY EYE SYNDROME OF BOTH EYES: ICD-10-CM

## 2023-10-11 DIAGNOSIS — H35.3231 EXUDATIVE AGE-RELATED MACULAR DEGENERATION OF BOTH EYES WITH ACTIVE CHOROIDAL NEOVASCULARIZATION (H): Primary | ICD-10-CM

## 2023-10-11 DIAGNOSIS — H43.813 PVD (POSTERIOR VITREOUS DETACHMENT), BILATERAL: ICD-10-CM

## 2023-10-11 DIAGNOSIS — Z96.1 PSEUDOPHAKIA OF BOTH EYES: ICD-10-CM

## 2023-10-11 PROCEDURE — 92134 CPTRZ OPH DX IMG PST SGM RTA: CPT | Performed by: OPHTHALMOLOGY

## 2023-10-11 PROCEDURE — 99214 OFFICE O/P EST MOD 30 MIN: CPT | Mod: 25 | Performed by: OPHTHALMOLOGY

## 2023-10-11 PROCEDURE — 250N000011 HC RX IP 250 OP 636: Performed by: OPHTHALMOLOGY

## 2023-10-11 PROCEDURE — 67028 INJECTION EYE DRUG: CPT | Mod: LT | Performed by: OPHTHALMOLOGY

## 2023-10-11 PROCEDURE — G0463 HOSPITAL OUTPT CLINIC VISIT: HCPCS | Mod: 25 | Performed by: OPHTHALMOLOGY

## 2023-10-11 RX ADMIN — Medication 1.25 MG: at 15:25

## 2023-10-11 ASSESSMENT — CONF VISUAL FIELD
OS_INFERIOR_TEMPORAL_RESTRICTION: 0
OS_SUPERIOR_TEMPORAL_RESTRICTION: 0
OD_SUPERIOR_TEMPORAL_RESTRICTION: 0
METHOD: COUNTING FINGERS
OD_NORMAL: 1
OS_NORMAL: 1
OD_SUPERIOR_NASAL_RESTRICTION: 0
OS_INFERIOR_NASAL_RESTRICTION: 0
OD_INFERIOR_NASAL_RESTRICTION: 0
OS_SUPERIOR_NASAL_RESTRICTION: 0
OD_INFERIOR_TEMPORAL_RESTRICTION: 0

## 2023-10-11 ASSESSMENT — EXTERNAL EXAM - LEFT EYE: OS_EXAM: NORMAL

## 2023-10-11 ASSESSMENT — VISUAL ACUITY
OD_SC: 20/25
OD_SC+: +2
OS_SC+: +2
OS_SC: 20/30
METHOD: SNELLEN - LINEAR

## 2023-10-11 ASSESSMENT — REFRACTION_WEARINGRX
OS_SPHERE: +0.50
OD_SPHERE: PLANO
SPECS_TYPE: PAL
OD_ADD: +2.50
OS_AXIS: 175
OS_ADD: +2.50
OD_CYLINDER: +0.75
OS_CYLINDER: +0.25
OD_AXIS: 010

## 2023-10-11 ASSESSMENT — SLIT LAMP EXAM - LIDS
COMMENTS: NORMAL
COMMENTS: NORMAL

## 2023-10-11 ASSESSMENT — TONOMETRY
OD_IOP_MMHG: 15
OS_IOP_MMHG: 15
IOP_METHOD: TONOPEN

## 2023-10-11 ASSESSMENT — EXTERNAL EXAM - RIGHT EYE: OD_EXAM: NORMAL

## 2023-10-11 NOTE — PROGRESS NOTES
CC -  Macular degeneration     INTERVAL HISTORY - Pt states no vision changes since last visit.   Dryness 1-2x weekly, uses AT PRN for relief.  No flashes or floaters. Pt has been forgetting to use [employer]     HPI -   Rita Mancini is a  73 year old year-old patient presenting for evaluation for macular degeneration. Was referred by her cataract surgeon for AMD. Saw Dr. KRISTIE Duarte ~6 months ago. 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 Observation recommended  No current smoking   No known FH of AMD, glaucoma     PAST OCULAR SURGERY  CE IOL each eye 2021    RETINAL IMAGING:  OCT 10/11/23:   Right eye: Normal foveal contour, no intraretinal or subretinal fluid. Pachychoroid.  Left eye:  SubRPE scar with subretinal fluid, worse compared to previous scan, SIRE present -stable    ASSESSMENT & PLAN    # Age related macular degeneration each eye  Right eye just a few small drusen  Left eye [facility] PED with SIRE and increased SRF; vision stable  pachychoroid +  S/P Avastin injection left eye last injection 8/2/23 (10 weeks)  10/11/2023: OCT with SRF  Discussed treat and extend plan; will do avastin today and RTC 8 weeks for DFE/OCT/ switch to Vabysmo left eye to be able to extend injection intervals    AREDS II supplements  Amsler grid education given     # pseudophakia each eye  Observe    # dry eye each eye   ATs as  Needed      Dispo:  RTC 7-8 w for OCT left eye; DFE ou and possible vabaysmo left eye       Complete documentation of historical and exam elements from today's encounter can be found in the full encounter summary report (not reduplicated in this progress note). I personally obtained the chief complaint(s) and history of present illness.  I confirmed and edited as necessary the review of systems, past medical/surgical history, family history, social history, and examination findings as documented by others; and I examined the patient myself. I personally reviewed the  relevant tests, images, and reports as documented above. I formulated and edited as necessary the assessment and plan and discussed the findings and management plan with the patient and family.     Malvin Rizzo MD

## 2023-10-11 NOTE — NURSING NOTE
Chief Complaints and History of Present Illnesses   Patient presents with    Macular Degeneration Follow Up     2 month follow up both eyes     Chief Complaint(s) and History of Present Illness(es)       Macular Degeneration Follow Up              Comments: 2 month follow up both eyes              Comments    Pt states vision is the same as last visit. No eye pain today. No new flashes or floaters.  DM2 BS:Unknown to pt, high per pt.  Lab Results       Component                Value               Date                       A1C                      7.5                 09/12/2023                 A1C                      7.5                 03/06/2023                 A1C                      7.1                 07/07/2022                 A1C                      6.6                 09/22/2021                 A1C                      6.6                 02/18/2021              KVNG Knight October 11, 2023 2:33 PM

## 2023-10-18 ENCOUNTER — TELEPHONE (OUTPATIENT)
Dept: INTERNAL MEDICINE | Facility: CLINIC | Age: 73
End: 2023-10-18
Payer: MEDICARE

## 2023-10-18 DIAGNOSIS — N39.0 RECURRENT UTI: Primary | ICD-10-CM

## 2023-10-18 NOTE — TELEPHONE ENCOUNTER
General Call      Reason for Call:  pt is calling and stating she feels that her vaginal yeast infection is back and is wondering if she needs another antibiotic    Please advise    What are your questions or concerns:  n/a    Date of last appointment with provider: n/a    Could we send this information to you in [employer] or would you prefer to receive a phone call?:   Patient would prefer a phone call   Okay to leave a detailed message?: Yes at Home number on file [phone] (home)

## 2023-10-20 ENCOUNTER — LAB (OUTPATIENT)
Dept: LAB | Facility: CLINIC | Age: 73
End: 2023-10-20
Payer: MEDICARE

## 2023-10-20 DIAGNOSIS — N39.0 RECURRENT UTI: ICD-10-CM

## 2023-10-20 LAB
ALBUMIN UR-MCNC: 30 MG/DL
APPEARANCE UR: ABNORMAL
BACTERIA #/AREA URNS HPF: ABNORMAL /HPF
BILIRUB UR QL STRIP: NEGATIVE
CLUE CELLS: PRESENT
COLOR UR AUTO: YELLOW
GLUCOSE UR STRIP-MCNC: NEGATIVE MG/DL
HGB UR QL STRIP: ABNORMAL
KETONES UR STRIP-MCNC: NEGATIVE MG/DL
LEUKOCYTE ESTERASE UR QL STRIP: ABNORMAL
NITRATE UR QL: POSITIVE
PH UR STRIP: 7 [PH] (ref 5–8)
RBC #/AREA URNS AUTO: ABNORMAL /HPF
SP GR UR STRIP: 1.01 (ref 1–1.03)
SQUAMOUS #/AREA URNS AUTO: ABNORMAL /LPF
TRICHOMONAS, WET PREP: ABNORMAL
UROBILINOGEN UR STRIP-ACNC: 0.2 E.U./DL
WBC #/AREA URNS AUTO: >100 /HPF
WBC'S/HIGH POWER FIELD, WET PREP: ABNORMAL
YEAST, WET PREP: ABNORMAL

## 2023-10-20 PROCEDURE — 87210 SMEAR WET MOUNT SALINE/INK: CPT

## 2023-10-20 PROCEDURE — 81001 URINALYSIS AUTO W/SCOPE: CPT

## 2023-10-20 PROCEDURE — 87186 SC STD MICRODIL/AGAR DIL: CPT

## 2023-10-20 PROCEDURE — 87086 URINE CULTURE/COLONY COUNT: CPT

## 2023-10-20 NOTE — TELEPHONE ENCOUNTER
Pt states s/s set in a week ago also c/o UTI s/s. Pt c/o burning urination, vaginal itch. Pt denies vaginal discharge. Chart review shows pt previously prescribed Flagyl for yeast infection, had UC visit 10/3/23 showing e-coli in urine, received course of Keflex for tx.     Per pt s/s are similar to previous UTI and yeast infection.

## 2023-10-20 NOTE — TELEPHONE ENCOUNTER
Spoke to pt to relay covering provider message listed below. Writer assisted pt darryn scheduling a 1:15 pm lab only appointment for 10/20/23.

## 2023-10-20 NOTE — TELEPHONE ENCOUNTER
She didn't have a yeast infection but BV , she needs to do a UA and wet prep if possible as it can be self collect . Lab only today will order

## 2023-10-21 LAB — BACTERIA UR CULT: ABNORMAL

## 2023-10-23 DIAGNOSIS — N76.0 BV (BACTERIAL VAGINOSIS): Primary | ICD-10-CM

## 2023-10-23 DIAGNOSIS — B96.89 BV (BACTERIAL VAGINOSIS): Primary | ICD-10-CM

## 2023-10-23 DIAGNOSIS — N39.0 RECURRENT UTI: ICD-10-CM

## 2023-10-23 RX ORDER — METRONIDAZOLE 500 MG/1
500 TABLET ORAL 2 TIMES DAILY
Qty: 14 TABLET | Refills: 0 | Status: SHIPPED | OUTPATIENT
Start: 2023-10-23 | End: 2023-10-30

## 2023-10-23 RX ORDER — SULFAMETHOXAZOLE/TRIMETHOPRIM 800-160 MG
1 TABLET ORAL 2 TIMES DAILY
Qty: 14 TABLET | Refills: 0 | Status: SHIPPED | OUTPATIENT
Start: 2023-10-23 | End: 2024-01-09

## 2023-10-23 NOTE — TELEPHONE ENCOUNTER
Pt calling back as she has not heard anything and she is very uncomfortable and would like medication asap.    She also wanted to ask about fluticasone-the pharmacy refused to fill stated it has been cancelled but she knows she has refills?

## 2023-10-23 NOTE — TELEPHONE ENCOUNTER
She was sent in antibiotics for urine infection and flagl for bacterial vaginosis of the vagina  She can make an appointment with Dr damon o any provider for follow up to discuss these recurrent infections next available

## 2023-10-23 NOTE — PROGRESS NOTES
Creatinine   Date Value Ref Range Status   09/12/2023 1.40 (H) 0.51 - 0.95 mg/dL Final     glomerular filtration rate

## 2023-10-30 DIAGNOSIS — J30.2 SEASONAL ALLERGIC RHINITIS, UNSPECIFIED TRIGGER: ICD-10-CM

## 2023-10-30 RX ORDER — FLUTICASONE PROPIONATE 50 MCG
1 SPRAY, SUSPENSION (ML) NASAL 2 TIMES DAILY
Qty: 16 G | Refills: 3 | Status: SHIPPED | OUTPATIENT
Start: 2023-10-30 | End: 2024-03-11

## 2023-10-31 DIAGNOSIS — J42 CHRONIC BRONCHITIS, UNSPECIFIED CHRONIC BRONCHITIS TYPE (H): ICD-10-CM

## 2023-11-01 RX ORDER — UMECLIDINIUM BROMIDE AND VILANTEROL TRIFENATATE 62.5; 25 UG/1; UG/1
1 POWDER RESPIRATORY (INHALATION) DAILY
Qty: 180 EACH | Refills: 0 | Status: SHIPPED | OUTPATIENT
Start: 2023-11-01 | End: 2024-08-07

## 2023-11-07 DIAGNOSIS — J30.2 SEASONAL ALLERGIC RHINITIS, UNSPECIFIED TRIGGER: ICD-10-CM

## 2023-11-07 RX ORDER — FLUTICASONE PROPIONATE 50 MCG
1 SPRAY, SUSPENSION (ML) NASAL 2 TIMES DAILY
Qty: 16 G | Refills: 3 | OUTPATIENT
Start: 2023-11-07

## 2023-11-07 ASSESSMENT — PATIENT HEALTH QUESTIONNAIRE - PHQ9
10. IF YOU CHECKED OFF ANY PROBLEMS, HOW DIFFICULT HAVE THESE PROBLEMS MADE IT FOR YOU TO DO YOUR WORK, TAKE CARE OF THINGS AT HOME, OR GET ALONG WITH OTHER PEOPLE: SOMEWHAT DIFFICULT
SUM OF ALL RESPONSES TO PHQ QUESTIONS 1-9: 5
SUM OF ALL RESPONSES TO PHQ QUESTIONS 1-9: 5

## 2023-11-08 ENCOUNTER — OFFICE VISIT (OUTPATIENT)
Dept: INTERNAL MEDICINE | Facility: CLINIC | Age: 73
End: 2023-11-08
Payer: MEDICARE

## 2023-11-08 VITALS
WEIGHT: 185.7 LBS | HEIGHT: 65 IN | SYSTOLIC BLOOD PRESSURE: 93 MMHG | TEMPERATURE: 97.1 F | HEART RATE: 77 BPM | DIASTOLIC BLOOD PRESSURE: 56 MMHG | BODY MASS INDEX: 30.94 KG/M2 | OXYGEN SATURATION: 96 % | RESPIRATION RATE: 15 BRPM

## 2023-11-08 DIAGNOSIS — G47.33 OSA ON CPAP: ICD-10-CM

## 2023-11-08 DIAGNOSIS — Z86.19 HISTORY OF GRAM NEGATIVE SEPSIS: ICD-10-CM

## 2023-11-08 DIAGNOSIS — E06.3 HYPOTHYROIDISM DUE TO HASHIMOTO'S THYROIDITIS: ICD-10-CM

## 2023-11-08 DIAGNOSIS — J44.9 CHRONIC OBSTRUCTIVE PULMONARY DISEASE, UNSPECIFIED COPD TYPE (H): ICD-10-CM

## 2023-11-08 DIAGNOSIS — Z29.11 NEED FOR VACCINATION AGAINST RESPIRATORY SYNCYTIAL VIRUS: ICD-10-CM

## 2023-11-08 DIAGNOSIS — N39.0 RECURRENT UTI: ICD-10-CM

## 2023-11-08 DIAGNOSIS — C34.91 NON-SMALL CELL CANCER OF RIGHT LUNG (H): ICD-10-CM

## 2023-11-08 DIAGNOSIS — E13.9 DIABETES 1.5, MANAGED AS TYPE 2 (H): Primary | ICD-10-CM

## 2023-11-08 DIAGNOSIS — F33.1 MODERATE EPISODE OF RECURRENT MAJOR DEPRESSIVE DISORDER (H): ICD-10-CM

## 2023-11-08 DIAGNOSIS — J47.9 BRONCHIECTASIS WITHOUT COMPLICATION (H): ICD-10-CM

## 2023-11-08 DIAGNOSIS — R55 SYNCOPE, UNSPECIFIED SYNCOPE TYPE: ICD-10-CM

## 2023-11-08 DIAGNOSIS — F10.21 HISTORY OF ALCOHOLISM (H): ICD-10-CM

## 2023-11-08 DIAGNOSIS — Z23 NEED FOR TDAP VACCINATION: ICD-10-CM

## 2023-11-08 DIAGNOSIS — J90 CHRONIC PLEURAL EFFUSION: ICD-10-CM

## 2023-11-08 DIAGNOSIS — J42 CHRONIC BRONCHITIS, UNSPECIFIED CHRONIC BRONCHITIS TYPE (H): ICD-10-CM

## 2023-11-08 LAB
ALBUMIN UR-MCNC: NEGATIVE MG/DL
ANION GAP SERPL CALCULATED.3IONS-SCNC: 13 MMOL/L (ref 7–15)
APPEARANCE UR: ABNORMAL
BACTERIA #/AREA URNS HPF: ABNORMAL /HPF
BILIRUB UR QL STRIP: NEGATIVE
BUN SERPL-MCNC: 22.5 MG/DL (ref 8–23)
CALCIUM SERPL-MCNC: 9.8 MG/DL (ref 8.8–10.2)
CHLORIDE SERPL-SCNC: 102 MMOL/L (ref 98–107)
COLOR UR AUTO: YELLOW
CREAT SERPL-MCNC: 1.27 MG/DL (ref 0.51–0.95)
DEPRECATED HCO3 PLAS-SCNC: 24 MMOL/L (ref 22–29)
EGFRCR SERPLBLD CKD-EPI 2021: 44 ML/MIN/1.73M2
GLUCOSE SERPL-MCNC: 113 MG/DL (ref 70–99)
GLUCOSE UR STRIP-MCNC: NEGATIVE MG/DL
HGB UR QL STRIP: ABNORMAL
KETONES UR STRIP-MCNC: NEGATIVE MG/DL
LEUKOCYTE ESTERASE UR QL STRIP: ABNORMAL
NITRATE UR QL: NEGATIVE
PH UR STRIP: 7 [PH] (ref 5–8)
POTASSIUM SERPL-SCNC: 4.9 MMOL/L (ref 3.4–5.3)
RBC #/AREA URNS AUTO: ABNORMAL /HPF
SODIUM SERPL-SCNC: 139 MMOL/L (ref 135–145)
SP GR UR STRIP: 1.01 (ref 1–1.03)
SQUAMOUS #/AREA URNS AUTO: ABNORMAL /LPF
TSH SERPL DL<=0.005 MIU/L-ACNC: 0.78 UIU/ML (ref 0.3–4.2)
UROBILINOGEN UR STRIP-ACNC: 0.2 E.U./DL
WBC #/AREA URNS AUTO: >100 /HPF
WBC CLUMPS #/AREA URNS HPF: PRESENT /HPF

## 2023-11-08 PROCEDURE — 90480 ADMN SARSCOV2 VAC 1/ONLY CMP: CPT | Performed by: INTERNAL MEDICINE

## 2023-11-08 PROCEDURE — 81001 URINALYSIS AUTO W/SCOPE: CPT | Performed by: INTERNAL MEDICINE

## 2023-11-08 PROCEDURE — 99214 OFFICE O/P EST MOD 30 MIN: CPT | Mod: 25 | Performed by: INTERNAL MEDICINE

## 2023-11-08 PROCEDURE — 90662 IIV NO PRSV INCREASED AG IM: CPT | Performed by: INTERNAL MEDICINE

## 2023-11-08 PROCEDURE — 87086 URINE CULTURE/COLONY COUNT: CPT | Performed by: INTERNAL MEDICINE

## 2023-11-08 PROCEDURE — 80048 BASIC METABOLIC PNL TOTAL CA: CPT | Performed by: INTERNAL MEDICINE

## 2023-11-08 PROCEDURE — 36415 COLL VENOUS BLD VENIPUNCTURE: CPT | Performed by: INTERNAL MEDICINE

## 2023-11-08 PROCEDURE — G0008 ADMIN INFLUENZA VIRUS VAC: HCPCS | Performed by: INTERNAL MEDICINE

## 2023-11-08 PROCEDURE — 84443 ASSAY THYROID STIM HORMONE: CPT | Performed by: INTERNAL MEDICINE

## 2023-11-08 PROCEDURE — 91320 SARSCV2 VAC 30MCG TRS-SUC IM: CPT | Performed by: INTERNAL MEDICINE

## 2023-11-08 RX ORDER — BUDESONIDE 180 UG/1
AEROSOL, POWDER RESPIRATORY (INHALATION)
Qty: 9 EACH | Refills: 3 | Status: SHIPPED | OUTPATIENT
Start: 2023-11-08 | End: 2024-05-22

## 2023-11-08 RX ORDER — RESPIRATORY SYNCYTIAL VIRUS VACCINE 120MCG/0.5
0.5 KIT INTRAMUSCULAR ONCE
Qty: 1 EACH | Refills: 0 | Status: SHIPPED | OUTPATIENT
Start: 2023-11-08 | End: 2023-11-08

## 2023-11-08 NOTE — PROGRESS NOTES
Assessment & Plan     Need for Tdap vaccination    - Tdap, tetanus-diptheria-acell pertussis, (BOOSTRIX) 5-2.5-18.5 LF-MCG/0.5 ZHOU injection; Inject 0.5 mLs into the muscle once for 1 dose    Need for vaccination against respiratory syncytial virus    - respiratory syncytial virus vaccine, bivalent (ABRYSVO) injection; Inject 0.5 mLs into the muscle once for 1 dose    Diabetes 1.5, managed as type 2 (H)  LDL Cholesterol Calculated   Date Value Ref Range Status   05/16/2023 80 <=100 mg/dL Final   11/04/2005 140 (H) 0 - 129 mg/dL Final         Chronic pleural effusion, left (s/p [place]c pleurodesis 2015)      Bronchiectasis without complication (H)  Stable doing well     Chronic bronchitis, unspecified chronic bronchitis type (H)      Non-small cell cancer of right lung, s/p radiation and chemotherapies, in remission since 6069-1960 (H)  Surveillance CT chest     Hypothyroidism due to Hashimoto's thyroiditis  TSH   Date Value Ref Range Status   11/08/2023 0.78 0.30 - 4.20 uIU/mL Final   09/22/2021 0.75 0.30 - 5.00 uIU/mL Final         Moderate episode of recurrent major depressive disorder (H)  Stable     History of gram negative sepsis      MONSERRAT on CPAP      History of alcoholism (H)  No relapse continues in sobriety     Syncope, unspecified syncope type  Was in the grocery store , had a UTI and felt dehydrated will check carotids . BP is naturally low . No orthostatic drop noted   - US Carotid Bilateral; Future  - Basic metabolic panel  (Ca, Cl, CO2, Creat, Gluc, K, Na, BUN); Future  - Home Blood Pressure Monitor Order for DME - ONLY FOR DME  - Basic metabolic panel  (Ca, Cl, CO2, Creat, Gluc, K, Na, BUN)    Recurrent UTI  Repeat Urine   - UA Macroscopic with reflex to Microscopic and Culture - Lab Collect; Future  - UA Macroscopic with reflex to Microscopic and Culture - Lab Collect  - Urine Microscopic Exam  - Urine Culture    Chronic obstructive pulmonary disease, unspecified COPD type (H)    - TSH; Future  -  "PULMICORT FLEXHALER 180 MCG/ACT inhaler; INHALE 2 PUFFS BY MOUTH TWICE A DAY  - TSH    Trial of pulmicort inhaler          BMI:   Estimated body mass index is 30.9 kg/m  as calculated from the following:    Height as of this encounter: 1.651 m (5' 5\").    Weight as of this encounter: 84.2 kg (185 lb 11.2 oz).           Paula Grullon MD  [facility]    Abel Salinas is a 73 year old, presenting for the following health issues:  Creatinine   Date Value Ref Range Status   09/12/2023 1.40 (H) 0.51 - 0.95 mg/dL Final     Has had spells of dizziness ,had given blood prior to that . Had full of syncope on hthe fall , declined   LDL Cholesterol Calculated   Date Value Ref Range Status   05/16/2023 80 <=100 mg/dL Final   11/04/2005 140 (H) 0 - 129 mg/dL Final     Current Outpatient Medications   Medication    respiratory syncytial virus vaccine, bivalent (ABRYSVO) injection    Tdap, tetanus-diptheria-acell pertussis, (BOOSTRIX) 5-2.5-18.5 LF-MCG/0.5 ZHOU injection    aspirin 81 MG EC tablet    blood glucose (NO BRAND SPECIFIED) test strip    calcium carbonate (TUMS) 500 MG chewable tablet    cetirizine (ZYRTEC) 10 MG CHEW    citalopram (CELEXA) 10 MG tablet    famotidine (PEPCID) 20 MG tablet    fluticasone (FLONASE) 50 MCG/ACT nasal spray    levothyroxine (SYNTHROID/LEVOTHROID) 50 MCG tablet    levothyroxine (SYNTHROID/LEVOTHROID) 75 MCG tablet    losartan (COZAAR) 25 MG tablet    magnesium 500 MG TABS    Melatonin 10 MG CAPS    montelukast (SINGULAIR) 10 MG tablet    multivitamin w/minerals (THERA-VIT-M) tablet    PULMICORT FLEXHALER 180 MCG/ACT inhaler    simvastatin (ZOCOR) 10 MG tablet    sulfamethoxazole-trimethoprim (BACTRIM DS) 800-160 MG tablet    triamcinolone (KENALOG) 0.1 % external cream    TRULICITY 1.5 MG/0.5ML pen    umeclidinium-vilanterol (ANORO ELLIPTA) 62.5-25 MCG/ACT oral inhaler    UNKNOWN TO PATIENT     Current Facility-Administered Medications   Medication    " "bevacizumab (AVASTIN) intravitreal inj 1.25 mg     TSH   Date Value Ref Range Status   03/06/2023 1.98 0.30 - 4.20 uIU/mL Final   09/22/2021 0.75 0.30 - 5.00 uIU/mL Final       Lab Results   Component Value Date    A1C 7.5 09/12/2023    A1C 7.5 03/06/2023    A1C 7.1 07/07/2022    A1C 6.6 09/22/2021    A1C 6.6 02/18/2021       Recheck Medication and Follow Up (Pt is here for med check, pt felt faint, pt reports that she thinks it the losartan, pt has low BP and the med's makes it drop. Pt reports that she thinks her UTI and yeast infections are back and they are recurring.)      11/8/2023     1:54 PM   Additional Questions   Roomed by galina morales   Accompanied by alone         11/8/2023     1:54 PM   Patient Reported Additional Medications   Patient reports taking the following new medications none       History of Present Illness       Reason for visit:  Fainting episode, I think due to medication!  Symptom onset:  More than a month  Symptoms include:  Lightheaded, pain in the back of my neck, weakness  Symptom intensity:  Severe  Symptom progression:  Staying the same  Had these symptoms before:  Yes  Has tried/received treatment for these symptoms:  No  What makes it worse:  No  What makes it better:  Yes, lying down    She eats 4 or more servings of fruits and vegetables daily.She consumes 0 sweetened beverage(s) daily.She exercises with enough effort to increase her heart rate 9 or less minutes per day.  She exercises with enough effort to increase her heart rate 3 or less days per week.   She is taking medications regularly.                 Review of Systems         Objective    BP 93/56 (BP Location: Left arm, Patient Position: Sitting, Cuff Size: Adult Regular)   Pulse 77   Temp 97.1  F (36.2  C) (Tympanic)   Resp 15   Ht 1.651 m (5' 5\")   Wt 84.2 kg (185 lb 11.2 oz)   LMP  (LMP Unknown)   SpO2 96%   Breastfeeding No   BMI 30.90 kg/m    Body mass index is 30.9 kg/m .  Physical Exam   GENERAL: healthy, " alert and no distress  NECK: no adenopathy, no asymmetry, masses, or scars and thyroid normal to palpation  RESP: lungs clear to auscultation - no rales, rhonchi or wheezes  CV: regular rate and rhythm, normal S1 S2, no S3 or S4, no murmur, click or rub, no peripheral edema and peripheral pulses strong  ABDOMEN: soft, nontender, no hepatosplenomegaly, no masses and bowel sounds normal  MS: no gross musculoskeletal defects noted, no edema

## 2023-11-08 NOTE — LETTER
November 8, 2023      Rita ZANDRA Mancini  [address]   [place] [address]        To Whom It May Concern:    Rita DE PAZ Live was seen in our clinic. She needs a blood pressure meter /kit to monitor her blood pressure     Sincerely,        Paula Grullon MD

## 2023-11-09 ENCOUNTER — HOSPITAL ENCOUNTER (OUTPATIENT)
Dept: ULTRASOUND IMAGING | Facility: HOSPITAL | Age: 73
Discharge: HOME | End: 2023-11-09
Attending: INTERNAL MEDICINE | Admitting: INTERNAL MEDICINE
Payer: MEDICARE

## 2023-11-09 DIAGNOSIS — R55 SYNCOPE, UNSPECIFIED SYNCOPE TYPE: ICD-10-CM

## 2023-11-09 LAB — BACTERIA UR CULT: NORMAL

## 2023-11-09 PROCEDURE — 93880 EXTRACRANIAL BILAT STUDY: CPT

## 2023-11-13 ENCOUNTER — TELEPHONE (OUTPATIENT)
Dept: INTERNAL MEDICINE | Facility: CLINIC | Age: 73
End: 2023-11-13
Payer: MEDICARE

## 2023-11-13 DIAGNOSIS — N39.0 RECURRENT UTI: ICD-10-CM

## 2023-11-13 DIAGNOSIS — N76.0 BACTERIAL VAGINOSIS: Primary | ICD-10-CM

## 2023-11-13 DIAGNOSIS — B96.89 BACTERIAL VAGINOSIS: Primary | ICD-10-CM

## 2023-11-13 DIAGNOSIS — Q62.11 HYDRONEPHROSIS WITH URETEROPELVIC JUNCTION (UPJ) OBSTRUCTION: ICD-10-CM

## 2023-11-13 NOTE — TELEPHONE ENCOUNTER
Test Results    Who ordered the test:  Dr. Grullon    Type of test: Lab  UA Macroscopic with reflex to Microscopic and Culture - Lab Collect          Date of test:  11/09/23    Where was the test performed:  [place]    What are your questions/concerns?:  Pt was wondering when the results come back from the culture, would she be getting that info from you or Dr. Woodson?    Could we send this information to you in Private Outlet or would you prefer to receive a phone call?:   Patient would prefer a phone call   Okay to leave a detailed message?: Yes at Cell number on file:    Telephone Information:   Mobile [phone]   Mobile Not on file.

## 2023-11-15 NOTE — TELEPHONE ENCOUNTER
"Per chart review, result notes from UA and culture were as follows:       Robbie boykin there is another urine infection going on . I would like to wait for the full culture reports . Given the increasing frequency you should get further work up like a CT urogram to look at the kidneys , trial of estrogen cream if not using already     Final culture is negative this is just some bacterial contamination without causing active infection.     Relayed this updated information to patient who has further questions about the CT urogram, as she endorses similar symptoms as before. Claims her urine is still very cloudy and \"looks off\". Will forward all information to provider for additional advice on next steps.    "

## 2023-11-17 RX ORDER — METRONIDAZOLE 7.5 MG/G
1 GEL VAGINAL DAILY
Qty: 70 G | Refills: 0 | Status: SHIPPED | OUTPATIENT
Start: 2023-11-17 | End: 2024-01-09

## 2023-11-17 RX ORDER — ESTRADIOL 0.1 MG/G
0.5 CREAM VAGINAL
Qty: 42.5 G | Refills: 3 | Status: SHIPPED | OUTPATIENT
Start: 2023-11-20 | End: 2023-12-13

## 2023-11-17 NOTE — TELEPHONE ENCOUNTER
Pt has long standing hydronephrosis since 2019 . She did not really recall this but does remember a stent being out in and removed . Has not had urology follow up since then . Also informed her she has CKD stage 3   For vaginitis symptoms continue MetroGel for 7 more days   For prevention of future UTI take estrogen topical ( no contraindication to estrogen )   Referral to urology for discussion of this may need cystourethroscopy    CT urogram probably should be avoided given CKD

## 2023-11-28 DIAGNOSIS — H35.3231 EXUDATIVE AGE-RELATED MACULAR DEGENERATION OF BOTH EYES WITH ACTIVE CHOROIDAL NEOVASCULARIZATION (H): Primary | ICD-10-CM

## 2023-12-04 DIAGNOSIS — E03.9 ACQUIRED HYPOTHYROIDISM: ICD-10-CM

## 2023-12-04 RX ORDER — LEVOTHYROXINE SODIUM 50 UG/1
TABLET ORAL
Qty: 24 TABLET | Refills: 3 | OUTPATIENT
Start: 2023-12-04

## 2023-12-06 ENCOUNTER — OFFICE VISIT (OUTPATIENT)
Dept: OPHTHALMOLOGY | Facility: CLINIC | Age: 73
End: 2023-12-06
Attending: OPHTHALMOLOGY
Payer: MEDICARE

## 2023-12-06 DIAGNOSIS — H43.813 PVD (POSTERIOR VITREOUS DETACHMENT), BILATERAL: ICD-10-CM

## 2023-12-06 DIAGNOSIS — Z96.1 PSEUDOPHAKIA OF BOTH EYES: ICD-10-CM

## 2023-12-06 DIAGNOSIS — H35.3231 EXUDATIVE AGE-RELATED MACULAR DEGENERATION OF BOTH EYES WITH ACTIVE CHOROIDAL NEOVASCULARIZATION (H): Primary | ICD-10-CM

## 2023-12-06 DIAGNOSIS — H04.123 DRY EYE SYNDROME OF BOTH EYES: ICD-10-CM

## 2023-12-06 PROCEDURE — 99214 OFFICE O/P EST MOD 30 MIN: CPT | Mod: 25 | Performed by: OPHTHALMOLOGY

## 2023-12-06 PROCEDURE — 67028 INJECTION EYE DRUG: CPT | Mod: LT | Performed by: OPHTHALMOLOGY

## 2023-12-06 PROCEDURE — G0463 HOSPITAL OUTPT CLINIC VISIT: HCPCS | Mod: 25 | Performed by: OPHTHALMOLOGY

## 2023-12-06 PROCEDURE — 250N000011 HC RX IP 250 OP 636: Mod: JZ | Performed by: OPHTHALMOLOGY

## 2023-12-06 PROCEDURE — 92134 CPTRZ OPH DX IMG PST SGM RTA: CPT | Performed by: OPHTHALMOLOGY

## 2023-12-06 RX ADMIN — FARICIMAB 6 MG: 6 INJECTION, SOLUTION INTRAVITREAL at 14:02

## 2023-12-06 ASSESSMENT — EXTERNAL EXAM - LEFT EYE: OS_EXAM: NORMAL

## 2023-12-06 ASSESSMENT — TONOMETRY
OS_IOP_MMHG: 12
IOP_METHOD: TONOPEN
OD_IOP_MMHG: 13

## 2023-12-06 ASSESSMENT — SLIT LAMP EXAM - LIDS
COMMENTS: NORMAL
COMMENTS: NORMAL

## 2023-12-06 ASSESSMENT — REFRACTION_WEARINGRX
OD_AXIS: 010
OS_AXIS: 175
OS_ADD: +2.50
OD_CYLINDER: +0.75
OS_CYLINDER: +0.25
OD_ADD: +2.50
OD_SPHERE: PLANO
OS_SPHERE: +0.50

## 2023-12-06 ASSESSMENT — VISUAL ACUITY
OS_SC: 20/40
OD_SC+: -2
OD_SC: 20/25
OS_PH_SC: 20/30
METHOD: SNELLEN - LINEAR

## 2023-12-06 ASSESSMENT — EXTERNAL EXAM - RIGHT EYE: OD_EXAM: NORMAL

## 2023-12-06 NOTE — PROGRESS NOTES
CC -  Macular degeneration     INTERVAL HISTORY - Pt states no vision changes since last visit.   Dryness 1-2x weekly, uses AT PRN for relief.  No flashes or floaters. Pt has been forgetting to use [employer] Grid     HPI -   Rita Mancini is a  73 year old year-old patient presenting for evaluation for macular degeneration. Was referred by her cataract surgeon for AMD. Saw Dr. KRISTIE Duarte ~6 months ago. 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 Observation recommended  No current smoking   No known FH of AMD, glaucoma     PAST OCULAR SURGERY  CE IOL each eye 2021    RETINAL IMAGING:  OCT 12/106/23:   Right eye: Normal foveal contour, no intraretinal or subretinal fluid. Pachychoroid.  Left eye:  SubRPE scar with subretinal fluid, worse compared to previous scan, SIRE present -stable    ASSESSMENT & PLAN    # Age related macular degeneration each eye  Right eye just a few small drusen  Left eye [facility] P[facility] with SIRE and increased SRF; vision stable  pachychoroid +  S/P Avastin injection left eye last injection 8/2/23 (10 weeks)  12/6/2023: OCT with improving SRF  Discussed treat and extend plan; will switch to Vabysmo today and RTC 8 weeks for DFE/OCT/ possible Vabysmo left eye    AREDS II supplements  Amsler grid education given     # pseudophakia each eye  Observe    # dry eye each eye   ATs as  Needed      Dispo:  RTC 7-8 w for OCT left eye; DFE ou and possible vabaysmo left eye       Complete documentation of historical and exam elements from today's encounter can be found in the full encounter summary report (not reduplicated in this progress note). I personally obtained the chief complaint(s) and history of present illness.  I confirmed and edited as necessary the review of systems, past medical/surgical history, family history, social history, and examination findings as documented by others; and I examined the patient myself. I personally reviewed the relevant tests, images, and  reports as documented above. I formulated and edited as necessary the assessment and plan and discussed the findings and management plan with the patient and family.     Malvin Rizzo MD

## 2023-12-06 NOTE — NURSING NOTE
Chief Complaints and History of Present Illnesses   Patient presents with    Macular Degeneration Follow Up     8 week follow up both eyes.     Chief Complaint(s) and History of Present Illness(es)       Macular Degeneration Follow Up              Comments: 8 week follow up both eyes.              Comments    Pt states vision is about the same as last visit. No eye pain today. No new flashes or floaters.  No redness or dryness.  DM2 BS: 130 this morning per pt.  Lab Results       Component                Value               Date                       A1C                      7.5                 09/12/2023                 A1C                      7.5                 03/06/2023                 A1C                      7.1                 07/07/2022                 A1C                      6.6                 09/22/2021                 A1C                      6.6                 02/18/2021             KVNG Knight December 6, 2023 12:59 PM

## 2023-12-12 ENCOUNTER — OFFICE VISIT (OUTPATIENT)
Dept: INTERNAL MEDICINE | Facility: CLINIC | Age: 73
End: 2023-12-12
Payer: MEDICARE

## 2023-12-12 VITALS
OXYGEN SATURATION: 96 % | WEIGHT: 186 LBS | SYSTOLIC BLOOD PRESSURE: 102 MMHG | HEIGHT: 65 IN | RESPIRATION RATE: 19 BRPM | HEART RATE: 69 BPM | BODY MASS INDEX: 30.99 KG/M2 | TEMPERATURE: 97.4 F | DIASTOLIC BLOOD PRESSURE: 60 MMHG

## 2023-12-12 DIAGNOSIS — E06.3 HYPOTHYROIDISM DUE TO HASHIMOTO'S THYROIDITIS: ICD-10-CM

## 2023-12-12 DIAGNOSIS — Z12.11 SCREENING FOR COLON CANCER: ICD-10-CM

## 2023-12-12 DIAGNOSIS — E13.9 DIABETES 1.5, MANAGED AS TYPE 2 (H): ICD-10-CM

## 2023-12-12 DIAGNOSIS — N13.39 OTHER HYDRONEPHROSIS: ICD-10-CM

## 2023-12-12 DIAGNOSIS — R82.71 BACTERIURIA, CHRONIC: Primary | ICD-10-CM

## 2023-12-12 DIAGNOSIS — N39.0 RECURRENT UTI: ICD-10-CM

## 2023-12-12 LAB — HBA1C MFR BLD: 6.7 % (ref 0–5.6)

## 2023-12-12 PROCEDURE — 99214 OFFICE O/P EST MOD 30 MIN: CPT | Performed by: INTERNAL MEDICINE

## 2023-12-12 PROCEDURE — 36415 COLL VENOUS BLD VENIPUNCTURE: CPT | Performed by: INTERNAL MEDICINE

## 2023-12-12 PROCEDURE — 83036 HEMOGLOBIN GLYCOSYLATED A1C: CPT | Performed by: INTERNAL MEDICINE

## 2023-12-12 RX ORDER — DULAGLUTIDE 3 MG/.5ML
3 INJECTION, SOLUTION SUBCUTANEOUS WEEKLY
Qty: 6 ML | Refills: 3 | Status: SHIPPED | OUTPATIENT
Start: 2023-12-12 | End: 2024-07-09

## 2023-12-12 RX ORDER — RESPIRATORY SYNCYTIAL VIRUS VACCINE 120MCG/0.5
0.5 KIT INTRAMUSCULAR ONCE
Qty: 1 EACH | Refills: 0 | Status: CANCELLED | OUTPATIENT
Start: 2023-12-12 | End: 2023-12-12

## 2023-12-12 NOTE — LETTER
December 20, 2023      Rita Mancini  [address]   [place] [address]        Dear ,    We are writing to inform you of your test results.    Your A1c is good!    Resulted Orders   Hemoglobin A1c   Result Value Ref Range    Hemoglobin A1C 6.7 (H) 0.0 - 5.6 %      Comment:      Normal <5.7%   Prediabetes 5.7-6.4%    Diabetes 6.5% or higher     Note: Adopted from ADA consensus guidelines.       If you have any questions or concerns, please call the clinic at the number listed above.       Sincerely,      Michi Murillo MD

## 2023-12-12 NOTE — PROGRESS NOTES
ASSESSMENT AND PLAN:    1. Chronic kidney disease, stage 3  Last creatinine 1.27 - before 1.40, 1.78 9 months ago.     2. Bacteriuria, chronic  Intermittent odor, without bleeding or dysuria or feeling sick.  Atrophic vaginitis may play a role. We discussed the rationale of using estrogen vaginal cream. We discussed symptoms or significant UTI. She will complete her present antibiotic course, given by Dr. Grullon.      3. Hydronephrosis  This is chronic and not progressive.  Treatment options aren't indicated.     4. Hypothyroidism due to Hashimoto's thyroiditis  Ongoing replacement.     5. Diabetes 1.5, managed as type 2   On trulicity, will increase from 1.5 to 3.0 dose.     6. Screening for colon cancer  Normal colonoscopy 9/2020, due again in 9/2030    Follow up 3 months.     CHIEF COMPLAINT:  Recent URI, urine symptoms.     HISTORY OF PRESENT ILLNESS:  Rita Mancini is a 73 year old female here in follow up. Tolerates the trulicity well.  Feels well overall.  No actual dysuria.  At times will note odor in urine, no hematuria or dysuria, and doesn't feel sick.  Wonders about why she should use estrogen cream.  No unusual cough or dyspnea.     REVIEW OF SYSTEMS:   See HPI, all other systems on review are negative.    Past Medical History:   Diagnosis Date    Age-related cataract 12/06/2021    Anxiety and depression 01/01/1962    Bigeminy 03/17/2019    Bronchiectasis without complication (H)     COPD (chronic obstructive pulmonary disease) (H) 01/01/2009    Functional diarrhea 12/31/2018    GERD (gastroesophageal reflux disease)     History of alcoholism (H)     Hx antineoplastic chemotherapy     lung cancer     Hx of radiation therapy     lung cancer     Hydronephrosis     right kidney since 2019    Hyperlipidemia LDL goal <100 03/06/2023    Hypothyroid     Infection due to 2019 novel coronavirus 03/06/2023    Lung cancer (H) 01/01/2008    RUL,  Non small cell cancer    Neuropathy of left abducens nerve  03/29/2017    Osteopenia     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, subsequent encounter 04/08/2019    Pleural effusion 01/01/2015    Sepsis due to Escherichia coli with acute renal failure without septic shock, unspecified acute renal failure type (H)     Sleep apnea 01/01/2010    does not use cpap    Type 2 diabetes, HbA1C goal < 8% (H)      History   Smoking Status    Former    Packs/day: 1.50    Years: 0.00    Types: Cigarettes    Quit date: 11/9/2008   Smokeless Tobacco    Never     Family History   Problem Relation Age of Onset    Heart Disease Mother     Hypertension Mother     Alcoholism Mother     Depression Mother     Heart Disease Father 45        mi age 45, cabg    Coronary Artery Disease Father     Cancer Father         prostate    Hypertension Father     Snoring Father     Chronic Obstructive Pulmonary Disease Brother     Alcoholism Brother     Alcoholism Sister     Diabetes Son     Anxiety Disorder Son     Depression Son     Post-Traumatic Stress Disorder (PTSD) Son     Obesity Daughter     Obesity Daughter     Cancer Maternal Aunt 47        breast    Breast Cancer Paternal Aunt     Leukemia Grandchild     Schizophrenia Grandchild     Lymphoma Grandchild     Glaucoma No family hx of     Macular Degeneration No family hx of      Past Surgical History:   Procedure Laterality Date    CATARACT IOL, RT/LT Bilateral 12/2021    IR MISCELLANEOUS PROCEDURE  12/10/2009    PORTACATH PLACEMENT      and removal    THORACOSCOPY Right 04/06/2015    Procedure: RIGHT THORACOSCOPY / BIOPSY PLEURAL / TALC PLEURODESIS;  Surgeon: Michi Ma MD;  Location: [facility] OR;  Service:     THORACOSCOPY Left 03/29/2019    Procedure: LEFT THORACOSCOPY WITH DECORTICATION;  Surgeon: Michi Ma MD;  Location: [facility] OR;  Service: General    TONSILLECTOMY  age 5    URETERAL STENT PLACEMENT Right 06/01/2010    US THORACENTESIS  03/27/2019     VITALS:  Vitals:     "12/12/23 1416   BP: 102/60   BP Location: Left arm   Patient Position: Sitting   Cuff Size: Adult Regular   Pulse: 69   Resp: 19   Temp: 97.4  F (36.3  C)   TempSrc: Tympanic   SpO2: 96%   Weight: 84.4 kg (186 lb)   Height: 1.651 m (5' 5\")     Wt Readings from Last 3 Encounters:   12/12/23 84.4 kg (186 lb)   11/08/23 84.2 kg (185 lb 11.2 oz)   10/03/23 84.8 kg (187 lb)     PHYSICAL EXAM:  Constitutional:  In NAD, alert and oriented  Cardiac:  S1 S2   Lungs: Clear     DECISION TO OBTAIN OLD RECORDS AND/OR OBTAIN HISTORY FROM SOMEONE OTHER THAN PATIENT, AND/OR ACCESSING CARE EVERYWHERE):  1  0     REVIEW AND SUMMARIZATION OF OLD RECORDS, AND/OR OBTAINING HISTORY FROM SOMEONE OTHER THAN PATIENT, AND/OR DISCUSSION OF CASE WITH ANOTHER HEALTH CARE PROVIDER:  2 reviewed recent notes    REVIEW AND/OR ORDER OF OF CLINICAL LAB TESTS: 1  ordered.    REVIEW AND/OR ORDER OF RADIOLOGY TESTS: 1 reviewed her kidney US.    REVIEW AND/OR ORDER OF MEDICAL TESTS (EKG/ECHO/COLONOSCOPY/EGD): 1  0    INDEPENDENT  VISUALIZATION OF IMAGE, TRACING, OR SPECIMEN ITSELF (2 EACH):  0     TOTAL: 4    Current Outpatient Medications   Medication Sig Dispense Refill    aspirin 81 MG EC tablet Take 81 mg by mouth daily      blood glucose (NO BRAND SPECIFIED) test strip Use to test blood sugar one time daily or as directed. 100 strip 3    calcium carbonate (TUMS) 500 MG chewable tablet Take 1 chew tab by mouth daily      cetirizine (ZYRTEC) 10 MG CHEW Take 10 mg by mouth daily      citalopram (CELEXA) 10 MG tablet Take 1 tablet (10 mg) by mouth daily 90 tablet 2    famotidine (PEPCID) 20 MG tablet Take 1 tablet (20 mg) by mouth daily 90 tablet 2    fluticasone (FLONASE) 50 MCG/ACT nasal spray Spray 1 spray into both nostrils 2 times daily 16 g 3    levothyroxine (SYNTHROID/LEVOTHROID) 50 MCG tablet TAKE ON EMPTY STOMACH EVERY MONDAY AND FRIDAY (SEE OTHER DOSE FOR REMAINING DAYS OF WEEK) 24 tablet 3    levothyroxine (SYNTHROID/LEVOTHROID) 75 MCG " tablet TAKE 1 TAB BY MOUTH ON TUE WED THR SAT SUN 60 tablet 3    magnesium 500 MG TABS       Melatonin 10 MG CAPS       montelukast (SINGULAIR) 10 MG tablet Take 1 tablet (10 mg) by mouth At Bedtime 90 tablet 3    multivitamin w/minerals (THERA-VIT-M) tablet Take 1 tablet by mouth daily      PULMICORT FLEXHALER 180 MCG/ACT inhaler INHALE 2 PUFFS BY MOUTH TWICE A DAY 9 each 3    simvastatin (ZOCOR) 10 MG tablet Take 1 tablet (10 mg) by mouth At Bedtime 90 tablet 2    triamcinolone (KENALOG) 0.1 % external cream Apply topically 2 times daily      TRULICITY 1.5 MG/0.5ML pen Inject 1.5 mg Subcutaneous every 7 days 9 mL 2    umeclidinium-vilanterol (ANORO ELLIPTA) 62.5-25 MCG/ACT oral inhaler INHALE 1 PUFF BY MOUTH EVERY  each 0    UNKNOWN TO PATIENT Eye vitamin      estradiol (ESTRACE) 0.1 MG/GM vaginal cream Place 0.5 g vaginally twice a week (Patient not taking: Reported on 12/12/2023) 42.5 g 3    metroNIDAZOLE (METROGEL) 0.75 % vaginal gel Place 1 applicator (5 g) vaginally daily (Patient not taking: Reported on 12/12/2023) 70 g 0    sulfamethoxazole-trimethoprim (BACTRIM DS) 800-160 MG tablet Take 1 tablet by mouth 2 times daily 14 tablet 0     Michi Murillo MD  Internal Medicine  [facility]

## 2023-12-13 PROBLEM — Z12.11 SCREENING FOR COLON CANCER: Status: ACTIVE | Noted: 2023-12-13

## 2023-12-13 RX ORDER — ESTRADIOL 0.1 MG/G
0.5 CREAM VAGINAL
Start: 2023-12-14 | End: 2024-04-03

## 2023-12-26 DIAGNOSIS — J30.2 SEASONAL ALLERGIC RHINITIS, UNSPECIFIED TRIGGER: ICD-10-CM

## 2023-12-27 RX ORDER — FLUTICASONE PROPIONATE 50 MCG
1 SPRAY, SUSPENSION (ML) NASAL 2 TIMES DAILY
Qty: 16 G | Refills: 3 | OUTPATIENT
Start: 2023-12-27

## 2024-01-08 ENCOUNTER — E-VISIT (OUTPATIENT)
Dept: INTERNAL MEDICINE | Facility: CLINIC | Age: 74
End: 2024-01-08
Payer: MEDICARE

## 2024-01-08 DIAGNOSIS — K40.90 NON-RECURRENT INGUINAL HERNIA WITHOUT OBSTRUCTION OR GANGRENE, UNSPECIFIED LATERALITY: Primary | ICD-10-CM

## 2024-01-08 DIAGNOSIS — K40.90 INGUINAL HERNIA WITHOUT OBSTRUCTION OR GANGRENE, RECURRENCE NOT SPECIFIED, UNSPECIFIED LATERALITY: ICD-10-CM

## 2024-01-08 PROCEDURE — 99207 PR NON-BILLABLE SERV PER CHARTING: CPT | Performed by: INTERNAL MEDICINE

## 2024-01-09 ENCOUNTER — TELEPHONE (OUTPATIENT)
Dept: INTERNAL MEDICINE | Facility: CLINIC | Age: 74
End: 2024-01-09

## 2024-01-09 NOTE — TELEPHONE ENCOUNTER
General Call      Reason for Call:  pt stating she schedule an Evisit on 01/08/2024 and has not heard back    Please advise    What are your questions or concerns:  n/a    Date of last appointment with provider: na/    Could we send this information to you in [employer] or would you prefer to receive a phone call?:   Patient would prefer a phone call   Okay to leave a detailed message?: Yes at Home number on file [phone] (home)

## 2024-01-10 NOTE — TELEPHONE ENCOUNTER
Provider E-Visit time total (minutes): 8 minutes.  I have referred her to general surgery. Dr. Lidia MD

## 2024-01-12 ENCOUNTER — OFFICE VISIT (OUTPATIENT)
Dept: SURGERY | Facility: CLINIC | Age: 74
End: 2024-01-12
Attending: INTERNAL MEDICINE
Payer: MEDICARE

## 2024-01-12 ENCOUNTER — TELEPHONE (OUTPATIENT)
Dept: SURGERY | Facility: CLINIC | Age: 74
End: 2024-01-12

## 2024-01-12 VITALS — BODY MASS INDEX: 29.95 KG/M2 | WEIGHT: 180 LBS

## 2024-01-12 DIAGNOSIS — K43.9 VENTRAL HERNIA WITHOUT OBSTRUCTION OR GANGRENE: Primary | ICD-10-CM

## 2024-01-12 PROCEDURE — 99203 OFFICE O/P NEW LOW 30 MIN: CPT | Performed by: SURGERY

## 2024-01-12 RX ORDER — CEFAZOLIN SODIUM/WATER 2 G/20 ML
2 SYRINGE (ML) INTRAVENOUS SEE ADMIN INSTRUCTIONS
Status: CANCELLED | OUTPATIENT
Start: 2024-02-16

## 2024-01-12 RX ORDER — CEFAZOLIN SODIUM/WATER 2 G/20 ML
2 SYRINGE (ML) INTRAVENOUS
Status: CANCELLED | OUTPATIENT
Start: 2024-02-16

## 2024-01-12 RX ORDER — ACETAMINOPHEN 325 MG/1
975 TABLET ORAL ONCE
Status: CANCELLED | OUTPATIENT
Start: 2024-02-16 | End: 2024-01-12

## 2024-01-12 NOTE — TELEPHONE ENCOUNTER
Patient calling back again as she has had an opportunity to speak with her children about the surgical dates.  She has decided per her childen that her health is more important than her schedule for 2/2 and has requested that the procedure be moved back to that date.  I have moved the procedure back to 2/2 from 2/9 at [facility] at this time.  No further action needed.

## 2024-01-12 NOTE — TELEPHONE ENCOUNTER
Patient called to report that she needs to move surgery from 2/2 due to a scheduling conflict.  Speaking with patient we agreed on 2/9.  I have moved the case at [facility] at this time.  Patient declined new confirmation letter as she was able to make adjustments to her current letter while we were on the phone.

## 2024-01-12 NOTE — PROGRESS NOTES
HPI: Rita Mancini is a 73 year old female referred to see me by Michi Murillo for a ventral hernia.  She notes this has been present for a number of years, and while it was asymptomatic initially is now causing her pain whenever she coughs or strains.   She denies any previous surgeries in the epigastric region other than chest tubes, with those sites not corresponding with the current hernia    Allergies:Seasonal allergies    Prior Medical History:   Past Medical History:   Diagnosis Date    Age-related cataract 12/06/2021    Anxiety and depression 01/01/1962    Bigeminy 03/17/2019    Bronchiectasis without complication (H)     COPD (chronic obstructive pulmonary disease) (H) 01/01/2009    Functional diarrhea 12/31/2018    GERD (gastroesophageal reflux disease)     History of alcoholism (H)     Hx antineoplastic chemotherapy     lung cancer     Hx of radiation therapy     lung cancer     Hydronephrosis     right kidney since 2019    Hyperlipidemia LDL goal <100 03/06/2023    Hypothyroid     Infection due to 2019 novel coronavirus 03/06/2023    Lung cancer (H) 01/01/2008    RUL,  Non small cell cancer    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    Osteopenia     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, subsequent encounter 04/08/2019    Pleural effusion 01/01/2015    Sepsis due to Escherichia coli with acute renal failure without septic shock, unspecified acute renal failure type (H)     Sleep apnea 01/01/2010    does not use cpap    Type 2 diabetes, HbA1C goal < 8% (H)        Prior Surgical History:   Past Surgical History:   Procedure Laterality Date    CATARACT IOL, RT/LT Bilateral 12/2021    IR MISCELLANEOUS PROCEDURE  12/10/2009    PORTACATH PLACEMENT      and removal    THORACOSCOPY Right 04/06/2015    Procedure: RIGHT THORACOSCOPY / BIOPSY PLEURAL / TALC PLEURODESIS;  Surgeon: Michi Ma MD;  Location: [facility];  Service:     THORACOSCOPY Left  03/29/2019    Procedure: LEFT THORACOSCOPY WITH DECORTICATION;  Surgeon: Michi Ma MD;  Location: [facility];  Service: General    TONSILLECTOMY  age 5    URETERAL STENT PLACEMENT Right 06/01/2010    US THORACENTESIS  03/27/2019       CURRENT MEDS:  Prior to Admission medications    Medication Sig Start Date End Date Taking? Authorizing Provider   aspirin 81 MG EC tablet Take 81 mg by mouth daily   Yes Reported, Patient   blood glucose (NO BRAND SPECIFIED) test strip Use to test blood sugar one time daily or as directed. 5/17/23  Yes Michi Murillo MD   calcium carbonate (TUMS) 500 MG chewable tablet Take 1 chew tab by mouth daily   Yes Reported, Patient   cetirizine (ZYRTEC) 10 MG CHEW Take 10 mg by mouth daily   Yes Reported, Patient   citalopram (CELEXA) 10 MG tablet Take 1 tablet (10 mg) by mouth daily 8/22/23  Yes Michi Murillo MD   Dulaglutide (TRULICITY) 3 MG/0.5ML SOPN Inject 3 mg Subcutaneous once a week 12/12/23  Yes Michi Murillo MD   estradiol (ESTRACE) 0.1 MG/GM vaginal cream Place 0.5 g vaginally twice a week 12/14/23  Yes Michi Murillo MD   famotidine (PEPCID) 20 MG tablet Take 1 tablet (20 mg) by mouth daily 8/22/23  Yes Michi Murillo MD   fluticasone (FLONASE) 50 MCG/ACT nasal spray Spray 1 spray into both nostrils 2 times daily 10/30/23  Yes Michi Murillo MD   levothyroxine (SYNTHROID/LEVOTHROID) 50 MCG tablet TAKE ON EMPTY STOMACH EVERY MONDAY AND FRIDAY (SEE OTHER DOSE FOR REMAINING DAYS OF WEEK) 3/21/23  Yes Michi Murillo MD   levothyroxine (SYNTHROID/LEVOTHROID) 75 MCG tablet TAKE 1 TAB BY MOUTH ON TUE WED THR SAT SUN 5/3/23  Yes Michi Murillo MD   magnesium 500 MG TABS    Yes Reported, Patient   Melatonin 10 MG CAPS    Yes Reported, Patient   montelukast (SINGULAIR) 10 MG tablet Take 1 tablet (10 mg) by mouth At Bedtime 5/3/23  Yes Michi Murillo MD   multivitamin w/minerals (THERA-VIT-M) tablet Take 1 tablet by mouth daily   Yes Reported, Patient    PULMICORT FLEXHALER 180 MCG/ACT inhaler INHALE 2 PUFFS BY MOUTH TWICE A DAY 11/8/23  Yes Paula Grullon MD   simvastatin (ZOCOR) 10 MG tablet Take 1 tablet (10 mg) by mouth At Bedtime 8/22/23  Yes Michi Murillo MD   triamcinolone (KENALOG) 0.1 % external cream Apply topically 2 times daily   Yes Reported, Patient   umeclidinium-vilanterol (ANORO ELLIPTA) 62.5-25 MCG/ACT oral inhaler INHALE 1 PUFF BY MOUTH EVERY DAY 11/1/23  Yes Dang Singh MD   UNKNOWN TO PATIENT Eye vitamin   Yes Reported, Patient         Family History   Problem Relation Age of Onset    Heart Disease Mother     Hypertension Mother     Alcoholism Mother     Depression Mother     Heart Disease Father 45        mi age 45, cabg    Coronary Artery Disease Father     Cancer Father         prostate    Hypertension Father     Snoring Father     Chronic Obstructive Pulmonary Disease Brother     Alcoholism Brother     Alcoholism Sister     Diabetes Son     Anxiety Disorder Son     Depression Son     Post-Traumatic Stress Disorder (PTSD) Son     Obesity Daughter     Obesity Daughter     Cancer Maternal Aunt 47        breast    Breast Cancer Paternal Aunt     Leukemia Grandchild     Schizophrenia Grandchild     Lymphoma Grandchild     Glaucoma No family hx of     Macular Degeneration No family hx of         reports that she quit smoking about 15 years ago. Her smoking use included cigarettes. She smoked an average of 1.5 packs per day. She has never used smokeless tobacco. She reports that she does not drink alcohol and does not use drugs.    History   Smoking Status    Former    Packs/day: 1.50    Years: 0.00    Types: Cigarettes    Quit date: 11/9/2008   Smokeless Tobacco    Never       Review of Systems -    The 10 point review of systems is within normal limits except as noted in HPI.    Wt 81.6 kg (180 lb)   LMP  (LMP Unknown)   BMI 29.95 kg/m    180 lbs 0 oz  Body mass index is 29.95 kg/m .    EXAM:  GENERAL: Alert, cooperative, appears  stated age  ABDOMEN: Epigastric hernia, mild tenderness with palpation, unable to appreciate fascial defect size due to nonreducible fatty contents      LABS:  Lab Results   Component Value Date    HGB 13.4 03/06/2023    WBC 12.1 (H) 03/06/2023     03/06/2023    AST 30 03/06/2023    ALT 16 03/06/2023    ALKPHOS 87 03/06/2023    BILITOTAL 0.6 03/06/2023     Imaging: CT imaging of the chest abdomen and pelvis obtained in 2019 reviewed, noteworthy for the presence of a fat-containing ventral hernia with a roughly 2 cm fascial defect at that point in time      Assessment/Plan:   1. Ventral hernia without obstruction or gangrene          Rita Mancini is a 73 year old female with signs and symptoms consistent with a ventral hernia.  I have explained the pathophysiology of hernias in detail as well as the surgical versus non-operative management strategies.      The risks of surgery were discussed in detail which include, but are not limited to, bleeding, infection, injury to surrounding structures, the need to convert to an open procedure, and possible recurrence.      She understands everything which was discussed and has consented to proceed with a minimally invasive ventral hernia repair with mesh.  Surgery will be scheduled at a time that works for the patient.      25 minutes spent on the date of the encounter doing chart review, interpretation of tests, patient visit, and documentation    Tyler Rossi MD ,MD  [facility] Department of Surgery

## 2024-01-12 NOTE — TELEPHONE ENCOUNTER
Spoke with patient today regarding surgery scheduling     Went over details/instructions.    Surgery Letter given to patient in clinic.  (Please see LETTERS TAB in chart to retrieve a copy of this letter)

## 2024-01-12 NOTE — LETTER
Pre-op Physical: 1/23/2024 at 2:40 w/ Dr Byrd    Surgery Date: 2/2/2024     Location: [place] [address]    Approximate Arrival Time: 11:00am  (Unless instructed differently by the pre-op call nurse)     Pre-Surgical Tasks:     Review all medications with your primary care or prescribing physician; they will advise you which meds to stop and when, and when you can resume taking.  Certain medications like blood thinners and weight loss medications need to be stopped in advance of surgery to proceed safely.      Blood thinners including but not exclusive to drugs like Xarelto, Eliquis, Warfarin and Aspirin, should be stopped five days before surgery, if your prescribing provider agrees. Follow your provider's advice on stopping blood thinners because they know you best.  If you are unsure if your medication is a blood thinner, ask your prescribing provider.    Weight loss medications: There are multiple medications being used for weight management and diabetes today, and the list is growing.  Phentermine, Ozempic, Wegovy, Trulicity, and other similar medications need to be stopped one week before surgery to avoid being cancelled.  Victoza and Saxenda can be continued longer but must be stopped one full day before surgery.  Please ask your prescribing provider for advice.    Diabetic medications: in addition to the medications talked about above that are used for either weight loss or diabetes, some people are on insulin that may require adjustment.  Please discuss managing diabetic medications with your prescribing doctor as these medications may require modification prior to surgery.     Please shower the evening before and morning of surgery with Hibiclens soap.  Any [facility] can provide this to you at no cost, or it can be found at your local pharmacy.     Fasting instructions will be provided by the pre-op nurse who will call you 1-3 days before  surgery.  Typically, we advise normal food up to 8 hours before you arrive for surgery. Clear liquids only from then until 2 hours before you arrive surgery, then nothing at all by mouth.  The nurse will review your specific instructions with you at the call.      Smoking impacts your body's ability to heal properly so we advise patients to quit if possible before surgery.  Plastic Surgery patients are required to be nicotine free for at least 8 weeks before surgery.      You will need an adult to drive you home and stay with you 24 hours after surgery. Public transportation or Medical Van Services are not permitted.    Visitor restrictions are subject to change, please verify with the pre-op nurse when they call how many people are permitted to accompany you.    We always encourage you to notify your insurance any time you have medical tests or procedures scheduled including surgery. The number is usually right on the back of your insurance card. To obtain pricing for surgery, please call  [employer] Cost of Care at [phone] or email [email or web address].        Call our office if you have any questions! Thank you!      Jarred Hurley  Complex   [facility]  [phone]

## 2024-01-12 NOTE — LETTER
1/12/2024         RE: Rita Mancini  [address]  [place] [address]        Dear Colleague,    Thank you for referring your patient, Rita Mancini, to the [facility] [place]. Please see a copy of my visit note below.    HPI: Rita Mancini is a 73 year old female referred to see me by Michi Murillo for a ventral hernia.  She notes this has been present for a number of years, and while it was asymptomatic initially is now causing her pain whenever she coughs or strains.   She denies any previous surgeries in the epigastric region other than chest tubes, with those sites not corresponding with the current hernia    Allergies:Seasonal allergies    Prior Medical History:   Past Medical History:   Diagnosis Date     Age-related cataract 12/06/2021     Anxiety and depression 01/01/1962     Bigeminy 03/17/2019     Bronchiectasis without complication (H)      COPD (chronic obstructive pulmonary disease) (H) 01/01/2009     Functional diarrhea 12/31/2018     GERD (gastroesophageal reflux disease)      History of alcoholism (H)      Hx antineoplastic chemotherapy     lung cancer      Hx of radiation therapy     lung cancer      Hydronephrosis     right kidney since 2019     Hyperlipidemia LDL goal <100 03/06/2023     Hypothyroid      Infection due to 2019 novel coronavirus 03/06/2023     Lung cancer (H) 01/01/2008    RUL,  Non small cell cancer     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     Osteopenia      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, subsequent encounter 04/08/2019     Pleural effusion 01/01/2015     Sepsis due to Escherichia coli with acute renal failure without septic shock, unspecified acute renal failure type (H)      Sleep apnea 01/01/2010    does not use cpap     Type 2 diabetes, HbA1C goal < 8% (H)        Prior Surgical History:   Past Surgical History:   Procedure Laterality Date      CATARACT IOL, RT/LT Bilateral 12/2021     IR MISCELLANEOUS PROCEDURE  12/10/2009     PORTACATH PLACEMENT      and removal     THORACOSCOPY Right 04/06/2015    Procedure: RIGHT THORACOSCOPY / BIOPSY PLEURAL / TALC PLEURODESIS;  Surgeon: Michi Ma MD;  Location: [facility];  Service:      THORACOSCOPY Left 03/29/2019    Procedure: LEFT THORACOSCOPY WITH DECORTICATION;  Surgeon: Michi Ma MD;  Location: [facility];  Service: General     TONSILLECTOMY  age 5     URETERAL STENT PLACEMENT Right 06/01/2010     US THORACENTESIS  03/27/2019       CURRENT MEDS:  Prior to Admission medications    Medication Sig Start Date End Date Taking? Authorizing Provider   aspirin 81 MG EC tablet Take 81 mg by mouth daily   Yes Reported, Patient   blood glucose (NO BRAND SPECIFIED) test strip Use to test blood sugar one time daily or as directed. 5/17/23  Yes Micih Murillo MD   calcium carbonate (TUMS) 500 MG chewable tablet Take 1 chew tab by mouth daily   Yes Reported, Patient   cetirizine (ZYRTEC) 10 MG CHEW Take 10 mg by mouth daily   Yes Reported, Patient   citalopram (CELEXA) 10 MG tablet Take 1 tablet (10 mg) by mouth daily 8/22/23  Yes Michi Murillo MD   Dulaglutide (TRULICITY) 3 MG/0.5ML SOPN Inject 3 mg Subcutaneous once a week 12/12/23  Yes Michi Murillo MD   estradiol (ESTRACE) 0.1 MG/GM vaginal cream Place 0.5 g vaginally twice a week 12/14/23  Yes Michi Mruillo MD   famotidine (PEPCID) 20 MG tablet Take 1 tablet (20 mg) by mouth daily 8/22/23  Yes Michi Murillo MD   fluticasone (FLONASE) 50 MCG/ACT nasal spray Spray 1 spray into both nostrils 2 times daily 10/30/23  Yes Michi Murillo MD   levothyroxine (SYNTHROID/LEVOTHROID) 50 MCG tablet TAKE ON EMPTY STOMACH EVERY MONDAY AND FRIDAY (SEE OTHER DOSE FOR REMAINING DAYS OF WEEK) 3/21/23  Yes Michi Murillo MD   levothyroxine (SYNTHROID/LEVOTHROID) 75 MCG tablet TAKE 1 TAB BY MOUTH ON TUE WED THR SAT SUN 5/3/23  Yes Rotilie,  Michi HERNDON MD   magnesium 500 MG TABS    Yes Reported, Patient   Melatonin 10 MG CAPS    Yes Reported, Patient   montelukast (SINGULAIR) 10 MG tablet Take 1 tablet (10 mg) by mouth At Bedtime 5/3/23  Yes Michi Murillo MD   multivitamin w/minerals (THERA-VIT-M) tablet Take 1 tablet by mouth daily   Yes Reported, Patient   PULMICORT FLEXHALER 180 MCG/ACT inhaler INHALE 2 PUFFS BY MOUTH TWICE A DAY 11/8/23  Yes Paula Grullon MD   simvastatin (ZOCOR) 10 MG tablet Take 1 tablet (10 mg) by mouth At Bedtime 8/22/23  Yes Michi Murillo MD   triamcinolone (KENALOG) 0.1 % external cream Apply topically 2 times daily   Yes Reported, Patient   umeclidinium-vilanterol (ANORO ELLIPTA) 62.5-25 MCG/ACT oral inhaler INHALE 1 PUFF BY MOUTH EVERY DAY 11/1/23  Yes Dang Singh MD   UNKNOWN TO PATIENT Eye vitamin   Yes Reported, Patient         Family History   Problem Relation Age of Onset     Heart Disease Mother      Hypertension Mother      Alcoholism Mother      Depression Mother      Heart Disease Father 45        mi age 45, cabg     Coronary Artery Disease Father      Cancer Father         prostate     Hypertension Father      Snoring Father      Chronic Obstructive Pulmonary Disease Brother      Alcoholism Brother      Alcoholism Sister      Diabetes Son      Anxiety Disorder Son      Depression Son      Post-Traumatic Stress Disorder (PTSD) Son      Obesity Daughter      Obesity Daughter      Cancer Maternal Aunt 47        breast     Breast Cancer Paternal Aunt      Leukemia Grandchild      Schizophrenia Grandchild      Lymphoma Grandchild      Glaucoma No family hx of      Macular Degeneration No family hx of         reports that she quit smoking about 15 years ago. Her smoking use included cigarettes. She smoked an average of 1.5 packs per day. She has never used smokeless tobacco. She reports that she does not drink alcohol and does not use drugs.    History   Smoking Status     Former     Packs/day: 1.50      Years: 0.00     Types: Cigarettes     Quit date: 11/9/2008   Smokeless Tobacco     Never       Review of Systems -    The 10 point review of systems is within normal limits except as noted in HPI.    Wt 81.6 kg (180 lb)   LMP  (LMP Unknown)   BMI 29.95 kg/m    180 lbs 0 oz  Body mass index is 29.95 kg/m .    EXAM:  GENERAL: Alert, cooperative, appears stated age  ABDOMEN: Epigastric hernia, mild tenderness with palpation, unable to appreciate fascial defect size due to nonreducible fatty contents      LABS:  Lab Results   Component Value Date    HGB 13.4 03/06/2023    WBC 12.1 (H) 03/06/2023     03/06/2023    AST 30 03/06/2023    ALT 16 03/06/2023    ALKPHOS 87 03/06/2023    BILITOTAL 0.6 03/06/2023     Imaging: CT imaging of the chest abdomen and pelvis obtained in 2019 reviewed, noteworthy for the presence of a fat-containing ventral hernia with a roughly 2 cm fascial defect at that point in time      Assessment/Plan:   1. Ventral hernia without obstruction or gangrene          Rita Mancini is a 73 year old female with signs and symptoms consistent with a ventral hernia.  I have explained the pathophysiology of hernias in detail as well as the surgical versus non-operative management strategies.      The risks of surgery were discussed in detail which include, but are not limited to, bleeding, infection, injury to surrounding structures, the need to convert to an open procedure, and possible recurrence.      She understands everything which was discussed and has consented to proceed with a minimally invasive ventral hernia repair with mesh.  Surgery will be scheduled at a time that works for the patient.      25 minutes spent on the date of the encounter doing chart review, interpretation of tests, patient visit, and documentation    Tyler Rossi MD ,MD  [facility] Department of Surgery      Again, thank you for allowing me to participate in the care of your patient.         Sincerely,        Tyler Rossi MD

## 2024-01-22 DIAGNOSIS — H35.3231 EXUDATIVE AGE-RELATED MACULAR DEGENERATION OF BOTH EYES WITH ACTIVE CHOROIDAL NEOVASCULARIZATION (H): Primary | ICD-10-CM

## 2024-01-23 ENCOUNTER — OFFICE VISIT (OUTPATIENT)
Dept: INTERNAL MEDICINE | Facility: CLINIC | Age: 74
End: 2024-01-23
Payer: MEDICARE

## 2024-01-23 VITALS
WEIGHT: 184.5 LBS | OXYGEN SATURATION: 98 % | HEIGHT: 65 IN | DIASTOLIC BLOOD PRESSURE: 50 MMHG | BODY MASS INDEX: 30.74 KG/M2 | SYSTOLIC BLOOD PRESSURE: 102 MMHG | RESPIRATION RATE: 12 BRPM | TEMPERATURE: 97.9 F

## 2024-01-23 DIAGNOSIS — Z01.818 PRE-OP EVALUATION: Primary | ICD-10-CM

## 2024-01-23 DIAGNOSIS — E11.9 TYPE 2 DIABETES, HBA1C GOAL < 8% (H): ICD-10-CM

## 2024-01-23 DIAGNOSIS — K42.0 UMBILICAL HERNIA WITH OBSTRUCTION: ICD-10-CM

## 2024-01-23 DIAGNOSIS — R05.2 SUBACUTE COUGH: ICD-10-CM

## 2024-01-23 LAB
ANION GAP SERPL CALCULATED.3IONS-SCNC: 13 MMOL/L (ref 7–15)
BUN SERPL-MCNC: 21.6 MG/DL (ref 8–23)
CALCIUM SERPL-MCNC: 9.5 MG/DL (ref 8.8–10.2)
CHLORIDE SERPL-SCNC: 101 MMOL/L (ref 98–107)
CREAT SERPL-MCNC: 1.32 MG/DL (ref 0.51–0.95)
DEPRECATED HCO3 PLAS-SCNC: 25 MMOL/L (ref 22–29)
EGFRCR SERPLBLD CKD-EPI 2021: 42 ML/MIN/1.73M2
ERYTHROCYTE [DISTWIDTH] IN BLOOD BY AUTOMATED COUNT: 12.7 % (ref 10–15)
GLUCOSE SERPL-MCNC: 163 MG/DL (ref 70–99)
HBA1C MFR BLD: 7.1 % (ref 0–5.6)
HCT VFR BLD AUTO: 41.1 % (ref 35–47)
HGB BLD-MCNC: 13.1 G/DL (ref 11.7–15.7)
MCH RBC QN AUTO: 29.6 PG (ref 26.5–33)
MCHC RBC AUTO-ENTMCNC: 31.9 G/DL (ref 31.5–36.5)
MCV RBC AUTO: 93 FL (ref 78–100)
PLATELET # BLD AUTO: 259 10E3/UL (ref 150–450)
POTASSIUM SERPL-SCNC: 4.4 MMOL/L (ref 3.4–5.3)
RBC # BLD AUTO: 4.42 10E6/UL (ref 3.8–5.2)
SODIUM SERPL-SCNC: 139 MMOL/L (ref 135–145)
WBC # BLD AUTO: 10.4 10E3/UL (ref 4–11)

## 2024-01-23 PROCEDURE — 85027 COMPLETE CBC AUTOMATED: CPT | Performed by: INTERNAL MEDICINE

## 2024-01-23 PROCEDURE — 80048 BASIC METABOLIC PNL TOTAL CA: CPT | Performed by: INTERNAL MEDICINE

## 2024-01-23 PROCEDURE — 83036 HEMOGLOBIN GLYCOSYLATED A1C: CPT | Performed by: INTERNAL MEDICINE

## 2024-01-23 PROCEDURE — 36415 COLL VENOUS BLD VENIPUNCTURE: CPT | Performed by: INTERNAL MEDICINE

## 2024-01-23 PROCEDURE — 99214 OFFICE O/P EST MOD 30 MIN: CPT | Performed by: INTERNAL MEDICINE

## 2024-01-23 RX ORDER — CODEINE PHOSPHATE AND GUAIFENESIN 10; 100 MG/5ML; MG/5ML
1-2 SOLUTION ORAL EVERY 4 HOURS PRN
Qty: 237 ML | Refills: 0 | Status: SHIPPED | OUTPATIENT
Start: 2024-01-23 | End: 2024-01-23

## 2024-01-23 RX ORDER — GUAIFENESIN/DEXTROMETHORPHAN 100-10MG/5
10 SYRUP ORAL EVERY 4 HOURS PRN
Qty: 236 ML | Refills: 0 | Status: SHIPPED | OUTPATIENT
Start: 2024-01-23 | End: 2024-04-03

## 2024-01-23 RX ORDER — RESPIRATORY SYNCYTIAL VIRUS VACCINE 120MCG/0.5
0.5 KIT INTRAMUSCULAR ONCE
Qty: 1 EACH | Refills: 0 | Status: CANCELLED | OUTPATIENT
Start: 2024-01-23 | End: 2024-01-23

## 2024-01-23 ASSESSMENT — PATIENT HEALTH QUESTIONNAIRE - PHQ9
SUM OF ALL RESPONSES TO PHQ QUESTIONS 1-9: 2
SUM OF ALL RESPONSES TO PHQ QUESTIONS 1-9: 2
10. IF YOU CHECKED OFF ANY PROBLEMS, HOW DIFFICULT HAVE THESE PROBLEMS MADE IT FOR YOU TO DO YOUR WORK, TAKE CARE OF THINGS AT HOME, OR GET ALONG WITH OTHER PEOPLE: SOMEWHAT DIFFICULT

## 2024-01-23 NOTE — PROGRESS NOTES
Preoperative Evaluation  [facility]  [address] [address]  Phone: [phone]  Fax: [phone]  Primary Provider: Kj Ball  Pre-op Performing Provider: KJ BALL  Jan 23, 2024   Rita is a 73 year old, presenting for the following:  Pre-Op Exam (Hernia repair at [facility], DOS 02/022024) and Discuss medication  (Losartan discontinued in November, patient claiming this was not to be cancelled per Dr. Ball/)        1/23/2024     2:46 PM   Additional Questions   Roomed by KIERAN Edwards     Surgical Information  Surgery/Procedure: Hernia Repair  Surgery Location: [facility]  Surgeon: Tyler Rossi MD  Surgery Date: 02/02/2024  Time of Surgery: TBD   Where patient plans to recover: At home with family- [place] and neighbors will be checking on her.  Fax number for surgical facility: Note does not need to be faxed, will be available electronically in Epic.    Assessment & Plan     The proposed surgical procedure is considered LOW risk.    Pre-op evaluation  She is medically stable.  Her chronic lung condition is not in exacerbation and does not require oxygen therapy.  Her ventral hernia has caused symptoms and the risk and benefit ratio supports surgical repair.  Her diabetes is well controlled without insulin. Her A1c is 7.1 and is satisfactory.  I find no contraindication to her having the surgery and I recommend proceeding as planned.     Umbilical hernia with obstruction      - No identified additional risk factors other than previously addressed    Recommendation  APPROVAL GIVEN to proceed with proposed procedure, without further diagnostic evaluation.    Subjective     HPI related to upcoming procedure: she has had symptoms of pain in there ventral hernia and has consented to having surgical repair. She feels well otherwise.  She does not have unusual dyspnea. She has a chronic non-productive cough that is  mild.  She does not have nausea or emesis or diarrhea and has not been acutely ill.         1/17/2024     8:49 PM   Preop Questions   1. Have you ever had a heart attack or stroke? No   2. Have you ever had surgery on your heart or blood vessels, such as a stent placement, a coronary artery bypass, or surgery on an artery in your head, neck, heart, or legs? No   3. Do you have chest pain with activity? No   4. Do you have a history of  heart failure? No   5. Do you currently have a cold, bronchitis or symptoms of other infection? No   6. Do you have a cough, shortness of breath, or wheezing? YES    7. Do you or anyone in your family have previous history of blood clots? No   8. Do you or does anyone in your family have a serious bleeding problem such as prolonged bleeding following surgeries or cuts? No   9. Have you ever had problems with anemia or been told to take iron pills? YES    10. Have you had any abnormal blood loss such as black, tarry or bloody stools, or abnormal vaginal bleeding? No   11. Have you ever had a blood transfusion? No   12. Are you willing to have a blood transfusion if it is medically needed before, during, or after your surgery? Yes   13. Have you or any of your relatives ever had problems with anesthesia? No   14. Do you have sleep apnea, excessive snoring or daytime drowsiness? YES    14a. Do you have a CPAP machine? Yes   15. Do you have any artifical heart valves or other implanted medical devices like a pacemaker, defibrillator, or continuous glucose monitor? No   16. Do you have artificial joints? No   17. Are you allergic to latex? YES:     Patient Active Problem List    Diagnosis Date Noted    Screening for colon cancer 12/13/2023     Priority: Medium     9/2020 normal colonoscopy, due 9/2030      Bacteriuria, chronic 12/12/2023     Priority: Medium    Hyperlipidemia LDL goal <100 03/06/2023     Priority: Medium    Infection due to 2019 novel coronavirus 03/06/2023     Priority:  Medium    Age-related cataract 12/06/2021     Priority: Medium    History of alcoholism (H)      Priority: Medium    Chronic kidney disease, stage 3 (H) 02/18/2021     Priority: Medium    History of gram negative sepsis 11/19/2020     Priority: Medium    Non-small cell cancer of right lung, s/p radiation and chemotherapies, in remission since [id number] (H)      Priority: Medium     Created by Conversion        Diabetes 1.5, managed as type 2 (H)      Priority: Medium     Created by Conversion        COPD (chronic obstructive pulmonary disease) (H)      Priority: Medium     Created by Conversion        MONSERRAT on CPAP      Priority: Medium     Created by Conversion        Lymph node enlargement 08/08/2019     Priority: Medium     Paratracheal lymph node        Other hydronephrosis 07/30/2019     Priority: Medium    Episode of recurrent major depressive disorder (H24)      Priority: Medium     Created by Conversion        Anemia 05/06/2019     Priority: Medium    Current moderate episode of major depressive disorder (H) 04/24/2019     Priority: Medium    Gastroesophageal reflux disease without esophagitis 04/24/2019     Priority: Medium    Ventral hernia without obstruction or gangrene 02/06/2019     Priority: Medium    Keratosis obturans of external ear canal, right 01/24/2018     Priority: Medium    Eczema of both hands 10/24/2017     Priority: Medium    Diverticulosis      Priority: Medium     Created by Conversion  [facility] Annotation: Feb 24 2011  9:18AM Maritza Gill: Per   Colonoscopy   2/2011.  Replacement Utility updated for latest IMO load        Bronchiectasis without complication (H) 12/01/2015     Priority: Medium    Chronic pleural effusion, left (s/p Talc pleurodesis 2015) 01/15/2015     Priority: Medium      Past Medical History:   Diagnosis Date    Age-related cataract 12/06/2021    Anxiety and depression 01/01/1962    Bigeminy 03/17/2019    Bronchiectasis without complication (H)     COPD (chronic  obstructive pulmonary disease) (H) 01/01/2009    Functional diarrhea 12/31/2018    GERD (gastroesophageal reflux disease)     History of alcoholism (H)     Hx antineoplastic chemotherapy     lung cancer     Hx of radiation therapy     lung cancer     Hydronephrosis     right kidney since 2019    Hyperlipidemia LDL goal <100 03/06/2023    Hypothyroid     Infection due to 2019 novel coronavirus 03/06/2023    Lung cancer (H) 01/01/2008    RUL,  Non small cell cancer    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    Osteopenia     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, subsequent encounter 04/08/2019    Pleural effusion 01/01/2015    Sepsis due to Escherichia coli with acute renal failure without septic shock, unspecified acute renal failure type (H)     Sleep apnea 01/01/2010    does not use cpap    Type 2 diabetes, HbA1C goal < 8% (H)      Past Surgical History:   Procedure Laterality Date    CATARACT IOL, RT/LT Bilateral 12/2021    IR MISCELLANEOUS PROCEDURE  12/10/2009    PORTACATH PLACEMENT      and removal    THORACOSCOPY Right 04/06/2015    Procedure: RIGHT THORACOSCOPY / BIOPSY PLEURAL / TALC PLEURODESIS;  Surgeon: Michi Ma MD;  Location: [facility];  Service:     THORACOSCOPY Left 03/29/2019    Procedure: LEFT THORACOSCOPY WITH DECORTICATION;  Surgeon: Michi Ma MD;  Location: [facility];  Service: General    TONSILLECTOMY  age 5    URETERAL STENT PLACEMENT Right 06/01/2010     THORACENTESIS  03/27/2019     Current Outpatient Medications   Medication Sig Dispense Refill    aspirin 81 MG EC tablet Take 81 mg by mouth daily      blood glucose (NO BRAND SPECIFIED) test strip Use to test blood sugar one time daily or as directed. 100 strip 3    calcium carbonate (TUMS) 500 MG chewable tablet Take 1 chew tab by mouth daily      cetirizine (ZYRTEC) 10 MG CHEW Take 10 mg by mouth daily      citalopram (CELEXA) 10 MG tablet Take 1 tablet (10  mg) by mouth daily 90 tablet 2    Dulaglutide (TRULICITY) 3 MG/0.5ML SOPN Inject 3 mg Subcutaneous once a week 6 mL 3    famotidine (PEPCID) 20 MG tablet Take 1 tablet (20 mg) by mouth daily 90 tablet 2    fluticasone (FLONASE) 50 MCG/ACT nasal spray Spray 1 spray into both nostrils 2 times daily 16 g 3    levothyroxine (SYNTHROID/LEVOTHROID) 50 MCG tablet TAKE ON EMPTY STOMACH EVERY MONDAY AND FRIDAY (SEE OTHER DOSE FOR REMAINING DAYS OF WEEK) 24 tablet 3    levothyroxine (SYNTHROID/LEVOTHROID) 75 MCG tablet TAKE 1 TAB BY MOUTH ON TUE WED THR SAT SUN 60 tablet 3    magnesium 500 MG TABS       Melatonin 10 MG CAPS       montelukast (SINGULAIR) 10 MG tablet Take 1 tablet (10 mg) by mouth At Bedtime 90 tablet 3    multivitamin w/minerals (THERA-VIT-M) tablet Take 1 tablet by mouth daily      PULMICORT FLEXHALER 180 MCG/ACT inhaler INHALE 2 PUFFS BY MOUTH TWICE A DAY 9 each 3    simvastatin (ZOCOR) 10 MG tablet Take 1 tablet (10 mg) by mouth At Bedtime 90 tablet 2    triamcinolone (KENALOG) 0.1 % external cream Apply topically 2 times daily      umeclidinium-vilanterol (ANORO ELLIPTA) 62.5-25 MCG/ACT oral inhaler INHALE 1 PUFF BY MOUTH EVERY  each 0    UNKNOWN TO PATIENT Eye vitamin      estradiol (ESTRACE) 0.1 MG/GM vaginal cream Place 0.5 g vaginally twice a week (Patient not taking: Reported on 1/23/2024)       Allergies   Allergen Reactions    Seasonal Allergies       Social History     Tobacco Use    Smoking status: Former     Packs/day: 1.50     Years: 0.00     Additional pack years: 0.00     Total pack years: 0.00     Types: Cigarettes     Quit date: 11/9/2008     Years since quitting: 15.2     Passive exposure: Never    Smokeless tobacco: Never   Substance Use Topics    Alcohol use: No     Family History   Problem Relation Age of Onset    Heart Disease Mother     Hypertension Mother     Alcoholism Mother     Depression Mother     Heart Disease Father 45        mi age 45, cabg    Coronary Artery Disease  "Father     Cancer Father         prostate    Hypertension Father     Snoring Father     Chronic Obstructive Pulmonary Disease Brother     Alcoholism Brother     Alcoholism Sister     Diabetes Son     Anxiety Disorder Son     Depression Son     Post-Traumatic Stress Disorder (PTSD) Son     Obesity Daughter     Obesity Daughter     Cancer Maternal Aunt 47        breast    Breast Cancer Paternal Aunt     Leukemia Grandchild     Schizophrenia Grandchild     Lymphoma Grandchild     Glaucoma No family hx of     Macular Degeneration No family hx of      History   Drug Use No     Review of Systems  See HPI    Objective      PHYSICAL EXAM:  General Appearance: In no acute distress  Vitals:    01/23/24 1453   BP: 102/50   BP Location: Left arm   Patient Position: Sitting   Cuff Size: Adult Regular   Resp: 12   Temp: 97.9  F (36.6  C)   SpO2: 98%   Weight: 83.7 kg (184 lb 8 oz)   Height: 1.651 m (5' 5\")     Estimated body mass index is 30.7 kg/m  as calculated from the following:    Height as of this encounter: 1.651 m (5' 5\").    Weight as of this encounter: 83.7 kg (184 lb 8 oz).  EYES: Clear, fundi are unremarkable   RESPIRATORY: Clear   CARDIOVASCULAR: S1, S2  ABDOMEN: soft, flat, and non-tender  EXTREMITIES:  no significant inflammation or edema  NEUROLOGIC: Speech is clear,  gait is normal  PSYCHIATRIC: Oriented X 3,  thinking is clear     Current Outpatient Medications   Medication Sig Dispense Refill    aspirin 81 MG EC tablet Take 81 mg by mouth daily      blood glucose (NO BRAND SPECIFIED) test strip Use to test blood sugar one time daily or as directed. 100 strip 3    calcium carbonate (TUMS) 500 MG chewable tablet Take 1 chew tab by mouth daily      cetirizine (ZYRTEC) 10 MG CHEW Take 10 mg by mouth daily      citalopram (CELEXA) 10 MG tablet Take 1 tablet (10 mg) by mouth daily 90 tablet 2    Dulaglutide (TRULICITY) 3 MG/0.5ML SOPN Inject 3 mg Subcutaneous once a week 6 mL 3    famotidine (PEPCID) 20 MG tablet " Take 1 tablet (20 mg) by mouth daily 90 tablet 2    fluticasone (FLONASE) 50 MCG/ACT nasal spray Spray 1 spray into both nostrils 2 times daily 16 g 3    levothyroxine (SYNTHROID/LEVOTHROID) 50 MCG tablet TAKE ON EMPTY STOMACH EVERY MONDAY AND FRIDAY (SEE OTHER DOSE FOR REMAINING DAYS OF WEEK) 24 tablet 3    levothyroxine (SYNTHROID/LEVOTHROID) 75 MCG tablet TAKE 1 TAB BY MOUTH ON TUE WED THR SAT SUN 60 tablet 3    magnesium 500 MG TABS       Melatonin 10 MG CAPS       montelukast (SINGULAIR) 10 MG tablet Take 1 tablet (10 mg) by mouth At Bedtime 90 tablet 3    multivitamin w/minerals (THERA-VIT-M) tablet Take 1 tablet by mouth daily      PULMICORT FLEXHALER 180 MCG/ACT inhaler INHALE 2 PUFFS BY MOUTH TWICE A DAY 9 each 3    simvastatin (ZOCOR) 10 MG tablet Take 1 tablet (10 mg) by mouth At Bedtime 90 tablet 2    triamcinolone (KENALOG) 0.1 % external cream Apply topically 2 times daily      umeclidinium-vilanterol (ANORO ELLIPTA) 62.5-25 MCG/ACT oral inhaler INHALE 1 PUFF BY MOUTH EVERY  each 0    UNKNOWN TO PATIENT Eye vitamin      estradiol (ESTRACE) 0.1 MG/GM vaginal cream Place 0.5 g vaginally twice a week (Patient not taking: Reported on 1/23/2024)        Diagnostics  Recent Results (from the past 48 hour(s))   CBC with platelets    Collection Time: 01/23/24  3:39 PM   Result Value Ref Range    WBC Count 10.4 4.0 - 11.0 10e3/uL    RBC Count 4.42 3.80 - 5.20 10e6/uL    Hemoglobin 13.1 11.7 - 15.7 g/dL    Hematocrit 41.1 35.0 - 47.0 %    MCV 93 78 - 100 fL    MCH 29.6 26.5 - 33.0 pg    MCHC 31.9 31.5 - 36.5 g/dL    RDW 12.7 10.0 - 15.0 %    Platelet Count 259 150 - 450 10e3/uL   Basic metabolic panel  (Ca, Cl, CO2, Creat, Gluc, K, Na, BUN)    Collection Time: 01/23/24  3:39 PM   Result Value Ref Range    Sodium 139 135 - 145 mmol/L    Potassium 4.4 3.4 - 5.3 mmol/L    Chloride 101 98 - 107 mmol/L    Carbon Dioxide (CO2) 25 22 - 29 mmol/L    Anion Gap 13 7 - 15 mmol/L    Urea Nitrogen 21.6 8.0 - 23.0 mg/dL     Creatinine 1.32 (H) 0.51 - 0.95 mg/dL    GFR Estimate 42 (L) >60 mL/min/1.73m2    Calcium 9.5 8.8 - 10.2 mg/dL    Glucose 163 (H) 70 - 99 mg/dL   Hemoglobin A1c    Collection Time: 01/23/24  3:39 PM   Result Value Ref Range    Hemoglobin A1C 7.1 (H) 0.0 - 5.6 %      Revised Cardiac Risk Index (RCRI)  The patient has the following serious cardiovascular risks for perioperative complications:   - No serious cardiac risks = 0 points     RCRI Interpretation: 0 points: Class I (very low risk - 0.4% complication rate)    Signed Electronically by: Michi Murillo MD  Copy of this evaluation report is provided to requesting physician.

## 2024-01-23 NOTE — H&P (VIEW-ONLY)
Preoperative Evaluation  [facility]  [address] [address]  Phone: [phone]  Fax: [phone]  Primary Provider: Kj Ball  Pre-op Performing Provider: KJ BALL  Jan 23, 2024   Rita is a 73 year old, presenting for the following:  Pre-Op Exam (Hernia repair at [facility], DOS 02/022024) and Discuss medication  (Losartan discontinued in November, patient claiming this was not to be cancelled per Dr. Ball/)        1/23/2024     2:46 PM   Additional Questions   Roomed by KIERAN Edwards     Surgical Information  Surgery/Procedure: Hernia Repair  Surgery Location: [facility]  Surgeon: Tyler Rossi MD  Surgery Date: 02/02/2024  Time of Surgery: TBD   Where patient plans to recover: At home with family- [place] and neighbors will be checking on her.  Fax number for surgical facility: Note does not need to be faxed, will be available electronically in Epic.    Assessment & Plan     The proposed surgical procedure is considered LOW risk.    Pre-op evaluation  She is medically stable.  Her chronic lung condition is not in exacerbation and does not require oxygen therapy.  Her ventral hernia has caused symptoms and the risk and benefit ratio supports surgical repair.  Her diabetes is well controlled without insulin. Her A1c is 7.1 and is satisfactory.  I find no contraindication to her having the surgery and I recommend proceeding as planned.     Umbilical hernia with obstruction      - No identified additional risk factors other than previously addressed    Recommendation  APPROVAL GIVEN to proceed with proposed procedure, without further diagnostic evaluation.    Subjective     HPI related to upcoming procedure: she has had symptoms of pain in there ventral hernia and has consented to having surgical repair. She feels well otherwise.  She does not have unusual dyspnea. She has a chronic non-productive cough that is  mild.  She does not have nausea or emesis or diarrhea and has not been acutely ill.         1/17/2024     8:49 PM   Preop Questions   1. Have you ever had a heart attack or stroke? No   2. Have you ever had surgery on your heart or blood vessels, such as a stent placement, a coronary artery bypass, or surgery on an artery in your head, neck, heart, or legs? No   3. Do you have chest pain with activity? No   4. Do you have a history of  heart failure? No   5. Do you currently have a cold, bronchitis or symptoms of other infection? No   6. Do you have a cough, shortness of breath, or wheezing? YES    7. Do you or anyone in your family have previous history of blood clots? No   8. Do you or does anyone in your family have a serious bleeding problem such as prolonged bleeding following surgeries or cuts? No   9. Have you ever had problems with anemia or been told to take iron pills? YES    10. Have you had any abnormal blood loss such as black, tarry or bloody stools, or abnormal vaginal bleeding? No   11. Have you ever had a blood transfusion? No   12. Are you willing to have a blood transfusion if it is medically needed before, during, or after your surgery? Yes   13. Have you or any of your relatives ever had problems with anesthesia? No   14. Do you have sleep apnea, excessive snoring or daytime drowsiness? YES    14a. Do you have a CPAP machine? Yes   15. Do you have any artifical heart valves or other implanted medical devices like a pacemaker, defibrillator, or continuous glucose monitor? No   16. Do you have artificial joints? No   17. Are you allergic to latex? YES:     Patient Active Problem List    Diagnosis Date Noted    Screening for colon cancer 12/13/2023     Priority: Medium     9/2020 normal colonoscopy, due 9/2030      Bacteriuria, chronic 12/12/2023     Priority: Medium    Hyperlipidemia LDL goal <100 03/06/2023     Priority: Medium    Infection due to 2019 novel coronavirus 03/06/2023     Priority:  Medium    Age-related cataract 12/06/2021     Priority: Medium    History of alcoholism (H)      Priority: Medium    Chronic kidney disease, stage 3 (H) 02/18/2021     Priority: Medium    History of gram negative sepsis 11/19/2020     Priority: Medium    Non-small cell cancer of right lung, s/p radiation and chemotherapies, in remission since [id number] (H)      Priority: Medium     Created by Conversion        Diabetes 1.5, managed as type 2 (H)      Priority: Medium     Created by Conversion        COPD (chronic obstructive pulmonary disease) (H)      Priority: Medium     Created by Conversion        MONSERRAT on CPAP      Priority: Medium     Created by Conversion        Lymph node enlargement 08/08/2019     Priority: Medium     Paratracheal lymph node        Other hydronephrosis 07/30/2019     Priority: Medium    Episode of recurrent major depressive disorder (H24)      Priority: Medium     Created by Conversion        Anemia 05/06/2019     Priority: Medium    Current moderate episode of major depressive disorder (H) 04/24/2019     Priority: Medium    Gastroesophageal reflux disease without esophagitis 04/24/2019     Priority: Medium    Ventral hernia without obstruction or gangrene 02/06/2019     Priority: Medium    Keratosis obturans of external ear canal, right 01/24/2018     Priority: Medium    Eczema of both hands 10/24/2017     Priority: Medium    Diverticulosis      Priority: Medium     Created by Conversion  [facility] Annotation: Feb 24 2011  9:18AM Maritza Gill: Per   Colonoscopy   2/2011.  Replacement Utility updated for latest IMO load        Bronchiectasis without complication (H) 12/01/2015     Priority: Medium    Chronic pleural effusion, left (s/p Talc pleurodesis 2015) 01/15/2015     Priority: Medium      Past Medical History:   Diagnosis Date    Age-related cataract 12/06/2021    Anxiety and depression 01/01/1962    Bigeminy 03/17/2019    Bronchiectasis without complication (H)     COPD (chronic  obstructive pulmonary disease) (H) 01/01/2009    Functional diarrhea 12/31/2018    GERD (gastroesophageal reflux disease)     History of alcoholism (H)     Hx antineoplastic chemotherapy     lung cancer     Hx of radiation therapy     lung cancer     Hydronephrosis     right kidney since 2019    Hyperlipidemia LDL goal <100 03/06/2023    Hypothyroid     Infection due to 2019 novel coronavirus 03/06/2023    Lung cancer (H) 01/01/2008    RUL,  Non small cell cancer    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    Osteopenia     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, subsequent encounter 04/08/2019    Pleural effusion 01/01/2015    Sepsis due to Escherichia coli with acute renal failure without septic shock, unspecified acute renal failure type (H)     Sleep apnea 01/01/2010    does not use cpap    Type 2 diabetes, HbA1C goal < 8% (H)      Past Surgical History:   Procedure Laterality Date    CATARACT IOL, RT/LT Bilateral 12/2021    IR MISCELLANEOUS PROCEDURE  12/10/2009    PORTACATH PLACEMENT      and removal    THORACOSCOPY Right 04/06/2015    Procedure: RIGHT THORACOSCOPY / BIOPSY PLEURAL / TALC PLEURODESIS;  Surgeon: Michi Ma MD;  Location: [facility];  Service:     THORACOSCOPY Left 03/29/2019    Procedure: LEFT THORACOSCOPY WITH DECORTICATION;  Surgeon: Michi Ma MD;  Location: [facility];  Service: General    TONSILLECTOMY  age 5    URETERAL STENT PLACEMENT Right 06/01/2010     THORACENTESIS  03/27/2019     Current Outpatient Medications   Medication Sig Dispense Refill    aspirin 81 MG EC tablet Take 81 mg by mouth daily      blood glucose (NO BRAND SPECIFIED) test strip Use to test blood sugar one time daily or as directed. 100 strip 3    calcium carbonate (TUMS) 500 MG chewable tablet Take 1 chew tab by mouth daily      cetirizine (ZYRTEC) 10 MG CHEW Take 10 mg by mouth daily      citalopram (CELEXA) 10 MG tablet Take 1 tablet (10  mg) by mouth daily 90 tablet 2    Dulaglutide (TRULICITY) 3 MG/0.5ML SOPN Inject 3 mg Subcutaneous once a week 6 mL 3    famotidine (PEPCID) 20 MG tablet Take 1 tablet (20 mg) by mouth daily 90 tablet 2    fluticasone (FLONASE) 50 MCG/ACT nasal spray Spray 1 spray into both nostrils 2 times daily 16 g 3    levothyroxine (SYNTHROID/LEVOTHROID) 50 MCG tablet TAKE ON EMPTY STOMACH EVERY MONDAY AND FRIDAY (SEE OTHER DOSE FOR REMAINING DAYS OF WEEK) 24 tablet 3    levothyroxine (SYNTHROID/LEVOTHROID) 75 MCG tablet TAKE 1 TAB BY MOUTH ON TUE WED THR SAT SUN 60 tablet 3    magnesium 500 MG TABS       Melatonin 10 MG CAPS       montelukast (SINGULAIR) 10 MG tablet Take 1 tablet (10 mg) by mouth At Bedtime 90 tablet 3    multivitamin w/minerals (THERA-VIT-M) tablet Take 1 tablet by mouth daily      PULMICORT FLEXHALER 180 MCG/ACT inhaler INHALE 2 PUFFS BY MOUTH TWICE A DAY 9 each 3    simvastatin (ZOCOR) 10 MG tablet Take 1 tablet (10 mg) by mouth At Bedtime 90 tablet 2    triamcinolone (KENALOG) 0.1 % external cream Apply topically 2 times daily      umeclidinium-vilanterol (ANORO ELLIPTA) 62.5-25 MCG/ACT oral inhaler INHALE 1 PUFF BY MOUTH EVERY  each 0    UNKNOWN TO PATIENT Eye vitamin      estradiol (ESTRACE) 0.1 MG/GM vaginal cream Place 0.5 g vaginally twice a week (Patient not taking: Reported on 1/23/2024)       Allergies   Allergen Reactions    Seasonal Allergies       Social History     Tobacco Use    Smoking status: Former     Packs/day: 1.50     Years: 0.00     Additional pack years: 0.00     Total pack years: 0.00     Types: Cigarettes     Quit date: 11/9/2008     Years since quitting: 15.2     Passive exposure: Never    Smokeless tobacco: Never   Substance Use Topics    Alcohol use: No     Family History   Problem Relation Age of Onset    Heart Disease Mother     Hypertension Mother     Alcoholism Mother     Depression Mother     Heart Disease Father 45        mi age 45, cabg    Coronary Artery Disease  "Father     Cancer Father         prostate    Hypertension Father     Snoring Father     Chronic Obstructive Pulmonary Disease Brother     Alcoholism Brother     Alcoholism Sister     Diabetes Son     Anxiety Disorder Son     Depression Son     Post-Traumatic Stress Disorder (PTSD) Son     Obesity Daughter     Obesity Daughter     Cancer Maternal Aunt 47        breast    Breast Cancer Paternal Aunt     Leukemia Grandchild     Schizophrenia Grandchild     Lymphoma Grandchild     Glaucoma No family hx of     Macular Degeneration No family hx of      History   Drug Use No     Review of Systems  See HPI    Objective      PHYSICAL EXAM:  General Appearance: In no acute distress  Vitals:    01/23/24 1453   BP: 102/50   BP Location: Left arm   Patient Position: Sitting   Cuff Size: Adult Regular   Resp: 12   Temp: 97.9  F (36.6  C)   SpO2: 98%   Weight: 83.7 kg (184 lb 8 oz)   Height: 1.651 m (5' 5\")     Estimated body mass index is 30.7 kg/m  as calculated from the following:    Height as of this encounter: 1.651 m (5' 5\").    Weight as of this encounter: 83.7 kg (184 lb 8 oz).  EYES: Clear, fundi are unremarkable   RESPIRATORY: Clear   CARDIOVASCULAR: S1, S2  ABDOMEN: soft, flat, and non-tender  EXTREMITIES:  no significant inflammation or edema  NEUROLOGIC: Speech is clear,  gait is normal  PSYCHIATRIC: Oriented X 3,  thinking is clear     Current Outpatient Medications   Medication Sig Dispense Refill    aspirin 81 MG EC tablet Take 81 mg by mouth daily      blood glucose (NO BRAND SPECIFIED) test strip Use to test blood sugar one time daily or as directed. 100 strip 3    calcium carbonate (TUMS) 500 MG chewable tablet Take 1 chew tab by mouth daily      cetirizine (ZYRTEC) 10 MG CHEW Take 10 mg by mouth daily      citalopram (CELEXA) 10 MG tablet Take 1 tablet (10 mg) by mouth daily 90 tablet 2    Dulaglutide (TRULICITY) 3 MG/0.5ML SOPN Inject 3 mg Subcutaneous once a week 6 mL 3    famotidine (PEPCID) 20 MG tablet " Take 1 tablet (20 mg) by mouth daily 90 tablet 2    fluticasone (FLONASE) 50 MCG/ACT nasal spray Spray 1 spray into both nostrils 2 times daily 16 g 3    levothyroxine (SYNTHROID/LEVOTHROID) 50 MCG tablet TAKE ON EMPTY STOMACH EVERY MONDAY AND FRIDAY (SEE OTHER DOSE FOR REMAINING DAYS OF WEEK) 24 tablet 3    levothyroxine (SYNTHROID/LEVOTHROID) 75 MCG tablet TAKE 1 TAB BY MOUTH ON TUE WED THR SAT SUN 60 tablet 3    magnesium 500 MG TABS       Melatonin 10 MG CAPS       montelukast (SINGULAIR) 10 MG tablet Take 1 tablet (10 mg) by mouth At Bedtime 90 tablet 3    multivitamin w/minerals (THERA-VIT-M) tablet Take 1 tablet by mouth daily      PULMICORT FLEXHALER 180 MCG/ACT inhaler INHALE 2 PUFFS BY MOUTH TWICE A DAY 9 each 3    simvastatin (ZOCOR) 10 MG tablet Take 1 tablet (10 mg) by mouth At Bedtime 90 tablet 2    triamcinolone (KENALOG) 0.1 % external cream Apply topically 2 times daily      umeclidinium-vilanterol (ANORO ELLIPTA) 62.5-25 MCG/ACT oral inhaler INHALE 1 PUFF BY MOUTH EVERY  each 0    UNKNOWN TO PATIENT Eye vitamin      estradiol (ESTRACE) 0.1 MG/GM vaginal cream Place 0.5 g vaginally twice a week (Patient not taking: Reported on 1/23/2024)        Diagnostics  Recent Results (from the past 48 hour(s))   CBC with platelets    Collection Time: 01/23/24  3:39 PM   Result Value Ref Range    WBC Count 10.4 4.0 - 11.0 10e3/uL    RBC Count 4.42 3.80 - 5.20 10e6/uL    Hemoglobin 13.1 11.7 - 15.7 g/dL    Hematocrit 41.1 35.0 - 47.0 %    MCV 93 78 - 100 fL    MCH 29.6 26.5 - 33.0 pg    MCHC 31.9 31.5 - 36.5 g/dL    RDW 12.7 10.0 - 15.0 %    Platelet Count 259 150 - 450 10e3/uL   Basic metabolic panel  (Ca, Cl, CO2, Creat, Gluc, K, Na, BUN)    Collection Time: 01/23/24  3:39 PM   Result Value Ref Range    Sodium 139 135 - 145 mmol/L    Potassium 4.4 3.4 - 5.3 mmol/L    Chloride 101 98 - 107 mmol/L    Carbon Dioxide (CO2) 25 22 - 29 mmol/L    Anion Gap 13 7 - 15 mmol/L    Urea Nitrogen 21.6 8.0 - 23.0 mg/dL     Creatinine 1.32 (H) 0.51 - 0.95 mg/dL    GFR Estimate 42 (L) >60 mL/min/1.73m2    Calcium 9.5 8.8 - 10.2 mg/dL    Glucose 163 (H) 70 - 99 mg/dL   Hemoglobin A1c    Collection Time: 01/23/24  3:39 PM   Result Value Ref Range    Hemoglobin A1C 7.1 (H) 0.0 - 5.6 %      Revised Cardiac Risk Index (RCRI)  The patient has the following serious cardiovascular risks for perioperative complications:   - No serious cardiac risks = 0 points     RCRI Interpretation: 0 points: Class I (very low risk - 0.4% complication rate)    Signed Electronically by: Michi Murillo MD  Copy of this evaluation report is provided to requesting physician.

## 2024-01-26 ENCOUNTER — TELEPHONE (OUTPATIENT)
Dept: SURGERY | Facility: CLINIC | Age: 74
End: 2024-01-26
Payer: MEDICARE

## 2024-01-26 NOTE — TELEPHONE ENCOUNTER
Received message from Dr Rossi that we need to move patient case off 2/2 as scheduled to a different day. Attempted to get patient scheduled on 1/31 per Dr Rossi okay.  Patient unable to make the 31st work in her calendar due to other appts.  Discussed other options with patient and settled on 2/16/2024.  New letter sent via [employer].

## 2024-01-26 NOTE — LETTER
Pre-op Physical: 1/23/2024 at 2:40 w/ Dr Byrd    Surgery Date: 2/16/2024     Location: [place] [address]    Approximate Arrival Time: 11:00am  (Unless instructed differently by the pre-op call nurse)     Pre-Surgical Tasks:     Review all medications with your primary care or prescribing physician; they will advise you which meds to stop and when, and when you can resume taking.  Certain medications like blood thinners and weight loss medications need to be stopped in advance of surgery to proceed safely.      Blood thinners including but not exclusive to drugs like Xarelto, Eliquis, Warfarin and Aspirin, should be stopped five days before surgery, if your prescribing provider agrees. Follow your provider's advice on stopping blood thinners because they know you best.  If you are unsure if your medication is a blood thinner, ask your prescribing provider.    Weight loss medications: There are multiple medications being used for weight management and diabetes today, and the list is growing.  Phentermine, Ozempic, Wegovy, Trulicity, and other similar medications need to be stopped one week before surgery to avoid being cancelled.  Victoza and Saxenda can be continued longer but must be stopped one full day before surgery.  Please ask your prescribing provider for advice.    Diabetic medications: in addition to the medications talked about above that are used for either weight loss or diabetes, some people are on insulin that may require adjustment.  Please discuss managing diabetic medications with your prescribing doctor as these medications may require modification prior to surgery.     Please shower the evening before and morning of surgery with Hibiclens soap.  Any [facility] can provide this to you at no cost, or it can be found at your local pharmacy.     Fasting instructions will be provided by the pre-op nurse who will call you 1-3 days  before surgery.  Typically, we advise normal food up to 8 hours before you arrive for surgery. Clear liquids only from then until 2 hours before you arrive surgery, then nothing at all by mouth.  The nurse will review your specific instructions with you at the call.      Smoking impacts your body's ability to heal properly so we advise patients to quit if possible before surgery.  Plastic Surgery patients are required to be nicotine free for at least 8 weeks before surgery.      You will need an adult to drive you home and stay with you 24 hours after surgery. Public transportation or Medical Van Services are not permitted.    Visitor restrictions are subject to change, please verify with the pre-op nurse when they call how many people are permitted to accompany you.    We always encourage you to notify your insurance any time you have medical tests or procedures scheduled including surgery. The number is usually right on the back of your insurance card. To obtain pricing for surgery, please call  [employer] Cost of Care at [phone] or email [email or web address].        Call our office if you have any questions! Thank you!      Jarred Hurley  Complex   [facility]  [phone]

## 2024-01-31 ENCOUNTER — OFFICE VISIT (OUTPATIENT)
Dept: OPHTHALMOLOGY | Facility: CLINIC | Age: 74
End: 2024-01-31
Attending: OPHTHALMOLOGY
Payer: MEDICARE

## 2024-01-31 DIAGNOSIS — Z96.1 PSEUDOPHAKIA OF BOTH EYES: ICD-10-CM

## 2024-01-31 DIAGNOSIS — H43.813 PVD (POSTERIOR VITREOUS DETACHMENT), BILATERAL: ICD-10-CM

## 2024-01-31 DIAGNOSIS — H04.123 DRY EYE SYNDROME OF BOTH EYES: ICD-10-CM

## 2024-01-31 DIAGNOSIS — H35.3231 EXUDATIVE AGE-RELATED MACULAR DEGENERATION OF BOTH EYES WITH ACTIVE CHOROIDAL NEOVASCULARIZATION (H): Primary | ICD-10-CM

## 2024-01-31 PROCEDURE — 99207 FUNDUS PHOTOS OU (BOTH EYES): CPT | Mod: 26 | Performed by: OPHTHALMOLOGY

## 2024-01-31 PROCEDURE — 250N000011 HC RX IP 250 OP 636: Mod: JZ | Performed by: OPHTHALMOLOGY

## 2024-01-31 PROCEDURE — G0463 HOSPITAL OUTPT CLINIC VISIT: HCPCS | Mod: 25 | Performed by: OPHTHALMOLOGY

## 2024-01-31 PROCEDURE — 92134 CPTRZ OPH DX IMG PST SGM RTA: CPT | Performed by: OPHTHALMOLOGY

## 2024-01-31 PROCEDURE — 67028 INJECTION EYE DRUG: CPT | Mod: LT | Performed by: OPHTHALMOLOGY

## 2024-01-31 PROCEDURE — 99214 OFFICE O/P EST MOD 30 MIN: CPT | Mod: 25 | Performed by: OPHTHALMOLOGY

## 2024-01-31 PROCEDURE — 92250 FUNDUS PHOTOGRAPHY W/I&R: CPT | Performed by: OPHTHALMOLOGY

## 2024-01-31 RX ADMIN — FARICIMAB 6 MG: 6 INJECTION, SOLUTION INTRAVITREAL at 14:53

## 2024-01-31 ASSESSMENT — VISUAL ACUITY
OD_SC+: -3
METHOD: SNELLEN - LINEAR
OS_PH_SC+: -2
OS_SC+: -1
OS_SC: 20/40
OS_PH_SC: 20/30
OD_SC: 20/25

## 2024-01-31 ASSESSMENT — REFRACTION_WEARINGRX
OD_CYLINDER: +0.75
OD_AXIS: 010
OD_ADD: +2.50
OS_AXIS: 175
OS_CYLINDER: +0.25
OS_SPHERE: +0.50
OS_ADD: +2.50
OD_SPHERE: PLANO

## 2024-01-31 ASSESSMENT — TONOMETRY
OS_IOP_MMHG: 13
IOP_METHOD: TONOPEN
OD_IOP_MMHG: 13

## 2024-01-31 ASSESSMENT — SLIT LAMP EXAM - LIDS
COMMENTS: NORMAL
COMMENTS: NORMAL

## 2024-01-31 ASSESSMENT — EXTERNAL EXAM - LEFT EYE: OS_EXAM: NORMAL

## 2024-01-31 ASSESSMENT — EXTERNAL EXAM - RIGHT EYE: OD_EXAM: NORMAL

## 2024-01-31 NOTE — NURSING NOTE
Chief Complaints and History of Present Illnesses   Patient presents with    Macular Degeneration Follow Up     Follow up for macular degeneration.     Chief Complaint(s) and History of Present Illness(es)       Macular Degeneration Follow Up              Laterality: both eyes    Associated symptoms: Negative for flashes and floaters    Treatments tried: no treatments    Pain scale: 0/10    Comments: Follow up for macular degeneration.              Comments    The patient reports stable vision.    The patient reports upcoming hernia repair on February 16th.    MICHELLE Barraza, COA 1:26 PM 01/31/2024

## 2024-01-31 NOTE — PROGRESS NOTES
CC -  Macular degeneration     INTERVAL HISTORY - Pt states no vision changes since last visit.   No flashes or floaters. Pt has been forgetting to use [employer]     HPI -   Rita Mancini is a  73 year old year-old patient presenting for evaluation for macular degeneration. Was referred by her cataract surgeon for AMD. Saw Dr. KRISTIE Duarte ~6 months ago. 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 Observation recommended  No current smoking   No known FH of AMD, glaucoma     PAST OCULAR SURGERY  CE IOL each eye 2021    RETINAL IMAGING:  OCT 1/31/24:   Right eye: Normal foveal contour, no intraretinal or subretinal fluid. Pachychoroid.  Left eye:  SubRPE scar with resolved subretinal fluid, SIRE present -stable    ASSESSMENT & PLAN      # Right eye early dry AMD  - with just a few small drusen; see below    # Left eye advanced exudative AMD   - [facility] PED with SIRE and increased SRF; vision stable  - pachychoroid +  S/P Avastin injection left eye last injection 8/2/23 (10 weeks)  1/31/24: resovled SRF  Plan for Vabysmo today and RTC 9 weeks for OCT/ possible Vabysmo left eye    AREDS II supplements  Amsler grid education given     # pseudophakia each eye  Observe    # dry eye each eye   ATs as  Needed      Dispo:  RTC 9 w for OCT left eye; vabaysmo left eye       Complete documentation of historical and exam elements from today's encounter can be found in the full encounter summary report (not reduplicated in this progress note). I personally obtained the chief complaint(s) and history of present illness.  I confirmed and edited as necessary the review of systems, past medical/surgical history, family history, social history, and examination findings as documented by others; and I examined the patient myself. I personally reviewed the relevant tests, images, and reports as documented above. I formulated and edited as necessary the assessment and plan and discussed the findings and  management plan with the patient and family.     Malvin Rizzo MD

## 2024-02-05 DIAGNOSIS — E03.9 ACQUIRED HYPOTHYROIDISM: ICD-10-CM

## 2024-02-07 RX ORDER — LEVOTHYROXINE SODIUM 75 UG/1
TABLET ORAL
Qty: 60 TABLET | Refills: 1 | Status: SHIPPED | OUTPATIENT
Start: 2024-02-07 | End: 2024-07-23

## 2024-02-16 ENCOUNTER — HOSPITAL ENCOUNTER (INPATIENT)
Facility: HOSPITAL | Age: 74
LOS: 1 days | Discharge: HOME | DRG: 354 | End: 2024-02-16
Attending: SURGERY | Admitting: SURGERY
Payer: MEDICARE

## 2024-02-16 ENCOUNTER — ANESTHESIA (OUTPATIENT)
Dept: SURGERY | Facility: HOSPITAL | Age: 74
DRG: 354 | End: 2024-02-16
Payer: MEDICARE

## 2024-02-16 ENCOUNTER — ANESTHESIA EVENT (OUTPATIENT)
Dept: SURGERY | Facility: HOSPITAL | Age: 74
DRG: 354 | End: 2024-02-16
Payer: MEDICARE

## 2024-02-16 VITALS
RESPIRATION RATE: 18 BRPM | HEIGHT: 65 IN | SYSTOLIC BLOOD PRESSURE: 129 MMHG | TEMPERATURE: 97.5 F | DIASTOLIC BLOOD PRESSURE: 62 MMHG | BODY MASS INDEX: 29.99 KG/M2 | OXYGEN SATURATION: 94 % | HEART RATE: 67 BPM | WEIGHT: 180 LBS

## 2024-02-16 DIAGNOSIS — K43.9 VENTRAL HERNIA WITHOUT OBSTRUCTION OR GANGRENE: Primary | ICD-10-CM

## 2024-02-16 LAB
GLUCOSE BLDC GLUCOMTR-MCNC: 136 MG/DL (ref 70–99)
GLUCOSE BLDC GLUCOMTR-MCNC: 147 MG/DL (ref 70–99)

## 2024-02-16 PROCEDURE — 710N000010 HC RECOVERY PHASE 1, LEVEL 2, PER MIN: Performed by: SURGERY

## 2024-02-16 PROCEDURE — 250N000009 HC RX 250: Performed by: NURSE ANESTHETIST, CERTIFIED REGISTERED

## 2024-02-16 PROCEDURE — 250N000011 HC RX IP 250 OP 636: Performed by: ANESTHESIOLOGY

## 2024-02-16 PROCEDURE — 258N000003 HC RX IP 258 OP 636: Performed by: NURSE ANESTHETIST, CERTIFIED REGISTERED

## 2024-02-16 PROCEDURE — 258N000003 HC RX IP 258 OP 636: Performed by: ANESTHESIOLOGY

## 2024-02-16 PROCEDURE — 360N000080 HC SURGERY LEVEL 7, PER MIN: Performed by: SURGERY

## 2024-02-16 PROCEDURE — 250N000025 HC SEVOFLURANE, PER MIN: Performed by: SURGERY

## 2024-02-16 PROCEDURE — 710N000012 HC RECOVERY PHASE 2, PER MINUTE: Performed by: SURGERY

## 2024-02-16 PROCEDURE — 49596 RPR AA HRN 1ST > 10 NCR/STRN: CPT | Performed by: SURGERY

## 2024-02-16 PROCEDURE — S2900 ROBOTIC SURGICAL SYSTEM: HCPCS | Performed by: SURGERY

## 2024-02-16 PROCEDURE — 272N000001 HC OR GENERAL SUPPLY STERILE: Performed by: SURGERY

## 2024-02-16 PROCEDURE — 120N000001 HC R&B MED SURG/OB

## 2024-02-16 PROCEDURE — 250N000011 HC RX IP 250 OP 636: Performed by: SURGERY

## 2024-02-16 PROCEDURE — 82962 GLUCOSE BLOOD TEST: CPT

## 2024-02-16 PROCEDURE — 0WUF4JZ SUPPLEMENT ABDOMINAL WALL WITH SYNTHETIC SUBSTITUTE, PERCUTANEOUS ENDOSCOPIC APPROACH: ICD-10-PCS | Performed by: SURGERY

## 2024-02-16 PROCEDURE — 250N000013 HC RX MED GY IP 250 OP 250 PS 637: Performed by: ANESTHESIOLOGY

## 2024-02-16 PROCEDURE — 0WQF4ZZ REPAIR ABDOMINAL WALL, PERCUTANEOUS ENDOSCOPIC APPROACH: ICD-10-PCS | Performed by: SURGERY

## 2024-02-16 PROCEDURE — 8E0W4CZ ROBOTIC ASSISTED PROCEDURE OF TRUNK REGION, PERCUTANEOUS ENDOSCOPIC APPROACH: ICD-10-PCS | Performed by: SURGERY

## 2024-02-16 PROCEDURE — C1781 MESH (IMPLANTABLE): HCPCS | Performed by: SURGERY

## 2024-02-16 PROCEDURE — 999N000141 HC STATISTIC PRE-PROCEDURE NURSING ASSESSMENT: Performed by: SURGERY

## 2024-02-16 PROCEDURE — 250N000011 HC RX IP 250 OP 636: Performed by: NURSE ANESTHETIST, CERTIFIED REGISTERED

## 2024-02-16 PROCEDURE — 370N000017 HC ANESTHESIA TECHNICAL FEE, PER MIN: Performed by: SURGERY

## 2024-02-16 PROCEDURE — 250N000013 HC RX MED GY IP 250 OP 250 PS 637: Performed by: SURGERY

## 2024-02-16 DEVICE — SELF-GRIPPING POLYESTER MESH,POLYESTER WITH POLYLACTIC ACID GRIPS
Type: IMPLANTABLE DEVICE | Site: ABDOMEN | Status: FUNCTIONAL
Brand: PROGRIP

## 2024-02-16 RX ORDER — PROPOFOL 10 MG/ML
INJECTION, EMULSION INTRAVENOUS CONTINUOUS PRN
Status: DISCONTINUED | OUTPATIENT
Start: 2024-02-16 | End: 2024-02-16

## 2024-02-16 RX ORDER — ONDANSETRON 4 MG/1
4 TABLET, ORALLY DISINTEGRATING ORAL EVERY 30 MIN PRN
Status: DISCONTINUED | OUTPATIENT
Start: 2024-02-16 | End: 2024-02-16 | Stop reason: HOSPADM

## 2024-02-16 RX ORDER — ACETAMINOPHEN 325 MG/1
650 TABLET ORAL
Status: DISCONTINUED | OUTPATIENT
Start: 2024-02-16 | End: 2024-02-16 | Stop reason: HOSPADM

## 2024-02-16 RX ORDER — ONDANSETRON 2 MG/ML
4 INJECTION INTRAMUSCULAR; INTRAVENOUS EVERY 30 MIN PRN
Status: DISCONTINUED | OUTPATIENT
Start: 2024-02-16 | End: 2024-02-16 | Stop reason: HOSPADM

## 2024-02-16 RX ORDER — OXYCODONE HYDROCHLORIDE 5 MG/1
10 TABLET ORAL
Status: DISCONTINUED | OUTPATIENT
Start: 2024-02-16 | End: 2024-02-16 | Stop reason: HOSPADM

## 2024-02-16 RX ORDER — LIDOCAINE 40 MG/G
CREAM TOPICAL
Status: DISCONTINUED | OUTPATIENT
Start: 2024-02-16 | End: 2024-02-16 | Stop reason: HOSPADM

## 2024-02-16 RX ORDER — LABETALOL HYDROCHLORIDE 5 MG/ML
5 INJECTION, SOLUTION INTRAVENOUS EVERY 10 MIN PRN
Status: DISCONTINUED | OUTPATIENT
Start: 2024-02-16 | End: 2024-02-16 | Stop reason: HOSPADM

## 2024-02-16 RX ORDER — EPHEDRINE SULFATE 50 MG/ML
INJECTION, SOLUTION INTRAMUSCULAR; INTRAVENOUS; SUBCUTANEOUS PRN
Status: DISCONTINUED | OUTPATIENT
Start: 2024-02-16 | End: 2024-02-16

## 2024-02-16 RX ORDER — FENTANYL CITRATE 50 UG/ML
INJECTION, SOLUTION INTRAMUSCULAR; INTRAVENOUS PRN
Status: DISCONTINUED | OUTPATIENT
Start: 2024-02-16 | End: 2024-02-16

## 2024-02-16 RX ORDER — CEFAZOLIN SODIUM/WATER 2 G/20 ML
2 SYRINGE (ML) INTRAVENOUS SEE ADMIN INSTRUCTIONS
Status: DISCONTINUED | OUTPATIENT
Start: 2024-02-16 | End: 2024-02-16 | Stop reason: HOSPADM

## 2024-02-16 RX ORDER — OXYCODONE HYDROCHLORIDE 5 MG/1
5 TABLET ORAL
Status: DISCONTINUED | OUTPATIENT
Start: 2024-02-16 | End: 2024-02-16

## 2024-02-16 RX ORDER — HALOPERIDOL 5 MG/ML
1 INJECTION INTRAMUSCULAR
Status: DISCONTINUED | OUTPATIENT
Start: 2024-02-16 | End: 2024-02-16 | Stop reason: HOSPADM

## 2024-02-16 RX ORDER — OXYCODONE HYDROCHLORIDE 5 MG/1
5 TABLET ORAL
Status: COMPLETED | OUTPATIENT
Start: 2024-02-16 | End: 2024-02-16

## 2024-02-16 RX ORDER — LIDOCAINE HYDROCHLORIDE 10 MG/ML
INJECTION, SOLUTION EPIDURAL; INFILTRATION; INTRACAUDAL; PERINEURAL PRN
Status: DISCONTINUED | OUTPATIENT
Start: 2024-02-16 | End: 2024-02-16

## 2024-02-16 RX ORDER — SODIUM CHLORIDE, SODIUM LACTATE, POTASSIUM CHLORIDE, CALCIUM CHLORIDE 600; 310; 30; 20 MG/100ML; MG/100ML; MG/100ML; MG/100ML
INJECTION, SOLUTION INTRAVENOUS CONTINUOUS
Status: DISCONTINUED | OUTPATIENT
Start: 2024-02-16 | End: 2024-02-16 | Stop reason: HOSPADM

## 2024-02-16 RX ORDER — ACETAMINOPHEN 325 MG/1
975 TABLET ORAL ONCE
Status: COMPLETED | OUTPATIENT
Start: 2024-02-16 | End: 2024-02-16

## 2024-02-16 RX ORDER — OXYCODONE HYDROCHLORIDE 5 MG/1
5-10 TABLET ORAL EVERY 4 HOURS PRN
Qty: 10 TABLET | Refills: 0 | Status: SHIPPED | OUTPATIENT
Start: 2024-02-16 | End: 2024-04-03

## 2024-02-16 RX ORDER — BUPIVACAINE HYDROCHLORIDE 2.5 MG/ML
INJECTION, SOLUTION INFILTRATION; PERINEURAL PRN
Status: DISCONTINUED | OUTPATIENT
Start: 2024-02-16 | End: 2024-02-16 | Stop reason: HOSPADM

## 2024-02-16 RX ORDER — MAGNESIUM SULFATE 4 G/50ML
4 INJECTION INTRAVENOUS ONCE
Status: COMPLETED | OUTPATIENT
Start: 2024-02-16 | End: 2024-02-16

## 2024-02-16 RX ORDER — FENTANYL CITRATE 50 UG/ML
25 INJECTION, SOLUTION INTRAMUSCULAR; INTRAVENOUS
Status: DISCONTINUED | OUTPATIENT
Start: 2024-02-16 | End: 2024-02-16 | Stop reason: HOSPADM

## 2024-02-16 RX ORDER — CEFAZOLIN SODIUM/WATER 2 G/20 ML
2 SYRINGE (ML) INTRAVENOUS
Status: COMPLETED | OUTPATIENT
Start: 2024-02-16 | End: 2024-02-16

## 2024-02-16 RX ORDER — ACETAMINOPHEN 325 MG/1
975 TABLET ORAL ONCE
Status: DISCONTINUED | OUTPATIENT
Start: 2024-02-16 | End: 2024-02-16

## 2024-02-16 RX ORDER — PROPOFOL 10 MG/ML
INJECTION, EMULSION INTRAVENOUS PRN
Status: DISCONTINUED | OUTPATIENT
Start: 2024-02-16 | End: 2024-02-16

## 2024-02-16 RX ADMIN — SODIUM CHLORIDE, POTASSIUM CHLORIDE, SODIUM LACTATE AND CALCIUM CHLORIDE: 600; 310; 30; 20 INJECTION, SOLUTION INTRAVENOUS at 12:03

## 2024-02-16 RX ADMIN — FENTANYL CITRATE 25 MCG: 50 INJECTION, SOLUTION INTRAMUSCULAR; INTRAVENOUS at 15:44

## 2024-02-16 RX ADMIN — PROPOFOL 30 MCG/KG/MIN: 10 INJECTION, EMULSION INTRAVENOUS at 12:42

## 2024-02-16 RX ADMIN — MAGNESIUM SULFATE HEPTAHYDRATE 4 G: 80 INJECTION, SOLUTION INTRAVENOUS at 12:02

## 2024-02-16 RX ADMIN — ACETAMINOPHEN 650 MG: 325 TABLET ORAL at 17:47

## 2024-02-16 RX ADMIN — Medication 2.5 MG: at 14:17

## 2024-02-16 RX ADMIN — PROPOFOL 150 MG: 10 INJECTION, EMULSION INTRAVENOUS at 12:40

## 2024-02-16 RX ADMIN — ACETAMINOPHEN 975 MG: 325 TABLET ORAL at 11:46

## 2024-02-16 RX ADMIN — SODIUM CHLORIDE, POTASSIUM CHLORIDE, SODIUM LACTATE AND CALCIUM CHLORIDE: 600; 310; 30; 20 INJECTION, SOLUTION INTRAVENOUS at 13:36

## 2024-02-16 RX ADMIN — HYDROMORPHONE HYDROCHLORIDE 1 MG: 1 INJECTION, SOLUTION INTRAMUSCULAR; INTRAVENOUS; SUBCUTANEOUS at 12:40

## 2024-02-16 RX ADMIN — ROCURONIUM BROMIDE 10 MG: 50 INJECTION, SOLUTION INTRAVENOUS at 14:16

## 2024-02-16 RX ADMIN — LIDOCAINE HYDROCHLORIDE 50 MG: 10 INJECTION, SOLUTION EPIDURAL; INFILTRATION; INTRACAUDAL; PERINEURAL at 12:40

## 2024-02-16 RX ADMIN — SUGAMMADEX 200 MG: 100 INJECTION, SOLUTION INTRAVENOUS at 14:27

## 2024-02-16 RX ADMIN — ROCURONIUM BROMIDE 50 MG: 50 INJECTION, SOLUTION INTRAVENOUS at 12:40

## 2024-02-16 RX ADMIN — Medication 5 MG: at 13:25

## 2024-02-16 RX ADMIN — FENTANYL CITRATE 50 MCG: 50 INJECTION INTRAMUSCULAR; INTRAVENOUS at 12:31

## 2024-02-16 RX ADMIN — FENTANYL CITRATE 50 MCG: 50 INJECTION INTRAMUSCULAR; INTRAVENOUS at 12:47

## 2024-02-16 RX ADMIN — PHENYLEPHRINE HYDROCHLORIDE 200 MCG: 10 INJECTION INTRAVENOUS at 12:40

## 2024-02-16 RX ADMIN — FENTANYL CITRATE 25 MCG: 50 INJECTION, SOLUTION INTRAMUSCULAR; INTRAVENOUS at 15:23

## 2024-02-16 RX ADMIN — ROCURONIUM BROMIDE 30 MG: 50 INJECTION, SOLUTION INTRAVENOUS at 13:35

## 2024-02-16 RX ADMIN — ROCURONIUM BROMIDE 20 MG: 50 INJECTION, SOLUTION INTRAVENOUS at 13:04

## 2024-02-16 RX ADMIN — OXYCODONE HYDROCHLORIDE 5 MG: 5 TABLET ORAL at 15:48

## 2024-02-16 RX ADMIN — PROPOFOL 50 MG: 10 INJECTION, EMULSION INTRAVENOUS at 12:47

## 2024-02-16 RX ADMIN — Medication 2 G: at 12:30

## 2024-02-16 RX ADMIN — Medication 5 MG: at 13:27

## 2024-02-16 ASSESSMENT — COPD QUESTIONNAIRES
COPD: 1
CAT_SEVERITY: MODERATE

## 2024-02-16 ASSESSMENT — ACTIVITIES OF DAILY LIVING (ADL)
ADLS_ACUITY_SCORE: 35

## 2024-02-16 NOTE — ANESTHESIA PREPROCEDURE EVALUATION
Anesthesia Pre-Procedure Evaluation    Patient: Rita Mancini   MRN: [id number] : 1950        Procedure : Procedure(s):  HERNIORRHAPHY, VENTRAL, ROBOT-ASSISTED, LAPAROSCOPIC, USING DA PAUL XI          Past Medical History:   Diagnosis Date    Age-related cataract 2021    Anemia     Anxiety and depression 1962    Bigeminy 2019    Bronchiectasis without complication (H)     Chronic cough     Chronic pleural effusion 01/15/2015    CKD (chronic kidney disease) stage 3, GFR 30-59 ml/min (H)     COPD (chronic obstructive pulmonary disease) (H) 2009    Depression     Diverticulosis     Eczema of both hands     Functional diarrhea 2018    GERD (gastroesophageal reflux disease)     History of alcoholism (H)     Hx antineoplastic chemotherapy     lung cancer     Hx of radiation therapy     lung cancer     Hydronephrosis     right kidney since 2019    Hyperlipidemia LDL goal <100 2023    Hypothyroid     Infection due to  novel coronavirus 2023    Lung cancer (H) 2008    RUL,  Non small cell cancer    Neuropathy of left abducens nerve 2017    Osteopenia     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, subsequent encounter 2019    Pleural effusion 2015    Sepsis due to Escherichia coli with acute renal failure without septic shock, unspecified acute renal failure type (H)     Sleep apnea 2010    does not use cpap    Type 2 diabetes, HbA1C goal < 8% (H)     Umbilical hernia with obstruction 2024      Past Surgical History:   Procedure Laterality Date    CATARACT IOL, RT/LT Bilateral 2021    IR MISCELLANEOUS PROCEDURE  12/10/2009    PORTACATH PLACEMENT      and removal    THORACOSCOPY Right 2015    Procedure: RIGHT THORACOSCOPY / BIOPSY PLEURAL / TALC PLEURODESIS;  Surgeon: Michi Ma MD;  Location: [facility];  Service:     THORACOSCOPY Left 2019    Procedure: LEFT  THORACOSCOPY WITH DECORTICATION;  Surgeon: Michi Ma MD;  Location: [facility];  Service: General    TONSILLECTOMY  age 5    URETERAL STENT PLACEMENT Right 06/01/2010    US THORACENTESIS  03/27/2019      Allergies   Allergen Reactions    Seasonal Allergies       Social History     Tobacco Use    Smoking status: Former     Packs/day: 1.50     Years: 0.00     Additional pack years: 0.00     Total pack years: 0.00     Types: Cigarettes     Quit date: 11/9/2008     Years since quitting: 15.2     Passive exposure: Never    Smokeless tobacco: Never   Substance Use Topics    Alcohol use: No      Wt Readings from Last 1 Encounters:   02/16/24 81.6 kg (180 lb)        Anesthesia Evaluation   Pt has had prior anesthetic.     No history of anesthetic complications       ROS/MED HX  ENT/Pulmonary:     (+) sleep apnea (noncompliant), doesn't use CPAP,                       moderate,  COPD,              Neurologic:     (+)    peripheral neuropathy,                            Cardiovascular:       METS/Exercise Tolerance: >4 METS    Hematologic:  - neg hematologic  ROS     Musculoskeletal:       GI/Hepatic:     (+) GERD,                   Renal/Genitourinary:     (+) renal disease, type: CRI,            Endo:     (+)  type II DM,        thyroid problem,            Psychiatric/Substance Use:     (+)   alcohol abuse      Infectious Disease:       Malignancy:       Other:            Physical Exam    Airway        Mallampati: III   TM distance: > 3 FB   Neck ROM: full   Mouth opening: > 3 cm    Respiratory Devices and Support         Dental     Comment: chipped    (+) Multiple crowns, permanant bridges      Cardiovascular          Rhythm and rate: regular     Pulmonary           breath sounds clear to auscultation           OUTSIDE LABS:  CBC:   Lab Results   Component Value Date    WBC 10.4 01/23/2024    WBC 12.1 (H) 03/06/2023    HGB 13.1 01/23/2024    HGB 13.4 03/06/2023    HCT 41.1 01/23/2024    HCT 41.0 03/06/2023  "    01/23/2024     03/06/2023     BMP:   Lab Results   Component Value Date     01/23/2024     11/08/2023    POTASSIUM 4.4 01/23/2024    POTASSIUM 4.9 11/08/2023    CHLORIDE 101 01/23/2024    CHLORIDE 102 11/08/2023    CO2 25 01/23/2024    CO2 24 11/08/2023    BUN 21.6 01/23/2024    BUN 22.5 11/08/2023    CR 1.32 (H) 01/23/2024    CR 1.27 (H) 11/08/2023     (H) 02/16/2024     (H) 01/23/2024     COAGS:   Lab Results   Component Value Date    PTT 31 10/07/2019    INR 1.07 10/07/2019     POC: No results found for: \"BGM\", \"HCG\", \"HCGS\"  HEPATIC:   Lab Results   Component Value Date    ALBUMIN 4.5 03/06/2023    PROTTOTAL 7.8 03/06/2023    ALT 16 03/06/2023    AST 30 03/06/2023    ALKPHOS 87 03/06/2023    BILITOTAL 0.6 03/06/2023     OTHER:   Lab Results   Component Value Date    PH 7.37 07/28/2019    LACT 1.0 10/08/2019    A1C 7.1 (H) 01/23/2024    EMELY 9.5 01/23/2024    PHOS 3.2 07/29/2019    MAG 2.1 09/22/2021    TSH 0.78 11/08/2023    T4 1.14 09/22/2021    SED 11 03/20/2006       Anesthesia Plan    ASA Status:  3    NPO Status:  NPO Appropriate    Anesthesia Type: General.     - Airway: ETT   Induction: Intravenous, Propofol.   Maintenance: Balanced.   Techniques and Equipment:     - Airway: Video-Laryngoscope       Consents    Anesthesia Plan(s) and associated risks, benefits, and realistic alternatives discussed. Questions answered and patient/representative(s) expressed understanding.     - Discussed:     - Discussed with:  Patient            Postoperative Care    Pain management: Multi-modal analgesia.   PONV prophylaxis: Ondansetron (or other 5HT-3), Dexamethasone or Solumedrol, Background Propofol Infusion     Comments:    Other Comments:     Dilaudid on induction  Ketamine  Precedex boluses prn               Beatriz Naomi Lee MD               # Drug Induced Platelet Defect: home medication list includes an antiplatelet medication  # DMII: A1C = 7.1 % (Ref range: " "0.0 - 5.6 %) within past 6 months  # Overweight: Estimated body mass index is 29.95 kg/m  as calculated from the following:    Height as of this encounter: 1.651 m (5' 5\").    Weight as of this encounter: 81.6 kg (180 lb).      "

## 2024-02-16 NOTE — ANESTHESIA CARE TRANSFER NOTE
Patient: Rita Mancini    Procedure: Procedure(s):  HERNIORRHAPHY, VENTRAL, ROBOT-ASSISTED, LAPAROSCOPIC, USING DA PAUL XI       Diagnosis: Ventral hernia without obstruction or gangrene [K43.9]  Diagnosis Additional Information: No value filed.    Anesthesia Type:   General     Note:    Oropharynx: oropharynx clear of all foreign objects  Level of Consciousness: drowsy  Oxygen Supplementation: face mask  Level of Supplemental Oxygen (L/min / FiO2): 10  Independent Airway: airway patency satisfactory and stable  Dentition: dentition unchanged  Vital Signs Stable: post-procedure vital signs reviewed and stable  Report to RN Given: handoff report given  Patient transferred to: PACU    Handoff Report: Identifed the Patient, Identified the Reponsible Provider, Reviewed the pertinent medical history, Discussed the surgical course, Reviewed Intra-OP anesthesia mangement and issues during anesthesia, Set expectations for post-procedure period and Allowed opportunity for questions and acknowledgement of understanding      Vitals:  Vitals Value Taken Time   /62 02/16/24 1441   Temp 36.7  C (98.1  F) 02/16/24 1440   Pulse 75 02/16/24 1442   Resp 14 02/16/24 1442   SpO2 96 % 02/16/24 1442   Vitals shown include unfiled device data.    Electronically Signed By: ZENIA Chiu CRNA  February 16, 2024  2:44 PM

## 2024-02-16 NOTE — ANESTHESIA PROCEDURE NOTES
Airway       Patient location during procedure: OR       Procedure Start/Stop Times: 2/16/2024 12:43 PM  Staff -        Anesthesiologist:  Beatriz Lee MD       CRNA: Antonia Menchaca APRN CRNA       Performed By: anesthesiologistIndications and Patient Condition       Indications for airway management: shar-procedural       Induction type:intravenous       Mask difficulty assessment: 1 - vent by mask    Final Airway Details       Final airway type: endotracheal airway       Successful airway: ETT - single  Endotracheal Airway Details        ETT size (mm): 7.0       Cuffed: yes       Successful intubation technique: video laryngoscopy       VL Blade Size: Glidescope 3       Grade View of Cords: 1       Adjucts: stylet       Position: Right       Measured from: lips       Secured at (cm): 21       Bite block used: None    Post intubation assessment        Placement verified by: capnometry, equal breath sounds and chest rise        Number of attempts at approach: 1       Number of other approaches attempted: 0       Secured with: tape       Ease of procedure: easy       Dentition: Intact and Unchanged       Dental guard used and removed. Dental Guard Type: Standard White.    Medication(s) Administered   Medication Administration Time: 2/16/2024 12:43 PM

## 2024-02-16 NOTE — ANESTHESIA POSTPROCEDURE EVALUATION
Patient: Rita Mancini    Procedure: Procedure(s):  HERNIORRHAPHY, VENTRAL, ROBOT-ASSISTED, LAPAROSCOPIC, USING DA PAUL XI       Anesthesia Type:  General    Note:     Postop Pain Control: Uneventful            Sign Out: Well controlled pain   PONV: No   Neuro/Psych: Uneventful            Sign Out: Acceptable/Baseline neuro status   Airway/Respiratory: Uneventful            Sign Out: Acceptable/Baseline resp. status   CV/Hemodynamics: Uneventful            Sign Out: Acceptable CV status; No obvious hypovolemia; No obvious fluid overload   Other NRE: NONE   DID A NON-ROUTINE EVENT OCCUR? No           Last vitals:  Vitals Value Taken Time   /67 02/16/24 1600   Temp 36.7  C (98.1  F) 02/16/24 1440   Pulse 69 02/16/24 1601   Resp 10 02/16/24 1601   SpO2 99 % 02/16/24 1601   Vitals shown include unfiled device data.    Electronically Signed By: Beatriz Lee MD  February 16, 2024  4:15 PM

## 2024-02-16 NOTE — INTERVAL H&P NOTE
"I have reviewed the surgical (or preoperative) H&P that is linked to this encounter, and examined the patient. There are no significant changes    Tyler Rossi MD, FACS  Office: [phone]  [facility]   General and Bariatric Surgery      Clinical Conditions Present on Arrival:  Clinically Significant Risk Factors Present on Admission                 # Drug Induced Platelet Defect: home medication list includes an antiplatelet medication  # DMII: A1C = 7.1 % (Ref range: 0.0 - 5.6 %) within past 6 months  # Overweight: Estimated body mass index is 29.95 kg/m  as calculated from the following:    Height as of this encounter: 1.651 m (5' 5\").    Weight as of this encounter: 81.6 kg (180 lb).       "

## 2024-02-16 NOTE — OP NOTE
[facility]  Operative Note    Pre-operative diagnosis: Ventral hernia without obstruction or gangrene [K43.9]   Post-operative diagnosis Ventral hernias   Procedure: Procedure(s):  HERNIORRHAPHY, VENTRAL, ROBOT-ASSISTED, LAPAROSCOPIC, USING DA OLIVE XI   Surgeon: Tyler Rossi MD   Assistants(s): None   Anesthesia: General    Estimated blood loss: Less than 50 ml    Total IV fluids: (See anesthesia record)   Blood transfusion: No transfusion was given during surgery   Total urine output: (See anesthesia record)   Drains: None   Specimens: None   Implants: None   Findings: 3 abdominal wall hernias in the midline, total area covered by the hernias 11 x 4 cm   Complications: None.   Condition: Stable       Description of procedure:  The patient was brought to the operating room where after induction of general anesthesia with endotracheal intubation she was positioned with both arms out.  She was then prepped and draped in sterile fashion.  After procedural timeout we began by accessing the abdomen with a Veress needle in the left upper quadrant.  After insufflation three 8 mm trocars were placed along the left anterior axillary line.  Bilateral tap blocks were performed with quarter percent Marcaine and the da Olive X Xi robot was then docked.  On initial survey of the abdomen we could see a tongue of omentum herniating through a supraumbilical fascial defect, with a separate umbilical hernia defect.  We began by utilizing lecture cautery and blunt dissection to reduce the incarcerated omentum from the ventral hernia.  Once this was reduced we then proceeded to score the posterior rectus fascia along the mid body of the left rectus musculature.  We then proceeded to extend in the retrorectus space medially until we encountered the medial border of the left retrorectus sheath.  This was then incised with electrocautery and extended into the preperitoneal plane above and below the ventral  hernia.  At the level of the umbilical hernia the fascia was divided around the umbilical hernia and extended inferiorly down to the level of the arcuate line of Broderick.  The preperitoneal dissection was then extended cephalad, dividing the medial border of the left rectus sheath as we went further toward the xiphoid process.  In the course of this preperitoneal dissection we did encounter a third fascial defect roughly 2 cm in diameter.  Once we felt we had sufficient extension above the cephalad most fascial defect we continued our extension until we encountered the medial border of the right rectus sheath.  This was incised with electrocautery from cephalad extending inferiorly, dividing the lateral border of the primary ventral hernia and extending down to the umbilicus and down to the arcuate line of Broderick inferiorly.  The retrorectus dissection was then extended in the right retrorectus space until reached the lateralmost extent of the retrorectus space on the right side.  The hernia sac was then further dissected out of the ventral hernia to be removed and discarded at the end of the case.  At this point we measured the total extent of the fascial defect between the umbilical and 2 supraumbilical ventral hernias which measured 11 cm from top to bottom and 4 cm from side-to-side at the largest extent.  We then proceeded to close the anterior fascial defects with a single running oh V-Loc suture, running the suture back upon itself to lock it in place.  With the anterior fascial defect thus closed, we then measured our retrorectus space which was approximately 15 cm side-to-side and 20 cm cephalad to caudal.  A 15 x 20 cm ProGrip mesh was then selected, rolled, and inserted into the intra-abdominal space.  This was then unfurled in the retrorectus space and laid against the anterior abdominal wall, adherent to the overlying muscle.  After ensuring the mesh lay in good position without any wrinkling or  folding the defect in the left posterior rectus sheath was closed with a running 2-0 absorbable V-Loc suture.  Once this was closed we then turned our attention to the midline fascial defect from the umbilical and supraumbilical ventral hernia where the defects were closed with running 2 oh V-Loc suture, completely excluding the mesh from the intra-abdominal space and in the retrorectus pocket.  Satisfied with this repair all needles were then removed.  The hernia sac was removed in the abdominal cavity.  The fascial defect at the left upper quadrant was then closed with a 0 Vicryl suture.  The remaining ports were then removed and da Olive  Xi robot undocked.  The skin incisions were then closed with interrupted 4-0 Vicryl sutures.    Tyler Rossi MD, FACS  [phone]  [facility]  General and Bariatric Surgery

## 2024-02-19 DIAGNOSIS — E03.9 ACQUIRED HYPOTHYROIDISM: ICD-10-CM

## 2024-02-19 RX ORDER — LEVOTHYROXINE SODIUM 50 UG/1
TABLET ORAL
Qty: 24 TABLET | Refills: 3 | OUTPATIENT
Start: 2024-02-19

## 2024-02-19 RX ORDER — LEVOTHYROXINE SODIUM 50 UG/1
TABLET ORAL
Qty: 24 TABLET | Refills: 3 | Status: SHIPPED | OUTPATIENT
Start: 2024-02-19

## 2024-02-19 NOTE — TELEPHONE ENCOUNTER
This is not a duplicate - pharmacy is now requesting the other dosage as there is two different dosages

## 2024-03-11 ENCOUNTER — TELEPHONE (OUTPATIENT)
Dept: INTERNAL MEDICINE | Facility: CLINIC | Age: 74
End: 2024-03-11
Payer: MEDICARE

## 2024-03-11 DIAGNOSIS — J30.2 SEASONAL ALLERGIC RHINITIS, UNSPECIFIED TRIGGER: ICD-10-CM

## 2024-03-11 RX ORDER — FLUTICASONE PROPIONATE 50 MCG
1 SPRAY, SUSPENSION (ML) NASAL 2 TIMES DAILY
Qty: 16 G | Refills: 3 | Status: SHIPPED | OUTPATIENT
Start: 2024-03-11 | End: 2024-08-01

## 2024-03-14 ENCOUNTER — VIRTUAL VISIT (OUTPATIENT)
Dept: SURGERY | Facility: CLINIC | Age: 74
End: 2024-03-14
Payer: MEDICARE

## 2024-03-14 DIAGNOSIS — Z98.890 POST-OPERATIVE STATE: Primary | ICD-10-CM

## 2024-03-14 PROCEDURE — 99024 POSTOP FOLLOW-UP VISIT: CPT | Mod: 93 | Performed by: SURGERY

## 2024-03-14 NOTE — LETTER
"    3/14/2024         RE: Rita Mancini  [address]  [place] [address]        Dear Colleague,    Thank you for referring your patient, Rita Mancini, to the [facility] SURGERY CLINIC AND BARIATRICS [facility]. Please see a copy of my visit note below.      The patient has been notified of following:     \"This telephone visit will be conducted via a call between you and your physician/provider. We have found that certain health care needs can be provided without the need for a physical exam.  This service lets us provide the care you need with a short phone conversation.  If a prescription is necessary we can send it directly to your pharmacy.  If lab work is needed we can place an order for that and you can then stop by our lab to have the test done at a later time.    Telephone visits are billed at different rates depending on your insurance coverage. During this emergency period, for some insurers they may be billed the same as an in-person visit.  Please reach out to your insurance provider with any questions.    If during the course of the call the physician/provider feels a telephone visit is not appropriate, you will not be charged for this service.\"    Patient has given verbal consent to a Telephone visit? Yes    What phone number would you like to be contacted at? [phone]     Patient would like to receive their AVS by [employer]      Telephone Start Time: 12:32 PM    Rita Mancini is status post robotic ventral hernia repair  4 weeks ago.  She is doing well with mild post operative incisional pain.  She is tolerating a regular diet and has no other complaints at present. States she has intermittent pain on her right side, but not very bothersome.     ASSESSMENT AND PLAN:  Rita Mancini is doing well postoperatively.  She may continue with the recommended diet and light activities as tolerated.  She may now follow up on a prn basis if she has any other questions " or concerns.     Tyler Rossi MD, FACS  Office: [phone]  [facility]   General and Bariatric Surgery    Telephone End Time (time telephone stopped): 12:34 PM    Originating Patient Location (pt. Location): Home      Distant Location (provider location):  On-site    Distant Location (provider location):  [facility] SURGERY CLINIC AND BARIATRICS CARE [place]                 Again, thank you for allowing me to participate in the care of your patient.        Sincerely,        Tyler Rossi MD

## 2024-03-14 NOTE — PROGRESS NOTES
"  The patient has been notified of following:     \"This telephone visit will be conducted via a call between you and your physician/provider. We have found that certain health care needs can be provided without the need for a physical exam.  This service lets us provide the care you need with a short phone conversation.  If a prescription is necessary we can send it directly to your pharmacy.  If lab work is needed we can place an order for that and you can then stop by our lab to have the test done at a later time.    Telephone visits are billed at different rates depending on your insurance coverage. During this emergency period, for some insurers they may be billed the same as an in-person visit.  Please reach out to your insurance provider with any questions.    If during the course of the call the physician/provider feels a telephone visit is not appropriate, you will not be charged for this service.\"    Patient has given verbal consent to a Telephone visit? Yes    What phone number would you like to be contacted at? [phone]     Patient would like to receive their AVS by [employer]      Telephone Start Time: 12:32 PM    Rita Mancini is status post robotic ventral hernia repair  4 weeks ago.  She is doing well with mild post operative incisional pain.  She is tolerating a regular diet and has no other complaints at present. States she has intermittent pain on her right side, but not very bothersome.     ASSESSMENT AND PLAN:  Rita Mancini is doing well postoperatively.  She may continue with the recommended diet and light activities as tolerated.  She may now follow up on a prn basis if she has any other questions or concerns.     Tyler Rossi MD, FACS  Office: [phone]  [facility]   General and Bariatric Surgery    Telephone End Time (time telephone stopped): 12:34 PM    Originating Patient Location (pt. Location): Home      Distant Location (provider location):  On-site    Distant Location " (provider location):  [facility] SURGERY CLINIC AND BARIATRICS [facility]

## 2024-03-19 DIAGNOSIS — J44.9 CHRONIC OBSTRUCTIVE PULMONARY DISEASE, UNSPECIFIED COPD TYPE (H): ICD-10-CM

## 2024-03-20 DIAGNOSIS — H35.3231 EXUDATIVE AGE-RELATED MACULAR DEGENERATION OF BOTH EYES WITH ACTIVE CHOROIDAL NEOVASCULARIZATION (H): Primary | ICD-10-CM

## 2024-03-20 RX ORDER — MONTELUKAST SODIUM 10 MG/1
10 TABLET ORAL AT BEDTIME
Qty: 90 TABLET | Refills: 3 | OUTPATIENT
Start: 2024-03-20

## 2024-03-21 NOTE — TELEPHONE ENCOUNTER
Prior Authorization Approval    Authorization Effective Date: 1/1/2024  Authorization Expiration Date: 3/21/2025  Medication: Trulicity 3MG/0.5ML pen-injectors  Reference #:     Insurance Company: [employer] - Phone [phone] Fax [phone]  Which Pharmacy is filling the prescription (Not needed for infusion/clinic administered): [id number] [place] [place] [facility] [address]  Pharmacy Notified: Yes  Patient Notified: Instructed pharmacy to notify patient when script is ready to /ship.

## 2024-03-21 NOTE — TELEPHONE ENCOUNTER
Central Prior Authorization Team   Phone: [phone]    PA Initiation    Medication: Trulicity 3MG/0.5ML pen-injectors  Insurance Company: [employer] - Phone [phone] Fax [phone]  Pharmacy Filling the Rx: [id number] - [place], [place] -  [address]  Filling Pharmacy Phone: [phone]  Filling Pharmacy Fax:    Start Date: 3/21/2024

## 2024-03-26 ENCOUNTER — TELEPHONE (OUTPATIENT)
Dept: INTERNAL MEDICINE | Facility: CLINIC | Age: 74
End: 2024-03-26
Payer: MEDICARE

## 2024-03-26 NOTE — TELEPHONE ENCOUNTER
New Medication Request    Contacts         Type Contact Phone/Fax    03/26/2024 05:33 PM CDT Phone (Incoming) [facility][id number] - [place], [place] - [address] (Pharmacy) [phone]            What medication are you requesting?:   losartan (COZAAR) 25 MG tablet (Discontinued) 90 tablet 3 5/16/2023 11/8/2023 --   Sig - Route: Take 1 tablet (25 mg) by mouth daily - Oral       Reason for medication request: Pharmacy states pt is requesting this medication    Have you taken this medication before?: Yes: 2023    Controlled Substance Agreement on file:   CSA -- Patient Level:     [Media Unavailable] Controlled Substance Agreement - Opioid - Scan on 8/29/2018         Preferred Pharmacy:       [facility]/pharm[facility] #[id number] - [place], [place] - [address]  [address]  [facility] [address]  Phone: [phone] Fax: [phone]

## 2024-03-28 ENCOUNTER — NURSE TRIAGE (OUTPATIENT)
Dept: NURSING | Facility: CLINIC | Age: 74
End: 2024-03-28
Payer: MEDICARE

## 2024-03-28 DIAGNOSIS — E13.9 DIABETES 1.5, MANAGED AS TYPE 2 (H): ICD-10-CM

## 2024-03-28 RX ORDER — LOSARTAN POTASSIUM 25 MG/1
25 TABLET ORAL DAILY
Qty: 90 TABLET | Refills: 3 | Status: SHIPPED | OUTPATIENT
Start: 2024-03-28 | End: 2024-07-25

## 2024-03-28 NOTE — TELEPHONE ENCOUNTER
Attempted to call patient to discuss medication request. When reviewing chart, Losartan 25 mg tablet was discontinue by Dr. Grullon due to hypotension.     Left message for patient to return call to confirm the reason for the request. Has patient been taking this medication? Has blood pressure at home been running high? Please provide some more information on return call, thank you.

## 2024-03-28 NOTE — TELEPHONE ENCOUNTER
Per chart review. Losartan Potassium discontinued 11/8/23.    Reports BP on low side per patient number. Reports Dr. Grullon discontinued due to Kidneys.Reports seeing PCP following 11/8 visit and PCP stated patient should be on Losartan Potassium. Reports had a previous syncope episode and PCP stated he did not think it was related to this medication. Previously on Losartan Potassium 25mg daily.       Also requested recommendation for follow up visit time frame. Not noted in last visit.

## 2024-03-28 NOTE — TELEPHONE ENCOUNTER
A while back, Dr Murillo had prescribed losartan. Another MD canceled the prescription. Since then Dr Murillo said she should be on it. She needs a prescription sent to [facility].  He also didn't tell her when to be seen for her diabetes check up. When should that be done? She can be reached at  [phone].  Cassie Langston RN  [facility]    Reason for Disposition   Nursing judgment    Additional Information   Negative: New-onset or worsening symptoms, see that protocol (e.g., diarrhea, runny nose, sore throat)   Negative: Medicine question not related to refill or renewal   Negative: Requesting a renewal or refill of a medicine patient is currently taking   Negative: Questions or concerns about high blood pressure   Negative: Nursing judgment    Protocols used: Information Only Call - No Triage-A-OH

## 2024-03-28 NOTE — TELEPHONE ENCOUNTER
Spoke with patient and informed of the following:    Michi Murillo MD   to Me   JR    3/28/24 12:09 PM  Yes, I think she should try to stay on the losartan.  I have signed the prescription. Dr. Lidia MD      Scheduled a 3 month follow up from last visit per PCP.

## 2024-04-03 ENCOUNTER — OFFICE VISIT (OUTPATIENT)
Dept: OPHTHALMOLOGY | Facility: CLINIC | Age: 74
End: 2024-04-03
Attending: OPHTHALMOLOGY
Payer: MEDICARE

## 2024-04-03 DIAGNOSIS — H43.813 PVD (POSTERIOR VITREOUS DETACHMENT), BILATERAL: ICD-10-CM

## 2024-04-03 DIAGNOSIS — H04.123 DRY EYE SYNDROME OF BOTH EYES: ICD-10-CM

## 2024-04-03 DIAGNOSIS — H35.3231 EXUDATIVE AGE-RELATED MACULAR DEGENERATION OF BOTH EYES WITH ACTIVE CHOROIDAL NEOVASCULARIZATION (H): Primary | ICD-10-CM

## 2024-04-03 DIAGNOSIS — Z96.1 PSEUDOPHAKIA OF BOTH EYES: ICD-10-CM

## 2024-04-03 DIAGNOSIS — J44.9 CHRONIC OBSTRUCTIVE PULMONARY DISEASE, UNSPECIFIED COPD TYPE (H): ICD-10-CM

## 2024-04-03 PROCEDURE — 67028 INJECTION EYE DRUG: CPT | Mod: LT | Performed by: OPHTHALMOLOGY

## 2024-04-03 PROCEDURE — G0463 HOSPITAL OUTPT CLINIC VISIT: HCPCS | Mod: 25 | Performed by: OPHTHALMOLOGY

## 2024-04-03 PROCEDURE — 99214 OFFICE O/P EST MOD 30 MIN: CPT | Mod: 25 | Performed by: OPHTHALMOLOGY

## 2024-04-03 PROCEDURE — 92134 CPTRZ OPH DX IMG PST SGM RTA: CPT | Performed by: OPHTHALMOLOGY

## 2024-04-03 PROCEDURE — 250N000011 HC RX IP 250 OP 636: Mod: JZ | Performed by: OPHTHALMOLOGY

## 2024-04-03 RX ORDER — MONTELUKAST SODIUM 10 MG/1
10 TABLET ORAL AT BEDTIME
Qty: 90 TABLET | Refills: 2 | Status: SHIPPED | OUTPATIENT
Start: 2024-04-03

## 2024-04-03 RX ADMIN — FARICIMAB 6 MG: 6 INJECTION, SOLUTION INTRAVITREAL at 14:47

## 2024-04-03 ASSESSMENT — CUP TO DISC RATIO
OS_RATIO: 0.25
OD_RATIO: 0.25

## 2024-04-03 ASSESSMENT — EXTERNAL EXAM - LEFT EYE: OS_EXAM: NORMAL

## 2024-04-03 ASSESSMENT — TONOMETRY
OD_IOP_MMHG: 19
IOP_METHOD: TONOPEN
OS_IOP_MMHG: 16

## 2024-04-03 ASSESSMENT — SLIT LAMP EXAM - LIDS
COMMENTS: NORMAL
COMMENTS: NORMAL

## 2024-04-03 ASSESSMENT — VISUAL ACUITY
OS_SC: 20/40
METHOD: SNELLEN - LINEAR
OS_PH_SC: 20/30
OS_SC+: +2
OD_SC+: -2
OD_PH_SC: 20/25
OD_SC: 20/30

## 2024-04-03 ASSESSMENT — EXTERNAL EXAM - RIGHT EYE: OD_EXAM: NORMAL

## 2024-04-03 NOTE — PROGRESS NOTES
CC -  Macular degeneration     INTERVAL HISTORY - Vision has remained about the same since last visit. She has noticed some floaters in both eyes, does not report flashes. Has not been using amsler grid.     HPI -   Rita Mancini is a  73 year old year-old patient presenting for evaluation for macular degeneration. Was referred by her cataract surgeon for AMD. Saw Dr. KRISTIE Duarte ~6 months ago. 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 Observation recommended  No current smoking   No known FH of AMD, glaucoma     PAST OCULAR SURGERY  CE IOL each eye 2021    RETINAL IMAGING:  OCT 04/03/24:   Right eye: Normal foveal contour, no intraretinal or subretinal fluid, small subfoveal drusen. Pachychoroid.  Left eye:  SubRPE scar, SIRE present with small subretinal fluid inferotemporal -stable    ASSESSMENT & PLAN  # Right eye early dry AMD  - with just a few small drusen; see below    # Left eye advanced exudative AMD   - [facility] PED with SIRE and increased SRF; vision stable  - pachychoroid +  S/P Avastin injection left eye last injection 8/2/23 (10 weeks)  1/31/24: resovled SRF  04/03/24: Small subretinal fluid collection parafoveal inferotemporal  Plan for Vabysmo left eye today and RTC 8-9 weeks for OCT and possible Vabysmo left eye    AREDS II supplements  Amsler grid education given     # Pseudophakia each eye  Observe    # Dry eye each eye   ATs as  Needed    # PVD each eye    - No signs of retinal tears or detachment  - Discussed with patient about the symptoms of retinal tears and detachment  - Monitor    Dispo:  RTC 8-9 w for OCT left eye; vabaysmo left eye; no dilation    Complete documentation of historical and exam elements from today's encounter can be found in the full encounter summary report (not reduplicated in this progress note). I personally obtained the chief complaint(s) and history of present illness.  I confirmed and edited as necessary the review of systems, past  medical/surgical history, family history, social history, and examination findings as documented by others; and I examined the patient myself. I personally reviewed the relevant tests, images, and reports as documented above. I formulated and edited as necessary the assessment and plan and discussed the findings and management plan with the patient and family.     Malvin Rizzo MD

## 2024-04-03 NOTE — NURSING NOTE
"Chief Complaints and History of Present Illnesses   Patient presents with    Exudative Macular Degeneration Follow Up     Chief Complaint(s) and History of Present Illness(es)       Exudative Macular Degeneration Follow Up              Laterality: both eyes    Associated symptoms: floaters.  Negative for dryness, eye pain, tearing, flashes, itching and burning    Pain scale: 0/10              Comments    Rita is here to continue care for exudative macular degeneration of both eyes. She says for a couple of days recently, left eye felt 'FULL\" LIKE FULL OF PRESSURE, BUT NOT TODAY.     Ángel Morrison COT 1:46 PM April 3, 2024                      "

## 2024-04-05 ENCOUNTER — TELEPHONE (OUTPATIENT)
Dept: INTERNAL MEDICINE | Facility: CLINIC | Age: 74
End: 2024-04-05
Payer: MEDICARE

## 2024-04-05 NOTE — TELEPHONE ENCOUNTER
Please start prior auth on :     Disp Refills Start End SADIE   PULMICORT FLEXHALER 180 MCG/ACT inhaler 9 each 3 11/8/2023 -- Yes   Sig: INHALE 2 PUFFS BY MOUTH TWICE A DAY   Sent to pharmacy as: Pulmicort Flexhaler 180 MCG/ACT Inhalation Aerosol Powder Breath Activated   Class: E-Prescribe   Order: [id number]   E-Prescribing Status: Receipt confirmed by pharmacy (11/8/2023  2:37 PM CST)   No prior authorization was found for this prescription.   Found prior authorization for another prescription for the same medication: Closed - Prior Authorization not required for patient/medication       Medication Administration Instructions    INHALE 2 PUFFS BY MOUTH TWICE A DAY     Pharmacy    [id number] - [place], [place] -  [address]     Associated Diagnoses    Chronic obstructive pulmonary disease, unspecified COPD type (H) [J44.9]

## 2024-04-19 NOTE — TELEPHONE ENCOUNTER
Prior Authorization Approval    Authorization Effective Date: 1/1/2024  Authorization Expiration Date: 4/19/2025  Medication:   Reference #:     Insurance Company: [employer] - Phone [phone] Fax [phone]  Which Pharmacy is filling the prescription (Not needed for infusion/clinic administered): [id number] - [place], [place] - [address]  Pharmacy Notified: Yes  Patient Notified: Instructed pharmacy to notify patient when script is ready to /ship.

## 2024-04-19 NOTE — TELEPHONE ENCOUNTER
Central Prior Authorization Team   Phone: [phone]    PA Initiation    Medication: Pulmicort Flexhaler 180MCG/ACT aerosol powder  Insurance Company: [employer] - Phone [phone] Fax [phone]  Pharmacy Filling the Rx: [id number] - [place], [place] - [address] [place] [address] [address]  Filling Pharmacy Phone: [phone]  Filling Pharmacy Fax:    Start Date: 4/18/2024

## 2024-04-24 ENCOUNTER — OFFICE VISIT (OUTPATIENT)
Dept: INTERNAL MEDICINE | Facility: CLINIC | Age: 74
End: 2024-04-24
Payer: MEDICARE

## 2024-04-24 VITALS
SYSTOLIC BLOOD PRESSURE: 100 MMHG | TEMPERATURE: 97 F | HEIGHT: 65 IN | WEIGHT: 172.7 LBS | DIASTOLIC BLOOD PRESSURE: 63 MMHG | OXYGEN SATURATION: 99 % | RESPIRATION RATE: 16 BRPM | BODY MASS INDEX: 28.78 KG/M2 | HEART RATE: 92 BPM

## 2024-04-24 DIAGNOSIS — J90 CHRONIC PLEURAL EFFUSION: ICD-10-CM

## 2024-04-24 DIAGNOSIS — K21.9 GASTROESOPHAGEAL REFLUX DISEASE WITHOUT ESOPHAGITIS: ICD-10-CM

## 2024-04-24 DIAGNOSIS — H35.30 MACULAR DEGENERATION (SENILE) OF RETINA: ICD-10-CM

## 2024-04-24 DIAGNOSIS — E13.9 DIABETES 1.5, MANAGED AS TYPE 2 (H): Primary | ICD-10-CM

## 2024-04-24 DIAGNOSIS — G47.9 SLEEP DISORDER: ICD-10-CM

## 2024-04-24 DIAGNOSIS — Z87.898 HISTORY OF SYNCOPE: ICD-10-CM

## 2024-04-24 DIAGNOSIS — E78.5 HYPERLIPIDEMIA LDL GOAL <100: ICD-10-CM

## 2024-04-24 PROBLEM — K43.9 VENTRAL HERNIA WITHOUT OBSTRUCTION OR GANGRENE: Status: RESOLVED | Noted: 2019-02-06 | Resolved: 2024-04-24

## 2024-04-24 PROBLEM — D64.9 ANEMIA: Status: RESOLVED | Noted: 2019-05-06 | Resolved: 2024-04-24

## 2024-04-24 PROBLEM — K42.0 UMBILICAL HERNIA WITH OBSTRUCTION: Status: RESOLVED | Noted: 2024-01-23 | Resolved: 2024-04-24

## 2024-04-24 LAB — HBA1C MFR BLD: 6.2 % (ref 0–5.6)

## 2024-04-24 PROCEDURE — 99214 OFFICE O/P EST MOD 30 MIN: CPT | Performed by: INTERNAL MEDICINE

## 2024-04-24 PROCEDURE — 36415 COLL VENOUS BLD VENIPUNCTURE: CPT | Performed by: INTERNAL MEDICINE

## 2024-04-24 PROCEDURE — 83036 HEMOGLOBIN GLYCOSYLATED A1C: CPT | Performed by: INTERNAL MEDICINE

## 2024-04-24 RX ORDER — RESPIRATORY SYNCYTIAL VIRUS VACCINE 120MCG/0.5
0.5 KIT INTRAMUSCULAR ONCE
Qty: 1 EACH | Refills: 0 | Status: CANCELLED | OUTPATIENT
Start: 2024-04-24 | End: 2024-04-24

## 2024-04-24 RX ORDER — ZOLPIDEM TARTRATE 5 MG/1
5 TABLET ORAL
Qty: 30 TABLET | Refills: 3 | Status: SHIPPED | OUTPATIENT
Start: 2024-04-24 | End: 2024-05-22

## 2024-04-24 RX ORDER — DULAGLUTIDE 4.5 MG/.5ML
4.5 INJECTION, SOLUTION SUBCUTANEOUS WEEKLY
Qty: 6 ML | Refills: 3 | Status: SHIPPED | OUTPATIENT
Start: 2024-04-24 | End: 2024-07-25

## 2024-04-24 NOTE — PROGRESS NOTES
ASSESSMENT AND PLAN:    1. Chronic kidney disease, stage 3   Need to follow.     2. Diabetes 1.5, managed as type 2  Improved monitoring with the higher dose of trulicity, and with weight loss. Will go up in dose for maximum benefit.     3. Chronic pleural effusion, left (s/p Talc pleurodesis 2015)  Asymptomatic.     4. Gastroesophageal reflux disease without esophagitis  Asymptomatic.     5. Hyperlipidemia LDL goal <100  Ongoing statin therapy.     6. Obstructive sleep apnea  Can't use CPAP - also uses an alternative device which is cumbersome.     Follow up in 6 months    CHIEF COMPLAINT:  Follow up DM and post hernia repair    HISTORY OF PRESENT ILLNESS:  Rita Mancini is a 73 year old female doing well. Hernia surgery went well.  She is losing weight on the 3.0 trulicity dose.  Blood sugars are good as well.  No dizziness or orthostasis. Voiding OK.     REVIEW OF SYSTEMS:   See HPI, all other systems on review are negative.    Past Medical History:   Diagnosis Date    Age-related cataract 12/06/2021    Anemia     Anxiety and depression 01/01/1962    Bigeminy 03/17/2019    Bronchiectasis without complication (H)     Chronic cough     Chronic pleural effusion 01/15/2015    CKD (chronic kidney disease) stage 3, GFR 30-59 ml/min (H)     COPD (chronic obstructive pulmonary disease) (H) 01/01/2009    Depression     Diverticulosis     Eczema of both hands     Functional diarrhea 12/31/2018    GERD (gastroesophageal reflux disease)     History of alcoholism (H)     Hx antineoplastic chemotherapy     lung cancer     Hx of radiation therapy     lung cancer     Hydronephrosis     right kidney since 2019    Hyperlipidemia LDL goal <100 03/06/2023    Hypothyroid     Infection due to 2019 novel coronavirus 03/06/2023    Lung cancer (H) 01/01/2008    RUL,  Non small cell cancer    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    Osteopenia     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,  subsequent encounter 04/08/2019    Pleural effusion 01/01/2015    Sepsis due to Escherichia coli with acute renal failure without septic shock, unspecified acute renal failure type (H)     Sleep apnea 01/01/2010    does not use cpap    Type 2 diabetes, HbA1C goal < 8% (H)     Umbilical hernia with obstruction 01/23/2024     History   Smoking Status    Former    Packs/day: 1.50    Years: 0.00    Types: Cigarettes    Quit date: 11/9/2008   Smokeless Tobacco    Never     Family History   Problem Relation Age of Onset    Heart Disease Mother     Hypertension Mother     Alcoholism Mother     Depression Mother     Heart Disease Father 45        mi age 45, cabg    Coronary Artery Disease Father     Cancer Father         prostate    Hypertension Father     Snoring Father     Chronic Obstructive Pulmonary Disease Brother     Alcoholism Brother     Alcoholism Sister     Diabetes Son     Anxiety Disorder Son     Depression Son     Post-Traumatic Stress Disorder (PTSD) Son     Obesity Daughter     Obesity Daughter     Cancer Maternal Aunt 47        breast    Breast Cancer Paternal Aunt     Leukemia Grandchild     Schizophrenia Grandchild     Lymphoma Grandchild     Glaucoma No family hx of     Macular Degeneration No family hx of      Past Surgical History:   Procedure Laterality Date    CATARACT IOL, RT/LT Bilateral 12/2021    DAVINCI XI HERNIORRHAPHY VENTRAL N/A 2/16/2024    Procedure: HERNIORRHAPHY, VENTRAL, ROBOT-ASSISTED, LAPAROSCOPIC, USING DA PAUL XI;  Surgeon: Tyler Rossi MD;  Location: [facility]    IR MISCELLANEOUS PROCEDURE  12/10/2009    PORTACATH PLACEMENT      and removal    THORACOSCOPY Right 04/06/2015    Procedure: RIGHT THORACOSCOPY / BIOPSY PLEURAL / TALC PLEURODESIS;  Surgeon: Michi Ma MD;  Location: [facility];  Service:     THORACOSCOPY Left 03/29/2019    Procedure: LEFT THORACOSCOPY WITH DECORTICATION;  Surgeon: Michi Ma MD;  Location: [facility];  Service:  "General    TONSILLECTOMY  age 5    URETERAL STENT PLACEMENT Right 06/01/2010    US THORACENTESIS  03/27/2019     Allergies   Allergen Reactions    Seasonal Allergies      VITALS:  Vitals:    04/24/24 1347   BP: 100/63   BP Location: Left arm   Patient Position: Sitting   Cuff Size: Adult Regular   Pulse: 92   Resp: 16   Temp: 97  F (36.1  C)   TempSrc: Tympanic   SpO2: 99%   Weight: 78.3 kg (172 lb 11.2 oz)   Height: 1.651 m (5' 5\")     Estimated body mass index is 28.74 kg/m  as calculated from the following:    Height as of this encounter: 1.651 m (5' 5\").    Weight as of this encounter: 78.3 kg (172 lb 11.2 oz).  Wt Readings from Last 3 Encounters:   04/24/24 78.3 kg (172 lb 11.2 oz)   02/16/24 81.6 kg (180 lb)   01/23/24 83.7 kg (184 lb 8 oz)     PHYSICAL EXAM:  Constitutional:  In NAD, alert and oriented  Cardiac:  S1 S2   Lungs: Clear     DECISION TO OBTAIN OLD RECORDS AND/OR OBTAIN HISTORY FROM SOMEONE OTHER THAN PATIENT, AND/OR ACCESSING CARE EVERYWHERE):  1  0     REVIEW AND SUMMARIZATION OF OLD RECORDS, AND/OR OBTAINING HISTORY FROM SOMEONE OTHER THAN PATIENT, AND/OR DISCUSSION OF CASE WITH ANOTHER HEALTH CARE PROVIDER:  2 reviewed surgery notes    REVIEW AND/OR ORDER OF OF CLINICAL LAB TESTS: 1  ordered.    REVIEW AND/OR ORDER OF RADIOLOGY TESTS: 1 0.    REVIEW AND/OR ORDER OF MEDICAL TESTS (EKG/ECHO/COLONOSCOPY/EGD): 1  0    INDEPENDENT  VISUALIZATION OF IMAGE, TRACING, OR SPECIMEN ITSELF (2 EACH):  0     TOTAL: 3    Current Outpatient Medications   Medication Sig Dispense Refill    aspirin 81 MG EC tablet Take 81 mg by mouth daily      blood glucose (NO BRAND SPECIFIED) test strip Use to test blood sugar one time daily or as directed. 100 strip 3    calcium carbonate (TUMS) 500 MG chewable tablet Take 1 chew tab by mouth daily      cetirizine (ZYRTEC) 10 MG CHEW Take 10 mg by mouth daily      citalopram (CELEXA) 10 MG tablet Take 1 tablet (10 mg) by mouth daily 90 tablet 2    Dulaglutide (TRULICITY) 3 " MG/0.5ML SOPN Inject 3 mg Subcutaneous once a week 6 mL 3    famotidine (PEPCID) 20 MG tablet Take 1 tablet (20 mg) by mouth daily 90 tablet 2    fluticasone (FLONASE) 50 MCG/ACT nasal spray Spray 1 spray into both nostrils 2 times daily 16 g 3    levothyroxine (SYNTHROID/LEVOTHROID) 50 MCG tablet TAKE ON EMPTY STOMACH EVERY MONDAY AND FRIDAY (SEE OTHER DOSE FOR REMAINING DAYS OF WEEK) 24 tablet 3    levothyroxine (SYNTHROID/LEVOTHROID) 75 MCG tablet TAKE 1 TAB BY MOUTH ON TUE WED THR SAT SUN 60 tablet 1    losartan (COZAAR) 25 MG tablet Take 1 tablet (25 mg) by mouth daily 90 tablet 3    magnesium 500 MG TABS       Melatonin 10 MG CAPS       montelukast (SINGULAIR) 10 MG tablet Take 1 tablet (10 mg) by mouth at bedtime 90 tablet 2    multivitamin w/minerals (THERA-VIT-M) tablet Take 1 tablet by mouth daily      PULMICORT FLEXHALER 180 MCG/ACT inhaler INHALE 2 PUFFS BY MOUTH TWICE A DAY 9 each 3    simvastatin (ZOCOR) 10 MG tablet Take 1 tablet (10 mg) by mouth At Bedtime 90 tablet 2    triamcinolone (KENALOG) 0.1 % external cream Apply topically 2 times daily      umeclidinium-vilanterol (ANORO ELLIPTA) 62.5-25 MCG/ACT oral inhaler INHALE 1 PUFF BY MOUTH EVERY  each 0    UNKNOWN TO PATIENT Eye vitamin       Michi Murillo MD  Internal Medicine  [facility]

## 2024-04-24 NOTE — LETTER
April 29, 2024      Rita Mancini  [address]   [place] [address]        Dear ,    We are writing to inform you of your test results.    Your tests are good.      Resulted Orders   Hemoglobin A1c   Result Value Ref Range    Hemoglobin A1C 6.2 (H) 0.0 - 5.6 %      Comment:      Normal <5.7%   Prediabetes 5.7-6.4%    Diabetes 6.5% or higher     Note: Adopted from ADA consensus guidelines.   Albumin Random Urine Quantitative with Creat Ratio   Result Value Ref Range    Creatinine Urine mg/dL 76.8 mg/dL      Comment:      The reference ranges have not been established in urine creatinine. The results should be integrated into the clinical context for interpretation.    Albumin Urine mg/L 14.5 mg/L      Comment:      The reference ranges have not been established in urine albumin. The results should be integrated into the clinical context for interpretation.    Albumin Urine mg/g Cr 18.88 0.00 - 25.00 mg/g Cr      Comment:      Microalbuminuria is defined as an albumin:creatinine ratio of 17 to 299 for males and 25 to 299 for females. A ratio of albumin:creatinine of 300 or higher is indicative of overt proteinuria.  Due to biologic variability, positive results should be confirmed by a second, first-morning random or 24-hour timed urine specimen. If there is discrepancy, a third specimen is recommended. When 2 out of 3 results are in the microalbuminuria range, this is evidence for incipient nephropathy and warrants increased efforts at glucose control, blood pressure control, and institution of therapy with an angiotensin-converting-enzyme (ACE) inhibitor (if the patient can tolerate it).         If you have any questions or concerns, please call the clinic at the number listed above.       Sincerely,      Michi Murillo MD

## 2024-04-25 PROCEDURE — 82043 UR ALBUMIN QUANTITATIVE: CPT | Performed by: INTERNAL MEDICINE

## 2024-04-25 PROCEDURE — 82570 ASSAY OF URINE CREATININE: CPT | Performed by: INTERNAL MEDICINE

## 2024-04-26 LAB
CREAT UR-MCNC: 76.8 MG/DL
MICROALBUMIN UR-MCNC: 14.5 MG/L
MICROALBUMIN/CREAT UR: 18.88 MG/G CR (ref 0–25)

## 2024-04-29 DIAGNOSIS — F41.1 GAD (GENERALIZED ANXIETY DISORDER): ICD-10-CM

## 2024-04-29 DIAGNOSIS — E78.5 HYPERLIPIDEMIA LDL GOAL <100: ICD-10-CM

## 2024-04-29 DIAGNOSIS — K21.9 GASTROESOPHAGEAL REFLUX DISEASE WITHOUT ESOPHAGITIS: ICD-10-CM

## 2024-04-29 RX ORDER — CITALOPRAM HYDROBROMIDE 10 MG/1
10 TABLET ORAL DAILY
Qty: 90 TABLET | Refills: 2 | Status: SHIPPED | OUTPATIENT
Start: 2024-04-29

## 2024-04-29 RX ORDER — FAMOTIDINE 20 MG/1
20 TABLET, FILM COATED ORAL DAILY
Qty: 90 TABLET | Refills: 2 | Status: SHIPPED | OUTPATIENT
Start: 2024-04-29

## 2024-04-29 RX ORDER — SIMVASTATIN 10 MG
10 TABLET ORAL AT BEDTIME
Qty: 90 TABLET | Refills: 2 | Status: SHIPPED | OUTPATIENT
Start: 2024-04-29

## 2024-05-01 ENCOUNTER — OFFICE VISIT (OUTPATIENT)
Dept: CARDIOLOGY | Facility: CLINIC | Age: 74
End: 2024-05-01
Payer: MEDICARE

## 2024-05-01 VITALS
DIASTOLIC BLOOD PRESSURE: 62 MMHG | BODY MASS INDEX: 28.46 KG/M2 | SYSTOLIC BLOOD PRESSURE: 116 MMHG | RESPIRATION RATE: 16 BRPM | WEIGHT: 171 LBS | HEART RATE: 79 BPM

## 2024-05-01 VITALS
RESPIRATION RATE: 16 BRPM | HEART RATE: 63 BPM | DIASTOLIC BLOOD PRESSURE: 48 MMHG | WEIGHT: 171 LBS | SYSTOLIC BLOOD PRESSURE: 94 MMHG | BODY MASS INDEX: 28.46 KG/M2

## 2024-05-01 DIAGNOSIS — Z00.6 EXAMINATION OF PARTICIPANT OR CONTROL IN CLINICAL RESEARCH: Primary | ICD-10-CM

## 2024-05-01 DIAGNOSIS — E13.9 DIABETES 1.5, MANAGED AS TYPE 2 (H): Primary | ICD-10-CM

## 2024-05-01 DIAGNOSIS — E78.5 HYPERLIPIDEMIA LDL GOAL <100: ICD-10-CM

## 2024-05-01 PROCEDURE — 36415 COLL VENOUS BLD VENIPUNCTURE: CPT

## 2024-05-01 PROCEDURE — 84999 UNLISTED CHEMISTRY PROCEDURE: CPT

## 2024-05-01 PROCEDURE — 99214 OFFICE O/P EST MOD 30 MIN: CPT | Performed by: NURSE PRACTITIONER

## 2024-05-01 PROCEDURE — 99207 PR NO CHARGE-RESEARCH SERVICE: CPT

## 2024-05-01 NOTE — LETTER
5/1/2024    Michi Murillo MD  [address]  [place] [address]    RE: Rita Mancini       Dear Colleague,     I had the pleasure of seeing Rita Mancini in the [facility].      A randomized, double-blind, placebo-controlled multicenter study to evaluate the effect of inclisiran on preventing major adverse cardiovascular events in high-risk primary prevention patients (VICTORION-1 PREVENT)    Rita Mancini was seen in clinic today for the screening visit.    Research nurse met with subject Rita Mancini and family to discuss consent and participation in the above noted study.    The study discussion included the following:   Study purpose  Qualifications for participation  Length of study participation  Study procedures  Risks and side effects  Benefits (if any)  Voluntary nature of participation  Alternatives to participation  Confidentiality of records  Financial considerations     Subject asked questions and agreed that she received answers that satisfied her.    Consent form [Version 01.01.03 - [employer] version 17 Jan 2024] signed on 01 May 2024 at 1000. Optional Genetic Consent form [Version 00.00.00 - [employer] version 29 Nov 2023] signed on 01 May 2024 at 1000.    Patient verbalized understanding. A signed copy was offered to the subject & forwarded to medical records. No study procedures were done prior to obtaining informed consent.      TIM obtained    Did Subject meet all inclusion criteria? Yes  Did Subject meet any Exclusion criteria? No    Physical Examination done by Andressa Vazquez, see documentation.     Blood Pressure and pulse taken 3 times in sitting position after resting for 5 minutes:     Vitals:    05/01/24 0930 05/01/24 1032 05/01/24 1033 05/01/24 1034   BP: 94/48 116/60 114/62 116/62   BP Location: Right arm Right arm Right arm Right arm   Patient Position: Sitting Sitting Sitting Sitting   Cuff Size: Adult Large Adult Regular Adult Regular Adult  "Regular   Pulse: 63 69 70 79   Resp: 16      Weight: 77.6 kg (171 lb)          Mean Pulse and Blood Pressure: 115/61  HR 73    Weight: 77.6 kg  Height: 5'5\"  Waist Circumference: 102 cm     Smoking Status:   Ever smoked tobacco: Yes  Type: Cigarettes (1.5 packs/day)  Amount: 101.25 Pack/Year  Start Date of Use: 44 years  End Date of Use (for former user): 11/09/2008    Fasting lab draw performed without issue Yes Time 1022  Serum Pregnancy test for WOCBP No Time  Central UA collected.    Adverse events, medications, and health status reviewed with subject.   Lifestyle instructions reviewed with subject.    Patient is on stable dose of statin therapy without changes in last 4 weeks Yes    Is patient on a Moderate Intensity or High Intensity Statin Therapy: 10 mg Simvastatin       Demographics:    [x]Not  or   [] or    []Not Reported  [] Unknown         Race:     [] or    []   []Black or    [] or Other   [x]White   []Other, specify:       Angelique Vazquez RN   Clinical Trials Office   [phone]      Current Outpatient Medications:     aspirin 81 MG EC tablet, Take 81 mg by mouth daily, Disp: , Rfl:     blood glucose (NO BRAND SPECIFIED) test strip, Use to test blood sugar one time daily or as directed., Disp: 100 strip, Rfl: 3    calcium carbonate (TUMS) 500 MG chewable tablet, Take 1 chew tab by mouth daily, Disp: , Rfl:     cetirizine (ZYRTEC) 10 MG CHEW, Take 10 mg by mouth daily, Disp: , Rfl:     citalopram (CELEXA) 10 MG tablet, Take 1 tablet (10 mg) by mouth daily, Disp: 90 tablet, Rfl: 2    Dulaglutide (TRULICITY) 3 MG/0.5ML SOPN, Inject 3 mg Subcutaneous once a week, Disp: 6 mL, Rfl: 3    famotidine (PEPCID) 20 MG tablet, Take 1 tablet (20 mg) by mouth daily, Disp: 90 tablet, Rfl: 2    fluticasone (FLONASE) 50 MCG/ACT nasal spray, Spray 1 spray into both nostrils 2 times daily, Disp: 16 g, Rfl: 3    " levothyroxine (SYNTHROID/LEVOTHROID) 50 MCG tablet, TAKE ON EMPTY STOMACH EVERY MONDAY AND FRIDAY (SEE OTHER DOSE FOR REMAINING DAYS OF WEEK), Disp: 24 tablet, Rfl: 3    levothyroxine (SYNTHROID/LEVOTHROID) 75 MCG tablet, TAKE 1 TAB BY MOUTH ON TUE WED THR SAT SUN, Disp: 60 tablet, Rfl: 1    losartan (COZAAR) 25 MG tablet, Take 1 tablet (25 mg) by mouth daily, Disp: 90 tablet, Rfl: 3    magnesium 500 MG TABS, , Disp: , Rfl:     Melatonin 10 MG CAPS, , Disp: , Rfl:     montelukast (SINGULAIR) 10 MG tablet, Take 1 tablet (10 mg) by mouth at bedtime, Disp: 90 tablet, Rfl: 2    multivitamin w/minerals (THERA-VIT-M) tablet, Take 1 tablet by mouth daily, Disp: , Rfl:     PULMICORT FLEXHALER 180 MCG/ACT inhaler, INHALE 2 PUFFS BY MOUTH TWICE A DAY, Disp: 9 each, Rfl: 3    simvastatin (ZOCOR) 10 MG tablet, Take 1 tablet (10 mg) by mouth at bedtime, Disp: 90 tablet, Rfl: 2    triamcinolone (KENALOG) 0.1 % external cream, Apply topically 2 times daily, Disp: , Rfl:     umeclidinium-vilanterol (ANORO ELLIPTA) 62.5-25 MCG/ACT oral inhaler, INHALE 1 PUFF BY MOUTH EVERY DAY, Disp: 180 each, Rfl: 0    UNKNOWN TO PATIENT, Eye vitamin, Disp: , Rfl:     Dulaglutide (TRULICITY) 4.5 MG/0.5ML SOPN, Inject 4.5 mg Subcutaneous once a week (Patient not taking: Reported on 5/1/2024), Disp: 6 mL, Rfl: 3    zolpidem (AMBIEN) 5 MG tablet, Take 1 tablet (5 mg) by mouth nightly as needed for sleep (Patient not taking: Reported on 5/1/2024), Disp: 30 tablet, Rfl: 3    Current Facility-Administered Medications:     bevacizumab (AVASTIN) intravitreal inj 1.25 mg, 1.25 mg, Intravitreal, Q28 Days, Malvin De Leon MD, 1.25 mg at 10/11/23 1525    faricimab-svoa (VABYSMO) intravitreal injection 6 mg, 6 mg, Intravitreal, q28 days, Malvin De Leon MD, 6 mg at 04/03/24 0087  Past Medical History:   Diagnosis Date    Age-related cataract 12/06/2021    Anemia     Anxiety and depression 01/01/1962    Bigeminy 03/17/2019    Bronchiectasis  without complication (H)     Chronic cough     Chronic pleural effusion 01/15/2015    CKD (chronic kidney disease) stage 3, GFR 30-59 ml/min (H)     COPD (chronic obstructive pulmonary disease) (H) 01/01/2009    Depression     Diverticulosis     Eczema of both hands     Functional diarrhea 12/31/2018    GERD (gastroesophageal reflux disease)     History of alcoholism (H)     History of syncope 04/24/2024    Hx antineoplastic chemotherapy     lung cancer     Hx of radiation therapy     lung cancer     Hydronephrosis     right kidney since 2019    Hyperlipidemia LDL goal <100 03/06/2023    Hypothyroid     Infection due to 2019 novel coronavirus 03/06/2023    Lung cancer (H) 01/01/2008    RUL,  Non small cell cancer    Macular degeneration (senile) of retina     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    Osteopenia     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, subsequent encounter 04/08/2019    Pleural effusion 01/01/2015    Sepsis due to Escherichia coli with acute renal failure without septic shock, unspecified acute renal failure type (H)     Sleep apnea 01/01/2010    does not use cpap    Type 2 diabetes, HbA1C goal < 8% (H)     Umbilical hernia with obstruction 01/23/2024       Vitals: BP 94/48 (BP Location: Right arm, Patient Position: Sitting, Cuff Size: Adult Large)   Pulse 63   Resp 16   Wt 77.6 kg (171 lb)   LMP  (LMP Unknown)   BMI 28.46 kg/m    BMI= Body mass index is 28.46 kg/m .  Other  female  73 year old       Thank you for allowing me to participate in the care of your patient.      Sincerely,     Angelique Vazquez RN     [facility]  cc:   Referred Self,

## 2024-05-01 NOTE — PROGRESS NOTES
"    A randomized, double-blind, placebo-controlled multicenter study to evaluate the effect of inclisiran on preventing major adverse cardiovascular events in high-risk primary prevention patients (VICTORION-1 PREVENT)    Rita Mancini was seen in clinic today for the screening visit.    Research nurse met with subject Rita Mancini and family to discuss consent and participation in the above noted study.    The study discussion included the following:   Study purpose  Qualifications for participation  Length of study participation  Study procedures  Risks and side effects  Benefits (if any)  Voluntary nature of participation  Alternatives to participation  Confidentiality of records  Financial considerations     Subject asked questions and agreed that she received answers that satisfied her.    Consent form [Version 01.01.03 - [employer] version 17 Jan 2024] signed on 01 May 2024 at 1000. Optional Genetic Consent form [Version 00.00.00 - [employer] version 29 Nov 2023] signed on 01 May 2024 at 1000.    Patient verbalized understanding. A signed copy was offered to the subject & forwarded to medical records. No study procedures were done prior to obtaining informed consent.      TIM obtained    Did Subject meet all inclusion criteria? Yes  Did Subject meet any Exclusion criteria? No    Physical Examination done by Andressa Vazquez, see documentation.     Blood Pressure and pulse taken 3 times in sitting position after resting for 5 minutes:     Vitals:    05/01/24 0930 05/01/24 1032 05/01/24 1033 05/01/24 1034   BP: 94/48 116/60 114/62 116/62   BP Location: Right arm Right arm Right arm Right arm   Patient Position: Sitting Sitting Sitting Sitting   Cuff Size: Adult Large Adult Regular Adult Regular Adult Regular   Pulse: 63 69 70 79   Resp: 16      Weight: 77.6 kg (171 lb)          Mean Pulse and Blood Pressure: 115/61  HR 73    Weight: 77.6 kg  Height: 5'5\"  Waist Circumference: 102 cm     Smoking Status:   Ever " smoked tobacco: Yes  Type: Cigarettes (1.5 packs/day)  Amount: 101.25 Pack/Year  Start Date of Use: 44 years  End Date of Use (for former user): 11/09/2008    Fasting lab draw performed without issue Yes Time 1022  Serum Pregnancy test for WOCBP No Time  Central UA collected.    Adverse events, medications, and health status reviewed with subject.   Lifestyle instructions reviewed with subject.    Patient is on stable dose of statin therapy without changes in last 4 weeks Yes    Is patient on a Moderate Intensity or High Intensity Statin Therapy: 10 mg Simvastatin       Demographics:    [x]Not  or   [] or    []Not Reported  [] Unknown         Race:     [] or    []   []Black or    [] or Other   [x]White   []Other, specify:       Angelique Vazquez RN   Clinical Trials Office   [phone]      Current Outpatient Medications:     aspirin 81 MG EC tablet, Take 81 mg by mouth daily, Disp: , Rfl:     blood glucose (NO BRAND SPECIFIED) test strip, Use to test blood sugar one time daily or as directed., Disp: 100 strip, Rfl: 3    calcium carbonate (TUMS) 500 MG chewable tablet, Take 1 chew tab by mouth daily, Disp: , Rfl:     cetirizine (ZYRTEC) 10 MG CHEW, Take 10 mg by mouth daily, Disp: , Rfl:     citalopram (CELEXA) 10 MG tablet, Take 1 tablet (10 mg) by mouth daily, Disp: 90 tablet, Rfl: 2    Dulaglutide (TRULICITY) 3 MG/0.5ML SOPN, Inject 3 mg Subcutaneous once a week, Disp: 6 mL, Rfl: 3    famotidine (PEPCID) 20 MG tablet, Take 1 tablet (20 mg) by mouth daily, Disp: 90 tablet, Rfl: 2    fluticasone (FLONASE) 50 MCG/ACT nasal spray, Spray 1 spray into both nostrils 2 times daily, Disp: 16 g, Rfl: 3    levothyroxine (SYNTHROID/LEVOTHROID) 50 MCG tablet, TAKE ON EMPTY STOMACH EVERY MONDAY AND FRIDAY (SEE OTHER DOSE FOR REMAINING DAYS OF WEEK), Disp: 24 tablet, Rfl: 3    levothyroxine (SYNTHROID/LEVOTHROID) 75  MCG tablet, TAKE 1 TAB BY MOUTH ON TUE WED THR SAT SUN, Disp: 60 tablet, Rfl: 1    losartan (COZAAR) 25 MG tablet, Take 1 tablet (25 mg) by mouth daily, Disp: 90 tablet, Rfl: 3    magnesium 500 MG TABS, , Disp: , Rfl:     Melatonin 10 MG CAPS, , Disp: , Rfl:     montelukast (SINGULAIR) 10 MG tablet, Take 1 tablet (10 mg) by mouth at bedtime, Disp: 90 tablet, Rfl: 2    multivitamin w/minerals (THERA-VIT-M) tablet, Take 1 tablet by mouth daily, Disp: , Rfl:     PULMICORT FLEXHALER 180 MCG/ACT inhaler, INHALE 2 PUFFS BY MOUTH TWICE A DAY, Disp: 9 each, Rfl: 3    simvastatin (ZOCOR) 10 MG tablet, Take 1 tablet (10 mg) by mouth at bedtime, Disp: 90 tablet, Rfl: 2    triamcinolone (KENALOG) 0.1 % external cream, Apply topically 2 times daily, Disp: , Rfl:     umeclidinium-vilanterol (ANORO ELLIPTA) 62.5-25 MCG/ACT oral inhaler, INHALE 1 PUFF BY MOUTH EVERY DAY, Disp: 180 each, Rfl: 0    UNKNOWN TO PATIENT, Eye vitamin, Disp: , Rfl:     Dulaglutide (TRULICITY) 4.5 MG/0.5ML SOPN, Inject 4.5 mg Subcutaneous once a week (Patient not taking: Reported on 5/1/2024), Disp: 6 mL, Rfl: 3    zolpidem (AMBIEN) 5 MG tablet, Take 1 tablet (5 mg) by mouth nightly as needed for sleep (Patient not taking: Reported on 5/1/2024), Disp: 30 tablet, Rfl: 3    Current Facility-Administered Medications:     bevacizumab (AVASTIN) intravitreal inj 1.25 mg, 1.25 mg, Intravitreal, Q28 Days, Malvin De Leon MD, 1.25 mg at 10/11/23 1525    faricimab-svoa (VABYSMO) intravitreal injection 6 mg, 6 mg, Intravitreal, q28 days, Malvin De Leon MD, 6 mg at 04/03/24 1447  Past Medical History:   Diagnosis Date    Age-related cataract 12/06/2021    Anemia     Anxiety and depression 01/01/1962    Bigeminy 03/17/2019    Bronchiectasis without complication (H)     Chronic cough     Chronic pleural effusion 01/15/2015    CKD (chronic kidney disease) stage 3, GFR 30-59 ml/min (H)     COPD (chronic obstructive pulmonary disease) (H) 01/01/2009     Depression     Diverticulosis     Eczema of both hands     Functional diarrhea 12/31/2018    GERD (gastroesophageal reflux disease)     History of alcoholism (H)     History of syncope 04/24/2024    Hx antineoplastic chemotherapy     lung cancer     Hx of radiation therapy     lung cancer     Hydronephrosis     right kidney since 2019    Hyperlipidemia LDL goal <100 03/06/2023    Hypothyroid     Infection due to 2019 novel coronavirus 03/06/2023    Lung cancer (H) 01/01/2008    RUL,  Non small cell cancer    Macular degeneration (senile) of retina     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    Osteopenia     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, subsequent encounter 04/08/2019    Pleural effusion 01/01/2015    Sepsis due to Escherichia coli with acute renal failure without septic shock, unspecified acute renal failure type (H)     Sleep apnea 01/01/2010    does not use cpap    Type 2 diabetes, HbA1C goal < 8% (H)     Umbilical hernia with obstruction 01/23/2024       Vitals: BP 94/48 (BP Location: Right arm, Patient Position: Sitting, Cuff Size: Adult Large)   Pulse 63   Resp 16   Wt 77.6 kg (171 lb)   LMP  (LMP Unknown)   BMI 28.46 kg/m    BMI= Body mass index is 28.46 kg/m .  Other  female  73 year old

## 2024-05-01 NOTE — LETTER
5/1/2024    Michi Murillo MD  [address] [place] [address]    RE: Rita Mancini       Dear Colleague,     I had the pleasure of seeing Rita Mancini in the [facility].        Assessment/Recommendations   Assessment:      VICTORION-1 PREVENT: A randomized, double-blind, placebo-controlled multicenter study to evaluate the effect of inclisiran on preventing major adverse cardiovascular events in high-risk primary prevention patients     1.  Diabetes: Most recent hemoglobin A1c 6.2.  Managed by primary  2.  Hyperlipidemia: Continues simvastatin  3.  Obesity: BMI 28.46    Plan:  1.  Continue current medications  2.  Follow-up per research protocol    Rita Mancini will follow up per research protocol.     History of Present Illness/Subjective    Ms. Rita Mnacini is a 73 year old female seen at [facility] today for continued follow-up.  He follows up for VICTORION-1 PREVENT: A randomized, double-blind, placebo-controlled multicenter study to evaluate the effect of inclisiran on preventing major adverse cardiovascular events in high-risk primary prevention patients.  She has a past medical history significant for diabetes, hyperlipidemia, obesity, CKD, COPD, obesity, GERD.    Today, she has mild fatigue.  She denies lightheadedness, shortness of breath, dyspnea on exertion, orthopnea, PND, palpitations, chest pain, abdominal fullness/bloating, and lower extremity edema.       Physical Examination Review of Systems   BP 94/48 (BP Location: Right arm, Patient Position: Sitting, Cuff Size: Adult Large)   Pulse 63   Resp 16   Wt 77.6 kg (171 lb)   LMP  (LMP Unknown)   BMI 28.46 kg/m    Body mass index is 28.46 kg/m .  Wt Readings from Last 3 Encounters:   05/01/24 77.6 kg (171 lb)   05/01/24 77.6 kg (171 lb)   04/24/24 78.3 kg (172 lb 11.2 oz)       General Appearance:   no acute distress   ENT/Mouth: No abnormalities   EYES:  no scleral icterus, normal  conjunctivae   Neck: no thyromegaly   Chest/Lungs:   Rhonchi right upper and lower lung fields, decreased left lower and upper lung fields, equal chest wall expansion    Cardiovascular:   Regular. Normal first and second heart sounds with no murmurs, rubs, or gallops, no edema bilaterally    Abdomen:  bowel sounds are present   Extremities: no cyanosis or clubbing   Skin: warm   Neurologic: no tremors     Psychiatric: alert and oriented x3    Enc Vitals  BP: 94/48  Pulse: 63  Resp: 16  Weight: 77.6 kg (171 lb)                                         Medical History  Surgical History Family History Social History   Past Medical History:   Diagnosis Date    Age-related cataract 12/06/2021    Anemia     Anxiety and depression 01/01/1962    Bigeminy 03/17/2019    Bronchiectasis without complication (H)     Chronic cough     Chronic pleural effusion 01/15/2015    CKD (chronic kidney disease) stage 3, GFR 30-59 ml/min (H)     COPD (chronic obstructive pulmonary disease) (H) 01/01/2009    Depression     Diverticulosis     Eczema of both hands     Functional diarrhea 12/31/2018    GERD (gastroesophageal reflux disease)     History of alcoholism (H)     History of syncope 04/24/2024    Hx antineoplastic chemotherapy     lung cancer     Hx of radiation therapy     lung cancer     Hydronephrosis     right kidney since 2019    Hyperlipidemia LDL goal <100 03/06/2023    Hypothyroid     Infection due to 2019 novel coronavirus 03/06/2023    Lung cancer (H) 01/01/2008    RUL,  Non small cell cancer    Macular degeneration (senile) of retina     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    Osteopenia     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    IR MISCELLANEOUS PROCEDURE  12/10/2009    PORTACATH PLACEMENT      and removal    THORACOSCOPY Right 04/06/2015    Procedure: RIGHT THORACOSCOPY / BIOPSY PLEURAL / TALC PLEURODESIS;  Surgeon: Michi Ma MD;  Location: [facility];  Service:     THORACOSCOPY Left 03/29/2019    Procedure: LEFT THORACOSCOPY WITH DECORTICATION;  Surgeon: Michi Ma MD;  Location: [facility];  Service: General    TONSILLECTOMY  age 5    URETERAL STENT PLACEMENT Right 06/01/2010    US THORACENTESIS  03/27/2019    Family History   Problem Relation Age of Onset    Heart Disease Mother     Hypertension Mother     Alcoholism Mother     Depression Mother     Heart Disease Father 45        mi age 45, cabg    Coronary Artery Disease Father     Cancer Father         prostate    Hypertension Father     Snoring Father     Chronic Obstructive Pulmonary Disease Brother     Alcoholism Brother     Alcoholism Sister     Diabetes Son     Anxiety Disorder Son     Depression Son     Post-Traumatic Stress Disorder (PTSD) Son     Obesity Daughter     Obesity Daughter     Cancer Maternal Aunt 47        breast    Breast Cancer Paternal Aunt     Leukemia Grandchild     Schizophrenia Grandchild     Lymphoma Grandchild     Glaucoma No family hx of     Macular Degeneration No family hx of     Social History     Socioeconomic History    Marital status:      Spouse name: Not on file    Number of children: Not on file    Years of education: Not on file    Highest education level: Not on file   Occupational History    Not on file   Tobacco Use    Smoking status:  Former     Current packs/day: 0.00     Types: Cigarettes     Start date: 11/9/2008     Quit date: 11/9/2008     Years since quitting: 15.4     Passive exposure: Never    Smokeless tobacco: Never   Vaping Use    Vaping status: Never Used   Substance and Sexual Activity    Alcohol use: No    Drug use: No    Sexual activity: Never   Other Topics Concern    Not on file   Social History Narrative    Retired real estate sales, 3 children.  Loves to watch TV, no pets.  .  Remote history of alcohol dependence.      Social Determinants of Health     Financial Resource Strain: Low Risk  (1/17/2024)    Financial Resource Strain     Within the past 12 months, have you or your family members you live with been unable to get utilities (heat, electricity) when it was really needed?: No   Food Insecurity: Low Risk  (1/17/2024)    Food Insecurity     Within the past 12 months, did you worry that your food would run out before you got money to buy more?: No     Within the past 12 months, did the food you bought just not last and you didn t have money to get more?: No   Transportation Needs: Low Risk  (1/17/2024)    Transportation Needs     Within the past 12 months, has lack of transportation kept you from medical appointments, getting your medicines, non-medical meetings or appointments, work, or from getting things that you need?: No   Physical Activity: Not on file   Stress: Not on file   Social Connections: Not on file   Interpersonal Safety: Low Risk  (11/8/2023)    Interpersonal Safety     Do you feel physically and emotionally safe where you currently live?: Yes     Within the past 12 months, have you been hit, slapped, kicked or otherwise physically hurt by someone?: No     Within the past 12 months, have you been humiliated or emotionally abused in other ways by your partner or ex-partner?: No   Housing Stability: Low Risk  (1/17/2024)    Housing Stability     Do you have housing? : Yes     Are you worried about  losing your housing?: No          Medications  Allergies   Current Outpatient Medications   Medication Sig Dispense Refill    aspirin 81 MG EC tablet Take 81 mg by mouth daily      blood glucose (NO BRAND SPECIFIED) test strip Use to test blood sugar one time daily or as directed. 100 strip 3    calcium carbonate (TUMS) 500 MG chewable tablet Take 1 chew tab by mouth daily      cetirizine (ZYRTEC) 10 MG CHEW Take 10 mg by mouth daily      citalopram (CELEXA) 10 MG tablet Take 1 tablet (10 mg) by mouth daily 90 tablet 2    Dulaglutide (TRULICITY) 3 MG/0.5ML SOPN Inject 3 mg Subcutaneous once a week 6 mL 3    famotidine (PEPCID) 20 MG tablet Take 1 tablet (20 mg) by mouth daily 90 tablet 2    fluticasone (FLONASE) 50 MCG/ACT nasal spray Spray 1 spray into both nostrils 2 times daily 16 g 3    levothyroxine (SYNTHROID/LEVOTHROID) 50 MCG tablet TAKE ON EMPTY STOMACH EVERY MONDAY AND FRIDAY (SEE OTHER DOSE FOR REMAINING DAYS OF WEEK) 24 tablet 3    levothyroxine (SYNTHROID/LEVOTHROID) 75 MCG tablet TAKE 1 TAB BY MOUTH ON TUE WED THR SAT SUN 60 tablet 1    losartan (COZAAR) 25 MG tablet Take 1 tablet (25 mg) by mouth daily 90 tablet 3    magnesium 500 MG TABS       Melatonin 10 MG CAPS       montelukast (SINGULAIR) 10 MG tablet Take 1 tablet (10 mg) by mouth at bedtime 90 tablet 2    multivitamin w/minerals (THERA-VIT-M) tablet Take 1 tablet by mouth daily      PULMICORT FLEXHALER 180 MCG/ACT inhaler INHALE 2 PUFFS BY MOUTH TWICE A DAY 9 each 3    simvastatin (ZOCOR) 10 MG tablet Take 1 tablet (10 mg) by mouth at bedtime 90 tablet 2    triamcinolone (KENALOG) 0.1 % external cream Apply topically 2 times daily      umeclidinium-vilanterol (ANORO ELLIPTA) 62.5-25 MCG/ACT oral inhaler INHALE 1 PUFF BY MOUTH EVERY  each 0    UNKNOWN TO PATIENT Eye vitamin      Dulaglutide (TRULICITY) 4.5 MG/0.5ML SOPN Inject 4.5 mg Subcutaneous once a week (Patient not taking: Reported on 5/1/2024) 6 mL 3    zolpidem (AMBIEN) 5 MG  tablet Take 1 tablet (5 mg) by mouth nightly as needed for sleep (Patient not taking: Reported on 5/1/2024) 30 tablet 3    Allergies   Allergen Reactions    Seasonal Allergies          Lab Results    Chemistry/lipid CBC Cardiac Enzymes/BNP/TSH/INR   Lab Results   Component Value Date    CHOL 164 05/16/2023    HDL 46 (L) 05/16/2023    TRIG 188 (H) 05/16/2023    BUN 21.6 01/23/2024     01/23/2024    CO2 25 01/23/2024    Lab Results   Component Value Date    WBC 10.4 01/23/2024    HGB 13.1 01/23/2024    HCT 41.1 01/23/2024    MCV 93 01/23/2024     01/23/2024    Lab Results   Component Value Date    TROPONINI <0.01 10/07/2019     (H) 07/27/2019    TSH 0.78 11/08/2023    INR 1.07 10/07/2019             This note has been dictated using voice recognition software. Any grammatical, typographical, or context distortions are unintentional and inherent to the software    20 minutes spent on the date of encounter doing chart review, review of outside records, review of test results, interpretation with above tests, patient visit, and documentation.                  Thank you for allowing me to participate in the care of your patient.      Sincerely,     ZENIA Song CNP     [facility]  cc:   ZENIA Song CNP  [address], [address]  [place] [address]

## 2024-05-01 NOTE — PROGRESS NOTES
Assessment/Recommendations   Assessment:      VICTORION-1 PREVENT: A randomized, double-blind, placebo-controlled multicenter study to evaluate the effect of inclisiran on preventing major adverse cardiovascular events in high-risk primary prevention patients     1.  Diabetes: Most recent hemoglobin A1c 6.2.  Managed by primary  2.  Hyperlipidemia: Continues simvastatin  3.  Obesity: BMI 28.46    Plan:  1.  Continue current medications  2.  Follow-up per research protocol    Rita Mancini will follow up per research protocol.     History of Present Illness/Subjective    Ms. Rita Mancini is a 73 year old female seen at [facility] today for continued follow-up.  He follows up for VICTORION-1 PREVENT: A randomized, double-blind, placebo-controlled multicenter study to evaluate the effect of inclisiran on preventing major adverse cardiovascular events in high-risk primary prevention patients.  She has a past medical history significant for diabetes, hyperlipidemia, obesity, CKD, COPD, obesity, GERD.    Today, she has mild fatigue.  She denies lightheadedness, shortness of breath, dyspnea on exertion, orthopnea, PND, palpitations, chest pain, abdominal fullness/bloating, and lower extremity edema.       Physical Examination Review of Systems   BP 94/48 (BP Location: Right arm, Patient Position: Sitting, Cuff Size: Adult Large)   Pulse 63   Resp 16   Wt 77.6 kg (171 lb)   LMP  (LMP Unknown)   BMI 28.46 kg/m    Body mass index is 28.46 kg/m .  Wt Readings from Last 3 Encounters:   05/01/24 77.6 kg (171 lb)   05/01/24 77.6 kg (171 lb)   04/24/24 78.3 kg (172 lb 11.2 oz)       General Appearance:   no acute distress   ENT/Mouth: No abnormalities   EYES:  no scleral icterus, normal conjunctivae   Neck: no thyromegaly   Chest/Lungs:   Rhonchi right upper and lower lung fields, decreased left lower and upper lung fields, equal chest wall expansion    Cardiovascular:   Regular. Normal first  and second heart sounds with no murmurs, rubs, or gallops, no edema bilaterally    Abdomen:  bowel sounds are present   Extremities: no cyanosis or clubbing   Skin: warm   Neurologic: no tremors     Psychiatric: alert and oriented x3    Enc Vitals  BP: 94/48  Pulse: 63  Resp: 16  Weight: 77.6 kg (171 lb)                                         Medical History  Surgical History Family History Social History   Past Medical History:   Diagnosis Date    Age-related cataract 12/06/2021    Anemia     Anxiety and depression 01/01/1962    Bigeminy 03/17/2019    Bronchiectasis without complication (H)     Chronic cough     Chronic pleural effusion 01/15/2015    CKD (chronic kidney disease) stage 3, GFR 30-59 ml/min (H)     COPD (chronic obstructive pulmonary disease) (H) 01/01/2009    Depression     Diverticulosis     Eczema of both hands     Functional diarrhea 12/31/2018    GERD (gastroesophageal reflux disease)     History of alcoholism (H)     History of syncope 04/24/2024    Hx antineoplastic chemotherapy     lung cancer     Hx of radiation therapy     lung cancer     Hydronephrosis     right kidney since 2019    Hyperlipidemia LDL goal <100 03/06/2023    Hypothyroid     Infection due to 2019 novel coronavirus 03/06/2023    Lung cancer (H) 01/01/2008    RUL,  Non small cell cancer    Macular degeneration (senile) of retina     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    Osteopenia     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, subsequent encounter 04/08/2019    Pleural effusion 01/01/2015    Sepsis due to Escherichia coli with acute renal failure without septic shock, unspecified acute renal failure type (H)     Sleep apnea 01/01/2010    does not use cpap    Type 2 diabetes, HbA1C goal < 8% (H)     Umbilical hernia with obstruction 01/23/2024    Past Surgical History:   Procedure Laterality Date    CATARACT IOL, RT/LT Bilateral 12/2021    DAVINCI XI HERNIORRHAPHY VENTRAL N/A  2/16/2024    Procedure: HERNIORRHAPHY, VENTRAL, ROBOT-ASSISTED, LAPAROSCOPIC, USING DA PAUL XI;  Surgeon: Tyler Rossi MD;  Location: [facility] OR    IR MISCELLANEOUS PROCEDURE  12/10/2009    PORTACATH PLACEMENT      and removal    THORACOSCOPY Right 04/06/2015    Procedure: RIGHT THORACOSCOPY / BIOPSY PLEURAL / TALC PLEURODESIS;  Surgeon: Michi Ma MD;  Location: [facility] OR;  Service:     THORACOSCOPY Left 03/29/2019    Procedure: LEFT THORACOSCOPY WITH DECORTICATION;  Surgeon: Michi Ma MD;  Location: [facility] OR;  Service: General    TONSILLECTOMY  age 5    URETERAL STENT PLACEMENT Right 06/01/2010    US THORACENTESIS  03/27/2019    Family History   Problem Relation Age of Onset    Heart Disease Mother     Hypertension Mother     Alcoholism Mother     Depression Mother     Heart Disease Father 45        mi age 45, cabg    Coronary Artery Disease Father     Cancer Father         prostate    Hypertension Father     Snoring Father     Chronic Obstructive Pulmonary Disease Brother     Alcoholism Brother     Alcoholism Sister     Diabetes Son     Anxiety Disorder Son     Depression Son     Post-Traumatic Stress Disorder (PTSD) Son     Obesity Daughter     Obesity Daughter     Cancer Maternal Aunt 47        breast    Breast Cancer Paternal Aunt     Leukemia Grandchild     Schizophrenia Grandchild     Lymphoma Grandchild     Glaucoma No family hx of     Macular Degeneration No family hx of     Social History     Socioeconomic History    Marital status:      Spouse name: Not on file    Number of children: Not on file    Years of education: Not on file    Highest education level: Not on file   Occupational History    Not on file   Tobacco Use    Smoking status: Former     Current packs/day: 0.00     Types: Cigarettes     Start date: 11/9/2008     Quit date: 11/9/2008     Years since quitting: 15.4     Passive exposure: Never    Smokeless tobacco: Never   Vaping Use     Vaping status: Never Used   Substance and Sexual Activity    Alcohol use: No    Drug use: No    Sexual activity: Never   Other Topics Concern    Not on file   Social History Narrative    Retired real estate sales, 3 children.  Loves to watch TV, no pets.  .  Remote history of alcohol dependence.      Social Determinants of Health     Financial Resource Strain: Low Risk  (1/17/2024)    Financial Resource Strain     Within the past 12 months, have you or your family members you live with been unable to get utilities (heat, electricity) when it was really needed?: No   Food Insecurity: Low Risk  (1/17/2024)    Food Insecurity     Within the past 12 months, did you worry that your food would run out before you got money to buy more?: No     Within the past 12 months, did the food you bought just not last and you didn t have money to get more?: No   Transportation Needs: Low Risk  (1/17/2024)    Transportation Needs     Within the past 12 months, has lack of transportation kept you from medical appointments, getting your medicines, non-medical meetings or appointments, work, or from getting things that you need?: No   Physical Activity: Not on file   Stress: Not on file   Social Connections: Not on file   Interpersonal Safety: Low Risk  (11/8/2023)    Interpersonal Safety     Do you feel physically and emotionally safe where you currently live?: Yes     Within the past 12 months, have you been hit, slapped, kicked or otherwise physically hurt by someone?: No     Within the past 12 months, have you been humiliated or emotionally abused in other ways by your partner or ex-partner?: No   Housing Stability: Low Risk  (1/17/2024)    Housing Stability     Do you have housing? : Yes     Are you worried about losing your housing?: No          Medications  Allergies   Current Outpatient Medications   Medication Sig Dispense Refill    aspirin 81 MG EC tablet Take 81 mg by mouth daily      blood glucose (NO BRAND SPECIFIED)  test strip Use to test blood sugar one time daily or as directed. 100 strip 3    calcium carbonate (TUMS) 500 MG chewable tablet Take 1 chew tab by mouth daily      cetirizine (ZYRTEC) 10 MG CHEW Take 10 mg by mouth daily      citalopram (CELEXA) 10 MG tablet Take 1 tablet (10 mg) by mouth daily 90 tablet 2    Dulaglutide (TRULICITY) 3 MG/0.5ML SOPN Inject 3 mg Subcutaneous once a week 6 mL 3    famotidine (PEPCID) 20 MG tablet Take 1 tablet (20 mg) by mouth daily 90 tablet 2    fluticasone (FLONASE) 50 MCG/ACT nasal spray Spray 1 spray into both nostrils 2 times daily 16 g 3    levothyroxine (SYNTHROID/LEVOTHROID) 50 MCG tablet TAKE ON EMPTY STOMACH EVERY MONDAY AND FRIDAY (SEE OTHER DOSE FOR REMAINING DAYS OF WEEK) 24 tablet 3    levothyroxine (SYNTHROID/LEVOTHROID) 75 MCG tablet TAKE 1 TAB BY MOUTH ON TUE WED THR SAT SUN 60 tablet 1    losartan (COZAAR) 25 MG tablet Take 1 tablet (25 mg) by mouth daily 90 tablet 3    magnesium 500 MG TABS       Melatonin 10 MG CAPS       montelukast (SINGULAIR) 10 MG tablet Take 1 tablet (10 mg) by mouth at bedtime 90 tablet 2    multivitamin w/minerals (THERA-VIT-M) tablet Take 1 tablet by mouth daily      PULMICORT FLEXHALER 180 MCG/ACT inhaler INHALE 2 PUFFS BY MOUTH TWICE A DAY 9 each 3    simvastatin (ZOCOR) 10 MG tablet Take 1 tablet (10 mg) by mouth at bedtime 90 tablet 2    triamcinolone (KENALOG) 0.1 % external cream Apply topically 2 times daily      umeclidinium-vilanterol (ANORO ELLIPTA) 62.5-25 MCG/ACT oral inhaler INHALE 1 PUFF BY MOUTH EVERY  each 0    UNKNOWN TO PATIENT Eye vitamin      Dulaglutide (TRULICITY) 4.5 MG/0.5ML SOPN Inject 4.5 mg Subcutaneous once a week (Patient not taking: Reported on 5/1/2024) 6 mL 3    zolpidem (AMBIEN) 5 MG tablet Take 1 tablet (5 mg) by mouth nightly as needed for sleep (Patient not taking: Reported on 5/1/2024) 30 tablet 3    Allergies   Allergen Reactions    Seasonal Allergies          Lab Results    Chemistry/lipid CBC  Cardiac Enzymes/BNP/TSH/INR   Lab Results   Component Value Date    CHOL 164 05/16/2023    HDL 46 (L) 05/16/2023    TRIG 188 (H) 05/16/2023    BUN 21.6 01/23/2024     01/23/2024    CO2 25 01/23/2024    Lab Results   Component Value Date    WBC 10.4 01/23/2024    HGB 13.1 01/23/2024    HCT 41.1 01/23/2024    MCV 93 01/23/2024     01/23/2024    Lab Results   Component Value Date    TROPONINI <0.01 10/07/2019     (H) 07/27/2019    TSH 0.78 11/08/2023    INR 1.07 10/07/2019             This note has been dictated using voice recognition software. Any grammatical, typographical, or context distortions are unintentional and inherent to the software    20 minutes spent on the date of encounter doing chart review, review of outside records, review of test results, interpretation with above tests, patient visit, and documentation.

## 2024-05-06 ENCOUNTER — TELEPHONE (OUTPATIENT)
Dept: CARDIOLOGY | Facility: CLINIC | Age: 74
End: 2024-05-06
Payer: MEDICARE

## 2024-05-06 DIAGNOSIS — G47.9 SLEEP DISORDER: ICD-10-CM

## 2024-05-06 PROCEDURE — 99207 PR NO CHARGE-RESEARCH SERVICE: CPT

## 2024-05-06 RX ORDER — ZOLPIDEM TARTRATE 5 MG/1
5 TABLET ORAL
OUTPATIENT
Start: 2024-05-06

## 2024-05-06 NOTE — TELEPHONE ENCOUNTER
A randomized, double-blind, placebo-controlled multicenter study to evaluate the effect of inclisiran on preventing major adverse cardiovascular events in high-risk primary prevention patients (VICTORION-1 PREVENT)    This patient does not qualify based on inclusion criteria 4- lipid lowering therapy does not meet minimum requirements for the study. Communicated this to patient and thanked for participation. Patient is interested in further clinical trials.     Angelique Vazquez RN  Clinical Research Nurse  [phone]

## 2024-05-07 ENCOUNTER — TRANSFERRED RECORDS (OUTPATIENT)
Dept: CARDIOLOGY | Facility: CLINIC | Age: 74
End: 2024-05-07
Payer: MEDICARE

## 2024-05-07 DIAGNOSIS — G47.9 SLEEP DISORDER: ICD-10-CM

## 2024-05-07 RX ORDER — ZOLPIDEM TARTRATE 5 MG/1
5 TABLET ORAL
OUTPATIENT
Start: 2024-05-07

## 2024-05-07 NOTE — TELEPHONE ENCOUNTER
90 day prescription requested     Disp Refills Start End SADIE   zolpidem (AMBIEN) 5 MG tablet 30 tablet 3 4/24/2024 -- No   Sig - Route: Take 1 tablet (5 mg) by mouth nightly as needed for sleep - Oral   Patient not taking: Reported on 5/1/2024   Sent to pharmacy as: Zolpidem Tartrate 5 MG Oral Tablet (AMBIEN)   Class: E-Prescribe   Order: [id number]   E-Prescribing Status: Receipt confirmed by pharmacy (4/24/2024  2:20 PM CDT)     Printout Tracking    External Result Report     Pharmacy    [id number] - [place], [place] - [address]     Associated Diagnoses    Sleep disorder [G47.9]

## 2024-05-07 NOTE — PROGRESS NOTES
Research laboratory data from May 2 reviewed and results not clinically significant including low total and LDL cholesterol, mild proteinuria with mild nitrates and leukocyte esterase in urine, mildly increased creatinine as well as increased glucose and hemoglobin A1c.  Would recommend rechecking clean urine.  LF

## 2024-05-14 DIAGNOSIS — E13.9 DIABETES 1.5, MANAGED AS TYPE 2 (H): ICD-10-CM

## 2024-05-21 DIAGNOSIS — H35.3231 EXUDATIVE AGE-RELATED MACULAR DEGENERATION OF BOTH EYES WITH ACTIVE CHOROIDAL NEOVASCULARIZATION (H): Primary | ICD-10-CM

## 2024-05-22 DIAGNOSIS — G47.9 SLEEP DISORDER: ICD-10-CM

## 2024-05-22 DIAGNOSIS — J44.9 CHRONIC OBSTRUCTIVE PULMONARY DISEASE, UNSPECIFIED COPD TYPE (H): ICD-10-CM

## 2024-05-22 RX ORDER — BUDESONIDE 180 UG/1
AEROSOL, POWDER RESPIRATORY (INHALATION)
Qty: 1 EACH | Refills: 11 | Status: SHIPPED | OUTPATIENT
Start: 2024-05-22

## 2024-05-22 RX ORDER — ZOLPIDEM TARTRATE 5 MG/1
5 TABLET ORAL
Qty: 90 TABLET | Refills: 3 | Status: SHIPPED | OUTPATIENT
Start: 2024-05-22

## 2024-05-22 NOTE — TELEPHONE ENCOUNTER
Pt states that these prescriptions need to be written as 90 day scripts as the copay is more picking up a 30 day script vs a one time copay for 90 days - please advise as pt is out of the pulmicort

## 2024-06-05 ENCOUNTER — OFFICE VISIT (OUTPATIENT)
Dept: OPHTHALMOLOGY | Facility: CLINIC | Age: 74
End: 2024-06-05
Attending: OPHTHALMOLOGY
Payer: MEDICARE

## 2024-06-05 DIAGNOSIS — H43.813 PVD (POSTERIOR VITREOUS DETACHMENT), BILATERAL: ICD-10-CM

## 2024-06-05 DIAGNOSIS — H35.3231 EXUDATIVE AGE-RELATED MACULAR DEGENERATION OF BOTH EYES WITH ACTIVE CHOROIDAL NEOVASCULARIZATION (H): Primary | ICD-10-CM

## 2024-06-05 DIAGNOSIS — Z96.1 PSEUDOPHAKIA OF BOTH EYES: ICD-10-CM

## 2024-06-05 DIAGNOSIS — H04.123 DRY EYE SYNDROME OF BOTH EYES: ICD-10-CM

## 2024-06-05 PROCEDURE — 250N000011 HC RX IP 250 OP 636: Mod: JZ | Performed by: OPHTHALMOLOGY

## 2024-06-05 PROCEDURE — 92134 CPTRZ OPH DX IMG PST SGM RTA: CPT | Performed by: OPHTHALMOLOGY

## 2024-06-05 PROCEDURE — 99213 OFFICE O/P EST LOW 20 MIN: CPT | Mod: 25 | Performed by: OPHTHALMOLOGY

## 2024-06-05 PROCEDURE — G0463 HOSPITAL OUTPT CLINIC VISIT: HCPCS | Mod: 25 | Performed by: OPHTHALMOLOGY

## 2024-06-05 PROCEDURE — 67028 INJECTION EYE DRUG: CPT | Mod: LT | Performed by: OPHTHALMOLOGY

## 2024-06-05 RX ADMIN — FARICIMAB 6 MG: 6 INJECTION, SOLUTION INTRAVITREAL at 13:39

## 2024-06-05 ASSESSMENT — EXTERNAL EXAM - LEFT EYE: OS_EXAM: NORMAL

## 2024-06-05 ASSESSMENT — VISUAL ACUITY
METHOD: SNELLEN - LINEAR
OS_PH_SC: 20/40-2
OS_SC: 20/50-/+2
OD_SC: 20/25

## 2024-06-05 ASSESSMENT — SLIT LAMP EXAM - LIDS
COMMENTS: NORMAL
COMMENTS: NORMAL

## 2024-06-05 ASSESSMENT — EXTERNAL EXAM - RIGHT EYE: OD_EXAM: NORMAL

## 2024-06-05 ASSESSMENT — CUP TO DISC RATIO
OS_RATIO: 0.25
OD_RATIO: 0.25

## 2024-06-05 ASSESSMENT — TONOMETRY
OS_IOP_MMHG: 13
OD_IOP_MMHG: 12
IOP_METHOD: ICARE

## 2024-06-05 NOTE — PROGRESS NOTES
CC -  Macular degeneration     INTERVAL HISTORY -  Vision has remained about the same since last visit. She has noticed some floaters in both eyes, does not report flashes. Has not been using amsler grid.     HPI -   Rita Mancini is a  73 year old year-old patient presenting for evaluation for macular degeneration. Was referred by her cataract surgeon for AMD. Saw Dr. KRISTIE Duarte ~6 months ago. 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 Observation recommended  No current smoking   No known FH of AMD, glaucoma     PAST OCULAR SURGERY  CE IOL each eye 2021    RETINAL IMAGING:  OCT 04/03/24:   Right eye: Normal foveal contour, no intraretinal or subretinal fluid, small subfoveal drusen. Pachychoroid.  Left eye:  SubRPE scar, SIRE present with small subretinal fluid inferotemporal -slightly worse    ASSESSMENT & PLAN  # Right eye early dry AMD  - with just a few small drusen; see below    # Left eye advanced exudative AMD   - [facility] PED with SIRE and increased SRF; vision stable  - pachychoroid +  S/P Avastin injection left eye last injection 8/2/23 (10 weeks)  1/31/24: resovled SRF  04/03/24: Small subretinal fluid collection parafoveal inferotemporal. Vabysmo left eye   06/05/24: Small subretinal fluid inferotemporal.   Plan for Vabysmo left eye today and RTC 8-9 weeks for OCT and possible Vabysmo left eye    AREDS II supplements  Amsler grid education given     # Pseudophakia each eye  Observe    # Dry eye each eye   ATs as  Needed    # PVD each eye    - No signs of retinal tears or detachment  - Discussed with patient about the symptoms of retinal tears and detachment  - Monitor    Dispo:  RTC 8-9 w for OCT left eye; vabaysmo left eye; no dilation    Benjamín Gonzalez MD  PGY-5 Vitreo-retina surgery Fellow  Department of Ophthalmology   [facility]      Complete documentation of historical and exam elements from today's encounter can be found in the full encounter  summary report (not reduplicated in this progress note). I personally obtained the chief complaint(s) and history of present illness.  I confirmed and edited as necessary the review of systems, past medical/surgical history, family history, social history, and examination findings as documented by others; and I examined the patient myself. I personally reviewed the relevant tests, images, and reports as documented above. I formulated and edited as necessary the assessment and plan and discussed the findings and management plan with the patient and family.     Malvin Rizzo MD

## 2024-06-09 ENCOUNTER — HEALTH MAINTENANCE LETTER (OUTPATIENT)
Age: 74
End: 2024-06-09

## 2024-07-01 ENCOUNTER — HOSPITAL ENCOUNTER (OUTPATIENT)
Dept: CT IMAGING | Facility: HOSPITAL | Age: 74
Discharge: HOME | End: 2024-07-01
Attending: INTERNAL MEDICINE | Admitting: INTERNAL MEDICINE
Payer: MEDICARE

## 2024-07-01 DIAGNOSIS — C34.91 NON-SMALL CELL CANCER OF RIGHT LUNG (H): ICD-10-CM

## 2024-07-01 PROCEDURE — 71250 CT THORAX DX C-: CPT

## 2024-07-02 LAB — RADIOLOGIST FLAGS: ABNORMAL

## 2024-07-03 ENCOUNTER — PATIENT OUTREACH (OUTPATIENT)
Dept: ONCOLOGY | Facility: HOSPITAL | Age: 74
End: 2024-07-03

## 2024-07-03 ENCOUNTER — ONCOLOGY VISIT (OUTPATIENT)
Dept: ONCOLOGY | Facility: HOSPITAL | Age: 74
End: 2024-07-03
Payer: MEDICARE

## 2024-07-03 ENCOUNTER — LAB (OUTPATIENT)
Dept: INFUSION THERAPY | Facility: HOSPITAL | Age: 74
End: 2024-07-03
Payer: MEDICARE

## 2024-07-03 VITALS
BODY MASS INDEX: 27.64 KG/M2 | TEMPERATURE: 97.5 F | RESPIRATION RATE: 16 BRPM | DIASTOLIC BLOOD PRESSURE: 58 MMHG | SYSTOLIC BLOOD PRESSURE: 108 MMHG | OXYGEN SATURATION: 96 % | WEIGHT: 165.9 LBS | HEIGHT: 65 IN | HEART RATE: 75 BPM

## 2024-07-03 DIAGNOSIS — C34.91 NON-SMALL CELL CANCER OF RIGHT LUNG (H): Primary | ICD-10-CM

## 2024-07-03 DIAGNOSIS — N13.30 HYDRONEPHROSIS OF RIGHT KIDNEY: ICD-10-CM

## 2024-07-03 DIAGNOSIS — J90 CHRONIC PLEURAL EFFUSION: ICD-10-CM

## 2024-07-03 LAB
ANION GAP SERPL CALCULATED.3IONS-SCNC: 11 MMOL/L (ref 7–15)
BUN SERPL-MCNC: 21.4 MG/DL (ref 8–23)
CALCIUM SERPL-MCNC: 9.9 MG/DL (ref 8.8–10.2)
CHLORIDE SERPL-SCNC: 101 MMOL/L (ref 98–107)
CREAT SERPL-MCNC: 1.43 MG/DL (ref 0.51–0.95)
DEPRECATED HCO3 PLAS-SCNC: 27 MMOL/L (ref 22–29)
EGFRCR SERPLBLD CKD-EPI 2021: 38 ML/MIN/1.73M2
GLUCOSE SERPL-MCNC: 126 MG/DL (ref 70–99)
POTASSIUM SERPL-SCNC: 4.7 MMOL/L (ref 3.4–5.3)
SODIUM SERPL-SCNC: 139 MMOL/L (ref 135–145)

## 2024-07-03 PROCEDURE — 99214 OFFICE O/P EST MOD 30 MIN: CPT | Performed by: INTERNAL MEDICINE

## 2024-07-03 PROCEDURE — G2211 COMPLEX E/M VISIT ADD ON: HCPCS | Performed by: INTERNAL MEDICINE

## 2024-07-03 PROCEDURE — 80048 BASIC METABOLIC PNL TOTAL CA: CPT

## 2024-07-03 PROCEDURE — 36415 COLL VENOUS BLD VENIPUNCTURE: CPT

## 2024-07-03 PROCEDURE — G0463 HOSPITAL OUTPT CLINIC VISIT: HCPCS | Performed by: INTERNAL MEDICINE

## 2024-07-03 ASSESSMENT — PAIN SCALES - GENERAL: PAINLEVEL: NO PAIN (0)

## 2024-07-03 NOTE — PROGRESS NOTES
[facility] Hematology and Oncology Progress Note    Patient: Rita Mancini  MRN: [id number]  Date of Service: Jul 3, 2024           Reason for visit         Problem List Items Addressed This Visit       Chronic pleural effusion, left (s/p Talc pleurodesis 2015)    Hydronephrosis of right kidney    Relevant Orders    Basic metabolic panel  (Ca, Cl, CO2, Creat, Gluc, K, Na, BUN) (Completed)     Other Visit Diagnoses       Non-small cell cancer of right lung (H)    -  Primary              Assessment      1.  A very pleasant 74 year old  woman with non-small-cell lung cancer stage III treated with cisplatin and -16 and radiation and currently under active surveillance.  She was diagnosed in 09/2009. Finished her treatment in February 2010.  CT shows a stable radiation changes no signs of recurrence.  Some small airway disease noted on right lower lobe.  2.   History of smoking.  Has COPD.  Uses budesonide daily.  Notes she was out of her inhaler for a little bit and her symptoms worsened but are now improving since restarting inhaler.  3.  Mild renal insufficiency. Stable slightly dilated renal collecting system.  Recent CT shows severe right hydronephrosis, however, this has not changed much from her baseline for 5 years.  She was also seen by urology and they felt that there was some kind of a stenosis in the ureters which did not require any surgical intervention.  Rest of her baseline renal insufficiency is most likely secondary to her diabetes.  She is on losartan.  4.  Is on Trulicity and notes 20 pounds weight loss in the last year  5.  Note paroxysmal nocturnal dyspnea due to history of MONSERRAT not currently able to tolerate CPAP.  Notes daytime fatigue and frequent napping.  She is interested in pursuing implant  6.  Patient notes she is very sedentary and does not move a lot.    Plan      1.  At this time we will continue with yearly CT scan follow-up.  She will not need any contrast with her scans  anymore.  2.  Recheck BMP today.  Creatinine slightly higher at 1.43.  Was 1.3 five months ago.  Discussed with patient that the right hydronephrosis has been seen on scans for 5+ years and is not concerning for acute issues.  She saw urologist 4 years ago and they evaluated her for this same concern.  No changes needed today.  Will continue to monitor.  Encouraged continued follow-up with primary for this.  3.  Discussed new findings of small airway disease on right lower lobe on recent CT. These changes are likely due to to her history of smoking and COPD.  Encouraged continued use of inhaler and management per primary.  4.  Encouraged patient to reach out to primary to pursue implant for MONSERRAT as she has not tolerated CPAP.  5.  Management of diabetes.  6.  Encouraged good diet and exercise.  Encouraged 30 minutes of exercise 5 times a week.  7.The longitudinal plan of care for the diagnosis(es)/condition(s) as documented were addressed during this visit. Due to the added complexity in care, I will continue to support Rita in the subsequent management and with ongoing continuity of care.     Medical decision making      Patient presenting with severe hydronephrosis.Reviewed notes from each unique source.  Reviewed each unique test.    Ordered tests.    Independently interpreted lab tests and radiological exams performed by other physicians.  Personally reviewed the images of the CT scan.       Cancer Staging   No matching staging information was found for the patient.        History of present illness        Ms. Rita Mancini is a very pleasant 74 year old woman with a diagnosis of nonsmall cell lung cancer located in the right upper lobe measuring 4.2 cm in size, presenting with some shortness of breath and cough in 09/2009 and biopsy suggestive of adenocarcinoma including lymphnode biopsy confirming it is stage III disease.  Subsequent to that she was treated with cisplatin and -16 for 3 cycles and Taxotere  for 2 cycles afterwards.  Subsequent to that she has been in remission.  She had been fine up until 11/2013 where a CT scan actually showed some congestion in the right lower lobe and then the PET scan showed that to be slightly hypermetabolic.  Therefore, she had that biopsied and that was negative. She had CT in September 2015 that revealed pleural effusion. This was tapped and was negative. About one litre of fluid was removed. She felt slightly better.    She was seen by pulmonary again for the fluid. Was then sent to Dr. Fair for pleural biopsy and pleurodhesis. That was done on 4/6/15. The biopsy was non malignant.      She has since been followed by me and pulmonary with CT scans.      Rita was admitted at [facility] on March 27, 2019 with septic shock.  She was found to have bilateral pneumonia but more so on the left side.  Needed to be intubated and ventilated.  Needed chest tube.  He was in the hospital for several days.  Repeat hospitalization.  She was  again in the hospital 31 July with some coughing and shortness of breath.  Found to have another infiltrate in the right lower lobe.    Comes in today for scheduled follow-up.  Had a CT scan earlier this month.  No concerning or new symptoms.  Notes her urine has had a strange odor for the past year but denies dysuria, urgency, or frequency.  Has some urge incontinence.  Notes that she was out of her budesonide inhaler for a little bit recently and had worsening shortness of breath but this is improved since restarting inhaler.  She denies new headaches, vision Changes, chest pain, abdominal pain, leg swelling, or new bone pain.  She has a chronic cough with intermittent sputum production that can be greenish or clear.  She denies fevers chills, back pain.  She has sleep apnea and has not been able to use her CPAP due to poor fitting.  She wakes up in the middle of night 3-4 times with shortness of breath.  She also notes excessive  daytime fatigue and feels like she could nap frequently.  Because she is so tired she does not feel the motivation to move frequently and notes a sedentary lifestyle.  She is very excited because she recently got a new grandchild.    Review of system      Pertinent items noted in history.    Past medical history      Past Medical History:   Diagnosis Date    Age-related cataract 12/06/2021    Anemia     Anxiety and depression 01/01/1962    Bigeminy 03/17/2019    Bronchiectasis without complication (H)     Chronic cough     Chronic pleural effusion 01/15/2015    CKD (chronic kidney disease) stage 3, GFR 30-59 ml/min (H)     COPD (chronic obstructive pulmonary disease) (H) 01/01/2009    Depression     Diverticulosis     Eczema of both hands     Functional diarrhea 12/31/2018    GERD (gastroesophageal reflux disease)     History of alcoholism (H)     History of syncope 04/24/2024    Hx antineoplastic chemotherapy     lung cancer     Hx of radiation therapy     lung cancer     Hydronephrosis     right kidney since 2019    Hyperlipidemia LDL goal <100 03/06/2023    Hypothyroid     Infection due to 2019 novel coronavirus 03/06/2023    Lung cancer (H) 01/01/2008    RUL,  Non small cell cancer    Macular degeneration (senile) of retina     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    Osteopenia     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, subsequent encounter 04/08/2019    Pleural effusion 01/01/2015    Sepsis due to Escherichia coli with acute renal failure without septic shock, unspecified acute renal failure type (H)     Sleep apnea 01/01/2010    does not use cpap    Type 2 diabetes, HbA1C goal < 8% (H)     Umbilical hernia with obstruction 01/23/2024       Past Surgical History:   Procedure Laterality Date    CATARACT IOL, RT/LT Bilateral 12/2021    DAVINCI XI HERNIORRHAPHY VENTRAL N/A 2/16/2024    Procedure: HERNIORRHAPHY, VENTRAL, ROBOT-ASSISTED, LAPAROSCOPIC, USING DA PAUL  "XI;  Surgeon: Tyler Rossi MD;  Location: [facility] OR    IR MISCELLANEOUS PROCEDURE  12/10/2009    PORTACATH PLACEMENT      and removal    THORACOSCOPY Right 04/06/2015    Procedure: RIGHT THORACOSCOPY / BIOPSY PLEURAL / TALC PLEURODESIS;  Surgeon: Michi Ma MD;  Location: [facility];  Service:     THORACOSCOPY Left 03/29/2019    Procedure: LEFT THORACOSCOPY WITH DECORTICATION;  Surgeon: Michi Ma MD;  Location: [facility];  Service: General    TONSILLECTOMY  age 5    URETERAL STENT PLACEMENT Right 06/01/2010    US THORACENTESIS  03/27/2019       Physical exam        /58   Pulse 75   Temp 97.5  F (36.4  C)   Resp 16   Ht 1.651 m (5' 5\")   Wt 75.3 kg (165 lb 14.4 oz)   LMP  (LMP Unknown)   SpO2 96%   BMI 27.61 kg/m      GENERAL: No acute distress. Cooperative in conversation.   HEENT:  Pupils are equal, round and reactive  RESP:Chest symmetric lungs are clear bilaterally per auscultation, slightly diminished throughout. Regular respiratory rate. No wheezes has occasional right-sided rhonchi.  CV: Normal S1 S2 Regular, rate and rhythm. No murmurs.    BACK: No costophrenic angle tenderness  EXTREMITIES: No lower extremity edema.   NEURO: Non- focal. Alert and oriented x3.   PSYCH: Within normal limits. No depression or anxiety.  SKIN: Warm dry intact.       Lab results      Recent Results (from the past 168 hour(s))   CT Chest w/o Contrast   Result Value Ref Range    Radiologist flags (Urgent)      Severe right-sided hydronephrosis which is mildly increased since the ultrasound from 9/18/2023.   Basic metabolic panel  (Ca, Cl, CO2, Creat, Gluc, K, Na, BUN)   Result Value Ref Range    Sodium 139 135 - 145 mmol/L    Potassium 4.7 3.4 - 5.3 mmol/L    Chloride 101 98 - 107 mmol/L    Carbon Dioxide (CO2) 27 22 - 29 mmol/L    Anion Gap 11 7 - 15 mmol/L    Urea Nitrogen 21.4 8.0 - 23.0 mg/dL    Creatinine 1.43 (H) 0.51 - 0.95 mg/dL    GFR Estimate 38 (L) >60 " mL/min/1.73m2    Calcium 9.9 8.8 - 10.2 mg/dL    Glucose 126 (H) 70 - 99 mg/dL       Imaging results        CT Chest w/o Contrast    Result Date: 7/2/2024  EXAM: CT CHEST W/O CONTRAST LOCATION: [facility] DATE: 7/1/2024 INDICATION:  Non-small cell cancer of right lung (H) COMPARISON: CT exams 6/6/2023, 5/3/2022 and 4/22/2019 TECHNIQUE: CT chest without IV contrast. Multiplanar reformats were obtained. Dose reduction techniques were used. CONTRAST: None. FINDINGS: LUNGS AND PLEURA: Stable right-sided pleural thickening, irregularity and calcific densities likely related to sequelae of prior talc pleurodesis. Stable basilar predominant bronchial wall thickening. Mild right lower lobar endobronchial mucoid impaction. Stable right basilar consolidation likely reflecting chronic subsegmental atelectasis. Stable medial right apical subpleural irregular consolidation with associated traction bronchiectasis and surrounding architectural torsion. This is consistent with posttreatment fibrosis. Scattered mild endobronchial mucoid impaction. Scattered right lower lobar predominant reticular nodular pulmonary opacities. Stable 4 mm right major fissural nodule image 159 series 3. There is a stable dominant 10 mm right lower lobe nodule image 154 series 3. No new or enlarging nodule. MEDIASTINUM/AXILLAE: Stable prominent AP window lymph node measuring 9 mm short axis. Mildly enlarged subcarinal lymph nodes including a dominant node measuring a stable 12 mm short axis. Normal heart size and no pericardial effusion. CORONARY ARTERY CALCIFICATION: Moderate. UPPER ABDOMEN: Partially visualized severe right-sided pelvocaliectasis. MUSCULOSKELETAL: Spinal degenerative changes. No definite suspicious osseous lesion.     IMPRESSION: 1.  Stable medial right apical post treatment fibrotic changes. No convincing evidence for local tumor recurrence. 2.  Stable mild subcarinal lymphadenopathy. 3.  New/increased right  lower lobe predominant reticular nodular opacities likely reflecting small airways disease. Associated bronchial wall thickening and scattered endobronchial mucoid impaction. 4.  Stable likely benign dominant pulmonary nodules including a 10 mm right lower lobe solid nodule. 5.  Severe right-sided hydronephrosis which is mildly increased since the ultrasound from 9/18/2023. Follow-up with a stone protocol CT or CT urogram may be helpful. [Access Center: Severe right-sided hydronephrosis which is mildly increased since the ultrasound from 9/18/2023.] This report will be copied to the [facility] to ensure a provider acknowledges the finding. [facility] is available Monday through Friday 8am-3:30 pm.     Vabysmo (faricimab) 6 mg Intravitreal Injection OS (left eye)    Result Date: 6/10/2024  Sign in/Time Out: Correct patient, Sign in communication completed, Correct medication, Correct procedure, Correct site . Plan: Injections . Pre-Procedure Pain: 0 . Post-Procedure Pain: 0 . Sign Out: Sign out discussion completed, Patient counseled on signs and symptoms for which to call and/or return to clinic, Patient tolerated procedure well with no complications, Complications noted in additional notes . Notes PROCEDURE DESCRIPTION:  After informed consent was obtained, the site of the left eye was marked and verified.  A drop of proparacaine followed by a drop of Betadine and viscous lidocaine jelly were placed in the eye.    A sterile lid speculum was placed.  Calipers were used to chidi the conjunctivae 3.5 mm posterior to the limbus inferotemporally and an extra drop of Betadine was placed on the chidi.  A TB syringe with a 32-gauge needle was used to inject 0.05 ml of Vabysmo into the vitreous.  The patient's vision was verified to be at least hand motions in each eye afterwards. The excess Betadine was rinsed from the eyes.  The patient tolerated the procedure well with no complications. Pain assessment  afterwards revealed a pain rating of 0 out of 10. I personally performed the procedure. Malvin Rizzo MD      CT scan personally reviewed.  Compared to the scan from 2020.    Total time spent was over 35 minutes.  This includes chart prep time, review of clinical data including notes from outside physicians, labs, review of medical imaging, discussion about  plan of care, documentation and .     Signed by: Herbert Foster MD,      This note has been dictated using voice recognition software. Any grammatical or context distortions are unintentional and inherent to the software

## 2024-07-03 NOTE — PROGRESS NOTES
[facility]: Cancer Care                                                                                      RN Cancer Care Coordinator left message for patient at request of SHARITA Segura, regarding lab result. Her creatinine result was elevated a bit more than it had been in January at last check. 1.43 today vs 1.32 in January.    Writer relayed the above information and that Dr. Foster still feels she does not need to go to a urology specialist at this point - as they had discussed in the clinic visit. Told her to continue to follow this lab with her PCP, and encouraged questions at our triage nurse line.    Signature:  Dottie Dangelo RN

## 2024-07-03 NOTE — Clinical Note
VICK... Gege I didn't put a follow up contact with this one because you are not on her care team. I re-enrolled at maintenance level.

## 2024-07-03 NOTE — Clinical Note
"7/3/2024      Rita Mancini  [address]  [place] [address]      Dear Colleague,    Thank you for referring your patient, Rita Mancini, to the [facility]. Please see a copy of my visit note below.    Oncology Rooming Note    July 3, 2024 2:12 PM   Rita Mancini is a 74 year old female who presents for:    Chief Complaint   Patient presents with    Oncology Clinic Visit     Non-small cell cancer of right lung     Initial Vitals: /58   Pulse 75   Temp 97.5  F (36.4  C)   Resp 16   Ht 1.651 m (5' 5\")   Wt 75.3 kg (165 lb 14.4 oz)   LMP  (LMP Unknown)   SpO2 96%   BMI 27.61 kg/m   Estimated body mass index is 27.61 kg/m  as calculated from the following:    Height as of this encounter: 1.651 m (5' 5\").    Weight as of this encounter: 75.3 kg (165 lb 14.4 oz). Body surface area is 1.86 meters squared.  No Pain (0) Comment: Data Unavailable   No LMP recorded (lmp unknown). Patient is postmenopausal.  Allergies reviewed: Yes  Medications reviewed: Yes    Medications: Medication refills not needed today.  Pharmacy name entered into Bottle:    [id number] - [place], [place] - [address] AT [facility]  [id number] - [place], [place] - [address]  [facility] [address]    Frailty Screening:   Is the patient here for a new oncology consult visit in cancer care? 2. No      Clinical concerns: Has some questions for the provider.       Martine Matta LPN                 Again, thank you for allowing me to participate in the care of your patient.        Sincerely,        Herbert Foster MD  "

## 2024-07-03 NOTE — PROGRESS NOTES
"Oncology Rooming Note    July 3, 2024 2:12 PM   Rita Mancini is a 74 year old female who presents for:    Chief Complaint   Patient presents with    Oncology Clinic Visit     Non-small cell cancer of right lung     Initial Vitals: /58   Pulse 75   Temp 97.5  F (36.4  C)   Resp 16   Ht 1.651 m (5' 5\")   Wt 75.3 kg (165 lb 14.4 oz)   LMP  (LMP Unknown)   SpO2 96%   BMI 27.61 kg/m   Estimated body mass index is 27.61 kg/m  as calculated from the following:    Height as of this encounter: 1.651 m (5' 5\").    Weight as of this encounter: 75.3 kg (165 lb 14.4 oz). Body surface area is 1.86 meters squared.  No Pain (0) Comment: Data Unavailable   No LMP recorded (lmp unknown). Patient is postmenopausal.  Allergies reviewed: Yes  Medications reviewed: Yes    Medications: Medication refills not needed today.  Pharmacy name entered into [employer]:    [id number] - [place], [place] - [address] AT [facility]  [id number] - [place], [place] - [address]  [facility] [address]    Frailty Screening:   Is the patient here for a new oncology consult visit in cancer care? 2. No      Clinical concerns: Has some questions for the provider.       Martine Matta LPN             "

## 2024-07-05 ENCOUNTER — TELEPHONE (OUTPATIENT)
Dept: INTERNAL MEDICINE | Facility: CLINIC | Age: 74
End: 2024-07-05

## 2024-07-05 DIAGNOSIS — E13.9 DIABETES 1.5, MANAGED AS TYPE 2 (H): ICD-10-CM

## 2024-07-05 NOTE — TELEPHONE ENCOUNTER
Medication Question or Refill        What medication are you calling about (include dose and sig)?:   Dulaglutide (TRULICITY) 3 MG/0.5ML SOPN     Who prescribed the medication?: Dr Murillo    Do you need a refill? Yes    Do you have any questions or concerns?  Yes: With national shortage, patient has had to keep taking the 3 mg dose. Cannot get the 4.5 dose.    Patient is asking that a prescription be written for 90 day supply with refills.    Could we send this information to you in [facility] or would you prefer to receive a phone call?:   Patient would prefer a phone call     Okay to leave a detailed message?: Yes at Home number on file [phone] (home)    Preferred Pharmacy:    [facility] [place] - [address]  [address]  [place] [address]  Phone: [phone] Fax: [phone]      Controlled Substance Agreement on file:   CSA -- Patient Level:     [Media Unavailable] Controlled Substance Agreement - Opioid - Scan on 8/29/2018

## 2024-07-08 ENCOUNTER — TELEPHONE (OUTPATIENT)
Dept: INTERNAL MEDICINE | Facility: CLINIC | Age: 74
End: 2024-07-08
Payer: MEDICARE

## 2024-07-08 DIAGNOSIS — E13.9 DIABETES 1.5, MANAGED AS TYPE 2 (H): Primary | ICD-10-CM

## 2024-07-08 NOTE — TELEPHONE ENCOUNTER
New Medication Request    Contacts       Contact Date/Time Type Contact Phone/Fax    07/08/2024 04:48 PM CDT Phone (Incoming) Rita Mancini (Self) [phone] (M)            What medication are you requesting?: meter for Accucheck guide meter    Reason for medication request: needing new one that that is what the test strips she just picked up.    Have you taken this medication before?: No    Controlled Substance Agreement on file:   CSA -- Patient Level:     [Media Unavailable] Controlled Substance Agreement - Opioid - Scan on 8/29/2018         Patient offered an appointment? No    Preferred Pharmacy:    [id number] - [place], [place] - [address]  [address] [place] [address]  Phone: [phone] Fax: [phone]      Could we send this information to you in [facility] or would you prefer to receive a phone call?:   Patient would prefer a phone call   Okay to leave a detailed message?: Yes at Cell number on file:    Telephone Information:   Mobile [phone]   Mobile Not on file.

## 2024-07-09 RX ORDER — DULAGLUTIDE 3 MG/.5ML
3 INJECTION, SOLUTION SUBCUTANEOUS WEEKLY
Qty: 6 ML | Refills: 0 | Status: SHIPPED | OUTPATIENT
Start: 2024-07-09 | End: 2024-10-07

## 2024-07-15 ENCOUNTER — TELEPHONE (OUTPATIENT)
Dept: INTERNAL MEDICINE | Facility: CLINIC | Age: 74
End: 2024-07-15
Payer: MEDICARE

## 2024-07-15 DIAGNOSIS — E13.9 DIABETES 1.5, MANAGED AS TYPE 2 (H): Primary | ICD-10-CM

## 2024-07-15 NOTE — TELEPHONE ENCOUNTER
New Medication Request    Contacts       Contact Date/Time Type Contact Phone/Fax    07/15/2024 03:30 PM CDT Phone (Incoming) Rita Mancini (Self) [phone] (M)            What medication are you requesting?: Accu-check lancets     Reason for medication request: pt needs lancets for her meter - accu check     Have you taken this medication before?: Yes:     Controlled Substance Agreement on file:   CSA -- Patient Level:     [Media Unavailable] Controlled Substance Agreement - Opioid - Scan on 8/29/2018         Patient offered an appointment? No    Preferred Pharmacy:       [facility] #[id number] - [place], [place] - [address]  [address] [place] [address]  Phone: [phone] Fax: [phone]        Could we send this information to you in [facility] or would you prefer to receive a phone call?:   Patient would prefer a phone call   Okay to leave a detailed message?: No at Cell number on file:    Telephone Information:   Mobile [phone]   Mobile Not on file.

## 2024-07-23 DIAGNOSIS — H35.3231 EXUDATIVE AGE-RELATED MACULAR DEGENERATION OF BOTH EYES WITH ACTIVE CHOROIDAL NEOVASCULARIZATION (H): Primary | ICD-10-CM

## 2024-07-23 DIAGNOSIS — E03.9 ACQUIRED HYPOTHYROIDISM: ICD-10-CM

## 2024-07-23 RX ORDER — LEVOTHYROXINE SODIUM 75 UG/1
TABLET ORAL
Qty: 66 TABLET | Refills: 0 | Status: SHIPPED | OUTPATIENT
Start: 2024-07-23

## 2024-07-24 ASSESSMENT — PATIENT HEALTH QUESTIONNAIRE - PHQ9
10. IF YOU CHECKED OFF ANY PROBLEMS, HOW DIFFICULT HAVE THESE PROBLEMS MADE IT FOR YOU TO DO YOUR WORK, TAKE CARE OF THINGS AT HOME, OR GET ALONG WITH OTHER PEOPLE: NOT DIFFICULT AT ALL
SUM OF ALL RESPONSES TO PHQ QUESTIONS 1-9: 5
SUM OF ALL RESPONSES TO PHQ QUESTIONS 1-9: 5

## 2024-07-25 ENCOUNTER — OFFICE VISIT (OUTPATIENT)
Dept: INTERNAL MEDICINE | Facility: CLINIC | Age: 74
End: 2024-07-25
Payer: MEDICARE

## 2024-07-25 VITALS
RESPIRATION RATE: 13 BRPM | DIASTOLIC BLOOD PRESSURE: 60 MMHG | TEMPERATURE: 97.3 F | BODY MASS INDEX: 26.99 KG/M2 | HEIGHT: 65 IN | SYSTOLIC BLOOD PRESSURE: 92 MMHG | WEIGHT: 162 LBS | HEART RATE: 87 BPM | OXYGEN SATURATION: 97 %

## 2024-07-25 DIAGNOSIS — Z29.11 NEED FOR VACCINATION AGAINST RESPIRATORY SYNCYTIAL VIRUS: ICD-10-CM

## 2024-07-25 DIAGNOSIS — Z23 NEED FOR TDAP VACCINATION: ICD-10-CM

## 2024-07-25 DIAGNOSIS — I95.1 ORTHOSTATIC HYPOTENSION: ICD-10-CM

## 2024-07-25 DIAGNOSIS — E13.9 DIABETES 1.5, MANAGED AS TYPE 2 (H): Primary | ICD-10-CM

## 2024-07-25 PROCEDURE — 93005 ELECTROCARDIOGRAM TRACING: CPT | Performed by: INTERNAL MEDICINE

## 2024-07-25 PROCEDURE — 99214 OFFICE O/P EST MOD 30 MIN: CPT | Performed by: INTERNAL MEDICINE

## 2024-07-25 PROCEDURE — G2211 COMPLEX E/M VISIT ADD ON: HCPCS | Performed by: INTERNAL MEDICINE

## 2024-07-25 PROCEDURE — 93010 ELECTROCARDIOGRAM REPORT: CPT | Mod: OFF | Performed by: INTERNAL MEDICINE

## 2024-07-25 RX ORDER — FLUDROCORTISONE ACETATE 0.1 MG/1
0.1 TABLET ORAL DAILY
Qty: 90 TABLET | Refills: 3 | Status: SHIPPED | OUTPATIENT
Start: 2024-07-25

## 2024-07-25 NOTE — PROGRESS NOTES
ASSESSMENT AND PLAN:    1. Need for Tdap vaccination  Discussed and referred    2. Diabetes 1.5, managed as type 2   Her A1c has been improved, and tolerating trulicity well, with weight loss.  Will continue at 3mg weekly.      3. Orthostatic hypotension  With episodic mild pre-syncope symptoms.  Careful orthostatic Bp measures, by me, today, does reveal transient drop from 90's to low 80's standing. EKG is unremarkable.  Suspect her orthostatic hypotension is multifactorial - related to weight loss,the losartan, and likely autonomic neuropathy, related to her DM.  She will stop losartan, and add 0.1mg  fludrocortisone daily.  Follow up one month and sooner if needed.   - EKG 12-lead, tracing only    Follow up three months    CHIEF COMPLAINT:  Orthostasis    HISTORY OF PRESENT ILLNESS:  Rita Mancini is a 74 year old female having periodic orthostasis, no syncope but near syncope at times.  No chest pain or unusual dyspnea or cough, no palpitations.  She continues to lose weight with trulicity and is pleased with that. No bowel or bladder issues, no focal arm or leg numbness or weakness    REVIEW OF SYSTEMS:   See HPI, all other systems on review are negative.    Past Medical History:   Diagnosis Date    Age-related cataract 12/06/2021    Anemia     Anxiety and depression 01/01/1962    Bigeminy 03/17/2019    Bronchiectasis without complication (H)     Chronic cough     Chronic pleural effusion 01/15/2015    CKD (chronic kidney disease) stage 3, GFR 30-59 ml/min (H)     COPD (chronic obstructive pulmonary disease) (H) 01/01/2009    Depression     Diverticulosis     Eczema of both hands     Functional diarrhea 12/31/2018    GERD (gastroesophageal reflux disease)     History of alcoholism (H)     History of syncope 04/24/2024    Hx antineoplastic chemotherapy     lung cancer     Hx of radiation therapy     lung cancer     Hydronephrosis     right kidney since 2019    Hyperlipidemia LDL goal <100 03/06/2023     Hypothyroid     Infection due to 2019 novel coronavirus 03/06/2023    Lung cancer (H) 01/01/2008    RUL,  Non small cell cancer    Macular degeneration (senile) of retina     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    Osteopenia     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, subsequent encounter 04/08/2019    Pleural effusion 01/01/2015    Sepsis due to Escherichia coli with acute renal failure without septic shock, unspecified acute renal failure type (H)     Sleep apnea 01/01/2010    does not use cpap    Type 2 diabetes, HbA1C goal < 8% (H)     Umbilical hernia with obstruction 01/23/2024     History   Smoking Status    Former    Types: Cigarettes   Smokeless Tobacco    Never     Family History   Problem Relation Age of Onset    Heart Disease Mother     Hypertension Mother     Alcoholism Mother     Depression Mother     Heart Disease Father 45        mi age 45, cabg    Coronary Artery Disease Father     Cancer Father         prostate    Hypertension Father     Snoring Father     Chronic Obstructive Pulmonary Disease Brother     Alcoholism Brother     Alcoholism Sister     Diabetes Son     Anxiety Disorder Son     Depression Son     Post-Traumatic Stress Disorder (PTSD) Son     Obesity Daughter     Obesity Daughter     Cancer Maternal Aunt 47        breast    Breast Cancer Paternal Aunt     Leukemia Grandchild     Schizophrenia Grandchild     Lymphoma Grandchild     Glaucoma No family hx of     Macular Degeneration No family hx of      Past Surgical History:   Procedure Laterality Date    CATARACT IOL, RT/LT Bilateral 12/2021    DAVINCI XI HERNIORRHAPHY VENTRAL N/A 2/16/2024    Procedure: HERNIORRHAPHY, VENTRAL, ROBOT-ASSISTED, LAPAROSCOPIC, USING DA PAUL XI;  Surgeon: Tyler Rossi MD;  Location: [facility] MISCELLANEOUS PROCEDURE  12/10/2009    PORTACATH PLACEMENT      and removal    THORACOSCOPY Right 04/06/2015    Procedure: RIGHT THORACOSCOPY / BIOPSY  "PLEURAL / TALC PLEURODESIS;  Surgeon: Michi Ma MD;  Location: [facility];  Service:     THORACOSCOPY Left 03/29/2019    Procedure: LEFT THORACOSCOPY WITH DECORTICATION;  Surgeon: Michi Ma MD;  Location: [facility];  Service: General    TONSILLECTOMY  age 5    URETERAL STENT PLACEMENT Right 06/01/2010    US THORACENTESIS  03/27/2019     Allergies   Allergen Reactions    Seasonal Allergies      VITALS:  Vitals:    07/25/24 1320   BP: 92/60   BP Location: Left arm   Patient Position: Sitting   Cuff Size: Adult Regular   Pulse: 87   Resp: 13   Temp: 97.3  F (36.3  C)   TempSrc: Tympanic   SpO2: 97%   Weight: 73.5 kg (162 lb)   Height: 1.651 m (5' 5\")     Estimated body mass index is 26.96 kg/m  as calculated from the following:    Height as of this encounter: 1.651 m (5' 5\").    Weight as of this encounter: 73.5 kg (162 lb).  Wt Readings from Last 3 Encounters:   07/25/24 73.5 kg (162 lb)   07/03/24 75.3 kg (165 lb 14.4 oz)   05/01/24 77.6 kg (171 lb)     PHYSICAL EXAM:  Constitutional:  In NAD, alert and oriented  Cardiac:  S1 S2   Lungs: Clear   Abdomen:   Soft, flat and non-tender  Neurologic:  Speech clear,  gait normal     Psychiatric:  Mood and behavior are appropriate, thinking is clear.     DECISION TO OBTAIN OLD RECORDS AND/OR OBTAIN HISTORY FROM SOMEONE OTHER THAN PATIENT, AND/OR ACCESSING CARE EVERYWHERE):  1  0     REVIEW AND SUMMARIZATION OF OLD RECORDS, AND/OR OBTAINING HISTORY FROM SOMEONE OTHER THAN PATIENT, AND/OR DISCUSSION OF CASE WITH ANOTHER HEALTH CARE PROVIDER:  2 reviewed past health notes    REVIEW AND/OR ORDER OF OF CLINICAL LAB TESTS: 1 reviewed.    REVIEW AND/OR ORDER OF RADIOLOGY TESTS: 1 0.    REVIEW AND/OR ORDER OF MEDICAL TESTS (EKG/ECHO/COLONOSCOPY/EGD): 1  0    INDEPENDENT  VISUALIZATION OF IMAGE, TRACING, OR SPECIMEN ITSELF (2 EACH):  0     TOTAL: 3    Current Outpatient Medications   Medication Sig Dispense Refill    aspirin 81 MG EC tablet Take 81 " mg by mouth daily      blood glucose (NO BRAND SPECIFIED) lancets standard Use to test blood sugar 1 times daily or as directed. 100 each 2    blood glucose (NO BRAND SPECIFIED) test strip Use to test blood sugar one time daily or as directed. 100 strip 2    blood glucose monitoring (NO BRAND SPECIFIED) meter device kit Use to test blood sugar 1 times daily or as directed. 1 kit 0    calcium carbonate (TUMS) 500 MG chewable tablet Take 1 chew tab by mouth daily      cetirizine (ZYRTEC) 10 MG CHEW Take 10 mg by mouth daily      citalopram (CELEXA) 10 MG tablet Take 1 tablet (10 mg) by mouth daily 90 tablet 2    Dulaglutide (TRULICITY) 3 MG/0.5ML SOPN Inject 3 mg subcutaneously once a week for 90 days 6 mL 0    famotidine (PEPCID) 20 MG tablet Take 1 tablet (20 mg) by mouth daily 90 tablet 2    fluticasone (FLONASE) 50 MCG/ACT nasal spray Spray 1 spray into both nostrils 2 times daily 16 g 3    levothyroxine (SYNTHROID/LEVOTHROID) 50 MCG tablet TAKE ON EMPTY STOMACH EVERY MONDAY AND FRIDAY (SEE OTHER DOSE FOR REMAINING DAYS OF WEEK) 24 tablet 3    levothyroxine (SYNTHROID/LEVOTHROID) 75 MCG tablet TAKE 1 TAB BY MOUTH ON TUE WED THR SAT SUN 66 tablet 0    losartan (COZAAR) 25 MG tablet Take 1 tablet (25 mg) by mouth daily 90 tablet 3    magnesium 500 MG TABS       Melatonin 10 MG CAPS       montelukast (SINGULAIR) 10 MG tablet Take 1 tablet (10 mg) by mouth at bedtime 90 tablet 2    multivitamin w/minerals (THERA-VIT-M) tablet Take 1 tablet by mouth daily      PULMICORT FLEXHALER 180 MCG/ACT inhaler INHALE 2 PUFFS BY MOUTH TWICE A DAY 1 each 11    simvastatin (ZOCOR) 10 MG tablet Take 1 tablet (10 mg) by mouth at bedtime 90 tablet 2    triamcinolone (KENALOG) 0.1 % external cream Apply topically 2 times daily      umeclidinium-vilanterol (ANORO ELLIPTA) 62.5-25 MCG/ACT oral inhaler INHALE 1 PUFF BY MOUTH EVERY  each 0    UNKNOWN TO PATIENT Eye vitamin      zolpidem (AMBIEN) 5 MG tablet Take 1 tablet (5 mg) by  mouth nightly as needed for sleep 90 tablet 3     Michi Murillo MD  Internal Medicine  [facility]

## 2024-07-26 LAB
ATRIAL RATE - MUSE: 73 BPM
DIASTOLIC BLOOD PRESSURE - MUSE: NORMAL MMHG
INTERPRETATION ECG - MUSE: NORMAL
P AXIS - MUSE: 63 DEGREES
PR INTERVAL - MUSE: 198 MS
QRS DURATION - MUSE: 78 MS
QT - MUSE: 406 MS
QTC - MUSE: 447 MS
R AXIS - MUSE: -11 DEGREES
SYSTOLIC BLOOD PRESSURE - MUSE: NORMAL MMHG
T AXIS - MUSE: 60 DEGREES
VENTRICULAR RATE- MUSE: 73 BPM

## 2024-08-01 DIAGNOSIS — J30.2 SEASONAL ALLERGIC RHINITIS, UNSPECIFIED TRIGGER: ICD-10-CM

## 2024-08-01 RX ORDER — FLUTICASONE PROPIONATE 50 MCG
1 SPRAY, SUSPENSION (ML) NASAL 2 TIMES DAILY
Qty: 16 G | Refills: 3 | Status: SHIPPED | OUTPATIENT
Start: 2024-08-01

## 2024-08-07 ENCOUNTER — OFFICE VISIT (OUTPATIENT)
Dept: OPHTHALMOLOGY | Facility: CLINIC | Age: 74
End: 2024-08-07
Attending: OPHTHALMOLOGY
Payer: MEDICARE

## 2024-08-07 DIAGNOSIS — H35.3231 EXUDATIVE AGE-RELATED MACULAR DEGENERATION OF BOTH EYES WITH ACTIVE CHOROIDAL NEOVASCULARIZATION (H): ICD-10-CM

## 2024-08-07 DIAGNOSIS — J42 CHRONIC BRONCHITIS, UNSPECIFIED CHRONIC BRONCHITIS TYPE (H): ICD-10-CM

## 2024-08-07 PROCEDURE — 250N000011 HC RX IP 250 OP 636: Mod: JZ | Performed by: OPHTHALMOLOGY

## 2024-08-07 PROCEDURE — G0463 HOSPITAL OUTPT CLINIC VISIT: HCPCS | Mod: 25 | Performed by: OPHTHALMOLOGY

## 2024-08-07 PROCEDURE — 67028 INJECTION EYE DRUG: CPT | Mod: LT | Performed by: OPHTHALMOLOGY

## 2024-08-07 PROCEDURE — 92134 CPTRZ OPH DX IMG PST SGM RTA: CPT | Performed by: OPHTHALMOLOGY

## 2024-08-07 RX ORDER — UMECLIDINIUM BROMIDE AND VILANTEROL TRIFENATATE 62.5; 25 UG/1; UG/1
1 POWDER RESPIRATORY (INHALATION) DAILY
Qty: 180 EACH | Refills: 0 | Status: SHIPPED | OUTPATIENT
Start: 2024-08-07

## 2024-08-07 RX ADMIN — FARICIMAB 6 MG: 6 INJECTION, SOLUTION INTRAVITREAL at 13:25

## 2024-08-07 RX ADMIN — LIDOCAINE HYDROCHLORIDE 0.5 ML: 20 INJECTION, SOLUTION EPIDURAL; INFILTRATION; INTRACAUDAL; PERINEURAL at 13:41

## 2024-08-07 ASSESSMENT — CUP TO DISC RATIO
OD_RATIO: 0.25
OS_RATIO: 0.25

## 2024-08-07 ASSESSMENT — TONOMETRY
OD_IOP_MMHG: 15
OS_IOP_MMHG: 13
IOP_METHOD: TONOPEN

## 2024-08-07 ASSESSMENT — VISUAL ACUITY
METHOD: SNELLEN - LINEAR
OD_SC+: -2
OD_SC: 20/25
OS_SC: 20/40
OS_SC+: -1

## 2024-08-07 ASSESSMENT — EXTERNAL EXAM - RIGHT EYE: OD_EXAM: NORMAL

## 2024-08-07 ASSESSMENT — SLIT LAMP EXAM - LIDS
COMMENTS: NORMAL
COMMENTS: NORMAL

## 2024-08-07 ASSESSMENT — EXTERNAL EXAM - LEFT EYE: OS_EXAM: NORMAL

## 2024-08-07 NOTE — PROGRESS NOTES
CC -  Macular degeneration     INTERVAL HISTORY -  Vision has remained about the same since last visit. She has noticed some floaters in both eyes, does not report flashes. Has not been using amsler grid.     HPI -   Rita Mancini is a  74 year old year-old patient presenting for evaluation for macular degeneration. Was referred by her cataract surgeon for AMD. Saw Dr. KRISTIE Duarte ~6 months ago. 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 Observation recommended  No current smoking   No known FH of AMD, glaucoma     PAST OCULAR SURGERY  CE IOL each eye 2021    RETINAL IMAGING:  OCT 08/7/24:   Right eye: Normal foveal contour, no intraretinal or subretinal fluid, small subfoveal drusen. Pachychoroid.  Left eye:  SubRPE scar, SIRE present with resolved small subretinal fluid inferotemporal -slightly worse    ASSESSMENT & PLAN  # Right eye early dry AMD  - with just a few small drusen; see below    # Left eye advanced exudative AMD   - [facility] PED with SIRE and increased SRF; vision stable  - pachychoroid +  S/P Avastin injection left eye last injection 8/2/23 1/31/24: resovled SRF  04/03/24: Small subretinal fluid collection parafoveal inferotemporal. Vabysmo left eye   08/07/24: resolved small tempo subretinal fluid; Vabysmo left eye today and RTC 10-11 weeks   AREDS II supplements  Amsler grid education given     # Pseudophakia each eye  Observe    # Dry eye each eye   ATs as  Needed    # PVD each eye    - No signs of retinal tears or detachment  - Discussed with patient about the symptoms of retinal tears and detachment  - Monitor    Dispo:  RTC 10-11 w for DFE and OCT left eye; vabaysmo left eye      Complete documentation of historical and exam elements from today's encounter can be found in the full encounter summary report (not reduplicated in this progress note). I personally obtained the chief complaint(s) and history of present illness.  I confirmed and edited as necessary the  review of systems, past medical/surgical history, family history, social history, and examination findings as documented by others; and I examined the patient myself. I personally reviewed the relevant tests, images, and reports as documented above. I formulated and edited as necessary the assessment and plan and discussed the findings and management plan with the patient and family.     Malvin Rizzo MD

## 2024-08-07 NOTE — NURSING NOTE
Chief Complaints and History of Present Illnesses   Patient presents with    Macular Degeneration Follow Up     Chief Complaint(s) and History of Present Illness(es)       Macular Degeneration Follow Up              Laterality: both eyes    Onset: 9 weeks ago              Comments    Pt. States that she is doing well. No change in VA BE. No pain BE. No flashes or floaters BE.   Ashtyn Gallardo COT 1:02 PM August 7, 2024

## 2024-08-26 ENCOUNTER — HOSPITAL ENCOUNTER (OUTPATIENT)
Dept: CARDIOLOGY | Facility: HOSPITAL | Age: 74
Discharge: HOME | End: 2024-08-26
Attending: INTERNAL MEDICINE | Admitting: INTERNAL MEDICINE
Payer: MEDICARE

## 2024-08-26 DIAGNOSIS — I95.1 ORTHOSTATIC HYPOTENSION: ICD-10-CM

## 2024-08-26 LAB — LVEF ECHO: NORMAL

## 2024-08-26 PROCEDURE — 93306 TTE W/DOPPLER COMPLETE: CPT

## 2024-08-26 PROCEDURE — 93306 TTE W/DOPPLER COMPLETE: CPT | Mod: 26 | Performed by: GENERAL ACUTE CARE HOSPITAL

## 2024-10-02 DIAGNOSIS — H35.3231 EXUDATIVE AGE-RELATED MACULAR DEGENERATION OF BOTH EYES WITH ACTIVE CHOROIDAL NEOVASCULARIZATION (H): Primary | ICD-10-CM

## 2024-10-14 DIAGNOSIS — E03.9 ACQUIRED HYPOTHYROIDISM: ICD-10-CM

## 2024-10-15 RX ORDER — LEVOTHYROXINE SODIUM 75 UG/1
TABLET ORAL
Qty: 66 TABLET | Refills: 0 | Status: SHIPPED | OUTPATIENT
Start: 2024-10-15

## 2024-10-16 ENCOUNTER — OFFICE VISIT (OUTPATIENT)
Dept: OPHTHALMOLOGY | Facility: CLINIC | Age: 74
End: 2024-10-16
Attending: OPHTHALMOLOGY
Payer: MEDICARE

## 2024-10-16 DIAGNOSIS — Z96.1 PSEUDOPHAKIA OF BOTH EYES: ICD-10-CM

## 2024-10-16 DIAGNOSIS — H43.813 PVD (POSTERIOR VITREOUS DETACHMENT), BILATERAL: ICD-10-CM

## 2024-10-16 DIAGNOSIS — H35.3231 EXUDATIVE AGE-RELATED MACULAR DEGENERATION OF BOTH EYES WITH ACTIVE CHOROIDAL NEOVASCULARIZATION (H): Primary | ICD-10-CM

## 2024-10-16 DIAGNOSIS — H04.123 DRY EYE SYNDROME OF BOTH EYES: ICD-10-CM

## 2024-10-16 PROCEDURE — 99214 OFFICE O/P EST MOD 30 MIN: CPT | Mod: 25 | Performed by: OPHTHALMOLOGY

## 2024-10-16 PROCEDURE — 67028 INJECTION EYE DRUG: CPT | Mod: LT | Performed by: OPHTHALMOLOGY

## 2024-10-16 PROCEDURE — G0463 HOSPITAL OUTPT CLINIC VISIT: HCPCS | Mod: 25 | Performed by: OPHTHALMOLOGY

## 2024-10-16 PROCEDURE — 92134 CPTRZ OPH DX IMG PST SGM RTA: CPT | Performed by: OPHTHALMOLOGY

## 2024-10-16 PROCEDURE — 250N000011 HC RX IP 250 OP 636: Mod: JZ | Performed by: OPHTHALMOLOGY

## 2024-10-16 RX ADMIN — FARICIMAB 6 MG: 6 INJECTION, SOLUTION INTRAVITREAL at 13:49

## 2024-10-16 RX ADMIN — LIDOCAINE HYDROCHLORIDE 5 ML: 20 INJECTION, SOLUTION EPIDURAL; INFILTRATION; INTRACAUDAL; PERINEURAL at 13:47

## 2024-10-16 ASSESSMENT — SLIT LAMP EXAM - LIDS
COMMENTS: NORMAL
COMMENTS: NORMAL

## 2024-10-16 ASSESSMENT — TONOMETRY
OS_IOP_MMHG: 15
IOP_METHOD: TONOPEN
OD_IOP_MMHG: 15

## 2024-10-16 ASSESSMENT — CUP TO DISC RATIO
OS_RATIO: 0.25
OD_RATIO: 0.25

## 2024-10-16 ASSESSMENT — VISUAL ACUITY
OS_CC: 20/40
OS_PH_CC: 20/40
OS_PH_CC+: +1
METHOD: SNELLEN - LINEAR
OD_CC+: -2
OD_CC: 20/25
OS_CC+: -1

## 2024-10-16 ASSESSMENT — EXTERNAL EXAM - LEFT EYE: OS_EXAM: NORMAL

## 2024-10-16 ASSESSMENT — EXTERNAL EXAM - RIGHT EYE: OD_EXAM: NORMAL

## 2024-10-16 NOTE — PROGRESS NOTES
CC -  Macular degeneration     INTERVAL HISTORY -  Vision has remained about the same since last visit. She has noticed some floaters in both eyes, does not report flashes. Has not been using amsler grid.     HPI -   Rita Mancini is a  74 year old year-old patient presenting for evaluation for macular degeneration. Was referred by her cataract surgeon for AMD. Saw Dr. KRISTIE Duarte ~6 months ago. 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 Observation recommended  No current smoking   No known FH of AMD, glaucoma     PAST OCULAR SURGERY  CE IOL each eye 2021    RETINAL IMAGING:  OCT 08/7/24 and 10/16/24  Right eye: Normal foveal contour, no intraretinal or subretinal fluid, small subfoveal drusen. Pachychoroid.  Left eye:  SubRPE scar, SIRE present with resolved small subretinal fluid inferotemporal -slightly worse    ASSESSMENT & PLAN  # Right eye early dry AMD  - with just a few small drusen; see below    # Left eye advanced exudative AMD   - [facility] PED with SIRE and increased SRF; vision stable  - pachychoroid +  S/P Avastin injection left eye last injection 8/2/23 1/31/24: resovled SRF  04/03/24: Small subretinal fluid collection parafoveal inferotemporal. Vabysmo left eye   10/16/24: resolved small tempo subretinal fluid; Vabysmo left eye today and RTC 10 weeks   AREDS II supplements  Amsler grid education given     # Pseudophakia each eye  Observe    # Dry eye each eye   ATs as  Needed    # PVD each eye    - No signs of retinal tears or detachment  - Discussed with patient about the symptoms of retinal tears and detachment  - Monitor    Dispo:  RTC 10-11 w for OCT left eye; vabaysmo left eye (no dilation)    Subconj injection for anesthesia    Complete documentation of historical and exam elements from today's encounter can be found in the full encounter summary report (not reduplicated in this progress note). I personally obtained the chief complaint(s) and history of present  illness.  I confirmed and edited as necessary the review of systems, past medical/surgical history, family history, social history, and examination findings as documented by others; and I examined the patient myself. I personally reviewed the relevant tests, images, and reports as documented above. I formulated and edited as necessary the assessment and plan and discussed the findings and management plan with the patient and family.     Malvin Rizzo MD

## 2024-10-16 NOTE — NURSING NOTE
Chief Complaints and History of Present Illnesses   Patient presents with    Follow Up     Exudative age-related macular degeneration of both eyes with active choroidal neovascularization      Chief Complaint(s) and History of Present Illness(es)       Follow Up              Comments: Exudative age-related macular degeneration of both eyes with active choroidal neovascularization               Comments    Pt states no change in VA since last visit  States floaters same as before  No flashes, eye pain or redness    Amaya Parisi COT 12:57 PM October 16, 2024

## 2024-10-22 ENCOUNTER — OFFICE VISIT (OUTPATIENT)
Dept: INTERNAL MEDICINE | Facility: CLINIC | Age: 74
End: 2024-10-22
Payer: MEDICARE

## 2024-10-22 VITALS
SYSTOLIC BLOOD PRESSURE: 112 MMHG | HEART RATE: 68 BPM | TEMPERATURE: 97.3 F | BODY MASS INDEX: 26.33 KG/M2 | OXYGEN SATURATION: 99 % | HEIGHT: 65 IN | DIASTOLIC BLOOD PRESSURE: 71 MMHG | WEIGHT: 158 LBS | RESPIRATION RATE: 15 BRPM

## 2024-10-22 DIAGNOSIS — F10.21 ALCOHOL DEPENDENCE IN REMISSION (H): ICD-10-CM

## 2024-10-22 DIAGNOSIS — I95.1 ORTHOSTATIC HYPOTENSION: ICD-10-CM

## 2024-10-22 DIAGNOSIS — E13.9 DIABETES 1.5, MANAGED AS TYPE 2 (H): Primary | ICD-10-CM

## 2024-10-22 DIAGNOSIS — I48.19 PERSISTENT ATRIAL FIBRILLATION (H): ICD-10-CM

## 2024-10-22 DIAGNOSIS — F33.1 MODERATE EPISODE OF RECURRENT MAJOR DEPRESSIVE DISORDER (H): ICD-10-CM

## 2024-10-22 PROBLEM — Z12.11 SCREENING FOR COLON CANCER: Status: RESOLVED | Noted: 2023-12-13 | Resolved: 2024-10-22

## 2024-10-22 PROBLEM — I48.91 ATRIAL FIBRILLATION (H): Status: ACTIVE | Noted: 2024-10-22

## 2024-10-22 PROBLEM — F32.1 CURRENT MODERATE EPISODE OF MAJOR DEPRESSIVE DISORDER (H): Status: RESOLVED | Noted: 2019-04-24 | Resolved: 2024-10-22

## 2024-10-22 PROBLEM — N18.30 CHRONIC KIDNEY DISEASE, STAGE 3 (H): Status: RESOLVED | Noted: 2021-02-18 | Resolved: 2024-10-22

## 2024-10-22 LAB
ANION GAP SERPL CALCULATED.3IONS-SCNC: 13 MMOL/L (ref 7–15)
BUN SERPL-MCNC: 23.7 MG/DL (ref 8–23)
CALCIUM SERPL-MCNC: 10.5 MG/DL (ref 8.8–10.4)
CHLORIDE SERPL-SCNC: 100 MMOL/L (ref 98–107)
CREAT SERPL-MCNC: 1.49 MG/DL (ref 0.51–0.95)
EGFRCR SERPLBLD CKD-EPI 2021: 36 ML/MIN/1.73M2
EST. AVERAGE GLUCOSE BLD GHB EST-MCNC: 131 MG/DL
GLUCOSE SERPL-MCNC: 104 MG/DL (ref 70–99)
HBA1C MFR BLD: 6.2 % (ref 0–5.6)
HCO3 SERPL-SCNC: 26 MMOL/L (ref 22–29)
POTASSIUM SERPL-SCNC: 5.5 MMOL/L (ref 3.4–5.3)
SODIUM SERPL-SCNC: 139 MMOL/L (ref 135–145)

## 2024-10-22 PROCEDURE — 99214 OFFICE O/P EST MOD 30 MIN: CPT | Performed by: INTERNAL MEDICINE

## 2024-10-22 PROCEDURE — 83036 HEMOGLOBIN GLYCOSYLATED A1C: CPT | Performed by: INTERNAL MEDICINE

## 2024-10-22 PROCEDURE — 80048 BASIC METABOLIC PNL TOTAL CA: CPT | Performed by: INTERNAL MEDICINE

## 2024-10-22 PROCEDURE — 36415 COLL VENOUS BLD VENIPUNCTURE: CPT | Performed by: INTERNAL MEDICINE

## 2024-10-22 ASSESSMENT — PAIN SCALES - GENERAL: PAINLEVEL: NO PAIN (0)

## 2024-10-22 NOTE — PROGRESS NOTES
ASSESSMENT AND PLAN:    1. Diabetes 1.5, managed as type 2  She feels it is well controlled.      2. Orthostatic hypotension  This seems to have resolved.  She doesn't think she needed to start the florinef.  Will follow.    Follow up in 3 months.     CHIEF COMPLAINT:  chief complaint    Rita is a 74 year old, presenting for the following health issues:  Follow Up (Follow up for existing conditions. She is having feet swelling with tenderness.)     Diabetes:   She presents for follow up of diabetes.  She is checking home blood glucose one time daily.   She checks blood glucose before meals.  Blood glucose is never over 200 and never under 70. She is aware of hypoglycemia symptoms including weakness and confusion.    She has no concerns regarding her diabetes at this time.  She is having numbness in feet.        HISTORY OF PRESENT ILLNESS:  Rita Mancini is a 74 year old female overall doing OK. Feet numbness, not pain, more weight loss, but less than before.  No weight gain.  No dyspnea or cough, or bowel or bladder problems.      REVIEW OF SYSTEMS:   See HPI, all other systems on review are negative.    Past Medical History:   Diagnosis Date    Age-related cataract 12/06/2021    Anemia     Anxiety and depression 01/01/1962    Bigeminy 03/17/2019    Bronchiectasis without complication (H)     Chronic cough     Chronic pleural effusion 01/15/2015    CKD (chronic kidney disease) stage 3, GFR 30-59 ml/min (H)     COPD (chronic obstructive pulmonary disease) (H) 01/01/2009    Depression     Diverticulosis     Eczema of both hands     Functional diarrhea 12/31/2018    GERD (gastroesophageal reflux disease)     History of alcoholism (H)     History of syncope 04/24/2024    Hx antineoplastic chemotherapy     lung cancer     Hx of radiation therapy     lung cancer     Hydronephrosis     right kidney since 2019    Hyperlipidemia LDL goal <100 03/06/2023    Hypothyroid     Infection due to 2019 novel coronavirus  03/06/2023    Lung cancer (H) 01/01/2008    RUL,  Non small cell cancer    Macular degeneration (senile) of retina     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    Orthostatic hypotension 10/22/2024    Osteopenia     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, subsequent encounter 04/08/2019    Pleural effusion 01/01/2015    Sepsis due to Escherichia coli with acute renal failure without septic shock, unspecified acute renal failure type (H)     Sleep apnea 01/01/2010    does not use cpap    Type 2 diabetes, HbA1C goal < 8% (H)     Umbilical hernia with obstruction 01/23/2024     History   Smoking Status    Former    Types: Cigarettes   Smokeless Tobacco    Never     Family History   Problem Relation Age of Onset    Heart Disease Mother     Hypertension Mother     Alcoholism Mother     Depression Mother     Heart Disease Father 45        mi age 45, cabg    Coronary Artery Disease Father     Cancer Father         prostate    Hypertension Father     Snoring Father     Chronic Obstructive Pulmonary Disease Brother     Alcoholism Brother     Alcoholism Sister     Diabetes Son     Anxiety Disorder Son     Depression Son     Post-Traumatic Stress Disorder (PTSD) Son     Obesity Daughter     Obesity Daughter     Cancer Maternal Aunt 47        breast    Breast Cancer Paternal Aunt     Leukemia Grandchild     Schizophrenia Grandchild     Lymphoma Grandchild     Glaucoma No family hx of     Macular Degeneration No family hx of      Past Surgical History:   Procedure Laterality Date    CATARACT IOL, RT/LT Bilateral 12/2021    DAVINCI XI HERNIORRHAPHY VENTRAL N/A 2/16/2024    Procedure: HERNIORRHAPHY, VENTRAL, ROBOT-ASSISTED, LAPAROSCOPIC, USING DA PAUL XI;  Surgeon: Tyler Rossi MD;  Location: [facility] [place] [facility]     MISCELLANEOUS PROCEDURE  12/10/2009    PORTACATH PLACEMENT      and removal    THORACOSCOPY Right 04/06/2015    Procedure: RIGHT THORACOSCOPY / BIOPSY PLEURAL / TALC  "PLEURODESIS;  Surgeon: Michi Ma MD;  Location: [facility];  Service:     THORACOSCOPY Left 03/29/2019    Procedure: LEFT THORACOSCOPY WITH DECORTICATION;  Surgeon: Michi Ma MD;  Location: [facility];  Service: General    TONSILLECTOMY  age 5    URETERAL STENT PLACEMENT Right 06/01/2010    US THORACENTESIS  03/27/2019     Allergies   Allergen Reactions    Seasonal Allergies      VITALS:  Vitals:    10/22/24 1418   BP: 112/71   BP Location: Left arm   Patient Position: Sitting   Cuff Size: Adult Regular   Pulse: 68   Resp: 15   Temp: 97.3  F (36.3  C)   TempSrc: Tympanic   SpO2: 99%   Weight: 71.7 kg (158 lb)   Height: 1.651 m (5' 5\")     Estimated body mass index is 26.29 kg/m  as calculated from the following:    Height as of this encounter: 1.651 m (5' 5\").    Weight as of this encounter: 71.7 kg (158 lb).  Wt Readings from Last 3 Encounters:   10/22/24 71.7 kg (158 lb)   07/25/24 73.5 kg (162 lb)   07/03/24 75.3 kg (165 lb 14.4 oz)     PHYSICAL EXAM:  Constitutional:  In NAD, alert and oriented  Neck: no cervical or axillary adenopathy  Cardiac:  S1 S2   Lungs: Clear   Abdomen:   Soft, flat and non-tender      DECISION TO OBTAIN OLD RECORDS AND/OR OBTAIN HISTORY FROM SOMEONE OTHER THAN PATIENT, AND/OR ACCESSING CARE EVERYWHERE):  1  0     REVIEW AND SUMMARIZATION OF OLD RECORDS, AND/OR OBTAINING HISTORY FROM SOMEONE OTHER THAN PATIENT, AND/OR DISCUSSION OF CASE WITH ANOTHER HEALTH CARE PROVIDER:  2 reviewed past health notes    REVIEW AND/OR ORDER OF OF CLINICAL LAB TESTS: 1  ordered.    REVIEW AND/OR ORDER OF RADIOLOGY TESTS: 1 0.    REVIEW AND/OR ORDER OF MEDICAL TESTS (EKG/ECHO/COLONOSCOPY/EGD): 1  0    INDEPENDENT  VISUALIZATION OF IMAGE, TRACING, OR SPECIMEN ITSELF (2 EACH):  0     TOTAL: 3    Current Outpatient Medications   Medication Sig Dispense Refill    aspirin 81 MG EC tablet Take 81 mg by mouth daily      blood glucose (NO BRAND SPECIFIED) lancets standard Use to " test blood sugar 1 times daily or as directed. 100 each 2    blood glucose (NO BRAND SPECIFIED) test strip Use to test blood sugar one time daily or as directed. 100 strip 2    blood glucose monitoring (NO BRAND SPECIFIED) meter device kit Use to test blood sugar 1 times daily or as directed. 1 kit 0    calcium carbonate (TUMS) 500 MG chewable tablet Take 1 chew tab by mouth daily      cetirizine (ZYRTEC) 10 MG CHEW Take 10 mg by mouth daily      citalopram (CELEXA) 10 MG tablet Take 1 tablet (10 mg) by mouth daily 90 tablet 2    famotidine (PEPCID) 20 MG tablet Take 1 tablet (20 mg) by mouth daily 90 tablet 2    fludrocortisone (FLORINEF) 0.1 MG tablet Take 1 tablet (0.1 mg) by mouth daily 90 tablet 3    fluticasone (FLONASE) 50 MCG/ACT nasal spray Spray 1 spray into both nostrils 2 times daily 16 g 3    levothyroxine (SYNTHROID/LEVOTHROID) 50 MCG tablet TAKE ON EMPTY STOMACH EVERY MONDAY AND FRIDAY (SEE OTHER DOSE FOR REMAINING DAYS OF WEEK) 24 tablet 3    levothyroxine (SYNTHROID/LEVOTHROID) 75 MCG tablet TAKE 1 TAB BY MOUTH ON TUE WED THR SAT SUN 66 tablet 0    magnesium 500 MG TABS       Melatonin 10 MG CAPS       montelukast (SINGULAIR) 10 MG tablet Take 1 tablet (10 mg) by mouth at bedtime 90 tablet 2    multivitamin w/minerals (THERA-VIT-M) tablet Take 1 tablet by mouth daily      PULMICORT FLEXHALER 180 MCG/ACT inhaler INHALE 2 PUFFS BY MOUTH TWICE A DAY 1 each 11    simvastatin (ZOCOR) 10 MG tablet Take 1 tablet (10 mg) by mouth at bedtime 90 tablet 2    triamcinolone (KENALOG) 0.1 % external cream Apply topically 2 times daily      umeclidinium-vilanterol (ANORO ELLIPTA) 62.5-25 MCG/ACT oral inhaler Inhale 1 puff into the lungs daily 180 each 0    UNKNOWN TO PATIENT Eye vitamin      zolpidem (AMBIEN) 5 MG tablet Take 1 tablet (5 mg) by mouth nightly as needed for sleep 90 tablet 3     Michi Murillo MD  Internal Medicine  [facility]

## 2024-10-22 NOTE — LETTER
October 22, 2024      Rita Mancini  [address]   [place] [address]        Dear ,    We are writing to inform you of your test results.    Your tests are good.  The A1c is good, the blood chemistries are stable.  The kidney abnormality, the creatinine, is mildly abnormal, but stable, unchanged from previous.      Resulted Orders   HEMOGLOBIN A1C   Result Value Ref Range    Estimated Average Glucose 131 (H) <117 mg/dL    Hemoglobin A1C 6.2 (H) 0.0 - 5.6 %      Comment:      Normal <5.7%   Prediabetes 5.7-6.4%    Diabetes 6.5% or higher     Note: Adopted from ADA consensus guidelines.   Basic metabolic panel  (Ca, Cl, CO2, Creat, Gluc, K, Na, BUN)   Result Value Ref Range    Sodium 139 135 - 145 mmol/L    Potassium 5.5 (H) 3.4 - 5.3 mmol/L    Chloride 100 98 - 107 mmol/L    Carbon Dioxide (CO2) 26 22 - 29 mmol/L    Anion Gap 13 7 - 15 mmol/L    Urea Nitrogen 23.7 (H) 8.0 - 23.0 mg/dL    Creatinine 1.49 (H) 0.51 - 0.95 mg/dL    GFR Estimate 36 (L) >60 mL/min/1.73m2      Comment:      eGFR calculated using 2021 CKD-EPI equation.    Calcium 10.5 (H) 8.8 - 10.4 mg/dL      Comment:      Reference intervals for this test were updated on 7/16/2024 to reflect our healthy population more accurately. There may be differences in the flagging of prior results with similar values performed with this method. Those prior results can be interpreted in the context of the updated reference intervals.    Glucose 104 (H) 70 - 99 mg/dL       If you have any questions or concerns, please call the clinic at the number listed above.       Sincerely,      Michi Murillo MD

## 2024-10-23 DIAGNOSIS — G47.9 SLEEP DISORDER: ICD-10-CM

## 2024-10-23 RX ORDER — ZOLPIDEM TARTRATE 5 MG/1
5 TABLET ORAL
Qty: 90 TABLET | Refills: 3 | OUTPATIENT
Start: 2024-10-23

## 2024-10-29 DIAGNOSIS — G47.9 SLEEP DISORDER: ICD-10-CM

## 2024-10-29 RX ORDER — ZOLPIDEM TARTRATE 5 MG/1
5 TABLET ORAL
Qty: 90 TABLET | Refills: 0 | Status: SHIPPED | OUTPATIENT
Start: 2024-10-29

## 2024-11-04 DIAGNOSIS — J42 CHRONIC BRONCHITIS, UNSPECIFIED CHRONIC BRONCHITIS TYPE (H): ICD-10-CM

## 2024-11-04 DIAGNOSIS — J30.2 SEASONAL ALLERGIC RHINITIS, UNSPECIFIED TRIGGER: ICD-10-CM

## 2024-11-04 RX ORDER — UMECLIDINIUM BROMIDE AND VILANTEROL TRIFENATATE 62.5; 25 UG/1; UG/1
1 POWDER RESPIRATORY (INHALATION) DAILY
Qty: 180 EACH | Refills: 0 | Status: SHIPPED | OUTPATIENT
Start: 2024-11-04

## 2024-11-04 RX ORDER — FLUTICASONE PROPIONATE 50 MCG
1 SPRAY, SUSPENSION (ML) NASAL 2 TIMES DAILY
Qty: 16 G | Refills: 3 | Status: SHIPPED | OUTPATIENT
Start: 2024-11-04

## 2024-11-11 DIAGNOSIS — J44.9 CHRONIC OBSTRUCTIVE PULMONARY DISEASE, UNSPECIFIED COPD TYPE (H): ICD-10-CM

## 2024-11-12 RX ORDER — MONTELUKAST SODIUM 10 MG/1
10 TABLET ORAL AT BEDTIME
Qty: 90 TABLET | Refills: 2 | OUTPATIENT
Start: 2024-11-12

## 2024-12-23 DIAGNOSIS — E78.5 HYPERLIPIDEMIA LDL GOAL <100: ICD-10-CM

## 2024-12-23 DIAGNOSIS — H35.3231 EXUDATIVE AGE-RELATED MACULAR DEGENERATION OF BOTH EYES WITH ACTIVE CHOROIDAL NEOVASCULARIZATION (H): Primary | ICD-10-CM

## 2024-12-23 RX ORDER — SIMVASTATIN 10 MG
10 TABLET ORAL AT BEDTIME
Qty: 90 TABLET | Refills: 2 | OUTPATIENT
Start: 2024-12-23

## 2024-12-31 ENCOUNTER — OFFICE VISIT (OUTPATIENT)
Dept: OPHTHALMOLOGY | Facility: CLINIC | Age: 74
End: 2024-12-31
Attending: OPHTHALMOLOGY
Payer: MEDICARE

## 2024-12-31 DIAGNOSIS — H04.123 DRY EYE SYNDROME OF BOTH EYES: ICD-10-CM

## 2024-12-31 DIAGNOSIS — H35.3231 EXUDATIVE AGE-RELATED MACULAR DEGENERATION OF BOTH EYES WITH ACTIVE CHOROIDAL NEOVASCULARIZATION (H): Primary | ICD-10-CM

## 2024-12-31 DIAGNOSIS — Z96.1 PSEUDOPHAKIA OF BOTH EYES: ICD-10-CM

## 2024-12-31 PROCEDURE — 67028 INJECTION EYE DRUG: CPT | Mod: LT | Performed by: OPHTHALMOLOGY

## 2024-12-31 PROCEDURE — G0463 HOSPITAL OUTPT CLINIC VISIT: HCPCS | Performed by: OPHTHALMOLOGY

## 2024-12-31 PROCEDURE — 250N000011 HC RX IP 250 OP 636: Mod: JZ | Performed by: OPHTHALMOLOGY

## 2024-12-31 PROCEDURE — 92134 CPTRZ OPH DX IMG PST SGM RTA: CPT | Performed by: OPHTHALMOLOGY

## 2024-12-31 RX ADMIN — FARICIMAB 6 MG: 6 INJECTION, SOLUTION INTRAVITREAL at 13:31

## 2024-12-31 RX ADMIN — LIDOCAINE HYDROCHLORIDE 5 ML: 20 INJECTION, SOLUTION EPIDURAL; INFILTRATION; INTRACAUDAL; PERINEURAL at 13:31

## 2024-12-31 ASSESSMENT — VISUAL ACUITY
OS_SC+: -2
OD_PH_SC+: -2
OD_PH_SC: 20/25
OD_SC: 20/30
OD_SC+: -2
OS_SC: 20/40
METHOD: SNELLEN - LINEAR
OS_PH_SC: 20/40

## 2024-12-31 ASSESSMENT — REFRACTION_WEARINGRX
OS_AXIS: 175
OS_ADD: +2.50
OD_CYLINDER: +0.75
OD_SPHERE: PLANO
OS_SPHERE: +0.50
OD_AXIS: 010
OS_CYLINDER: +0.25
OD_ADD: +2.50

## 2024-12-31 ASSESSMENT — EXTERNAL EXAM - LEFT EYE: OS_EXAM: NORMAL

## 2024-12-31 ASSESSMENT — TONOMETRY
OD_IOP_MMHG: 13
OS_IOP_MMHG: 14
IOP_METHOD: TONOPEN

## 2024-12-31 ASSESSMENT — EXTERNAL EXAM - RIGHT EYE: OD_EXAM: NORMAL

## 2024-12-31 ASSESSMENT — CUP TO DISC RATIO
OD_RATIO: 0.25
OS_RATIO: 0.25

## 2024-12-31 ASSESSMENT — SLIT LAMP EXAM - LIDS
COMMENTS: NORMAL
COMMENTS: NORMAL

## 2024-12-31 NOTE — PROGRESS NOTES
CC -  Macular degeneration     INTERVAL HISTORY -  Vision has remained about the same since last visit. She has noticed some floaters in both eyes, does not report flashes. Has not been using amsler grid.     HPI -   Rita Mancini is a  74 year old year-old patient presenting for evaluation for macular degeneration. Was referred by her cataract surgeon for AMD. Saw Dr. KRISTIE Duarte ~6 months ago. 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 Observation recommended  No current smoking   No known FH of AMD, glaucoma     PAST OCULAR SURGERY  CE IOL each eye 2021    RETINAL IMAGING:  OCT 12/31/24  Right eye: Normal foveal contour, no intraretinal or subretinal fluid, small subfoveal drusen. Pachychoroid.  Left eye:  SubRPE scar, SIRE present with resolved small subretinal fluid inferotemporal -slightly worse    ASSESSMENT & PLAN  # Right eye early dry AMD  - with just a few small drusen; see below    # Left eye advanced exudative AMD   - [facility] PED with SIRE and increased SRF; vision stable  - pachychoroid +  S/P Avastin injection left eye last injection 8/2/23 1/31/24: resovled SRF  04/03/24: Small subretinal fluid collection parafoveal inferotemporal. Vabysmo left eye   10/16/24: resolved small tempo subretinal fluid; Vabysmo left eye today and RTC 10 weeks   AREDS II supplements  Amsler grid education given     # Pseudophakia each eye  Observe    # Dry eye each eye   ATs as  Needed    # PVD each eye    - No signs of retinal tears or detachment  - Discussed with patient about the symptoms of retinal tears and detachment  - Monitor    Dispo:  RTC 11-12 w for OCT left eye; vabaysmo left eye (no dilation)    Subconj injection for anesthesia    Complete documentation of historical and exam elements from today's encounter can be found in the full encounter summary report (not reduplicated in this progress note). I personally obtained the chief complaint(s) and history of present illness.  I  confirmed and edited as necessary the review of systems, past medical/surgical history, family history, social history, and examination findings as documented by others; and I examined the patient myself. I personally reviewed the relevant tests, images, and reports as documented above. I formulated and edited as necessary the assessment and plan and discussed the findings and management plan with the patient and family.     Malvin iRzzo MD

## 2025-01-06 DIAGNOSIS — E03.9 ACQUIRED HYPOTHYROIDISM: ICD-10-CM

## 2025-01-06 RX ORDER — LEVOTHYROXINE SODIUM 75 UG/1
TABLET ORAL
Qty: 66 TABLET | Refills: 0 | Status: SHIPPED | OUTPATIENT
Start: 2025-01-06

## 2025-01-07 DIAGNOSIS — F41.1 GAD (GENERALIZED ANXIETY DISORDER): ICD-10-CM

## 2025-01-07 DIAGNOSIS — E78.5 HYPERLIPIDEMIA LDL GOAL <100: ICD-10-CM

## 2025-01-07 RX ORDER — CITALOPRAM HYDROBROMIDE 10 MG/1
10 TABLET ORAL DAILY
Qty: 90 TABLET | Refills: 3 | Status: SHIPPED | OUTPATIENT
Start: 2025-01-07

## 2025-01-07 RX ORDER — SIMVASTATIN 10 MG
10 TABLET ORAL AT BEDTIME
Qty: 90 TABLET | Refills: 3 | Status: SHIPPED | OUTPATIENT
Start: 2025-01-07

## 2025-01-20 DIAGNOSIS — E03.9 ACQUIRED HYPOTHYROIDISM: ICD-10-CM

## 2025-01-20 RX ORDER — LEVOTHYROXINE SODIUM 50 UG/1
TABLET ORAL
Qty: 24 TABLET | Refills: 3 | Status: SHIPPED | OUTPATIENT
Start: 2025-01-20

## 2025-01-22 ENCOUNTER — OFFICE VISIT (OUTPATIENT)
Dept: INTERNAL MEDICINE | Facility: CLINIC | Age: 75
End: 2025-01-22
Payer: MEDICARE

## 2025-01-22 VITALS
WEIGHT: 159.1 LBS | TEMPERATURE: 96.9 F | BODY MASS INDEX: 26.51 KG/M2 | HEIGHT: 65 IN | HEART RATE: 65 BPM | RESPIRATION RATE: 18 BRPM | DIASTOLIC BLOOD PRESSURE: 82 MMHG | OXYGEN SATURATION: 96 % | SYSTOLIC BLOOD PRESSURE: 110 MMHG

## 2025-01-22 DIAGNOSIS — E13.9 DIABETES 1.5, MANAGED AS TYPE 2 (H): Primary | ICD-10-CM

## 2025-01-22 DIAGNOSIS — H35.3231 EXUDATIVE AGE-RELATED MACULAR DEGENERATION OF BOTH EYES WITH ACTIVE CHOROIDAL NEOVASCULARIZATION (H): ICD-10-CM

## 2025-01-22 DIAGNOSIS — F41.1 GAD (GENERALIZED ANXIETY DISORDER): ICD-10-CM

## 2025-01-22 DIAGNOSIS — E03.9 HYPOTHYROIDISM, UNSPECIFIED TYPE: ICD-10-CM

## 2025-01-22 DIAGNOSIS — Z29.11 NEED FOR VACCINATION AGAINST RESPIRATORY SYNCYTIAL VIRUS: ICD-10-CM

## 2025-01-22 DIAGNOSIS — Z85.118 HISTORY OF LUNG CANCER: ICD-10-CM

## 2025-01-22 PROBLEM — C34.90: Status: ACTIVE | Noted: 2025-01-22

## 2025-01-22 PROBLEM — I48.91 ATRIAL FIBRILLATION (H): Status: RESOLVED | Noted: 2024-10-22 | Resolved: 2025-01-22

## 2025-01-22 PROBLEM — C34.90: Status: RESOLVED | Noted: 2025-01-22 | Resolved: 2025-01-22

## 2025-01-22 PROBLEM — F10.21 ALCOHOL DEPENDENCE IN REMISSION (H): Status: RESOLVED | Noted: 2024-10-22 | Resolved: 2025-01-22

## 2025-01-22 LAB
EST. AVERAGE GLUCOSE BLD GHB EST-MCNC: 123 MG/DL
HBA1C MFR BLD: 5.9 % (ref 0–5.6)

## 2025-01-22 PROCEDURE — 80048 BASIC METABOLIC PNL TOTAL CA: CPT | Performed by: INTERNAL MEDICINE

## 2025-01-22 PROCEDURE — 36415 COLL VENOUS BLD VENIPUNCTURE: CPT | Performed by: INTERNAL MEDICINE

## 2025-01-22 PROCEDURE — 83036 HEMOGLOBIN GLYCOSYLATED A1C: CPT | Performed by: INTERNAL MEDICINE

## 2025-01-22 PROCEDURE — 99214 OFFICE O/P EST MOD 30 MIN: CPT | Performed by: INTERNAL MEDICINE

## 2025-01-22 PROCEDURE — G2211 COMPLEX E/M VISIT ADD ON: HCPCS | Performed by: INTERNAL MEDICINE

## 2025-01-22 PROCEDURE — 84443 ASSAY THYROID STIM HORMONE: CPT | Performed by: INTERNAL MEDICINE

## 2025-01-22 RX ORDER — DULAGLUTIDE 4.5 MG/.5ML
INJECTION, SOLUTION SUBCUTANEOUS
COMMUNITY
Start: 2024-12-23

## 2025-01-22 RX ORDER — LANCETS
EACH MISCELLANEOUS
COMMUNITY
Start: 2024-10-22

## 2025-01-22 RX ORDER — CITALOPRAM HYDROBROMIDE 10 MG/1
10 TABLET ORAL DAILY
Status: SHIPPED
Start: 2025-01-22

## 2025-01-22 ASSESSMENT — PATIENT HEALTH QUESTIONNAIRE - PHQ9: SUM OF ALL RESPONSES TO PHQ QUESTIONS 1-9: 7

## 2025-01-22 ASSESSMENT — PAIN SCALES - GENERAL: PAINLEVEL_OUTOF10: NO PAIN (0)

## 2025-01-22 NOTE — PROGRESS NOTES
ASSESSMENT AND PLAN:    1. Diabetes 1.5, managed as type 2  Doing well with trulicity and metformin.     2. History of Non-small cell cancer of right lung  S/p treatment 2008, without recurrence.     3. Exudative age-related macular degeneration     4. CARLOS ALBERTO (generalized anxiety disorder)    5. Hypothyroidism, unspecified type  Ongoing replacement.     6. Chronic right hydronephrosis  This goes back at least until 2019.  She may have had pyelonephritis in the past with renal damage.      7. Chronic renal failure, stage 3b.   This has been stable.     The longitudinal plan of care for the diagnosis(es)/condition(s) as documented were addressed during this visit. Due to the added complexity in care, I will continue to support Rita in the subsequent management and with ongoing continuity of care.    Follow up in 6 months.     CHIEF COMPLAINT:  Follow up diabetes, kidney disease.     HISTORY OF PRESENT ILLNESS:  Rita Mancini is a 74 year old female continues to feel well.  Not having unusual dyspnea or cough or bowel or bladder issues.  Weight is now stable with 30 pound weight loss.     REVIEW OF SYSTEMS:   See HPI, all other systems on review are negative.    Past Medical History:   Diagnosis Date    Age-related cataract 12/06/2021    Anemia     Anxiety and depression 01/01/1962    Bigeminy 03/17/2019    Bronchiectasis without complication (H)     Chronic cough     Chronic pleural effusion 01/15/2015    CKD (chronic kidney disease) stage 3, GFR 30-59 ml/min (H)     COPD (chronic obstructive pulmonary disease) (H) 01/01/2009    Depression     Diverticulosis     Eczema of both hands     Functional diarrhea 12/31/2018    GERD (gastroesophageal reflux disease)     History of alcoholism (H)     History of syncope 04/24/2024    Hx antineoplastic chemotherapy     lung cancer     Hx of radiation therapy     lung cancer     Hydronephrosis     right kidney since 2019    Hyperlipidemia LDL goal <100 03/06/2023    Hypothyroid      Infection due to 2019 novel coronavirus 03/06/2023    Lung cancer (H) 01/01/2008    RUL,  Non small cell cancer    Macular degeneration (senile) of retina     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    Orthostatic hypotension 10/22/2024    Osteopenia     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, subsequent encounter 04/08/2019    Pleural effusion 01/01/2015    Sepsis due to Escherichia coli with acute renal failure without septic shock, unspecified acute renal failure type (H)     Sleep apnea 01/01/2010    does not use cpap    Type 2 diabetes, HbA1C goal < 8% (H)     Umbilical hernia with obstruction 01/23/2024     History   Smoking Status    Former    Types: Cigarettes   Smokeless Tobacco    Never     Family History   Problem Relation Age of Onset    Heart Disease Mother     Hypertension Mother     Alcoholism Mother     Depression Mother     Heart Disease Father 45        mi age 45, cabg    Coronary Artery Disease Father     Cancer Father         prostate    Hypertension Father     Snoring Father     Chronic Obstructive Pulmonary Disease Brother     Alcoholism Brother     Alcoholism Sister     Diabetes Son     Anxiety Disorder Son     Depression Son     Post-Traumatic Stress Disorder (PTSD) Son     Obesity Daughter     Obesity Daughter     Cancer Maternal Aunt 47        breast    Breast Cancer Paternal Aunt     Leukemia Grandchild     Schizophrenia Grandchild     Lymphoma Grandchild     Glaucoma No family hx of     Macular Degeneration No family hx of      Past Surgical History:   Procedure Laterality Date    CATARACT IOL, RT/LT Bilateral 12/2021    DAVINCI XI HERNIORRHAPHY VENTRAL N/A 2/16/2024    Procedure: HERNIORRHAPHY, VENTRAL, ROBOT-ASSISTED, LAPAROSCOPIC, USING DA PAUL XI;  Surgeon: Tyler Rossi MD;  Location: [facility] OR     MISCELLANEOUS PROCEDURE  12/10/2009    PORTACATH PLACEMENT      and removal    THORACOSCOPY Right 04/06/2015    Procedure:  "RIGHT THORACOSCOPY / BIOPSY PLEURAL / TALC PLEURODESIS;  Surgeon: Michi Ma MD;  Location: [facility] OR;  Service:     THORACOSCOPY Left 03/29/2019    Procedure: LEFT THORACOSCOPY WITH DECORTICATION;  Surgeon: Michi Ma MD;  Location: [facility];  Service: General    TONSILLECTOMY  age 5    URETERAL STENT PLACEMENT Right 06/01/2010    US THORACENTESIS  03/27/2019     Allergies   Allergen Reactions    Seasonal Allergies      VITALS:  Vitals:    01/22/25 1353   BP: 110/82   BP Location: Left arm   Patient Position: Sitting   Cuff Size: Adult Regular   Pulse: 65   Resp: 18   Temp: 96.9  F (36.1  C)   TempSrc: Tympanic   SpO2: 96%   Weight: 72.2 kg (159 lb 1.6 oz)   Height: 1.651 m (5' 5\")     Estimated body mass index is 26.48 kg/m  as calculated from the following:    Height as of this encounter: 1.651 m (5' 5\").    Weight as of this encounter: 72.2 kg (159 lb 1.6 oz).  Wt Readings from Last 3 Encounters:   01/22/25 72.2 kg (159 lb 1.6 oz)   10/22/24 71.7 kg (158 lb)   07/25/24 73.5 kg (162 lb)     PHYSICAL EXAM:  Constitutional:  In NAD, alert and oriented  Neck: no cervical or axillary adenopathy  Cardiac:  S1 S2   Lungs: Clear   Abdomen:   Soft, flat and non-tender    DECISION TO OBTAIN OLD RECORDS AND/OR OBTAIN HISTORY FROM SOMEONE OTHER THAN PATIENT, AND/OR ACCESSING CARE EVERYWHERE):  1  0     REVIEW AND SUMMARIZATION OF OLD RECORDS, AND/OR OBTAINING HISTORY FROM SOMEONE OTHER THAN PATIENT, AND/OR DISCUSSION OF CASE WITH ANOTHER HEALTH CARE PROVIDER:  2 reviewed past health history.     REVIEW AND/OR ORDER OF OF CLINICAL LAB TESTS: 1  ordered. .    REVIEW AND/OR ORDER OF RADIOLOGY TESTS: 1 reviewed past kidney imaging.     REVIEW AND/OR ORDER OF MEDICAL TESTS (EKG/ECHO/COLONOSCOPY/EGD): 1  0    INDEPENDENT  VISUALIZATION OF IMAGE, TRACING, OR SPECIMEN ITSELF (2 EACH):  0     TOTAL: 4    Current Outpatient Medications   Medication Sig Dispense Refill    aspirin 81 MG EC tablet Take " 81 mg by mouth daily      blood glucose monitoring (SOFTCLIX) lancets USE TO TEST BLOOD SUGAR 1 TIMES DAILY OR AS DIRECTED.      calcium carbonate (TUMS) 500 MG chewable tablet Take 1 chew tab by mouth daily      cetirizine (ZYRTEC) 10 MG CHEW Take 10 mg by mouth daily      citalopram (CELEXA) 10 MG tablet Take 1 tablet (10 mg) by mouth daily. 90 tablet 3    famotidine (PEPCID) 20 MG tablet Take 1 tablet (20 mg) by mouth daily 90 tablet 2    fluticasone (FLONASE) 50 MCG/ACT nasal spray Spray 1 spray into both nostrils 2 times daily. 16 g 3    levothyroxine (SYNTHROID/LEVOTHROID) 50 MCG tablet TAKE ON EMPTY STOMACH EVERY MONDAY AND FRIDAY (SEE OTHER DOSE FOR REMAINING DAYS OF WEEK) 24 tablet 3    levothyroxine (SYNTHROID/LEVOTHROID) 75 MCG tablet TAKE 1 TAB BY MOUTH ON TUE WED THR SAT SUN 66 tablet 0    magnesium 500 MG TABS       Melatonin 10 MG CAPS       montelukast (SINGULAIR) 10 MG tablet Take 1 tablet (10 mg) by mouth at bedtime. 90 tablet 2    multivitamin w/minerals (THERA-VIT-M) tablet Take 1 tablet by mouth daily      PULMICORT FLEXHALER 180 MCG/ACT inhaler INHALE 2 PUFFS BY MOUTH TWICE A DAY 1 each 11    simvastatin (ZOCOR) 10 MG tablet Take 1 tablet (10 mg) by mouth at bedtime. 90 tablet 3    triamcinolone (KENALOG) 0.1 % external cream Apply topically 2 times daily      TRULICITY 4.5 MG/0.5ML SOAJ       umeclidinium-vilanterol (ANORO ELLIPTA) 62.5-25 MCG/ACT oral inhaler Inhale 1 puff into the lungs daily. 180 each 0    zolpidem (AMBIEN) 5 MG tablet Take 1 tablet (5 mg) by mouth nightly as needed for sleep. 90 tablet 0    blood glucose (NO BRAND SPECIFIED) lancets standard Use to test blood sugar 1 times daily or as directed. 100 each 2    blood glucose (NO BRAND SPECIFIED) test strip Use to test blood sugar one time daily or as directed. 100 strip 2     Michi Murillo MD  Internal Medicine  [facility]

## 2025-01-23 LAB
ANION GAP SERPL CALCULATED.3IONS-SCNC: 10 MMOL/L (ref 7–15)
BUN SERPL-MCNC: 27.4 MG/DL (ref 8–23)
CALCIUM SERPL-MCNC: 10 MG/DL (ref 8.8–10.4)
CHLORIDE SERPL-SCNC: 100 MMOL/L (ref 98–107)
CREAT SERPL-MCNC: 1.61 MG/DL (ref 0.51–0.95)
EGFRCR SERPLBLD CKD-EPI 2021: 33 ML/MIN/1.73M2
GLUCOSE SERPL-MCNC: 100 MG/DL (ref 70–99)
HCO3 SERPL-SCNC: 27 MMOL/L (ref 22–29)
POTASSIUM SERPL-SCNC: 5.5 MMOL/L (ref 3.4–5.3)
SODIUM SERPL-SCNC: 137 MMOL/L (ref 135–145)
TSH SERPL DL<=0.005 MIU/L-ACNC: 2.38 UIU/ML (ref 0.3–4.2)

## 2025-02-27 ENCOUNTER — TELEPHONE (OUTPATIENT)
Dept: INTERNAL MEDICINE | Facility: CLINIC | Age: 75
End: 2025-02-27
Payer: MEDICARE

## 2025-02-27 ENCOUNTER — E-VISIT (OUTPATIENT)
Dept: URGENT CARE | Facility: CLINIC | Age: 75
End: 2025-02-27
Payer: MEDICARE

## 2025-02-27 DIAGNOSIS — B00.1 HERPES LABIALIS: Primary | ICD-10-CM

## 2025-02-27 RX ORDER — VALACYCLOVIR HYDROCHLORIDE 1 G/1
2000 TABLET, FILM COATED ORAL 2 TIMES DAILY
Qty: 4 TABLET | Refills: 0 | Status: SHIPPED | OUTPATIENT
Start: 2025-02-27 | End: 2025-02-28

## 2025-02-27 NOTE — PATIENT INSTRUCTIONS
Hello,    After reviewing your responses, I've been able to diagnose you with coldsores which are common mouth sores caused by infection with the virus herpes.    Based on your responses, I have prescribed valtrex to treat this. Please follow the instructions on the medication. If you experience irritation of your skin, new rash, or any other new symptoms, you should stop using this medication and contact your primary care provider.    If this treatment does not work for you or your sores are worsening instead of improving or do not resolve in 7 days, please plan to follow-up with your primary care provider. They may be able to offer refills for you for future outbreaks as well.    Thanks for choosing?us?as your health care partner,?   ?   ZENIA ALEXANDRE CNP?   Cold Sores: Care Instructions  Overview  Cold sores are clusters of small blisters on the lip and skin around or inside the mouth. Often the first sign of a cold sore is a spot that tingles, burns, or itches. A blister usually forms within 24 hours. They are sometimes called fever blisters. The skin around the blisters can be red and inflamed. The blisters can break open, weep a clear fluid, and then scab over after a few days. Cold sores often heal in 7 to 10 days with no scar.  Cold sores are caused by the herpes simplex virus. The virus is spread by skin-to-skin contact. That means that if you have a cold sore and kiss another person, that person could get a cold sore too.  This is the same virus that causes some cases of genital herpes. So if you have a cold sore and have oral sex with someone, that person could get a sore in the genital area.  Cold sores will often go away on their own. But if they're painful, ask your doctor about a prescription antiviral medicine. It can relieve pain, help prevent outbreaks, and shorten the healing time.  Follow-up care is a key part of your treatment and safety. Be sure to make and go to all appointments,  and call your doctor if you are having problems. It's also a good idea to know your test results and keep a list of the medicines you take.  How can you care for yourself at home?  Wash your hands often. And try not to touch your cold sores. This will help to avoid spreading the virus to your eyes or genital area or to other people. This is more likely to happen if this is your first cold sore outbreak.  To help relieve pain, try placing a cold, wet towel on the sore. This can also reduce swelling.  If you are just getting a cold sore, try using over-the-counter docosanol (Abreva) cream to reduce symptoms.  If your doctor prescribed antiviral medicine to relieve pain and shorten the healing time, be sure to follow the directions.  Take an over-the-counter pain medicine, such as acetaminophen (Tylenol), ibuprofen (Advil, Motrin), or naproxen (Aleve), as needed. Read and follow all instructions on the label. No one younger than 20 should take aspirin. It has been linked to Reye syndrome, a serious illness.  Do not take two or more pain medicines at the same time unless the doctor told you to. Many pain medicines have acetaminophen, which is Tylenol. Too much acetaminophen (Tylenol) can be harmful.  Avoid citrus fruit, tomatoes, and other foods that contain acid.  Use over-the-counter ointments, such as Anbesol or Orajel, to numb sore areas in the mouth or on the lips.  Do not kiss or have oral sex with anyone while you have a cold sore.  To prevent cold sores in the future  Avoid long exposure of your lips to sunlight. (Wear a hat to help shade your mouth.)  Using lip balm that contains sunscreen may help reduce outbreaks of cold sores.  Do not share towels, razors, silverware, toothbrushes, or other objects with a person who has a cold sore.  For frequent or painful cold sores, try taking an antiviral medicine daily to decrease outbreaks.  When should you call for help?   Call your doctor now or seek immediate  "medical care if:    Your symptoms are painful and you want to try antiviral medicine.     You have signs of infection, such as:  Increased pain, swelling, warmth, or redness.  Red streaks leading from a cold sore.  Pus draining from a cold sore.  A fever.     You have a cold sore and develop eye pain, eye discharge, or any changes in your vision.   Watch closely for changes in your health, and be sure to contact your doctor if:    The cold sore does not heal in 7 to 10 days.     You get cold sores often.   Where can you learn more?  Go to https://www.[employer].net/patiented  Enter R850 in the search box to learn more about \"Cold Sores: Care Instructions.\"  Current as of: July 31, 2024  Content Version: 14.3    2024 [employer].   Care instructions adapted under license by your healthcare professional. If you have questions about a medical condition or this instruction, always ask your healthcare professional. [employer] disclaims any warranty or liability for your use of this information.    "

## 2025-02-27 NOTE — TELEPHONE ENCOUNTER
"S-(situation): Patient calls requesting acyclovir for cold sores.     B-(background): Hx of cold sores.     A-(assessment): Patient states she has a \"few\" cold sores on her lips that are \"not healing very well.\"    R-(recommendations): Recommended patient completes an E-visit. Sent patient [employer] message with link. Patient verbalizes understanding and is agreeable to do so.           "

## 2025-03-17 DIAGNOSIS — K21.9 GASTROESOPHAGEAL REFLUX DISEASE WITHOUT ESOPHAGITIS: ICD-10-CM

## 2025-03-18 ENCOUNTER — OFFICE VISIT (OUTPATIENT)
Dept: OPHTHALMOLOGY | Facility: CLINIC | Age: 75
End: 2025-03-18
Attending: OPHTHALMOLOGY
Payer: MEDICARE

## 2025-03-18 DIAGNOSIS — H35.3231 EXUDATIVE AGE-RELATED MACULAR DEGENERATION OF BOTH EYES WITH ACTIVE CHOROIDAL NEOVASCULARIZATION (H): Primary | ICD-10-CM

## 2025-03-18 PROCEDURE — G0463 HOSPITAL OUTPT CLINIC VISIT: HCPCS | Performed by: OPHTHALMOLOGY

## 2025-03-18 PROCEDURE — 67028 INJECTION EYE DRUG: CPT | Mod: LT | Performed by: OPHTHALMOLOGY

## 2025-03-18 PROCEDURE — 250N000011 HC RX IP 250 OP 636: Performed by: OPHTHALMOLOGY

## 2025-03-18 PROCEDURE — 92134 CPTRZ OPH DX IMG PST SGM RTA: CPT | Performed by: OPHTHALMOLOGY

## 2025-03-18 RX ORDER — FAMOTIDINE 20 MG/1
20 TABLET, FILM COATED ORAL DAILY
Qty: 90 TABLET | Refills: 0 | OUTPATIENT
Start: 2025-03-18

## 2025-03-18 RX ADMIN — LIDOCAINE HYDROCHLORIDE 0.5 ML: 20 INJECTION, SOLUTION EPIDURAL; INFILTRATION; INTRACAUDAL; PERINEURAL at 14:11

## 2025-03-18 RX ADMIN — FARICIMAB 6 MG: 6 INJECTION, SOLUTION INTRAVITREAL at 14:11

## 2025-03-18 ASSESSMENT — REFRACTION_WEARINGRX
OD_ADD: +2.50
OS_CYLINDER: +0.25
OS_AXIS: 175
OD_AXIS: 010
OD_CYLINDER: +0.75
OS_SPHERE: +0.50
OS_ADD: +2.50
OD_SPHERE: PLANO

## 2025-03-18 ASSESSMENT — EXTERNAL EXAM - RIGHT EYE: OD_EXAM: NORMAL

## 2025-03-18 ASSESSMENT — TONOMETRY
OD_IOP_MMHG: 10
OS_IOP_MMHG: 9
IOP_METHOD: ICARE

## 2025-03-18 ASSESSMENT — SLIT LAMP EXAM - LIDS
COMMENTS: NORMAL
COMMENTS: NORMAL

## 2025-03-18 ASSESSMENT — VISUAL ACUITY
CORRECTION_TYPE: GLASSES
OS_CC: 20/40
OS_CC+: +2
OD_CC: 20/20
METHOD: SNELLEN - LINEAR

## 2025-03-18 ASSESSMENT — EXTERNAL EXAM - LEFT EYE: OS_EXAM: NORMAL

## 2025-03-18 ASSESSMENT — CUP TO DISC RATIO
OS_RATIO: 0.25
OD_RATIO: 0.25

## 2025-03-18 NOTE — NURSING NOTE
Chief Complaints and History of Present Illnesses   Patient presents with    Follow Up     Chief Complaint(s) and History of Present Illness(es)       Follow Up              Course: stable    Associated symptoms: Negative for eye pain, tearing, flashes and floaters    Treatments tried: artificial tears              Comments    Pt notes stable vision, no new concerns.  Uses Ats PRN.    Ita KHAN 12:39 PM March 18, 2025

## 2025-03-18 NOTE — PROGRESS NOTES
CC -  Macular degeneration     INTERVAL HISTORY -  Vision has remained about the same since last visit. She has noticed some floaters in both eyes, does not report flashes. Has not been using amsler grid.     HPI -   Rita Mancini is a  74 year old year-old patient presenting for evaluation for macular degeneration. Was referred by her cataract surgeon for AMD. Saw Dr. KRISTIE Duarte ~6 months ago. 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 Observation recommended  No current smoking   No known FH of AMD, glaucoma     PAST OCULAR SURGERY  CE IOL each eye 2021    RETINAL IMAGING:  OCT 3/18/25  Right eye: Normal foveal contour, no intraretinal or subretinal fluid, small subfoveal drusen. Pachychoroid.  Left eye:  SubRPE scar, SIRE present with resolved small subretinal fluid inferotemporal -slightly worse    ASSESSMENT & PLAN  # Right eye early dry AMD  - with just a few small drusen; see below    # Left eye advanced exudative AMD   - [facility] PED with SIRE and increased SRF; vision stable  - pachychoroid +  S/P Avastin injection left eye last injection 8/2/23 1/31/24: resovled SRF  04/03/24: Small subretinal fluid collection parafoveal inferotemporal. Vabysmo left eye     3/18/25: last injection 12/31/25; OCT today stable with no fluid: T&E: vabaysmo and RTC 12-13 w  AREDS II supplements  Amsler grid education given     # Pseudophakia each eye  Observe    # Dry eye each eye   ATs as  Needed    # PVD each eye    - No signs of retinal tears or detachment  - Discussed with patient about the symptoms of retinal tears and detachment  - Monitor    Dispo:  RTC 12-13 w for DFE and OCT OU; vabaysmo left eye    Subconj injection for anesthesia    Complete documentation of historical and exam elements from today's encounter can be found in the full encounter summary report (not reduplicated in this progress note). I personally obtained the chief complaint(s) and history of present illness.  I  confirmed and edited as necessary the review of systems, past medical/surgical history, family history, social history, and examination findings as documented by others; and I examined the patient myself. I personally reviewed the relevant tests, images, and reports as documented above. I formulated and edited as necessary the assessment and plan and discussed the findings and management plan with the patient and family.     Malvin Rizzo MD

## 2025-03-24 DIAGNOSIS — J44.9 CHRONIC OBSTRUCTIVE PULMONARY DISEASE, UNSPECIFIED COPD TYPE (H): ICD-10-CM

## 2025-03-24 RX ORDER — BUDESONIDE 180 UG/1
AEROSOL, POWDER RESPIRATORY (INHALATION)
Qty: 1 EACH | Refills: 11 | OUTPATIENT
Start: 2025-03-24

## 2025-03-30 DIAGNOSIS — G47.9 SLEEP DISORDER: ICD-10-CM

## 2025-03-30 DIAGNOSIS — E03.9 ACQUIRED HYPOTHYROIDISM: ICD-10-CM

## 2025-03-30 RX ORDER — ZOLPIDEM TARTRATE 5 MG/1
5 TABLET ORAL
Qty: 30 TABLET | Refills: 5 | Status: SHIPPED | OUTPATIENT
Start: 2025-03-30 | End: 2025-04-02

## 2025-03-31 DIAGNOSIS — K21.9 GASTROESOPHAGEAL REFLUX DISEASE WITHOUT ESOPHAGITIS: ICD-10-CM

## 2025-03-31 RX ORDER — LEVOTHYROXINE SODIUM 75 UG/1
TABLET ORAL
Qty: 60 TABLET | Refills: 2 | Status: SHIPPED | OUTPATIENT
Start: 2025-03-31

## 2025-03-31 RX ORDER — FAMOTIDINE 20 MG/1
20 TABLET, FILM COATED ORAL DAILY
Qty: 90 TABLET | Refills: 2 | Status: SHIPPED | OUTPATIENT
Start: 2025-03-31

## 2025-03-31 NOTE — TELEPHONE ENCOUNTER
REFERRAL INFORMATION:  Referring By: Michi Murillo MD   Referring Clinic: [facility]   Reason for Visit/Diagnosis: Chronic maxillary sinusitis, apt per Pt, [place] verified    FUTURE VISIT INFORMATION:  Appointment Date: 5/20/25  Appointment Time: 2:30 PM   NOTES STATUS DETAILS   OFFICE NOTE from referring provider Internal   3/29/25, 5/16/23- - Referral/ OV  w. Michi Murillo MD @ [facility]    IMAGES *pertaining images & report*       CT, MRI, PET, NM, US, XRAYS Internal  7/1/24- ct chest

## 2025-04-02 DIAGNOSIS — G47.9 SLEEP DISORDER: ICD-10-CM

## 2025-04-02 RX ORDER — ZOLPIDEM TARTRATE 5 MG/1
5 TABLET ORAL
Qty: 30 TABLET | Refills: 5 | Status: SHIPPED | OUTPATIENT
Start: 2025-04-02

## 2025-04-14 DIAGNOSIS — E13.9 DIABETES 1.5, MANAGED AS TYPE 2 (H): ICD-10-CM

## 2025-04-14 DIAGNOSIS — J42 CHRONIC BRONCHITIS, UNSPECIFIED CHRONIC BRONCHITIS TYPE (H): ICD-10-CM

## 2025-04-14 NOTE — TELEPHONE ENCOUNTER
blood glucose (NO BRAND SPECIFIED) test strip (Discontinued) 100 strip 2 5/15/2024 1/22/2025 No   Sig: Use to test blood sugar one time daily or as directed.   Sent to pharmacy as: Glucose Blood In Vitro Strip (NO BRAND SPECIFIED)     Pharmacy requesting refill - cannot pend medication

## 2025-04-16 RX ORDER — UMECLIDINIUM BROMIDE AND VILANTEROL TRIFENATATE 62.5; 25 UG/1; UG/1
1 POWDER RESPIRATORY (INHALATION) DAILY
Qty: 180 EACH | Refills: 3 | Status: SHIPPED | OUTPATIENT
Start: 2025-04-16

## 2025-04-21 ENCOUNTER — TELEPHONE (OUTPATIENT)
Dept: INTERNAL MEDICINE | Facility: CLINIC | Age: 75
End: 2025-04-21
Payer: MEDICARE

## 2025-04-21 NOTE — TELEPHONE ENCOUNTER
Kennedy Carpio Tech from [facility] calls with concern. States patient had visit with Pharmacist today to review medications. Patient mentioned asthma diagnosis and that she had been forgetting to report this to PCP. Pharmacist recommending short acting beta agonist, Shirin will fax this information to [facility] as well.      Per Chart Review, it appears patient has had Ventolin and albuterol inhaler in the past.    Attempted to contact patient to discuss asthma and inhaler request. Left message requesting return call from clinic. Is patient experiencing shortness of breath/wheezing/coughing? When was the last time patient had an inhaler? Please discuss and triage if needed.    KIERAN Jeff   [facility]    Fluids in progress.

## 2025-04-21 NOTE — TELEPHONE ENCOUNTER
Pt returns call. States she uses the Ellipta and Pulmicort. Does not have an emergency inhaler.     States she has occasional SOB, but always has coughing and wheezing.     Also reports green mucus/sinus issues for the past 2 months. Initially thought it was allergies. Denies any other symptoms, no fever, etc. She has ENT appt for 5/20. Wondering if she should be doing something else for this in the meantime.

## 2025-04-22 DIAGNOSIS — J44.9 CHRONIC OBSTRUCTIVE PULMONARY DISEASE, UNSPECIFIED COPD TYPE (H): ICD-10-CM

## 2025-04-23 RX ORDER — BUDESONIDE 180 UG/1
AEROSOL, POWDER RESPIRATORY (INHALATION)
Qty: 1 EACH | Refills: 3 | Status: SHIPPED | OUTPATIENT
Start: 2025-04-23

## 2025-05-20 ENCOUNTER — PRE VISIT (OUTPATIENT)
Dept: OTOLARYNGOLOGY | Facility: CLINIC | Age: 75
End: 2025-05-20

## 2025-05-20 ENCOUNTER — OFFICE VISIT (OUTPATIENT)
Dept: OTOLARYNGOLOGY | Facility: CLINIC | Age: 75
End: 2025-05-20
Attending: INTERNAL MEDICINE
Payer: MEDICARE

## 2025-05-20 VITALS
SYSTOLIC BLOOD PRESSURE: 107 MMHG | HEART RATE: 62 BPM | OXYGEN SATURATION: 96 % | HEIGHT: 65 IN | BODY MASS INDEX: 26.81 KG/M2 | WEIGHT: 160.9 LBS | DIASTOLIC BLOOD PRESSURE: 68 MMHG

## 2025-05-20 DIAGNOSIS — J32.9 CHRONIC SINUSITIS, UNSPECIFIED LOCATION: Primary | ICD-10-CM

## 2025-05-20 DIAGNOSIS — J32.0 CHRONIC MAXILLARY SINUSITIS: ICD-10-CM

## 2025-05-20 PROCEDURE — 31231 NASAL ENDOSCOPY DX: CPT | Performed by: STUDENT IN AN ORGANIZED HEALTH CARE EDUCATION/TRAINING PROGRAM

## 2025-05-20 PROCEDURE — 1125F AMNT PAIN NOTED PAIN PRSNT: CPT | Performed by: STUDENT IN AN ORGANIZED HEALTH CARE EDUCATION/TRAINING PROGRAM

## 2025-05-20 PROCEDURE — 3074F SYST BP LT 130 MM HG: CPT | Performed by: STUDENT IN AN ORGANIZED HEALTH CARE EDUCATION/TRAINING PROGRAM

## 2025-05-20 PROCEDURE — 3078F DIAST BP <80 MM HG: CPT | Performed by: STUDENT IN AN ORGANIZED HEALTH CARE EDUCATION/TRAINING PROGRAM

## 2025-05-20 PROCEDURE — 99204 OFFICE O/P NEW MOD 45 MIN: CPT | Mod: 25 | Performed by: STUDENT IN AN ORGANIZED HEALTH CARE EDUCATION/TRAINING PROGRAM

## 2025-05-20 RX ORDER — METHYLPREDNISOLONE 4 MG/1
TABLET ORAL
Qty: 21 TABLET | Refills: 0 | Status: SHIPPED | OUTPATIENT
Start: 2025-05-20

## 2025-05-20 RX ORDER — AMOXICILLIN AND CLAVULANATE POTASSIUM 500; 125 MG/1; MG/1
1 TABLET, FILM COATED ORAL 2 TIMES DAILY
Qty: 20 TABLET | Refills: 0 | Status: SHIPPED | OUTPATIENT
Start: 2025-05-20

## 2025-05-20 ASSESSMENT — PAIN SCALES - GENERAL: PAINLEVEL_OUTOF10: SEVERE PAIN (8)

## 2025-05-20 NOTE — PATIENT INSTRUCTIONS
You were seen in the ENT Clinic today by Dr. Paz. If you have any questions or concerns after your appointment, please contact us (see below)    The following has been recommended for you based upon your appointment today:  Augmentin and methylPREDNISolone have been sent to your pharmacy:   [id number] - [place], [place] - [address]     Please return to clinic after CT sinus     How to Contact Us:  Send a [employer] message to your provider. Our team will respond to you via [employer]. Occasionally, we will need to call you to get further information.  For urgent matters (Monday-Friday), call the ENT Clinic: [phone] and speak with a call center team member - they will route your call appropriately.   If you'd like to speak directly with a nurse, please find our contact information below. We do our best to check voicemail frequently throughout the day, and will work to call you back within 1-2 days. For urgent matters, please use the general clinic phone numbers listed above.    Nanic ANDERSON RN, BSN   RN Care Coordinator, ENT Clinic  [facility]  Direct: [phone]  Maggie WEBB LPN  Direct: [phone]

## 2025-05-20 NOTE — NURSING NOTE
"Chief Complaint   Patient presents with    Consult   Blood pressure 107/68, pulse 62, height 1.651 m (5' 5\"), weight 73 kg (160 lb 14.4 oz), SpO2 96%, not currently breastfeeding. Bert Salmon, EMT    "

## 2025-05-20 NOTE — PROGRESS NOTES
"  [facility] - Rhinology & Skull Base Surgery  New Patient Visit      Encounter date:   May 20, 2025    Referring Provider:  Michi Murillo MD  [address] [place] [address]    Assessment, Decision Making, and Plan:  (J32.0) Chronic maxillary sinusitis  (primary encounter diagnosis)  Comment:   --possible chronic sinusitis/subacute  Plan: IMAGESTREAM RECORDING ORDER  --longer antibiotic course + steroid   --imaging after medical trial to assess      History of Present Illness:   Rita Mancini is a 74 year old female who presents for consultation regarding nasal issues.    Longer term  3 months recently thick drainage, facial pain (R > L)  History of chronic rhinitis, spring time allergies   Variable difficulty breathing through the nose (worse at night, can alternate side to side)  Noticed some sores inside the nose  Feels the sense of smell has been declining    Daily antihistamine  Has been using flonase on a daily basis for years    +DM  No surgery, trauma    Physical Exam:  Vital signs: /68 (BP Location: Right arm, Patient Position: Sitting, Cuff Size: Adult Regular)   Pulse 62   Ht 1.651 m (5' 5\")   Wt 73 kg (160 lb 14.4 oz)   LMP  (LMP Unknown)   SpO2 96%   BMI 26.78 kg/m     General Appearance: No acute distress, appropriate demeanor, conversant  Eyes: moist conjunctivae; EOMI; pupils symmetric; visual acuity grossly intact; no proptosis  Head: normocephalic; overall symmetric appearance without deformity  Face: overall symmetric without deformity  Ears: Normal appearance of external ear; external meatus normal in appearance; TMs intact without perforation bilaterally;   Nose: No external deformity; septum (non obstructing) ; inferior turbinates (non obstructing)   Oral Cavity/oropharynx: Normal appearance of mucosa; tongue midline; no mass or lesions; oropharynx without obvious mucosal abnormality  Neck: no palpable lymphadenopathy; thyroid without palpable " nodules  Lungs: symmetric chest rise; no wheezing  CV: Good distal perfusion; normal hear rate  Extremities: No deformity  Neurologic Exam: Cranial nerves II-XII are grossly intact; no focal deficit    Procedure Note  Procedure performed: Rigid nasal endoscopy  Indication: To evaluate for sinonasal pathology not visualized on routine anterior rhinoscopy  Anesthesia: 4% topical lidocaine with 0.05% oxymetazoline  Description of procedure: A 0-degree, 4 mm rigid endoscope was inserted into bilateral nasal cavities and the inferior and middle turbinates, nasal valves, nasal cavity, inferior meatus, middle meatus, sphenoethmoid recess, and nasopharynx were thoroughly evaluated for evidence of obstruction, edema, purulence, polyps and/or mass/lesion.     Findings:    No polyps or mass lesions      The patient tolerated the procedure well without complication.     Truman Paz MD    Rhinology  Endoscopic Skull Base Surgery  [facility]  Department of Otolaryngology - Head & Neck Surgery

## 2025-05-20 NOTE — LETTER
"5/20/2025       RE: Rita Mancini  [address]  [place] [address]     Dear Colleague,    Thank you for referring your patient, Rita Mancini, to the [facility] EAR NOSE AND THROAT CLINIC [place] at [facility]. Please see a copy of my visit note below.      [facility] - Rhinology & Skull Base Surgery  New Patient Visit      Encounter date:   May 20, 2025    Referring Provider:  Michi Murillo MD  [address] [place] [address]    Assessment, Decision Making, and Plan:  (J32.0) Chronic maxillary sinusitis  (primary encounter diagnosis)  Comment:   --possible chronic sinusitis/subacute  Plan: IMAGESTREAM RECORDING ORDER  --longer antibiotic course + steroid   --imaging after medical trial to assess      History of Present Illness:   Rita Mancini is a 74 year old female who presents for consultation regarding nasal issues.    Longer term  3 months recently thick drainage, facial pain (R > L)  History of chronic rhinitis, spring time allergies   Variable difficulty breathing through the nose (worse at night, can alternate side to side)  Noticed some sores inside the nose  Feels the sense of smell has been declining    Daily antihistamine  Has been using flonase on a daily basis for years    +DM  No surgery, trauma    Physical Exam:  Vital signs: /68 (BP Location: Right arm, Patient Position: Sitting, Cuff Size: Adult Regular)   Pulse 62   Ht 1.651 m (5' 5\")   Wt 73 kg (160 lb 14.4 oz)   LMP  (LMP Unknown)   SpO2 96%   BMI 26.78 kg/m     General Appearance: No acute distress, appropriate demeanor, conversant  Eyes: moist conjunctivae; EOMI; pupils symmetric; visual acuity grossly intact; no proptosis  Head: normocephalic; overall symmetric appearance without deformity  Face: overall symmetric without deformity  Ears: Normal appearance of external ear; external meatus normal in appearance; TMs " intact without perforation bilaterally;   Nose: No external deformity; septum (non obstructing) ; inferior turbinates (non obstructing)   Oral Cavity/oropharynx: Normal appearance of mucosa; tongue midline; no mass or lesions; oropharynx without obvious mucosal abnormality  Neck: no palpable lymphadenopathy; thyroid without palpable nodules  Lungs: symmetric chest rise; no wheezing  CV: Good distal perfusion; normal hear rate  Extremities: No deformity  Neurologic Exam: Cranial nerves II-XII are grossly intact; no focal deficit    Procedure Note  Procedure performed: Rigid nasal endoscopy  Indication: To evaluate for sinonasal pathology not visualized on routine anterior rhinoscopy  Anesthesia: 4% topical lidocaine with 0.05% oxymetazoline  Description of procedure: A 0-degree, 4 mm rigid endoscope was inserted into bilateral nasal cavities and the inferior and middle turbinates, nasal valves, nasal cavity, inferior meatus, middle meatus, sphenoethmoid recess, and nasopharynx were thoroughly evaluated for evidence of obstruction, edema, purulence, polyps and/or mass/lesion.     Findings:    No polyps or mass lesions      The patient tolerated the procedure well without complication.     Truman Paz MD    Rhinology  Endoscopic Skull Base Surgery  [facility]  Department of Otolaryngology - Head & Neck Surgery          Again, thank you for allowing me to participate in the care of your patient.      Sincerely,    Truman Paz MD

## 2025-05-30 ENCOUNTER — TELEPHONE (OUTPATIENT)
Dept: INTERNAL MEDICINE | Facility: CLINIC | Age: 75
End: 2025-05-30
Payer: MEDICARE

## 2025-05-30 DIAGNOSIS — J44.9 CHRONIC OBSTRUCTIVE PULMONARY DISEASE, UNSPECIFIED COPD TYPE (H): Primary | ICD-10-CM

## 2025-05-30 NOTE — TELEPHONE ENCOUNTER
Please start prior authorization:    KEY: [id number]    PULMICORT FLEXHALER 180 MCG/ACT inhaler 1 each 3 4/23/2025 -- Yes   Sig: INHALE 2 PUFFS BY MOUTH TWICE A DAY   Sent to pharmacy as: Pulmicort Flexhaler 180 MCG/ACT Inhalation Aerosol Powder Breath Activated   Class: E-Prescribe   Order: [id number]   E-Prescribing Status: Receipt confirmed by pharmacy (4/23/2025  1:36 PM CDT)   No prior authorization was found for this prescription.   Found prior authorization for another prescription for the same medication: Closed - Prior Authorization not required for patient/medication     Printout Tracking    External Result Report     Medication Administration Instructions    INHALE 2 PUFFS BY MOUTH TWICE A DAY     Ph[facility]acy    [facility] - [address]     Associated Diagnoses    Chronic obstructive pulmonary disease, unspecified COPD type (H) [J44.9]

## 2025-06-01 NOTE — TELEPHONE ENCOUNTER
PA Initiation    Medication: PULMICORT FLEXHALER 180 MCG/ACT IN AEPB  Insurance Company: [employer] Part D - Phone [phone] Fax [phone]  Pharm[facility] Filling the Rx: [facility] - [address]  Filling Pharmacy Phone: [phone]  Filling Pharmacy Fax: [phone]  Start Date: 6/1/2025

## 2025-06-02 NOTE — TELEPHONE ENCOUNTER
PRIOR AUTHORIZATION DENIED    Medication: PULMICORT FLEXHALER 180 MCG/ACT IN AEPB    Insurance Company: [employer] Part D - Phone [phone] Fax [phone]    Denial Date: 6/1/2025    Denial Reason(s): Patient needs to try and fail budesonide suspension, Alvesco inhaler and Arnuity Ellipta.     Appeal Information:

## 2025-06-02 NOTE — TELEPHONE ENCOUNTER
PA denied-  Patient needs to try and fail budesonide suspension, Alvesco inhaler and Arnuity Ellipta.

## 2025-06-04 DIAGNOSIS — H35.3231 EXUDATIVE AGE-RELATED MACULAR DEGENERATION OF BOTH EYES WITH ACTIVE CHOROIDAL NEOVASCULARIZATION (H): Primary | ICD-10-CM

## 2025-06-04 NOTE — TELEPHONE ENCOUNTER
Ordered Arnuity Ellipta considering it is once daily and also the suspension of budesonide should be taken with a nebulizer machine. Unsure if patient has the machine.     Thanks,  Sanam

## 2025-06-11 ENCOUNTER — PATIENT OUTREACH (OUTPATIENT)
Dept: CARE COORDINATION | Facility: CLINIC | Age: 75
End: 2025-06-11
Payer: MEDICARE

## 2025-06-12 DIAGNOSIS — E11.9 TYPE 2 DIABETES, HBA1C GOAL < 8% (H): ICD-10-CM

## 2025-06-12 RX ORDER — DULAGLUTIDE 4.5 MG/.5ML
4.5 INJECTION, SOLUTION SUBCUTANEOUS WEEKLY
Qty: 2 ML | Refills: 3 | Status: SHIPPED | OUTPATIENT
Start: 2025-06-12

## 2025-06-13 ENCOUNTER — TELEPHONE (OUTPATIENT)
Dept: INTERNAL MEDICINE | Facility: CLINIC | Age: 75
End: 2025-06-13
Payer: MEDICARE

## 2025-06-13 DIAGNOSIS — J44.9 CHRONIC OBSTRUCTIVE PULMONARY DISEASE, UNSPECIFIED COPD TYPE (H): ICD-10-CM

## 2025-06-13 NOTE — TELEPHONE ENCOUNTER
Medication Question or Refill    Contacts       Contact Date/Time Type Contact Phone/Fax    06/13/2025 01:13 PM CDT Fax (Incoming) [facility] - [address] (Pharmacy) [phone]     April-pharm tech            What medication are you calling about (include dose and sig)?: please send in new script if appropriate.     patient is asking for 90 day refill as it is less expensive to get per insurance please and asked pharmacy to reach out to us to send in new script.     Disp Refills Start End SADIE   fluticasone (ARNUITY ELLIPTA) 100 MCG/ACT inhaler 30 each 4 6/4/2025 -- No   Sig - Route: Inhale 1 puff into the lungs daily. - Inhalation   Sent to pharmacy as: Fluticasone Furoate 100 MCG/ACT Inhalation Aerosol Powder Breath Activated (ARNUITY ELLIPTA)   Class: E-Prescribe   Order: [id number]   E-Prescribing Status: Receipt confirmed by pharmacy (6/4/2025  3:00 PM CDT)       [facility]erred [facility]:  [facility] [address]  [place] [address]  Phone: [phone] Fax: [phone]      Controlled Substance Agreement on file:   CSA -- Patient Level:     [Media Unavailable] Controlled Substance Agreement - Opioid - Scan on 8/29/2018

## 2025-06-15 ENCOUNTER — HEALTH MAINTENANCE LETTER (OUTPATIENT)
Age: 75
End: 2025-06-15

## 2025-06-18 ENCOUNTER — OFFICE VISIT (OUTPATIENT)
Dept: OPHTHALMOLOGY | Facility: CLINIC | Age: 75
End: 2025-06-18
Attending: OPHTHALMOLOGY
Payer: MEDICARE

## 2025-06-18 DIAGNOSIS — H43.813 PVD (POSTERIOR VITREOUS DETACHMENT), BILATERAL: ICD-10-CM

## 2025-06-18 DIAGNOSIS — Z96.1 PSEUDOPHAKIA OF BOTH EYES: ICD-10-CM

## 2025-06-18 DIAGNOSIS — H35.3231 EXUDATIVE AGE-RELATED MACULAR DEGENERATION OF BOTH EYES WITH ACTIVE CHOROIDAL NEOVASCULARIZATION (H): Primary | ICD-10-CM

## 2025-06-18 DIAGNOSIS — H04.123 DRY EYE SYNDROME OF BOTH EYES: ICD-10-CM

## 2025-06-18 PROCEDURE — 67028 INJECTION EYE DRUG: CPT | Mod: LT | Performed by: OPHTHALMOLOGY

## 2025-06-18 PROCEDURE — 92134 CPTRZ OPH DX IMG PST SGM RTA: CPT | Performed by: OPHTHALMOLOGY

## 2025-06-18 PROCEDURE — G0463 HOSPITAL OUTPT CLINIC VISIT: HCPCS | Performed by: OPHTHALMOLOGY

## 2025-06-18 PROCEDURE — 250N000011 HC RX IP 250 OP 636: Performed by: OPHTHALMOLOGY

## 2025-06-18 RX ADMIN — FARICIMAB 6 MG: 6 INJECTION, SOLUTION INTRAVITREAL at 14:55

## 2025-06-18 RX ADMIN — LIDOCAINE HYDROCHLORIDE 0.5 ML: 20 INJECTION, SOLUTION EPIDURAL; INFILTRATION; INTRACAUDAL; PERINEURAL at 14:55

## 2025-06-18 ASSESSMENT — VISUAL ACUITY
OS_PH_CC+: +2
OS_CC: 20/50
OD_CC: 20/20
METHOD: SNELLEN - LINEAR
CORRECTION_TYPE: GLASSES
OS_CC+: -1
OS_PH_CC: 20/50

## 2025-06-18 ASSESSMENT — CUP TO DISC RATIO
OS_RATIO: 0.25
OD_RATIO: 0.25

## 2025-06-18 ASSESSMENT — TONOMETRY
IOP_METHOD: TONOPEN
OS_IOP_MMHG: 12
OD_IOP_MMHG: 12

## 2025-06-18 ASSESSMENT — REFRACTION_WEARINGRX
OS_ADD: +2.50
OD_SPHERE: PLANO
OS_AXIS: 175
OS_SPHERE: +0.50
OS_CYLINDER: +0.25
OD_AXIS: 010
OD_ADD: +2.50
OD_CYLINDER: +0.75

## 2025-06-18 ASSESSMENT — EXTERNAL EXAM - RIGHT EYE: OD_EXAM: NORMAL

## 2025-06-18 ASSESSMENT — SLIT LAMP EXAM - LIDS
COMMENTS: NORMAL
COMMENTS: NORMAL

## 2025-06-18 ASSESSMENT — EXTERNAL EXAM - LEFT EYE: OS_EXAM: NORMAL

## 2025-06-18 NOTE — PROGRESS NOTES
CC -  Macular degeneration     INTERVAL HISTORY - Pt states no change in VA since last visit No flashes or floaters No eye pain or redness     HPI - Rita Mancini is a  74 year old patient presenting for follow up of ARMD OU. No current smoking. No known FH of AMD, glaucoma     PAST OCULAR SURGERY: CE IOL each eye 2021    RETINAL IMAGING:  OCT .topday   Right eye: Normal foveal contour, no intraretinal or subretinal fluid, small subfoveal drusen. Pachychoroid.  Left eye:  SubRPE scar, SIRE present with resolved small subretinal fluid inferotemporal -slightly worse    ASSESSMENT & PLAN  # Right eye early dry AMD  - with just a few small drusen; see below    # Left eye advanced exudative AMD   - [facility] PED with SIRE and increased SRF; vision stable  - pachychoroid +  S/P Avastin injection left eye last injection 8/2/23 1/31/24: resovled SRF  04/03/24: Small subretinal fluid collection parafoveal inferotemporal. Vabysmo left eye   3/18/25: last injection 12/31/25; OCT today stable with no fluid: T&E:   06/18/25 13w interval; new SRF and VA down to 20/50    vabaysmo OS today  AREDS II supplements  Amsler grid   RTC 10w VT OCT mac, vabysmo OS    # Pseudophakia each eye  Observe    # Dry eye each eye   ATs as  Needed    # PVD each eye   - No signs of retinal tears or detachment  - Discussed with patient about the symptoms of retinal tears and detachment  - Monitor    RTC 10w VT OCT mac, optos, vabysmo OS    Subconj injection for anesthesia    Page Singh MD  Vitreoretinal Surgery Fellow    Complete documentation of historical and exam elements from today's encounter can be found in the full encounter summary report (not reduplicated in this progress note). I personally obtained the chief complaint(s) and history of present illness.  I confirmed and edited as necessary the review of systems, past medical/surgical history, family history, social history, and examination findings as documented by others; and I examined the  patient myself. I personally reviewed the relevant tests, images, and reports as documented above. I formulated and edited as necessary the assessment and plan and discussed the findings and management plan with the patient and family.     Malvin Rizzo MD

## 2025-06-18 NOTE — NURSING NOTE
Chief Complaints and History of Present Illnesses   Patient presents with    Follow Up     Exudative age-related macular degeneration of both eyes with active choroidal neovascularization     Chief Complaint(s) and History of Present Illness(es)       Follow Up              Comments: Exudative age-related macular degeneration of both eyes with active choroidal neovascularization              Comments    Pt states no change in VA since last visit  No flashes or floaters   No eye pain or redness    Amaya Parisi COT 1:10 PM June 18, 2025

## 2025-06-27 ENCOUNTER — OFFICE VISIT (OUTPATIENT)
Dept: OTOLARYNGOLOGY | Facility: CLINIC | Age: 75
End: 2025-06-27
Payer: MEDICARE

## 2025-06-27 ENCOUNTER — ANCILLARY PROCEDURE (OUTPATIENT)
Dept: CT IMAGING | Facility: CLINIC | Age: 75
End: 2025-06-27
Attending: STUDENT IN AN ORGANIZED HEALTH CARE EDUCATION/TRAINING PROGRAM
Payer: MEDICARE

## 2025-06-27 VITALS
HEIGHT: 65 IN | SYSTOLIC BLOOD PRESSURE: 103 MMHG | DIASTOLIC BLOOD PRESSURE: 61 MMHG | OXYGEN SATURATION: 97 % | HEART RATE: 70 BPM | BODY MASS INDEX: 26.33 KG/M2 | WEIGHT: 158 LBS

## 2025-06-27 DIAGNOSIS — J32.9 CHRONIC SINUSITIS, UNSPECIFIED LOCATION: ICD-10-CM

## 2025-06-27 DIAGNOSIS — J32.0 CHRONIC MAXILLARY SINUSITIS: Primary | ICD-10-CM

## 2025-06-27 PROCEDURE — 70486 CT MAXILLOFACIAL W/O DYE: CPT | Performed by: RADIOLOGY

## 2025-06-27 PROCEDURE — 1125F AMNT PAIN NOTED PAIN PRSNT: CPT | Performed by: STUDENT IN AN ORGANIZED HEALTH CARE EDUCATION/TRAINING PROGRAM

## 2025-06-27 PROCEDURE — 99213 OFFICE O/P EST LOW 20 MIN: CPT | Performed by: STUDENT IN AN ORGANIZED HEALTH CARE EDUCATION/TRAINING PROGRAM

## 2025-06-27 PROCEDURE — 3074F SYST BP LT 130 MM HG: CPT | Performed by: STUDENT IN AN ORGANIZED HEALTH CARE EDUCATION/TRAINING PROGRAM

## 2025-06-27 PROCEDURE — 3078F DIAST BP <80 MM HG: CPT | Performed by: STUDENT IN AN ORGANIZED HEALTH CARE EDUCATION/TRAINING PROGRAM

## 2025-06-27 ASSESSMENT — PAIN SCALES - GENERAL: PAINLEVEL_OUTOF10: MILD PAIN (3)

## 2025-06-27 NOTE — NURSING NOTE
"Chief Complaint   Patient presents with    RECHECK   Blood pressure 103/61, pulse 70, height 1.651 m (5' 5\"), weight 71.7 kg (158 lb), SpO2 97%, not currently breastfeeding. Bert Salmon, EMT    "

## 2025-06-27 NOTE — PATIENT INSTRUCTIONS
You were seen in the ENT Clinic today by Dr. Paz. If you have any questions or concerns after your appointment, please contact us (see below)    The following has been recommended for you based upon your appointment today:  Proceed with surgery. We will have you see our Pre-Anesthesia care team for your pre op.   Your current CT is okay.   Dr. Paz will message your primary care provider regarding your sleep apnea.  Tentative plan for surgery at [facility]. Will confirm once information reviewed with your primary care provider.    Plan to return to ENT clinic after surgery.    How to Contact Us:  Send a [employer] message to your provider. Our team will respond to you via [employer]. Occasionally, we will need to call you to get further information.  For urgent matters (Monday-Friday), call the ENT Clinic: [phone] and speak with a call center team member - they will route your call appropriately.   If you'd like to speak directly with a nurse, please find our contact information below. We do our best to check voicemail frequently throughout the day, and will work to call you back within 1-2 days. For urgent matters, please use the general clinic phone numbers listed above.    Nanci ANDERSON RN, BSN   RN Care Coordinator, ENT Clinic  [facility]  Direct: [phone]  Maggie WEBB LPN  Direct: [phone]

## 2025-06-27 NOTE — LETTER
6/27/2025       RE: Rita Mancini  [address]  [place] [address]     Dear Colleague,    Thank you for referring your patient, Rita Mancini, to the [facility] EAR NOSE AND THROAT CLINIC [place] at [facility]. Please see a copy of my visit note below.      [facility] - Rhinology & Skull Base Surgery  New Patient Visit      Encounter date:   6/27/2025    Assessment, Decision Making, and Plan:  (J32.0) Chronic maxillary sinusitis  (primary encounter diagnosis)  Comment:   --suspect left chronic maxillary siniusitis, odontogenic with extension into the anterior ethmoids and frontal sinuses  --right side demonstrates mild chronic maxillary atelectasis with no orbital position change (grade 1a)  --has had a trial of antibiotic and steroid with no resolution of symptoms  --I have recommended surgery  --in addition, I have asked her to follow up with dental services for evaluation and possible extraction of her left maxillary molars  Plan:     I discussed the risks, benefits, and alternatives to endoscopic sinonasal surgery, including but not limited to: pain, bleeding, infection, CSF leak, orbital injury resulting in vision changes and/or vision loss, septal perforation and/or hematoma, dental hypesthesia, facial numbness, need for continued medical management after surgery, and need for additional procedures, among others. she was allowed to ask questions, which I answered to the best of my ability. After this discussion, surgery was elected.     PAC  Will reach out to Dr. Murillo to see how severe MONSERRAT is  Likely CSC  Diabetic    History of Present Illness:   Rita Mancini is a 74 year old female who presents for consultation regarding nasal issues.    Longer term  3 months recently thick drainage, facial pain (R > L)  History of chronic rhinitis, spring time allergies   Variable difficulty breathing through the nose  "(worse at night, can alternate side to side)  Noticed some sores inside the nose  Feels the sense of smell has been declining    Daily antihistamine  Has been using flonase on a daily basis for years    +DM  No surgery, trauma    Physical Exam:  /61 (BP Location: Left arm, Patient Position: Sitting, Cuff Size: Adult Regular)   Pulse 70   Ht 1.651 m (5' 5\")   Wt 71.7 kg (158 lb)   LMP  (LMP Unknown)   SpO2 97%   BMI 26.29 kg/m     General Appearance: No acute distress, appropriate demeanor, conversant  Eyes: moist conjunctivae; EOMI; pupils symmetric; visual acuity grossly intact; no proptosis  Head: normocephalic; overall symmetric appearance without deformity  Face: overall symmetric without deformity  Ears: Normal appearance of external ear; external meatus normal in appearance; TMs intact without perforation bilaterally;   Nose: No external deformity; septum (non obstructing) ; inferior turbinates (non obstructing)   Oral Cavity/oropharynx: Normal appearance of mucosa; tongue midline; no mass or lesions; oropharynx without obvious mucosal abnormality  Neck: no palpable lymphadenopathy; thyroid without palpable nodules  Lungs: symmetric chest rise; no wheezing  CV: Good distal perfusion; normal hear rate  Extremities: No deformity  Neurologic Exam: Cranial nerves II-XII are grossly intact; no focal deficit    Imaging:  CT sinus - primary review with complete opacification of the left maxillary sinus, anterior ethmoids and frontal sinus, possible central hyperdensity in the OMC - possible mycetoma, lateralized UP/membranous fontanelle with mild bony contraction on the right suggestive of CMA grade 1a    Truman Paz MD    Rhinology  Endoscopic Skull Base Surgery  [facility]  Department of Otolaryngology - Head & Neck Surgery        Again, thank you for allowing me to participate in the care of your patient.      Sincerely,    Truman Paz MD    "

## 2025-06-29 NOTE — PROGRESS NOTES
[facility] - Rhinology & Skull Base Surgery  New Patient Visit      Encounter date:   6/27/2025    Assessment, Decision Making, and Plan:  (J32.0) Chronic maxillary sinusitis  (primary encounter diagnosis)  Comment:   --suspect left chronic maxillary siniusitis, odontogenic with extension into the anterior ethmoids and frontal sinuses  --right side demonstrates mild chronic maxillary atelectasis with no orbital position change (grade 1a)  --has had a trial of antibiotic and steroid with no resolution of symptoms  --I have recommended surgery  --in addition, I have asked her to follow up with dental services for evaluation and possible extraction of her left maxillary molars  Plan:     I discussed the risks, benefits, and alternatives to endoscopic sinonasal surgery, including but not limited to: pain, bleeding, infection, CSF leak, orbital injury resulting in vision changes and/or vision loss, septal perforation and/or hematoma, dental hypesthesia, facial numbness, need for continued medical management after surgery, and need for additional procedures, among others. she was allowed to ask questions, which I answered to the best of my ability. After this discussion, surgery was elected.     PAC  Will reach out to Dr. Murillo to see how severe MONSERRAT is  Likely CSC  Diabetic    History of Present Illness:   Rita Mancini is a 74 year old female who presents for consultation regarding nasal issues.    Longer term  3 months recently thick drainage, facial pain (R > L)  History of chronic rhinitis, spring time allergies   Variable difficulty breathing through the nose (worse at night, can alternate side to side)  Noticed some sores inside the nose  Feels the sense of smell has been declining    Daily antihistamine  Has been using flonase on a daily basis for years    +DM  No surgery, trauma    Physical Exam:  /61 (BP Location: Left arm, Patient Position: Sitting, Cuff Size: Adult Regular)   Pulse 70   " Ht 1.651 m (5' 5\")   Wt 71.7 kg (158 lb)   LMP  (LMP Unknown)   SpO2 97%   BMI 26.29 kg/m     General Appearance: No acute distress, appropriate demeanor, conversant  Eyes: moist conjunctivae; EOMI; pupils symmetric; visual acuity grossly intact; no proptosis  Head: normocephalic; overall symmetric appearance without deformity  Face: overall symmetric without deformity  Ears: Normal appearance of external ear; external meatus normal in appearance; TMs intact without perforation bilaterally;   Nose: No external deformity; septum (non obstructing) ; inferior turbinates (non obstructing)   Oral Cavity/oropharynx: Normal appearance of mucosa; tongue midline; no mass or lesions; oropharynx without obvious mucosal abnormality  Neck: no palpable lymphadenopathy; thyroid without palpable nodules  Lungs: symmetric chest rise; no wheezing  CV: Good distal perfusion; normal hear rate  Extremities: No deformity  Neurologic Exam: Cranial nerves II-XII are grossly intact; no focal deficit    Imaging:  CT sinus - primary review with complete opacification of the left maxillary sinus, anterior ethmoids and frontal sinus, possible central hyperdensity in the OMC - possible mycetoma, lateralized UP/membranous fontanelle with mild bony contraction on the right suggestive of CMA grade 1a    Truman Paz MD    Rhinology  Endoscopic Skull Base Surgery  [facility]  Department of Otolaryngology - Head & Neck Surgery      "

## 2025-06-30 ENCOUNTER — TELEPHONE (OUTPATIENT)
Dept: INTERNAL MEDICINE | Facility: CLINIC | Age: 75
End: 2025-06-30
Payer: MEDICARE

## 2025-06-30 DIAGNOSIS — J32.0 CHRONIC MAXILLARY SINUSITIS: Primary | ICD-10-CM

## 2025-07-01 ENCOUNTER — LAB (OUTPATIENT)
Dept: LAB | Facility: CLINIC | Age: 75
End: 2025-07-01
Payer: MEDICARE

## 2025-07-01 DIAGNOSIS — J32.0 CHRONIC MAXILLARY SINUSITIS: ICD-10-CM

## 2025-07-01 PROCEDURE — 82784 ASSAY IGA/IGD/IGG/IGM EACH: CPT

## 2025-07-01 PROCEDURE — 36415 COLL VENOUS BLD VENIPUNCTURE: CPT

## 2025-07-02 ENCOUNTER — PREP FOR PROCEDURE (OUTPATIENT)
Dept: OTOLARYNGOLOGY | Facility: CLINIC | Age: 75
End: 2025-07-02
Payer: MEDICARE

## 2025-07-02 DIAGNOSIS — J32.0 CHRONIC MAXILLARY SINUSITIS: Primary | ICD-10-CM

## 2025-07-02 LAB
IGA SERPL-MCNC: 253 MG/DL (ref 84–499)
IGG SERPL-MCNC: 1236 MG/DL (ref 610–1616)
IGM SERPL-MCNC: 56 MG/DL (ref 35–242)

## 2025-07-02 RX ORDER — CEFAZOLIN SODIUM 2 G/50ML
2 SOLUTION INTRAVENOUS
OUTPATIENT
Start: 2025-07-02

## 2025-07-02 RX ORDER — CEFAZOLIN SODIUM 2 G/50ML
2 SOLUTION INTRAVENOUS SEE ADMIN INSTRUCTIONS
OUTPATIENT
Start: 2025-07-02

## 2025-07-02 RX ORDER — DEXAMETHASONE SODIUM PHOSPHATE 4 MG/ML
10 INJECTION, SOLUTION INTRA-ARTICULAR; INTRALESIONAL; INTRAMUSCULAR; INTRAVENOUS; SOFT TISSUE ONCE
OUTPATIENT
Start: 2025-07-02 | End: 2025-07-02

## 2025-07-03 ENCOUNTER — TELEPHONE (OUTPATIENT)
Dept: OTOLARYNGOLOGY | Facility: CLINIC | Age: 75
End: 2025-07-03
Payer: MEDICARE

## 2025-07-03 NOTE — TELEPHONE ENCOUNTER
Scheduled surgery with Dr. Truman Paz on 08/13/2025    Did patient/provider have any scheduling preferences? No    Does patient want a sooner date? No    Spoke with: Rita (patient)    Surgery is located at [facility]    Patient will be seen for their H&P by their PCP Dr. Murillo within 30 days of surgery - Confirmed PCP on file is up to date     Does patient need a consult before upcoming surgery? No    Anesthesia type: General    Requested Imaging required for surgery: No    Patient needs scheduled for their 1 week post op    Patient will receive their surgery packet & surgical soap via mail per their preference - Confirmed address on file is up to date    Patient was not provided a start time for surgery & is aware they will receive this information 2-3 days before surgery    Additional comments:      Patient was instructed to call back with any further questions or concerns.     Elmira Floyd on 7/3/2025 at 12:35 PM

## 2025-07-14 ENCOUNTER — TELEPHONE (OUTPATIENT)
Dept: OTOLARYNGOLOGY | Facility: CLINIC | Age: 75
End: 2025-07-14
Payer: MEDICARE

## 2025-07-14 NOTE — TELEPHONE ENCOUNTER
Received call from: Rita Salinas requested to postpone surgery scheduled for 8/13    States she has not gotten dental work scheduled that is needed prior to surgery, and would like to call back to reschedule once dental work is coordinated    Removed surgery from schedule, cancelled post-op    Nissa Mcneill on 7/14/2025 at 2:38 PM

## 2025-07-17 DIAGNOSIS — J44.9 CHRONIC OBSTRUCTIVE PULMONARY DISEASE, UNSPECIFIED COPD TYPE (H): ICD-10-CM

## 2025-07-17 RX ORDER — MONTELUKAST SODIUM 10 MG/1
1 TABLET ORAL AT BEDTIME
Qty: 30 TABLET | Refills: 11 | OUTPATIENT
Start: 2025-07-17

## 2025-07-19 ENCOUNTER — OFFICE VISIT (OUTPATIENT)
Dept: URGENT CARE | Facility: URGENT CARE | Age: 75
End: 2025-07-19
Payer: MEDICARE

## 2025-07-19 ENCOUNTER — NURSE TRIAGE (OUTPATIENT)
Dept: NURSING | Facility: CLINIC | Age: 75
End: 2025-07-19
Payer: MEDICARE

## 2025-07-19 ENCOUNTER — HOSPITAL ENCOUNTER (INPATIENT)
Facility: CLINIC | Age: 75
LOS: 2 days | Discharge: HOME | DRG: 194 | End: 2025-07-22
Attending: EMERGENCY MEDICINE | Admitting: STUDENT IN AN ORGANIZED HEALTH CARE EDUCATION/TRAINING PROGRAM
Payer: MEDICARE

## 2025-07-19 VITALS
SYSTOLIC BLOOD PRESSURE: 112 MMHG | DIASTOLIC BLOOD PRESSURE: 61 MMHG | HEART RATE: 80 BPM | BODY MASS INDEX: 26.21 KG/M2 | TEMPERATURE: 97.8 F | WEIGHT: 157.5 LBS

## 2025-07-19 DIAGNOSIS — J96.01 ACUTE RESPIRATORY FAILURE WITH HYPOXIA (H): ICD-10-CM

## 2025-07-19 DIAGNOSIS — J44.0 CHRONIC OBSTRUCTIVE PULMONARY DISEASE WITH ACUTE LOWER RESPIRATORY INFECTION (H): ICD-10-CM

## 2025-07-19 DIAGNOSIS — J44.1 COPD EXACERBATION (H): Primary | ICD-10-CM

## 2025-07-19 DIAGNOSIS — J18.9 PNEUMONIA DUE TO INFECTIOUS ORGANISM, UNSPECIFIED LATERALITY, UNSPECIFIED PART OF LUNG: ICD-10-CM

## 2025-07-19 DIAGNOSIS — Z87.891 HISTORY OF TOBACCO USE: ICD-10-CM

## 2025-07-19 LAB
D DIMER PPP FEU-MCNC: 1.5 UG/ML FEU (ref 0–0.5)
INR PPP: 1.21 (ref 0.85–1.15)
LACTATE SERPL-SCNC: 2.3 MMOL/L (ref 0.7–2)
PROTHROMBIN TIME: 15.3 SECONDS (ref 11.8–14.8)

## 2025-07-19 PROCEDURE — 36415 COLL VENOUS BLD VENIPUNCTURE: CPT | Performed by: EMERGENCY MEDICINE

## 2025-07-19 PROCEDURE — 87486 CHLMYD PNEUM DNA AMP PROBE: CPT | Performed by: EMERGENCY MEDICINE

## 2025-07-19 PROCEDURE — 84155 ASSAY OF PROTEIN SERUM: CPT | Performed by: EMERGENCY MEDICINE

## 2025-07-19 PROCEDURE — 99214 OFFICE O/P EST MOD 30 MIN: CPT | Performed by: FAMILY MEDICINE

## 2025-07-19 PROCEDURE — 87637 SARSCOV2&INF A&B&RSV AMP PRB: CPT | Performed by: EMERGENCY MEDICINE

## 2025-07-19 PROCEDURE — 3074F SYST BP LT 130 MM HG: CPT | Performed by: FAMILY MEDICINE

## 2025-07-19 PROCEDURE — 87040 BLOOD CULTURE FOR BACTERIA: CPT | Performed by: EMERGENCY MEDICINE

## 2025-07-19 PROCEDURE — 1125F AMNT PAIN NOTED PAIN PRSNT: CPT | Performed by: FAMILY MEDICINE

## 2025-07-19 PROCEDURE — 85048 AUTOMATED LEUKOCYTE COUNT: CPT | Performed by: EMERGENCY MEDICINE

## 2025-07-19 PROCEDURE — 99285 EMERGENCY DEPT VISIT HI MDM: CPT | Mod: 25 | Performed by: EMERGENCY MEDICINE

## 2025-07-19 PROCEDURE — 85610 PROTHROMBIN TIME: CPT | Performed by: EMERGENCY MEDICINE

## 2025-07-19 PROCEDURE — 85379 FIBRIN DEGRADATION QUANT: CPT | Performed by: EMERGENCY MEDICINE

## 2025-07-19 PROCEDURE — 85007 BL SMEAR W/DIFF WBC COUNT: CPT | Performed by: EMERGENCY MEDICINE

## 2025-07-19 PROCEDURE — 84145 PROCALCITONIN (PCT): CPT | Performed by: EMERGENCY MEDICINE

## 2025-07-19 PROCEDURE — 83605 ASSAY OF LACTIC ACID: CPT | Performed by: EMERGENCY MEDICINE

## 2025-07-19 PROCEDURE — 87635 SARS-COV-2 COVID-19 AMP PRB: CPT | Performed by: FAMILY MEDICINE

## 2025-07-19 PROCEDURE — 99285 EMERGENCY DEPT VISIT HI MDM: CPT | Performed by: EMERGENCY MEDICINE

## 2025-07-19 PROCEDURE — 3078F DIAST BP <80 MM HG: CPT | Performed by: FAMILY MEDICINE

## 2025-07-19 RX ORDER — ALBUTEROL SULFATE 90 UG/1
1-2 INHALANT RESPIRATORY (INHALATION) EVERY 4 HOURS PRN
Qty: 18 G | Refills: 0 | Status: ON HOLD | OUTPATIENT
Start: 2025-07-19 | End: 2025-07-22

## 2025-07-19 RX ORDER — DOXYCYCLINE 100 MG/1
100 CAPSULE ORAL 2 TIMES DAILY
Qty: 20 CAPSULE | Refills: 0 | Status: ON HOLD | OUTPATIENT
Start: 2025-07-19 | End: 2025-07-21

## 2025-07-19 RX ORDER — PIPERACILLIN SODIUM, TAZOBACTAM SODIUM 3; .375 G/15ML; G/15ML
3.38 INJECTION, POWDER, LYOPHILIZED, FOR SOLUTION INTRAVENOUS ONCE
Status: COMPLETED | OUTPATIENT
Start: 2025-07-20 | End: 2025-07-20

## 2025-07-19 ASSESSMENT — COLUMBIA-SUICIDE SEVERITY RATING SCALE - C-SSRS
2. HAVE YOU ACTUALLY HAD ANY THOUGHTS OF KILLING YOURSELF IN THE PAST MONTH?: NO
6. HAVE YOU EVER DONE ANYTHING, STARTED TO DO ANYTHING, OR PREPARED TO DO ANYTHING TO END YOUR LIFE?: NO
1. IN THE PAST MONTH, HAVE YOU WISHED YOU WERE DEAD OR WISHED YOU COULD GO TO SLEEP AND NOT WAKE UP?: NO

## 2025-07-19 ASSESSMENT — ACTIVITIES OF DAILY LIVING (ADL)
ADLS_ACUITY_SCORE: 41
ADLS_ACUITY_SCORE: 41

## 2025-07-19 ASSESSMENT — PAIN SCALES - GENERAL: PAINLEVEL_OUTOF10: MODERATE PAIN (5)

## 2025-07-19 NOTE — TELEPHONE ENCOUNTER
"Nurse Triage SBAR    Is this a 2nd Level Triage? NO    Situation: COPD Exacerbation.    Background:  In the last couple of days patient developed a worsening cough, tightness in chest, recently got home from being out of town.     Assessment: She is currently reporting shortness of breath at rest with difficulty catching her breath and a non-productive cough and \"feeling awful\".    Protocol Recommended Disposition:   Go to ED Now (Or PCP Triage)    Recommendation: Patient was advised of triage disposition and decided she was going to go in to be seen at the [facility]; she was provided care advice and call back information.        Does the patient meet one of the following criteria for ADS visit consideration? 16+ years old, with an [facility] PCP     TIP  Providers, please consider if this condition is appropriate for management at one of our Acute and Diagnostic Services sites.     If patient is a good candidate, please use dotphrase <dot>triageresponse and select Refer to ADS to document.    Reason for Disposition   [1] MODERATE difficulty breathing (e.g., speaks in phrases, SOB even at rest) AND [2] worse than normal    Additional Information   Negative: SEVERE difficulty breathing (e.g., struggling for each breath, speaks in single words, pulse > 120)   Negative: Bluish (or gray) lips or face now   Negative: Difficult to awaken or acting confused (e.g., disoriented, slurred speech)   Negative: Sounds like a life-threatening emergency to the triager    Protocols used: COPD Oxygen Monitoring and [id number]-[id number]-CF    "

## 2025-07-20 ENCOUNTER — APPOINTMENT (OUTPATIENT)
Dept: CT IMAGING | Facility: CLINIC | Age: 75
DRG: 194 | End: 2025-07-20
Attending: EMERGENCY MEDICINE
Payer: MEDICARE

## 2025-07-20 PROBLEM — J18.9 PNEUMONIA DUE TO INFECTIOUS ORGANISM, UNSPECIFIED LATERALITY, UNSPECIFIED PART OF LUNG: Status: ACTIVE | Noted: 2025-07-20

## 2025-07-20 PROBLEM — J96.01 ACUTE RESPIRATORY FAILURE WITH HYPOXIA (H): Status: ACTIVE | Noted: 2025-07-20

## 2025-07-20 LAB
ALBUMIN SERPL BCG-MCNC: 3.5 G/DL (ref 3.5–5.2)
ALBUMIN UR-MCNC: NEGATIVE MG/DL
ALP SERPL-CCNC: 132 U/L (ref 40–150)
ALT SERPL W P-5'-P-CCNC: ABNORMAL U/L
ANION GAP SERPL CALCULATED.3IONS-SCNC: 12 MMOL/L (ref 7–15)
ANION GAP SERPL CALCULATED.3IONS-SCNC: 14 MMOL/L (ref 7–15)
APPEARANCE UR: ABNORMAL
AST SERPL W P-5'-P-CCNC: ABNORMAL U/L
BACTERIA #/AREA URNS HPF: ABNORMAL /HPF
BASOPHILS # BLD MANUAL: 0 10E3/UL (ref 0–0.2)
BASOPHILS NFR BLD MANUAL: 0 %
BILIRUB SERPL-MCNC: 0.5 MG/DL
BILIRUB UR QL STRIP: NEGATIVE
BUN SERPL-MCNC: 21.9 MG/DL (ref 8–23)
BUN SERPL-MCNC: 24.4 MG/DL (ref 8–23)
C PNEUM DNA SPEC QL NAA+PROBE: NOT DETECTED
CALCIUM SERPL-MCNC: 8.5 MG/DL (ref 8.8–10.4)
CALCIUM SERPL-MCNC: 8.9 MG/DL (ref 8.8–10.4)
CAOX CRY #/AREA URNS HPF: ABNORMAL /HPF
CHLORIDE SERPL-SCNC: 104 MMOL/L (ref 98–107)
CHLORIDE SERPL-SCNC: 99 MMOL/L (ref 98–107)
COLOR UR AUTO: ABNORMAL
CREAT SERPL-MCNC: 1.29 MG/DL (ref 0.51–0.95)
CREAT SERPL-MCNC: 1.42 MG/DL (ref 0.51–0.95)
EGFRCR SERPLBLD CKD-EPI 2021: 38 ML/MIN/1.73M2
EGFRCR SERPLBLD CKD-EPI 2021: 43 ML/MIN/1.73M2
EOSINOPHIL # BLD MANUAL: 0.3 10E3/UL (ref 0–0.7)
EOSINOPHIL NFR BLD MANUAL: 2 %
ERYTHROCYTE [DISTWIDTH] IN BLOOD BY AUTOMATED COUNT: 13.8 % (ref 10–15)
ERYTHROCYTE [DISTWIDTH] IN BLOOD BY AUTOMATED COUNT: 13.8 % (ref 10–15)
FLUAV H1 2009 PAND RNA SPEC QL NAA+PROBE: NOT DETECTED
FLUAV H1 RNA SPEC QL NAA+PROBE: NOT DETECTED
FLUAV H3 RNA SPEC QL NAA+PROBE: NOT DETECTED
FLUAV RNA SPEC QL NAA+PROBE: NEGATIVE
FLUAV RNA SPEC QL NAA+PROBE: NOT DETECTED
FLUBV RNA RESP QL NAA+PROBE: NEGATIVE
FLUBV RNA SPEC QL NAA+PROBE: NOT DETECTED
GLUCOSE BLDC GLUCOMTR-MCNC: 176 MG/DL (ref 70–99)
GLUCOSE SERPL-MCNC: 118 MG/DL (ref 70–99)
GLUCOSE SERPL-MCNC: 282 MG/DL (ref 70–99)
GLUCOSE UR STRIP-MCNC: NEGATIVE MG/DL
HADV DNA SPEC QL NAA+PROBE: NOT DETECTED
HCO3 SERPL-SCNC: 20 MMOL/L (ref 22–29)
HCO3 SERPL-SCNC: 21 MMOL/L (ref 22–29)
HCOV PNL SPEC NAA+PROBE: NOT DETECTED
HCT VFR BLD AUTO: 32 % (ref 35–47)
HCT VFR BLD AUTO: 33.9 % (ref 35–47)
HGB BLD-MCNC: 10.1 G/DL (ref 11.7–15.7)
HGB BLD-MCNC: 11.1 G/DL (ref 11.7–15.7)
HGB UR QL STRIP: ABNORMAL
HMPV RNA SPEC QL NAA+PROBE: NOT DETECTED
HOLD SPECIMEN: NORMAL
HPIV1 RNA SPEC QL NAA+PROBE: NOT DETECTED
HPIV2 RNA SPEC QL NAA+PROBE: NOT DETECTED
HPIV3 RNA SPEC QL NAA+PROBE: NOT DETECTED
HPIV4 RNA SPEC QL NAA+PROBE: NOT DETECTED
HYALINE CASTS: 1 /LPF
KETONES UR STRIP-MCNC: NEGATIVE MG/DL
L PNEUMO1 AG UR QL IA: NEGATIVE
LACTATE SERPL-SCNC: 1.1 MMOL/L (ref 0.7–2)
LEUKOCYTE ESTERASE UR QL STRIP: ABNORMAL
LYMPHOCYTES # BLD MANUAL: 3.5 10E3/UL (ref 0.8–5.3)
LYMPHOCYTES NFR BLD MANUAL: 18 %
M PNEUMO DNA SPEC QL NAA+PROBE: NOT DETECTED
MCH RBC QN AUTO: 29.2 PG (ref 26.5–33)
MCH RBC QN AUTO: 29.4 PG (ref 26.5–33)
MCHC RBC AUTO-ENTMCNC: 31.6 G/DL (ref 31.5–36.5)
MCHC RBC AUTO-ENTMCNC: 32.7 G/DL (ref 31.5–36.5)
MCV RBC AUTO: 90 FL (ref 78–100)
MCV RBC AUTO: 93 FL (ref 78–100)
MONOCYTES # BLD MANUAL: 1.2 10E3/UL (ref 0–1.3)
MONOCYTES NFR BLD MANUAL: 6 %
MRSA DNA SPEC QL NAA+PROBE: NEGATIVE
MUCOUS THREADS #/AREA URNS LPF: PRESENT /LPF
NEUTROPHILS # BLD MANUAL: 14 10E3/UL (ref 1.6–8.3)
NEUTROPHILS NFR BLD MANUAL: 74 %
NITRATE UR QL: POSITIVE
PH UR STRIP: 6 [PH] (ref 5–7)
PLAT MORPH BLD: ABNORMAL
PLATELET # BLD AUTO: 298 10E3/UL (ref 150–450)
PLATELET # BLD AUTO: 302 10E3/UL (ref 150–450)
POTASSIUM SERPL-SCNC: 4.6 MMOL/L (ref 3.4–5.3)
POTASSIUM SERPL-SCNC: 5.1 MMOL/L (ref 3.4–5.3)
PROCALCITONIN SERPL IA-MCNC: 0.14 NG/ML
PROT SERPL-MCNC: 7.6 G/DL (ref 6.4–8.3)
RBC # BLD AUTO: 3.46 10E6/UL (ref 3.8–5.2)
RBC # BLD AUTO: 3.77 10E6/UL (ref 3.8–5.2)
RBC MORPH BLD: ABNORMAL
RBC URINE: 6 /HPF
RSV RNA SPEC NAA+PROBE: NEGATIVE
RSV RNA SPEC QL NAA+PROBE: NOT DETECTED
RSV RNA SPEC QL NAA+PROBE: NOT DETECTED
RV+EV RNA SPEC QL NAA+PROBE: NOT DETECTED
S PNEUM AG SPEC QL: NEGATIVE
S PYO DNA THROAT QL NAA+PROBE: NOT DETECTED
SA TARGET DNA: NEGATIVE
SARS-COV-2 RNA RESP QL NAA+PROBE: NEGATIVE
SARS-COV-2 RNA RESP QL NAA+PROBE: NEGATIVE
SODIUM SERPL-SCNC: 134 MMOL/L (ref 135–145)
SODIUM SERPL-SCNC: 136 MMOL/L (ref 135–145)
SP GR UR STRIP: 1.02 (ref 1–1.03)
SPECIMEN TYPE: NORMAL
SQUAMOUS EPITHELIAL: <1 /HPF
TRANSITIONAL EPI: <1 /HPF
UROBILINOGEN UR STRIP-MCNC: NORMAL MG/DL
VARIANT LYMPHS BLD QL SMEAR: PRESENT
WBC # BLD AUTO: 15 10E3/UL (ref 4–11)
WBC # BLD AUTO: 19 10E3/UL (ref 4–11)
WBC CLUMPS #/AREA URNS HPF: PRESENT /HPF
WBC URINE: >182 /HPF

## 2025-07-20 PROCEDURE — 250N000012 HC RX MED GY IP 250 OP 636 PS 637: Performed by: EMERGENCY MEDICINE

## 2025-07-20 PROCEDURE — 258N000003 HC RX IP 258 OP 636: Performed by: EMERGENCY MEDICINE

## 2025-07-20 PROCEDURE — 250N000013 HC RX MED GY IP 250 OP 250 PS 637

## 2025-07-20 PROCEDURE — 250N000013 HC RX MED GY IP 250 OP 250 PS 637: Performed by: STUDENT IN AN ORGANIZED HEALTH CARE EDUCATION/TRAINING PROGRAM

## 2025-07-20 PROCEDURE — 87205 SMEAR GRAM STAIN: CPT | Performed by: STUDENT IN AN ORGANIZED HEALTH CARE EDUCATION/TRAINING PROGRAM

## 2025-07-20 PROCEDURE — 80048 BASIC METABOLIC PNL TOTAL CA: CPT

## 2025-07-20 PROCEDURE — 82962 GLUCOSE BLOOD TEST: CPT

## 2025-07-20 PROCEDURE — 99222 1ST HOSP IP/OBS MODERATE 55: CPT | Mod: AI | Performed by: STUDENT IN AN ORGANIZED HEALTH CARE EDUCATION/TRAINING PROGRAM

## 2025-07-20 PROCEDURE — 999N000215 HC STATISTIC HFNC ADULT NON-CPAP

## 2025-07-20 PROCEDURE — 250N000012 HC RX MED GY IP 250 OP 636 PS 637: Performed by: STUDENT IN AN ORGANIZED HEALTH CARE EDUCATION/TRAINING PROGRAM

## 2025-07-20 PROCEDURE — 71275 CT ANGIOGRAPHY CHEST: CPT

## 2025-07-20 PROCEDURE — 250N000011 HC RX IP 250 OP 636: Performed by: EMERGENCY MEDICINE

## 2025-07-20 PROCEDURE — 120N000002 HC R&B MED SURG/OB UMMC

## 2025-07-20 PROCEDURE — 83605 ASSAY OF LACTIC ACID: CPT | Performed by: EMERGENCY MEDICINE

## 2025-07-20 PROCEDURE — 99222 1ST HOSP IP/OBS MODERATE 55: CPT | Performed by: INTERNAL MEDICINE

## 2025-07-20 PROCEDURE — 96365 THER/PROPH/DIAG IV INF INIT: CPT | Performed by: EMERGENCY MEDICINE

## 2025-07-20 PROCEDURE — 999N000157 HC STATISTIC RCP TIME EA 10 MIN

## 2025-07-20 PROCEDURE — 250N000009 HC RX 250: Performed by: STUDENT IN AN ORGANIZED HEALTH CARE EDUCATION/TRAINING PROGRAM

## 2025-07-20 PROCEDURE — 87186 SC STD MICRODIL/AGAR DIL: CPT

## 2025-07-20 PROCEDURE — 85014 HEMATOCRIT: CPT

## 2025-07-20 PROCEDURE — 250N000009 HC RX 250: Performed by: EMERGENCY MEDICINE

## 2025-07-20 PROCEDURE — 250N000011 HC RX IP 250 OP 636

## 2025-07-20 PROCEDURE — 250N000013 HC RX MED GY IP 250 OP 250 PS 637: Performed by: PHYSICIAN ASSISTANT

## 2025-07-20 PROCEDURE — 36415 COLL VENOUS BLD VENIPUNCTURE: CPT | Performed by: EMERGENCY MEDICINE

## 2025-07-20 PROCEDURE — 87641 MR-STAPH DNA AMP PROBE: CPT

## 2025-07-20 PROCEDURE — 96367 TX/PROPH/DG ADDL SEQ IV INF: CPT | Performed by: EMERGENCY MEDICINE

## 2025-07-20 PROCEDURE — 81001 URINALYSIS AUTO W/SCOPE: CPT

## 2025-07-20 PROCEDURE — 250N000013 HC RX MED GY IP 250 OP 250 PS 637: Performed by: EMERGENCY MEDICINE

## 2025-07-20 PROCEDURE — 87651 STREP A DNA AMP PROBE: CPT | Performed by: EMERGENCY MEDICINE

## 2025-07-20 PROCEDURE — 87899 AGENT NOS ASSAY W/OPTIC: CPT

## 2025-07-20 PROCEDURE — 94640 AIRWAY INHALATION TREATMENT: CPT | Performed by: EMERGENCY MEDICINE

## 2025-07-20 PROCEDURE — 36415 COLL VENOUS BLD VENIPUNCTURE: CPT

## 2025-07-20 PROCEDURE — 87040 BLOOD CULTURE FOR BACTERIA: CPT | Performed by: EMERGENCY MEDICINE

## 2025-07-20 PROCEDURE — 71275 CT ANGIOGRAPHY CHEST: CPT | Mod: 26 | Performed by: RADIOLOGY

## 2025-07-20 RX ORDER — LEVOTHYROXINE SODIUM 75 UG/1
75 TABLET ORAL
Status: DISCONTINUED | OUTPATIENT
Start: 2025-07-20 | End: 2025-07-22 | Stop reason: HOSPADM

## 2025-07-20 RX ORDER — FLUTICASONE PROPIONATE 50 MCG
1 SPRAY, SUSPENSION (ML) NASAL 2 TIMES DAILY
Status: DISCONTINUED | OUTPATIENT
Start: 2025-07-20 | End: 2025-07-22 | Stop reason: HOSPADM

## 2025-07-20 RX ORDER — ALBUTEROL SULFATE 90 UG/1
1-2 INHALANT RESPIRATORY (INHALATION) EVERY 4 HOURS PRN
Status: DISCONTINUED | OUTPATIENT
Start: 2025-07-20 | End: 2025-07-20

## 2025-07-20 RX ORDER — ASPIRIN 81 MG/1
81 TABLET ORAL DAILY
Status: DISCONTINUED | OUTPATIENT
Start: 2025-07-20 | End: 2025-07-22 | Stop reason: HOSPADM

## 2025-07-20 RX ORDER — CALCIUM CARBONATE 500 MG/1
1000 TABLET, CHEWABLE ORAL 4 TIMES DAILY PRN
Status: DISCONTINUED | OUTPATIENT
Start: 2025-07-20 | End: 2025-07-22 | Stop reason: HOSPADM

## 2025-07-20 RX ORDER — CETIRIZINE HYDROCHLORIDE 10 MG/1
10 TABLET ORAL DAILY
Status: DISCONTINUED | OUTPATIENT
Start: 2025-07-20 | End: 2025-07-22 | Stop reason: HOSPADM

## 2025-07-20 RX ORDER — LEVOTHYROXINE SODIUM 50 UG/1
50 TABLET ORAL SEE ADMIN INSTRUCTIONS
Status: DISCONTINUED | OUTPATIENT
Start: 2025-07-20 | End: 2025-07-20

## 2025-07-20 RX ORDER — AMOXICILLIN 250 MG
1 CAPSULE ORAL 2 TIMES DAILY PRN
Status: DISCONTINUED | OUTPATIENT
Start: 2025-07-20 | End: 2025-07-22 | Stop reason: HOSPADM

## 2025-07-20 RX ORDER — CETIRIZINE HYDROCHLORIDE 10 MG/1
10 TABLET, CHEWABLE ORAL DAILY
Status: DISCONTINUED | OUTPATIENT
Start: 2025-07-20 | End: 2025-07-20

## 2025-07-20 RX ORDER — ACETAMINOPHEN 325 MG/1
650 TABLET ORAL ONCE
Status: COMPLETED | OUTPATIENT
Start: 2025-07-20 | End: 2025-07-20

## 2025-07-20 RX ORDER — IOPAMIDOL 755 MG/ML
57 INJECTION, SOLUTION INTRAVASCULAR ONCE
Status: COMPLETED | OUTPATIENT
Start: 2025-07-20 | End: 2025-07-20

## 2025-07-20 RX ORDER — MONTELUKAST SODIUM 10 MG/1
10 TABLET ORAL AT BEDTIME
Status: DISCONTINUED | OUTPATIENT
Start: 2025-07-20 | End: 2025-07-22 | Stop reason: HOSPADM

## 2025-07-20 RX ORDER — IPRATROPIUM BROMIDE AND ALBUTEROL SULFATE 2.5; .5 MG/3ML; MG/3ML
3 SOLUTION RESPIRATORY (INHALATION) ONCE
Status: COMPLETED | OUTPATIENT
Start: 2025-07-20 | End: 2025-07-20

## 2025-07-20 RX ORDER — SIMVASTATIN 10 MG
10 TABLET ORAL AT BEDTIME
Status: DISCONTINUED | OUTPATIENT
Start: 2025-07-20 | End: 2025-07-22 | Stop reason: HOSPADM

## 2025-07-20 RX ORDER — AMOXICILLIN 250 MG
2 CAPSULE ORAL 2 TIMES DAILY PRN
Status: DISCONTINUED | OUTPATIENT
Start: 2025-07-20 | End: 2025-07-22 | Stop reason: HOSPADM

## 2025-07-20 RX ORDER — PREDNISONE 20 MG/1
60 TABLET ORAL ONCE
Status: COMPLETED | OUTPATIENT
Start: 2025-07-20 | End: 2025-07-20

## 2025-07-20 RX ORDER — LIDOCAINE 40 MG/G
CREAM TOPICAL
Status: DISCONTINUED | OUTPATIENT
Start: 2025-07-20 | End: 2025-07-22 | Stop reason: HOSPADM

## 2025-07-20 RX ORDER — IPRATROPIUM BROMIDE AND ALBUTEROL SULFATE 2.5; .5 MG/3ML; MG/3ML
3 SOLUTION RESPIRATORY (INHALATION) EVERY 4 HOURS PRN
Status: DISCONTINUED | OUTPATIENT
Start: 2025-07-20 | End: 2025-07-22 | Stop reason: HOSPADM

## 2025-07-20 RX ORDER — MULTIPLE VITAMINS W/ MINERALS TAB 9MG-400MCG
1 TAB ORAL DAILY
Status: DISCONTINUED | OUTPATIENT
Start: 2025-07-20 | End: 2025-07-22 | Stop reason: HOSPADM

## 2025-07-20 RX ORDER — UMECLIDINIUM BROMIDE AND VILANTEROL TRIFENATATE 62.5; 25 UG/1; UG/1
1 POWDER RESPIRATORY (INHALATION) DAILY
Status: DISCONTINUED | OUTPATIENT
Start: 2025-07-20 | End: 2025-07-22 | Stop reason: HOSPADM

## 2025-07-20 RX ORDER — PREDNISONE 20 MG/1
40 TABLET ORAL EVERY 24 HOURS
Status: DISCONTINUED | OUTPATIENT
Start: 2025-07-20 | End: 2025-07-22

## 2025-07-20 RX ORDER — ENOXAPARIN SODIUM 100 MG/ML
40 INJECTION SUBCUTANEOUS EVERY 24 HOURS
Status: DISCONTINUED | OUTPATIENT
Start: 2025-07-20 | End: 2025-07-21

## 2025-07-20 RX ORDER — TRIAMCINOLONE ACETONIDE 1 MG/G
CREAM TOPICAL 2 TIMES DAILY
Status: DISCONTINUED | OUTPATIENT
Start: 2025-07-20 | End: 2025-07-22 | Stop reason: HOSPADM

## 2025-07-20 RX ORDER — CITALOPRAM HYDROBROMIDE 10 MG/1
10 TABLET ORAL DAILY
Status: DISCONTINUED | OUTPATIENT
Start: 2025-07-20 | End: 2025-07-22 | Stop reason: HOSPADM

## 2025-07-20 RX ORDER — PREDNISONE 20 MG/1
40 TABLET ORAL EVERY 24 HOURS
Status: DISCONTINUED | OUTPATIENT
Start: 2025-07-20 | End: 2025-07-20

## 2025-07-20 RX ORDER — LEVOTHYROXINE SODIUM 50 UG/1
50 TABLET ORAL
Status: DISCONTINUED | OUTPATIENT
Start: 2025-07-21 | End: 2025-07-22 | Stop reason: HOSPADM

## 2025-07-20 RX ORDER — FAMOTIDINE 20 MG/1
20 TABLET, FILM COATED ORAL DAILY
Status: DISCONTINUED | OUTPATIENT
Start: 2025-07-20 | End: 2025-07-22 | Stop reason: HOSPADM

## 2025-07-20 RX ORDER — CEFTRIAXONE 1 G/1
1 INJECTION, POWDER, FOR SOLUTION INTRAMUSCULAR; INTRAVENOUS EVERY 24 HOURS
Status: DISCONTINUED | OUTPATIENT
Start: 2025-07-20 | End: 2025-07-21

## 2025-07-20 RX ORDER — LEVOTHYROXINE SODIUM 75 UG/1
75 TABLET ORAL SEE ADMIN INSTRUCTIONS
Status: DISCONTINUED | OUTPATIENT
Start: 2025-07-20 | End: 2025-07-20

## 2025-07-20 RX ADMIN — FLUTICASONE PROPIONATE 1 SPRAY: 50 SPRAY, METERED NASAL at 09:11

## 2025-07-20 RX ADMIN — ENOXAPARIN SODIUM 40 MG: 40 INJECTION SUBCUTANEOUS at 08:09

## 2025-07-20 RX ADMIN — FLUTICASONE FUROATE 1 PUFF: 100 POWDER RESPIRATORY (INHALATION) at 09:11

## 2025-07-20 RX ADMIN — SIMVASTATIN 10 MG: 10 TABLET, FILM COATED ORAL at 21:29

## 2025-07-20 RX ADMIN — PIPERACILLIN AND TAZOBACTAM 3.38 G: 3; .375 INJECTION, POWDER, FOR SOLUTION INTRAVENOUS at 00:38

## 2025-07-20 RX ADMIN — MONTELUKAST 10 MG: 10 TABLET, FILM COATED ORAL at 21:29

## 2025-07-20 RX ADMIN — ASPIRIN 81 MG: 81 TABLET ORAL at 08:09

## 2025-07-20 RX ADMIN — Medication 1500 MG: at 01:41

## 2025-07-20 RX ADMIN — Medication 1 TABLET: at 08:09

## 2025-07-20 RX ADMIN — IOPAMIDOL 57 ML: 755 INJECTION, SOLUTION INTRAVENOUS at 01:25

## 2025-07-20 RX ADMIN — ACETAMINOPHEN 650 MG: 325 TABLET ORAL at 00:34

## 2025-07-20 RX ADMIN — FLUTICASONE PROPIONATE 1 SPRAY: 50 SPRAY, METERED NASAL at 21:28

## 2025-07-20 RX ADMIN — UMECLIDINIUM BROMIDE AND VILANTEROL TRIFENATATE 1 PUFF: 62.5; 25 POWDER RESPIRATORY (INHALATION) at 09:11

## 2025-07-20 RX ADMIN — SODIUM CHLORIDE 500 ML: 0.9 INJECTION, SOLUTION INTRAVENOUS at 00:41

## 2025-07-20 RX ADMIN — FAMOTIDINE 20 MG: 20 TABLET, FILM COATED ORAL at 08:09

## 2025-07-20 RX ADMIN — LEVOTHYROXINE SODIUM 75 MCG: 75 TABLET ORAL at 08:09

## 2025-07-20 RX ADMIN — Medication 10 MG: at 22:07

## 2025-07-20 RX ADMIN — IPRATROPIUM BROMIDE AND ALBUTEROL SULFATE 3 ML: .5; 3 SOLUTION RESPIRATORY (INHALATION) at 19:42

## 2025-07-20 RX ADMIN — CITALOPRAM HYDROBROMIDE 10 MG: 10 TABLET ORAL at 09:20

## 2025-07-20 RX ADMIN — CETIRIZINE HYDROCHLORIDE 10 MG: 10 TABLET, FILM COATED ORAL at 09:20

## 2025-07-20 RX ADMIN — TRIAMCINOLONE ACETONIDE: 1 CREAM TOPICAL at 09:11

## 2025-07-20 RX ADMIN — PREDNISONE 40 MG: 20 TABLET ORAL at 14:15

## 2025-07-20 RX ADMIN — PREDNISONE 60 MG: 20 TABLET ORAL at 02:58

## 2025-07-20 RX ADMIN — CEFTRIAXONE SODIUM 1 G: 1 INJECTION, POWDER, FOR SOLUTION INTRAMUSCULAR; INTRAVENOUS at 07:09

## 2025-07-20 RX ADMIN — IPRATROPIUM BROMIDE AND ALBUTEROL SULFATE 3 ML: .5; 3 SOLUTION RESPIRATORY (INHALATION) at 00:35

## 2025-07-20 ASSESSMENT — ACTIVITIES OF DAILY LIVING (ADL)
ADLS_ACUITY_SCORE: 50
ADLS_ACUITY_SCORE: 41
ADLS_ACUITY_SCORE: 50
ADLS_ACUITY_SCORE: 41
ADLS_ACUITY_SCORE: 50
ADLS_ACUITY_SCORE: 41
ADLS_ACUITY_SCORE: 50
ADLS_ACUITY_SCORE: 41
ADLS_ACUITY_SCORE: 41
ADLS_ACUITY_SCORE: 50
ADLS_ACUITY_SCORE: 41
ADLS_ACUITY_SCORE: 50
ADLS_ACUITY_SCORE: 50
ADLS_ACUITY_SCORE: 41

## 2025-07-20 NOTE — ED PROVIDER NOTES
History     Chief Complaint   Patient presents with    Shortness of Breath     HPI  Rita Mancini is a 75 year old female who has a PMH notable for COPD, Chronic pleural effusions, chronic sinusitis, DM, HLD, hypothyroidism, GERD, depression      Patient reports her baseline SpO2 when feeling well is around 96%. Does not normally have hypoxia or reduced value.       No other new symptoms or concerns reported at this time. Full ROS completed w/o additional findings.         Past Medical History  Past Medical History:   Diagnosis Date    Age-related cataract 12/06/2021    Anemia     Anxiety and depression 01/01/1962    Bigeminy 03/17/2019    Bronchiectasis without complication (H)     Chronic cough     Chronic maxillary sinusitis     Chronic pleural effusion 01/15/2015    CKD (chronic kidney disease) stage 3, GFR 30-59 ml/min (H)     COPD (chronic obstructive pulmonary disease) (H) 01/01/2009    Depression     Diverticulosis     Eczema of both hands     Functional diarrhea 12/31/2018    GERD (gastroesophageal reflux disease)     History of alcoholism (H)     History of lung cancer 01/22/2025    History of syncope 04/24/2024    Hx antineoplastic chemotherapy     lung cancer     Hx of radiation therapy     lung cancer     Hydronephrosis     right kidney since 2019    Hyperlipidemia LDL goal <100 03/06/2023    Hypothyroid     Infection due to 2019 novel coronavirus 03/06/2023    Lung cancer (H) 01/01/2008    RUL,  Non small cell cancer    Macular degeneration (senile) of retina     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    Orthostatic hypotension 10/22/2024    Osteopenia     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, subsequent encounter 04/08/2019    Pleural effusion 01/01/2015    Sepsis due to Escherichia coli with acute renal failure without septic shock, unspecified acute renal failure type (H)     Sleep apnea 01/01/2010    does not use cpap    Type 2 diabetes, HbA1C  goal < 8% (H)     Umbilical hernia with obstruction 01/23/2024     Past Surgical History:   Procedure Laterality Date    CATARACT IOL, RT/LT Bilateral 12/2021    DAVINCI XI HERNIORRHAPHY VENTRAL N/A 2/16/2024    Procedure: HERNIORRHAPHY, VENTRAL, ROBOT-ASSISTED, LAPAROSCOPIC, USING DA PAUL XI;  Surgeon: Tyler Rossi MD;  Location: [facility] OR    IR MISCELLANEOUS PROCEDURE  12/10/2009    PORTACATH PLACEMENT      and removal    THORACOSCOPY Right 04/06/2015    Procedure: RIGHT THORACOSCOPY / BIOPSY PLEURAL / TALC PLEURODESIS;  Surgeon: Michi Ma MD;  Location: [facility] OR;  Service:     THORACOSCOPY Left 03/29/2019    Procedure: LEFT THORACOSCOPY WITH DECORTICATION;  Surgeon: Michi Ma MD;  Location: [facility];  Service: General    TONSILLECTOMY  age 5    URETERAL STENT PLACEMENT Right 06/01/2010    US THORACENTESIS  03/27/2019     aspirin 81 MG EC tablet  cetirizine (ZYRTEC) 10 MG CHEW  citalopram (CELEXA) 10 MG tablet  famotidine (PEPCID) 20 MG tablet  levothyroxine (SYNTHROID/LEVOTHROID) 50 MCG tablet  levothyroxine (SYNTHROID/LEVOTHROID) 75 MCG tablet  magnesium 500 MG TABS  Melatonin 10 MG CAPS  montelukast (SINGULAIR) 10 MG tablet  multivitamin w/minerals (THERA-VIT-M) tablet  simvastatin (ZOCOR) 10 MG tablet  triamcinolone (KENALOG) 0.1 % external cream  albuterol (PROAIR HFA/PROVENTIL HFA/VENTOLIN HFA) 108 (90 Base) MCG/ACT inhaler  blood glucose (NO BRAND SPECIFIED) test strip  blood glucose monitoring (SOFTCLIX) lancets  calcium carbonate (TUMS) 500 MG chewable tablet  doxycycline hyclate (VIBRAMYCIN) 100 MG capsule  fluticasone (ARNUITY ELLIPTA) 100 MCG/ACT inhaler  fluticasone (FLONASE) 50 MCG/ACT nasal spray  sodium chloride (OCEAN) 0.65 % nasal spray  TRULICITY 4.5 MG/0.5ML SOAJ  umeclidinium-vilanterol (ANORO ELLIPTA) 62.5-25 MCG/ACT oral inhaler  zolpidem (AMBIEN) 5 MG tablet      Allergies   Allergen Reactions    Seasonal Allergies      Family History  Family  "History   Problem Relation Age of Onset    Heart Disease Mother     Hypertension Mother     Alcoholism Mother     Depression Mother     Heart Disease Father 45        mi age 45, cabg    Coronary Artery Disease Father     Cancer Father         prostate    Hypertension Father     Snoring Father     Chronic Obstructive Pulmonary Disease Brother     Alcoholism Brother     Alcoholism Sister     Diabetes Son     Anxiety Disorder Son     Depression Son     Post-Traumatic Stress Disorder (PTSD) Son     Obesity Daughter     Obesity Daughter     Cancer Maternal Aunt 47        breast    Breast Cancer Paternal Aunt     Leukemia Grandchild     Schizophrenia Grandchild     Lymphoma Grandchild     Glaucoma No family hx of     Macular Degeneration No family hx of      Social History   Social History     Tobacco Use    Smoking status: Former     Current packs/day: 0.00     Types: Cigarettes     Start date: 2008     Quit date: 2008     Years since quittin.7     Passive exposure: Never    Smokeless tobacco: Never   Vaping Use    Vaping status: Never Used   Substance Use Topics    Alcohol use: No    Drug use: No              REVIEW OF SYSTEMS  A complete review of systems was performed with pertinent positives and negatives noted in the HPI, and all other systems negative.    Physical Exam   BP: 124/65  Pulse: 96  Temp: 100.2  F (37.9  C)  Resp: 24  Height: 165.1 cm (5' 5\")  Weight: 71.2 kg (157 lb)  SpO2: 92 %      Physical Exam  CONSTITUTIONAL: Awake and alert. Non-toxic appearance. No acute distress.   HENT:   - Head: Normocephalic and atraumatic.   - Ears: External ear grossly normal.   - Nose: Nose normal. No rhinorrhea. No epistaxis.   - Mouth/Throat: MMM  EYES: Conjunctivae and lids are normal. No scleral icterus.   NECK: Normal range of motion and phonation normal. Neck supple.  No tracheal deviation, no stridor. No edema or erythema noted.  CARDIOVASCULAR: Normal rate, regular rhythm and no appreciable abnormal " heart sounds.  PULMONARY/CHEST: Normal work of breathing. No accessory muscle usage or stridor. No respiratory distress.  No appreciable abnormal breath sounds.  ABDOMEN: Soft, non-distended. No tenderness. No peritoneal findings, no rigidity, rebound or guarding.  No palpable masses or abnormal pulsatility appreciated.  MUSCULOSKELETAL: Extremities warm and seemingly well perfused.   NEUROLOGIC: Awake, alert. Not disoriented. No seizure activity. GCS 15  SKIN: Skin is warm and dry. No diaphoresis. No pallor. No rash/acute appearing skin changes noted.  PSYCHIATRIC: Normal mood and affect. Speech and behavior normal. Thought processes linear. Cognition and memory are normal. No SI/HI reported.    ED Course     ED Course as of 07/20/25 0531   Sun Jul 20, 2025   0105 Discussed with IM   0222 Patient reported feeling somewhat symptomatically improved after the neb, but continued to be hypoxic and had to be placed on nasal cannula oxygen, which improved SpO2 to 92%.           Assessments & Plan (with Medical Decision Making)     IMPRESSION:   75 year old female w/ PMH notable for      Clinically, patient appears  . Vitals . Otherwise on examination,     Ddx includes, but not limited to,      PLAN:   -  - Risks/benefits of pursuing imaging reviewed and accepted.   - Dispo pending ED Course    RESULTS:  Labs:   -   Urine:   -   Imaging: Written preliminary reports reviewed:    - CT Chest PE:   No evidence of pulmonary embolism.  Patchy consolidative pulmonary opacity in the right upper lobe, likely infectious.  Multiple areas of bronchial/bronchiolar wall thickening and scattered areas of bronchiolar mucoid impaction, can be seen with bronchitis/bronchiolitis.  A 1.1 cm right lower lobe nodule, not significantly changed as compared to the 07/01/2024 exam.  Multiple enlarged mediastinal and hilar lymph nodes, slightly increased in size as compared to the 07/01/2024 exam, indeterminate, could be reactive or  metastatic.    Results/reports reviewed w/ patient who expresses understanding of findings and F/U recommendations.    INTERVENTIONS:   - Duoneb  - PO prednisone  - Supplemental O2 (NC)  - Tawana Hernandez    RE-EVALUATION:  - The patient continues to have hypoxia, placed on NC O2  - Pt otherwise continues to do well here in the ED, no acute issues or apparent concerning changes in vitals or clinical appearance.    DISCUSSIONS:  - w/ IM: Reviewed patient/presentation, current state of workup/any pending studies. They will admit for further evaluation/management, F/U pending studies as needed, coordinate w/ consulting services as needed. No additional requests/recommendations for workup/management for in the ED at this time.    - w/ Patient: I have reviewed the available findings, plan with the patient.  She expressed understanding and agreement with this plan. All questions answered to the best of our ability at this time.       DISPOSITION/PLANNING:  IMPRESSION:   - Acute hypoxic respiratory failure  - Pneumonia  - COPD exacerbation  DISPOSITION:  - Admit to IM for further evaluation/management   PENDING:   - Finalized imaging reports  OTHER RECOMMENDATIONS:   - Onc consult as needed      ______________________________________________________________________          Marga Beck MD  7/19/2025   [facility] EMERGENCY DEPARTMENT       Marga Beck MD  07/21/25 [id number]

## 2025-07-20 NOTE — ED TRIAGE NOTES
Patient is ambulatory into the ED with complaints of dyspnea at rest, hypoxic at home (88%), sore throat, low grade temp chills, and cough. Hx of lung CA (in remission) and COPD. VSS in triage, low grade temp 100.2.      Triage Assessment (Adult)       Row Name 07/19/25 5030          Triage Assessment    Airway WDL WDL        Respiratory WDL    Respiratory WDL X;all     Rhythm/Pattern, Respiratory labored;shortness of breath     Cough Type nonproductive        Skin Circulation/Temperature WDL    Skin Circulation/Temperature WDL WDL        Cardiac WDL    Cardiac WDL WDL        Peripheral/Neurovascular WDL    Peripheral Neurovascular WDL WDL        Cognitive/Neuro/Behavioral WDL    Cognitive/Neuro/Behavioral WDL WDL

## 2025-07-20 NOTE — PROGRESS NOTES
SHIFT: [id number]    NEURO: pt is A&O x 4 pt is able to make needs known and calls appropriately     RESPIRATORY: pt denies SOB on RA, nonproductive frequent cough     CARDIAC: pt denies chest pain.     GI/: voiding via ambulating to BR      DIET: regular    PAIN/NAUSEA: denies    INCISION/DRAINS: none    SKIN: no concerns     IV ACCESS: PIV SL     ACTIVITY: independent     LAB: no protocols      PLAN: oncology work up - nodules found in lungs

## 2025-07-20 NOTE — PHARMACY-VANCOMYCIN DOSING SERVICE
Pharmacy Vancomycin Initial Note  Date of Service 2025  Patient's  1950  75 year old, female    Indication: [facility] Pneumonia    Current estimated CrCl = Estimated Creatinine Clearance: 36.7 mL/min (A) (based on SCr of 1.29 mg/dL (H)).    Creatinine for last 3 days  2025: 11:20 PM Creatinine 1.29 mg/dL    Recent Vancomycin Level(s) for last 3 days  No results found for requested labs within last 3 days.      Vancomycin IV Administrations (past 72 hours)        No vancomycin orders with administrations in past 72 hours.                    Nephrotoxins and other renal medications (From now, onward)      Start     Dose/Rate Route Frequency Ordered Stop    25 011  vancomycin place nunez - receiving intermittent dosing         1 each Intravenous SEE ADMIN INSTRUCTIONS 25 0100  vancomycin (VANCOCIN) 1,500 mg in 0.9% NaCl 250 mL intermittent infusion         1,500 mg  over 90 Minutes Intravenous ONCE 25 0001              Contrast Orders - past 72 hours (72h ago, onward)      None            Intermittent dosing based on levels          Plan:  Start vancomycin  1500 mg IV once now  Additional doses to be determined by future levels and/or renal function   Vancomycin monitoring method: Trough (Method 2 = manual dose calculation)  Vancomycin therapeutic monitoring goal: 15-20 mg/L  Pharmacy will check vancomycin levels as appropriate in 1-3 Days.    Serum creatinine levels will be ordered daily for the first week of therapy and at least twice weekly for subsequent weeks.      Maximo Brasher, [facility]  [id number]

## 2025-07-20 NOTE — CONSULTS
[facility] Oncology Consultation    Rita Mancini MRN# [id number]   Age: 75 year old YOB: 1950     Date of Admission:  7/19/2025    Reason for consult: History of lung cancer, here with shortness of breath       Requesting physician: Dr Austin       Level of consult: One-time consult to assist in determining a diagnosis and to recommend an appropriate treatment plan           Assessment and Plan:   Assessment:   Nonsmall cell lung cancer located in the right upper lobe measuring 4.2 cm in size, presenting with some shortness of breath and cough in 09/2009 and biopsy suggestive of adenocarcinoma including lymphnode biopsy confirming it is stage III disease.  Subsequent to that she was treated with cisplatin and -16 for 3 cycles and Taxotere for 2 cycles afterwards.  In remission  Bronchitis vs pneumonia suggestive of infection  Mediastinal and hilar lymphadenopathy - mild, slightly increased.      Plan:   I discussed with the patient today that I am most suspicious her symptoms are related to a COPD exacerbation with possible bronchitis or an infection.  She was started on inhalers as well as ceftriaxone.  Her imaging has a lymph node that is mildly enlarged though this is likely reactive.  She also has a stable lung nodule for the last year.    My suspicion for cancer relapse is low.  She has no other evidence of cancer recurrence with no metastases in the liver, abdomen, or bone that are present on CT imaging.    Recommend treatment for COPD exacerbation with antibiotics and inhalers as for the primary team.  She has follow-up with Dr. Foster in outpatient oncology in the next couple of weeks.  I anticipate repeating a chest CT in 1 to 2 months would be reasonable after discharge.      Please call oncology with any further questions.    Madiha Spivey MD   [phone]             Chief Complaint:   CC:  shortness of breath, cough, history of lung cancer    History is obtained from the  patient    Patient is a 75-year-old female with a history of non-small cell lung cancer located in the right upper lobe diagnosed in 2009 who comes in today with cough and shortness of breath.  She has a history of non-small cell lung cancer in the right upper lobe measuring 4.2 cm in size diagnosed in September 2009.  Biopsy revealed adenocarcinoma.  She was diagnosed as a stage III.  She was treated with cisplatin and -16 for 3 cycles and subsequently Taxotere for 2 cycles afterward.  She has remained in remission since that time.  She sees Dr. Foster and has scheduled follow-up next week.    She reports the last couple of weeks she has been having a cough, nonproductive, without hemoptysis.  She went to the pharmacy yesterday to  her prescription.  She stopped by her daughter and son-in-law's house was found to be hypoxic and was subsequently brought to the emergency department for further evaluation.    She reports no fevers.  She has been feeling better since being in the hospital.  She has questions about her CT scan.  On admission she underwent a CT scan of the chest which revealed mild hilar and mediastinal lymphadenopathy.  This previously measured 1.4 cm and had been stable for several years.  On admission this was 1.9 cm.  She also has a lung nodule that measures approximately 1 cm that has been stable for over a year.  This looks unchanged.  There are no abnormalities involving the bone, liver, kidneys.    She was admitted for further evaluation.  She was started on empiric antibiotics.     ROS: 10 point ROS neg other than the symptoms noted above in the HPI.      -         Cancer Treatment History:    Diagnosis of nonsmall cell lung cancer located in the right upper lobe measuring 4.2 cm in size, presenting with some shortness of breath and cough in 09/2009 and biopsy suggestive of adenocarcinoma including lymphnode biopsy confirming it is stage III disease.  Subsequent to that she was treated  with cisplatin and -16 for 3 cycles and Taxotere for 2 cycles afterwards.  Subsequent to that she has been in remission.   She follows annually with Dr Foster.               Past Medical History:     Past Medical History:   Diagnosis Date    Age-related cataract 12/06/2021    Anemia     Anxiety and depression 01/01/1962    Bigeminy 03/17/2019    Bronchiectasis without complication (H)     Chronic cough     Chronic maxillary sinusitis     Chronic pleural effusion 01/15/2015    CKD (chronic kidney disease) stage 3, GFR 30-59 ml/min (H)     COPD (chronic obstructive pulmonary disease) (H) 01/01/2009    Depression     Diverticulosis     Eczema of both hands     Functional diarrhea 12/31/2018    GERD (gastroesophageal reflux disease)     History of alcoholism (H)     History of lung cancer 01/22/2025    History of syncope 04/24/2024    Hx antineoplastic chemotherapy     lung cancer     Hx of radiation therapy     lung cancer     Hydronephrosis     right kidney since 2019    Hyperlipidemia LDL goal <100 03/06/2023    Hypothyroid     Infection due to 2019 novel coronavirus 03/06/2023    Lung cancer (H) 01/01/2008    RUL,  Non small cell cancer    Macular degeneration (senile) of retina     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    Orthostatic hypotension 10/22/2024    Osteopenia     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, subsequent encounter 04/08/2019    Pleural effusion 01/01/2015    Sepsis due to Escherichia coli with acute renal failure without septic shock, unspecified acute renal failure type (H)     Sleep apnea 01/01/2010    does not use cpap    Type 2 diabetes, HbA1C goal < 8% (H)     Umbilical hernia with obstruction 01/23/2024               Past Surgical History:   I have reviewed this patient's past surgical history          Social History:   I reviewed patient's social history.  Supportive children and grandchildren.            Family History:   This patient has  no significant family history             Allergies:   All allergies reviewed and addressed          Medications:     Current Facility-Administered Medications   Medication Dose Route Frequency Provider Last Rate Last Admin    albuterol (PROVENTIL HFA/VENTOLIN HFA) inhaler  1-2 puff Inhalation Q4H PRN Jayjay Austin MD        aspirin EC tablet 81 mg  81 mg Oral Daily Jayjay Austin MD   81 mg at 07/20/25 0809    calcium carbonate (TUMS) chewable tablet 1,000 mg  1,000 mg Oral 4x Daily PRN Jayjay Austin MD        cefTRIAXone (ROCEPHIN) 1 g vial to attach to  mL bag for ADULTS or NS 50 mL bag for PEDS  1 g Intravenous Q24H Jayjay Austin MD   Stopped at 07/20/25 0911    cetirizine (zyrTEC) tablet 10 mg  10 mg Oral Daily BobAbe DO   10 mg at 07/20/25 0920    citalopram (celeXA) tablet 10 mg  10 mg Oral Daily Jayjay Austin MD   10 mg at 07/20/25 0920    enoxaparin ANTICOAGULANT (LOVENOX) injection 40 mg  40 mg Subcutaneous Q24H Jayjay Austin MD   40 mg at 07/20/25 0809    famotidine (PEPCID) tablet 20 mg  20 mg Oral Daily Jayjay Austin MD   20 mg at 07/20/25 0809    faricimab-svoa (VABYSMO) intravitreal injection (prefilled syringe) 6 mg  6 mg Intravitreal Q28 Days Malvin De Leon MD   6 mg at 06/18/25 1455    faricimab-svoa (VABYSMO) intravitreal injection (vial) 6 mg  6 mg Intravitreal Q28 Days Malvin De Leon MD   6 mg at 12/31/24 1331    fluticasone (ARNUITY ELLIPTA) 100 MCG/ACT inhaler 1 puff  1 puff Inhalation Daily Jayjay Austin MD   1 puff at 07/20/25 0911    fluticasone (FLONASE) 50 MCG/ACT spray 1 spray  1 spray Both Nostrils BID Jayjay Austin MD   1 spray at 07/20/25 0911    [START ON 7/21/2025] levothyroxine (SYNTHROID/LEVOTHROID) tablet 50 mcg  50 mcg Oral Once per day on Monday Friday Abe Rojas DO        And    levothyroxine (SYNTHROID/LEVOTHROID) tablet 75 mcg  75 mcg Oral Once per day on Sunday Tuesday Wednesday Thursday  Saturday Abe Rojas DO   75 mcg at 07/20/25 0809    lidocaine (LMX4) cream   Topical Q1H PRN Jayjay Austin MD        lidocaine (PF) (XYLOCAINE) injection 0.5 mL  0.5 mL Ophthalmic Q28 Days Malvin De Leon MD   0.5 mL at 06/18/25 1455    lidocaine (PF) (XYLOCAINE) injection 5 mL  5 mL Other q28 days    5 mL at 12/31/24 1331    lidocaine 1 % 0.1-1 mL  0.1-1 mL Other Q1H PRN Jayjay Austin MD        montelukast (SINGULAIR) tablet 10 mg  10 mg Oral At Bedtime Jayjay Austin MD        multivitamin w/minerals (THERA-VIT-M) tablet 1 tablet  1 tablet Oral Daily Jayjay Austin MD   1 tablet at 07/20/25 0809    predniSONE (DELTASONE) tablet 40 mg  40 mg Oral Q24H Abe Rojas DO        senna-docusate (SENOKOT-S/PERICOLACE) 8.6-50 MG per tablet 1 tablet  1 tablet Oral BID PRN Jayjay Austin MD        Or    senna-docusate (SENOKOT-S/PERICOLACE) 8.6-50 MG per tablet 2 tablet  2 tablet Oral BID PRN Jayjay Austin MD        simvastatin (ZOCOR) tablet 10 mg  10 mg Oral At Bedtime Jayjay Austin MD        sodium chloride (OCEAN) 0.65 % nasal spray 1 spray  1 spray Both Nostrils Daily PRN Jayjay Austin MD        sodium chloride (PF) 0.9% PF flush 3 mL  3 mL Intracatheter Q8H RAYMOND Jayjay Austin MD   3 mL at 07/20/25 0508    sodium chloride (PF) 0.9% PF flush 3 mL  3 mL Intracatheter q1 min prn Jayjay Austin MD        triamcinolone (KENALOG) 0.1 % cream   Topical BID Jayjay Austin MD   Given at 07/20/25 0911    umeclidinium-vilanterol (ANORO ELLIPTA) 62.5-25 MCG/ACT oral inhaler 1 puff  1 puff Inhalation Daily Jayjay Austin MD   1 puff at 07/20/25 0968     Current Outpatient Medications   Medication Sig Dispense Refill    aspirin 81 MG EC tablet Take 81 mg by mouth daily      cetirizine (ZYRTEC) 10 MG CHEW Take 10 mg by mouth daily      citalopram (CELEXA) 10 MG tablet Take 1 tablet (10 mg) by mouth daily.      famotidine (PEPCID) 20 MG tablet Take 1 tablet (20 mg) by mouth  daily. 90 tablet 2    levothyroxine (SYNTHROID/LEVOTHROID) 50 MCG tablet TAKE ON EMPTY STOMACH EVERY MONDAY AND FRIDAY (SEE OTHER DOSE FOR REMAINING DAYS OF WEEK) 24 tablet 3    levothyroxine (SYNTHROID/LEVOTHROID) 75 MCG tablet Take 1 tablet on Tue, Wed, Thur, Sat, Sun. 60 tablet 2    magnesium 500 MG TABS       Melatonin 10 MG CAPS       montelukast (SINGULAIR) 10 MG tablet Take 1 tablet (10 mg) by mouth at bedtime. 90 tablet 2    multivitamin w/minerals (THERA-VIT-M) tablet Take 1 tablet by mouth daily      simvastatin (ZOCOR) 10 MG tablet Take 1 tablet (10 mg) by mouth at bedtime. 90 tablet 3    triamcinolone (KENALOG) 0.1 % external cream Apply topically 2 times daily      albuterol (PROAIR HFA/PROVENTIL HFA/VENTOLIN HFA) 108 (90 Base) MCG/ACT inhaler Inhale 1-2 puffs into the lungs every 4 hours as needed for shortness of breath or cough. 18 g 0    blood glucose (NO BRAND SPECIFIED) test strip Use to test blood sugar one time daily or as directed. 100 strip 3    blood glucose monitoring (SOFTCLIX) lancets USE TO TEST BLOOD SUGAR 1 TIMES DAILY OR AS DIRECTED.      calcium carbonate (TUMS) 500 MG chewable tablet Take 1 chew tab by mouth daily      doxycycline hyclate (VIBRAMYCIN) 100 MG capsule Take 1 capsule (100 mg) by mouth 2 times daily for 10 days. 20 capsule 0    fluticasone (ARNUITY ELLIPTA) 100 MCG/ACT inhaler Inhale 1 puff into the lungs daily. 1 each 4    fluticasone (FLONASE) 50 MCG/ACT nasal spray Spray 1 spray into both nostrils 2 times daily. 16 g 3    sodium chloride (OCEAN) 0.65 % nasal spray Spray 1 spray into both nostrils daily as needed for congestion.      TRULICITY 4.5 MG/0.5ML SOAJ Inject 4.5 mg subcutaneously once a week. 2 mL 3    umeclidinium-vilanterol (ANORO ELLIPTA) 62.5-25 MCG/ACT oral inhaler Inhale 1 puff into the lungs daily. 180 each 3    zolpidem (AMBIEN) 5 MG tablet Take 1 tablet by mouth nightly as needed for sleep 30 tablet 5             Review of Systems:   The Review of  "Systems is negative other than noted in the HPI          Physical Exam:   Vitals were reviewed  Gen: older woman, appears comfortable lying in bed  Neck ;supple no lad  CV: rrr  Lungs: expiratory wheezes in the RUL and RLL, no wheezes or crackles in L lung  Abd; soft  nt nd + bs  Ext : no edema            Data:   All laboratory data reviewed      Lab Results   Component Value Date    WBC 15.0 07/20/2025    WBC 10.7 03/20/2006     Lab Results   Component Value Date    RBC 3.46 07/20/2025    RBC 4.75 03/20/2006     Lab Results   Component Value Date    HGB 10.1 07/20/2025    HGB 14.7 03/20/2006     Lab Results   Component Value Date    HCT 32.0 07/20/2025    HCT 43.3 03/20/2006     No components found for: \"MCT\"  Lab Results   Component Value Date    MCV 93 07/20/2025    MCV 91 03/20/2006     Lab Results   Component Value Date    MCH 29.2 07/20/2025    MCH 30.9 03/20/2006     Lab Results   Component Value Date    MCHC 31.6 07/20/2025    MCHC 34.0 03/20/2006     Lab Results   Component Value Date    RDW 13.8 07/20/2025    RDW 12.6 03/20/2006     Lab Results   Component Value Date     07/20/2025     03/20/2006       Recent Labs   Lab Test 07/20/25  0627 07/19/25  2320    134*   POTASSIUM 4.6 5.1   CHLORIDE 104 99   CO2 20* 21*   ANIONGAP 12 14   * 118*   BUN 21.9 24.4*   CR 1.42* 1.29*   EMELY 8.5* 8.9     Liver Function Studies -   Recent Labs   Lab Test 07/19/25  2320 03/06/23  1649   PROTTOTAL 7.6 7.8   ALBUMIN 3.5 4.5   BILITOTAL 0.5 0.6   ALKPHOS 132 87   AST  --  30   ALT  --  16     EXAM: CT CHEST PULMONARY EMBOLISM W CONTRAST  LOCATION: [facility]  DATE: 07/20/2025     INDICATION: Shortness of breath, hypoxia, + dimer. ? PE, pneumonia, or other abnormality.  COMPARISON: Chest CT on 07/01/2024.  TECHNIQUE: CT chest pulmonary angiogram during arterial phase injection of IV contrast. Multiplanar reformats and MIP reconstructions were performed. " Dose reduction techniques were used.   CONTRAST: Iopamidol (Isovue 370) solution 57 mL.     FINDINGS:  ANGIOGRAM CHEST: No evidence of pulmonary embolism. No evidence of thoracic aortic aneurysm or dissection. No evidence of right heart strain.     LUNGS AND PLEURA: No pleural effusion or pneumothorax. Patchy consolidative opacity in the right upper lobe, likely infectious. Posterior bibasilar pulmonary opacities, right more than left, could be atelectatic or infectious. Multiple areas of   bronchial/bronchiolar wall thickening and scattered areas of bronchiolar mucoid impaction, can be seen with bronchitis/bronchiolitis. Area of architectural distortion in the medial aspect of the right lung apex, likely represents post-treatment changes.   1.1 cm right lower lobe nodule (series 6 image 185), not significantly changed as compared to 07/01/2024.     MEDIASTINUM/AXILLAE: No cardiomegaly or significant pericardial effusion. Multiple enlarged mediastinal and hilar lymph nodes; for example, there is a 1.9 cm short axis subcarinal lymph node (series 4 image 134), increased in size as compared to the   07/01/2024 exam when it measured 1.1 cm, not significantly changed as compared to the 07/01/2024 exam.     CORONARY ARTERY CALCIFICATION: None.     UPPER ABDOMEN: No pleural effusion or pneumothorax. 4.5 cm cyst at the upper pole of the right kidney and 1.3 cm cyst at the upper pole of the left kidney.     MUSCULOSKELETAL: No suspicious osseous lesion.                                                                      IMPRESSION:  1.  No evidence of pulmonary embolism.  2.  Patchy consolidative pulmonary opacity in the right upper lobe, likely infectious.  3.  Multiple areas of bronchial/bronchiolar wall thickening and scattered areas of bronchiolar mucoid impaction, can be seen with bronchitis/bronchiolitis.  4.  A 1.1 cm right lower lobe nodule, not significantly changed as compared to the 07/01/2024 exam.  5.  Multiple  enlarged mediastinal and hilar lymph nodes, slightly increased in size as compared to the 07/01/2024 exam, indeterminate, could be reactive or metastatic.    Madiha Spivey MD

## 2025-07-20 NOTE — PROGRESS NOTES
Urgent Care Clinic Visit    Chief Complaint   Patient presents with    Cough    Pharyngitis    Headache               7/19/2025     7:20 PM   Additional Questions   Roomed by naheed   Accompanied by her

## 2025-07-20 NOTE — H&P
[facility]    History and Physical - Hospitalist Service, GOLD TEAM        Date of Admission:  7/19/2025    Assessment & Plan      Rita Mancini is a 75 year old female with PMH of COPD, lung adenocarcinoma (R U lobe) s/p Chemo radiation, treated L Lung abscess/ empyema, and chronic sinusitis presents with productive cough and dyspnea for a month which exaggerated the last week. She has had sinusitis for almost two months and was treated with amoxicillin/ clavulanate and doxycycline with no  improvement. In the ED she was managed for PNA with Vano + Pip/Tazo     #PNA  #COPD  #Non small cell Lung cancer (2008)  #Hx Left lung absess  The pt presented with productive cough and dyspnea. She is afebrile, procal is negative but the WBC are increased at 19 and the CTPE suggests bronchitis/bronchiolitis and non specific lesions consistent with infection in the RUL. The Viral panel is negative. Ddx includes COPD exacerbation and Cancer relapse. She is s/p chemo and radiation has been in remission for more than 10 years and although there are LN and a nodule in the RLL, the findings are mostly unchanged form last year.   - Sputum Cultures   - Urinary Strept Pneum & Legionella antigens   - MRSA swab  - Continue PTA COPD medication:    Albuterol  Budesonide  Fluticasone  Montelukast  Umeclidinium Vilanterol  - Start Ceftriaxone 1g Daily  - Hold Vanco  - Discontinue Pip/tazo  - Consider consult Pulmonology     CKD  At her baseline Cr    Hyperlipidemia  - simvastatin     Mood disorder  - Citalopram    DM  She is on Trulicity due on Sunday   - Consider holding Trulicity     GERD  - Famotidine    Hypothyroidism  - Levothyroxine 50mcg M & F, 75mcg the rest of the week        Diet: Low Consistent Carb (45 g CHO per Meal) Diet  DVT Prophylaxis: Enoxaparin (Lovenox) SQ  Rodríguez Catheter: Not present  Lines: None     Cardiac Monitoring: None  Code Status: Full Code    Clinically  "Significant Risk Factors Present on Admission         # Hyponatremia: Lowest Na = 134 mmol/L in last 2 days, will monitor as appropriate          # Coagulation Defect: INR = 1.21 (Ref range: 0.85 - 1.15) and/or PTT = N/A, will monitor for bleeding  # Drug Induced Platelet Defect: home medication list includes an antiplatelet medication             # Overweight: Estimated body mass index is 26.13 kg/m  as calculated from the following:    Height as of this encounter: 1.651 m (5' 5\").    Weight as of this encounter: 71.2 kg (157 lb).              Disposition Plan      Expected Discharge Date: 07/22/2025                The patient's care was discussed with the Attending Physician, Dr. Rojas.      Jayjay Austin MD  Hospitalist Service, [facility]  Securely message with [employer] (more info)  Text page via [facility] Paging/Directory   See signed in provider for up to date coverage information  ______________________________________________________________________    Chief Complaint   Dyspnea and cough    History is obtained from the patient    History of Present Illness   Rita Mancini is a 75 year old female who presented with dyspnea, coughing and chest pain. She started having dry couch a month ago but the last week she has been coughing up, with the sputum having yellowish/ greenish color. She also complains of sinusitis symptoms such as secretions and headaches which started a month ago and although she received Augmentin and doxycycline the symptoms, although improved, persist. She also reports two episodes of vomiting accompanied with diarrhea. She denies fever or sick surroundings.    The patient  has a history of COPD that was well controled until a month ago when her respiratory symptoms started and was hospitalized for Strept Pyogenes abscess c/b TSS 6 years ago. Additionally per oncology (Dr Foster) \"she has been diagnosed with nonsmall cell lung " "cancer located in the right upper lobe measuring 4.2 cm in size, presenting with some shortness of breath and cough in 09/2009 and biopsy suggestive of adenocarcinoma including lymphnode biopsy confirming it is stage III disease.  Subsequent to that she was treated with cisplatin and -16 for 3 cycles and Taxotere for 2 cycles afterwards.  Subsequent to that she has been in remission.\"    In the ED she was managed for PNA with vanco & pip/tazo. Her CTPE was neg for embolism but positive for consolidation and bronchitis/bronchiolitis.      Past Medical History    Past Medical History:   Diagnosis Date    Age-related cataract 12/06/2021    Anemia     Anxiety and depression 01/01/1962    Bigeminy 03/17/2019    Bronchiectasis without complication (H)     Chronic cough     Chronic maxillary sinusitis     Chronic pleural effusion 01/15/2015    CKD (chronic kidney disease) stage 3, GFR 30-59 ml/min (H)     COPD (chronic obstructive pulmonary disease) (H) 01/01/2009    Depression     Diverticulosis     Eczema of both hands     Functional diarrhea 12/31/2018    GERD (gastroesophageal reflux disease)     History of alcoholism (H)     History of lung cancer 01/22/2025    History of syncope 04/24/2024    Hx antineoplastic chemotherapy     lung cancer     Hx of radiation therapy     lung cancer     Hydronephrosis     right kidney since 2019    Hyperlipidemia LDL goal <100 03/06/2023    Hypothyroid     Infection due to 2019 novel coronavirus 03/06/2023    Lung cancer (H) 01/01/2008    RUL,  Non small cell cancer    Macular degeneration (senile) of retina     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    Orthostatic hypotension 10/22/2024    Osteopenia     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, subsequent encounter 04/08/2019    Pleural effusion 01/01/2015    Sepsis due to Escherichia coli with acute renal failure without septic shock, unspecified acute renal failure type (H)     Sleep " apnea 01/01/2010    does not use cpap    Type 2 diabetes, HbA1C goal < 8% (H)     Umbilical hernia with obstruction 01/23/2024       Past Surgical History   Past Surgical History:   Procedure Laterality Date    CATARACT IOL, RT/LT Bilateral 12/2021    DAVINCI XI HERNIORRHAPHY VENTRAL N/A 2/16/2024    Procedure: HERNIORRHAPHY, VENTRAL, ROBOT-ASSISTED, LAPAROSCOPIC, USING DA PAUL XI;  Surgeon: Tyler Rossi MD;  Location: [facility] MISCELLANEOUS PROCEDURE  12/10/2009    PORTACATH PLACEMENT      and removal    THORACOSCOPY Right 04/06/2015    Procedure: RIGHT THORACOSCOPY / BIOPSY PLEURAL / TALC PLEURODESIS;  Surgeon: Michi Ma MD;  Location: [facility];  Service:     THORACOSCOPY Left 03/29/2019    Procedure: LEFT THORACOSCOPY WITH DECORTICATION;  Surgeon: Michi Ma MD;  Location: [facility];  Service: General    TONSILLECTOMY  age 5    URETERAL STENT PLACEMENT Right 06/01/2010    US THORACENTESIS  03/27/2019       Prior to Admission Medications   Prior to Admission Medications   Prescriptions Last Dose Informant Patient Reported? Taking?   Melatonin 10 MG CAPS 7/19/2025  Yes Yes   TRULICITY 4.5 MG/0.5ML SOAJ   No No   Sig: Inject 4.5 mg subcutaneously once a week.   albuterol (PROAIR HFA/PROVENTIL HFA/VENTOLIN HFA) 108 (90 Base) MCG/ACT inhaler   No No   Sig: Inhale 1-2 puffs into the lungs every 4 hours as needed for shortness of breath or cough.   aspirin 81 MG EC tablet 7/19/2025  Yes Yes   Sig: Take 81 mg by mouth daily   blood glucose (NO BRAND SPECIFIED) test strip   No No   Sig: Use to test blood sugar one time daily or as directed.   blood glucose monitoring (SOFTCLIX) lancets   Yes No   Sig: USE TO TEST BLOOD SUGAR 1 TIMES DAILY OR AS DIRECTED.   calcium carbonate (TUMS) 500 MG chewable tablet   Yes No   Sig: Take 1 chew tab by mouth daily   cetirizine (ZYRTEC) 10 MG CHEW 7/19/2025  Yes Yes   Sig: Take 10 mg by mouth daily   citalopram (CELEXA) 10 MG tablet  7/19/2025  No Yes   Sig: Take 1 tablet (10 mg) by mouth daily.   doxycycline hyclate (VIBRAMYCIN) 100 MG capsule   No No   Sig: Take 1 capsule (100 mg) by mouth 2 times daily for 10 days.   famotidine (PEPCID) 20 MG tablet 7/19/2025  No Yes   Sig: Take 1 tablet (20 mg) by mouth daily.   fluticasone (ARNUITY ELLIPTA) 100 MCG/ACT inhaler   No No   Sig: Inhale 1 puff into the lungs daily.   fluticasone (FLONASE) 50 MCG/ACT nasal spray   No No   Sig: Spray 1 spray into both nostrils 2 times daily.   levothyroxine (SYNTHROID/LEVOTHROID) 50 MCG tablet 7/19/2025  No Yes   Sig: TAKE ON EMPTY STOMACH EVERY MONDAY AND FRIDAY (SEE OTHER DOSE FOR REMAINING DAYS OF WEEK)   levothyroxine (SYNTHROID/LEVOTHROID) 75 MCG tablet 7/19/2025  No Yes   Sig: Take 1 tablet on Tue, Wed, Thur, Sat, Sun.   magnesium 500 MG TABS 7/19/2025  Yes Yes   montelukast (SINGULAIR) 10 MG tablet 7/19/2025  No Yes   Sig: Take 1 tablet (10 mg) by mouth at bedtime.   multivitamin w/minerals (THERA-VIT-M) tablet 7/19/2025  Yes Yes   Sig: Take 1 tablet by mouth daily   simvastatin (ZOCOR) 10 MG tablet 7/19/2025  No Yes   Sig: Take 1 tablet (10 mg) by mouth at bedtime.   sodium chloride (OCEAN) 0.65 % nasal spray   Yes No   Sig: Spray 1 spray into both nostrils daily as needed for congestion.   triamcinolone (KENALOG) 0.1 % external cream 7/19/2025  Yes Yes   Sig: Apply topically 2 times daily   umeclidinium-vilanterol (ANORO ELLIPTA) 62.5-25 MCG/ACT oral inhaler   No No   Sig: Inhale 1 puff into the lungs daily.   zolpidem (AMBIEN) 5 MG tablet   No No   Sig: Take 1 tablet by mouth nightly as needed for sleep      Facility-Administered Medications Last Administration Doses Remaining   faricimab-svoa (VABYSMO) intravitreal injection (prefilled syringe) 6 mg 6/18/2025  2:55 PM    faricimab-svoa (VABYSMO) intravitreal injection (vial) 6 mg 12/31/2024  1:31 PM 12   lidocaine (PF) (XYLOCAINE) injection 0.5 mL 6/18/2025  2:55 PM    lidocaine (PF) (XYLOCAINE)  injection 5 mL 12/31/2024  1:31 PM               Physical Exam   Vital Signs: Temp: 98.4  F (36.9  C) Temp src: Oral BP: 122/88 Pulse: 82   Resp: 20 SpO2: 97 % O2 Device: Nasal cannula Oxygen Delivery: 2 LPM  Weight: 157 lbs 0 oz    General Appearance: In no apparent distress, alert, oriented x 4  Respiratory: Crackles bilaterally, respiratory sounds present throughout lung fields, diminished on the right lung  Cardiovascular: RRR, S1,S2, systolic murmur  GI: soft, nontender, nondistended    Medical Decision Making       Please see A&P for additional details of medical decision making.      Data     I have personally reviewed the following data over the past 24 hrs:    19.0 (H)  \   11.1 (L)   / 302     134 (L) 99 24.4 (H) /  118 (H)   5.1 21 (L) 1.29 (H) \     ALT: N/A AST: N/A AP: 132 TBILI: 0.5   ALB: 3.5 TOT PROTEIN: 7.6 LIPASE: N/A     Procal: 0.14 CRP: N/A Lactic Acid: 1.1       INR:  1.21 (H) PTT:  N/A   D-dimer:  1.50 (H) Fibrinogen:  N/A

## 2025-07-20 NOTE — PROGRESS NOTES
ASSESSMENT/ PLAN:  COPD exacerbation (H)     - COVID-19 Virus (Coronavirus) by PCR Nose  - doxycycline hyclate (VIBRAMYCIN) 100 MG capsule; Take 1 capsule (100 mg) by mouth 2 times daily for 10 days.  - albuterol (PROAIR HFA/PROVENTIL HFA/VENTOLIN HFA) 108 (90 Base) MCG/ACT inhaler; Inhale 1-2 puffs into the lungs every 4 hours as needed for shortness of breath or cough.       Medication: continue current COPD medication regimen unchanged        Discussed medication dosage, usage, side effects, and goals of   treatment in detail.     Avoidance of precipitants.    Return if worsening shortness of breath.    Follow-up with primary physician for chronic management of COPD symptoms    Patient Education: Instructed to return to urgent care or notify primary MD office of fever >101, blood in sputum, chest pain, dyspnea at rest, or other symptoms of concern to patient.  ----------------------------------------------------------------------------------------    SUBJECTIVE:  Chief Complaint   Patient presents with    Cough    Pharyngitis    Headache     Rita Mancini is a 75 year old female who presents for shortness of breath, congested cough with wheezing that started 2 weeks ago.     A previous diagnosis of COPD was made concerning this patient on the basis of   symptoms, pulmonary function testing, and response to medications.   Current symptoms include wheezing, productive cough with sputum described as yellow and mucoid, dyspnea on exertion, and chest tightness.   Precipitating factors are uri infections and strong odors.      COPD treatment that is used daily/ chronically throughout the year (   currently singulair daily,  Pulmacort inhaler daily,  Anoro ellipta inhaler daily)  Additional treatments to control this COPD episode  ( none )  Her insurance is requiring change in medications- but she has not started the new medication - Arnuity elllipta    Not aware of exposure to covid- no home testing done       Past  Medical History:   Diagnosis Date    Age-related cataract 12/06/2021    Anemia     Anxiety and depression 01/01/1962    Bigeminy 03/17/2019    Bronchiectasis without complication (H)     Chronic cough     Chronic maxillary sinusitis     Chronic pleural effusion 01/15/2015    CKD (chronic kidney disease) stage 3, GFR 30-59 ml/min (H)     COPD (chronic obstructive pulmonary disease) (H) 01/01/2009    Depression     Diverticulosis     Eczema of both hands     Functional diarrhea 12/31/2018    GERD (gastroesophageal reflux disease)     History of alcoholism (H)     History of lung cancer 01/22/2025    History of syncope 04/24/2024    Hx antineoplastic chemotherapy     lung cancer     Hx of radiation therapy     lung cancer     Hydronephrosis     right kidney since 2019    Hyperlipidemia LDL goal <100 03/06/2023    Hypothyroid     Infection due to 2019 novel coronavirus 03/06/2023    Lung cancer (H) 01/01/2008    RUL,  Non small cell cancer    Macular degeneration (senile) of retina     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    Orthostatic hypotension 10/22/2024    Osteopenia     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, subsequent encounter 04/08/2019    Pleural effusion 01/01/2015    Sepsis due to Escherichia coli with acute renal failure without septic shock, unspecified acute renal failure type (H)     Sleep apnea 01/01/2010    does not use cpap    Type 2 diabetes, HbA1C goal < 8% (H)     Umbilical hernia with obstruction 01/23/2024     Patient Active Problem List   Diagnosis    Episode of recurrent major depressive disorder    Diabetes 1.5, managed as type 2 (H)    Diverticulosis    Chronic pleural effusion, left (s/p Talc pleurodesis 2015)    Eczema of both hands    Keratosis obturans of external ear canal, right    Gastroesophageal reflux disease without esophagitis    Hydronephrosis of right kidney    Lymph node enlargement    History of gram negative sepsis     Age-related cataract    Hyperlipidemia LDL goal <100    Infection due to 2019 novel coronavirus    Bacteriuria, chronic    Exudative age-related macular degeneration of both eyes with active choroidal neovascularization (H)    History of syncope    Orthostatic hypotension    Hypothyroidism    History of lung cancer    Chronic maxillary sinusitis       ALLERGIES:  Seasonal allergies    Current Outpatient Medications   Medication Sig Dispense Refill    albuterol (PROAIR HFA/PROVENTIL HFA/VENTOLIN HFA) 108 (90 Base) MCG/ACT inhaler Inhale 1-2 puffs into the lungs every 4 hours as needed for shortness of breath or cough. 18 g 0    doxycycline hyclate (VIBRAMYCIN) 100 MG capsule Take 1 capsule (100 mg) by mouth 2 times daily for 10 days. 20 capsule 0    aspirin 81 MG EC tablet Take 81 mg by mouth daily      blood glucose (NO BRAND SPECIFIED) test strip Use to test blood sugar one time daily or as directed. 100 strip 3    blood glucose monitoring (SOFTCLIX) lancets USE TO TEST BLOOD SUGAR 1 TIMES DAILY OR AS DIRECTED.      calcium carbonate (TUMS) 500 MG chewable tablet Take 1 chew tab by mouth daily      cetirizine (ZYRTEC) 10 MG CHEW Take 10 mg by mouth daily      citalopram (CELEXA) 10 MG tablet Take 1 tablet (10 mg) by mouth daily.      famotidine (PEPCID) 20 MG tablet Take 1 tablet (20 mg) by mouth daily. 90 tablet 2    fluticasone (ARNUITY ELLIPTA) 100 MCG/ACT inhaler Inhale 1 puff into the lungs daily. 1 each 4    fluticasone (FLONASE) 50 MCG/ACT nasal spray Spray 1 spray into both nostrils 2 times daily. 16 g 3    levothyroxine (SYNTHROID/LEVOTHROID) 50 MCG tablet TAKE ON EMPTY STOMACH EVERY MONDAY AND FRIDAY (SEE OTHER DOSE FOR REMAINING DAYS OF WEEK) 24 tablet 3    levothyroxine (SYNTHROID/LEVOTHROID) 75 MCG tablet Take 1 tablet on Tue, Wed, Thur, Sat, Sun. 60 tablet 2    magnesium 500 MG TABS       Melatonin 10 MG CAPS       montelukast (SINGULAIR) 10 MG tablet Take 1 tablet (10 mg) by mouth at bedtime. 90  tablet 2    multivitamin w/minerals (THERA-VIT-M) tablet Take 1 tablet by mouth daily      simvastatin (ZOCOR) 10 MG tablet Take 1 tablet (10 mg) by mouth at bedtime. 90 tablet 3    sodium chloride (OCEAN) 0.65 % nasal spray Spray 1 spray into both nostrils daily as needed for congestion.      triamcinolone (KENALOG) 0.1 % external cream Apply topically 2 times daily      TRULICITY 4.5 MG/0.5ML SOAJ Inject 4.5 mg subcutaneously once a week. 2 mL 3    umeclidinium-vilanterol (ANORO ELLIPTA) 62.5-25 MCG/ACT oral inhaler Inhale 1 puff into the lungs daily. 180 each 3    zolpidem (AMBIEN) 5 MG tablet Take 1 tablet by mouth nightly as needed for sleep 30 tablet 5     Current Facility-Administered Medications   Medication Dose Route Frequency Provider Last Rate Last Admin    faricimab-svoa (VABYSMO) intravitreal injection (prefilled syringe) 6 mg  6 mg Intravitreal Q28 Days Malvin De Leon MD   6 mg at 25 1455    faricimab-svoa (VABYSMO) intravitreal injection (vial) 6 mg  6 mg Intravitreal Q28 Days Malvin De Leon MD   6 mg at 24 1331    lidocaine (PF) (XYLOCAINE) injection 0.5 mL  0.5 mL Ophthalmic Q28 Days Malvin De Leon MD   0.5 mL at 25 1455    lidocaine (PF) (XYLOCAINE) injection 5 mL  5 mL Other q28 days    5 mL at 24 1331       Social History     Tobacco Use    Smoking status: Former     Current packs/day: 0.00     Types: Cigarettes     Start date: 2008     Quit date: 2008     Years since quittin.7     Passive exposure: Never    Smokeless tobacco: Never   Substance Use Topics    Alcohol use: No       Family History   Problem Relation Age of Onset    Heart Disease Mother     Hypertension Mother     Alcoholism Mother     Depression Mother     Heart Disease Father 45        mi age 45, cabg    Coronary Artery Disease Father     Cancer Father         prostate    Hypertension Father     Snoring Father     Chronic Obstructive Pulmonary Disease  Brother     Alcoholism Brother     Alcoholism Sister     Diabetes Son     Anxiety Disorder Son     Depression Son     Post-Traumatic Stress Disorder (PTSD) Son     Obesity Daughter     Obesity Daughter     Cancer Maternal Aunt 47        breast    Breast Cancer Paternal Aunt     Leukemia Grandchild     Schizophrenia Grandchild     Lymphoma Grandchild     Glaucoma No family hx of     Macular Degeneration No family hx of      ROS:  CONSTITUTIONAL:NEGATIVE for fever, chills,    INTEGUMENTARY/SKIN: NEGATIVE for worrisome rashes, or lesions  EYES: NEGATIVE for vision changes or irritation  ENT/MOUTH: NEGATIVE for ear, mouth and throat problems           OBJECTIVE: Initial physical exam  /61   Pulse 80   Temp 97.8  F (36.6  C) (Tympanic)   Wt 71.4 kg (157 lb 8 oz)   LMP  (LMP Unknown)   BMI 26.21 kg/m     GENERAL: alert, mild distress, cooperative,  frequent congested cough,  no labored breathing  SKIN: skin is clear, no rashes noted  HEAD: The head is normocephalic.   EYES: conjunctivae and cornea normal.without erythema or discharge  EARS: The canals are clear, tympanic membranes normal with no erythema/effusion.  NOSE: Clear, no discharge or congestion: THROAT: moist mucous membranes, no erythema.  NECK: The neck is supple, no masses or significant adenopathy noted  LUNGS: POSITIVE  for rhonchi bilateral and expiratory wheezes few bilateral  CV: regular rate and rhythm. S1 and S2 are normal. No murmurs.  ABDOMEN:  Abdomen soft, non-tender, non-distended, no masses. bowel sound normal

## 2025-07-20 NOTE — PLAN OF CARE
"Pt care shift [id number]    /88 (BP Location: Right arm, Patient Position: Semi-Dudley's)   Pulse 82   Temp 98.4  F (36.9  C) (Oral)   Resp 20   Ht 1.651 m (5' 5\")   Wt 71.2 kg (157 lb)   LMP  (LMP Unknown)   SpO2 97%   BMI 26.13 kg/m      Pt denies new or worsening symptoms during this time. Pt endorses previously discussed dyspnea, worse with exertion. On 2 L O2 via NC, sating well. SBA. Moves ind in bed. Pt AOx4. L PIV SL.   "

## 2025-07-21 LAB
ANION GAP SERPL CALCULATED.3IONS-SCNC: 12 MMOL/L (ref 7–15)
BACTERIA UR CULT: ABNORMAL
BUN SERPL-MCNC: 24.8 MG/DL (ref 8–23)
CALCIUM SERPL-MCNC: 9.3 MG/DL (ref 8.8–10.4)
CHLORIDE SERPL-SCNC: 105 MMOL/L (ref 98–107)
CREAT SERPL-MCNC: 1.13 MG/DL (ref 0.51–0.95)
EGFRCR SERPLBLD CKD-EPI 2021: 50 ML/MIN/1.73M2
ERYTHROCYTE [DISTWIDTH] IN BLOOD BY AUTOMATED COUNT: 13.9 % (ref 10–15)
GLUCOSE BLDC GLUCOMTR-MCNC: 127 MG/DL (ref 70–99)
GLUCOSE BLDC GLUCOMTR-MCNC: 150 MG/DL (ref 70–99)
GLUCOSE BLDC GLUCOMTR-MCNC: 249 MG/DL (ref 70–99)
GLUCOSE SERPL-MCNC: 187 MG/DL (ref 70–99)
HCO3 SERPL-SCNC: 20 MMOL/L (ref 22–29)
HCT VFR BLD AUTO: 31.3 % (ref 35–47)
HGB BLD-MCNC: 10.2 G/DL (ref 11.7–15.7)
MCH RBC QN AUTO: 29.7 PG (ref 26.5–33)
MCHC RBC AUTO-ENTMCNC: 32.6 G/DL (ref 31.5–36.5)
MCV RBC AUTO: 91 FL (ref 78–100)
PLATELET # BLD AUTO: 322 10E3/UL (ref 150–450)
POTASSIUM SERPL-SCNC: 4.9 MMOL/L (ref 3.4–5.3)
RBC # BLD AUTO: 3.44 10E6/UL (ref 3.8–5.2)
SODIUM SERPL-SCNC: 137 MMOL/L (ref 135–145)
WBC # BLD AUTO: 20.7 10E3/UL (ref 4–11)

## 2025-07-21 PROCEDURE — 80048 BASIC METABOLIC PNL TOTAL CA: CPT | Performed by: STUDENT IN AN ORGANIZED HEALTH CARE EDUCATION/TRAINING PROGRAM

## 2025-07-21 PROCEDURE — 250N000009 HC RX 250: Performed by: STUDENT IN AN ORGANIZED HEALTH CARE EDUCATION/TRAINING PROGRAM

## 2025-07-21 PROCEDURE — 250N000013 HC RX MED GY IP 250 OP 250 PS 637: Performed by: STUDENT IN AN ORGANIZED HEALTH CARE EDUCATION/TRAINING PROGRAM

## 2025-07-21 PROCEDURE — 250N000013 HC RX MED GY IP 250 OP 250 PS 637: Performed by: PHYSICIAN ASSISTANT

## 2025-07-21 PROCEDURE — 85027 COMPLETE CBC AUTOMATED: CPT | Performed by: STUDENT IN AN ORGANIZED HEALTH CARE EDUCATION/TRAINING PROGRAM

## 2025-07-21 PROCEDURE — 250N000011 HC RX IP 250 OP 636

## 2025-07-21 PROCEDURE — 999N000157 HC STATISTIC RCP TIME EA 10 MIN

## 2025-07-21 PROCEDURE — 36415 COLL VENOUS BLD VENIPUNCTURE: CPT | Performed by: STUDENT IN AN ORGANIZED HEALTH CARE EDUCATION/TRAINING PROGRAM

## 2025-07-21 PROCEDURE — 250N000012 HC RX MED GY IP 250 OP 636 PS 637: Performed by: STUDENT IN AN ORGANIZED HEALTH CARE EDUCATION/TRAINING PROGRAM

## 2025-07-21 PROCEDURE — 120N000002 HC R&B MED SURG/OB UMMC

## 2025-07-21 PROCEDURE — 99232 SBSQ HOSP IP/OBS MODERATE 35: CPT | Performed by: STUDENT IN AN ORGANIZED HEALTH CARE EDUCATION/TRAINING PROGRAM

## 2025-07-21 PROCEDURE — 250N000013 HC RX MED GY IP 250 OP 250 PS 637

## 2025-07-21 PROCEDURE — 999N000128 HC STATISTIC PERIPHERAL IV START W/O US GUIDANCE

## 2025-07-21 RX ORDER — PIPERACILLIN SODIUM, TAZOBACTAM SODIUM 3; .375 G/15ML; G/15ML
3.38 INJECTION, POWDER, LYOPHILIZED, FOR SOLUTION INTRAVENOUS EVERY 6 HOURS
Status: DISCONTINUED | OUTPATIENT
Start: 2025-07-21 | End: 2025-07-21

## 2025-07-21 RX ORDER — BENZONATATE 100 MG/1
100 CAPSULE ORAL 3 TIMES DAILY PRN
Status: DISCONTINUED | OUTPATIENT
Start: 2025-07-21 | End: 2025-07-22 | Stop reason: HOSPADM

## 2025-07-21 RX ORDER — ACETAMINOPHEN 325 MG/1
650 TABLET ORAL EVERY 6 HOURS PRN
Status: DISCONTINUED | OUTPATIENT
Start: 2025-07-21 | End: 2025-07-22 | Stop reason: HOSPADM

## 2025-07-21 RX ORDER — LEVOFLOXACIN 500 MG/1
500 TABLET, FILM COATED ORAL DAILY
Qty: 5 TABLET | Refills: 0 | Status: ACTIVE | OUTPATIENT
Start: 2025-07-21 | End: 2025-07-26

## 2025-07-21 RX ORDER — PREDNISONE 20 MG/1
40 TABLET ORAL EVERY 24 HOURS
Qty: 6 TABLET | Refills: 0 | Status: SHIPPED | OUTPATIENT
Start: 2025-07-21 | End: 2025-07-24

## 2025-07-21 RX ORDER — CEFTRIAXONE 1 G/1
1 INJECTION, POWDER, FOR SOLUTION INTRAMUSCULAR; INTRAVENOUS EVERY 24 HOURS
Status: DISCONTINUED | OUTPATIENT
Start: 2025-07-22 | End: 2025-07-22 | Stop reason: HOSPADM

## 2025-07-21 RX ADMIN — Medication 10 MG: at 22:22

## 2025-07-21 RX ADMIN — FAMOTIDINE 20 MG: 20 TABLET, FILM COATED ORAL at 08:02

## 2025-07-21 RX ADMIN — MONTELUKAST 10 MG: 10 TABLET, FILM COATED ORAL at 22:14

## 2025-07-21 RX ADMIN — BENZONATATE 100 MG: 100 CAPSULE ORAL at 04:02

## 2025-07-21 RX ADMIN — ACETAMINOPHEN 650 MG: 325 TABLET ORAL at 20:14

## 2025-07-21 RX ADMIN — Medication 1 TABLET: at 08:02

## 2025-07-21 RX ADMIN — FLUTICASONE PROPIONATE 1 SPRAY: 50 SPRAY, METERED NASAL at 08:05

## 2025-07-21 RX ADMIN — CEFTRIAXONE SODIUM 1 G: 1 INJECTION, POWDER, FOR SOLUTION INTRAMUSCULAR; INTRAVENOUS at 06:21

## 2025-07-21 RX ADMIN — BENZONATATE 100 MG: 100 CAPSULE ORAL at 14:13

## 2025-07-21 RX ADMIN — PREDNISONE 40 MG: 20 TABLET ORAL at 14:10

## 2025-07-21 RX ADMIN — SIMVASTATIN 10 MG: 10 TABLET, FILM COATED ORAL at 22:14

## 2025-07-21 RX ADMIN — IPRATROPIUM BROMIDE AND ALBUTEROL SULFATE 3 ML: .5; 3 SOLUTION RESPIRATORY (INHALATION) at 22:22

## 2025-07-21 RX ADMIN — CITALOPRAM HYDROBROMIDE 10 MG: 10 TABLET ORAL at 08:02

## 2025-07-21 RX ADMIN — CETIRIZINE HYDROCHLORIDE 10 MG: 10 TABLET, FILM COATED ORAL at 08:02

## 2025-07-21 RX ADMIN — TRIAMCINOLONE ACETONIDE: 1 CREAM TOPICAL at 19:46

## 2025-07-21 RX ADMIN — IPRATROPIUM BROMIDE AND ALBUTEROL SULFATE 3 ML: .5; 3 SOLUTION RESPIRATORY (INHALATION) at 17:01

## 2025-07-21 RX ADMIN — TRIAMCINOLONE ACETONIDE: 1 CREAM TOPICAL at 08:04

## 2025-07-21 RX ADMIN — FLUTICASONE FUROATE 1 PUFF: 100 POWDER RESPIRATORY (INHALATION) at 08:05

## 2025-07-21 RX ADMIN — FLUTICASONE PROPIONATE 1 SPRAY: 50 SPRAY, METERED NASAL at 19:46

## 2025-07-21 RX ADMIN — LEVOTHYROXINE SODIUM 50 MCG: 0.05 TABLET ORAL at 06:21

## 2025-07-21 RX ADMIN — UMECLIDINIUM BROMIDE AND VILANTEROL TRIFENATATE 1 PUFF: 62.5; 25 POWDER RESPIRATORY (INHALATION) at 08:04

## 2025-07-21 RX ADMIN — ASPIRIN 81 MG: 81 TABLET ORAL at 08:02

## 2025-07-21 RX ADMIN — ENOXAPARIN SODIUM 40 MG: 40 INJECTION SUBCUTANEOUS at 08:02

## 2025-07-21 ASSESSMENT — ACTIVITIES OF DAILY LIVING (ADL)
ADLS_ACUITY_SCORE: 36
ADLS_ACUITY_SCORE: 30
ADLS_ACUITY_SCORE: 36
ADLS_ACUITY_SCORE: 32
ADLS_ACUITY_SCORE: 36
ADLS_ACUITY_SCORE: 32
ADLS_ACUITY_SCORE: 32
ADLS_ACUITY_SCORE: 50
ADLS_ACUITY_SCORE: 30
ADLS_ACUITY_SCORE: 36
ADLS_ACUITY_SCORE: 32
ADLS_ACUITY_SCORE: 32
ADLS_ACUITY_SCORE: 50
ADLS_ACUITY_SCORE: 50
ADLS_ACUITY_SCORE: 36
ADLS_ACUITY_SCORE: 30

## 2025-07-21 NOTE — PLAN OF CARE
03:00- 07:00  Goal Outcome Evaluation:      Plan of Care Reviewed With: patient    Overall Patient Progress: no change      Vitals: AVSS on RA, 2L O2 via NC when sleeping  Pain: Denies  Neuro:WDL  CV:WDL  Resp:.WDL except, cough, Denies SOB, on continuous pulse ox.   GI:WDL- LBM   :WDL, voiding spontaneously w/ adequate UO.   Skin: WDL  Diet: Low carb diet  Lines: PIVx1  Activity Assistance: independent  Safety/Falls Risk: Level of Risk: Low Risk  Procedures/Imagin/20- CT Chest      With PRN tessalon for cough. Continue w/ POC.

## 2025-07-21 NOTE — PROGRESS NOTES
Brief Medicine Cross Cover Note    Paged by nursing about patient not having any blood sugar checks, as patient normally checks at home. Wondering if family should bring in Trulicity. Defer to day team to decide on Trulicity as it is not formulary. Ordered blood glucose checks BID, and if elevated will check TIDAC and nightly and add sliding scale insulin.     Reva Rodriguez [facility]

## 2025-07-21 NOTE — CONSULTS
"Consult received for Vascular Access Team.  See LDA for details. For additional needs place \"Consult for Inpatient Vascular Access Care\"  [id number] order in EPIC.  "

## 2025-07-21 NOTE — PROGRESS NOTES
[facility]    Medicine Progress Note - Hospitalist Service, GOLD TEAM 8    Date of Admission:  7/19/2025    Assessment & Plan   Rita Mancini is a 75 year old female with PMH of COPD, lung adenocarcinoma (R U lobe) s/p Chemo radiation, treated L Lung abscess/ empyema, and chronic sinusitis presents with productive cough and dyspnea for a month which exaggerated the last week. She has had sinusitis for almost two months and was treated with amoxicillin/ clavulanate and doxycycline with no  improvement. Patient was admitted for RUL pna and COPD exacerbation.     Today:  - Pt clinically doing well, not on oxygen, no worsening symptoms, leukocytosis was trending down however today is up to 20K which could be secondary to oral steroids, discussed discharge options with patient, and close OP follow up with PCP, she preferred to stay inpatient until her leukocytosis improves. Will not change CTX today, final sputum cultures pending. No new fever.   - Pt with no urinary symptoms, urine cx positive       #PNA- RUP on CT, sputum cx pending  #Bronchitis/bronchiolitis  #COPD exacerbation  #Non small cell Lung cancer (2008)  #Hx Left lung abscess  #Mediastinal and hilar lymphadenopathy - mild, slightly increased   The pt presented with productive cough and dyspnea. She is afebrile, procal is negative but the WBC are increased at 19 and the CTPE suggests bronchitis/bronchiolitis and non specific lesions consistent with infection in the RUL. The Viral panel is negative. Ddx includes COPD exacerbation and Cancer relapse. She is s/p chemo and radiation has been in remission for more than 10 years and although there are LN and a nodule in the RLL, the findings are mostly unchanged form last year.   - Sputum Cultures - pending  - All infectious work up so far stayed negative  - Continue PTA COPD medication:              Albuterol  Budesonide  Fluticasone  Montelukast  Umeclidinium  "Vilanterol  - C/w Ceftriaxone 1g Daily  - Oncology consulted re: LAD, no concern for lung cancer progression, pt has an upcoming appointment with OP oncologist, no further recs.      CKD  At her baseline Cr     Hyperlipidemia  - simvastatin      Mood disorder  - Citalopram     DM  She is on Trulicity due on Sunday      GERD  - Famotidine     Hypothyroidism  - Levothyroxine 50mcg M & F, 75mcg the rest of the week          Diet: Low Consistent Carb (45 g CHO per Meal) Diet  Diet    DVT Prophylaxis: Pneumatic Compression Devices  Rodríguez Catheter: Not present  Lines: None     Cardiac Monitoring: None  Code Status: Full Code      Clinically Significant Risk Factors         # Hyponatremia: Lowest Na = 134 mmol/L in last 2 days, will monitor as appropriate   # Hypocalcemia: Lowest Ca = 8.5 mg/dL in last 2 days, will monitor and replace as appropriate      # Coagulation Defect: INR = 1.21 (Ref range: 0.85 - 1.15) and/or PTT = N/A, will monitor for bleeding               # Overweight: Estimated body mass index is 26.19 kg/m  as calculated from the following:    Height as of this encounter: 1.651 m (5' 5\").    Weight as of this encounter: 71.4 kg (157 lb 6.4 oz)., PRESENT ON ADMISSION            Social Drivers of Health    Tobacco Use: Medium Risk (6/18/2025)    Patient History     Smoking Tobacco Use: Former     Smokeless Tobacco Use: Never     Passive Exposure: Never          Disposition Plan     Medically Ready for Discharge: Anticipated Tomorrow             Christiana Quiros MD  Hospitalist Service, GOLD TEAM 8  [facility]  Securely message with [employer] (more info)  Text page via [facility] Paging/Directory   See signed in provider for up to date coverage information  ______________________________________________________________________    Interval History   No acute event overnight, denies fever, worsening cough, SOB, CP, OOB and eating ok. She prefers to stay inpatient " until her leukocytosis is improved and she does not need to come to ED again.     Physical Exam   Vital Signs: Temp: 97.7  F (36.5  C) Temp src: Oral BP: 103/46 Pulse: 77   Resp: 16 SpO2: 98 % O2 Device: None (Room air) Oxygen Delivery: 2 LPM  Weight: 157 lbs 6.4 oz    Gen: A&Ox4, lying comfortably on bed, in no distress, doing well- on RA  Lung: good air conduction, no wheezing  Heart: no rub/murmur/gallop  Abd: soft, non-tender, non-distended  Ext: No edema in upper/lower extremities      Medical Decision Making       45 MINUTES SPENT BY ME on the date of service doing chart review, history, exam, documentation & further activities per the note.      Data     I have personally reviewed the following data over the past 24 hrs:    20.7 (H)  \   10.2 (L)   / 322     137 105 24.8 (H) /  187 (H)   4.9 20 (L) 1.13 (H) \       Imaging results reviewed over the past 24 hrs:   No results found for this or any previous visit (from the past 24 hours).

## 2025-07-21 NOTE — Clinical Note
Patient has been educated on potential risks of choosing to leave the unit and that the responsibility for patient well-being will belong to the patient. Pt has been informed that admission to hospital is due to need for medical treatment. Education given to the patient on some of the potential risks included but are not limited to:      - lack of access to nursing intervention      - possible missed appointments with MD, therapies, tests      - possible missed medications, antibiotics, management of IV's    Patient Response:***    Patient notified staff prior to leaving unit: ***  Coban wrap placed over IV prior to pt leaving unit ***     TeleHospitalist Neurology

## 2025-07-21 NOTE — PLAN OF CARE
Goal Outcome Evaluation:      Plan of Care Reviewed With: patient    Overall Patient Progress: improving    VSS, on RA. Requires O2 overnight. Patient has some expiratory wheezes and frequent productive cough but denies SOB. Gave tessalon with relief. Voiding spontaneously. Skin intact. Denies pain. Up ad suzy. Regular diet. BG checks 2x day. WBC still elevated and rising staying overnight to monitor.

## 2025-07-21 NOTE — PROGRESS NOTES
Nursing Focus: Admission    D: Patient admitted/transferred from ED via wheelchair for further work up and management.      I: Upon arrival to the unit patient was oriented to room, unit, and call light. Patient s height, weight, and vital signs were obtained. Allergies reviewed and allergy band applied. MD notified of patient s arrival on the unit. Adult AVS completed  by bedside RN. Head to toe assessment completed. Education assessment completed. Care plan initiated.     A: Vital signs stable upon admission. Patient denies pain. Two RN skin assessment completed Yes. Second RN was Paul MAHAN. Significant Skin Findings include bruise in bilateral hands from previous lab draws. [facility] Nurse Consult Ordered No.    P: Continue to monitor patient s VS and intervene as needed. Continue with plan of care. Notify MD with any concerns or changes in patient status.

## 2025-07-22 VITALS
HEART RATE: 66 BPM | SYSTOLIC BLOOD PRESSURE: 147 MMHG | OXYGEN SATURATION: 100 % | RESPIRATION RATE: 16 BRPM | HEIGHT: 65 IN | DIASTOLIC BLOOD PRESSURE: 73 MMHG | TEMPERATURE: 97.5 F | BODY MASS INDEX: 26.09 KG/M2 | WEIGHT: 156.6 LBS

## 2025-07-22 LAB
ANION GAP SERPL CALCULATED.3IONS-SCNC: 11 MMOL/L (ref 7–15)
BACTERIA SPT CULT: NORMAL
BUN SERPL-MCNC: 27.9 MG/DL (ref 8–23)
CALCIUM SERPL-MCNC: 9.8 MG/DL (ref 8.8–10.4)
CHLORIDE SERPL-SCNC: 105 MMOL/L (ref 98–107)
CREAT SERPL-MCNC: 1.08 MG/DL (ref 0.51–0.95)
EGFRCR SERPLBLD CKD-EPI 2021: 53 ML/MIN/1.73M2
ERYTHROCYTE [DISTWIDTH] IN BLOOD BY AUTOMATED COUNT: 14.1 % (ref 10–15)
GLUCOSE BLDC GLUCOMTR-MCNC: 132 MG/DL (ref 70–99)
GLUCOSE SERPL-MCNC: 167 MG/DL (ref 70–99)
GRAM STAIN RESULT: NORMAL
HCO3 SERPL-SCNC: 22 MMOL/L (ref 22–29)
HCT VFR BLD AUTO: 31 % (ref 35–47)
HGB BLD-MCNC: 10.2 G/DL (ref 11.7–15.7)
MCH RBC QN AUTO: 29.6 PG (ref 26.5–33)
MCHC RBC AUTO-ENTMCNC: 32.9 G/DL (ref 31.5–36.5)
MCV RBC AUTO: 90 FL (ref 78–100)
PLATELET # BLD AUTO: 348 10E3/UL (ref 150–450)
POTASSIUM SERPL-SCNC: 4.8 MMOL/L (ref 3.4–5.3)
RBC # BLD AUTO: 3.45 10E6/UL (ref 3.8–5.2)
SODIUM SERPL-SCNC: 138 MMOL/L (ref 135–145)
WBC # BLD AUTO: 20.8 10E3/UL (ref 4–11)

## 2025-07-22 PROCEDURE — 80048 BASIC METABOLIC PNL TOTAL CA: CPT | Performed by: STUDENT IN AN ORGANIZED HEALTH CARE EDUCATION/TRAINING PROGRAM

## 2025-07-22 PROCEDURE — 36415 COLL VENOUS BLD VENIPUNCTURE: CPT | Performed by: STUDENT IN AN ORGANIZED HEALTH CARE EDUCATION/TRAINING PROGRAM

## 2025-07-22 PROCEDURE — 99239 HOSP IP/OBS DSCHRG MGMT >30: CPT | Performed by: STUDENT IN AN ORGANIZED HEALTH CARE EDUCATION/TRAINING PROGRAM

## 2025-07-22 PROCEDURE — 250N000011 HC RX IP 250 OP 636: Performed by: STUDENT IN AN ORGANIZED HEALTH CARE EDUCATION/TRAINING PROGRAM

## 2025-07-22 PROCEDURE — 250N000013 HC RX MED GY IP 250 OP 250 PS 637: Performed by: PHYSICIAN ASSISTANT

## 2025-07-22 PROCEDURE — 250N000013 HC RX MED GY IP 250 OP 250 PS 637: Performed by: STUDENT IN AN ORGANIZED HEALTH CARE EDUCATION/TRAINING PROGRAM

## 2025-07-22 PROCEDURE — 250N000012 HC RX MED GY IP 250 OP 636 PS 637: Performed by: STUDENT IN AN ORGANIZED HEALTH CARE EDUCATION/TRAINING PROGRAM

## 2025-07-22 PROCEDURE — 85014 HEMATOCRIT: CPT | Performed by: STUDENT IN AN ORGANIZED HEALTH CARE EDUCATION/TRAINING PROGRAM

## 2025-07-22 PROCEDURE — 250N000013 HC RX MED GY IP 250 OP 250 PS 637

## 2025-07-22 RX ORDER — PREDNISONE 20 MG/1
40 TABLET ORAL EVERY 24 HOURS
Qty: 4 TABLET | Refills: 0 | Status: SHIPPED | OUTPATIENT
Start: 2025-07-23 | End: 2025-08-07

## 2025-07-22 RX ORDER — ALBUTEROL SULFATE 90 UG/1
1-2 INHALANT RESPIRATORY (INHALATION) EVERY 4 HOURS PRN
Qty: 18 G | Refills: 0 | Status: SHIPPED | OUTPATIENT
Start: 2025-07-22

## 2025-07-22 RX ORDER — PREDNISONE 20 MG/1
40 TABLET ORAL EVERY 24 HOURS
Status: DISCONTINUED | OUTPATIENT
Start: 2025-07-22 | End: 2025-07-22 | Stop reason: HOSPADM

## 2025-07-22 RX ADMIN — LEVOTHYROXINE SODIUM 75 MCG: 75 TABLET ORAL at 06:06

## 2025-07-22 RX ADMIN — FLUTICASONE FUROATE 1 PUFF: 100 POWDER RESPIRATORY (INHALATION) at 08:23

## 2025-07-22 RX ADMIN — CEFTRIAXONE SODIUM 1 G: 1 INJECTION, POWDER, FOR SOLUTION INTRAMUSCULAR; INTRAVENOUS at 08:23

## 2025-07-22 RX ADMIN — ASPIRIN 81 MG: 81 TABLET ORAL at 08:23

## 2025-07-22 RX ADMIN — FLUTICASONE PROPIONATE 1 SPRAY: 50 SPRAY, METERED NASAL at 08:23

## 2025-07-22 RX ADMIN — CITALOPRAM HYDROBROMIDE 10 MG: 10 TABLET ORAL at 08:24

## 2025-07-22 RX ADMIN — ACETAMINOPHEN 650 MG: 325 TABLET ORAL at 10:18

## 2025-07-22 RX ADMIN — Medication 1 TABLET: at 08:24

## 2025-07-22 RX ADMIN — PREDNISONE 40 MG: 20 TABLET ORAL at 10:15

## 2025-07-22 RX ADMIN — CETIRIZINE HYDROCHLORIDE 10 MG: 10 TABLET, FILM COATED ORAL at 08:24

## 2025-07-22 RX ADMIN — UMECLIDINIUM BROMIDE AND VILANTEROL TRIFENATATE 1 PUFF: 62.5; 25 POWDER RESPIRATORY (INHALATION) at 08:23

## 2025-07-22 RX ADMIN — FAMOTIDINE 20 MG: 20 TABLET, FILM COATED ORAL at 08:24

## 2025-07-22 ASSESSMENT — ACTIVITIES OF DAILY LIVING (ADL)
ADLS_ACUITY_SCORE: 36

## 2025-07-22 NOTE — DISCHARGE SUMMARY
"[facility]  Hospitalist Discharge Summary      Date of Admission:  7/19/2025  Date of Discharge:  7/22/2025  Discharging Provider: Magen Ram MD  Discharge Service: Hospitalist Service, GOLD TEAM 8    Discharge Diagnoses   COPD Exacerbation   Community acquired pneumonia     Clinically Significant Risk Factors     # Overweight: Estimated body mass index is 26.06 kg/m  as calculated from the following:    Height as of this encounter: 1.651 m (5' 5\").    Weight as of this encounter: 71 kg (156 lb 9.6 oz).       Follow-ups Needed After Discharge   Follow-up Appointments       ADULT [facility] Specialty Follow-up and recommended labs and tests      Follow up with your oncologist     Appointments on [place] and/or [facility] (with [facility] or [facility] provider or service). Call [phone] if you haven't heard regarding these appointments within 7 days of discharge.        Hospital Follow-up with Existing Primary Care Provider (PCP)      Follow up with your PCP as scheduled before.    Schedule Primary Care visit within: 14 Days   Recommended labs and Imaging (to be ordered by Primary Care Provider): recheck WBC to ensure it normalizes               Unresulted Labs Ordered in the Past 30 Days of this Admission       Date and Time Order Name Status Description    7/19/2025 11:11 PM Blood Culture Peripheral blood (BC) Arm, Right Preliminary     7/19/2025 11:07 PM Blood Culture Peripheral blood (BC) Hand, Left Preliminary         These results will be followed up by hospitalist     Discharge Disposition   Discharged to home  Condition at discharge: Stable    Hospital Course   Rita Mancini is a 75 year old female with PMH of COPD, lung adenocarcinoma (R U lobe) s/p Chemo radiation, treated L Lung abscess/ empyema, and chronic sinusitis presents with productive cough and dyspnea for a month which exaggerated the last week. She has had sinusitis for almost two months and was " treated with amoxicillin/ clavulanate and doxycycline with no improvement. Patient was admitted for RUL pna and COPD exacerbation.     #PNA- RUP on CT, sputum cx pending  #Bronchitis/bronchiolitis  #COPD exacerbation  She presented with productive cough and dyspnea.She is afebrile, procal is negative but the WBC are increased at 19 and the CT suggests bronchitis/bronchiolitis and non specific lesions consistent with infection in the right upper lobe. The Viral panel is negative.  She was empirically treated for community-acquired pneumonia with ceftriaxone which was transitioned to Augmentin on discharge.  She was also treated for a COPD exacerbation with antibiotics and prednisone for a 5-day course.  Recommend continuing her prior to admission inhalers on discharge.  Her albuterol was also refilled.    #Leukocytosis  She presented with a WBC 19 which was 20 at time of discharge.  It was thought that this was likely secondary to systemic steroids.  No evidence of worsening infection during her hospitalization.  Recommend follow-up with PCP to get a recheck of her white blood cell count in 1 month.     #Non small cell Lung cancer (2008)  #Hx Left lung abscess  #Mediastinal and hilar lymphadenopathy - mild, slightly increased   Oncology saw patient while she was admitted and did not think that her current symptoms were secondary to her underlying lung cancer.  She has follow-up with her outpatient oncologist in the next several weeks and they can discuss whether repeat chest CT in the next 1 to 2 months should be completed per inpatient oncology recommendations.      Consultations This Hospital Stay   PHARMACY TO DOSE [facility]  ONCOLOGY ADULT IP CONSULT  CONSULT FOR INPATIENT VASCULAR ACCESS CARE    Code Status   Full Code    Time Spent on this Encounter   I, Magen Ram MD, personally saw the patient today and spent greater than 30 minutes discharging this patient.       Magen Ram MD  [facility]  5A ONCOLOGY  [address] [address]  Phone: [phone]  ______________________________________________________________________    Physical Exam   Vital Signs: Temp: 97.5  F (36.4  C) Temp src: Oral BP: (!) 147/73 Pulse: 66   Resp: 16 SpO2: 100 % O2 Device: Nasal cannula Oxygen Delivery: 1.5 LPM  Weight: 156 lbs 9.6 oz  Physical Exam  Vitals and nursing note reviewed.   Constitutional:       Appearance: Normal appearance. She is not ill-appearing or toxic-appearing.   HENT:      Head: Normocephalic and atraumatic.      Right Ear: External ear normal.      Left Ear: External ear normal.      Nose: Nose normal.      Mouth/Throat:      Mouth: Mucous membranes are moist.      Pharynx: Oropharynx is clear.   Eyes:      Extraocular Movements: Extraocular movements intact.      Conjunctiva/sclera: Conjunctivae normal.   Cardiovascular:      Rate and Rhythm: Normal rate and regular rhythm.      Pulses: Normal pulses.      Heart sounds: Normal heart sounds.   Pulmonary:      Effort: Pulmonary effort is normal.      Breath sounds: Normal breath sounds.   Abdominal:      General: Abdomen is flat. Bowel sounds are normal. There is no distension.      Palpations: Abdomen is soft.      Tenderness: There is no abdominal tenderness.   Musculoskeletal:         General: No swelling.   Skin:     General: Skin is warm and dry.      Capillary Refill: Capillary refill takes less than 2 seconds.      Findings: No rash.   Neurological:      General: No focal deficit present.      Mental Status: She is alert and oriented to person, place, and time.      Cranial Nerves: No cranial nerve deficit.   Psychiatric:         Mood and Affect: Mood normal.         Behavior: Behavior normal.              Primary Care Physician   Michi Murillo    Discharge Orders      Reason for your hospital stay    You were hospitalized due to COPD exacerbation     Activity    Your activity upon discharge: activity as tolerated     ADULT [facility] Specialty  Follow-up and recommended labs and tests    Follow up with your oncologist     Appointments on [place] and/or [facility] (with [facility] or [facility] provider or service). Call [phone] if you haven't heard regarding these appointments within 7 days of discharge.     Diet    Follow this diet upon discharge: Current Diet:Orders Placed This Encounter      Low Consistent Carb (45 g CHO per Meal) Diet     Hospital Follow-up with Existing Primary Care Provider (PCP)    Follow up with your PCP as scheduled before.       Significant Results and Procedures   Most Recent 3 CBC's:  Recent Labs   Lab Test 07/22/25 0727 07/21/25  1143 07/20/25  0627   WBC 20.8* 20.7* 15.0*   HGB 10.2* 10.2* 10.1*   MCV 90 91 93    322 298     Most Recent 3 BMP's:  Recent Labs   Lab Test 07/22/25  0827 07/22/25  0727 07/21/25  1909 07/21/25  1143 07/20/25  2209 07/20/25 0627   NA  --  138  --  137  --  136   POTASSIUM  --  4.8  --  4.9  --  4.6   CHLORIDE  --  105  --  105  --  104   CO2  --  22  --  20*  --  20*   BUN  --  27.9*  --  24.8*  --  21.9   CR  --  1.08*  --  1.13*  --  1.42*   ANIONGAP  --  11  --  12  --  12   EMELY  --  9.8  --  9.3  --  8.5*   * 167* 249* 187*   < > 282*    < > = values in this interval not displayed.   ,   Results for orders placed or performed during the hospital encounter of 07/19/25   CT Chest Pulmonary Embolism w Contrast    Narrative    EXAM: CT CHEST PULMONARY EMBOLISM W CONTRAST  LOCATION: [facility]  DATE: 07/20/2025    INDICATION: Shortness of breath, hypoxia, + dimer. ? PE, pneumonia, or other abnormality.  COMPARISON: Chest CT on 07/01/2024.  TECHNIQUE: CT chest pulmonary angiogram during arterial phase injection of IV contrast. Multiplanar reformats and MIP reconstructions were performed. Dose reduction techniques were used.   CONTRAST: Iopamidol (Isovue 370) solution 57 mL.    FINDINGS:  ANGIOGRAM CHEST: No evidence of pulmonary embolism.  No evidence of thoracic aortic aneurysm or dissection. No evidence of right heart strain.    LUNGS AND PLEURA: No pleural effusion or pneumothorax. Patchy consolidative opacity in the right upper lobe, likely infectious. Posterior bibasilar pulmonary opacities, right more than left, could be atelectatic or infectious. Multiple areas of   bronchial/bronchiolar wall thickening and scattered areas of bronchiolar mucoid impaction, can be seen with bronchitis/bronchiolitis. Area of architectural distortion in the medial aspect of the right lung apex, likely represents post-treatment changes.   1.1 cm right lower lobe nodule (series 6 image 185), not significantly changed as compared to 07/01/2024.    MEDIASTINUM/AXILLAE: No cardiomegaly or significant pericardial effusion. Multiple enlarged mediastinal and hilar lymph nodes; for example, there is a 1.9 cm short axis subcarinal lymph node (series 4 image 134), increased in size as compared to the   07/01/2024 exam when it measured 1.1 cm, not significantly changed as compared to the 07/01/2024 exam.    CORONARY ARTERY CALCIFICATION: None.    UPPER ABDOMEN: No pleural effusion or pneumothorax. 4.5 cm cyst at the upper pole of the right kidney and 1.3 cm cyst at the upper pole of the left kidney.    MUSCULOSKELETAL: No suspicious osseous lesion.      Impression    IMPRESSION:  1.  No evidence of pulmonary embolism.  2.  Patchy consolidative pulmonary opacity in the right upper lobe, likely infectious.  3.  Multiple areas of bronchial/bronchiolar wall thickening and scattered areas of bronchiolar mucoid impaction, can be seen with bronchitis/bronchiolitis.  4.  A 1.1 cm right lower lobe nodule, not significantly changed as compared to the 07/01/2024 exam.  5.  Multiple enlarged mediastinal and hilar lymph nodes, slightly increased in size as compared to the 07/01/2024 exam, indeterminate, could be reactive or metastatic.       Discharge Medications      Review of your  medicines        START taking        Dose / Directions   amoxicillin-clavulanate 875-125 MG tablet  Commonly known as: AUGMENTIN  Used for: COPD exacerbation (H)      Dose: 1 tablet  Start taking on: July 23, 2025  Take 1 tablet by mouth 2 times daily.  Quantity: 4 tablet  Refills: 0     levofloxacin 500 MG tablet  Commonly known as: LEVAQUIN  Used for: COPD exacerbation (H)      Dose: 500 mg  Take 1 tablet (500 mg) by mouth daily for 5 days.  Quantity: 5 tablet  Refills: 0     * predniSONE 20 MG tablet  Commonly known as: DELTASONE  Used for: COPD exacerbation (H)      Dose: 40 mg  Take 2 tablets (40 mg) by mouth every 24 hours for 3 days.  Quantity: 6 tablet  Refills: 0     * predniSONE 20 MG tablet  Commonly known as: DELTASONE  Used for: COPD exacerbation (H)      Dose: 40 mg  Start taking on: July 23, 2025  Take 2 tablets (40 mg) by mouth every 24 hours.  Quantity: 4 tablet  Refills: 0           * This list has 2 medication(s) that are the same as other medications prescribed for you. Read the directions carefully, and ask your doctor or other care provider to review them with you.                CONTINUE these medicines which have NOT CHANGED        Dose / Directions   albuterol 108 (90 Base) MCG/ACT inhaler  Commonly known as: PROAIR HFA/PROVENTIL HFA/VENTOLIN HFA  Used for: COPD exacerbation (H)      Dose: 1-2 puff  Inhale 1-2 puffs into the lungs every 4 hours as needed for shortness of breath or cough.  Quantity: 18 g  Refills: 0     Anoro Ellipta 62.5-25 MCG/ACT oral inhaler  Used for: Chronic bronchitis, unspecified chronic bronchitis type (H)  Generic drug: umeclidinium-vilanterol      Dose: 1 puff  Inhale 1 puff into the lungs daily.  Quantity: 180 each  Refills: 3     aspirin 81 MG EC tablet      Dose: 81 mg  Take 81 mg by mouth daily  Refills: 0     blood glucose monitoring lancets      USE TO TEST BLOOD SUGAR 1 TIMES DAILY OR AS DIRECTED.  Refills: 0     blood glucose test strip  Commonly known as:  NO BRAND SPECIFIED  Used for: Diabetes 1.5, managed as type 2 (H)      Use to test blood sugar one time daily or as directed.  Quantity: 100 strip  Refills: 3     calcium carbonate 500 MG chewable tablet  Commonly known as: TUMS      Dose: 1 chew tab  Take 1 chew tab by mouth daily  Refills: 0     cetirizine 10 MG Chew  Commonly known as: zyrTEC      Dose: 10 mg  Take 10 mg by mouth daily  Refills: 0     citalopram 10 MG tablet  Commonly known as: celeXA  Used for: CARLOS ALBERTO (generalized anxiety disorder)      Dose: 10 mg  Take 1 tablet (10 mg) by mouth daily.  Refills: 0     famotidine 20 MG tablet  Commonly known as: PEPCID  Used for: Gastroesophageal reflux disease without esophagitis      Dose: 20 mg  Take 1 tablet (20 mg) by mouth daily.  Quantity: 90 tablet  Refills: 2     fluticasone 100 MCG/ACT inhaler  Commonly known as: ARNUITY ELLIPTA  Used for: Chronic obstructive pulmonary disease, unspecified COPD type (H)      Dose: 1 puff  Inhale 1 puff into the lungs daily.  Quantity: 1 each  Refills: 4     fluticasone 50 MCG/ACT nasal spray  Commonly known as: FLONASE  Used for: Seasonal allergic rhinitis, unspecified trigger      Dose: 1 spray  Spray 1 spray into both nostrils 2 times daily.  Quantity: 16 g  Refills: 3     * levothyroxine 50 MCG tablet  Commonly known as: SYNTHROID/LEVOTHROID  Used for: Acquired hypothyroidism      TAKE ON EMPTY STOMACH EVERY MONDAY AND FRIDAY (SEE OTHER DOSE FOR REMAINING DAYS OF WEEK)  Quantity: 24 tablet  Refills: 3     * levothyroxine 75 MCG tablet  Commonly known as: SYNTHROID/LEVOTHROID  Used for: Acquired hypothyroidism      Take 1 tablet on Tue, Wed, Thur, Sat, Sun.  Quantity: 60 tablet  Refills: 2     magnesium 500 MG Tabs      Refills: 0     Melatonin 10 MG Caps      Refills: 0     montelukast 10 MG tablet  Commonly known as: SINGULAIR  Used for: Chronic obstructive pulmonary disease, unspecified COPD type (H)      Dose: 10 mg  Take 1 tablet (10 mg) by mouth at  bedtime.  Quantity: 90 tablet  Refills: 2     multivitamin w/minerals tablet      Dose: 1 tablet  Take 1 tablet by mouth daily  Refills: 0     simvastatin 10 MG tablet  Commonly known as: ZOCOR  Used for: Hyperlipidemia LDL goal <100      Dose: 10 mg  Take 1 tablet (10 mg) by mouth at bedtime.  Quantity: 90 tablet  Refills: 3     sodium chloride 0.65 % nasal spray  Commonly known as: OCEAN      Dose: 1 spray  Spray 1 spray into both nostrils daily as needed for congestion.  Refills: 0     triamcinolone 0.1 % external cream  Commonly known as: KENALOG      Apply topically 2 times daily  Refills: 0     Trulicity 4.5 MG/0.5ML Soaj  Used for: Type 2 diabetes, HbA1C goal < 8% (H)  Generic drug: Dulaglutide      Dose: 4.5 mg  Inject 4.5 mg subcutaneously once a week.  Quantity: 2 mL  Refills: 3     zolpidem 5 MG tablet  Commonly known as: AMBIEN  Used for: Sleep disorder      Dose: 5 mg  Take 1 tablet by mouth nightly as needed for sleep  Quantity: 30 tablet  Refills: 5           * This list has 2 medication(s) that are the same as other medications prescribed for you. Read the directions carefully, and ask your doctor or other care provider to review them with you.                STOP taking      doxycycline hyclate 100 MG capsule  Commonly known as: VIBRAMYCIN                  Where to get your medicines        These medications were sent to [facility] [place], [place] - [address] [address]      Phone: [phone]   albuterol 108 (90 Base) MCG/ACT inhaler  amoxicillin-clavulanate 875-125 MG tablet  levofloxacin 500 MG tablet  predniSONE 20 MG tablet  predniSONE 20 MG tablet       Allergies   Allergies   Allergen Reactions    Seasonal Allergies

## 2025-07-22 NOTE — PLAN OF CARE
Goal Outcome Evaluation:  A&Ox4. RA. 2L NC while sleeping. Lung sounds wheezy and course - Dueneb given x 1. Reported SOB, improved after Dueneb. Denied chest pain and tightness. UAL. Low carb diet. BG 2x/day. Denied pain and nausea. Diarrhea x 4 - pt stated she told provider about diarrhea. Voiding spon. Piv X 1 - SL. Plan to discharge tomorrow.     Plan of Care Reviewed With: patient    Overall Patient Progress: no changeOverall Patient Progress: no change

## 2025-07-22 NOTE — PLAN OF CARE
Goal Outcome Evaluation:    Nursing Focus: Discharge    D: Patient discharged to home at 1300. Patient accompanied by daughter.    I: Discharge prescriptions sent to discharge pharmacy to be filled. All discharge medications and instructions reviewed with Rita by bedside RN (discharge before noon). Patient instructed to call clinic triage nurse if Rita experiences a fever >100.4, uncontrolled nausea, vomiting, diarrhea, or pain; or experiences any signs or symptoms of bleeding. Other phone numbers to call with questions or concerns after discharge reviewed. PIV removed. Education completed.    A: Rita verbalized understanding of discharge medications and instructions. Patient will  medications at discharge pharmacy.      P: Patient to follow-up in clinic on 7/30 with PCP.

## 2025-07-22 NOTE — DISCHARGE INSTRUCTIONS
Please return if you have any of the following:   - worsening shortness of breath  - new fever after finishing antibiotics   - any other new or worsening symptoms

## 2025-07-22 NOTE — PLAN OF CARE
Goal Outcome Evaluation:      Plan of Care Reviewed With: patient    Overall Patient Progress: improving    Reason for Admission: admitted on 7/19/25 for RUL pna and COPD exacerbation of productive cough and dyspnea for about a month. PMH of COPD, lung adenocarcinoma (R U lobe) s/p Chemo radiation, treated L Lung abscess/ empyema, and chronic sinusitis.     Status: stable    RN assumed cares at 1900    Vitals: VSS on RA while awake. 1.5 L NC while sleeping.  Pain: C/O new 6/10 low back pain this evening. Managed with PRN tylenol.  Neuro: WDL, A/O X 4. Glasses at bedside.   Cardiac: WDL, denies chest pain.  Respiratory: RA X, while sleeping 1.5L. C/O SOB after coughing episode. Cough intermittent and productive, DUONEB PRN given before bed.  : Independent to bathroom.  GI: LBM: 7/21 X3, diarrhea.  Diet: Regular, snack provided this evening.  IV/Drains: R PIV, SL.  Activity: Independent.  Skin: WDL, scattered bruising on bilateral lower arms/wrists.  Labs: Reviewed.    Plan of Care:      Pt C/O new pain in her back this evening, pt states that they believe it is from being too sedentary. Pt ambulated unit X2 this evening and was encouraged to reposition often. Provider was paged about the pain and writer requested PRN tylenol. Continue with the POC.

## 2025-07-23 ENCOUNTER — PATIENT OUTREACH (OUTPATIENT)
Dept: CARE COORDINATION | Facility: CLINIC | Age: 75
End: 2025-07-23
Payer: MEDICARE

## 2025-07-23 NOTE — PROGRESS NOTES
Clinic Care Coordination Contact  Transitions of Care Outreach    Chief Complaint   Patient presents with    Clinic Care Coordination - Post Hospital       Most Recent Admission Date: 7/19/2025   Most Recent Admission Diagnosis: Acute respiratory failure with hypoxia (H) - J96.01  Pneumonia due to infectious organism, unspecified laterality, unspecified part of lung - J18.9     Most Recent Discharge Date: 7/22/2025   Most Recent Discharge Diagnosis: Acute respiratory failure with hypoxia (H) - J96.01  Pneumonia due to infectious organism, unspecified laterality, unspecified part of lung - J18.9  COPD exacerbation (H) - J44.1     Transitions of Care Assessment    Discharge Assessment  How are you doing now that you are home?: pt stated that she is feeling better.  How are your symptoms? (Red Flag symptoms escalate to triage hotline per guidelines): Improved  Do you know how to contact your clinic care team if you have future questions or changes to your health status? : Yes  Does the patient have their discharge instructions? : Yes  Does the patient have questions regarding their discharge instructions? : No  Were you started on any new medications or were there changes to any of your previous medications? : Yes  Does the patient have all of their medications?: Yes  Do you have questions regarding any of your medications? : No  Do you have all of your needed medical supplies or equipment (DME)?  (i.e. oxygen tank, CPAP, cane, etc.): Yes    Post-op ([facility] CTA Only)  If the patient had a surgery or procedure, do they have any questions for a nurse?: No         [facility] Explained and offered Care Coordination support to eligible patients: Yes    Patient accepted? No    Follow up Plan     Discharge Follow-Up  Discharge follow up appointment scheduled in alignment with recommended follow up timeframe or Transitions of Risk Category? (Low = within 30 days; Moderate= within 14 days; High= within 7 days): Yes  Discharge Follow Up  Appointment Date: 07/31/25  Discharge Follow Up Appointment Scheduled with?: Primary Care Provider    Future Appointments   Date Time Provider Department Center   7/30/2025  1:30 PM [facility] CHEMO LAB DRAW 1 [facility]   7/30/2025  2:00 PM Herbert Foster MD [facility]   7/31/2025  2[facility]30 [facility]   8/6/2025  2:30 PM [facility]   8/26/2025 12:30 PM Malvin De Leon MD [facility]       Outpatient Plan as outlined on AVS reviewed with patient.      For any urgent concerns, please contact our 24 hour nurse triage line: [phone]         Lavinia, W  [phone]  [facility]

## 2025-07-24 LAB
BACTERIA SPEC CULT: NO GROWTH
BACTERIA SPEC CULT: NORMAL

## 2025-07-25 LAB — BACTERIA SPEC CULT: NO GROWTH

## 2025-07-27 ENCOUNTER — HEALTH MAINTENANCE LETTER (OUTPATIENT)
Age: 75
End: 2025-07-27

## 2025-07-30 ENCOUNTER — ONCOLOGY VISIT (OUTPATIENT)
Dept: ONCOLOGY | Facility: HOSPITAL | Age: 75
End: 2025-07-30
Payer: MEDICARE

## 2025-07-30 VITALS
HEART RATE: 70 BPM | SYSTOLIC BLOOD PRESSURE: 131 MMHG | HEIGHT: 65 IN | DIASTOLIC BLOOD PRESSURE: 61 MMHG | OXYGEN SATURATION: 96 % | TEMPERATURE: 98.9 F | BODY MASS INDEX: 25.72 KG/M2 | RESPIRATION RATE: 18 BRPM | WEIGHT: 154.4 LBS

## 2025-07-30 DIAGNOSIS — J44.0 CHRONIC OBSTRUCTIVE PULMONARY DISEASE WITH ACUTE LOWER RESPIRATORY INFECTION (H): ICD-10-CM

## 2025-07-30 DIAGNOSIS — C34.91 NON-SMALL CELL CANCER OF RIGHT LUNG (H): Primary | ICD-10-CM

## 2025-07-30 PROCEDURE — 99214 OFFICE O/P EST MOD 30 MIN: CPT | Performed by: INTERNAL MEDICINE

## 2025-07-30 PROCEDURE — G0463 HOSPITAL OUTPT CLINIC VISIT: HCPCS | Performed by: INTERNAL MEDICINE

## 2025-07-30 PROCEDURE — G2211 COMPLEX E/M VISIT ADD ON: HCPCS | Performed by: INTERNAL MEDICINE

## 2025-07-30 ASSESSMENT — PAIN SCALES - GENERAL: PAINLEVEL_OUTOF10: MILD PAIN (3)

## 2025-07-30 NOTE — PROGRESS NOTES
[facility] Hematology and Oncology Progress Note    Patient: Rita Mancini  MRN: [id number]  Date of Service: Jul 30, 2025           Reason for visit         Problem List Items Addressed This Visit          Oncology    Non-small cell cancer of right lung, s/p radiation and chemotherapies, in remission since [id number] (H) - Primary    Relevant Orders    Comprehensive metabolic panel    CT Chest w/o Contrast       Respiratory/Allergy    Chronic obstructive pulmonary disease with acute lower respiratory infection (H)    Relevant Orders    Comprehensive metabolic panel    CT Chest w/o Contrast         Assessment      A very pleasant 75 year old  woman with non-small-cell lung cancer stage III treated with cisplatin and -16 and radiation and currently under active surveillance.  She was diagnosed in 09/2009. Finished her treatment in February 2010.  CT shows a stable radiation changes no signs of recurrence.  Some small airway disease noted on right lower lobe.  History of smoking.  Has COPD.  Uses budesonide daily.  Notes she was out of her inhaler for a little bit and her symptoms worsened but are now improving since restarting inhaler.  Mild renal insufficiency. Stable slightly dilated renal collecting system.  Recent CT shows severe right hydronephrosis, however, this has not changed much from her baseline for 5 years.  She was also seen by urology and they felt that there was some kind of a stenosis in the ureters which did not require any surgical intervention.  Rest of her baseline renal insufficiency is most likely secondary to her diabetes.  She is on losartan.  Is on Trulicity and notes 20 pounds weight loss in the last year  Note paroxysmal nocturnal dyspnea due to history of MONSERRAT not currently able to tolerate CPAP.  Notes daytime fatigue and frequent napping.  She is interested in pursuing implant  Patient notes she is very sedentary and does not move a lot.    Plan      As per the lung cancer is  concerned we will continue with annual CT scan follow-ups.  Discussed with Rita about pulmonary toilet.  She needs to get the phlegm out on a daily basis and keep the lungs clear of any impacted phlegm.  Continue good diet and exercise.  Continue use inhalers/nebulizers.  Continue with good management of diabetes that she is currently doing.  Follow-up with Dr. Borden routinely for primary care and preventive care needs.  The longitudinal plan of care for the diagnosis(es)/condition(s) as documented were addressed during this visit. Due to the added complexity in care, I will continue to support Rita in the subsequent management and with ongoing continuity of care.    Medical decision making      Patient presenting with COPD type changes on the CT scan.I reviewed notes from the recent hospitalization and emergency room visit.  Personally reviewed the images of the CT scan which showed a lot of bronchiectatic changes as well as pneumonia type changes on the right upper lobe and right lower lobe.  The right lower lobe findings are probably chronic.  Compared to the previous CT scan as well.  The mediastinal lymph nodes are slightly enlarged which are most likely reactive in nature.  I reviewed labs including CBC CMP UTI urine evaluation and sputum cultures etc.  Ordered labs and CTs.       Cancer Staging   No matching staging information was found for the patient.        History of present illness        Ms. Rita Mancini is a very pleasant 75 year old woman with a diagnosis of nonsmall cell lung cancer located in the right upper lobe measuring 4.2 cm in size, presenting with some shortness of breath and cough in 09/2009 and biopsy suggestive of adenocarcinoma including lymphnode biopsy confirming it is stage III disease.  Subsequent to that she was treated with cisplatin and -16 for 3 cycles and Taxotere for 2 cycles afterwards.  Subsequent to that she has been in remission.  She had been fine up until 11/2013  where a CT scan actually showed some congestion in the right lower lobe and then the PET scan showed that to be slightly hypermetabolic.  Therefore, she had that biopsied and that was negative. She had CT in September 2015 that revealed pleural effusion. This was tapped and was negative. About one litre of fluid was removed. She felt slightly better.    She was seen by pulmonary again for the fluid. Was then sent to Dr. Fair for pleural biopsy and pleurodhesis. That was done on 4/6/15. The biopsy was non malignant.      She has since been followed by me and pulmonary with CT scans.      Rita was admitted at [facility] on March 27, 2019 with septic shock.  She was found to have bilateral pneumonia but more so on the left side.  Needed to be intubated and ventilated.  Needed chest tube.  He was in the hospital for several days.  Repeat hospitalization.  She was  again in the hospital 31 July with some coughing and shortness of breath.  Found to have another infiltrate in the right lower lobe.    Comes in today for scheduled follow-up.  Had a visit to the emergency room on 19 July 2025 due to worsening shortness of breath.  Might be triggered by exposure [place]  CT scan did show findings consistent with bronchitis and perhaps a little bit of pneumonia.  She was in the hospital for few days.  Urine culture did show E. coli.  Was put on some antibiotics and inhalers.  Doing little bit better compared to before.      Past medical history      Past Medical History:   Diagnosis Date    Age-related cataract 12/06/2021    Anemia     Anxiety and depression 01/01/1962    Bigeminy 03/17/2019    Bronchiectasis without complication (H)     Chronic cough     Chronic maxillary sinusitis     Chronic pleural effusion 01/15/2015    CKD (chronic kidney disease) stage 3, GFR 30-59 ml/min (H)     COPD (chronic obstructive pulmonary disease) (H) 01/01/2009    Depression     Diverticulosis      Eczema of both hands     Functional diarrhea 12/31/2018    GERD (gastroesophageal reflux disease)     History of alcoholism (H)     History of lung cancer 01/22/2025    History of syncope 04/24/2024    Hx antineoplastic chemotherapy     lung cancer     Hx of radiation therapy     lung cancer     Hydronephrosis     right kidney since 2019    Hyperlipidemia LDL goal <100 03/06/2023    Hypothyroid     Infection due to 2019 novel coronavirus 03/06/2023    Lung cancer (H) 01/01/2008    RUL,  Non small cell cancer    Macular degeneration (senile) of retina     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    Orthostatic hypotension 10/22/2024    Osteopenia     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, subsequent encounter 04/08/2019    Pleural effusion 01/01/2015    Sepsis due to Escherichia coli with acute renal failure without septic shock, unspecified acute renal failure type (H)     Sleep apnea 01/01/2010    does not use cpap    Type 2 diabetes, HbA1C goal < 8% (H)     Umbilical hernia with obstruction 01/23/2024       Past Surgical History:   Procedure Laterality Date    CATARACT IOL, RT/LT Bilateral 12/2021    DAVINCI XI HERNIORRHAPHY VENTRAL N/A 2/16/2024    Procedure: HERNIORRHAPHY, VENTRAL, ROBOT-ASSISTED, LAPAROSCOPIC, USING DA PAUL XI;  Surgeon: Tyler Rossi MD;  [facility] MISCELLANEOUS PROCEDURE  12/10/2009    PORTACATH PLACEMENT      and removal    THORACOSCOPY Right 04/06/2015    Procedure: RIGHT THORACOSCOPY / BIOPSY PLEURAL / TALC PLEURODESIS;  Surgeon: Michi Ma MD;  Location: [facility] OR;  Service:     THORACOSCOPY Left 03/29/2019    Procedure: LEFT THORACOSCOPY WITH DECORTICATION;  Surgeon: Michi Ma MD;  Location: [facility] OR;  Service: General    TONSILLECTOMY  age 5    URETERAL STENT PLACEMENT Right 06/01/2010    US THORACENTESIS  03/27/2019       PresentReview of system      Details noted in the  "history of present illness.  A detailed review of systems is otherwise negative.      Physical exam        /61   Pulse 70   Temp 98.9  F (37.2  C)   Resp 18   Ht 1.651 m (5' 5\")   Wt 70 kg (154 lb 6.4 oz)   LMP  (LMP Unknown)   SpO2 96%   BMI 25.69 kg/m      GENERAL: No acute distress. Cooperative in conversation.   HEENT:  Pupils are equal, round and reactive. Oral mucosa is clean and intact. No ulcerations or mucositis noted. No bleeding noted.  RESP:Chest symmetric lungs are clear bilaterally per auscultation. Regular respiratory rate.  Coarse rhonchi bilaterally.  Some wheezing as well.  CV: Normal S1 S2 Regular, rate and rhythm.     ABD: Nondistended, soft, nontender. Positive bowel sounds. No organomegaly.   EXTREMITIES: No lower extremity edema.   NEURO: Non- focal. Alert and oriented x3.  Cranial nerves appear intact.  PSYCH: Within normal limits. No depression or anxiety.  SKIN: Warm dry intact.      Lab results Reviewed      No results found for this or any previous visit (from the past week).    Imaging results Reviewed        CT Chest Pulmonary Embolism w Contrast  Result Date: 7/20/2025  EXAM: CT CHEST PULMONARY EMBOLISM W CONTRAST LOCATION: [facility] DATE: 07/20/2025 INDICATION: Shortness of breath, hypoxia, + dimer. ? PE, pneumonia, or other abnormality. COMPARISON: Chest CT on 07/01/2024. TECHNIQUE: CT chest pulmonary angiogram during arterial phase injection of IV contrast. Multiplanar reformats and MIP reconstructions were performed. Dose reduction techniques were used. CONTRAST: Iopamidol (Isovue 370) solution 57 mL. FINDINGS: ANGIOGRAM CHEST: No evidence of pulmonary embolism. No evidence of thoracic aortic aneurysm or dissection. No evidence of right heart strain. LUNGS AND PLEURA: No pleural effusion or pneumothorax. Patchy consolidative opacity in the right upper lobe, likely infectious. Posterior bibasilar pulmonary opacities, right " more than left, could be atelectatic or infectious. Multiple areas of bronchial/bronchiolar wall thickening and scattered areas of bronchiolar mucoid impaction, can be seen with bronchitis/bronchiolitis. Area of architectural distortion in the medial aspect of the right lung apex, likely represents post-treatment changes. 1.1 cm right lower lobe nodule (series 6 image 185), not significantly changed as compared to 07/01/2024. MEDIASTINUM/AXILLAE: No cardiomegaly or significant pericardial effusion. Multiple enlarged mediastinal and hilar lymph nodes; for example, there is a 1.9 cm short axis subcarinal lymph node (series 4 image 134), increased in size as compared to the 07/01/2024 exam when it measured 1.1 cm, not significantly changed as compared to the 07/01/2024 exam. CORONARY ARTERY CALCIFICATION: None. UPPER ABDOMEN: No pleural effusion or pneumothorax. 4.5 cm cyst at the upper pole of the right kidney and 1.3 cm cyst at the upper pole of the left kidney. MUSCULOSKELETAL: No suspicious osseous lesion.     IMPRESSION: 1.  No evidence of pulmonary embolism. 2.  Patchy consolidative pulmonary opacity in the right upper lobe, likely infectious. 3.  Multiple areas of bronchial/bronchiolar wall thickening and scattered areas of bronchiolar mucoid impaction, can be seen with bronchitis/bronchiolitis. 4.  A 1.1 cm right lower lobe nodule, not significantly changed as compared to the 07/01/2024 exam. 5.  Multiple enlarged mediastinal and hilar lymph nodes, slightly increased in size as compared to the 07/01/2024 exam, indeterminate, could be reactive or metastatic.        Signed by: Herbert Foster MD      This note has been dictated using voice recognition software. Any grammatical or context distortions are unintentional and inherent to the software

## 2025-07-30 NOTE — PROGRESS NOTES
"Oncology Rooming Note    July 30, 2025 1:37 PM   Rita Mancini is a 75 year old female who presents for:    Chief Complaint   Patient presents with    Oncology Clinic Visit     History of lung cancer       Initial Vitals: /61   Pulse 70   Temp 98.9  F (37.2  C)   Resp 18   Ht 1.651 m (5' 5\")   Wt 70 kg (154 lb 6.4 oz)   LMP  (LMP Unknown)   SpO2 96%   BMI 25.69 kg/m   Estimated body mass index is 25.69 kg/m  as calculated from the following:    Height as of this encounter: 1.651 m (5' 5\").    Weight as of this encounter: 70 kg (154 lb 6.4 oz). Body surface area is 1.79 meters squared.  Mild Pain (3) Comment: Data Unavailable   No LMP recorded (lmp unknown). Patient is postmenopausal.  Allergies reviewed: Yes  Medications reviewed: Yes    Medications: Medication refills not needed today.  Pharmacy name entered into [employer]:    [id number] - [place], [place] - [address]  [place], [place] - [address]    PHQ9:  Did this patient require a PHQ9?: No      Clinical concerns: Sore throat, and tired       Martine Matta LPN             "

## 2025-07-30 NOTE — LETTER
"7/30/2025      Rita Mancini  [address]  [place] [address]      Dear Colleague,    Thank you for referring your patient, Rita Mancini, to the [facility]. Please see a copy of my visit note below.    Oncology Rooming Note    July 30, 2025 1:37 PM   Rita Mancini is a 75 year old female who presents for:    Chief Complaint   Patient presents with     Oncology Clinic Visit     History of lung cancer       Initial Vitals: /61   Pulse 70   Temp 98.9  F (37.2  C)   Resp 18   Ht 1.651 m (5' 5\")   Wt 70 kg (154 lb 6.4 oz)   LMP  (LMP Unknown)   SpO2 96%   BMI 25.69 kg/m   Estimated body mass index is 25.69 kg/m  as calculated from the following:    Height as of this encounter: 1.651 m (5' 5\").    Weight as of this encounter: 70 kg (154 lb 6.4 oz). Body surface area is 1.79 meters squared.  Mild Pain (3) Comment: Data Unavailable   No LMP recorded (lmp unknown). Patient is postmenopausal.  Allergies reviewed: Yes  Medications reviewed: Yes    Medications: Medication refills not needed today.  Pharmacy name entered into [facility]:    [id number] - [place], [place] - [address]  [place] - [address]    PHQ9:  Did this patient require a PHQ9?: No      Clinical concerns: Sore throat, and tired       Martine Matta LPN               [facility]ematology [facility] Progress Note    Patient: Rita Mancini  MRN: [id number]  Date of Service: Jul 30, 2025           Reason for visit         Problem List Items Addressed This Visit          Oncology    Non-small cell cancer of right lung, s/p radiation and chemotherapies, in remission since [id number] (H) - Primary    Relevant Orders    Comprehensive metabolic panel    CT Chest w/o Contrast       Respiratory/Allergy    Chronic obstructive pulmonary disease with acute lower respiratory infection (H)    Relevant Orders    Comprehensive metabolic panel    CT Chest w/o " Contrast         Assessment      A very pleasant 75 year old  woman with non-small-cell lung cancer stage III treated with cisplatin and -16 and radiation and currently under active surveillance.  She was diagnosed in 09/2009. Finished her treatment in February 2010.  CT shows a stable radiation changes no signs of recurrence.  Some small airway disease noted on right lower lobe.  History of smoking.  Has COPD.  Uses budesonide daily.  Notes she was out of her inhaler for a little bit and her symptoms worsened but are now improving since restarting inhaler.  Mild renal insufficiency. Stable slightly dilated renal collecting system.  Recent CT shows severe right hydronephrosis, however, this has not changed much from her baseline for 5 years.  She was also seen by urology and they felt that there was some kind of a stenosis in the ureters which did not require any surgical intervention.  Rest of her baseline renal insufficiency is most likely secondary to her diabetes.  She is on losartan.  Is on Trulicity and notes 20 pounds weight loss in the last year  Note paroxysmal nocturnal dyspnea due to history of MONSERRAT not currently able to tolerate CPAP.  Notes daytime fatigue and frequent napping.  She is interested in pursuing implant  Patient notes she is very sedentary and does not move a lot.    Plan      As per the lung cancer is concerned we will continue with annual CT scan follow-ups.  Discussed with Rita about pulmonary toilet.  She needs to get the phlegm out on a daily basis and keep the lungs clear of any impacted phlegm.  Continue good diet and exercise.  Continue use inhalers/nebulizers.  Continue with good management of diabetes that she is currently doing.  Follow-up with Dr. Borden routinely for primary care and preventive care needs.  The longitudinal plan of care for the diagnosis(es)/condition(s) as documented were addressed during this visit. Due to the added complexity in care, I will continue to  support Rita in the subsequent management and with ongoing continuity of care.    Medical decision making      Patient presenting with COPD type changes on the CT scan.I reviewed notes from the recent hospitalization and emergency room visit.  Personally reviewed the images of the CT scan which showed a lot of bronchiectatic changes as well as pneumonia type changes on the right upper lobe and right lower lobe.  The right lower lobe findings are probably chronic.  Compared to the previous CT scan as well.  The mediastinal lymph nodes are slightly enlarged which are most likely reactive in nature.  I reviewed labs including CBC CMP UTI urine evaluation and sputum cultures etc.  Ordered labs and CTs.       Cancer Staging   No matching staging information was found for the patient.        History of present illness        Ms. Rita Mancini is a very pleasant 75 year old woman with a diagnosis of nonsmall cell lung cancer located in the right upper lobe measuring 4.2 cm in size, presenting with some shortness of breath and cough in 09/2009 and biopsy suggestive of adenocarcinoma including lymphnode biopsy confirming it is stage III disease.  Subsequent to that she was treated with cisplatin and -16 for 3 cycles and Taxotere for 2 cycles afterwards.  Subsequent to that she has been in remission.  She had been fine up until 11/2013 where a CT scan actually showed some congestion in the right lower lobe and then the PET scan showed that to be slightly hypermetabolic.  Therefore, she had that biopsied and that was negative. She had CT in September 2015 that revealed pleural effusion. This was tapped and was negative. About one litre of fluid was removed. She felt slightly better.    She was seen by pulmonary again for the fluid. Was then sent to Dr. Fair for pleural biopsy and pleurodhesis. That was done on 4/6/15. The biopsy was non malignant.      She has since been followed by me and pulmonary with CT scans.       Rita was admitted at [facility] on March 27, 2019 with septic shock.  She was found to have bilateral pneumonia but more so on the left side.  Needed to be intubated and ventilated.  Needed chest tube.  He was in the hospital for several days.  Repeat hospitalization.  She was  again in the hospital 31 July with some coughing and shortness of breath.  Found to have another infiltrate in the right lower lobe.    Comes in today for scheduled follow-up.  Had a visit to the emergency room on 19 July 2025 due to worsening shortness of breath.  Might be triggered by exposure to [place] smoke when she went up [place].  CT scan did show findings consistent with bronchitis and perhaps a little bit of pneumonia.  She was in the hospital for few days.  Urine culture did show E. coli.  Was put on some antibiotics and inhalers.  Doing little bit better compared to before.      Past medical history      Past Medical History:   Diagnosis Date     Age-related cataract 12/06/2021     Anemia      Anxiety and depression 01/01/1962     Bigeminy 03/17/2019     Bronchiectasis without complication (H)      Chronic cough      Chronic maxillary sinusitis      Chronic pleural effusion 01/15/2015     CKD (chronic kidney disease) stage 3, GFR 30-59 ml/min (H)      COPD (chronic obstructive pulmonary disease) (H) 01/01/2009     Depression      Diverticulosis      Eczema of both hands      Functional diarrhea 12/31/2018     GERD (gastroesophageal reflux disease)      History of alcoholism (H)      History of lung cancer 01/22/2025     History of syncope 04/24/2024     Hx antineoplastic chemotherapy     lung cancer      Hx of radiation therapy     lung cancer      Hydronephrosis     right kidney since 2019     Hyperlipidemia LDL goal <100 03/06/2023     Hypothyroid      Infection due to 2019 novel coronavirus 03/06/2023     Lung cancer (H) 01/01/2008    RUL,  Non small cell cancer     Macular degeneration (senile) of retina       "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     Orthostatic hypotension 10/22/2024     Osteopenia      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, subsequent encounter 04/08/2019     Pleural effusion 01/01/2015     Sepsis due to Escherichia coli with acute renal failure without septic shock, unspecified acute renal failure type (H)      Sleep apnea 01/01/2010    does not use cpap     Type 2 diabetes, HbA1C goal < 8% (H)      Umbilical hernia with obstruction 01/23/2024       Past Surgical History:   Procedure Laterality Date     CATARACT IOL, RT/LT Bilateral 12/2021     DAVINCI XI HERNIORRHAPHY VENTRAL N/A 2/16/2024    Procedure: HERNIORRHAPHY, VENTRAL, ROBOT-ASSISTED, LAPAROSCOPIC, USING DA PAUL XI;  Surgeon: Tyler Rossi MD;  Location: [facility] MISCELLANEOUS PROCEDURE  12/10/2009     PORTACATH PLACEMENT      and removal     THORACOSCOPY Right 04/06/2015    Procedure: RIGHT THORACOSCOPY / BIOPSY PLEURAL / TALC PLEURODESIS;  Surgeon: Michi Ma MD;  Location: [facility];  Service:      THORACOSCOPY Left 03/29/2019    Procedure: LEFT THORACOSCOPY WITH DECORTICATION;  Surgeon: Michi Ma MD;  Location: [facility];  Service: General     TONSILLECTOMY  age 5     URETERAL STENT PLACEMENT Right 06/01/2010     US THORACENTESIS  03/27/2019       PresentReview of system      Details noted in the history of present illness.  A detailed review of systems is otherwise negative.      Physical exam        /61   Pulse 70   Temp 98.9  F (37.2  C)   Resp 18   Ht 1.651 m (5' 5\")   Wt 70 kg (154 lb 6.4 oz)   LMP  (LMP Unknown)   SpO2 96%   BMI 25.69 kg/m      GENERAL: No acute distress. Cooperative in conversation.   HEENT:  Pupils are equal, round and reactive. Oral mucosa is clean and intact. No ulcerations or mucositis noted. No bleeding noted.  RESP:Chest symmetric lungs are clear bilaterally per auscultation. Regular respiratory " rate.  Coarse rhonchi bilaterally.  Some wheezing as well.  CV: Normal S1 S2 Regular, rate and rhythm.     ABD: Nondistended, soft, nontender. Positive bowel sounds. No organomegaly.   EXTREMITIES: No lower extremity edema.   NEURO: Non- focal. Alert and oriented x3.  Cranial nerves appear intact.  PSYCH: Within normal limits. No depression or anxiety.  SKIN: Warm dry intact.      Lab results Reviewed      No results found for this or any previous visit (from the past week).    Imaging results Reviewed        CT Chest Pulmonary Embolism w Contrast  Result Date: 7/20/2025  EXAM: CT CHEST PULMONARY EMBOLISM W CONTRAST LOCATION: [facility] DATE: 07/20/2025 INDICATION: Shortness of breath, hypoxia, + dimer. ? PE, pneumonia, or other abnormality. COMPARISON: Chest CT on 07/01/2024. TECHNIQUE: CT chest pulmonary angiogram during arterial phase injection of IV contrast. Multiplanar reformats and MIP reconstructions were performed. Dose reduction techniques were used. CONTRAST: Iopamidol (Isovue 370) solution 57 mL. FINDINGS: ANGIOGRAM CHEST: No evidence of pulmonary embolism. No evidence of thoracic aortic aneurysm or dissection. No evidence of right heart strain. LUNGS AND PLEURA: No pleural effusion or pneumothorax. Patchy consolidative opacity in the right upper lobe, likely infectious. Posterior bibasilar pulmonary opacities, right more than left, could be atelectatic or infectious. Multiple areas of bronchial/bronchiolar wall thickening and scattered areas of bronchiolar mucoid impaction, can be seen with bronchitis/bronchiolitis. Area of architectural distortion in the medial aspect of the right lung apex, likely represents post-treatment changes. 1.1 cm right lower lobe nodule (series 6 image 185), not significantly changed as compared to 07/01/2024. MEDIASTINUM/AXILLAE: No cardiomegaly or significant pericardial effusion. Multiple enlarged mediastinal and hilar lymph  nodes; for example, there is a 1.9 cm short axis subcarinal lymph node (series 4 image 134), increased in size as compared to the 07/01/2024 exam when it measured 1.1 cm, not significantly changed as compared to the 07/01/2024 exam. CORONARY ARTERY CALCIFICATION: None. UPPER ABDOMEN: No pleural effusion or pneumothorax. 4.5 cm cyst at the upper pole of the right kidney and 1.3 cm cyst at the upper pole of the left kidney. MUSCULOSKELETAL: No suspicious osseous lesion.     IMPRESSION: 1.  No evidence of pulmonary embolism. 2.  Patchy consolidative pulmonary opacity in the right upper lobe, likely infectious. 3.  Multiple areas of bronchial/bronchiolar wall thickening and scattered areas of bronchiolar mucoid impaction, can be seen with bronchitis/bronchiolitis. 4.  A 1.1 cm right lower lobe nodule, not significantly changed as compared to the 07/01/2024 exam. 5.  Multiple enlarged mediastinal and hilar lymph nodes, slightly increased in size as compared to the 07/01/2024 exam, indeterminate, could be reactive or metastatic.        Signed by: Herbert Foster MD      This note has been dictated using voice recognition software. Any grammatical or context distortions are unintentional and inherent to the software        Again, thank you for allowing me to participate in the care of your patient.        Sincerely,        Herbert Foster MD    Electronically signed

## 2025-07-31 ENCOUNTER — OFFICE VISIT (OUTPATIENT)
Dept: INTERNAL MEDICINE | Facility: CLINIC | Age: 75
End: 2025-07-31
Payer: MEDICARE

## 2025-07-31 VITALS
DIASTOLIC BLOOD PRESSURE: 70 MMHG | RESPIRATION RATE: 15 BRPM | TEMPERATURE: 97.9 F | WEIGHT: 152.8 LBS | BODY MASS INDEX: 25.46 KG/M2 | HEART RATE: 84 BPM | OXYGEN SATURATION: 100 % | HEIGHT: 65 IN | SYSTOLIC BLOOD PRESSURE: 107 MMHG

## 2025-07-31 DIAGNOSIS — E13.9 DIABETES 1.5, MANAGED AS TYPE 2 (H): Primary | ICD-10-CM

## 2025-07-31 DIAGNOSIS — G47.33 OBSTRUCTIVE SLEEP APNEA SYNDROME: ICD-10-CM

## 2025-07-31 DIAGNOSIS — J44.0 CHRONIC OBSTRUCTIVE PULMONARY DISEASE WITH ACUTE LOWER RESPIRATORY INFECTION (H): ICD-10-CM

## 2025-07-31 DIAGNOSIS — B37.0 THRUSH: ICD-10-CM

## 2025-07-31 DIAGNOSIS — J32.0 CHRONIC MAXILLARY SINUSITIS: ICD-10-CM

## 2025-07-31 PROBLEM — J18.9 PNEUMONIA DUE TO INFECTIOUS ORGANISM, UNSPECIFIED LATERALITY, UNSPECIFIED PART OF LUNG: Status: RESOLVED | Noted: 2025-07-20 | Resolved: 2025-07-31

## 2025-07-31 PROBLEM — J96.01 ACUTE RESPIRATORY FAILURE WITH HYPOXIA (H): Status: RESOLVED | Noted: 2025-07-20 | Resolved: 2025-07-31

## 2025-07-31 PROCEDURE — G2211 COMPLEX E/M VISIT ADD ON: HCPCS | Performed by: INTERNAL MEDICINE

## 2025-07-31 PROCEDURE — 99214 OFFICE O/P EST MOD 30 MIN: CPT | Performed by: INTERNAL MEDICINE

## 2025-07-31 PROCEDURE — 3078F DIAST BP <80 MM HG: CPT | Performed by: INTERNAL MEDICINE

## 2025-07-31 PROCEDURE — 1125F AMNT PAIN NOTED PAIN PRSNT: CPT | Performed by: INTERNAL MEDICINE

## 2025-07-31 PROCEDURE — 3074F SYST BP LT 130 MM HG: CPT | Performed by: INTERNAL MEDICINE

## 2025-07-31 RX ORDER — FLUCONAZOLE 150 MG/1
150 TABLET ORAL
Qty: 3 TABLET | Refills: 0 | Status: SHIPPED | OUTPATIENT
Start: 2025-07-31 | End: 2025-08-06

## 2025-07-31 ASSESSMENT — PAIN SCALES - GENERAL: PAINLEVEL_OUTOF10: MILD PAIN (3)

## 2025-08-05 SDOH — HEALTH STABILITY: PHYSICAL HEALTH: ON AVERAGE, HOW MANY MINUTES DO YOU ENGAGE IN EXERCISE AT THIS LEVEL?: 10 MIN

## 2025-08-05 SDOH — HEALTH STABILITY: PHYSICAL HEALTH: ON AVERAGE, HOW MANY DAYS PER WEEK DO YOU ENGAGE IN MODERATE TO STRENUOUS EXERCISE (LIKE A BRISK WALK)?: 2 DAYS

## 2025-08-05 ASSESSMENT — PATIENT HEALTH QUESTIONNAIRE - PHQ9
SUM OF ALL RESPONSES TO PHQ QUESTIONS 1-9: 5
10. IF YOU CHECKED OFF ANY PROBLEMS, HOW DIFFICULT HAVE THESE PROBLEMS MADE IT FOR YOU TO DO YOUR WORK, TAKE CARE OF THINGS AT HOME, OR GET ALONG WITH OTHER PEOPLE: NOT DIFFICULT AT ALL
SUM OF ALL RESPONSES TO PHQ QUESTIONS 1-9: 5

## 2025-08-05 ASSESSMENT — SOCIAL DETERMINANTS OF HEALTH (SDOH): HOW OFTEN DO YOU GET TOGETHER WITH FRIENDS OR RELATIVES?: THREE TIMES A WEEK

## 2025-08-06 ENCOUNTER — OFFICE VISIT (OUTPATIENT)
Dept: INTERNAL MEDICINE | Facility: CLINIC | Age: 75
End: 2025-08-06
Payer: MEDICARE

## 2025-08-06 VITALS
SYSTOLIC BLOOD PRESSURE: 96 MMHG | DIASTOLIC BLOOD PRESSURE: 58 MMHG | OXYGEN SATURATION: 95 % | TEMPERATURE: 97.1 F | RESPIRATION RATE: 13 BRPM | HEIGHT: 65 IN | BODY MASS INDEX: 25.16 KG/M2 | WEIGHT: 151 LBS | HEART RATE: 68 BPM

## 2025-08-06 DIAGNOSIS — E13.9 DIABETES 1.5, MANAGED AS TYPE 2 (H): ICD-10-CM

## 2025-08-06 DIAGNOSIS — E03.9 HYPOTHYROIDISM, UNSPECIFIED TYPE: ICD-10-CM

## 2025-08-06 DIAGNOSIS — J44.1 COPD EXACERBATION (H): Primary | ICD-10-CM

## 2025-08-06 DIAGNOSIS — K21.9 GASTROESOPHAGEAL REFLUX DISEASE WITHOUT ESOPHAGITIS: ICD-10-CM

## 2025-08-06 DIAGNOSIS — E78.5 HYPERLIPIDEMIA LDL GOAL <100: ICD-10-CM

## 2025-08-06 DIAGNOSIS — N13.30 HYDRONEPHROSIS OF RIGHT KIDNEY: ICD-10-CM

## 2025-08-06 PROCEDURE — G2211 COMPLEX E/M VISIT ADD ON: HCPCS | Performed by: INTERNAL MEDICINE

## 2025-08-06 PROCEDURE — 1111F DSCHRG MED/CURRENT MED MERGE: CPT | Performed by: INTERNAL MEDICINE

## 2025-08-06 PROCEDURE — G0438 PPPS, INITIAL VISIT: HCPCS | Performed by: INTERNAL MEDICINE

## 2025-08-06 PROCEDURE — 99214 OFFICE O/P EST MOD 30 MIN: CPT | Mod: 25 | Performed by: INTERNAL MEDICINE

## 2025-08-06 PROCEDURE — 3078F DIAST BP <80 MM HG: CPT | Performed by: INTERNAL MEDICINE

## 2025-08-06 PROCEDURE — 3074F SYST BP LT 130 MM HG: CPT | Performed by: INTERNAL MEDICINE

## 2025-08-06 RX ORDER — VIT C/E/ZN/COPPR/LUTEIN/ZEAXAN 250MG-90MG
1 CAPSULE ORAL 2 TIMES DAILY
COMMUNITY

## 2025-08-06 RX ORDER — ALBUTEROL SULFATE 90 UG/1
1-2 INHALANT RESPIRATORY (INHALATION) EVERY 4 HOURS PRN
Qty: 18 G | Refills: 0 | Status: CANCELLED | OUTPATIENT
Start: 2025-08-06

## 2025-08-06 RX ORDER — TRIAMCINOLONE ACETONIDE 1 MG/G
CREAM TOPICAL 2 TIMES DAILY
Status: CANCELLED | OUTPATIENT
Start: 2025-08-06

## 2025-08-07 PROBLEM — Z85.118 HISTORY OF LUNG CANCER: Status: RESOLVED | Noted: 2025-01-22 | Resolved: 2025-08-07

## 2025-08-12 DIAGNOSIS — J44.1 COPD EXACERBATION (H): ICD-10-CM

## 2025-08-12 DIAGNOSIS — L30.9 DERMATITIS: Primary | ICD-10-CM

## 2025-08-12 RX ORDER — ALBUTEROL SULFATE 90 UG/1
1-2 INHALANT RESPIRATORY (INHALATION) EVERY 4 HOURS PRN
Qty: 18 G | Refills: 3 | Status: SHIPPED | OUTPATIENT
Start: 2025-08-12

## 2025-08-12 RX ORDER — TRIAMCINOLONE ACETONIDE 1 MG/G
CREAM TOPICAL 2 TIMES DAILY
Qty: 30 G | Refills: 3 | Status: SHIPPED | OUTPATIENT
Start: 2025-08-12

## 2025-08-14 DIAGNOSIS — H35.3231 EXUDATIVE AGE-RELATED MACULAR DEGENERATION OF BOTH EYES WITH ACTIVE CHOROIDAL NEOVASCULARIZATION (H): Primary | ICD-10-CM

## 2025-08-21 DIAGNOSIS — J44.9 CHRONIC OBSTRUCTIVE PULMONARY DISEASE, UNSPECIFIED COPD TYPE (H): ICD-10-CM

## 2025-08-21 RX ORDER — MONTELUKAST SODIUM 10 MG/1
10 TABLET ORAL AT BEDTIME
Qty: 90 TABLET | Refills: 2 | Status: SHIPPED | OUTPATIENT
Start: 2025-08-21

## 2025-08-26 ENCOUNTER — OFFICE VISIT (OUTPATIENT)
Dept: OPHTHALMOLOGY | Facility: CLINIC | Age: 75
End: 2025-08-26
Attending: OPHTHALMOLOGY
Payer: MEDICARE

## 2025-08-26 DIAGNOSIS — H35.3231 EXUDATIVE AGE-RELATED MACULAR DEGENERATION OF BOTH EYES WITH ACTIVE CHOROIDAL NEOVASCULARIZATION (H): Primary | ICD-10-CM

## 2025-08-26 PROCEDURE — 92134 CPTRZ OPH DX IMG PST SGM RTA: CPT | Mod: 26 | Performed by: OPHTHALMOLOGY

## 2025-08-26 PROCEDURE — 99213 OFFICE O/P EST LOW 20 MIN: CPT | Mod: 25 | Performed by: OPHTHALMOLOGY

## 2025-08-26 PROCEDURE — 92250 FUNDUS PHOTOGRAPHY W/I&R: CPT | Performed by: OPHTHALMOLOGY

## 2025-08-26 PROCEDURE — G0463 HOSPITAL OUTPT CLINIC VISIT: HCPCS | Mod: 25 | Performed by: OPHTHALMOLOGY

## 2025-08-26 PROCEDURE — 92134 CPTRZ OPH DX IMG PST SGM RTA: CPT | Performed by: OPHTHALMOLOGY

## 2025-08-26 PROCEDURE — 67028 INJECTION EYE DRUG: CPT | Mod: LT | Performed by: OPHTHALMOLOGY

## 2025-08-26 PROCEDURE — G0463 HOSPITAL OUTPT CLINIC VISIT: HCPCS | Performed by: OPHTHALMOLOGY

## 2025-08-26 PROCEDURE — 99207 FUNDUS PHOTOS OU (BOTH EYES): CPT | Performed by: OPHTHALMOLOGY

## 2025-08-26 PROCEDURE — 250N000011 HC RX IP 250 OP 636: Performed by: OPHTHALMOLOGY

## 2025-08-26 RX ADMIN — LIDOCAINE HYDROCHLORIDE 0.5 ML: 20 INJECTION, SOLUTION EPIDURAL; INFILTRATION; INTRACAUDAL; PERINEURAL at 13:10

## 2025-08-26 RX ADMIN — FARICIMAB 6 MG: 6 INJECTION, SOLUTION INTRAVITREAL at 13:10

## 2025-08-26 ASSESSMENT — TONOMETRY
OD_IOP_MMHG: 14
OS_IOP_MMHG: 15
IOP_METHOD: ICARE

## 2025-08-26 ASSESSMENT — VISUAL ACUITY
OS_CC+: +2
OD_CC: 20/20
OD_CC+: -1
CORRECTION_TYPE: GLASSES
METHOD: SNELLEN - LINEAR
OS_CC: 20/50

## 2025-08-26 ASSESSMENT — EXTERNAL EXAM - RIGHT EYE: OD_EXAM: NORMAL

## 2025-08-26 ASSESSMENT — REFRACTION_WEARINGRX
OS_ADD: +2.50
OD_AXIS: 010
OS_AXIS: 175
OD_SPHERE: PLANO
OS_CYLINDER: +0.25
OD_ADD: +2.50
OS_SPHERE: +0.50
OD_CYLINDER: +0.75

## 2025-08-26 ASSESSMENT — CUP TO DISC RATIO
OD_RATIO: 0.25
OS_RATIO: 0.25

## 2025-08-26 ASSESSMENT — SLIT LAMP EXAM - LIDS
COMMENTS: NORMAL
COMMENTS: NORMAL

## 2025-08-26 ASSESSMENT — EXTERNAL EXAM - LEFT EYE: OS_EXAM: NORMAL

## (undated) DEVICE — BINDER ABDOMINAL 3PANEL LG 45IN 08140345

## (undated) DEVICE — SU VICRYL+ 0 27 UR6 VLT VCP603H

## (undated) DEVICE — SU WND CLOSURE V-LOC PBT SZ 0 18" GS-21 VLOCN0326

## (undated) DEVICE — SYR 30ML LL W/O NDL 302832

## (undated) DEVICE — DAVINCI HOT SHEARS TIP COVER  400180

## (undated) DEVICE — PREP CHLORAPREP 26ML TINTED HI-LITE ORANGE 930815

## (undated) DEVICE — Device

## (undated) DEVICE — GLOVE BIOGEL PI ULTRATOUCH G SZ 6.0 42160

## (undated) DEVICE — SYR 50ML SLIP TIP W/O NDL 309654

## (undated) DEVICE — SOL WATER IRRIG 1000ML BOTTLE 2F7114

## (undated) DEVICE — DAVINCI XI OBTURATOR BLADELESS 8MM 470359

## (undated) DEVICE — PAD POS XL 1X20X40IN PINK PIGAZZI

## (undated) DEVICE — DECANTER VIAL 2006S

## (undated) DEVICE — LUBRICANT INST ELECTROLUBE EL101

## (undated) DEVICE — TUBING SMOKE EVAC PNEUMOCLEAR HIGH FLOW 0620050250

## (undated) DEVICE — CUSTOM PACK LAP CHOLE SBA5BLCHEA

## (undated) DEVICE — DAVINCI XI DRAPE ARM 470015

## (undated) DEVICE — DRAPE SHEET TABLE COVER KC 42301*

## (undated) DEVICE — DAVINCI XI DRAPE COLUMN 470341

## (undated) DEVICE — DRAPE U SPLIT 74X120" 29440

## (undated) DEVICE — DRAPE SHEET REV FOLD 3/4 9349

## (undated) DEVICE — GOWN LG DISP 9515

## (undated) DEVICE — NDL INSUFFLATION 13GA 120MM C2201

## (undated) DEVICE — DAVINCI XI SEAL UNIVERSAL 5-8MM 470361

## (undated) DEVICE — SU WND CLOSURE V-LOC 90 SZ 2-0 9" GS-22 VLOCM2145

## (undated) DEVICE — SUTURE VICRYL+ 4-0 UNDYED PS-2 VCP496H

## (undated) DEVICE — GLOVE UNDER INDICATOR PI SZ 6.5 LF 41665

## (undated) DEVICE — ANTIFOG SOLUTION SEE SHARP 150M TROCAR SWABS 30978

## (undated) DEVICE — GLOVE BIOGEL PI SZ 8.0 40880

## (undated) RX ORDER — EPHEDRINE SULFATE 50 MG/ML
INJECTION, SOLUTION INTRAMUSCULAR; INTRAVENOUS; SUBCUTANEOUS
Status: DISPENSED
Start: 2024-02-16

## (undated) RX ORDER — PROPOFOL 10 MG/ML
INJECTION, EMULSION INTRAVENOUS
Status: DISPENSED
Start: 2024-02-16

## (undated) RX ORDER — FENTANYL CITRATE 50 UG/ML
INJECTION, SOLUTION INTRAMUSCULAR; INTRAVENOUS
Status: DISPENSED
Start: 2024-02-16

## (undated) RX ORDER — LIDOCAINE HYDROCHLORIDE 10 MG/ML
INJECTION, SOLUTION EPIDURAL; INFILTRATION; INTRACAUDAL; PERINEURAL
Status: DISPENSED
Start: 2024-02-16

## (undated) RX ORDER — BUPIVACAINE HYDROCHLORIDE 2.5 MG/ML
INJECTION, SOLUTION EPIDURAL; INFILTRATION; INTRACAUDAL
Status: DISPENSED
Start: 2024-02-16